# Patient Record
Sex: FEMALE | Race: BLACK OR AFRICAN AMERICAN | NOT HISPANIC OR LATINO | Employment: OTHER | URBAN - METROPOLITAN AREA
[De-identification: names, ages, dates, MRNs, and addresses within clinical notes are randomized per-mention and may not be internally consistent; named-entity substitution may affect disease eponyms.]

---

## 2017-01-06 ENCOUNTER — TELEPHONE (OUTPATIENT)
Dept: INTERNAL MEDICINE | Facility: CLINIC | Age: 57
End: 2017-01-06

## 2017-01-06 NOTE — TELEPHONE ENCOUNTER
----- Message from Bhavani Boone sent at 1/6/2017  8:43 AM CST -----  Contact: pt  _  1st Request  _  2nd Request  _  3rd Request        Who: pt    Why: pt is following up on the back brace, knee    What Number to Call Back:[phone]    When to Expect a call back: (Before the end of the day)   -- if call after 3:00 call back will be tomorrow.

## 2017-01-06 NOTE — TELEPHONE ENCOUNTER
----- Message from Bhavani Boone sent at 1/6/2017  8:43 AM CST -----  Contact: pt  _  1st Request  _  2nd Request  _  3rd Request        Who: pt    Why: pt is following up on the back brace, knee    What Number to Call Back:[phone]    When to Expect a call back: (Before the end of the day)   -- if call after 3:00 call back will be tomorrow.

## 2017-01-13 DIAGNOSIS — E11.9 TYPE 2 DIABETES MELLITUS WITHOUT COMPLICATION: ICD-10-CM

## 2017-01-20 ENCOUNTER — CLINICAL SUPPORT (OUTPATIENT)
Dept: REHABILITATION | Facility: HOSPITAL | Age: 57
End: 2017-01-20
Attending: INTERNAL MEDICINE
Payer: MEDICARE

## 2017-01-20 DIAGNOSIS — M79.89 SWELLING OF LIMB: ICD-10-CM

## 2017-01-20 DIAGNOSIS — I89.0 LYMPHEDEMA: Primary | ICD-10-CM

## 2017-01-20 PROCEDURE — 97110 THERAPEUTIC EXERCISES: CPT

## 2017-01-20 PROCEDURE — 97140 MANUAL THERAPY 1/> REGIONS: CPT

## 2017-01-23 NOTE — PROGRESS NOTES
Physician: Dipesh  Diagnosis: L breast CA- B Mastectomy B SHLOMO reconstruction  Encounter Diagnoses   Name Primary?    Lymphedema Yes    Swelling of limb         Visit #: 4    Treatment: decongestive, therex, education, issued supplies    SUBJECTIVE: Pt was delayed due to transportation by bus and police activity on bridge.  Pt lost or misplaced her [facility] information. Pt has been wearing temporary compression stockinettes regularly.  Pt has some neck, arm, back and knee complaints.   Pt will contact [facility] for compression sleeve support again for financial support Pain: 8/10 all areas  OBJECTIVE:   Pt amb to dept with str cane- pt with some wincing and altering of positions for comfort    Wearing temporary compression sleeve L UE-  Issued several new segments of TGgrip F for her L UE- donated by therapist.  Pt was educated in fit, wear schedule, possible ordering of compression arm sleeve for L UE.    Treatment:   MANUAL LYMPHATIC DRAINAGE:  While supine with arm elevated,  Drainage along neck, B subclavian regions,  Across ant chest and top of shoulder,  Axillary region, drainage of entire UE especially upper arm and forearm areas of density, wrist and hand with return of all areas proximally,  In sidelying stimulation of substitution pathways,  drainage post arm, and across back and down along trunk  SEQUENTIAL COMPRESSION PUMP: na  MULTILAYERED BANDAGING: applied new segment of TGgrip F for daytime use of L UE  Dc with any problems  [facility], request L UE compression arm sleeve and gauntlet 20-30mmHg for lymphedema   THERAPEUTIC EXERCISES:  Pt needs some cueing and encouragement for therex, posture, and participation.  Instructed in and performed AAROM L SH, gentle distraction, scapular mobilizations, stretching for R UE  .Pt was instructed in and performed dowel assisted sh flex and abd,  Scapular retractions, pec stretch in corner or doorway, bow and arrow style stretch  of reach and rotate on table top- standing with back to wall for ext,  Scapular retractions   exercises were adapted as needed for sitting postures, lean on wall on sit at table for back, neck and LE support and comfort.  Pt with ROM limited by c/o in neck and back.    Cueing and encouragement as well as breathing cues required.  Pt was able to demonstrate and voice understanding of performance. , Continue HEP of AROM, stretching, and postural correction.   PATIENT/FAMILY Education: wear schedule,  HEP,  Beginning of self massage,  Risk reduction  ASSESSMENT: Pt needs guidance and support for her overall management.  Her arm is soft and mobile with mild limitations in ROM of her neck and sh.  Pt was issued temporary segments of compression if she is not able to afford or coordinate financial assistance to receive a sleeve.   Only mild lymphedema to L UE but needs more mobility which would assist drainage, help postural alignment, and contract/relax of nearby musculature.   Emotional support provided.   Pt has mild lymphedema in her L UE but feels benefit from compression.   Pt has OA of neck and postural dysfunction that has been limiting for some time.  Encouraging AROM, postures, and positions that will assist with mobility, function as well as comfort.   Patient is making     fair                  progress towards established goals.  PLAN: Continue PT  1-2x           weekly for Complete Decongestive Therapy:  Manual lymphatic drainage, Multilayered short stretch bandaging, Pneumatic compression, Therapeutic exercises, Patient education as deemed necessary to achieve stated goals.

## 2017-02-01 ENCOUNTER — CLINICAL SUPPORT (OUTPATIENT)
Dept: REHABILITATION | Facility: HOSPITAL | Age: 57
End: 2017-02-01
Attending: INTERNAL MEDICINE
Payer: MEDICARE

## 2017-02-01 DIAGNOSIS — I89.0 LYMPHEDEMA: ICD-10-CM

## 2017-02-01 DIAGNOSIS — I97.2 POST-MASTECTOMY LYMPHEDEMA SYNDROME: Primary | ICD-10-CM

## 2017-02-01 DIAGNOSIS — M79.89 SWELLING OF LIMB: ICD-10-CM

## 2017-02-01 PROCEDURE — 97140 MANUAL THERAPY 1/> REGIONS: CPT

## 2017-02-01 PROCEDURE — G8985 CARRY GOAL STATUS: HCPCS | Mod: CJ

## 2017-02-01 PROCEDURE — 97110 THERAPEUTIC EXERCISES: CPT

## 2017-02-01 PROCEDURE — G8986 CARRY D/C STATUS: HCPCS | Mod: CK

## 2017-02-01 NOTE — PROGRESS NOTES
Physician: Dipesh  Diagnosis: L breast CA- B Mastectomy B SHLOMO reconstruction  Encounter Diagnoses   Name Primary?    Post-mastectomy lymphedema syndrome Yes    Lymphedema     Swelling of limb         Visit #:5    Pt was evaluated 11/14/16 and followed for 5 sessions of Complete Decongestive Therapy.  Pt was issued temporary stockinette compression Tubigrip F- several segments donated by therapist  Pt has previously been given [facility] as resource to assist with cost of sleeve  Dr. Landon was requested to send orders for 20-30mmHg compression arm sleeve/gauntlet directly to [employer]- pt voiced interest in getting sleeve with them- she was educated that Medicare does not cover cost and [employer] may have different vendor.   Pt rescheduled 2 sessions.    Treatment: decongestive, therex, education, issued supplies    SUBJECTIVE: Pt uses bus transportation and was early today.  Pt relates some of her struggles with her psychiatric issues and does follow with mental health support and with medications.  Pt has requests for a neck, back, and knee braces with her medical doctors.  Pt has general and ongoing complaints of pain in these areas.  Pt has been wearing temporary compression stockinettes regularly.  Pt has some neck, arm, back and knee complaints.   Pt will contact [facility] for compression sleeve support again for financial support   Pt states she may call [facility] for arm sleeve.   Pain: 6- 8/10 all areas  OBJECTIVE:   Pt amb to dept with str cane- pt moving fairly well with transitions on/off plinth.   Pt will move her arms, neck, and upper back to adjust for comfort.     Wearing temporary compression sleeve L UE-  Issued several new segments of TGgrip F for her L UE- donated by therapist.  Pt was educated in fit, wear schedule, possible ordering of compression arm sleeve for L UE.  Vendor info and confirmed she had [employer] [facility] information.    Pt voices understanding management of lymphedema risk - should continue  "compression and exercise.        LANDMARK UE eval RIGHT 11/14/16 11/14/2016   LEFT UE  LEFT UE 2/1/17 DIFFERENCE of L UE   E + 8" 36.5 cm 35.5 cm 36.0 0.5cm   E + 6" 36.0 cm 35.0 cm 34.5 0.5 cm   E + 4" 35.0 cm 33.5 cm 33.0 0.5 cm   E + 2" 32.5 cm 33.0 cm 32.0 1.0 cm   Elbow 29.5 cm 30.5 cm 29.0 1.5 cm   W+ 8" 30.5 cm 31.0 cm 31.0 0 cm   W + 6" 28.5 cm 29.0 cm 29.0 0cm   W + 4" 22.5 cm 24.5 cm 23.5 1.0 cm   Wrist 17.0 cm 17.5 cm 17.0 0.5 cm   DPC 20.5 cm 20.5 cm 19.0 1.5cm   IP Thumb 6.5 cm 6.5 cm 6.5 0 cm          Treatment:   MANUAL LYMPHATIC DRAINAGE:  While supine with arm elevated,  Drainage along neck, B subclavian regions,  Across ant chest and top of shoulder,  Axillary region, drainage of entire UE especially upper arm and forearm areas of density, wrist and hand with return of all areas proximally,  In sidelying stimulation of substitution pathways,  drainage post arm, and across back and down along trunk  SEQUENTIAL COMPRESSION PUMP: na  MULTILAYERED BANDAGING: applied new segment of TGgrip F for daytime use of L UE  Dc with any problems  Reviewed information for [facility]- Pt has contact #, request L UE compression arm sleeve and gauntlet 20-30mmHg for lymphedema   THERAPEUTIC EXERCISES:  Pt needs some cueing and encouragement for therex, posture, and participation.  Instructed in and performed AAROM L SH, gentle distraction, scapular mobilizations, stretching for R UE  .Pt was instructed in and performed dowel assisted sh flex and abd,  Scapular retractions, pec stretch in corner or doorway, bow and arrow style stretch of reach and rotate on table top- standing with back to wall for ext,  Scapular retractions   exercises were adapted as needed for sitting postures, lean on wall on sit at table for back, neck and LE support and comfort.  Pt with ROM limited by c/o in neck and back.    Cueing and encouragement as well as breathing cues required.  Provided additional copies of her HEP and postural correction. "   Pt was able to demonstrate and voice understanding of performance. , Continue HEP of AROM, stretching, and postural correction.   PATIENT/FAMILY Education: wear schedule,  HEP,  Beginning of self massage,  Risk reduction  Compression choices and cost information  Emotional support provided.  ASSESSMENT: Pt needs guidance and support for her overall management.  Her arm is soft and mobile with mild limitations in ROM of her neck and sh.  Her L UE is smaller in some areas and is well maintained.  Pt may benefit from compression sleeve or ongoing use of segments of temporary compression if she is not able to afford or coordinate financial assistance to receive a sleeve.   Only mild lymphedema to L UE but needs more mobility which would assist drainage, help postural alignment, and contract/relax of nearby musculature.   Emotional support provided.   Pt has mild lymphedema in her L UE but she feels benefit from compression and seeks a sleeve.  Pt has OA of neck and postural dysfunction that has been limiting for some time.  Encouraging AROM, postures, and positions that will assist with mobility, function as well as comfort.     FUNCTIONAL LIMITATIONS REPORTS- G codes     Category: Carry, Moving & Handling     Tool:  FOTO      Score: 49/100     Current:       Goal:  CJ 20-40%     Discharge: : CK 40-60%       Goals: ( 4-6 weeks)  Decrease girth in L UE by .5-1cm  met  Patient will demonstrate 100% knowledge of lymphedema precautions and signs of infection.   Patient with use of compression stockinette or compression sleeve.   Patient will perform self lymph drainage techniques.  Patient will show reduction in density to mild or less with improved contour of limb.  Patient to namita/doff compression garment with daily compliance. - sleeve in process  Pt to be I and compliant with HEP.   Pt to show AROM of B UE to at least 110 * of flex and abd- able to perform- did show placement of her arm overhead on  pillow in flex/abd/er  Does have some wincing and postural adjustments.  Improved postural correction and alignment. - partial    PLAN  Pt has completed course of lymphedema management and exercise recommendations.  Pt has options for compression and cost assistance.   was requested to send orders directly to [facility] if pt chooses this vendor for her sleeve. .

## 2017-02-06 ENCOUNTER — TELEPHONE (OUTPATIENT)
Dept: HEMATOLOGY/ONCOLOGY | Facility: CLINIC | Age: 57
End: 2017-02-06

## 2017-02-16 ENCOUNTER — OFFICE VISIT (OUTPATIENT)
Dept: NEUROLOGY | Facility: CLINIC | Age: 57
End: 2017-02-16
Payer: MEDICARE

## 2017-02-16 ENCOUNTER — OFFICE VISIT (OUTPATIENT)
Dept: INTERNAL MEDICINE | Facility: CLINIC | Age: 57
End: 2017-02-16
Attending: INTERNAL MEDICINE
Payer: MEDICARE

## 2017-02-16 VITALS
DIASTOLIC BLOOD PRESSURE: 84 MMHG | WEIGHT: 195.31 LBS | BODY MASS INDEX: 34.61 KG/M2 | SYSTOLIC BLOOD PRESSURE: 132 MMHG | HEIGHT: 63 IN | HEART RATE: 80 BPM

## 2017-02-16 VITALS
SYSTOLIC BLOOD PRESSURE: 138 MMHG | HEART RATE: 82 BPM | DIASTOLIC BLOOD PRESSURE: 92 MMHG | BODY MASS INDEX: 33.98 KG/M2 | OXYGEN SATURATION: 97 % | WEIGHT: 191.81 LBS | HEIGHT: 63 IN

## 2017-02-16 DIAGNOSIS — M47.812 CERVICAL SPONDYLOSIS WITHOUT MYELOPATHY: ICD-10-CM

## 2017-02-16 DIAGNOSIS — G62.9 PERIPHERAL POLYNEUROPATHY: Primary | ICD-10-CM

## 2017-02-16 DIAGNOSIS — N30.01 ACUTE CYSTITIS WITH HEMATURIA: Primary | ICD-10-CM

## 2017-02-16 DIAGNOSIS — M54.16 LUMBAR RADICULITIS: ICD-10-CM

## 2017-02-16 DIAGNOSIS — M54.12 CERVICAL RADICULOPATHY: ICD-10-CM

## 2017-02-16 DIAGNOSIS — T45.1X5A CHEMOTHERAPY-INDUCED NEUROPATHY: ICD-10-CM

## 2017-02-16 DIAGNOSIS — G62.0 CHEMOTHERAPY-INDUCED NEUROPATHY: ICD-10-CM

## 2017-02-16 LAB
BACTERIA #/AREA URNS AUTO: ABNORMAL /HPF
BILIRUB UR QL STRIP: NEGATIVE
CLARITY UR REFRACT.AUTO: CLEAR
COLOR UR AUTO: YELLOW
GLUCOSE UR QL STRIP: NEGATIVE
HGB UR QL STRIP: ABNORMAL
HYALINE CASTS UR QL AUTO: 0 /LPF
KETONES UR QL STRIP: NEGATIVE
LEUKOCYTE ESTERASE UR QL STRIP: ABNORMAL
MICROSCOPIC COMMENT: ABNORMAL
NITRITE UR QL STRIP: NEGATIVE
PH UR STRIP: 6 [PH] (ref 5–8)
PROT UR QL STRIP: ABNORMAL
RBC #/AREA URNS AUTO: 16 /HPF (ref 0–4)
SP GR UR STRIP: 1.01 (ref 1–1.03)
SQUAMOUS #/AREA URNS AUTO: 1 /HPF
URN SPEC COLLECT METH UR: ABNORMAL
UROBILINOGEN UR STRIP-ACNC: NEGATIVE EU/DL
WBC #/AREA URNS AUTO: 50 /HPF (ref 0–5)

## 2017-02-16 PROCEDURE — 81001 URINALYSIS AUTO W/SCOPE: CPT

## 2017-02-16 PROCEDURE — 87086 URINE CULTURE/COLONY COUNT: CPT

## 2017-02-16 PROCEDURE — 99999 PR PBB SHADOW E&M-EST. PATIENT-LVL III: CPT | Mod: PBBFAC,,, | Performed by: INTERNAL MEDICINE

## 2017-02-16 PROCEDURE — 99999 PR PBB SHADOW E&M-EST. PATIENT-LVL III: CPT | Mod: PBBFAC,,, | Performed by: PSYCHIATRY & NEUROLOGY

## 2017-02-16 PROCEDURE — 87077 CULTURE AEROBIC IDENTIFY: CPT

## 2017-02-16 PROCEDURE — 3075F SYST BP GE 130 - 139MM HG: CPT | Mod: S$GLB,,, | Performed by: INTERNAL MEDICINE

## 2017-02-16 PROCEDURE — 3079F DIAST BP 80-89 MM HG: CPT | Mod: S$GLB,,, | Performed by: PSYCHIATRY & NEUROLOGY

## 2017-02-16 PROCEDURE — 99213 OFFICE O/P EST LOW 20 MIN: CPT | Mod: S$GLB,,, | Performed by: INTERNAL MEDICINE

## 2017-02-16 PROCEDURE — 99214 OFFICE O/P EST MOD 30 MIN: CPT | Mod: S$GLB,,, | Performed by: PSYCHIATRY & NEUROLOGY

## 2017-02-16 PROCEDURE — 3078F DIAST BP <80 MM HG: CPT | Mod: S$GLB,,, | Performed by: INTERNAL MEDICINE

## 2017-02-16 PROCEDURE — 3075F SYST BP GE 130 - 139MM HG: CPT | Mod: S$GLB,,, | Performed by: PSYCHIATRY & NEUROLOGY

## 2017-02-16 PROCEDURE — 87186 SC STD MICRODIL/AGAR DIL: CPT

## 2017-02-16 PROCEDURE — 87088 URINE BACTERIA CULTURE: CPT

## 2017-02-16 RX ORDER — NITROFURANTOIN (MACROCRYSTALS) 100 MG/1
100 CAPSULE ORAL 2 TIMES DAILY
Qty: 10 CAPSULE | Refills: 0 | Status: SHIPPED | OUTPATIENT
Start: 2017-02-16 | End: 2017-02-21

## 2017-02-16 RX ORDER — INSULIN PUMP SYRINGE, 3 ML
EACH MISCELLANEOUS
Qty: 1 EACH | Refills: 0 | Status: SHIPPED | OUTPATIENT
Start: 2017-02-16 | End: 2019-12-24

## 2017-02-16 RX ORDER — LANCETS
EACH MISCELLANEOUS
Qty: 100 EACH | Refills: 11 | Status: SHIPPED | OUTPATIENT
Start: 2017-02-16 | End: 2021-11-16 | Stop reason: SDUPTHER

## 2017-02-16 RX ORDER — PREGABALIN 100 MG/1
100 CAPSULE ORAL 2 TIMES DAILY
Qty: 60 CAPSULE | Refills: 6 | Status: SHIPPED | OUTPATIENT
Start: 2017-02-16 | End: 2017-03-24

## 2017-02-16 RX ORDER — PREGABALIN 100 MG/1
100 CAPSULE ORAL 2 TIMES DAILY
COMMUNITY
End: 2017-02-16 | Stop reason: SDUPTHER

## 2017-02-16 NOTE — PROGRESS NOTES
Subjective:       Patient ID: Jonathan Gonzales is a 57 y.o. female.    Reason for Consult: Peripheral Neuropathy; Neck Pain; and Back Pain      Interval History:  Jonathan Gonzales is here for follow up. Their condition is clinically stable from the peripheral neuropathy perspective.  She notes that the Lyrica at 100 mg by mouth twice a day is somewhat sedating however she notes that this is her significant relief of her neuropathy issues.  She does note that her neck and back pain have been flaring up.  She does note that she refused further intervention several months back for her neck and back pain issues but will go back and see [facility] management [facility] and see what her options are at this point in time.  She is going to get her HbA1c checked today.  Previously it was 5.4.     Objective:     Vitals:    02/16/17 1341   BP: 132/84   Pulse: 80     Patient is awake alert oriented to person place and time.  Spurling's is positive on the left.  There is a length dependent stocking glove neuropathy with decrement of sensation to pinprick and fine touch less than 70% of normal sensation worse distally than proximally.  Focused examination was undertaken today. Over 50% of face to face time of 25 minute visit time was in giving guidance, counseling and discussing treatment options.    Assessment:     1. Peripheral polyneuropathy  pregabalin (LYRICA) 100 MG capsule   2. Chemotherapy-induced neuropathy     3. Cervical spondylosis without myelopathy  pregabalin (LYRICA) 100 MG capsule   4. Cervical radiculopathy  pregabalin (LYRICA) 100 MG capsule   5. Lumbar radiculitis  pregabalin (LYRICA) 100 MG capsule       Plan:   57-year-old right-handed female presents for evaluation and follow-up of diabetes and chemotherapy related neuropathy.  At this point in time as this medication is helping her despite some mild sedation I will prescribe her this medication again.  She is deriving benefit  from this in excess of the intolerable side effects that she is experiencing.  We have had a discussion today stating that this is a medication that will relieve her symptoms but will not do anything to help with the underlying neuropathy that she may likely live with numbness and tingling for the rest of her life of which she verbalizes understanding.  We have discussed the risks, benefits and alternatives to the treatment plan as outlined above.  I will follow up with them in 6 month(s).  The patient verbalizes understanding and agreement with the treatment plan. Questions were sought and answered to her stated verbal satisfaction.        Franchesca Osuna MD    This note is dictated on Dragon Natural Speaking word recognition program. There are word recognition mistakes that are occasionally missed on review.

## 2017-02-16 NOTE — PROGRESS NOTES
"Subjective:       Patient ID: Jonathan Gonzales is a 57 y.o. female.    Chief Complaint: Urinary Frequency    HPI Comments: Here for urgent visit    Increased urinary frequency and urgency, lower abdominal discomfort, and hematuria for the past week. She denies F/C, dysuria, or vaginal discharge. She does not check her BG due to not having a meter any longer but her son recommended she increase her insulin so she increased her lantus to 35 units last night and she feels her symptoms have improved some today.        Review of Systems       Objective:      Vitals:    02/16/17 1502   BP: (!) 138/92   Pulse: 82   SpO2: 97%   Weight: 87 kg (191 lb 12.8 oz)   Height: 5' 3" (1.6 m)      Physical Exam   Constitutional: She is oriented to person, place, and time. She appears well-developed and well-nourished. She does not have a sickly appearance. No distress.   HENT:   Head: Normocephalic and atraumatic.   Eyes: Conjunctivae and EOM are normal. Right eye exhibits no discharge. Left eye exhibits no discharge. No scleral icterus.   Pulmonary/Chest: Effort normal. No respiratory distress.   Abdominal: Normal appearance. She exhibits no distension. There is no tenderness. There is no CVA tenderness.   Neurological: She is alert and oriented to person, place, and time.   Skin: Skin is warm and dry. She is not diaphoretic.   Psychiatric: She has a normal mood and affect. Her speech is normal.       Assessment:       1. Acute cystitis with hematuria        Plan:       Jonathan was seen today for urinary frequency.    Diagnoses and all orders for this visit:    Urinary urgency  -     Urinalysis  -     Urine culture  -     nitrofurantoin (MACRODANTIN) 100 MG capsule; Take 1 capsule (100 mg total) by mouth 2 (two) times daily.    Other orders  -     blood-glucose meter (FREESTYLE SYSTEM KIT) kit; Pt to check BG up to QID. Dispense strips compatible with pt brand meter  -     blood sugar diagnostic Strp; Pt to check BG up to QID. " Dispense strips compatible with pt brand meter  -     lancets (ONETOUCH ULTRASOFT LANCETS) Misc; Pt to check BG up to QID. Dispense strips compatible with pt brand meter                 Side effects of medication(s) were discussed in detail and patient voiced understanding.  Patient will call back for any issues or complications.

## 2017-02-17 ENCOUNTER — TELEPHONE (OUTPATIENT)
Dept: INTERNAL MEDICINE | Facility: CLINIC | Age: 57
End: 2017-02-17

## 2017-02-17 NOTE — TELEPHONE ENCOUNTER
----- Message from Ghulam Contreras MD sent at 2/17/2017 12:59 PM CST -----  A1c has risen slightly to 8.3.  Emphasize dietary and medication adherence.  No changes in medications.  Followup as scheduled.

## 2017-02-20 LAB — BACTERIA UR CULT: NORMAL

## 2017-02-24 ENCOUNTER — TELEPHONE (OUTPATIENT)
Dept: INTERNAL MEDICINE | Facility: CLINIC | Age: 57
End: 2017-02-24

## 2017-02-24 NOTE — TELEPHONE ENCOUNTER
----- Message from Anjana Pelletier sent at 2/24/2017  9:46 AM CST -----  Contact: Dr. Lundberg / [employer] / Ph# [phone]  X  1st Request  _  2nd Request  _  3rd Request    Who: Jonathan Gonzales (mrn# [id number])    Why: Please give a call back at your earliest convenience to Dr. Lundberg with [employer] as it pertains to this patient. THANKS!    What Number to Call Back: [phone]    When to Expect a call back: (Before the end of the day)   -- if the call is after 12:00, the call back will be tomorrow.

## 2017-02-24 NOTE — TELEPHONE ENCOUNTER
Dr. Lundberg is requesting a call to discuss disability hearing for the pt. Dr. Lundberg states he wanted to discuss pt LOV and f/u on questions and concerns about pt status. Dr. Lundberg informed of pt PCP unavailable. Dr. Lundberg states he would like to discuss more with the covering physician. Please advise/authorize?

## 2017-02-25 ENCOUNTER — HOSPITAL ENCOUNTER (EMERGENCY)
Facility: HOSPITAL | Age: 57
Discharge: HOME | End: 2017-02-26
Attending: EMERGENCY MEDICINE
Payer: MEDICAID

## 2017-02-25 DIAGNOSIS — R52 PAIN: ICD-10-CM

## 2017-02-25 DIAGNOSIS — N39.0 ACUTE UTI: ICD-10-CM

## 2017-02-25 DIAGNOSIS — E86.0 DEHYDRATION: ICD-10-CM
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
## 2017-02-25 DIAGNOSIS — M25.552 LEFT HIP PAIN: Primary | ICD-10-CM

## 2017-02-25 DIAGNOSIS — I10 ESSENTIAL HYPERTENSION: ICD-10-CM

## 2017-02-25 LAB — POCT GLUCOSE: 90 MG/DL (ref 70–110)

## 2017-02-25 PROCEDURE — 99285 EMERGENCY DEPT VISIT HI MDM: CPT | Mod: 25

## 2017-02-25 PROCEDURE — 82962 GLUCOSE BLOOD TEST: CPT

## 2017-02-25 PROCEDURE — 96361 HYDRATE IV INFUSION ADD-ON: CPT

## 2017-02-25 PROCEDURE — 96360 HYDRATION IV INFUSION INIT: CPT

## 2017-02-25 NOTE — ED AVS SNAPSHOT
[facility]  [address] [place] [address]               Jonathan Gonzales   2017 10:52 PM   ED    Description:  Female : 1960   Department:  [facility]           Your Care was Coordinated By:     Provider Role From To    Leticia Mcclelland MD Attending Provider [id number] --      Reason for Visit     Hip Pain           Diagnoses this Visit        Comments    Left hip pain    -  Primary     Pain         Fall, initial encounter         Acute UTI           ED Disposition     None           To Do List           Follow-up Information     Follow up with Ghulam Contreras MD In 2 days.    Specialty:  Family Medicine    Contact information:    [address]  [address]  [facility] [address]  [phone]          Follow up with Shiraz Hawley MD.    Specialty:  Orthopedic Surgery    Why:  If symptoms worsen, or do not improve    Contact information:    [address]  [address]  [place] [address]  [phone]         These Medications        Disp Refills Start End    meloxicam (MOBIC) 7.5 MG tablet 10 tablet 0 2017     Take 1 tablet (7.5 mg total) by mouth once daily. - Oral    Pharmacy: [id number] - [place], [place] - [address] AT [facility] Ph #: [phone]       cephALEXin (KEFLEX) 500 MG capsule 28 capsule 0 2017 3/5/2017    Take 1 capsule (500 mg total) by mouth 4 (four) times daily. - Oral    Pharmacy: [id number] - [place], [place] - [address] AT [facility] Ph #: [phone]         [facility]     [facility] -  Assistance  Registered nurses in the [facility] provide clinical advisement, health education, appointment booking, and other advisory services.  Call for this free service at [phone].             Medications           Message regarding Medications      Verify the changes and/or additions to your medication regime listed below are the same as discussed with your clinician today.  If any of these changes or additions are incorrect, please notify your healthcare provider.        START taking these NEW medications        Refills    meloxicam (MOBIC) 7.5 MG tablet 0    Sig: Take 1 tablet (7.5 mg total) by mouth once daily.    Class: Print    Route: Oral    cephALEXin (KEFLEX) 500 MG capsule 0    Sig: Take 1 capsule (500 mg total) by mouth 4 (four) times daily.    Class: Print    Route: Oral      These medications were administered today        Dose Freq    sodium chloride 0.9% bolus 1,000 mL 1,000 mL Once    Sig: Inject 1,000 mLs into the vein once.    Class: Normal    Route: Intravenous    lisinopril tablet 10 mg 10 mg ED 1 Time    Sig: Take 2 tablets (10 mg total) by mouth ED 1 Time.    Class: Normal    Route: Oral    hydrochlorothiazide tablet 25 mg 25 mg ED 1 Time    Sig: Take 1 tablet (25 mg total) by mouth ED 1 Time.    Class: Normal    Route: Oral      STOP taking these medications     ibuprofen (ADVIL,MOTRIN) 600 MG tablet            Verify that the below list of medications is an accurate representation of the medications you are currently taking.  If none reported, the list may be blank. If incorrect, please contact your healthcare provider. Carry this list with you in case of emergency.           Current Medications     divalproex (DEPAKOTE) 500 MG Tb24     lisinopril-hydrochlorothiazide (PRINZIDE,ZESTORETIC) 10-12.5 mg per tablet TAKE ONE TABLET BY MOUTH ONCE DAILY FOR BLOOD PRESSURE    metformin (GLUCOPHAGE) 500 MG tablet Take 1 tablet (500 mg total) by mouth 2 (two) times daily with meals.    nitrofurantoin (MACRODANTIN) 100 MG capsule Take 100 mg by mouth 4 (four) times daily.    olanzapine (ZYPREXA) 20 MG tablet     pravastatin (PRAVACHOL) 20 MG tablet Take 1 tablet (20 mg total) by mouth once daily.    ziprasidone (GEODON) 40 MG Cap Take 20 mg by mouth  "2 (two) times daily.    ammonium lactate (LAC-HYDRIN) 12 % lotion     blood sugar diagnostic Strp Pt to check BG up to QID. Dispense strips compatible with pt brand meter    blood-glucose meter (FREESTYLE SYSTEM KIT) kit Pt to check BG up to QID. Dispense strips compatible with pt brand meter    cephALEXin (KEFLEX) 500 MG capsule Take 1 capsule (500 mg total) by mouth 4 (four) times daily.    FLUARIX QUAD 3983-1661, PF, 60 mcg (15 mcg x 4)/0.5 mL Syrg TO BE ADMINISTERED BY PHARMACIST FOR IMMUNIZATION    insulin glargine (LANTUS SOLOSTAR) 100 unit/mL (3 mL) InPn pen Inject 25 Units into the skin every evening.    KLOR-CON M20 20 mEq tablet Take 1 tablet (20 mEq total) by mouth 2 (two) times daily.    lancets (ONETOUCH ULTRASOFT LANCETS) Misc Pt to check BG up to QID. Dispense strips compatible with pt brand meter    letrozole (FEMARA) 2.5 mg Tab Take 1 tablet (2.5 mg total) by mouth once daily.    meloxicam (MOBIC) 7.5 MG tablet Take 1 tablet (7.5 mg total) by mouth once daily.    multivit-iron-FA-calcium &mins (ONE-A-DAY WOMENS FORMULA) 18 mg iron-400 mcg-500 mg Ca Tab Take 1 tablet by mouth Daily.    nicotine (NICODERM CQ) 21 mg/24 hr Place 1 patch onto the skin once daily.    nicotine polacrilex (NICORETTE) 4 MG Gum Take 1 each (4 mg total) by mouth as needed.    nystatin (MYCOSTATIN) cream Apply topically 2 (two) times daily.    pen needle, diabetic (BD ULTRA-FINE [place] PEN NEEDLES) 32 gauge x 5/32" Ndle Pt is injecting once daily    pen needle, diabetic, safety 29 gauge x 3/16" Ndle Use as instructed    pregabalin (LYRICA) 100 MG capsule Take 1 capsule (100 mg total) by mouth 2 (two) times daily.    quetiapine (SEROQUEL) 100 MG Tab Take 100 mg by mouth nightly as needed.            Clinical Reference Information           Your Vitals Were     BP                   155/96           Allergies as of 2/26/2017        Reactions    Banana Nausea And Vomiting      Immunizations Administered on Date of Encounter - " 2/26/2017     None      ED Micro, Lab, POCT     Start Ordered       Status Ordering Provider    02/26/17 0125 02/26/17 0124  Urine culture  STAT      In process     02/25/17 2321 02/25/17 2320  Ethanol  Once      Final result     02/25/17 2321 02/25/17 2320  Drug screen panel, emergency  STAT      Final result     02/25/17 2321 02/25/17 2320  Acetaminophen level  Once      Final result     02/25/17 2321 02/25/17 2320  Salicylate level  Once      Final result     02/25/17 2321 02/25/17 2320  APTT  STAT      Final result     02/25/17 2321 02/25/17 2320  TSH  Once      Final result     02/25/17 2321 02/25/17 2320  Protime-INR  STAT      Final result     02/25/17 2321 02/25/17 2320  Troponin I  STAT      Final result     02/25/17 2321 02/25/17 2320  B-Type natriuretic peptide (BNP)  STAT      Final result     02/25/17 2321 02/25/17 2320  Ammonia  STAT      Final result     02/25/17 2321 02/25/17 2320  Magnesium  Once      Final result     02/25/17 2320 02/25/17 2320  CBC auto differential  STAT      Final result     02/25/17 2320 02/25/17 2320  Comprehensive metabolic panel  STAT      Final result     02/25/17 2310 02/25/17 2310  POCT glucose  Once      Final result     02/25/17 2252 02/25/17 2251  Urinalysis  STAT      Final result     02/25/17 2251 02/25/17 2251  Urinalysis Microscopic  Once      Final result       ED Imaging Orders     Start Ordered       Status Ordering Provider    02/26/17 0350 02/26/17 0349  CT Lower Extremity Without Contrast Left  1 time imaging      In process     02/25/17 2321 02/25/17 2321  CT Head Without Contrast  1 time imaging      Final result     02/25/17 2320 02/25/17 2320  X-Ray Chest AP Portable  1 time imaging      Final result     02/25/17 2252 02/25/17 2251  X-Ray Hips Bilateral 2 View Incl AP Pelvis  1 time imaging      Final result         Discharge Instructions       Continue medicines as previously prescribed.  Be careful which medicines you are taking so that you are not  accidentally taking additional doses of the same medication.  Return to emergency department for worsening severe pain, fever or difficulty walking, bowel or bladder incontinence or retention, or any other concerns.    Discharge References/Attachments     ARTHRALGIA (ENGLISH)    BLADDER INFECTION, FEMALE (ADULT) (ENGLISH)      Your Scheduled Appointments     Mar 03, 2017  1:00 PM CST   Non-Fasting Lab [facility]   [facility] [place]    [address]  [place] [address] [phone]            Mar 03, 2017  1:15 PM CST   Urine with SPECIMEN, [facility] [place] [facility]    [address]  [place] [address] [phone]            Mar 07, 2017  2:00 PM CST   Established Patient Visit with Galina Landon MD   [facility] ([facility])    [address] [place] [address] [phone]            Aug 17, 2017  1:00 PM CDT   Neurology - Established Patient with Cuba Osuna III, MD   [facility]    [address] [place] [address] [place] [address] [phone]              Smoking Cessation     If you would like to quit smoking:   You may be eligible for free services if you are a [place] resident and started smoking cigarettes before September 1, 1988.  Call the Smoking Cessation Trust (SCT) toll free at (320) 742-2013 or (888) 627-7048.   Call 4-800-QUIT-NOW if you do not meet the above criteria.             [facility] complies with applicable Federal civil rights laws and does not discriminate on the basis of race, color, national origin, age, disability, or sex.        Language Assistance Services     ATTENTION: Language assistance services are available, free of charge. Please call [phone].      ATENCIÓN: Si habla lee, tiene a dorman disposición servicios gratuitos de asistencia lingüística. Llame al [phone].     CHÚ Ý: N?u b?n nói Ti?ng Vi?t, có các d?ch v? h? tr?  steff lane? mi?n phí dành cho b?n. G?i s? [phone].

## 2017-02-26 VITALS
HEART RATE: 86 BPM | OXYGEN SATURATION: 97 % | BODY MASS INDEX: 30.82 KG/M2 | RESPIRATION RATE: 18 BRPM | TEMPERATURE: 99 F | DIASTOLIC BLOOD PRESSURE: 72 MMHG | HEIGHT: 65 IN | SYSTOLIC BLOOD PRESSURE: 138 MMHG | WEIGHT: 185 LBS

## 2017-02-26 LAB
ALBUMIN SERPL BCP-MCNC: 3.4 G/DL
ALP SERPL-CCNC: 63 U/L
ALT SERPL W/O P-5'-P-CCNC: 37 U/L
AMMONIA PLAS-SCNC: 26 UMOL/L
AMPHET+METHAMPHET UR QL: NEGATIVE
ANION GAP SERPL CALC-SCNC: 12 MMOL/L
APAP SERPL-MCNC: <3 UG/ML
APTT BLDCRRT: <21 SEC
AST SERPL-CCNC: 109 U/L
BACTERIA #/AREA URNS HPF: ABNORMAL /HPF
BARBITURATES UR QL SCN>200 NG/ML: NEGATIVE
BASOPHILS # BLD AUTO: 0.04 K/UL
BASOPHILS NFR BLD: 0.4 %
BENZODIAZ UR QL SCN>200 NG/ML: NEGATIVE
BILIRUB SERPL-MCNC: 1.1 MG/DL
BILIRUB UR QL STRIP: NEGATIVE
BNP SERPL-MCNC: 19 PG/ML
BUN SERPL-MCNC: 14 MG/DL
BZE UR QL SCN: NEGATIVE
CALCIUM SERPL-MCNC: 9.5 MG/DL
CANNABINOIDS UR QL SCN: NEGATIVE
CHLORIDE SERPL-SCNC: 106 MMOL/L
CLARITY UR: CLEAR
CO2 SERPL-SCNC: 26 MMOL/L
COLOR UR: YELLOW
CREAT SERPL-MCNC: 1 MG/DL
CREAT UR-MCNC: 166.4 MG/DL
DIFFERENTIAL METHOD: ABNORMAL
EOSINOPHIL # BLD AUTO: 0.1 K/UL
EOSINOPHIL NFR BLD: 1.3 %
ERYTHROCYTE [DISTWIDTH] IN BLOOD BY AUTOMATED COUNT: 14.9 %
EST. GFR  (AFRICAN AMERICAN): >60 ML/MIN/1.73 M^2
EST. GFR  (NON AFRICAN AMERICAN): >60 ML/MIN/1.73 M^2
ETHANOL SERPL-MCNC: <10 MG/DL
GLUCOSE SERPL-MCNC: 103 MG/DL
GLUCOSE UR QL STRIP: NEGATIVE
HCT VFR BLD AUTO: 39.5 %
HGB BLD-MCNC: 12.7 G/DL
HGB UR QL STRIP: NEGATIVE
INR PPP: 1
KETONES UR QL STRIP: ABNORMAL
LEUKOCYTE ESTERASE UR QL STRIP: ABNORMAL
LYMPHOCYTES # BLD AUTO: 2.6 K/UL
LYMPHOCYTES NFR BLD: 23.9 %
MAGNESIUM SERPL-MCNC: 1.8 MG/DL
MCH RBC QN AUTO: 29.7 PG
MCHC RBC AUTO-ENTMCNC: 32.2 %
MCV RBC AUTO: 93 FL
METHADONE UR QL SCN>300 NG/ML: NEGATIVE
MICROSCOPIC COMMENT: ABNORMAL
MONOCYTES # BLD AUTO: 1.5 K/UL
MONOCYTES NFR BLD: 13.9 %
NEUTROPHILS # BLD AUTO: 6.5 K/UL
NEUTROPHILS NFR BLD: 59.6 %
NITRITE UR QL STRIP: NEGATIVE
OPIATES UR QL SCN: NEGATIVE
PCP UR QL SCN>25 NG/ML: NEGATIVE
PH UR STRIP: 5 [PH] (ref 5–8)
PLATELET # BLD AUTO: 304 K/UL
PMV BLD AUTO: 10.1 FL
POTASSIUM SERPL-SCNC: 3.8 MMOL/L
PROT SERPL-MCNC: 8.4 G/DL
PROT UR QL STRIP: NEGATIVE
PROTHROMBIN TIME: 10.7 SEC
RBC # BLD AUTO: 4.27 M/UL
SALICYLATES SERPL-MCNC: <5 MG/DL
SODIUM SERPL-SCNC: 144 MMOL/L
SP GR UR STRIP: 1.02 (ref 1–1.03)
TOXICOLOGY INFORMATION: NORMAL
TROPONIN I SERPL DL<=0.01 NG/ML-MCNC: 0.02 NG/ML
TSH SERPL DL<=0.005 MIU/L-ACNC: 3.42 UIU/ML
URN SPEC COLLECT METH UR: ABNORMAL
UROBILINOGEN UR STRIP-ACNC: NEGATIVE EU/DL
WBC # BLD AUTO: 10.97 K/UL
WBC #/AREA URNS HPF: 12 /HPF (ref 0–5)

## 2017-02-26 PROCEDURE — 84484 ASSAY OF TROPONIN QUANT: CPT

## 2017-02-26 PROCEDURE — 85025 COMPLETE CBC W/AUTO DIFF WBC: CPT

## 2017-02-26 PROCEDURE — 83880 ASSAY OF NATRIURETIC PEPTIDE: CPT

## 2017-02-26 PROCEDURE — 82570 ASSAY OF URINE CREATININE: CPT

## 2017-02-26 PROCEDURE — 83735 ASSAY OF MAGNESIUM: CPT

## 2017-02-26 PROCEDURE — 81000 URINALYSIS NONAUTO W/SCOPE: CPT

## 2017-02-26 PROCEDURE — 85610 PROTHROMBIN TIME: CPT

## 2017-02-26 PROCEDURE — 25000003 PHARM REV CODE 250: Performed by: EMERGENCY MEDICINE

## 2017-02-26 PROCEDURE — 80329 ANALGESICS NON-OPIOID 1 OR 2: CPT

## 2017-02-26 PROCEDURE — 93005 ELECTROCARDIOGRAM TRACING: CPT

## 2017-02-26 PROCEDURE — 80307 DRUG TEST PRSMV CHEM ANLYZR: CPT

## 2017-02-26 PROCEDURE — 87086 URINE CULTURE/COLONY COUNT: CPT

## 2017-02-26 PROCEDURE — 85730 THROMBOPLASTIN TIME PARTIAL: CPT

## 2017-02-26 PROCEDURE — 80053 COMPREHEN METABOLIC PANEL: CPT

## 2017-02-26 PROCEDURE — 84443 ASSAY THYROID STIM HORMONE: CPT

## 2017-02-26 PROCEDURE — 80320 DRUG SCREEN QUANTALCOHOLS: CPT

## 2017-02-26 PROCEDURE — 82140 ASSAY OF AMMONIA: CPT

## 2017-02-26 RX ORDER — MELOXICAM 7.5 MG/1
7.5 TABLET ORAL DAILY
Qty: 10 TABLET | Refills: 0 | Status: SHIPPED | OUTPATIENT
Start: 2017-02-26 | End: 2017-06-19

## 2017-02-26 RX ORDER — LISINOPRIL 5 MG/1
10 TABLET ORAL
Status: COMPLETED | OUTPATIENT
Start: 2017-02-26 | End: 2017-02-26

## 2017-02-26 RX ORDER — NITROFURANTOIN (MACROCRYSTALS) 100 MG/1
100 CAPSULE ORAL 4 TIMES DAILY
COMMUNITY
End: 2017-06-19 | Stop reason: ALTCHOICE

## 2017-02-26 RX ORDER — ZIPRASIDONE HYDROCHLORIDE 40 MG/1
20 CAPSULE ORAL 2 TIMES DAILY
COMMUNITY
End: 2017-05-25

## 2017-02-26 RX ORDER — HYDROCHLOROTHIAZIDE 25 MG/1
25 TABLET ORAL
Status: COMPLETED | OUTPATIENT
Start: 2017-02-26 | End: 2017-02-26

## 2017-02-26 RX ORDER — IBUPROFEN 600 MG/1
600 TABLET ORAL
Status: COMPLETED | OUTPATIENT
Start: 2017-02-26 | End: 2017-02-26

## 2017-02-26 RX ORDER — CEPHALEXIN 500 MG/1
500 CAPSULE ORAL 4 TIMES DAILY
Qty: 28 CAPSULE | Refills: 0 | Status: SHIPPED | OUTPATIENT
Start: 2017-02-26 | End: 2017-03-05

## 2017-02-26 RX ADMIN — SODIUM CHLORIDE 1000 ML: 0.9 INJECTION, SOLUTION INTRAVENOUS at 12:02

## 2017-02-26 RX ADMIN — LISINOPRIL 10 MG: 5 TABLET ORAL at 01:02

## 2017-02-26 RX ADMIN — IBUPROFEN 600 MG: 600 TABLET, FILM COATED ORAL at 08:02

## 2017-02-26 RX ADMIN — HYDROCHLOROTHIAZIDE 25 MG: 25 TABLET ORAL at 01:02

## 2017-02-26 NOTE — ED NOTES
Pt reports having a double mastectomy, MD Carrier made aware. MD Carrier ok'd to use either arm to insert IV.

## 2017-02-26 NOTE — ED NOTES
While attempting to do sitting orthostatics, pt was unable to sit up in bed without falling back. Family (daughter and son) at the bedside witnessed.

## 2017-02-26 NOTE — ED PROVIDER NOTES
"Encounter Date: 2/25/2017    SCRIBE #1 NOTE: I, Johny Randy KIRK, am scribing for, and in the presence of,  Leticia Mcclelland MD. I have scribed the following portions of the note - Other sections scribed: HPI and ROS.       History     Chief Complaint   Patient presents with    Hip Pain     bilateral hip pain all day due to a fall at home. She at home alone and fell and when family came home and  found pt on the floor.     Review of patient's allergies indicates:   Allergen Reactions    Banana Nausea And Vomiting     HPI Comments: CC: Hip Pain     HPI: This 57 y.o. female smoker (x5 per day) with depression, bipolar disorder, schizophrenia, HTN, breast cancer, type II IDDM, neuropathy, and post-mastectomy lymphedema syndrome presents to the ED c/o acute onset, mild (2/10) bilateral hip pain that began earlier today post fall. Daughter of pt states pt was home alone and was found covered in feces when she got home at 7 pm. Pt reports trying to use the bathroom when she fell and hit the back of her head on the floor. Pt states she had been lying on the floor all day. Daughter reports pt has been acting "drowsy and not like herself." Daughter reports finding random pills hidden everywhere around the house. No prior medical treatment has been attempted. No alleviating or exacerbating factors. Pt denies headaches, n/v, fever, and LOC.      The history is provided by the patient and a relative. No  was used.     Past Medical History:   Diagnosis Date    Bipolar disorder     Cancer of female breast 10/2011    T2 N1 Mx, Stage IIB left breast cancer    Depression     Diabetes mellitus type II     Hypertension     Neuropathy     Post-mastectomy lymphedema syndrome     Schizophrenia      Past Surgical History:   Procedure Laterality Date    BREAST RECONSTRUCTION  11/23/11    B/L SHLOMO flap reconstruction S/P left MRM, right prophylactic mastectomy    BREAST RECONSTRUCTION Bilateral " 3/13/2015    NIPPLE RECONSTRUCTION    MASTECTOMY      bilateral    TUBAL LIGATION       Family History   Problem Relation Age of Onset    Kidney disease Mother     Diabetic kidney disease Mother     Hypertension Mother     Diabetes Mother     Diabetic kidney disease Sister     Cancer Sister     Diabetic kidney disease Brother     Diabetic kidney disease Son     Breast cancer Neg Hx     Ovarian cancer Neg Hx      Social History   Substance Use Topics    Smoking status: Current Every Day Smoker     Types: Cigarettes    Smokeless tobacco: None      Comment: Smoke 5 cigarettes a day.    Alcohol use No     Review of Systems   Constitutional: Negative for chills, diaphoresis and fever.   HENT: Negative for ear pain and sore throat.    Eyes: Negative for pain.        (-) eye problems   Respiratory: Negative for cough and shortness of breath.    Cardiovascular: Negative for chest pain.   Gastrointestinal: Negative for abdominal pain, diarrhea, nausea and vomiting.   Genitourinary: Negative for dysuria.   Musculoskeletal: Positive for myalgias. Negative for back pain.        (+) left hip   Skin: Negative for rash.   Neurological: Negative for headaches.       Physical Exam   Initial Vitals   BP Pulse Resp Temp SpO2   02/25/17 2249 02/25/17 2249 02/25/17 2249 02/25/17 2249 02/25/17 2249   130/90 98 16 99.2 °F (37.3 °C) 96 %     Physical Exam    Nursing note and vitals reviewed.  Constitutional: She appears well-developed and well-nourished.   HENT:   Head: Normocephalic and atraumatic.   Mouth/Throat: Mucous membranes are dry.   Eyes: Conjunctivae and EOM are normal.   Neck: Neck supple.   Cardiovascular: Regular rhythm and normal heart sounds. Exam reveals no gallop and no friction rub.    No murmur heard.  Pulmonary/Chest: Breath sounds normal. No respiratory distress. She has no wheezes. She has no rhonchi. She has no rales.   Abdominal: Soft. Bowel sounds are normal. She exhibits no distension and no  pulsatile midline mass. There is no tenderness. There is no rebound and no guarding.   Musculoskeletal:   Pain with range of motion of left hip.  No long bone deformity.   Neurological: She is alert and oriented to person, place, and time. No cranial nerve deficit.   Skin: No rash noted.         ED Course   Procedures  Labs Reviewed   URINALYSIS - Abnormal; Notable for the following:        Result Value    Ketones, UA 1+ (*)     Leukocytes, UA 1+ (*)     All other components within normal limits   CBC W/ AUTO DIFFERENTIAL - Abnormal; Notable for the following:     RDW 14.9 (*)     Mono # 1.5 (*)     All other components within normal limits   COMPREHENSIVE METABOLIC PANEL - Abnormal; Notable for the following:     Albumin 3.4 (*)     Total Bilirubin 1.1 (*)      (*)     All other components within normal limits   ACETAMINOPHEN LEVEL - Abnormal; Notable for the following:     Acetaminophen (Tylenol), Serum <3.0 (*)     All other components within normal limits   SALICYLATE LEVEL - Abnormal; Notable for the following:     Salicylate Lvl <5.0 (*)     All other components within normal limits   URINALYSIS MICROSCOPIC - Abnormal; Notable for the following:     WBC, UA 12 (*)     All other components within normal limits   CULTURE, URINE   ALCOHOL,MEDICAL (ETHANOL)   DRUG SCREEN PANEL, URINE EMERGENCY   APTT   TSH   PROTIME-INR   TROPONIN I   B-TYPE NATRIURETIC PEPTIDE   AMMONIA   MAGNESIUM   POCT GLUCOSE     EKG Readings: (Independently Interpreted)   Normal sinus rhythm, rate approximately 95.  No ST elevation or depression.  QTc 402.  No evidence of acute ischemia or malignant arrhythmia.          Medical Decision Making:   History:   Old Medical Records: I decided to obtain old medical records.  57 y.o. female with history of schizophrenia, diabetes, presents to the emergency department after sustaining a mechanical fall at home today.  Daughter states that she came home today to find her on the floor.  Patient  "states she was tried to go to the bathroom, when she fell down.  She does note she hit her head.  Denies loss of consciousness.  She states she was in pain and could not get up.  She denies chest pain or shortness of breath.  Head CT ordered and is negative for acute findings.  Daughter states that over the last several days she believes that she has been a little more "off", and is unsure if she is comfortable taking her medicines.  She states that she noticed a lot of random pills around floor in the home, and thinks she may be overmedicated.  Her lips are dry, IV fluids ordered.  Bilateral hip x-rays read by radiology as negative for acute fracture or dislocation. Chest x-ray independently interpreted by me as negative for acute infiltrates, pneumothorax, effusion, widening mediastinum, or other acute cardiopulmonary process.  Patient continued to c/o pain and had difficulty bearing weight on affected leg. Therefore CT left hip ordered. This is pending. Dr. Dowling to follow up on CT results.  Labs reveal no leukocytosis.  Hemoglobin and hematocrit are normal.  Creatinine is within normal limits.  Glucose 103.  Anion gap 12, acute DKA ruled out.  LFTs benign, total bilirubin mildly elevated.  CPK is normal, acute rhabdomyolysis unlikely.  Troponin and BNP are not elevated.  Ammonia level normal.  Magnesium normal.  Urine drug screen is negative.  Urinalysis is positive for 1+ leukocytes, 1+ ketone, 12 WBCs.  Urine culture ordered.  Plan to treat for UTI with Keflex, Mobic for pain.  Patient's ED workup essentially benign.  She is afebrile.  Vital signs stable.  Patient given her home dose of blood pressure medicine while in emergency department.  Plan to discharge home once CT resulted.             Scribe Attestation:   Scribe #1: I performed the above scribed service and the documentation accurately describes the services I performed. I attest to the accuracy of the note.    Attending Attestation: "           Physician Attestation for Scribe:  Physician Attestation Statement for Scribe #1: I, Leticia Mcclelland MD, reviewed documentation, as scribed by Johny Padilla II in my presence, and it is both accurate and complete.                 ED Course     Clinical Impression:   The primary encounter diagnosis was Left hip pain. Diagnoses of Pain, Fall, initial encounter, Acute UTI, Essential hypertension, and Dehydration were also pertinent to this visit.          Leticia Mcclelland MD  02/26/17 2035

## 2017-02-26 NOTE — ED TRIAGE NOTES
57 year old female arrives to the ED via [facility] due to bilateral hip pain from fall at home earlier today. Pt is unaware of the time she fell. Pt reports she was alone at home and was found by her family on the floor. EMS reports that family reports pt urinated and defecated on herself and family cleaned up in the restroom PTA.

## 2017-02-26 NOTE — ED NOTES
"  When reviewing the pt medication bottles, there were multiple bottles of the same medication in the bag that was brought with her. Pt states "my medication always making me drowsy." Pt is very lethargic but arouseable. Daughter also reported that she found pill all throughout the house on the floor and in the couch seats. MD Mcclelland made aware.   "

## 2017-02-26 NOTE — ED NOTES
"  Rounded on pt, pt continue to appear lethargic. Pt states "Im ready to go home." Asked pt if she is able to walk pt states "yes i can walk, I dont feel week anymore." MD Carrier notified.     "

## 2017-02-26 NOTE — DISCHARGE INSTRUCTIONS
Continue medicines as previously prescribed.  Be careful which medicines you are taking so that you are not accidentally taking additional doses of the same medication.  Return to emergency department for worsening severe pain, fever or difficulty walking, bowel or bladder incontinence or retention, or any other concerns.

## 2017-02-27 PROBLEM — F31.9 BIPOLAR AFFECTIVE DISORDER: Status: ACTIVE | Noted: 2017-02-27

## 2017-02-28 LAB — BACTERIA UR CULT: NORMAL

## 2017-03-01 ENCOUNTER — DOCUMENTATION ONLY (OUTPATIENT)
Dept: HEMATOLOGY/ONCOLOGY | Facility: CLINIC | Age: 57
End: 2017-03-01

## 2017-03-01 ENCOUNTER — TELEPHONE (OUTPATIENT)
Dept: INTERNAL MEDICINE | Facility: CLINIC | Age: 57
End: 2017-03-01

## 2017-03-01 NOTE — TELEPHONE ENCOUNTER
"----- Message from Anjana Pelletier sent at 3/1/2017 10:50 AM CST -----  Contact: Dr. Godfrey / Ph# [phone]  _  1st Request  X  2nd Request  _  3rd Request    Who:Jonathan Gonzales (mrn# [id number])    Why: Dr. Godfrey has called requesting a call back.  Says, "his call pertains to any additional advise  give as it pertains to patient's disability."  Please give a call back at your earliest convenience.  THANKS!    What Number to Call Back:   Dr. Godfrey / ph#  [phone]    When to Expect a call back: (Before the end of the day)   -- if the call is after 12:00, the call back will be tomorrow.                        "

## 2017-03-08 DIAGNOSIS — Z79.899 ENCOUNTER FOR MONITORING ADJUVANT HORMONAL THERAPY: ICD-10-CM

## 2017-03-08 DIAGNOSIS — Z51.81 ENCOUNTER FOR MONITORING ADJUVANT HORMONAL THERAPY: ICD-10-CM

## 2017-03-08 DIAGNOSIS — C50.912 BREAST CANCER, STAGE 2, LEFT: ICD-10-CM

## 2017-03-08 RX ORDER — LETROZOLE 2.5 MG/1
2.5 TABLET, FILM COATED ORAL DAILY
Qty: 90 TABLET | Refills: 1 | Status: SHIPPED | OUTPATIENT
Start: 2017-03-08 | End: 2017-06-26

## 2017-03-16 ENCOUNTER — TELEPHONE (OUTPATIENT)
Dept: INTERNAL MEDICINE | Facility: CLINIC | Age: 57
End: 2017-03-16

## 2017-03-16 NOTE — TELEPHONE ENCOUNTER
I spoke to pt and confirmed that our office received paperwork from [facility]. Pt advised that she had an appointment on 03/17/2017. CF

## 2017-03-16 NOTE — TELEPHONE ENCOUNTER
----- Message from Meri Lozano sent at 3/16/2017 12:53 PM CDT -----  Contact: pt   X_  1st Request  _  2nd Request  _  3rd Request    Who:JOYA MUNOZ [[id number]]    Why: Patient states she would like to verify a letter has been received from [facility]     What Number to Call Back: [phone]    When to Expect a call back: (Before the end of the day)   -- if call after 3:00 call back will be tomorrow.

## 2017-03-17 ENCOUNTER — OFFICE VISIT (OUTPATIENT)
Dept: INTERNAL MEDICINE | Facility: CLINIC | Age: 57
End: 2017-03-17
Attending: FAMILY MEDICINE
Payer: MEDICARE

## 2017-03-17 VITALS
WEIGHT: 178.13 LBS | SYSTOLIC BLOOD PRESSURE: 113 MMHG | DIASTOLIC BLOOD PRESSURE: 61 MMHG | BODY MASS INDEX: 29.68 KG/M2 | HEIGHT: 65 IN | HEART RATE: 67 BPM

## 2017-03-17 DIAGNOSIS — N30.01 ACUTE CYSTITIS WITH HEMATURIA: Primary | ICD-10-CM

## 2017-03-17 DIAGNOSIS — C50.919 MALIGNANT NEOPLASM OF BREAST (FEMALE), UNSPECIFIED SITE: ICD-10-CM

## 2017-03-17 DIAGNOSIS — I97.2 POSTMASTECTOMY LYMPHEDEMA: Chronic | ICD-10-CM

## 2017-03-17 DIAGNOSIS — I89.0 LYMPHEDEMA: ICD-10-CM

## 2017-03-17 DIAGNOSIS — G62.9 PERIPHERAL POLYNEUROPATHY: ICD-10-CM

## 2017-03-17 DIAGNOSIS — E11.40 TYPE 2 DIABETES MELLITUS WITH DIABETIC NEUROPATHY, UNSPECIFIED LONG TERM INSULIN USE STATUS: ICD-10-CM

## 2017-03-17 DIAGNOSIS — N39.41 URGE INCONTINENCE: ICD-10-CM

## 2017-03-17 DIAGNOSIS — M79.89 SWELLING OF LIMB: ICD-10-CM

## 2017-03-17 PROCEDURE — 4010F ACE/ARB THERAPY RXD/TAKEN: CPT | Mod: S$GLB,,, | Performed by: FAMILY MEDICINE

## 2017-03-17 PROCEDURE — 3078F DIAST BP <80 MM HG: CPT | Mod: S$GLB,,, | Performed by: FAMILY MEDICINE

## 2017-03-17 PROCEDURE — 99214 OFFICE O/P EST MOD 30 MIN: CPT | Mod: S$GLB,,, | Performed by: FAMILY MEDICINE

## 2017-03-17 PROCEDURE — 3074F SYST BP LT 130 MM HG: CPT | Mod: S$GLB,,, | Performed by: FAMILY MEDICINE

## 2017-03-17 PROCEDURE — 2022F DILAT RTA XM EVC RTNOPTHY: CPT | Mod: S$GLB,,, | Performed by: FAMILY MEDICINE

## 2017-03-17 PROCEDURE — 1160F RVW MEDS BY RX/DR IN RCRD: CPT | Mod: S$GLB,,, | Performed by: FAMILY MEDICINE

## 2017-03-17 PROCEDURE — 3045F PR MOST RECENT HEMOGLOBIN A1C LEVEL 7.0-9.0%: CPT | Mod: S$GLB,,, | Performed by: FAMILY MEDICINE

## 2017-03-17 PROCEDURE — 99999 PR PBB SHADOW E&M-EST. PATIENT-LVL III: CPT | Mod: PBBFAC,,, | Performed by: FAMILY MEDICINE

## 2017-03-17 RX ORDER — CLONAZEPAM 0.5 MG/1
TABLET ORAL
Refills: 3 | COMMUNITY
Start: 2017-02-21 | End: 2019-09-11

## 2017-03-17 RX ORDER — BLOOD-GLUCOSE METER
EACH MISCELLANEOUS
COMMUNITY
Start: 2017-02-16 | End: 2023-01-19

## 2017-03-17 RX ORDER — OLANZAPINE 10 MG/1
TABLET ORAL
Refills: 3 | COMMUNITY
Start: 2017-02-21 | End: 2019-08-19 | Stop reason: SDUPTHER

## 2017-03-17 RX ORDER — ZIPRASIDONE HYDROCHLORIDE 20 MG/1
CAPSULE ORAL
Refills: 0 | COMMUNITY
Start: 2017-02-21 | End: 2017-03-24

## 2017-03-17 RX ORDER — PEN NEEDLE, DIABETIC 31 G X1/4"
NEEDLE, DISPOSABLE MISCELLANEOUS
Refills: 5 | COMMUNITY
Start: 2017-03-06 | End: 2021-06-28 | Stop reason: SDUPTHER

## 2017-03-17 RX ORDER — LANCETS 33 GAUGE
EACH MISCELLANEOUS
COMMUNITY
Start: 2017-02-16 | End: 2018-11-23 | Stop reason: SDUPTHER

## 2017-03-17 NOTE — MR AVS SNAPSHOT
[facility]  [address]  [facility] [address]  Phone: [phone]  Fax: [phone]                  Jonahtan Gonzales   3/17/2017 8:40 AM   Office Visit    Description:  Female : 1960   Provider:  Ghulam Contreras MD   Department:  [facility]           Reason for Visit     Urinary Frequency           Diagnoses this Visit        Comments    Acute cystitis with hematuria    -  Primary     Swelling of limb         Postmastectomy lymphedema         Peripheral polyneuropathy         Malignant neoplasm of breast (female), unspecified site         Lymphedema         Type 2 diabetes mellitus with diabetic neuropathy, unspecified long term insulin use status         Urge incontinence                To Do List           Future Appointments        Provider Department Dept Phone    3/24/2017 9:00 AM Galina [facility]andon MD [facility] [phone]    3/30/2017 8:00 AM Subhash Cifuentes MD [facility] Urology [phone]    2017 1:00 PM Cuba Osuna III, MD [facility] Neurology [phone]      Goals (5 Years of Data)     None      [facility]     [facility] -  Assistance  Registered nurses in the [facility] provide clinical advisement, health education, appointment booking, and other advisory services.  Call for this free service at [phone].             Medications           Message regarding Medications     Verify the changes and/or additions to your medication regime listed below are the same as discussed with your clinician today.  If any of these changes or additions are incorrect, please notify your healthcare provider.             Verify that the below list of medications is an accurate representation of the medications you are currently taking.  If none reported, the list may be blank. If incorrect, please contact your healthcare provider. Carry this list with you in case of emergency.  "          Current Medications     ammonium lactate (LAC-HYDRIN) 12 % lotion     blood sugar diagnostic Strp Pt to check BG up to QID. Dispense strips compatible with pt brand meter    blood-glucose meter (FREESTYLE SYSTEM KIT) kit Pt to check BG up to QID. Dispense strips compatible with pt brand meter    clonazePAM (KLONOPIN) 0.5 MG tablet TK 1 T PO BID    divalproex (DEPAKOTE) 500 MG Tb24     EASY TOUCH 31 gauge x 1/4" Ndle     FLUARIX QUAD 0906-5153, PF, 60 mcg (15 mcg x 4)/0.5 mL Syrg TO BE ADMINISTERED BY PHARMACIST FOR IMMUNIZATION    insulin glargine (LANTUS SOLOSTAR) 100 unit/mL (3 mL) InPn pen Inject 25 Units into the skin every evening.    KLOR-CON M20 20 mEq tablet Take 1 tablet (20 mEq total) by mouth 2 (two) times daily.    lancets (ONETOUCH ULTRASOFT LANCETS) Misc Pt to check BG up to QID. Dispense strips compatible with pt brand meter    letrozole (FEMARA) 2.5 mg Tab Take 1 tablet (2.5 mg total) by mouth once daily.    lisinopril-hydrochlorothiazide (PRINZIDE,ZESTORETIC) 10-12.5 mg per tablet TAKE ONE TABLET BY MOUTH ONCE DAILY FOR BLOOD PRESSURE    meloxicam (MOBIC) 7.5 MG tablet Take 1 tablet (7.5 mg total) by mouth once daily.    metformin (GLUCOPHAGE) 500 MG tablet Take 1 tablet (500 mg total) by mouth 2 (two) times daily with meals.    multivit-iron-FA-calcium &mins (ONE-A-DAY WOMENS FORMULA) 18 mg iron-400 mcg-500 mg Ca Tab Take 1 tablet by mouth Daily.    nicotine (NICODERM CQ) 21 mg/24 hr Place 1 patch onto the skin once daily.    nicotine polacrilex (NICORETTE) 4 MG Gum Take 1 each (4 mg total) by mouth as needed.    nitrofurantoin (MACRODANTIN) 100 MG capsule Take 100 mg by mouth 4 (four) times daily.    nystatin (MYCOSTATIN) cream Apply topically 2 (two) times daily.    olanzapine (ZYPREXA) 10 MG tablet TK 1 T PO QHS    pen needle, diabetic (BD ULTRA-FINE ITALO PEN NEEDLES) 32 gauge x 5/32" Ndle Pt is injecting once daily    pen needle, diabetic, safety 29 gauge x 3/16" Ndle Use as " "instructed    pravastatin (PRAVACHOL) 20 MG tablet Take 1 tablet (20 mg total) by mouth once daily.    pregabalin (LYRICA) 100 MG capsule Take 1 capsule (100 mg total) by mouth 2 (two) times daily.    quetiapine (SEROQUEL) 100 MG Tab Take 100 mg by mouth nightly as needed.     TRUE METRIX GLUCOSE METER Misc     TRUEPLUS LANCETS 33 gauge Misc     ziprasidone (GEODON) 20 MG Cap     ziprasidone (GEODON) 40 MG Cap Take 20 mg by mouth 2 (two) times daily.           Clinical Reference Information           Your Vitals Were     BP Pulse Height Weight BMI    113/61 67 5' 5" (1.651 m) 80.8 kg (178 lb 2.1 oz) 29.64 kg/m2      Blood Pressure          Most Recent Value    BP  113/61      Allergies as of 3/17/2017     Banana      Immunizations Administered on Date of Encounter - 3/17/2017     None      Orders Placed During Today's Visit      Normal Orders This Visit    Ambulatory Referral to Urology     URINALYSIS     Urine culture       Instructions    Your test results will be communicated to you via: [facility], Telephone or Letter.  If you have not received your test results within one week. Please contact the clinic.           Smoking Cessation     If you would like to quit smoking:   You may be eligible for free services if you are a [place] resident and started smoking cigarettes before September 1, 1988.  Call the Smoking Cessation Trust ([facility]) toll free at (317) 610-8060 or (792) 140-8151.   Call 1-800-QUIT-NOW if you do not meet the above criteria.            Language Assistance Services     ATTENTION: Language assistance services are available, free of charge. Please call [phone].      ATENCIÓN: Si habla español, tiene a dorman disposición servicios gratuitos de asistencia lingüística. Llame al [phone].     CHÚ Ý: N?u b?n nói Ti?ng Vi?t, có các d?ch v? h? tr? ngôn ng? mi?n phí dành cho b?n. G?i s? [phone].         [facility] complies with applicable Federal civil rights laws and " does not discriminate on the basis of race, color, national origin, age, disability, or sex.

## 2017-03-22 ENCOUNTER — TELEPHONE (OUTPATIENT)
Dept: NEUROLOGY | Facility: CLINIC | Age: 57
End: 2017-03-22

## 2017-03-22 ENCOUNTER — TELEPHONE (OUTPATIENT)
Dept: INTERNAL MEDICINE | Facility: CLINIC | Age: 57
End: 2017-03-22
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
## 2017-03-22 NOTE — TELEPHONE ENCOUNTER
----- Message from Bhavani Boone sent at 3/22/2017  9:00 AM CDT -----  Contact: [facility]roy [facility] [phone] ext [phone]  _  1st Request  _  2nd Request  _  3rd Request        Who: troy [facility] [phone] ext [phone]    Why: faxed a letter on 3/9/17. Wants to know if dr beyer agrees with the medical assessment. It was a 9 page fax. Please call troy    What Number to Call Back:[facility] [phone] ext [phone]    When to Expect a call back: (Before the end of the day)   -- if the call is after 12:00, the call back will be tomorrow.

## 2017-03-22 NOTE — TELEPHONE ENCOUNTER
Last visit 2/16/17, next visit 8/17/17        Disp Refills Start End        pregabalin (LYRICA) 100 MG capsule 60 capsule 6 2/16/2017       Sig - Route: Take 1 capsule (100 mg total) by mouth 2 (two) times daily. - Oral      Class: Print      There should be refills available. Called patient to advise and find out if she has spoken with the pharmacy.    Unable to reach or leave message. Voicemail not set up.

## 2017-03-22 NOTE — TELEPHONE ENCOUNTER
----- Message from Lexie Salter sent at 3/22/2017  4:25 PM CDT -----  Contact: Megan from  [facility]  x_  1st Request  _  2nd Request  _  3rd Request      Who:Megan from  [facility]    Why: returning call back     What Number to Call Back: [phone]    When to Expect a call back: (Before the end of the day)   -- if call after 3:00 call back will be tomorrow.

## 2017-03-22 NOTE — TELEPHONE ENCOUNTER
[facility] told Ms. Gonzales they do not have any refills on the Lyrica. Ms. Gonzales states this is where she had the Rx filled in February.     Called [facility] at . No option to speak with anybody, voicemail only. Detailed message left asking for call back to confirm if Ms. Gonzales had Lyrica filled with them and if so when.

## 2017-03-22 NOTE — TELEPHONE ENCOUNTER
Advised Ms. Christian that there are refills left on the original Rx. She states she switched pharmacies from [facility] to [facility].     I advised her to call [facility] pharmacy and ask them to transfer the Rx to [facility]. She states she will do this.

## 2017-03-23 ENCOUNTER — TELEPHONE (OUTPATIENT)
Dept: NEUROLOGY | Facility: CLINIC | Age: 57
End: 2017-03-23

## 2017-03-23 NOTE — TELEPHONE ENCOUNTER
Left vmail asking if Ms. Gonzales filled Lyrica with them and if so when and how many refills are left. Ms. Gonzales would like to have the remainder transferred to      [facility] [address] [place] [address] [address] AT [facility] [phone] (Phone)  [phone] (Fax)

## 2017-03-23 NOTE — TELEPHONE ENCOUNTER
Called to advise that [facility] states they have not filled Lyrica for her.    Unable to reach or leave message as voicemail is not set up.

## 2017-03-23 NOTE — PROGRESS NOTES
CHIEF COMPLAINT:  Frequent urination and DM follow up    HISTORY OF PRESENT ILLNESS: The patient is a 57 year-old black female.  The patient has a long and complicated medical history.      She has been having frequent urination recently.  We will obtain a culture to rule out a UTI.  If symptoms continue we will have her see urology..     The patient has a history of diabetes.  Recent blood sugars have been less than 120.  There have been no episodes of hypoglycemia.    The patient has a history of stable hypertension on current medications.  Patient denies chest pain or shortness of breath today.    The patient has a history of stable hyperlipidemia on current medications.  The patient denies chest pain or shortness of breath today.  The patient denies muscle aches or myalgias suggestive of myositis.    She has a history of breast cancer for which she underwent bilateral mastectomies and chemotherapy.    REVIEW OF SYSTEMS:  GENERAL: No fever, chills or weight loss.  SKIN: No rashes, itching or changes in color or texture of skin.  HEAD: No headaches or recent head trauma.  EYES: Visual acuity fine. No photophobia, ocular pain or diplopia.  EARS: Denies ear pain, discharge or vertigo.  NOSE: No loss of smell, no epistaxis or postnasal drip.  MOUTH & THROAT: No hoarseness or change in voice. No excessive gum bleeding.  NODES: Denies swollen glands.  CHEST: Denies AWAD, cyanosis, wheezing, cough and sputum production.  CARDIOVASCULAR: Denies chest pain, PND, orthopnea or reduced exercise tolerance.  ABDOMEN: Appetite fine. No weight loss. Denies diarrhea, abdominal pain, hematemesis or blood in stool.  URINARY: No flank pain, dysuria or hematuria.  PERIPHERAL VASCULAR: No claudication or cyanosis.  MUSCULOSKELETAL: No joint stiffness or swelling. Except as noted above.  NEUROLOGIC: No history of seizures, paralysis, alteration of gait or coordination.    SOCIAL HISTORY: The patient does not smoke.  The patient consumes  "alcohol socially.      PHYSICAL EXAMINATION:     Blood pressure 113/61, pulse 67, height 5' 5" (1.651 m), weight 80.8 kg (178 lb 2.1 oz).    APPEARANCE: Well nourished, well developed, in no acute distress.  She is quite somnolent.  She falls asleep during the encounter.  She is easily arousable.  She says she didn't sleep well last night.  HEAD: Normocephalic, atraumatic.  EYES: PERRL. EOMI.  Conjunctivae without injection and  anicteric  EARS: TM's intact. Light reflex normal. No retraction or perforation.    NOSE: Mucosa pink. Airway clear.  MOUTH & THROAT: No tonsillar enlargement. No pharyngeal erythema or exudate. No stridor.  NECK: Supple.   NODES: No cervical, axillary or inguinal lymph node enlargement.  CHEST: Lungs clear to auscultation.  No retractions are noted.  No rales or rhonchi are present.  CARDIOVASCULAR: Normal S1, S2. No rubs, murmurs or gallops.  ABDOMEN: Bowel sounds normal. Not distended. Soft. No tenderness or masses.  No ascites is noted.  MUSCULOSKELETAL:  There is no clubbing, cyanosis, or edema of the extremities x4.  There is full range of motion of the lumbar spine.  There is full range of motion of the extremities x4.  There is no deformity noted.    NEUROLOGIC:       Normal speech development.      Hearing normal.      Normal gait.      Motor and sensory exams grossly normal.      DTR's normal.  PSYCHIATRIC: Patient is alert and oriented x3.  Thought processes are all normal.  There is no homicidality.  There is no suicidality.  There is no evidence of psychosis.    LABORATORY/RADIOLOGY:   Chart reviewed . including surgeries and blood work    ASSESSMENT:   Frequent urination, rule out UTI  Breast cancer with resultant lymphedema  Hypertension  Diabetes  Neuropathic pain  Cervical radiculopathy    PLAN:  We will follow-up UCx to r/o UTl.    KCl 40 po qd  Prinzide  Pravachol 20 mg by mouth daily    Return to clinic in 6 months with blood work prior  "

## 2017-03-24 ENCOUNTER — OFFICE VISIT (OUTPATIENT)
Dept: HEMATOLOGY/ONCOLOGY | Facility: CLINIC | Age: 57
End: 2017-03-24
Payer: MEDICARE

## 2017-03-24 VITALS
DIASTOLIC BLOOD PRESSURE: 84 MMHG | SYSTOLIC BLOOD PRESSURE: 130 MMHG | BODY MASS INDEX: 30.34 KG/M2 | HEART RATE: 77 BPM | TEMPERATURE: 99 F | OXYGEN SATURATION: 97 % | WEIGHT: 182.31 LBS

## 2017-03-24 DIAGNOSIS — E11.8 TYPE 2 DIABETES MELLITUS WITH COMPLICATION, WITH LONG-TERM CURRENT USE OF INSULIN: ICD-10-CM

## 2017-03-24 DIAGNOSIS — F31.9 BIPOLAR AFFECTIVE DISORDER, REMISSION STATUS UNSPECIFIED: ICD-10-CM

## 2017-03-24 DIAGNOSIS — Z79.899 ENCOUNTER FOR MONITORING ADJUVANT HORMONAL THERAPY: ICD-10-CM

## 2017-03-24 DIAGNOSIS — Z79.4 TYPE 2 DIABETES MELLITUS WITH COMPLICATION, WITH LONG-TERM CURRENT USE OF INSULIN: ICD-10-CM

## 2017-03-24 DIAGNOSIS — C50.919 BREAST CANCER, STAGE 2, UNSPECIFIED LATERALITY: Primary | Chronic | ICD-10-CM

## 2017-03-24 DIAGNOSIS — I97.2 POSTMASTECTOMY LYMPHEDEMA: Chronic | ICD-10-CM

## 2017-03-24 DIAGNOSIS — Z51.81 ENCOUNTER FOR MONITORING ADJUVANT HORMONAL THERAPY: ICD-10-CM

## 2017-03-24 PROCEDURE — 99999 PR PBB SHADOW E&M-EST. PATIENT-LVL IV: CPT | Mod: PBBFAC,,, | Performed by: INTERNAL MEDICINE

## 2017-03-24 PROCEDURE — 3045F PR MOST RECENT HEMOGLOBIN A1C LEVEL 7.0-9.0%: CPT | Mod: S$GLB,,, | Performed by: INTERNAL MEDICINE

## 2017-03-24 PROCEDURE — 1160F RVW MEDS BY RX/DR IN RCRD: CPT | Mod: S$GLB,,, | Performed by: INTERNAL MEDICINE

## 2017-03-24 PROCEDURE — 3075F SYST BP GE 130 - 139MM HG: CPT | Mod: S$GLB,,, | Performed by: INTERNAL MEDICINE

## 2017-03-24 PROCEDURE — 4010F ACE/ARB THERAPY RXD/TAKEN: CPT | Mod: S$GLB,,, | Performed by: INTERNAL MEDICINE

## 2017-03-24 PROCEDURE — 2022F DILAT RTA XM EVC RTNOPTHY: CPT | Mod: S$GLB,,, | Performed by: INTERNAL MEDICINE

## 2017-03-24 PROCEDURE — 3079F DIAST BP 80-89 MM HG: CPT | Mod: S$GLB,,, | Performed by: INTERNAL MEDICINE

## 2017-03-24 PROCEDURE — 99214 OFFICE O/P EST MOD 30 MIN: CPT | Mod: S$GLB,,, | Performed by: INTERNAL MEDICINE

## 2017-03-24 RX ORDER — PEN NEEDLE, DIABETIC 31 GX5/16"
NEEDLE, DISPOSABLE MISCELLANEOUS
COMMUNITY
Start: 2017-03-22 | End: 2019-10-31 | Stop reason: SDUPTHER

## 2017-03-24 NOTE — MR AVS SNAPSHOT
[facility]  [address] [place] [address]  Phone: [phone]                  Jonathan Gonzales   3/24/2017 9:00 AM   Office Visit    Description:  Female : 1960   Provider:  Galina Landon MD   [facility]           Reason for Visit     Follow-up           Diagnoses this Visit        Comments    Breast cancer, stage 2, unspecified laterality    -  Primary     Encounter for monitoring adjuvant hormonal therapy         Bipolar affective disorder, remission status unspecified         Postmastectomy lymphedema         Type 2 diabetes mellitus with complication, with long-term current use of insulin                To Do List           Future Appointments        Provider Department Dept Phone    3/30/2017 8:00 AM Subhash Cifuentes MD [facility] Urology [phone]    2017 9:00 AM Galina Landon MD [facility] [phone]    2017 1:00 PM Cuba Osuna III, MD [facility] Neurology [phone]      Goals (5 Years of Data)     None      Follow-Up and Disposition     Return in about 3 months (around 2017).      [facility]     [facility] -  Assistance  Registered nurses in the [facility] provide clinical advisement, health education, appointment booking, and other advisory services.  Call for this free service at [phone].             Medications           Message regarding Medications     Verify the changes and/or additions to your medication regime listed below are the same as discussed with your clinician today.  If any of these changes or additions are incorrect, please notify your healthcare provider.        STOP taking these medications     pregabalin (LYRICA) 100 MG capsule Take 1 capsule (100 mg total) by mouth 2 (two) times daily.    multivit-iron-FA-calcium &mins (ONE-A-DAY WOMENS FORMULA) 18 mg iron-400 mcg-500 mg Ca Tab Take 1 tablet by mouth  "Daily.           Verify that the below list of medications is an accurate representation of the medications you are currently taking.  If none reported, the list may be blank. If incorrect, please contact your healthcare provider. Carry this list with you in case of emergency.           Current Medications     ammonium lactate (LAC-HYDRIN) 12 % lotion     BD INSULIN PEN NEEDLE UF MINI 31 gauge x 3/16" Ndle     blood sugar diagnostic Strp Pt to check BG up to QID. Dispense strips compatible with pt brand meter    blood-glucose meter (FREESTYLE SYSTEM KIT) kit Pt to check BG up to QID. Dispense strips compatible with pt brand meter    clonazePAM (KLONOPIN) 0.5 MG tablet TK 1 T PO BID    divalproex (DEPAKOTE) 500 MG Tb24     EASY TOUCH 31 gauge x 1/4" Ndle     FLUARIX QUAD 0344-7148, PF, 60 mcg (15 mcg x 4)/0.5 mL Syrg TO BE ADMINISTERED BY PHARMACIST FOR IMMUNIZATION    insulin glargine (LANTUS SOLOSTAR) 100 unit/mL (3 mL) InPn pen Inject 25 Units into the skin every evening.    KLOR-CON M20 20 mEq tablet Take 1 tablet (20 mEq total) by mouth 2 (two) times daily.    lancets (ONETOUCH ULTRASOFT LANCETS) Misc Pt to check BG up to QID. Dispense strips compatible with pt brand meter    letrozole (FEMARA) 2.5 mg Tab Take 1 tablet (2.5 mg total) by mouth once daily.    lisinopril-hydrochlorothiazide (PRINZIDE,ZESTORETIC) 10-12.5 mg per tablet TAKE ONE TABLET BY MOUTH ONCE DAILY FOR BLOOD PRESSURE    meloxicam (MOBIC) 7.5 MG tablet Take 1 tablet (7.5 mg total) by mouth once daily.    metformin (GLUCOPHAGE) 500 MG tablet Take 1 tablet (500 mg total) by mouth 2 (two) times daily with meals.    nicotine (NICODERM CQ) 21 mg/24 hr Place 1 patch onto the skin once daily.    nicotine polacrilex (NICORETTE) 4 MG Gum Take 1 each (4 mg total) by mouth as needed.    nitrofurantoin (MACRODANTIN) 100 MG capsule Take 100 mg by mouth 4 (four) times daily.    olanzapine (ZYPREXA) 10 MG tablet TK 1 T PO QHS    pen needle, diabetic (BD " "ULTRA-FINE ITALO PEN NEEDLES) 32 gauge x 5/32" Ndle Pt is injecting once daily    pen needle, diabetic, safety 29 gauge x 3/16" Ndle Use as instructed    pravastatin (PRAVACHOL) 20 MG tablet Take 1 tablet (20 mg total) by mouth once daily.    quetiapine (SEROQUEL) 100 MG Tab Take 100 mg by mouth nightly as needed.     TRUE METRIX GLUCOSE METER Misc     TRUEPLUS LANCETS 33 gauge Misc     ziprasidone (GEODON) 40 MG Cap Take 20 mg by mouth 2 (two) times daily.    nystatin (MYCOSTATIN) cream Apply topically 2 (two) times daily.           Clinical Reference Information           Your Vitals Were     BP Pulse Temp Weight SpO2 BMI    130/84 (BP Location: Right arm, Patient Position: Sitting, BP Method: Manual) 77 98.8 °F (37.1 °C) (Oral) 82.7 kg (182 lb 5.1 oz) 97% 30.34 kg/m2      Blood Pressure          Most Recent Value    BP  130/84      Allergies as of 3/24/2017     Banana      Immunizations Administered on Date of Encounter - 3/24/2017     None      Smoking Cessation     If you would like to quit smoking:   You may be eligible for free services if you are a [place] resident and started smoking cigarettes before September 1, 1988.  Call the Smoking Cessation Trust ([facility]) toll free at ([phone] 910-4889 or (498) [phone].   Call 6-800-QUIT-NOW if you do not meet the above criteria.            Language Assistance Services     ATTENTION: Language assistance services are available, free of charge. Please call [phone].      ATENCIÓN: Si habla español, tiene a dorman disposición servicios gratuitos de asistencia lingüística. Llame al [phone].     CHÚ Ý: N?u b?n nói Ti?ng Vi?t, có các d?ch v? h? tr? ngôn ng? mi?n phí dành cho b?n. G?i s? [phone].         [facility] complies with applicable Federal civil rights laws and does not discriminate on the basis of race, color, national origin, age, disability, or sex.        "

## 2017-03-24 NOTE — PROGRESS NOTES
Subjective:       Patient ID: Jonathan Gonzales is a 57 y.o. female.    Chief Complaint: Follow-up    HPI     PATIENT LOST TO FOLLOW-UP ( last f/u 10/2015)   PT HAD 2 NS VISITS PAST 2 WKS       Diagnosis: Stage IIB lt Breast CA T2N1MO ER/IA negative, HER-2/bridget amplified dx'd 11/2011   Therapy:     1. s/p bilateral mastectomy with SHLOMO flap reconstruction 11/23/2011                        2. completed   Phase III 0221 protocol                        3. Letrozole 3/2012- present    HPI 56-year-old female with stage IIB breast cancer, left breast originally diagnosed in November 2011, status post bilateral skin -sparing mastectomy with a right prophylactic and a left radical mastectomy for a T2 N1 MO ,Stage 2b. breast cancer of the left breast on 11/23/2011 with immediate autologous tissue reconstruction with SHLOMO flap on letrozole therapy here for f/u. She underwent second stage reconstructive surgery on 4/27/2015 which was complicated by a hematoma for whichShe was taken back to OR emergently  for aspiration.          Pt previously Lost to  follow-up    Today, she is doing well  She remains on endocrine therapy   She continues to wear compression sleeve LUE   She reports lymphedema stable.  Previously followed by PT  No SOB/CP/N/V.        She reports urinary urgency past few mos  No dysuria/hematuria  Urine cx 2/26/2017- neg   Follow-up with  planned         She is followed by PCP for  history of type 2  DM w/neuropathy , HTN and hyperlipidemia         Bone Density 10/19/2016 nl      Pt previously  followed by Dr. Mcgee for her psych history of bipolar d/o and schizophrenia  She now followed by [facility] after care      PrevHx: Tumor was ER/IA negative, HER-2/bridget amplified with 1 of total of 24 lymph nodes removed which revealed metastatic carcinoma, 0.4 cm in diameter with no extranodal extension.She had abnormal PET imaging 12/2011 which revealed. Findings suggestive of metastatic disease to  supraclavicular, mediastinal and right hilar lymph nodes.She underwent rt supraclavicular lymph node biopsy which was negative for malignancy. PET/CT 10/12 revealed resolution of LAD and no evid of mets.Patient was enrolled in 0221 study. She completed 1 yr of Herceptin therapy3/12/2013. She was prescribed Letrozole therapy 3/2012      Tx; Phase III 0221 protocol- DD AC ( Adriamycin 60mg/m2/Cytoxan 60mg/2) x4 followed by weekly Paclitaxel 175mg/m2/Herceptin and completion of Herceptin therapy (6mg/kg q3wks) 1 yr 3/12/13              Review of Systems   Constitutional: Negative for appetite change, chills and fatigue.   HENT: Negative for congestion, hearing loss and nosebleeds.    Eyes: Negative for visual disturbance.   Respiratory: Negative for apnea, cough, chest tightness, shortness of breath and wheezing.    Cardiovascular: Negative for chest pain, palpitations and leg swelling.   Gastrointestinal: Negative for abdominal distention, abdominal pain, blood in stool, constipation, diarrhea, nausea and vomiting.   Genitourinary: Negative for difficulty urinating, dysuria, flank pain, frequency, hematuria and urgency.   Musculoskeletal: Negative for arthralgias, back pain, gait problem, joint swelling and neck pain.   Skin: Negative for rash.        No petechiae, ecchymoses   Neurological: Negative for dizziness, tremors, seizures, syncope, speech difficulty, light-headedness, numbness and headaches.   Hematological: Negative for adenopathy. Does not bruise/bleed easily.   Psychiatric/Behavioral: Negative for confusion and dysphoric mood. The patient is not nervous/anxious.        Objective:       Vitals:    03/24/17 0845   BP: 130/84   BP Location: Right arm   Patient Position: Sitting   BP Method: Manual   Pulse: 77   Temp: 98.8 °F (37.1 °C)   TempSrc: Oral   SpO2: 97%   Weight: 82.7 kg (182 lb 5.1 oz)       Physical Exam   Constitutional: She is oriented to person, place, and time. She appears well-developed and  well-nourished.   HENT:   Head: Normocephalic.   Mouth/Throat: Oropharynx is clear and moist. No oropharyngeal exudate.   Eyes: Conjunctivae and lids are normal. Pupils are equal, round, and reactive to light. No scleral icterus.   Neck: Normal range of motion. Neck supple. No thyromegaly present.   Cardiovascular: Normal rate, regular rhythm and normal heart sounds.    No murmur heard.  Pulmonary/Chest: Breath sounds normal. She has no wheezes. She has no rales.   S/p Rt reconstructed breast- palpable fibrosis noted  Healed surgical incision site lower quad no erythema,  LAD noted    S/p Lt reconstructed breast- no palpable masses,  Nodules or LAD noted   Abdominal: Soft. Bowel sounds are normal. She exhibits no distension and no mass. There is no hepatosplenomegaly. There is no tenderness. There is no rebound and no guarding.   Musculoskeletal: Normal range of motion. She exhibits edema. She exhibits no tenderness.   Lymphadenopathy:     She has no cervical adenopathy.     She has no axillary adenopathy.        Right: No supraclavicular adenopathy present.        Left: No supraclavicular adenopathy present.   Neurological: She is alert and oriented to person, place, and time. No cranial nerve deficit. Coordination normal.   Skin: Skin is warm and dry. No ecchymosis, no petechiae and no rash noted. No erythema.   Psychiatric: She has a normal mood and affect.           Results for JOYA MUNOZ (MRN [id number]) as of 6/8/2015 13:40   Ref. Range 6/2/2015 07:50   Vit D, 25-Hydroxy Latest Range: 30-96 ng/mL 33     Lab Results   Component Value Date    WBC 10.97 02/26/2017    HGB 12.7 02/26/2017    HCT 39.5 02/26/2017    MCV 93 02/26/2017     02/26/2017       Impression: Normal bone mineral density of the lumbar spine, right   femoral neck, and left femoral neck      10/19/2016 Bone Density-nl    Labs: none   Assessment:       1. Breast cancer, stage 2, unspecified laterality    2. Encounter for monitoring  adjuvant hormonal therapy    3. Bipolar affective disorder, remission status unspecified    4. Postmastectomy lymphedema    5. Type 2 diabetes mellitus with complication, with long-term current use of insulin        Plan:   Jonathan was seen today for follow-up.  Pt clinically stable    Diagnoses and associated orders for this visit:    Breast cancer, Stage IIB lt Breast CA T2N1MO diagnosed  11/2011   - s/p bilateral mastectomy with SHLOMO flap reconstruction 11/23/2011   - s/p chemotherapy per  Phase III 0221 protocol      S/p second stage reconstructive surgery      Cont with AI therapy ( pt has not completed 5 yrs - off 2 mos)       Rt Breast US 10/2015- Area in the right breast is benign-negative.  .ACR BI-RADS Category 2: Benign   - no evidence of disease recurrence clinically   -Breast Cancer Index discussed - consider testing    Encounter for monitoring adjuvant hormonal therapy     Tolerating w/out complications- cont current therapy        Type 2 DM with diabetic neuropathy     Stable     Cont Lyrica      Follow-up with PCP      Postmastectomy lymphedema  Stable  Cont compression sleeves    Bipolar d/o  Follow-up with psych      F/u 3mos with cbc,cmp ( to ensure compliance w/ follow-up visits)     CC: Ghulam Contreras MD

## 2017-03-27 RX ORDER — METFORMIN HYDROCHLORIDE 500 MG/1
500 TABLET ORAL 2 TIMES DAILY WITH MEALS
Qty: 180 TABLET | Refills: 3 | Status: SHIPPED | OUTPATIENT
Start: 2017-03-27 | End: 2018-01-25 | Stop reason: SDUPTHER

## 2017-03-27 NOTE — TELEPHONE ENCOUNTER
----- Message from Isabella Hewitt sent at 3/27/2017  9:55 AM CDT -----  _  1st Request  _  2nd Request  _  3rd Request    Please refill the medication(s) listed below. Please call the patient when the prescription(s) is ready for  at the phone number ([phone]  Medication #1metformin (GLUCOPHAGE) 500 MG tablet          Preferred Pharmacy:[facility] [phone]  PLEASE NOTE NEW PHARM FOR PATIENT

## 2017-03-30 ENCOUNTER — TELEPHONE (OUTPATIENT)
Dept: UROLOGY | Facility: CLINIC | Age: 57
End: 2017-03-30

## 2017-03-30 NOTE — TELEPHONE ENCOUNTER
Attempt to call pt no answer. A voice message was left for pt regarding her appointment that was missed. A call back number was left for pt to reschedule.

## 2017-04-12 ENCOUNTER — OFFICE VISIT (OUTPATIENT)
Dept: UROLOGY | Facility: CLINIC | Age: 57
End: 2017-04-12
Payer: MEDICARE

## 2017-04-12 VITALS
DIASTOLIC BLOOD PRESSURE: 94 MMHG | HEIGHT: 65 IN | WEIGHT: 182.31 LBS | SYSTOLIC BLOOD PRESSURE: 128 MMHG | HEART RATE: 120 BPM | BODY MASS INDEX: 30.37 KG/M2

## 2017-04-12 DIAGNOSIS — N32.81 OAB (OVERACTIVE BLADDER): ICD-10-CM

## 2017-04-12 DIAGNOSIS — N39.41 URGE INCONTINENCE OF URINE: Primary | ICD-10-CM

## 2017-04-12 LAB
BILIRUB SERPL-MCNC: NORMAL MG/DL
BLOOD URINE, POC: NORMAL
COLOR, POC UA: YELLOW
GLUCOSE UR QL STRIP: NORMAL
KETONES UR QL STRIP: NORMAL
LEUKOCYTE ESTERASE URINE, POC: NORMAL
NITRITE, POC UA: NORMAL
PH, POC UA: 5
POC RESIDUAL URINE VOLUME: 18 ML (ref 0–100)
PROTEIN, POC: NORMAL
SPECIFIC GRAVITY, POC UA: 1
UROBILINOGEN, POC UA: NORMAL

## 2017-04-12 PROCEDURE — 3074F SYST BP LT 130 MM HG: CPT | Mod: S$GLB,,, | Performed by: NURSE PRACTITIONER

## 2017-04-12 PROCEDURE — 81002 URINALYSIS NONAUTO W/O SCOPE: CPT | Mod: S$GLB,,, | Performed by: NURSE PRACTITIONER

## 2017-04-12 PROCEDURE — 3080F DIAST BP >= 90 MM HG: CPT | Mod: S$GLB,,, | Performed by: NURSE PRACTITIONER

## 2017-04-12 PROCEDURE — 99213 OFFICE O/P EST LOW 20 MIN: CPT | Mod: 25,S$GLB,, | Performed by: NURSE PRACTITIONER

## 2017-04-12 PROCEDURE — 51798 US URINE CAPACITY MEASURE: CPT | Mod: S$GLB,,, | Performed by: NURSE PRACTITIONER

## 2017-04-12 PROCEDURE — 1160F RVW MEDS BY RX/DR IN RCRD: CPT | Mod: S$GLB,,, | Performed by: NURSE PRACTITIONER

## 2017-04-12 PROCEDURE — 87086 URINE CULTURE/COLONY COUNT: CPT

## 2017-04-12 PROCEDURE — 99999 PR PBB SHADOW E&M-EST. PATIENT-LVL III: CPT | Mod: PBBFAC,,, | Performed by: NURSE PRACTITIONER

## 2017-04-12 RX ORDER — OXYBUTYNIN CHLORIDE 5 MG/1
5 TABLET, EXTENDED RELEASE ORAL DAILY
Qty: 30 TABLET | Refills: 2 | Status: SHIPPED | OUTPATIENT
Start: 2017-04-12 | End: 2017-05-02 | Stop reason: SDUPTHER

## 2017-04-12 NOTE — PROGRESS NOTES
"Subjective:      Jonathan Gonzales is a 57 y.o. female who was referred by Dr. Contreras for evaluation of urinary incontinence.      Urinary Incontinence  Patient complains of urinary incontinence. This has been present for 2 month. She leaks urine with urge. Patient describes the symptoms as urge to urinate with little or no warning. Factors associated with symptoms include none known. Evaluation to date includes UA/CS: negative. Treatment to date includes none.     The patient reports a UTI (culture proven- E. coli) on 2/16/17 that was treated with Macrobid. She reports that after completing the antibiotics, she still experienced some urinary incontinence (urge), suprapubic pressure, and frequency. She denies dysuria, hematuria, flank pain, and fever.     The following portions of the patient's history were reviewed and updated as appropriate: allergies, current medications, past family history, past medical history, past social history, past surgical history and problem list.    Review of Systems  Constitutional: no fever or chills  ENT: no nasal congestion or sore throat  Respiratory: no cough or shortness of breath  Cardiovascular: no chest pain or palpitations  Gastrointestinal: no nausea or vomiting, tolerating diet  Genitourinary: as per HPI  Hematologic/Lymphatic: no easy bruising or lymphadenopathy  Musculoskeletal: no arthralgias or myalgias  Neurological: no seizures or tremors  Behavioral/Psych: no auditory or visual hallucinations     Objective:   Vital Signs:BP (!) 128/94 (BP Location: Right arm, Patient Position: Sitting, BP Method: Automatic)  Pulse (!) 120  Ht 5' 5" (1.651 m)  Wt 82.7 kg (182 lb 5.1 oz)  BMI 30.34 kg/m2    Physical Exam   General: alert and oriented, no acute distress  Head: normocephalic, atraumatic  Neck: supple, no lymphadenopathy, normal ROM, no masses  Respiratory: Symmetric expansion, non-labored breathing  Cardiovascular: regular rate and rhythm, nomal pulses, no " peripheral edema  Abdomen: soft, non tender, non distended, no palpable masses, no hernias, no hepatomegaly or splenomegaly  Pelvic: not examined  Lymphatic: no inguinal nodes  Skin: normal coloration and turgor, no rashes, no suspicious skin lesions noted  Neuro: alert and oriented x3, no gross deficits  Psych: normal judgment and insight, normal mood/affect and non-anxious  No CVA tenderness    Lab Review   Urinalysis demonstrates positive for leukocytes (+), red blood cells (about 5-10 enrique/mL), ketones (+)  PVR; 18 ml    Lab Results   Component Value Date    WBC 10.97 02/26/2017    HGB 12.7 02/26/2017    HCT 39.5 02/26/2017    MCV 93 02/26/2017     02/26/2017     Lab Results   Component Value Date    CREATININE 1.0 02/26/2017    BUN 14 02/26/2017     Imaging   None     Assessment:     1. Urge incontinence of urine    2. OAB (overactive bladder)     Plan:   Jonathan was seen today for urinary frequency.    Diagnoses and all orders for this visit:    Urge incontinence of urine  -     POCT URINE DIPSTICK WITHOUT MICROSCOPE  -     POCT Bladder Scan  -     oxybutynin (DITROPAN-XL) 5 MG TR24; Take 1 tablet (5 mg total) by mouth once daily.  -     Urine culture    OAB (overactive bladder)    Plan:   1. Urine culture today. Will notify the patient with the results.    2. OAB symptoms most likely related to her recent infection. Will trial ditropan to relieve her urinary symptoms.     Follow up in 6 weeks with PVR.

## 2017-04-13 LAB — BACTERIA UR CULT: NORMAL

## 2017-04-18 DIAGNOSIS — E11.9 TYPE 2 DIABETES MELLITUS WITHOUT COMPLICATION: ICD-10-CM

## 2017-05-02 ENCOUNTER — OFFICE VISIT (OUTPATIENT)
Dept: INTERNAL MEDICINE | Facility: CLINIC | Age: 57
End: 2017-05-02
Attending: FAMILY MEDICINE
Payer: MEDICARE

## 2017-05-02 ENCOUNTER — LAB VISIT (OUTPATIENT)
Dept: LAB | Facility: OTHER | Age: 57
End: 2017-05-02
Attending: FAMILY MEDICINE
Payer: MEDICARE

## 2017-05-02 VITALS
WEIGHT: 189.13 LBS | SYSTOLIC BLOOD PRESSURE: 110 MMHG | HEIGHT: 65 IN | HEART RATE: 93 BPM | DIASTOLIC BLOOD PRESSURE: 76 MMHG | BODY MASS INDEX: 31.51 KG/M2

## 2017-05-02 DIAGNOSIS — E11.40 TYPE 2 DIABETES MELLITUS WITH DIABETIC NEUROPATHY, WITH LONG-TERM CURRENT USE OF INSULIN: ICD-10-CM

## 2017-05-02 DIAGNOSIS — M50.30 DDD (DEGENERATIVE DISC DISEASE), CERVICAL: ICD-10-CM

## 2017-05-02 DIAGNOSIS — N39.41 URGE INCONTINENCE OF URINE: ICD-10-CM

## 2017-05-02 DIAGNOSIS — M54.12 CERVICAL RADICULOPATHY: ICD-10-CM

## 2017-05-02 DIAGNOSIS — F31.9 BIPOLAR AFFECTIVE DISORDER, REMISSION STATUS UNSPECIFIED: ICD-10-CM

## 2017-05-02 DIAGNOSIS — Z79.4 TYPE 2 DIABETES MELLITUS WITH DIABETIC NEUROPATHY, WITH LONG-TERM CURRENT USE OF INSULIN: ICD-10-CM

## 2017-05-02 DIAGNOSIS — Z00.00 PREVENTATIVE HEALTH CARE: Primary | ICD-10-CM

## 2017-05-02 DIAGNOSIS — E11.9 TYPE 2 DIABETES MELLITUS WITHOUT COMPLICATION: ICD-10-CM

## 2017-05-02 DIAGNOSIS — I10 HTN (HYPERTENSION), BENIGN: ICD-10-CM

## 2017-05-02 DIAGNOSIS — M47.812 CERVICAL SPONDYLOSIS WITHOUT MYELOPATHY: ICD-10-CM

## 2017-05-02 DIAGNOSIS — C50.419 MALIGNANT NEOPLASM OF UPPER-OUTER QUADRANT OF FEMALE BREAST, UNSPECIFIED LATERALITY: ICD-10-CM

## 2017-05-02 LAB
ALBUMIN SERPL BCP-MCNC: 3.3 G/DL
ALP SERPL-CCNC: 48 U/L
ALT SERPL W/O P-5'-P-CCNC: 19 U/L
ANION GAP SERPL CALC-SCNC: 10 MMOL/L
AST SERPL-CCNC: 23 U/L
BILIRUB SERPL-MCNC: 0.2 MG/DL
BUN SERPL-MCNC: 10 MG/DL
CALCIUM SERPL-MCNC: 9.4 MG/DL
CHLORIDE SERPL-SCNC: 102 MMOL/L
CHOLEST/HDLC SERPL: 5.3 {RATIO}
CO2 SERPL-SCNC: 29 MMOL/L
CREAT SERPL-MCNC: 1.2 MG/DL
EST. GFR  (AFRICAN AMERICAN): 58 ML/MIN/1.73 M^2
EST. GFR  (NON AFRICAN AMERICAN): 50 ML/MIN/1.73 M^2
GLUCOSE SERPL-MCNC: 276 MG/DL
HDL/CHOLESTEROL RATIO: 18.9 %
HDLC SERPL-MCNC: 196 MG/DL
HDLC SERPL-MCNC: 37 MG/DL
LDLC SERPL CALC-MCNC: 106.2 MG/DL
NONHDLC SERPL-MCNC: 159 MG/DL
POTASSIUM SERPL-SCNC: 3.5 MMOL/L
PROT SERPL-MCNC: 7.6 G/DL
SODIUM SERPL-SCNC: 141 MMOL/L
TRIGL SERPL-MCNC: 264 MG/DL

## 2017-05-02 PROCEDURE — 3078F DIAST BP <80 MM HG: CPT | Mod: S$GLB,,, | Performed by: FAMILY MEDICINE

## 2017-05-02 PROCEDURE — 99999 PR PBB SHADOW E&M-EST. PATIENT-LVL III: CPT | Mod: PBBFAC,,, | Performed by: FAMILY MEDICINE

## 2017-05-02 PROCEDURE — 99396 PREV VISIT EST AGE 40-64: CPT | Mod: S$GLB,,, | Performed by: FAMILY MEDICINE

## 2017-05-02 PROCEDURE — 3074F SYST BP LT 130 MM HG: CPT | Mod: S$GLB,,, | Performed by: FAMILY MEDICINE

## 2017-05-02 PROCEDURE — 80061 LIPID PANEL: CPT

## 2017-05-02 PROCEDURE — 83036 HEMOGLOBIN GLYCOSYLATED A1C: CPT

## 2017-05-02 PROCEDURE — 36415 COLL VENOUS BLD VENIPUNCTURE: CPT

## 2017-05-02 PROCEDURE — 80053 COMPREHEN METABOLIC PANEL: CPT

## 2017-05-02 RX ORDER — OXYBUTYNIN CHLORIDE 10 MG/1
10 TABLET, EXTENDED RELEASE ORAL DAILY
Qty: 30 TABLET | Refills: 2 | Status: SHIPPED | OUTPATIENT
Start: 2017-05-02 | End: 2017-05-25

## 2017-05-02 RX ORDER — PREGABALIN 100 MG/1
100 CAPSULE ORAL 2 TIMES DAILY
Qty: 60 CAPSULE | Refills: 6 | Status: SHIPPED | OUTPATIENT
Start: 2017-05-02 | End: 2017-07-26 | Stop reason: SDUPTHER

## 2017-05-02 NOTE — MR AVS SNAPSHOT
[facility]  [address]  [place] [address]  Phone: [phone]  Fax: [phone]                  Jonathan Gonzales   2017 1:40 PM   Office Visit    Description:  Female : 1960   Provider:  Ghulam Contreras MD   Department:  [facility]           Reason for Visit     Extremity Weakness     Fall           Diagnoses this Visit        Comments    Preventative health care    -  Primary     Bipolar affective disorder, remission status unspecified         Malignant neoplasm of upper-outer quadrant of female breast, unspecified laterality         Type 2 diabetes mellitus with diabetic neuropathy, with long-term current use of insulin         HTN (hypertension), benign         Urge incontinence of urine         Cervical radiculopathy         Cervical spondylosis without myelopathy         DDD (degenerative disc disease), cervical                To Do List           Future Appointments        Provider Department Dept Phone    2017 3:00 PM LAB, SAME DAY [facility] [phone]    5/10/2017 11:00 AM Kathy Garvin MD [facility] [phone]    2017 2:30 PM Brianna Coon NP [facility] [phone]    2017 9:00 AM Galina Landon MD [facility] [phone]    2017 1:00 PM Cuba Osuna III, MD [facility] [phone]      Goals (5 Years of Data)     None       These Medications        Disp Refills Start End    pregabalin (LYRICA) 100 MG capsule 60 capsule 6 2017 10/31/2017    Take 1 capsule (100 mg total) by mouth 2 (two) times daily. - Oral    Pharmacy: [facility] [place], [place] - [address] Ph #: [phone]       oxybutynin (DITROPAN-XL) 10 MG 24 hr tablet 30 tablet 2 2017    Take 1 tablet (10 mg total) by mouth once daily. - Oral    Pharmacy: [facility]  "[place] - [address] Ph #: [phone]         [facility]     [facility] Nurse Care Line - 24/7 Assistance  Unless otherwise directed by your provider, please contact [facility], our nurse care line that is available for 24/7 assistance.     Registered nurses in the [facility] provide: appointment scheduling, clinical advisement, health education, and other advisory services.  Call: [phone] (toll free)               Medications           Message regarding Medications     Verify the changes and/or additions to your medication regime listed below are the same as discussed with your clinician today.  If any of these changes or additions are incorrect, please notify your healthcare provider.        START taking these NEW medications        Refills    pregabalin (LYRICA) 100 MG capsule 6    Sig: Take 1 capsule (100 mg total) by mouth 2 (two) times daily.    Class: Normal    Route: Oral      CHANGE how you are taking these medications     Start Taking Instead of    oxybutynin (DITROPAN-XL) 10 MG 24 hr tablet oxybutynin (DITROPAN-XL) 5 MG TR24    Dosage:  Take 1 tablet (10 mg total) by mouth once daily. Dosage:  Take 1 tablet (5 mg total) by mouth once daily.    Reason for Change:  Reorder       STOP taking these medications     FLUARIX QUAD 4430-8106, PF, 60 mcg (15 mcg x 4)/0.5 mL Syrg TO BE ADMINISTERED BY PHARMACIST FOR IMMUNIZATION           Verify that the below list of medications is an accurate representation of the medications you are currently taking.  If none reported, the list may be blank. If incorrect, please contact your healthcare provider. Carry this list with you in case of emergency.           Current Medications     ammonium lactate (LAC-HYDRIN) 12 % lotion     BD INSULIN PEN NEEDLE UF MINI 31 gauge x 3/16" Ndle     blood sugar diagnostic Strp Pt to check BG up to QID. Dispense strips compatible with pt brand meter    blood-glucose meter (FREESTYLE SYSTEM " "KIT) kit Pt to check BG up to QID. Dispense strips compatible with pt brand meter    clonazePAM (KLONOPIN) 0.5 MG tablet TK 1 T PO BID    divalproex (DEPAKOTE) 500 MG Tb24     EASY TOUCH 31 gauge x 1/4" Ndle     insulin glargine (LANTUS SOLOSTAR) 100 unit/mL (3 mL) InPn pen Inject 25 Units into the skin every evening.    KLOR-CON M20 20 mEq tablet Take 1 tablet (20 mEq total) by mouth 2 (two) times daily.    lancets (ONETOUCH ULTRASOFT LANCETS) [facility] Pt to check BG up to QID. Dispense strips compatible with pt brand meter    letrozole (FEMARA) 2.5 mg Tab Take 1 tablet (2.5 mg total) by mouth once daily.    lisinopril-hydrochlorothiazide (PRINZIDE,ZESTORETIC) 10-12.5 mg per tablet TAKE ONE TABLET BY MOUTH ONCE DAILY FOR BLOOD PRESSURE    meloxicam (MOBIC) 7.5 MG tablet Take 1 tablet (7.5 mg total) by mouth once daily.    metformin (GLUCOPHAGE) 500 MG tablet Take 1 tablet (500 mg total) by mouth 2 (two) times daily with meals.    nitrofurantoin (MACRODANTIN) 100 MG capsule Take 100 mg by mouth 4 (four) times daily.    nystatin (MYCOSTATIN) cream Apply topically 2 (two) times daily.    olanzapine (ZYPREXA) 10 MG tablet TK 1 T PO QHS    oxybutynin (DITROPAN-XL) 10 MG 24 hr tablet Take 1 tablet (10 mg total) by mouth once daily.    pen needle, diabetic (BD ULTRA-FINE [place] PEN NEEDLES) 32 gauge x 5/32" Ndle Pt is injecting once daily    pen needle, diabetic, safety 29 gauge x 3/16" Ndle Use as instructed    pravastatin (PRAVACHOL) 20 MG tablet Take 1 tablet (20 mg total) by mouth once daily.    pregabalin (LYRICA) 100 MG capsule Take 1 capsule (100 mg total) by mouth 2 (two) times daily.    quetiapine (SEROQUEL) 100 MG Tab Take 100 mg by mouth nightly as needed.     TRUE METRIX GLUCOSE METER Misc     TRUEPLUS LANCETS 33 gauge Misc     ziprasidone (GEODON) 40 MG Cap Take 20 mg by mouth 2 (two) times daily.           Clinical Reference Information           Your Vitals Were     BP Pulse Height Weight BMI    110/76 93 5' 5" " (1.651 m) 85.8 kg (189 lb 2.5 oz) 31.48 kg/m2      Blood Pressure          Most Recent Value    BP  110/76      Allergies as of 5/2/2017     Banana      Immunizations Administered on Date of Encounter - 5/2/2017     None      Orders Placed During Today's Visit      Normal Orders This Visit    Ambulatory Referral to Pain Clinic     Future Labs/Procedures Expected by Expires    Comprehensive metabolic panel  5/2/2017 7/1/2017    Hemoglobin A1c  5/2/2017 7/31/2017      [facility] Sign-Up     Activating your [facility] account is as easy as 1-2-3!     1) Visit [email or web address], select Sign Up Now, enter this activation code and your date of birth, then select Next.  Activation code not generated  Current Patient Portal Status: Account disabled      2) Create a username and password to use when you visit [facility] in the future and select a security question in case you lose your password and select Next.    3) Enter your e-mail address and click Sign Up!    Additional Information  If you have questions, please e-mail [email or web address] or call [phone] to talk to our [facility] staff. Remember, [facility] is NOT to be used for urgent needs. For medical emergencies, dial 911.         Instructions    Your test results will be communicated to you via: [facility], Telephone or Letter.  If you have not received your test results within one week. Please contact the clinic.           Smoking Cessation     If you would like to quit smoking:   You may be eligible for free services if you are a [place] resident and started smoking cigarettes before September 1, 1988.  Call the Smoking Cessation Trust (SCT) toll free at (055) 087-6814 or (373) 299-9995.   Call 6-800-QUIT-NOW if you do not meet the above criteria.   Contact us via email: [email or web address]   View our website for more information: [email or web address]        Language Assistance Services     ATTENTION: Language assistance services are available,  free of charge. Please call [phone].      ATENCIÓN: Si habla español, tiene a dorman disposición servicios gratuitos de asistencia lingüística. Llame al [phone].     CHÚ Ý: N?u b?n nói Ti?ng Vi?t, có các d?ch v? h? tr? ngôn ng? mi?n phí dành cho b?n. G?i s? [phone].         [facility] complies with applicable Federal civil rights laws and does not discriminate on the basis of race, color, national origin, age, disability, or sex.

## 2017-05-02 NOTE — PROGRESS NOTES
CHIEF COMPLAINT:  Frequent urination and DM follow up    HISTORY OF PRESENT ILLNESS: The patient is a 57 year-old black female.  The patient has a long and complicated medical history.      She continues to have problems with neuropathic pain as well as central pain.  She would like to be seen in the pain clinic this sounds reasonable.  We will restart her Lyrica 100 twice a day in the meantime.    She has been having frequent urination recently.  We will increase her ditropan to 10 mg daily.     The patient has a history of diabetes.  Recent blood sugars have been less than 120.  There have been no episodes of hypoglycemia.    The patient has a history of stable hypertension on current medications.  Patient denies chest pain or shortness of breath today.    The patient has a history of stable hyperlipidemia on current medications.  The patient denies chest pain or shortness of breath today.  The patient denies muscle aches or myalgias suggestive of myositis.    She has a history of breast cancer for which she underwent bilateral mastectomies and chemotherapy.    REVIEW OF SYSTEMS:  GENERAL: No fever, chills or weight loss.  SKIN: No rashes, itching or changes in color or texture of skin.  HEAD: No headaches or recent head trauma.  EYES: Visual acuity fine. No photophobia, ocular pain or diplopia.  EARS: Denies ear pain, discharge or vertigo.  NOSE: No loss of smell, no epistaxis or postnasal drip.  MOUTH & THROAT: No hoarseness or change in voice. No excessive gum bleeding.  NODES: Denies swollen glands.  CHEST: Denies AWAD, cyanosis, wheezing, cough and sputum production.  CARDIOVASCULAR: Denies chest pain, PND, orthopnea or reduced exercise tolerance.  ABDOMEN: Appetite fine. No weight loss. Denies diarrhea, abdominal pain, hematemesis or blood in stool.  URINARY: No flank pain, dysuria or hematuria.  PERIPHERAL VASCULAR: No claudication or cyanosis.  MUSCULOSKELETAL: No joint stiffness or swelling. Except as  "noted above.  NEUROLOGIC: No history of seizures, paralysis, alteration of gait or coordination.    SOCIAL HISTORY: The patient does not smoke.  The patient consumes alcohol socially.      PHYSICAL EXAMINATION:     Blood pressure 110/76, pulse 93, height 5' 5" (1.651 m), weight 85.8 kg (189 lb 2.5 oz).    APPEARANCE: Well nourished, well developed, in no acute distress.  She is quite somnolent.  She falls asleep during the encounter.  She is easily arousable.  She says she didn't sleep well last night.  HEAD: Normocephalic, atraumatic.  EYES: PERRL. EOMI.  Conjunctivae without injection and  anicteric  EARS: TM's intact. Light reflex normal. No retraction or perforation.    NOSE: Mucosa pink. Airway clear.  MOUTH & THROAT: No tonsillar enlargement. No pharyngeal erythema or exudate. No stridor.  NECK: Supple.   NODES: No cervical, axillary or inguinal lymph node enlargement.  CHEST: Lungs clear to auscultation.  No retractions are noted.  No rales or rhonchi are present.  CARDIOVASCULAR: Normal S1, S2. No rubs, murmurs or gallops.  ABDOMEN: Bowel sounds normal. Not distended. Soft. No tenderness or masses.  No ascites is noted.  MUSCULOSKELETAL:  There is no clubbing, cyanosis, or edema of the extremities x4.  There is full range of motion of the lumbar spine.  There is full range of motion of the extremities x4.  There is no deformity noted.    NEUROLOGIC:       Normal speech development.      Hearing normal.      Normal gait.      Motor and sensory exams grossly normal.      DTR's normal.  PSYCHIATRIC: Patient is alert and oriented x3.  Thought processes are all normal.  There is no homicidality.  There is no suicidality.  There is no evidence of psychosis.    LABORATORY/RADIOLOGY:   Chart reviewed . including surgeries and blood work    ASSESSMENT:   Frequent urination and urge incontinence  Breast cancer with resultant lymphedema  Hypertension  Diabetes  Neuropathic pain  Cervical radiculopathy    PLAN:  We will " increase ditropan to 10 mg daily  Restart Lyrica  Pain clinic referral  KCl 40 po qd  Prinzide  Pravachol 20 mg by mouth daily  We will get an A1c today.  Return to clinic in 6 months

## 2017-05-03 ENCOUNTER — TELEPHONE (OUTPATIENT)
Dept: INTERNAL MEDICINE | Facility: CLINIC | Age: 57
End: 2017-05-03

## 2017-05-03 LAB
ESTIMATED AVG GLUCOSE: 166 MG/DL
HBA1C MFR BLD HPLC: 7.4 %

## 2017-05-03 NOTE — TELEPHONE ENCOUNTER
----- Message from Ghulam Contreras MD sent at 5/3/2017  2:12 PM CDT -----  Blood work is very good.  A1c is very good.  No changes in medications.  Followup as scheduled.

## 2017-05-25 ENCOUNTER — OFFICE VISIT (OUTPATIENT)
Dept: UROLOGY | Facility: CLINIC | Age: 57
End: 2017-05-25
Payer: MEDICARE

## 2017-05-25 VITALS
HEART RATE: 73 BPM | WEIGHT: 189 LBS | BODY MASS INDEX: 31.49 KG/M2 | DIASTOLIC BLOOD PRESSURE: 96 MMHG | HEIGHT: 65 IN | SYSTOLIC BLOOD PRESSURE: 130 MMHG

## 2017-05-25 DIAGNOSIS — N39.41 URGE INCONTINENCE: Primary | ICD-10-CM

## 2017-05-25 DIAGNOSIS — N32.81 OAB (OVERACTIVE BLADDER): ICD-10-CM

## 2017-05-25 DIAGNOSIS — N39.41 URGE INCONTINENCE OF URINE: ICD-10-CM

## 2017-05-25 LAB — POC RESIDUAL URINE VOLUME: 0 ML (ref 0–100)

## 2017-05-25 PROCEDURE — 99213 OFFICE O/P EST LOW 20 MIN: CPT | Mod: PBBFAC,25 | Performed by: NURSE PRACTITIONER

## 2017-05-25 PROCEDURE — 99999 PR PBB SHADOW E&M-EST. PATIENT-LVL III: CPT | Mod: PBBFAC,,, | Performed by: NURSE PRACTITIONER

## 2017-05-25 PROCEDURE — 99213 OFFICE O/P EST LOW 20 MIN: CPT | Mod: S$GLB,,, | Performed by: NURSE PRACTITIONER

## 2017-05-25 PROCEDURE — 51798 US URINE CAPACITY MEASURE: CPT | Mod: PBBFAC | Performed by: NURSE PRACTITIONER

## 2017-05-25 RX ORDER — ZIPRASIDONE HYDROCHLORIDE 80 MG/1
80 CAPSULE ORAL NIGHTLY
COMMUNITY
Start: 2017-05-08 | End: 2017-07-26

## 2017-05-25 RX ORDER — OXYBUTYNIN CHLORIDE 10 MG/1
10 TABLET, EXTENDED RELEASE ORAL DAILY
Qty: 30 TABLET | Refills: 11 | Status: SHIPPED | OUTPATIENT
Start: 2017-05-25 | End: 2018-07-24 | Stop reason: SDUPTHER

## 2017-05-25 NOTE — PROGRESS NOTES
Subjective:      Jonathan Gonzales is a 57 y.o. female who presents today for follow up regarding her urinary incontinence.      Urinary Incontinence  Patient complains of urinary incontinence. This has been present for 2 month. She leaks urine with urge. Patient describes the symptoms as urge to urinate with little or no warning. Factors associated with symptoms include none known. Evaluation to date includes UA/CS: negative. Treatment to date includes none. The patient reported a UTI (culture proven- E. coli) on 2/16/17 that was treated with Macrobid. She reported that after completing the antibiotics, she still experienced some urinary incontinence (urge), suprapubic pressure, and frequency. She denied dysuria, hematuria, flank pain, and fever. Six weeks ago we started ditropan 5 mg XL for her urge incontinence.    Today the patient reports much improvement in her urinary symptoms. She states that she is having less frequency and has had an improvement in urgency. She reports less urinary leakage, although she is still using depends. She denies dysuria, hematuria, flank pain and fever today. Denies SE of the medication.     The following portions of the patient's history were reviewed and updated as appropriate: allergies, current medications, past family history, past medical history, past social history, past surgical history and problem list.    Review of Systems  Constitutional: no fever or chills  ENT: no nasal congestion or sore throat  Respiratory: no cough or shortness of breath  Cardiovascular: no chest pain or palpitations  Gastrointestinal: no nausea or vomiting, tolerating diet  Genitourinary: as per HPI  Hematologic/Lymphatic: no easy bruising or lymphadenopathy  Musculoskeletal: no arthralgias or myalgias  Neurological: no seizures or tremors  Behavioral/Psych: no auditory or visual hallucinations     Objective:   Vital Signs:BP (!) 130/96 (BP Location: Right arm, Patient Position: Sitting, BP  "Method: Automatic)   Pulse 73   Ht 5' 5" (1.651 m)   Wt 85.7 kg (189 lb)   BMI 31.45 kg/m²     Physical Exam   General: alert and oriented, no acute distress  Head: normocephalic, atraumatic  Neck: supple, no lymphadenopathy, normal ROM, no masses  Respiratory: Symmetric expansion, non-labored breathing  Cardiovascular: regular rate and rhythm, nomal pulses, no peripheral edema  Abdomen: soft, non tender, non distended, no palpable masses, no hernias, no hepatomegaly or splenomegaly  Pelvic: not examined  Lymphatic: no inguinal nodes  Skin: normal coloration and turgor, no rashes, no suspicious skin lesions noted  Neuro: alert and oriented x3, no gross deficits  Psych: normal judgment and insight, normal mood/affect and non-anxious  No CVA tenderness    Lab Review   Urinalysis demonstrates: no sample today   PVR:  0 mL (voided before appointment)     Lab Results   Component Value Date    WBC 10.97 02/26/2017    HGB 12.7 02/26/2017    HCT 39.5 02/26/2017    MCV 93 02/26/2017     02/26/2017     Lab Results   Component Value Date    CREATININE 1.2 05/02/2017    BUN 10 05/02/2017     Imaging   None     Assessment:     1. Urge incontinence of urine    2. OAB (overactive bladder)     Plan:   Jonathan was seen today for follow-up.    Diagnoses and all orders for this visit:    OAB (overactive bladder)    Urge incontinence of urine  -     POCT Bladder Scan  -     oxybutynin (DITROPAN-XL) 10 MG 24 hr tablet; Take 1 tablet (10 mg total) by mouth once daily.    Plan:    --Increase the dose of ditropan to 10 mg XL  --Follow up in 6 months with PVR     "

## 2017-06-19 ENCOUNTER — OFFICE VISIT (OUTPATIENT)
Dept: INTERNAL MEDICINE | Facility: CLINIC | Age: 57
End: 2017-06-19
Payer: MEDICARE

## 2017-06-19 VITALS
HEART RATE: 96 BPM | DIASTOLIC BLOOD PRESSURE: 70 MMHG | OXYGEN SATURATION: 96 % | SYSTOLIC BLOOD PRESSURE: 104 MMHG | RESPIRATION RATE: 18 BRPM | BODY MASS INDEX: 32.7 KG/M2 | WEIGHT: 191.56 LBS | HEIGHT: 64 IN

## 2017-06-19 DIAGNOSIS — Z00.00 ENCOUNTER FOR PREVENTIVE HEALTH EXAMINATION: Primary | ICD-10-CM

## 2017-06-19 DIAGNOSIS — I10 HTN (HYPERTENSION), BENIGN: ICD-10-CM

## 2017-06-19 DIAGNOSIS — Z72.0 TOBACCO ABUSE: ICD-10-CM

## 2017-06-19 DIAGNOSIS — M54.5 CHRONIC LOW BACK PAIN, UNSPECIFIED BACK PAIN LATERALITY, WITH SCIATICA PRESENCE UNSPECIFIED: ICD-10-CM

## 2017-06-19 DIAGNOSIS — E11.42 DM TYPE 2 WITH DIABETIC PERIPHERAL NEUROPATHY: ICD-10-CM

## 2017-06-19 DIAGNOSIS — C50.919 BREAST CANCER, STAGE 2, UNSPECIFIED LATERALITY: Chronic | ICD-10-CM

## 2017-06-19 DIAGNOSIS — N32.81 OAB (OVERACTIVE BLADDER): ICD-10-CM

## 2017-06-19 DIAGNOSIS — F25.0 SCHIZOAFFECTIVE DISORDER, BIPOLAR TYPE: ICD-10-CM

## 2017-06-19 DIAGNOSIS — M50.30 DDD (DEGENERATIVE DISC DISEASE), CERVICAL: ICD-10-CM

## 2017-06-19 DIAGNOSIS — Z01.00 ROUTINE EYE EXAM: ICD-10-CM

## 2017-06-19 DIAGNOSIS — E11.8 DIABETIC FOOT: ICD-10-CM

## 2017-06-19 DIAGNOSIS — G89.29 CHRONIC LOW BACK PAIN, UNSPECIFIED BACK PAIN LATERALITY, WITH SCIATICA PRESENCE UNSPECIFIED: ICD-10-CM

## 2017-06-19 PROCEDURE — 99499 UNLISTED E&M SERVICE: CPT | Mod: S$GLB,,, | Performed by: NURSE PRACTITIONER

## 2017-06-19 PROCEDURE — 99215 OFFICE O/P EST HI 40 MIN: CPT | Mod: PBBFAC | Performed by: NURSE PRACTITIONER

## 2017-06-19 PROCEDURE — G0439 PPPS, SUBSEQ VISIT: HCPCS | Mod: S$GLB,,, | Performed by: NURSE PRACTITIONER

## 2017-06-19 PROCEDURE — 99999 PR PBB SHADOW E&M-EST. PATIENT-LVL V: CPT | Mod: PBBFAC,,, | Performed by: NURSE PRACTITIONER

## 2017-06-19 NOTE — PROGRESS NOTES
"Jonathan Gonzales presented for a  Medicare AWV and comprehensive Health Risk Assessment today. She states she has been on disability since May 2014 due to breast cancer, schizophrenia, and bipolar disorder.  The following components were reviewed and updated:    · Medical history  · Family History  · Social history  · Allergies and Current Medications  · Health Risk Assessment  · Health Maintenance  · Care Team     ** See Completed Assessments for Annual Wellness Visit within the encounter summary.**       The following assessments were completed:  · Living Situation  · CAGE  · Depression Screening  · Timed Get Up and Go  · Whisper Test  · Cognitive Function Screening  ·   ·   ·   · Nutrition Screening  · ADL Screening  · PAQ Screening    Vitals:    06/19/17 1413   BP: 104/70   Patient Position: Sitting   BP Method: Manual   Pulse: 96   Resp: 18   SpO2: 96%   Weight: 86.9 kg (191 lb 9.3 oz)   Height: 5' 3.75" (1.619 m)     Body mass index is 33.14 kg/m².  Physical Exam   Constitutional: She is oriented to person, place, and time. She appears well-developed. No distress.   HENT:   Head: Normocephalic.   Right Ear: External ear normal.   Left Ear: External ear normal.   Nose: Nose normal.   Eyes: Conjunctivae are normal. No scleral icterus.   Neck: Normal range of motion. Neck supple.   Cardiovascular: Normal rate, regular rhythm, normal heart sounds and intact distal pulses.  Exam reveals no gallop and no friction rub.    No murmur heard.  Pulmonary/Chest: Effort normal. No respiratory distress. She has decreased breath sounds. She has no wheezes. She has no rales. She exhibits no tenderness.   Abdominal: Soft. Bowel sounds are normal.   Musculoskeletal: Normal range of motion. She exhibits edema (1+ nonpitting edema to BLE (ankles and pedal)).   Neurological: She is alert and oriented to person, place, and time.   Skin: Skin is warm and dry. She is not diaphoretic. No erythema.   Psychiatric: She has a normal mood and " affect. Her behavior is normal. Judgment and thought content normal.   Vitals reviewed.        Diagnoses and health risks identified today and associated recommendations/orders:    1. Breast cancer, stage 2, unspecified laterality  Chronic.  Stable.  Per hematology/oncology note, she was diagnosed with Stage IIB breast cancer in 11/2011.  S/p bilateral mastectomy with SHLOMO flap reconstruction 11/2011.  She is on Letrozole currently.  Monitored by Hematology/Oncology.       2. Schizoaffective disorder, bipolar type  Chronic.  Stable.  Treated with divalproex, ziprasidone,quetiapine, olanzapine, and clonazepam.  Encouraged to continue treatment plan.  Monitored by Psychiatry (Dr. Beckford at [facility]).      3. Uncontrolled type 2 diabetes mellitus with diabetic neuropathy, with long-term current use of insulin  Chronic.  Uncontrolled.  Last Hemoglobin A1C = 7.4% from 5/2017. Treated with Lantus and metformin.  Encouraged to increase exercise as tolerated to at least 2 hours and 30 minutes of moderate-intensity aerobic activity per week and  2 days per week of muscle-strengthening activities and to improve diet to diabetic diet. Education provided.  Encouraged to follow up with PCP and Diabetes Self-Management Training for questions or concerns.  Monitored by PCP.      4. DM type 2 with diabetic peripheral neuropathy  Chronic. Stable.  Neuropathy treated with pregabalin.  Monitored by Neurology and Podiatry.      5. Diabetic foot  Chronic.  Stable.  Monitored by PCP and Podiatry.    - Ambulatory consult to Podiatry    6. Encounter for preventive health examination  Annual Health Risk Assessment (HRA) visit today.  Counseling and referral of health maintenance and preventative health measures performed.  Patient given annual wellness paperwork to take home.  Encouraged to return in 1 year for subsequent HRA visit.  - Declined tetanus immunization and foot exam.      7. DDD (degenerative disc disease), cervical  Chronic.  " Stable.  Monitored by Pain Medicine.     -- Ambulatory Referral to Pain Clinic  She would like to see a different Pain Medicine provider.    - Paper prescription from PCP faxed to DME for tripod cane, per patient request.      8. Routine eye exam  - Ambulatory consult to Optometry  - Scheduled    9. Tobacco abuse  Chronic intermittent use for 25 years.  States she is "maybe" ready to quit.  Smokes 0.75 packs per day.  Monitored by PCP.     - Ambulatory referral to Smoking Cessation Program    10. Chronic low back pain, unspecified back pain laterality, with sciatica presence unspecified  Chronic.  Stable.  Monitored by Pain Medicine.      11. HTN (hypertension), benign  Chronic.  Controlled.  Treated with lisinopril-HCTZ.  Encouraged to increase exercise as tolerated to at least 2 hours and 30 minutes of moderate-intensity aerobic activity per week and  2 days per week of muscle-strengthening activities and to improve diet to heart healthy, low sodium diet.  Education provided.  Encouraged to continue treatment plan.  Monitored by PCP.     12. OAB (overactive bladder)  Chronic.  Stable.  Treated with oxybutynin.  Encouraged to continue treatment plan.  Monitored by Urology.      Provided Lyndon with a 5-10 year written screening schedule and personal prevention plan. Recommendations were developed using the USPSTF age appropriate recommendations. Education, counseling, and referrals were provided as needed. After Visit Summary printed and given to patient which includes a list of additional screenings\tests needed.    Return in about 1 year (around 6/19/2018) for your next health risk assessment visit.    Sulema Cordova NP  "

## 2017-06-19 NOTE — PATIENT INSTRUCTIONS
Fall Prevention  Falls often occur due to slipping, tripping or losing your balance. Millions of people fall every year and injure themselves. Here are ways to reduce your risk of falling again.  · Think about your fall, was there anything that caused your fall that can be fixed, removed, or replaced?  · Make your home safe by keeping walkways clear of objects you may trip over.  · Use non-slip pads under rugs. Do not use area rugs or small throw rugs.  · Use non-slip mats in bathtubs and showers.  · Install handrails and lights on staircases.  · Do not walk in poorly lit areas.  · Do not stand on chairs or wobbly ladders.  · Use caution when reaching overhead or looking upward. This position can cause a loss of balance.  · Be sure your shoes fit properly, have non-slip bottoms and are in good condition.   · Wear shoes both inside and out. Avoid going barefoot or wearing slippers.  · Be cautious when going up and down stairs, curbs, and when walking on uneven sidewalks.  · If your balance is poor, consider using a cane or walker.  · If your fall was related to alcohol use, stop or limit alcohol intake.   · If your fall was related to use of sleeping medicines, talk to your doctor about this. You may need to reduce your dosage at bedtime if you awaken during the night to go to the bathroom.    · To reduce the need for nighttime bathroom trips:  ¨ Avoid drinking fluids for several hours before going to bed  ¨ Empty your bladder before going to bed  ¨ Men can keep a urinal at the bedside  · Stay as active as you can. Balance, flexibility, strength, and endurance all come from exercise. They all play a role in preventing falls. Ask your healthcare provider which types of activity are right for you.  · Get your vision checked on a regular basis.  · If you have pets, know where they are before you stand up or walk so you don't trip over them.  · Use night lights.  Date Last Reviewed: 11/5/2015  © 4886-1095 [employer]. [address] [place] [address]. All rights reserved. This information is not intended as a substitute for professional medical care. Always follow your healthcare professional's instructions.        Preventing Falls in the Home  As you get older, falls are more likely for many reasons. One reason is because your reaction time slows. Your muscles and joints may also get stiffer, making them less flexible and therefore more prone to injury. Illness, medicines, and vision changes can also affect your balance. This increases your risk of falling. A fall could leave you unable to live on your own. To make your home safer, follow these tips:   Floors  Make floors safer by doing the following:   · Put nonskid pads under area rugs.  · Remove throw rugs.  · Replace worn floor coverings.  · Tack carpets firmly to each step on carpeted stairs. Put nonskid strips on the edges of uncarpeted stairs.  · Keep floors and stairs free of clutter and cords.  · Arrange furniture so there are clear pathways.  · Clean up any spills right away.  Bathrooms  Make bathrooms safer by doing the following:   ·   Install grab bars in the tub or shower.  · Apply nonskid strips or put a nonskid rubber mat in the tub or shower.  · Sit on a bath chair to bathe.  · Use bathmats with nonskid backing.  Lighting  Tips to light your way:   · Keep a flashlight in each room.  · Put a nightlight along the pathway between the bedroom and the bathroom.  Date Last Reviewed: 8/1/2016  © 1897-4054 [employer]. [address] [place] [address]. All rights reserved. This information is not intended as a substitute for professional medical care. Always follow your healthcare professional's instructions.        Diabetes: Activity Tips    Being more active can help you manage your diabetes. The tips on this sheet can help you get the most from your exercise. They can also help you stay safe.  Benefit from  briskness  Brisk activity gets your heart beating faster. This can help you increase your fitness, lose extra weight, and manage your blood sugar level. Try brisk walking. Or, if you have foot or leg problems, you can try swimming or bike riding. You can break up your exercise into chunks throughout the day. Work up to at least 30 minutes of steady, brisk exercise on most days.  Warm up and cool down  Warming up and cooling down reduce your risk of injury. They also help limit muscle soreness. Do a mild version of your activity for 5 minutes before and after your routine. You can also learn stretches that will help keep your muscles loose. Your healthcare provider may show you good ways to warm up and stretch.  Do the talk-sing test  The talk-sing test is a simple way to tell how hard youre exercising. If you can talk while exercising, youre in a safe range. If youre out of breath, slow down. If you can carry a tune, its time to  the pace. Walk up a hill. Increase the resistance on your stationary bike. Or swim faster.  What about eating?  You may be told to plan your exercise for 1 to 2 hours after a meal. In most cases, you dont need to eat while being active. If you take insulin or medicine that can cause low blood sugar, test your blood sugar before exercising. And carry a fast-acting sugar that will raise your blood sugar level quickly. This includes glucose tablets or hard candy. Use it if you feel low blood sugar symptoms.  Safety tips  These tips can help you stay safe as you become fit:  · Exercise with a friend or carry a cell phone if you have one.  · Carry or wear identification, such as a necklace or bracelet, that says you have diabetes.  · Use the proper footwear and safety equipment for your activity.  · Drink water before, during, and after exercise.  · Dress properly for the weather.  · Dont exercise in very hot or very cold weather.  · Dont exercise if you are sick.  · If you are  instructed to do so, test your blood sugar before and after you exercise. Have a small carbohydrate snack if your blood sugar is low before you start exercising.   When to stop exercising and call your healthcare provider  Stop exercising and call your healthcare provider right away if you notice any of the following:  · Pain, pressure, tightness, or heaviness in the chest  · Pain or heaviness in the neck, shoulders, back, arms, legs, or feet  · Unusual shortness of breath  · Dizziness or lightheadedness  · Unusually rapid or slow pulse  · Increased joint or muscle pain  · Nausea or vomiting  Date Last Reviewed: 5/1/2016 © 2000-2016 [employer]. [address] [place] [address], [place] [address]. All rights reserved. This information is not intended as a substitute for professional medical care. Always follow your healthcare professional's instructions.        Diet: Diabetes  Food is an important tool that you can use to control diabetes and stay healthy. Eating well-balanced meals in the correct amounts will help you control your blood glucose levels and prevent low blood sugar reactions. It will also help you reduce the health risks of diabetes. There is no one specific diet that is right for everyone with diabetes. But there are general guidelines to follow. A registered dietitian (RD) will create a tailored diet approach thats just right for you. He or she will also help you plan healthy meals and snacks. If you have any questions, call your dietitian for advice.    Guidelines for success  Talk with your healthcare provider before starting a diabetes diet or weight loss program. If you haven't talked with a dietitian yet, ask your provider for a referral. The following guidelines can help you succeed:  · Select foods from the 6 food groups below. Your dietitian will help you find food choices within each group. He or she will also show you serving sizes and how many servings you can have at each  meal.  ¨ Grains, beans, and starchy vegetables  ¨ Vegetables  ¨ Fruit  ¨ Milk or yogurt  ¨ Meat, poultry, fish, or tofu  ¨ Healthy fats  · Check your blood sugar levels as directed by your provider. Take any medicine as prescribed by your provider.  · Learn to read food labels and pick the right portion sizes.  · Eat only the amount of food in your meal plan. Eat about the same amount of food at regular times each day. Dont skip meals. Eat meals 4 to 5 hours apart, with snacks in between.  · Limit alcohol. It raises blood sugar levels. Drink water or calorie-free diet drinks that use safe sweeteners.  · Eat less fat to help lower your risk of heart disease. Use nonfat or low-fat dairy products and lean meats. Avoid fried foods. Use cooking oils that are unsaturated, such as olive, canola, or peanut oil.  · Talk with your dietitian about safe sugar substitutes.  · Avoid added salt. It can contribute to high blood pressure, which can cause heart disease. People with diabetes already have a risk of high blood pressure and heart disease.  · Stay at a healthy weight. If you need to lose weight, cut down on your portion sizes. But dont skip meals. Exercise is an important part of any weight management program. Talk with your provider about an exercise program thats right for you.  · For more information about the best diet plan for you, talk with a registered dietitian (RD). To find an RD in your area, contact:  ¨ Academy of Nutrition and Dietetics www.eatright.org  ¨ The American Diabetes Association [phone] www.diabetes.org  Date Last Reviewed: 8/1/2016  © 5834-3047 [employer]. [address] [place] [address]. All rights reserved. This information is not intended as a substitute for professional medical care. Always follow your healthcare professional's instructions.          Counseling and Referral of Other Preventative  (Italic type indicates deductible and co-insurance are  waived)    Patient Name: Jonathan Gonzales  Today's Date: 6/19/2017      SERVICE LIMITATIONS RECOMMENDATION    Vaccines    · Pneumococcal (once after 65)    · Influenza (annually)    · Hepatitis B (if medium/high risk)    · Prevnar 13      Hepatitis B medium/high risk factors:       - End-stage renal disease       - Hemophiliacs who received Factor VII or         IX concentrates       - Clients of institutions for the mentally             retarded       - Persons who live in the same house as          a HepB carrier       - Homosexual men       - Illicit injectable drug abusers     Pneumococcal: N/A     Influenza: N/A     Hepatitis B: N/A     Prevnar 13: N/A    Mammogram (biennial age 50-74)  Annually (age 40 or over)  N/A    Pap (up to age 70 and after 70 if unknown history or abnormal study last 10 years)    Last done 3/2015, recommend to repeat per provider recommended.     The USPSTF recommends screening for cervical cancer in women age 21 to 65 years with cytology (Pap smear) every 3 years.     Colorectal cancer screening (to age 75)    · Fecal occult blood test (annual)  · Flexible sigmoidoscopy (5y)  · Screening colonoscopy (10y)  · Barium enema   Last done 3/2011, recommend to repeat every 5-10  years    Diabetes self-management training (no USPSTF recommendations)  Requires referral by treating physician for patient with diabetes or renal disease. 10 hours of initial DSMT sessions of no less than 30 minutes each in a continuous 12-month period. 2 hours of follow-up DSMT in subsequent years.  Recommended to patient, declined    Bone mass measurements (age 65 & older, biennial)  Requires diagnosis related to osteoporosis or estrogen deficiency. Biennial benefit unless patient has history of long-term glucocorticoid  N/A    Glaucoma screening (no USPSTF recommendation)  Diabetes mellitus, family history   , age 50 or over    American, age 65 or over  Scheduled, see appointments    Medical  nutrition therapy for diabetes or renal disease (no recommended schedule)  Requires referral by treating physician for patient with diabetes or renal disease or kidney transplant within the past 3 years.  Can be provided in same year as diabetes self-management training (DSMT), and CMS recommends medical nutrition therapy take place after DSMT. Up to 3 hours for initial year and 2 hours in subsequent years.  Recommended to patient, declined    Cardiovascular screening blood tests (every 5 years)  · Fasting lipid panel  Order as a panel if possible  Last done 5/2017, recommend to repeat every 1  years    Diabetes screening tests (at least every 3 years, Medicare covers annually or at 6-month intervals for prediabetic patients)  · Fasting blood sugar (FBS) or glucose tolerance test (GTT)  Patient must be diagnosed with one of the following:       - Hypertension       - Dyslipidemia       - Obesity (BMI 30kg/m2)       - Previous elevated impaired FBS or GTT       ... or any two of the following:       - Overweight (BMI 25 but <30)       - Family history of diabetes       - Age 65 or older       - History of gestational diabetes or birth of baby weighing more than 9 pounds  N/A    Abdominal aortic aneurysm screening (once)  · Sonogram   Limited to patients who meet one of the following criteria:       - Men who are 65-75 years old and have smoked more than 100 cigarette in their lifetime       - Anyone with a family history of abdominal aortic aneurysm       - Anyone recommended for screening by the USPSTF  N/A    HIV screening (annually for increased risk patients)  · HIV-1 and HIV-2 by EIA, or CLEMENTE, rapid antibody test or oral mucosa transudate  Patients must be at increased risk for HIV infection per USPSTF guidelines or pregnant. Tests covered annually for patient at increased risk or as requested by the patient. Pregnant patients may receive up to 3 tests during pregnancy.  Risks discussed, screening is not  recommended    Smoking cessation counseling (up to 8 sessions per year)  Patients must be asymptomatic of tobacco-related conditions to receive as a preventative service.  Referred to Tobacco Cessation Program.    Subsequent annual wellness visit  At least 12 months since last AWV  Return in one year     The following information is provided to all patients.  This information is to help you find resources for any of the problems found today that may be affecting your health:                Living healthy guide: www.[facility].[place].[facility]      Understanding Diabetes: www.diabetes.org      Eating healthy: www.cdc.gov/healthyweight      [facility] home safety checklist: www.cdc.gov/steadi/patient.html      Agency on Aging: www.goea.[place].[facility]      Alcoholics anonymous (AA): www.aa.org      Physical Activity: www.michelle.nih.gov/ft3islo      Tobacco use: www.quitwithusla.org

## 2017-06-26 ENCOUNTER — OFFICE VISIT (OUTPATIENT)
Dept: HEMATOLOGY/ONCOLOGY | Facility: CLINIC | Age: 57
End: 2017-06-26
Payer: MEDICARE

## 2017-06-26 VITALS
TEMPERATURE: 98 F | WEIGHT: 190.94 LBS | HEIGHT: 63 IN | HEART RATE: 77 BPM | BODY MASS INDEX: 33.83 KG/M2 | DIASTOLIC BLOOD PRESSURE: 62 MMHG | SYSTOLIC BLOOD PRESSURE: 114 MMHG | OXYGEN SATURATION: 96 %

## 2017-06-26 DIAGNOSIS — I89.0 LYMPHEDEMA: ICD-10-CM

## 2017-06-26 DIAGNOSIS — C50.919 MALIGNANT NEOPLASM OF FEMALE BREAST, UNSPECIFIED LATERALITY, UNSPECIFIED SITE OF BREAST: Primary | ICD-10-CM

## 2017-06-26 DIAGNOSIS — F25.0 SCHIZOAFFECTIVE DISORDER, BIPOLAR TYPE: ICD-10-CM

## 2017-06-26 PROCEDURE — 99214 OFFICE O/P EST MOD 30 MIN: CPT | Mod: PBBFAC | Performed by: INTERNAL MEDICINE

## 2017-06-26 PROCEDURE — 99499 UNLISTED E&M SERVICE: CPT | Mod: S$GLB,,, | Performed by: INTERNAL MEDICINE

## 2017-06-26 PROCEDURE — 99999 PR PBB SHADOW E&M-EST. PATIENT-LVL IV: CPT | Mod: PBBFAC,,, | Performed by: INTERNAL MEDICINE

## 2017-06-26 PROCEDURE — 99214 OFFICE O/P EST MOD 30 MIN: CPT | Mod: S$GLB,,, | Performed by: INTERNAL MEDICINE

## 2017-06-26 NOTE — PROGRESS NOTES
Subjective:       Patient ID: Jonathan Gonzales is a 57 y.o. female.    Chief Complaint: Follow-up    HPI     PATIENT LOST TO FOLLOW-UP ( last f/u 10/2015)          Diagnosis: Stage IIB lt Breast CA T2N1MO ER weak pos/PA negative, HER-2/bridget amplified dx'd 11/2011   Therapy:     1. s/p bilateral mastectomy with SHLOMO flap reconstruction 11/23/2011                        2. completed   Phase III 0221 protocol                   HPI 56-year-old female with stage IIB breast cancer, left breast originally diagnosed in November 2011, status post bilateral skin -sparing mastectomy with a right prophylactic and a left radical mastectomy for a T2 N1 MO ,Stage 2b. breast cancer of the left breast on 11/23/2011 with immediate autologous tissue reconstruction with SHLOMO flap on letrozole therapy here for f/u. She underwent second stage reconstructive surgery on 4/27/2015 which was complicated by a hematoma for whichShe was taken back to OR emergently  for aspiration.          Pt previously Lost to  follow-up    Today, she is doing well  She is followed by psychiatry ( Dr. Lloyd)  for bipolar disorder  She reports her psych meds recently adjusted   She continues to wear compression sleeve LUE   She reports lymphedema stable.  Previously followed by PT  No SOB/CP/N/V.                She is followed by PCP for  history of type 2  DM w/neuropathy , HTN and hyperlipidemia         Bone Density 10/19/2016 nl      Pt previously  followed by Dr. Mcgee for her psych history of bipolar d/o and schizophrenia  She now followed by [facility] after care      PrevHx: Tumor was ER weak pos /PA negative, HER-2/bridget amplified with 1 of total of 24 lymph nodes removed which revealed metastatic carcinoma, 0.4 cm in diameter with no extranodal extension.She had abnormal PET imaging 12/2011 which revealed. Findings suggestive of metastatic disease to supraclavicular, mediastinal and right hilar lymph nodes.She underwent rt supraclavicular lymph  "node biopsy which was negative for malignancy. PET/CT 10/12 revealed resolution of LAD and no evid of mets.Patient was enrolled in 0221 study. She completed 1 yr of Herceptin therapy3/12/2013.     Tx; Phase III 0221 protocol- DD AC ( Adriamycin 60mg/m2/Cytoxan 60mg/2) x4 followed by weekly Paclitaxel 175mg/m2/Herceptin and completion of Herceptin therapy (6mg/kg q3wks) 1 yr 3/12/13              Review of Systems   Constitutional: Negative for appetite change, chills and fatigue.   HENT: Negative for congestion, hearing loss and nosebleeds.    Eyes: Negative for visual disturbance.   Respiratory: Negative for apnea, cough, chest tightness, shortness of breath and wheezing.    Cardiovascular: Negative for chest pain, palpitations and leg swelling.   Gastrointestinal: Negative for abdominal distention, abdominal pain, blood in stool, constipation, diarrhea, nausea and vomiting.   Genitourinary: Negative for difficulty urinating, dysuria, flank pain, frequency, hematuria and urgency.   Musculoskeletal: Negative for arthralgias, back pain, gait problem, joint swelling and neck pain.   Skin: Negative for rash.        No petechiae, ecchymoses   Neurological: Negative for dizziness, tremors, seizures, syncope, speech difficulty, light-headedness, numbness and headaches.   Hematological: Negative for adenopathy. Does not bruise/bleed easily.   Psychiatric/Behavioral: Negative for confusion and dysphoric mood. The patient is not nervous/anxious.        Objective:       Vitals:    06/26/17 0906   BP: 114/62   BP Location: Right arm   Patient Position: Sitting   BP Method: Manual   Pulse: 77   Temp: 98.3 °F (36.8 °C)   TempSrc: Oral   SpO2: 96%   Weight: 86.6 kg (190 lb 14.7 oz)   Height: 5' 3" (1.6 m)       Physical Exam   Constitutional: She is oriented to person, place, and time. She appears well-developed and well-nourished.   HENT:   Head: Normocephalic.   Mouth/Throat: Oropharynx is clear and moist. No oropharyngeal " exudate.   Eyes: Conjunctivae and lids are normal. Pupils are equal, round, and reactive to light. No scleral icterus.   Neck: Normal range of motion. Neck supple. No thyromegaly present.   Cardiovascular: Normal rate, regular rhythm and normal heart sounds.    No murmur heard.  Pulmonary/Chest: Breath sounds normal. She has no wheezes. She has no rales.   S/p Rt reconstructed breast- palpable fibrosis noted  Healed surgical incision site lower quad no erythema,  LAD noted    S/p Lt reconstructed breast- no palpable masses,  Nodules or LAD noted   Abdominal: Soft. Bowel sounds are normal. She exhibits no distension and no mass. There is no hepatosplenomegaly. There is no tenderness. There is no rebound and no guarding.   Musculoskeletal: Normal range of motion. She exhibits edema. She exhibits no tenderness.   Lymphadenopathy:     She has no cervical adenopathy.     She has no axillary adenopathy.        Right: No supraclavicular adenopathy present.        Left: No supraclavicular adenopathy present.   Neurological: She is alert and oriented to person, place, and time. No cranial nerve deficit. Coordination normal.   Skin: Skin is warm and dry. No ecchymosis, no petechiae and no rash noted. No erythema.   Psychiatric: She has a normal mood and affect.           Results for JONATHAN MUNOZ (MRN [id number]) as of 6/8/2015 13:40   Ref. Range 6/2/2015 07:50   Vit D, 25-Hydroxy Latest Range: 30-96 ng/mL 33     Lab Results   Component Value Date    WBC 10.97 02/26/2017    HGB 12.7 02/26/2017    HCT 39.5 02/26/2017    MCV 93 02/26/2017     02/26/2017           10/19/2016 Bone Density-nl    Labs: none   Assessment:       1. Malignant neoplasm of female breast, unspecified laterality, unspecified site of breast    2. Lymphedema    3. Schizoaffective disorder, bipolar type        Plan:   Jonathan was seen today for follow-up.  Pt clinically stable    Diagnoses and associated orders for this visit:    Breast cancer,  Stage IIB lt Breast CA T2N1MO ER weak pos/ [place] neg Her 2 pos diagnosed  11/2011   - s/p bilateral mastectomy with SHLOMO flap reconstruction 11/23/2011   - s/p chemotherapy per  Phase III 0221 protocol      S/p second stage reconstructive surgery      Rt Breast US 10/2015- Area in the right breast is benign-negative.  .ACR BI-RADS Category 2: Benign   - no evidence of disease recurrence clinically     Postmastectomy lymphedema  Stable  Cont compression sleeves    Bipolar d/o  Follow-up with psych      F/u 4 mos with cbc,cmp     CC: Ghulam Contreras MD

## 2017-07-15 DIAGNOSIS — N39.41 URGE INCONTINENCE OF URINE: ICD-10-CM

## 2017-07-26 ENCOUNTER — OFFICE VISIT (OUTPATIENT)
Dept: INTERNAL MEDICINE | Facility: CLINIC | Age: 57
End: 2017-07-26
Attending: FAMILY MEDICINE
Payer: MEDICARE

## 2017-07-26 ENCOUNTER — OFFICE VISIT (OUTPATIENT)
Dept: OPTOMETRY | Facility: CLINIC | Age: 57
End: 2017-07-26
Payer: MEDICARE

## 2017-07-26 VITALS
WEIGHT: 187.81 LBS | BODY MASS INDEX: 33.28 KG/M2 | SYSTOLIC BLOOD PRESSURE: 130 MMHG | HEIGHT: 63 IN | HEART RATE: 82 BPM | DIASTOLIC BLOOD PRESSURE: 70 MMHG | OXYGEN SATURATION: 97 %

## 2017-07-26 DIAGNOSIS — H01.02B SQUAMOUS BLEPHARITIS OF UPPER AND LOWER EYELIDS OF BOTH EYES: ICD-10-CM

## 2017-07-26 DIAGNOSIS — H01.02A SQUAMOUS BLEPHARITIS OF UPPER AND LOWER EYELIDS OF BOTH EYES: ICD-10-CM

## 2017-07-26 DIAGNOSIS — E11.9 TYPE 2 DIABETES MELLITUS WITHOUT OPHTHALMIC MANIFESTATIONS: Primary | ICD-10-CM

## 2017-07-26 DIAGNOSIS — E11.42 DM TYPE 2 WITH DIABETIC PERIPHERAL NEUROPATHY: Primary | ICD-10-CM

## 2017-07-26 DIAGNOSIS — M47.812 CERVICAL SPONDYLOSIS WITHOUT MYELOPATHY: ICD-10-CM

## 2017-07-26 DIAGNOSIS — M54.12 CERVICAL RADICULOPATHY: ICD-10-CM

## 2017-07-26 DIAGNOSIS — H52.4 PRESBYOPIA: ICD-10-CM

## 2017-07-26 DIAGNOSIS — M50.30 DDD (DEGENERATIVE DISC DISEASE), CERVICAL: ICD-10-CM

## 2017-07-26 PROCEDURE — 99499 UNLISTED E&M SERVICE: CPT | Mod: S$GLB,,, | Performed by: OPTOMETRIST

## 2017-07-26 PROCEDURE — 99214 OFFICE O/P EST MOD 30 MIN: CPT | Mod: S$GLB,,, | Performed by: FAMILY MEDICINE

## 2017-07-26 PROCEDURE — 99999 PR PBB SHADOW E&M-EST. PATIENT-LVL I: CPT | Mod: PBBFAC,,, | Performed by: OPTOMETRIST

## 2017-07-26 PROCEDURE — 99499 UNLISTED E&M SERVICE: CPT | Mod: S$GLB,,, | Performed by: FAMILY MEDICINE

## 2017-07-26 PROCEDURE — 99999 PR PBB SHADOW E&M-EST. PATIENT-LVL III: CPT | Mod: PBBFAC,,, | Performed by: FAMILY MEDICINE

## 2017-07-26 PROCEDURE — 4010F ACE/ARB THERAPY RXD/TAKEN: CPT | Mod: S$GLB,,, | Performed by: FAMILY MEDICINE

## 2017-07-26 PROCEDURE — 92014 COMPRE OPH EXAM EST PT 1/>: CPT | Mod: S$GLB,,, | Performed by: OPTOMETRIST

## 2017-07-26 PROCEDURE — 3045F PR MOST RECENT HEMOGLOBIN A1C LEVEL 7.0-9.0%: CPT | Mod: S$GLB,,, | Performed by: FAMILY MEDICINE

## 2017-07-26 RX ORDER — PREGABALIN 100 MG/1
100 CAPSULE ORAL 2 TIMES DAILY
Qty: 60 CAPSULE | Refills: 6 | Status: SHIPPED | OUTPATIENT
Start: 2017-07-26 | End: 2017-08-17 | Stop reason: SDUPTHER

## 2017-07-26 RX ORDER — MELOXICAM 7.5 MG/1
7.5 TABLET ORAL DAILY
COMMUNITY
End: 2017-09-27

## 2017-07-26 RX ORDER — ZIPRASIDONE HYDROCHLORIDE 40 MG/1
40 CAPSULE ORAL NIGHTLY
COMMUNITY
Start: 2017-07-10 | End: 2018-02-16 | Stop reason: SDUPTHER

## 2017-07-26 RX ORDER — QUETIAPINE FUMARATE 100 MG/1
100 TABLET, FILM COATED ORAL NIGHTLY
Qty: 30 TABLET | Refills: 0 | Status: SHIPPED | OUTPATIENT
Start: 2017-07-26 | End: 2017-07-26 | Stop reason: SDUPTHER

## 2017-07-26 NOTE — PROGRESS NOTES
HPI     Eye Problem    Additional comments: itching and excessive tearing ou x 1 year           Comments   Patient's last dilated exam was: 2/5/2015  Pt states: complaining of itchy, watery eyes ou x 1 year, not using any   gtts, irritation causes blurred vision, has to constantly blink her eyes.    Glasses are 1 year old, gets glasses from a dr in [facility]. Patient denies   flashes, floaters, pain and double vision. Blood sugar was 153 this   morning. IDDM since 2015  Hemoglobin A1C       Date                     Value               Ref Range             Status                05/02/2017               7.4 (H)             4.5 - 6.2 %           Final                 04/11/2016               5.4                 4.5 - 6.2 %           Final            ----------         Last edited by Sherine Harvey, PCT on 7/26/2017 11:15 AM.   (History)        ROS     Positive for: Endocrine, Eyes    Negative for: Constitutional, Gastrointestinal, Neurological, Skin,   Genitourinary, Musculoskeletal, HENT, Cardiovascular, Respiratory,   Psychiatric, Allergic/Imm, Heme/Lymph    Last edited by Tiffanie Zhao, OD on 7/26/2017 12:57 PM. (History)        Assessment /Plan     For exam results, see Encounter Report.    Type 2 diabetes mellitus without ophthalmic manifestations    Squamous blepharitis of upper and lower eyelids of both eyes    Presbyopia          1.  No retinopathy--monitor yearly.   Educated pt eye health correlates with blood sugar control.    2.  Recommend lid scrubs 2-3x/week and artificial tears.  3.  Continue w/ current rx          RTC 1 year for dm exam.

## 2017-07-26 NOTE — PROGRESS NOTES
"Subjective:      Patient ID: Jonathan Gonzales is a 57 y.o. female.    Chief Complaint: Weight Loss (consult visit)    Patient with history of depression, bipolar, schizophrenia, Dm, HTN and HLD. Her heaviest weight was 191 pounds, her lightest weight was 171 when she was eating less. She has never taken medication or completed or any weight loss programs. She is about 5/10 on willingness to change. She eats out and buys food from gas station. She does not drink water. In the last few weeks her mood has been good, sleep varies, no SI or HI.     Breakfast   Skip   Soda    Lunch/dinner  Cabbage  spaghetti with meat sauce       Review of Systems   Constitutional: Negative.    Respiratory: Negative.    Cardiovascular: Negative.    Genitourinary: Negative.      I personally reviewed Past Medical History, Past Surgical history,  Past Social History and Family History    Objective:   /70   Pulse 82   Ht 5' 3" (1.6 m)   Wt 85.2 kg (187 lb 13.3 oz)   SpO2 97%   BMI 33.27 kg/m²     Physical Exam   Constitutional: She is oriented to person, place, and time. She appears well-developed and well-nourished. No distress.   HENT:   Head: Normocephalic.   Right Ear: External ear normal.   Left Ear: External ear normal.   Mouth/Throat: Oropharynx is clear and moist.   Eyes: Conjunctivae and EOM are normal. Pupils are equal, round, and reactive to light. Right eye exhibits no discharge. Left eye exhibits no discharge. No scleral icterus.   Neck: Normal range of motion. No tracheal deviation present. No thyromegaly present.   Cardiovascular: Normal rate, regular rhythm, normal heart sounds and intact distal pulses.  Exam reveals no gallop.    No murmur heard.  Pulmonary/Chest: Effort normal and breath sounds normal. No respiratory distress. She has no wheezes. She has no rales. She exhibits no tenderness.   Abdominal: Soft. Bowel sounds are normal. She exhibits no distension and no mass. There is no tenderness. There is no " rebound and no guarding.   Musculoskeletal: Normal range of motion.   Neurological: She is alert and oriented to person, place, and time.   Skin: Skin is warm and dry.   Psychiatric: She has a normal mood and affect. Her behavior is normal. Judgment and thought content normal.   Vitals reviewed.      Jonathan was seen today for weight loss.    Diagnoses and all orders for this visit:    Uncontrolled type 2 diabetes mellitus with diabetic neuropathy, with long-term current use of insulin  -   Will start trulicity  -patient to return in 4 weeks with food journal and weight recheck   -     dulaglutide (TRULICITY) 1.5 mg/0.5 mL PnIj; Inject 1.5 mg into the skin every 7 days.    Bipolar  -stable, on current regimen followed by Dr. Kulkarni at [facility]   -     Cancel: Ambulatory referral to Optometry

## 2017-07-27 RX ORDER — QUETIAPINE FUMARATE 100 MG/1
TABLET, FILM COATED ORAL
Qty: 90 TABLET | Refills: 0 | Status: SHIPPED | OUTPATIENT
Start: 2017-07-27 | End: 2018-02-16 | Stop reason: SDUPTHER

## 2017-08-17 ENCOUNTER — OFFICE VISIT (OUTPATIENT)
Dept: NEUROLOGY | Facility: CLINIC | Age: 57
End: 2017-08-17
Payer: MEDICARE

## 2017-08-17 VITALS
WEIGHT: 193.31 LBS | HEART RATE: 80 BPM | BODY MASS INDEX: 34.25 KG/M2 | HEIGHT: 63 IN | DIASTOLIC BLOOD PRESSURE: 78 MMHG | SYSTOLIC BLOOD PRESSURE: 102 MMHG

## 2017-08-17 DIAGNOSIS — E11.42 DM TYPE 2 WITH DIABETIC PERIPHERAL NEUROPATHY: Primary | ICD-10-CM

## 2017-08-17 DIAGNOSIS — M47.812 CERVICAL SPONDYLOSIS WITHOUT MYELOPATHY: ICD-10-CM

## 2017-08-17 DIAGNOSIS — M54.12 CERVICAL RADICULOPATHY: ICD-10-CM

## 2017-08-17 DIAGNOSIS — M50.30 DDD (DEGENERATIVE DISC DISEASE), CERVICAL: ICD-10-CM

## 2017-08-17 PROCEDURE — 99214 OFFICE O/P EST MOD 30 MIN: CPT | Mod: S$GLB,,, | Performed by: PSYCHIATRY & NEUROLOGY

## 2017-08-17 PROCEDURE — 4010F ACE/ARB THERAPY RXD/TAKEN: CPT | Mod: S$GLB,,, | Performed by: PSYCHIATRY & NEUROLOGY

## 2017-08-17 PROCEDURE — 99499 UNLISTED E&M SERVICE: CPT | Mod: S$PBB,,, | Performed by: PSYCHIATRY & NEUROLOGY

## 2017-08-17 PROCEDURE — 3078F DIAST BP <80 MM HG: CPT | Mod: S$GLB,,, | Performed by: PSYCHIATRY & NEUROLOGY

## 2017-08-17 PROCEDURE — 3045F PR MOST RECENT HEMOGLOBIN A1C LEVEL 7.0-9.0%: CPT | Mod: S$GLB,,, | Performed by: PSYCHIATRY & NEUROLOGY

## 2017-08-17 PROCEDURE — 99999 PR PBB SHADOW E&M-EST. PATIENT-LVL III: CPT | Mod: PBBFAC,,, | Performed by: PSYCHIATRY & NEUROLOGY

## 2017-08-17 PROCEDURE — 3008F BODY MASS INDEX DOCD: CPT | Mod: S$GLB,,, | Performed by: PSYCHIATRY & NEUROLOGY

## 2017-08-17 PROCEDURE — 3074F SYST BP LT 130 MM HG: CPT | Mod: S$GLB,,, | Performed by: PSYCHIATRY & NEUROLOGY

## 2017-08-17 RX ORDER — PREGABALIN 100 MG/1
100 CAPSULE ORAL 2 TIMES DAILY
Qty: 60 CAPSULE | Refills: 6 | Status: SHIPPED | OUTPATIENT
Start: 2017-08-17 | End: 2017-08-17 | Stop reason: SDUPTHER

## 2017-08-17 RX ORDER — PREGABALIN 100 MG/1
100 CAPSULE ORAL 2 TIMES DAILY
Qty: 60 CAPSULE | Refills: 6 | Status: SHIPPED | OUTPATIENT
Start: 2017-08-17 | End: 2018-02-16 | Stop reason: SDUPTHER

## 2017-08-17 NOTE — PROGRESS NOTES
Subjective:       Patient ID: Jonathan Gonzales is a 57 y.o. female.    Reason for Consult: Neuropathy    Interval History:  Jonathan Gonzales is here for follow up. Their condition is clinically stable.  She notices that she is having some more leg swelling in bilateral lower extremities compared to previous.  She has been working on her diabetes management and her HbA1c has come down from 8-7.4.  We have discussed stroke risk factors on today's visit.  She notes that she does have a strong family history of diabetes on both sides of her family and that she is trying to avoid hemodialysis.  She does admit that she has problems with high blood pressure, cholesterol and diabetes and is a half pack a day smoker.  We have discussed the importance of reducing her stroke risk factors moving forward.     Objective:     Vitals:    08/17/17 1301   BP: 102/78   Pulse: 80     Patient is awake alert oriented to person place and time.  Moves all 4 extremities against gravity.  Gait and station are only mildly abnormal with a steppage gait.  Cranial nerves II through XII without focal deficit.  There is a significant decrement of sensation in terms of fine touch and pinprick less than 50% of normal sensation in a stocking and glove distribution.  Focused examination was undertaken today. Over 50% of face to face time of 25 minute visit time was in giving guidance, counseling and discussing treatment options.      Assessment/Plan:     Problem List Items Addressed This Visit        Neuro    Cervical spondylosis without myelopathy    Relevant Medications    pregabalin (LYRICA) 100 MG capsule    DDD (degenerative disc disease), cervical    Relevant Medications    pregabalin (LYRICA) 100 MG capsule    DM type 2 with diabetic peripheral neuropathy - Primary    Overview     Tolerable amount of leg swelling on Lyrica 100mg po BID  We have discussed the improved HbA1c and I have encouraged continued health maintenance  She notes that  this dose seems to help without too many side effects         Current Assessment & Plan     Refill Lyrica 100mg po BID         Relevant Medications    pregabalin (LYRICA) 100 MG capsule       Orthopedic    Cervical radiculopathy    Relevant Medications    pregabalin (LYRICA) 100 MG capsule      Other Visit Diagnoses    None.       57-year-old female presents for evaluation and follow-up of neuropathy.  At this time the Lyrica continues to provide her significant benefit.  She is compliant with the medication and has not had intolerable adverse effects.  We have discussed the swelling of her feet as part of the issue with this medication both discussed that this is a risk that she is willing to tolerate.  The benefits are excellent for her.  I will refill her prescription for 6 months and see her back at that time.  We have additionally spoken about her stroke risk factors and I have asked her to work on reduction of smoking and eventually leading to smoking cessation to reduce her stroke risk.  I will follow up with them in 6 month(s).  The patient verbalizes understanding and agreement with the treatment plan. I have discussed risks, benefits and alternatives to the treatment plan. Questions were sought and answered to her stated verbal satisfaction.        Franchesca Osuna MD    This note is dictated on Dragon Natural Speaking word recognition program. There are word recognition mistakes that are occasionally missed on review.

## 2017-08-23 ENCOUNTER — OFFICE VISIT (OUTPATIENT)
Dept: INTERNAL MEDICINE | Facility: CLINIC | Age: 57
End: 2017-08-23
Attending: FAMILY MEDICINE
Payer: MEDICARE

## 2017-08-23 DIAGNOSIS — E66.01 SEVERE OBESITY (BMI 35.0-39.9): ICD-10-CM

## 2017-08-23 PROCEDURE — 3008F BODY MASS INDEX DOCD: CPT | Mod: S$GLB,,, | Performed by: FAMILY MEDICINE

## 2017-08-23 PROCEDURE — 3045F PR MOST RECENT HEMOGLOBIN A1C LEVEL 7.0-9.0%: CPT | Mod: S$GLB,,, | Performed by: FAMILY MEDICINE

## 2017-08-23 PROCEDURE — 99499 UNLISTED E&M SERVICE: CPT | Mod: S$PBB,,, | Performed by: FAMILY MEDICINE

## 2017-08-23 PROCEDURE — 3074F SYST BP LT 130 MM HG: CPT | Mod: S$GLB,,, | Performed by: FAMILY MEDICINE

## 2017-08-23 PROCEDURE — 4010F ACE/ARB THERAPY RXD/TAKEN: CPT | Mod: S$GLB,,, | Performed by: FAMILY MEDICINE

## 2017-08-23 PROCEDURE — 99999 PR PBB SHADOW E&M-EST. PATIENT-LVL V: CPT | Mod: PBBFAC,,, | Performed by: FAMILY MEDICINE

## 2017-08-23 PROCEDURE — 3079F DIAST BP 80-89 MM HG: CPT | Mod: S$GLB,,, | Performed by: FAMILY MEDICINE

## 2017-08-23 PROCEDURE — 99213 OFFICE O/P EST LOW 20 MIN: CPT | Mod: S$GLB,,, | Performed by: FAMILY MEDICINE

## 2017-08-23 NOTE — PATIENT INSTRUCTIONS
[email or web address]      Liraglutide injection  What is this medicine?  LIRAGLUTIDE (LIR a GLOO tide) is used to improve blood sugar control in adults with type 2 diabetes. This medicine may be used with other oral diabetes medicines.  How should I use this medicine?  This medicine is for injection under the skin of your upper leg, stomach area, or upper arm. You will be taught how to prepare and give this medicine. Use exactly as directed. Take your medicine at regular intervals. Do not take it more often than directed.  It is important that you put your used needles and syringes in a special sharps container. Do not put them in a trash can. If you do not have a sharps container, call your pharmacist or healthcare provider to get one.  A special MedGuide will be given to you by the pharmacist with each prescription and refill. Be sure to read this information carefully each time.  Talk to your pediatrician regarding the use of this medicine in children. Special care may be needed.  What side effects may I notice from receiving this medicine?  Side effects that you should report to your doctor or health care professional as soon as possible:  · allergic reactions like skin rash, itching or hives, swelling of the face, lips, or tongue  · breathing problems  · fever, chills  · loss of appetite  · signs and symptoms of low blood sugar such as feeling anxious, confusion, dizziness, increased hunger, unusually weak or tired, sweating, shakiness, cold, irritable, headache, blurred vision, fast heartbeat, loss of consciousness  · trouble passing urine or change in the amount of urine  · unusual stomach pain or upset  · vomiting  Side effects that usually do not require medical attention (Report these to your doctor or health care professional if they continue or are bothersome.):  · constipation  · diarrhea  · fatigue  · headache  · nausea  What may interact with this  medicine?  · acetaminophen  · atorvastatin  · birth control pills  · digoxin  · griseofulvin  · lisinoprilMany medications may cause changes in blood sugar, these include:  · alcohol containing beverages  · aspirin and aspirin-like drugs  · chloramphenicol  · chromium  · diuretics  · female hormones, such as estrogens or progestins, birth control pills  · heart medicines  · isoniazid  · male hormones or anabolic steroids  · medications for weight loss  · medicines for allergies, asthma, cold, or cough  · medicines for mental problems  · medicines called MAO inhibitors - Nardil, Parnate, Marplan, Eldepryl  · niacin  · NSAIDS, such as ibuprofen  · pentamidine  · phenytoin  · probenecid  · quinolone antibiotics such as ciprofloxacin, levofloxacin, ofloxacin  · some herbal dietary supplements  · steroid medicines such as prednisone or cortisone  · thyroid hormonesSome medications can hide the warning symptoms of low blood sugar (hypoglycemia). You may need to monitor your blood sugar more closely if you are taking one of these medications. These include:  · beta-blockers, often used for high blood pressure or heart problems (examples include atenolol, metoprolol, propranolol)  · clonidine  · guanethidine  · reserpine  What if I miss a dose?  If you miss a dose, take it as soon as you can. If it is almost time for your next dose, take only that dose. Do not take double or extra doses.  Where should I keep my medicine?  Keep out of the reach of children.  Store unopened pen in a refrigerator between 2 and 8 degrees C (36 and 46 degrees F). Do not freeze or use if the medicine has been frozen. Protect from light and excessive heat. After you first use the pen, it can be stored at room temperature between 15 and 30 degrees C (59 and 86 degrees F) or in a refrigerator. Throw away your used pen after 30 days or after the expiration date, whichever comes first.  Do not store your pen with the needle attached. If the needle is  left on, medicine may leak from the pen.  What should I tell my health care provider before I take this medicine?  They need to know if you have any of these conditions:  · endocrine tumors (MEN 2) or if someone in your family had these tumors  · gallstones  · high cholesterol  · history of alcohol abuse problem  · history of pancreatitis  · kidney disease or if you are on dialysis  · liver disease  · previous swelling of the tongue, face, or lips with difficulty breathing, difficulty swallowing, hoarseness, or tightening of the throat  · stomach problems  · suicidal thoughts, plans, or attempt; a previous suicide attempt by you or a family member  · thyroid cancer or if someone in your family had thyroid cancer  · an unusual or allergic reaction to liraglutide, medicines, foods, dyes, or preservatives  · pregnant or trying to get pregnant  · breast-feeding  What should I watch for while using this medicine?  Visit your doctor or health care professional for regular checks on your progress.  A test called the HbA1C (A1C) will be monitored. This is a simple blood test. It measures your blood sugar control over the last 2 to 3 months. You will receive this test every 3 to 6 months.  Learn how to check your blood sugar. Learn the symptoms of low and high blood sugar and how to manage them.  Always carry a quick-source of sugar with you in case you have symptoms of low blood sugar. Examples include hard sugar candy or glucose tablets. Make sure others know that you can choke if you eat or drink when you develop serious symptoms of low blood sugar, such as seizures or unconsciousness. They must get medical help at once.  Tell your doctor or health care professional if you have high blood sugar. You might need to change the dose of your medicine. If you are sick or exercising more than usual, you might need to change the dose of your medicine.  Do not skip meals. Ask your doctor or health care professional if you should  avoid alcohol. Many nonprescription cough and cold products contain sugar or alcohol. These can affect blood sugar.  Liraglutide pens and cartridges should never be shared. Even if the needle is changed, sharing may result in passing of viruses like hepatitis or HIV.  Wear a medical ID bracelet or chain, and carry a card that describes your disease and details of your medicine and dosage times.  Patients and their families should watch out for worsening depression or thoughts of suicide. Also watch out for sudden changes in feelings such as feeling anxious, agitated, panicky, irritable, hostile, aggressive, impulsive, severely restless, overly excited and hyperactive, or not being able to sleep. If this happens, especially at the beginning of treatment or after a change in dose, call your health care professional.  Date Last Reviewed:   NOTE:This sheet is a summary. It may not cover all possible information. If you have questions about this medicine, talk to your doctor, pharmacist, or health care provider. Copyright© 2016 [employer]

## 2017-08-23 NOTE — PROGRESS NOTES
"Subjective:      Patient ID: Jonathan Gonzales is a 57 y.o. female.    Chief Complaint: Weight Loss (f/u)    She is here for diabetes and weight loss follow up. She did start trulicity and it did curb her appetite a little bit. She has not had any low sugars.     Breakfast  Cold cut sandwich- /ham/salami/2 slices white bread   16 oz pink lemonade    Lunch   Cold cut sandwich-mayonaisse/ham/salami/2 slices white bread   Skip  8 oz pink lemonade     Dinner  Pork, beans and rice   8 oz pink lemonade       Review of Systems   Constitutional: Negative.    Respiratory: Negative.    Cardiovascular: Negative.    Gastrointestinal: Negative.    Genitourinary: Negative.      I personally reviewed Past Medical History, Past Surgical history,  Past Social History and Family History    Objective:   /86   Pulse 106   Ht 5' 3" (1.6 m)   Wt 88.7 kg (195 lb 8.8 oz)   BMI 34.64 kg/m²     Physical Exam   Constitutional: She is oriented to person, place, and time. She appears well-developed and well-nourished. No distress.   HENT:   Head: Normocephalic.   Right Ear: External ear normal.   Left Ear: External ear normal.   Mouth/Throat: Oropharynx is clear and moist.   Eyes: Conjunctivae and EOM are normal. Pupils are equal, round, and reactive to light. Right eye exhibits no discharge. Left eye exhibits no discharge. No scleral icterus.   Neck: Normal range of motion. No tracheal deviation present. No thyromegaly present.   Cardiovascular: Normal rate, regular rhythm, normal heart sounds and intact distal pulses.  Exam reveals no gallop.    No murmur heard.  Pulmonary/Chest: Effort normal and breath sounds normal. No respiratory distress. She has no wheezes. She has no rales. She exhibits no tenderness.   Abdominal: Soft. Bowel sounds are normal. She exhibits no distension and no mass. There is no tenderness. There is no rebound and no guarding.   Musculoskeletal: Normal range of motion.   Neurological: She is alert and " oriented to person, place, and time.   Skin: Skin is warm and dry.   Psychiatric: She has a normal mood and affect. Her behavior is normal. Judgment and thought content normal.   Vitals reviewed.      Jonathan was seen today for weight loss.    Diagnoses and all orders for this visit:    Uncontrolled type 2 diabetes mellitus with diabetic neuropathy, with long-term current use of insulin  Severe obesity (BMI 35.0-39.9)  -will stop trulicity, start victoza, patient to call weekly with list of sugars, reviewed diet changes and change to water     -     liraglutide 0.6 mg/0.1 mL, 18 mg/3 mL, subq PNIJ (VICTOZA 2-HERMANN) 0.6 mg/0.1 mL (18 mg/3 mL) PnIj; Inject 0.6 mg into the skin once daily.

## 2017-08-30 RX ORDER — OXYBUTYNIN CHLORIDE 5 MG/1
TABLET, EXTENDED RELEASE ORAL
Qty: 30 TABLET | Refills: 0 | Status: SHIPPED | OUTPATIENT
Start: 2017-08-30 | End: 2017-09-26 | Stop reason: SDUPTHER

## 2017-09-22 ENCOUNTER — LAB VISIT (OUTPATIENT)
Dept: LAB | Facility: OTHER | Age: 57
End: 2017-09-22
Attending: INTERNAL MEDICINE
Payer: MEDICARE

## 2017-09-22 DIAGNOSIS — C50.919 MALIGNANT NEOPLASM OF FEMALE BREAST: ICD-10-CM

## 2017-09-22 LAB
ALBUMIN SERPL BCP-MCNC: 3.1 G/DL
ALP SERPL-CCNC: 69 U/L
ALT SERPL W/O P-5'-P-CCNC: 28 U/L
ANION GAP SERPL CALC-SCNC: 10 MMOL/L
AST SERPL-CCNC: 29 U/L
BASOPHILS # BLD AUTO: 0.01 K/UL
BASOPHILS NFR BLD: 0.2 %
BILIRUB SERPL-MCNC: 0.4 MG/DL
BUN SERPL-MCNC: 11 MG/DL
CALCIUM SERPL-MCNC: 9.4 MG/DL
CHLORIDE SERPL-SCNC: 105 MMOL/L
CO2 SERPL-SCNC: 26 MMOL/L
CREAT SERPL-MCNC: 1 MG/DL
DIFFERENTIAL METHOD: ABNORMAL
EOSINOPHIL # BLD AUTO: 0.1 K/UL
EOSINOPHIL NFR BLD: 1.9 %
ERYTHROCYTE [DISTWIDTH] IN BLOOD BY AUTOMATED COUNT: 13 %
EST. GFR  (AFRICAN AMERICAN): >60 ML/MIN/1.73 M^2
EST. GFR  (NON AFRICAN AMERICAN): >60 ML/MIN/1.73 M^2
GLUCOSE SERPL-MCNC: 176 MG/DL
HCT VFR BLD AUTO: 36.7 %
HGB BLD-MCNC: 12.2 G/DL
LYMPHOCYTES # BLD AUTO: 2.8 K/UL
LYMPHOCYTES NFR BLD: 52.6 %
MCH RBC QN AUTO: 30.5 PG
MCHC RBC AUTO-ENTMCNC: 33.2 G/DL
MCV RBC AUTO: 92 FL
MONOCYTES # BLD AUTO: 0.4 K/UL
MONOCYTES NFR BLD: 7.7 %
NEUTROPHILS # BLD AUTO: 1.9 K/UL
NEUTROPHILS NFR BLD: 35.9 %
PLATELET # BLD AUTO: 279 K/UL
PMV BLD AUTO: 10.8 FL
POTASSIUM SERPL-SCNC: 3.4 MMOL/L
PROT SERPL-MCNC: 7.4 G/DL
RBC # BLD AUTO: 4 M/UL
SODIUM SERPL-SCNC: 141 MMOL/L
WBC # BLD AUTO: 5.3 K/UL

## 2017-09-22 PROCEDURE — 80053 COMPREHEN METABOLIC PANEL: CPT

## 2017-09-22 PROCEDURE — 85025 COMPLETE CBC W/AUTO DIFF WBC: CPT

## 2017-09-22 PROCEDURE — 36415 COLL VENOUS BLD VENIPUNCTURE: CPT

## 2017-09-26 ENCOUNTER — OFFICE VISIT (OUTPATIENT)
Dept: HEMATOLOGY/ONCOLOGY | Facility: CLINIC | Age: 57
End: 2017-09-26
Payer: MEDICARE

## 2017-09-26 VITALS
HEART RATE: 77 BPM | WEIGHT: 191.69 LBS | DIASTOLIC BLOOD PRESSURE: 82 MMHG | BODY MASS INDEX: 33.96 KG/M2 | TEMPERATURE: 98 F | SYSTOLIC BLOOD PRESSURE: 133 MMHG | OXYGEN SATURATION: 99 %

## 2017-09-26 DIAGNOSIS — F31.9 BIPOLAR AFFECTIVE DISORDER, REMISSION STATUS UNSPECIFIED: ICD-10-CM

## 2017-09-26 DIAGNOSIS — Z85.3 HISTORY OF BREAST CANCER: Primary | ICD-10-CM

## 2017-09-26 DIAGNOSIS — I89.0 LYMPHEDEMA: ICD-10-CM

## 2017-09-26 PROCEDURE — 99499 UNLISTED E&M SERVICE: CPT | Mod: S$GLB,,, | Performed by: INTERNAL MEDICINE

## 2017-09-26 PROCEDURE — 3079F DIAST BP 80-89 MM HG: CPT | Mod: S$GLB,,, | Performed by: INTERNAL MEDICINE

## 2017-09-26 PROCEDURE — 99214 OFFICE O/P EST MOD 30 MIN: CPT | Mod: S$GLB,,, | Performed by: INTERNAL MEDICINE

## 2017-09-26 PROCEDURE — 3008F BODY MASS INDEX DOCD: CPT | Mod: S$GLB,,, | Performed by: INTERNAL MEDICINE

## 2017-09-26 PROCEDURE — 3075F SYST BP GE 130 - 139MM HG: CPT | Mod: S$GLB,,, | Performed by: INTERNAL MEDICINE

## 2017-09-26 PROCEDURE — 99999 PR PBB SHADOW E&M-EST. PATIENT-LVL IV: CPT | Mod: PBBFAC,,, | Performed by: INTERNAL MEDICINE

## 2017-09-26 NOTE — PROGRESS NOTES
Subjective:       Patient ID: Jonathan Gonzales is a 57 y.o. female.    Chief Complaint: Follow-up    HPI              Diagnosis: Stage IIB lt Breast CA T2N1MO ER weak pos/AK negative, HER-2/bridget amplified dx'd 11/2011   Therapy:     1. s/p bilateral mastectomy with SHLOMO flap reconstruction 11/23/2011                        2. completed   Phase III 0221 protocol                   HPI 57-year-old female with stage IIB breast cancer, left breast originally diagnosed in November 2011, status post bilateral skin -sparing mastectomy with a right prophylactic and a left radical mastectomy for a T2 N1 MO ,Stage 2b. breast cancer of the left breast on 11/23/2011 with immediate autologous tissue reconstruction with SHLOMO flap. She underwent second stage reconstructive surgery on 4/27/2015 which was complicated by a hematoma for whichShe was taken back to OR emergently  for aspiration.          Pt previously Lost to  follow-up ( >1yr) and has re established care    Today, she is doing well  She was previously  followed by psychiatry ( Dr. Lloyd)  for bipolar disorder  She is now followed by [facility]   She continues to wear compression sleeve LUE   She reports lymphedema stable.  Previously followed by PT  No SOB/CP/N/V.  Appetite and weight stable   No recent infections  No fevers, night sweats      Pt  Followed by PCP for DM Type 2. She recently started victoza          She is followed by PCP for  history of type 2  DM w/neuropathy , HTN and hyperlipidemia         Bone Density 10/19/2016 nl      Pt previously  followed by Dr. Mcgee for her psych history of bipolar d/o and schizophrenia  She now followed by [facility] care      PrevHx: Tumor was ER weak pos /AK negative, HER-2/bridget amplified with 1 of total of 24 lymph nodes removed which revealed metastatic carcinoma, 0.4 cm in diameter with no extranodal extension.She had abnormal PET imaging 12/2011 which revealed. Findings suggestive of  metastatic disease to supraclavicular, mediastinal and right hilar lymph nodes.She underwent rt supraclavicular lymph node biopsy which was negative for malignancy. PET/CT 10/12 revealed resolution of LAD and no evid of mets.Patient was enrolled in 0221 study. She completed 1 yr of Herceptin therapy3/12/2013.     Tx; Phase III 0221 protocol- DD AC ( Adriamycin 60mg/m2/Cytoxan 60mg/2) x4 followed by weekly Paclitaxel 175mg/m2/Herceptin and completion of Herceptin therapy (6mg/kg q3wks) 1 yr 3/12/13              Review of Systems   Constitutional: Negative for appetite change, chills and fatigue.   HENT: Negative for congestion, hearing loss and nosebleeds.    Eyes: Negative for visual disturbance.   Respiratory: Negative for apnea, cough, chest tightness, shortness of breath and wheezing.    Cardiovascular: Negative for chest pain, palpitations and leg swelling.   Gastrointestinal: Negative for abdominal distention, abdominal pain, blood in stool, constipation, diarrhea, nausea and vomiting.   Genitourinary: Negative for difficulty urinating, dysuria, flank pain, frequency, hematuria and urgency.   Musculoskeletal: Negative for arthralgias, back pain, gait problem, joint swelling and neck pain.   Skin: Negative for rash.        No petechiae, ecchymoses   Neurological: Negative for dizziness, tremors, seizures, syncope, speech difficulty, light-headedness, numbness and headaches.   Hematological: Negative for adenopathy. Does not bruise/bleed easily.   Psychiatric/Behavioral: Negative for confusion and dysphoric mood. The patient is not nervous/anxious.        Objective:       Vitals:    09/26/17 1012   BP: 133/82   BP Location: Right arm   Patient Position: Sitting   BP Method: Medium (Automatic)   Pulse: 77   Temp: 98.1 °F (36.7 °C)   TempSrc: Oral   SpO2: 99%   Weight: 87 kg (191 lb 11 oz)       Physical Exam   Constitutional: She is oriented to person, place, and time. She appears well-developed and  well-nourished.   HENT:   Head: Normocephalic.   Mouth/Throat: Oropharynx is clear and moist. No oropharyngeal exudate.   Eyes: Conjunctivae and lids are normal. Pupils are equal, round, and reactive to light. No scleral icterus.   Neck: Normal range of motion. Neck supple. No thyromegaly present.   Cardiovascular: Normal rate, regular rhythm and normal heart sounds.    No murmur heard.  Pulmonary/Chest: Breath sounds normal. She has no wheezes. She has no rales.   S/p Rt reconstructed breast- palpable fibrosis noted  Healed surgical incision site lower quad no erythema,  LAD noted    S/p Lt reconstructed breast- no palpable masses,  Nodules or LAD noted   Abdominal: Soft. Bowel sounds are normal. She exhibits no distension and no mass. There is no hepatosplenomegaly. There is no tenderness. There is no rebound and no guarding.   Musculoskeletal: Normal range of motion. She exhibits edema. She exhibits no tenderness.   Lymphadenopathy:     She has no cervical adenopathy.     She has no axillary adenopathy.        Right: No supraclavicular adenopathy present.        Left: No supraclavicular adenopathy present.   Neurological: She is alert and oriented to person, place, and time. No cranial nerve deficit. Coordination normal.   Skin: Skin is warm and dry. No ecchymosis, no petechiae and no rash noted. No erythema.   Psychiatric: She has a normal mood and affect.           Results for JONATHAN MUNOZ (MRN [id number]) as of 6/8/2015 13:40   Ref. Range 6/2/2015 07:50   Vit D, 25-Hydroxy Latest Range: 30-96 ng/mL 33     Lab Results   Component Value Date    WBC 5.30 09/22/2017    HGB 12.2 09/22/2017    HCT 36.7 (L) 09/22/2017    MCV 92 09/22/2017     09/22/2017           10/19/2016 Bone Density-nl    Labs: none   Assessment:       1. History of breast cancer    2. Lymphedema    3. Bipolar affective disorder, remission status unspecified    4. DM type 2, uncontrolled, with neuropathy        Plan:   Jonathan was seen  today for follow-up.  Pt clinically stable    Diagnoses and associated orders for this visit:    Breast cancer, Stage IIB lt Breast CA T2N1MO ER weak pos/ CT neg Her 2 pos diagnosed  11/2011   - s/p bilateral mastectomy with SHLOMO flap reconstruction 11/23/2011   - s/p chemotherapy per  Phase III 0221 protocol      S/p second stage reconstructive surgery      Rt Breast US 10/2015- Area in the right breast is benign-negative.  .ACR BI-RADS Category 2: Benign   - no evidence of disease recurrence clinically     Postmastectomy lymphedema  Stable  Cont compression sleeves  S/p PT    Bipolar d/o  Follow-up with psych    Type 2 DM  Follow-up with PCP    F/u 4 mos with cbc,cmp     CC: Ghulam Contreras MD

## 2017-09-27 ENCOUNTER — OFFICE VISIT (OUTPATIENT)
Dept: INTERNAL MEDICINE | Facility: CLINIC | Age: 57
End: 2017-09-27
Attending: FAMILY MEDICINE
Payer: MEDICARE

## 2017-09-27 VITALS
HEART RATE: 88 BPM | WEIGHT: 195.56 LBS | DIASTOLIC BLOOD PRESSURE: 80 MMHG | DIASTOLIC BLOOD PRESSURE: 86 MMHG | SYSTOLIC BLOOD PRESSURE: 120 MMHG | BODY MASS INDEX: 34.14 KG/M2 | HEIGHT: 63 IN | WEIGHT: 192.69 LBS | HEIGHT: 63 IN | BODY MASS INDEX: 34.65 KG/M2 | SYSTOLIC BLOOD PRESSURE: 118 MMHG | HEART RATE: 106 BPM | OXYGEN SATURATION: 97 %

## 2017-09-27 DIAGNOSIS — E11.9 TYPE 2 DIABETES MELLITUS WITHOUT COMPLICATION, WITH LONG-TERM CURRENT USE OF INSULIN: ICD-10-CM

## 2017-09-27 DIAGNOSIS — G89.29 CHRONIC NECK PAIN: ICD-10-CM

## 2017-09-27 DIAGNOSIS — M54.5 CHRONIC LOW BACK PAIN, UNSPECIFIED BACK PAIN LATERALITY, WITH SCIATICA PRESENCE UNSPECIFIED: ICD-10-CM

## 2017-09-27 DIAGNOSIS — G89.29 CHRONIC LOW BACK PAIN, UNSPECIFIED BACK PAIN LATERALITY, WITH SCIATICA PRESENCE UNSPECIFIED: ICD-10-CM

## 2017-09-27 DIAGNOSIS — Z79.4 TYPE 2 DIABETES MELLITUS WITHOUT COMPLICATION, WITH LONG-TERM CURRENT USE OF INSULIN: ICD-10-CM

## 2017-09-27 DIAGNOSIS — M54.2 CHRONIC NECK PAIN: ICD-10-CM

## 2017-09-27 DIAGNOSIS — G56.00 CARPAL TUNNEL SYNDROME, UNSPECIFIED LATERALITY: ICD-10-CM

## 2017-09-27 PROCEDURE — 99999 PR PBB SHADOW E&M-EST. PATIENT-LVL III: CPT | Mod: PBBFAC,,, | Performed by: FAMILY MEDICINE

## 2017-09-27 PROCEDURE — 3079F DIAST BP 80-89 MM HG: CPT | Mod: S$GLB,,, | Performed by: FAMILY MEDICINE

## 2017-09-27 PROCEDURE — 99499 UNLISTED E&M SERVICE: CPT | Mod: S$GLB,,, | Performed by: FAMILY MEDICINE

## 2017-09-27 PROCEDURE — 99214 OFFICE O/P EST MOD 30 MIN: CPT | Mod: S$GLB,,, | Performed by: FAMILY MEDICINE

## 2017-09-27 PROCEDURE — 3074F SYST BP LT 130 MM HG: CPT | Mod: S$GLB,,, | Performed by: FAMILY MEDICINE

## 2017-09-27 PROCEDURE — 3008F BODY MASS INDEX DOCD: CPT | Mod: S$GLB,,, | Performed by: FAMILY MEDICINE

## 2017-09-27 PROCEDURE — G0008 ADMIN INFLUENZA VIRUS VAC: HCPCS | Mod: S$GLB,,, | Performed by: FAMILY MEDICINE

## 2017-09-27 PROCEDURE — 90686 IIV4 VACC NO PRSV 0.5 ML IM: CPT | Mod: S$GLB,,, | Performed by: FAMILY MEDICINE

## 2017-09-27 RX ORDER — AMMONIUM LACTATE 12 G/100G
LOTION TOPICAL 2 TIMES DAILY PRN
Qty: 57 G | Refills: 0 | Status: SHIPPED | OUTPATIENT
Start: 2017-09-27 | End: 2018-09-26

## 2017-09-27 RX ORDER — INSULIN GLARGINE 100 [IU]/ML
23 INJECTION, SOLUTION SUBCUTANEOUS NIGHTLY
Qty: 15 ML | Refills: 0
Start: 2017-09-27 | End: 2018-07-23 | Stop reason: SDUPTHER

## 2017-09-27 NOTE — PROGRESS NOTES
"Subjective:      Patient ID: Jonathan Gonzales is a 57 y.o. female.    Chief Complaint: Weight Loss (f/u)    She has been on victoza and denies abdominal pain, nausea or vomiting or diarrhea. She is tolerating medication. It is helping with suppressing her appetite. She drinks about 1 gallon water daily. She has neck pain and back pain. She reports bulging disc in neck. She reports her sugars are in the 100s in the morning.     Breakfast  1 cup Cheerios/whole milk     Lunch   ramen Noodles    Dinner  ramen noodles       Review of Systems   Constitutional: Negative.    HENT: Negative.    Respiratory: Negative.    Cardiovascular: Negative.    Gastrointestinal: Negative.    Genitourinary: Negative.    Neurological: Negative.      I personally reviewed Past Medical History, Past Surgical history,  Past Social History and Family History    Objective:   /80   Pulse 88   Ht 5' 3" (1.6 m)   Wt 87.4 kg (192 lb 10.9 oz)   SpO2 97%   BMI 34.13 kg/m²     Physical Exam   Constitutional: She is oriented to person, place, and time. She appears well-developed and well-nourished. No distress.   HENT:   Head: Normocephalic.   Right Ear: External ear normal.   Left Ear: External ear normal.   Mouth/Throat: Oropharynx is clear and moist.   Eyes: Conjunctivae and EOM are normal. Pupils are equal, round, and reactive to light. Right eye exhibits no discharge. Left eye exhibits no discharge. No scleral icterus.   Neck: Normal range of motion. No tracheal deviation present. No thyromegaly present.   Cardiovascular: Normal rate, regular rhythm, normal heart sounds and intact distal pulses.  Exam reveals no gallop.    No murmur heard.  Pulmonary/Chest: Effort normal and breath sounds normal. No respiratory distress. She has no wheezes. She has no rales. She exhibits no tenderness.   Abdominal: Soft. Bowel sounds are normal. She exhibits no distension and no mass. There is no tenderness. There is no rebound and no guarding. "   Musculoskeletal: Normal range of motion.   Neurological: She is alert and oriented to person, place, and time.   Skin: Skin is warm and dry.   Psychiatric: She has a normal mood and affect. Her behavior is normal. Judgment and thought content normal.   Vitals reviewed.      Jonathan was seen today for weight loss.    Diagnoses and all orders for this visit:    Uncontrolled type 2 diabetes mellitus with diabetic neuropathy, with long-term current use of insulin  -will increase to 1.2 mg, patient to follow up in 4 weeks and patient to bring food journal   -     liraglutide 0.6 mg/0.1 mL, 18 mg/3 mL, subq PNIJ (VICTOZA 2-HERMANN) 0.6 mg/0.1 mL (18 mg/3 mL) PnIj; Inject 1.2 mg into the skin once daily.    Carpal tunnel syndrome, unspecified laterality  -     arm brace Misc; 1 application by Misc.(Non-Drug; Combo Route) route once daily.    Chronic neck pain  Chronic low back pain, unspecified back pain laterality, with sciatica presence unspecified  -will follow up with pain management     Type 2 diabetes mellitus without complication, with long-term current use of insulin  -call weekly with list of sugars, will decrease to 23 units lantus   -     insulin glargine (LANTUS SOLOSTAR) 100 unit/mL (3 mL) InPn pen; Inject 23 Units into the skin every evening.    Other orders  -     Influenza - Quadrivalent (3 years & older) (PF)

## 2017-09-29 DIAGNOSIS — E11.9 DIABETES MELLITUS WITHOUT COMPLICATION: ICD-10-CM

## 2017-10-06 ENCOUNTER — OFFICE VISIT (OUTPATIENT)
Dept: PAIN MEDICINE | Facility: CLINIC | Age: 57
End: 2017-10-06
Attending: ANESTHESIOLOGY
Payer: MEDICARE

## 2017-10-06 VITALS
DIASTOLIC BLOOD PRESSURE: 83 MMHG | HEART RATE: 76 BPM | TEMPERATURE: 98 F | WEIGHT: 189.63 LBS | RESPIRATION RATE: 16 BRPM | BODY MASS INDEX: 33.6 KG/M2 | HEIGHT: 63 IN | SYSTOLIC BLOOD PRESSURE: 122 MMHG

## 2017-10-06 DIAGNOSIS — M54.12 CERVICAL RADICULOPATHY: ICD-10-CM

## 2017-10-06 DIAGNOSIS — M54.17 LUMBOSACRAL RADICULOPATHY: Primary | ICD-10-CM

## 2017-10-06 DIAGNOSIS — M47.816 LUMBAR SPONDYLOSIS: ICD-10-CM

## 2017-10-06 DIAGNOSIS — G89.4 CHRONIC PAIN DISORDER: ICD-10-CM

## 2017-10-06 DIAGNOSIS — M51.36 DDD (DEGENERATIVE DISC DISEASE), LUMBAR: ICD-10-CM

## 2017-10-06 PROCEDURE — 99214 OFFICE O/P EST MOD 30 MIN: CPT | Mod: S$GLB,,, | Performed by: NURSE PRACTITIONER

## 2017-10-06 PROCEDURE — 99999 PR PBB SHADOW E&M-EST. PATIENT-LVL III: CPT | Mod: PBBFAC,,, | Performed by: NURSE PRACTITIONER

## 2017-10-06 PROCEDURE — 99499 UNLISTED E&M SERVICE: CPT | Mod: S$GLB,,, | Performed by: NURSE PRACTITIONER

## 2017-10-06 NOTE — LETTER
October 6, 2017      Roula Al MD  [address]  [place] [address]           [facility]  [address]  [place] [address]  Phone: [phone]  Fax: [phone]          Patient: Jonathan Gonzales   MR Number: [id number]   YOB: 1960   Date of Visit: 10/6/2017       Dear Dr. Roula Al:    Thank you for referring Jonathan Gonzales to me for evaluation. Attached you will find relevant portions of my assessment and plan of care.    If you have questions, please do not hesitate to call me. I look forward to following Jonathan Gonzales along with you.    Sincerely,    Navya Nair, [facility]    Enclosure  CC:  No Recipients    If you would like to receive this communication electronically, please contact [email or web address] or [phone] to request more information on [facility] [employer] access.    For providers and/or their staff who would like to refer a patient to [facility], please contact us through our one-stop-shop provider referral line, [facility], at [phone].    If you feel you have received this communication in error or would no longer like to receive these types of communications, please e-mail [email or web address]

## 2017-10-06 NOTE — PROGRESS NOTES
Chronic patient Established Note (Follow up visit)      SUBJECTIVE:    Jonathan Gonzales presents to the clinic for a follow-up appointment for neck pain.  She was last seen in clinic by Dr. Jerry on 9/3/15.  Since this time, was evaluated by [facility] and scheduled for Right C5-6 TF CARLEE which was performed on 9/24/15 by IR.  She had limited benefit from the procedure.  She claims that the procedure worsened her pain.  Prior to this, she had procedures by Dr. Jerry which also provided limited benefit.  She underwent an EMG and was evaluated by Dr. Edawrds.  C6/7 ACDF was recommended but the patient declined.  She has recently been seen by PCP and reported neck and back pain.  For this reason, she was referred back to our office.  She is reporting neck pain with radiation into both arms.  She is reporting back pain with radiation down the front and back of both legs.  Her most recent lumbar MRI was in 2012.  She has not had procedures for her back pain.  She is here today requesting oral pain medications.  She is taking Lyrica which does help.  Her pain today is 8/10.      Of note, the patient is a poor historian and has a complex medical history.  She has a history of breast cancer with bilateral mastectomy and chemotherapy.  She is followed by Dr. Osuna for diabetic and chemo induced neuropathy.  She has a history of LUE lymphedema.  She also has a history of schizophrenia, bipolar and depression which she reports is managed by outside psychiatry.    Pain Medications:  Lyrica 100 mg BID     - Opioids: None  - Adjuvant Medications: Lyrica 100 mg BID  - Anti-Coagulants: None  - Others: See Medication Sheet    Opioid Contract: no     report:  Reviewed and consistent with medication use as prescribed.    Pain Procedures:   5/9/14 C7- T1 IL CARLEE  6/20/14 C7-T1 IL CARLEE  5/22/15 Left C3,4,5,6 MBB  6/26/15 Left C3,4,5,6 MBB  7/31/15 Left C3,4,5,6 RFA- no relief  9/24/15 Right C5-6 TF CARLEE (IR)-  limited benefit    Physical Therapy/Home Exercise: no    Imaging:     MRI Cervical Spine Without Contrast 02/15/13    Result Narrative     MRI of cervical spine    Technique: MRI of cervical spine performed without contrast. Following sequences were obtained: Localizer; sagittal T1, T2, and STIR; axial gradient and T2.    Comparison: Not available.    Findings:    There is heterogeneity of bone marrow signal, with region of T1 hypointensity within the occipital bone. Vertebral body height and alignment are maintained. There is straightening of cervical lordosis. Craniocervical junction is unremarkable. Spinal   cord demonstrates normal signal. Vertebral artery flow voids are maintained.    C2-C3: No spinal canal stenosis or neuroforaminal narrowing.    C3-C4: Small central disk osteophyte complex noted. No spinal canal stenosis or neuroforaminal narrowing.    C4-C5: Small central disk osteophyte complex and left uncovertebral arthrosis noted. No spinal canal stenosis or neuroforaminal narrowing.    C5-C6: Broad-based disk osteophyte complex with bilateral uncovertebral arthrosis resulting in mild central canal stenosis and mild right neuroforaminal narrowing.    C6-C7: Broad-based left paracentral disk osteophyte complex results in moderate spinal canal stenosis, predominately in the left lateral recess and deforms the spinal cord. There is no neuroforaminal narrowing.    C7-T1: No spinal canal stenosis or neuroforaminal narrowing.       Result Impression       1. Degenerative changes of the lower cervical spine as detailed above.  2. Heterogeneity of bone marrow signal, particularly the posterior calvarium. Although red marrow hyperplasia is more likely, recommend whole-body bone scan to exclude osseous metastases in this patient with history of breast cancer.      Electronically signed by: YAMILEX DAHL MD  Date: 02/15/13  Time: 10:25       MRI Cervical Spine Without Contrast          Labs:    [facility]  Sodium   Date  Value Ref Range Status   09/22/2017 141 136 - 145 mmol/L Final     Potassium   Date Value Ref Range Status   09/22/2017 3.4 (L) 3.5 - 5.1 mmol/L Final     Chloride   Date Value Ref Range Status   09/22/2017 105 95 - 110 mmol/L Final     CO2   Date Value Ref Range Status   09/22/2017 26 23 - 29 mmol/L Final     Glucose   Date Value Ref Range Status   09/22/2017 176 (H) 70 - 110 mg/dL Final     BUN, Bld   Date Value Ref Range Status   09/22/2017 11 6 - 20 mg/dL Final     Creatinine   Date Value Ref Range Status   09/22/2017 1.0 0.5 - 1.4 mg/dL Final     Calcium   Date Value Ref Range Status   09/22/2017 9.4 8.7 - 10.5 mg/dL Final     Total Protein   Date Value Ref Range Status   09/22/2017 7.4 6.0 - 8.4 g/dL Final     Albumin   Date Value Ref Range Status   09/22/2017 3.1 (L) 3.5 - 5.2 g/dL Final     Total Bilirubin   Date Value Ref Range Status   09/22/2017 0.4 0.1 - 1.0 mg/dL Final     Comment:     For infants and newborns, interpretation of results should be based  on gestational age, weight and in agreement with clinical  observations.  Premature Infant recommended reference ranges:  Up to 24 hours.............<8.0 mg/dL  Up to 48 hours............<12.0 mg/dL  3-5 days..................<15.0 mg/dL  6-29 days.................<15.0 mg/dL       Alkaline Phosphatase   Date Value Ref Range Status   09/22/2017 69 55 - 135 U/L Final     AST   Date Value Ref Range Status   09/22/2017 29 10 - 40 U/L Final     ALT   Date Value Ref Range Status   09/22/2017 28 10 - 44 U/L Final     Anion Gap   Date Value Ref Range Status   09/22/2017 10 8 - 16 mmol/L Final     eGFR if    Date Value Ref Range Status   09/22/2017 >60 >60 mL/min/1.73 m^2 Final     eGFR if non    Date Value Ref Range Status   09/22/2017 >60 >60 mL/min/1.73 m^2 Final     Comment:     Calculation used to obtain the estimated glomerular filtration  rate (eGFR) is the CKD-EPI equation. Since race is unknown   in our information  system, the eGFR values for   -American and Non--American patients are given   for each creatinine result.         Lab Results   Component Value Date    WBC 5.30 09/22/2017    HGB 12.2 09/22/2017    HCT 36.7 (L) 09/22/2017    MCV 92 09/22/2017     09/22/2017         REVIEW OF SYSTEMS:    GENERAL:  No weight loss, malaise or fevers.  HEENT:  Negative for frequent or significant headaches.  NECK:  Negative for lumps, goiter, pain and significant neck swelling.  RESPIRATORY:  Negative for cough, wheezing or shortness of breath.  CARDIOVASCULAR:  Negative for chest pain, leg swelling or palpitations. Hypertension.  GI:  Negative for abdominal discomfort, blood in stools or black stools or change in bowel habits.  MUSCULOSKELETAL:  See HPI.  SKIN:  Negative for lesions, rash, and itching.  PSYCH:  Positive for sleep disturbance.  H/O depression, bipolar disorder and schizophrenia managed by outside psych.  HEMATOLOGY/LYMPHOLOGY:  Negative for prolonged bleeding, bruising easily or swollen nodes. H/O breast cancer.  NEURO:   No history of headaches, syncope, paralysis, seizures or tremors.  ENDO: Diabetes.  All other reviewed and negative other than HPI.    Past Medical History:  Past Medical History:   Diagnosis Date    Bipolar disorder     Cancer of female breast 10/2010    T2 N1 Mx, Stage IIB left breast cancer    Cancer of upper-outer quadrant of female breast 7/9/2012    Candidiasis of vulva and vagina 6/29/2012    Depression     Diabetes mellitus type II     Disturbances of sensation of smell and taste 6/29/2012    Hypertension     Malignant neoplasm of breast (female), unspecified site 4/27/2015    Neuropathy     Nonspecific abnormal unspecified cardiovascular function study 4/12/2013    ECG READ AS SHOWING A T-WAVE ABNORMALITY     Post-mastectomy lymphedema syndrome     Postmastectomy lymphedema 6/29/2012    Schizophrenia     Thrush 6/29/2012       Past Surgical History:  Past  "Surgical History:   Procedure Laterality Date    BREAST RECONSTRUCTION  11/23/11    B/L SHLOMO flap reconstruction S/P left MRM, right prophylactic mastectomy    BREAST RECONSTRUCTION Bilateral 3/13/2015    NIPPLE RECONSTRUCTION    MASTECTOMY Bilateral 01/2011    bilateral    TUBAL LIGATION         Family History:  Family History   Problem Relation Age of Onset    Kidney disease Mother      Dialysis    Hypertension Mother     Diabetes Mother     Deep vein thrombosis Mother     Diabetic kidney disease Sister     Lung cancer Sister     Diabetes Sister     Diabetes Brother     Diabetes Son     No Known Problems Father     No Known Problems Maternal Grandmother     No Known Problems Maternal Grandfather     No Known Problems Paternal Grandmother     No Known Problems Paternal Grandfather     No Known Problems Son     Cervical cancer Daughter     No Known Problems Daughter     No Known Problems Daughter     No Known Problems Daughter     Breast cancer Neg Hx     Ovarian cancer Neg Hx        Social History:  Social History     Social History    Marital status:      Spouse name: N/A    Number of children: 6    Years of education: N/A     Occupational History    Disability      Social History Main Topics    Smoking status: Current Every Day Smoker     Packs/day: 0.75     Years: 25.00     Types: Cigarettes    Smokeless tobacco: Never Used    Alcohol use No    Drug use: No      Comment: 1991 - stopped using crack cocaine    Sexual activity: No     Other Topics Concern    Not on file     Social History Narrative    No narrative on file       OBJECTIVE:    /83   Pulse 76   Temp 97.7 °F (36.5 °C) (Oral)   Resp 16   Ht 5' 3" (1.6 m)   Wt 86 kg (189 lb 9.5 oz)   BMI 33.59 kg/m²     PHYSICAL EXAMINATION:    General appearance: Well appearing, in no acute distress. The patient is frequently drifting off during exam.    Psych:  Mood and affect appropriate.  Skin: Skin color, " texture, turgor normal, no rashes or lesions, in both upper and lower body.  Head/face:  Atraumatic, normocephalic. No palpable lymph nodes  Neck: There is pain to palpation over the cervical paraspinal and trapezius muscles. Spurling positive to right side.  There is pain with neck flexion, extension, and lateral flexion.  Positive facet loading bilaterally, L>R.  Cor: RRR  Pulm: CTA  GI: Abdomen soft and non-tender.  Back: There is pain with palpation to lumbar paraspinals and facet joints.  Limited flexion and extension.  Bilateral facet loading.  SLR is negative.  Extremities: Peripheral joint ROM is full and pain free without obvious instability or laxity in all four extremities. Lymphedema to LUE.  Musculoskeletal: 4/5 strength in right ankle with plantar and dorsiflexion. 4/5 strength in left ankle with plantar and dorsiflexion. 5/5 strength with right knee flexion and extension. 5/5 strength with left knee flexion and extension. 5/5 strength in right EHL, 5/5 strength in left EHL. No atrophy or tone abnormalities are noted.  Bilateral hand  strength 4/5.    Neuro: Bilateral upper and lower extremity coordination and muscle stretch reflexes are physiologic and symmetric.  Plantar response are downgoing. Decreased sensation to BUE and BLE.  Gait: Antalgic.    ASSESSMENT: 57 y.o. year old female with neck and back pain, consistent with      1. Lumbosacral radiculopathy  MRI Lumbar Spine W WO Contrast    X-Ray Lumbar Complete With Flex And Ext   2. DDD (degenerative disc disease), lumbar  MRI Lumbar Spine W WO Contrast    X-Ray Lumbar Complete With Flex And Ext   3. Lumbar spondylosis     4. Chronic pain disorder     5. Cervical radiculopathy           PLAN:     - I have stressed the importance of physical activity and a home exercise plan to help with pain and improve health.    - Can consider restarting PT.    - Regarding neck pain, she has had limited benefit from CARLEE and RFA by Dr. Jerry.  She has  limited benefit from TF CARLEE by IR.  At her last appointment with Dr. Edwards, C6-7 ACDF was recommended which she declined.  I do not recommend further interventions for her neck.    - Regarding back pain, she is having radicular symptoms.  Her last MRI was in 2012 so I will order updated imaging.  She is open to procedures for her back.    - She requested opioid pain medication which I told her we would not start writing for her.  She does endorse benefit with Lyrica.      - RTC after MRI.    - Counseled patient regarding the importance of activity modification and physical therapy.    - Dr. Jerry was consulted on the patient and agrees with this plan.      The above plan and management options were discussed at length with patient. Patient is in agreement with the above and verbalized understanding.      ANUJ Irene  10/06/2017

## 2017-10-17 ENCOUNTER — HOSPITAL ENCOUNTER (OUTPATIENT)
Dept: RADIOLOGY | Facility: OTHER | Age: 57
Discharge: HOME | End: 2017-10-17
Attending: NURSE PRACTITIONER
Payer: MEDICARE

## 2017-10-17 DIAGNOSIS — M54.17 LUMBOSACRAL RADICULOPATHY: ICD-10-CM

## 2017-10-17 DIAGNOSIS — M51.36 DDD (DEGENERATIVE DISC DISEASE), LUMBAR: ICD-10-CM

## 2017-10-17 PROCEDURE — 72158 MRI LUMBAR SPINE W/O & W/DYE: CPT | Mod: TC

## 2017-10-17 PROCEDURE — 72114 X-RAY EXAM L-S SPINE BENDING: CPT | Mod: 26,,, | Performed by: RADIOLOGY

## 2017-10-17 PROCEDURE — 25500020 PHARM REV CODE 255: Performed by: NURSE PRACTITIONER

## 2017-10-17 PROCEDURE — 72158 MRI LUMBAR SPINE W/O & W/DYE: CPT | Mod: 26,,, | Performed by: RADIOLOGY

## 2017-10-17 PROCEDURE — A9585 GADOBUTROL INJECTION: HCPCS | Performed by: NURSE PRACTITIONER

## 2017-10-17 PROCEDURE — 72114 X-RAY EXAM L-S SPINE BENDING: CPT | Mod: TC

## 2017-10-17 RX ORDER — GADOBUTROL 604.72 MG/ML
8.5 INJECTION INTRAVENOUS
Status: COMPLETED | OUTPATIENT
Start: 2017-10-17 | End: 2017-10-17

## 2017-10-17 RX ADMIN — GADOBUTROL 8.5 ML: 604.72 INJECTION INTRAVENOUS at 10:10

## 2017-10-19 ENCOUNTER — OFFICE VISIT (OUTPATIENT)
Dept: PAIN MEDICINE | Facility: CLINIC | Age: 57
End: 2017-10-19
Attending: ANESTHESIOLOGY
Payer: MEDICARE

## 2017-10-19 VITALS
HEIGHT: 63 IN | TEMPERATURE: 99 F | HEART RATE: 81 BPM | BODY MASS INDEX: 32.69 KG/M2 | WEIGHT: 184.5 LBS | SYSTOLIC BLOOD PRESSURE: 111 MMHG | DIASTOLIC BLOOD PRESSURE: 82 MMHG

## 2017-10-19 DIAGNOSIS — M54.12 CERVICAL RADICULOPATHY: ICD-10-CM

## 2017-10-19 DIAGNOSIS — M47.819 SPONDYLOSIS WITHOUT MYELOPATHY: ICD-10-CM

## 2017-10-19 DIAGNOSIS — M47.9 OSTEOARTHRITIS OF SPINE, UNSPECIFIED SPINAL OSTEOARTHRITIS COMPLICATION STATUS, UNSPECIFIED SPINAL REGION: ICD-10-CM

## 2017-10-19 DIAGNOSIS — M50.30 DDD (DEGENERATIVE DISC DISEASE), CERVICAL: Primary | ICD-10-CM

## 2017-10-19 PROCEDURE — 99499 UNLISTED E&M SERVICE: CPT | Mod: S$GLB,,, | Performed by: ANESTHESIOLOGY

## 2017-10-19 PROCEDURE — 99999 PR PBB SHADOW E&M-EST. PATIENT-LVL II: CPT | Mod: PBBFAC,,, | Performed by: ANESTHESIOLOGY

## 2017-10-19 PROCEDURE — 99213 OFFICE O/P EST LOW 20 MIN: CPT | Mod: GC,S$GLB,, | Performed by: ANESTHESIOLOGY

## 2017-10-19 NOTE — PROGRESS NOTES
Chronic patient Established Note (Follow up visit)      SUBJECTIVE:    Jonathan Gonzales 57 y.o. female with a pmhx of breast cancer(s/p mastectomy bilaterally), DM2, HTN, bipolar disorder and schizophrenia who presents to the clinic for a follow-up appointment for neck pain and low back pain. She states that her worst pain is neck pain. She last received cervical CARLEE in 2015 with no pain relief. She was evaluated by neurosurgery, C6/7 ACDF was recommended but the patient declined.Since the last visit, Jonathan Gonzales states the pain has been stable. Current pain intensity is 8/10.    Past Medical History:   Diagnosis Date    Bipolar disorder     Cancer of female breast 10/2010    T2 N1 Mx, Stage IIB left breast cancer    Cancer of upper-outer quadrant of female breast 7/9/2012    Candidiasis of vulva and vagina 6/29/2012    Depression     Diabetes mellitus type II     Disturbances of sensation of smell and taste 6/29/2012    Hypertension     Malignant neoplasm of breast (female), unspecified site 4/27/2015    Neuropathy     Nonspecific abnormal unspecified cardiovascular function study 4/12/2013    ECG READ AS SHOWING A T-WAVE ABNORMALITY     Post-mastectomy lymphedema syndrome     Postmastectomy lymphedema 6/29/2012    Schizophrenia     Thrush 6/29/2012       Pain Disability Index Review:  Last 3 PDI Scores 10/6/2017 9/3/2015 7/10/2015   Pain Disability Index (PDI) 0 65 50     Pain Medications:    - Opioids: None  - Adjuvant Medications: Lyrica ( Pregabalin)  - Anti-Coagulants: None  - Others: See med list    Opioid Contract: no     report:  Reviewed and consistent with medication use as prescribed.    Pain Procedures:  5/9/14 C7- T1 IL CARLEE  6/20/14 C7-T1 IL CARLEE  5/22/15 Left C3,4,5,6 MBB  6/26/15 Left C3,4,5,6 MBB  7/31/15 Left C3,4,5,6 RFA- no relief  9/24/15 Right C5-6 TF CARLEE (IR)- limited benefit     Physical Therapy/Home Exercise: walking daily    Imaging:MRI Lumbar Spine W WO  Contrast   Narrative     HISTORY: Lumbosacral radiculopathy. History of breast cancer    TECHNIQUE: Multiplanar, multisequence MR images were acquired from the thoracolumbar junction to the sacrum before and after intravenous administration of 8.5 mL Gadavist contrast.      COMPARISON: MRI lumbar spine 8/22/12    FINDINGS:     Alignment: Minimal grade 1 anterolisthesis of 2-3 mm L4-L5.    Vertebrae: Normal marrow signal. No fracture.    Discs:  Disc desiccation at L4-L5.    Cord: Normal. Conus terminates at L1-L2.    Degenerative findings:        T10-L1: Incompletely imaged. No obvious disc bulge, spinal canal stenosis, or foraminal stenosis.       L1-L4: No significant disc disease.  No spinal canal stenosis.  Mild facet arthropathy at L3-L4. No neural foraminal stenosis.       L4-L5: Aforementioned anterolisthesis with circumferential disc bulge and uncovering of the disc in conjunction with bilateral facet arthropathy results in severe spinal canal stenosis (more pronounced than 8/22/12) with mild foraminal stenoses.       L5-S1: No significant disc disease.  No central canal stenosis.  No neural foraminal stenosis.    Paraspinal muscles & soft tissues: Normal.   Impression       Anterolisthesis with disc bulge and facet arthropathy at L4-L5 resulting in severe spinal canal stenosis and mild foraminal stenoses, overall worsened from 8/22/12.      Electronically signed by: OSCAR NASCIMENTO  Date: 10/17/17  Time: 10:56        X-Ray Lumbar Complete With Flex And Ext   Narrative     Technique: AP, oblique, and lateral views including flexion/extension views of the lumbar spine.    Comparison: Same-day MRI    Findings:   Degenerative changes throughout the lumbar spine better evaluated on same day MRI. There is left greater than right facet arthropathy throughout the lumbar spine, worse inferiorly. Degenerative disc disease at L4-5 and L5-S1. Grade one anterolisthesis of L4 on L5 (approximately 0.8 cm) without significant  translation of listhesis on flexion/extension views. Vertebral body heights are maintained. Paravertebral soft tissues are unremarkable.   Impression        Degenerative changes as above with grade 1 anterolisthesis L4 on L5 without instability.      Electronically signed by: ZHENG DIAZ MD  Date: 10/17/17  Time: 11:52          MRI Cervical Spine Without Contrast 02/15/13     Result Narrative      MRI of cervical spine    Technique: MRI of cervical spine performed without contrast. Following sequences were obtained: Localizer; sagittal T1, T2, and STIR; axial gradient and T2.    Comparison: Not available.    Findings:    There is heterogeneity of bone marrow signal, with region of T1 hypointensity within the occipital bone. Vertebral body height and alignment are maintained. There is straightening of cervical lordosis. Craniocervical junction is unremarkable. Spinal   cord demonstrates normal signal. Vertebral artery flow voids are maintained.    C2-C3: No spinal canal stenosis or neuroforaminal narrowing.    C3-C4: Small central disk osteophyte complex noted. No spinal canal stenosis or neuroforaminal narrowing.    C4-C5: Small central disk osteophyte complex and left uncovertebral arthrosis noted. No spinal canal stenosis or neuroforaminal narrowing.    C5-C6: Broad-based disk osteophyte complex with bilateral uncovertebral arthrosis resulting in mild central canal stenosis and mild right neuroforaminal narrowing.    C6-C7: Broad-based left paracentral disk osteophyte complex results in moderate spinal canal stenosis, predominately in the left lateral recess and deforms the spinal cord. There is no neuroforaminal narrowing.    C7-T1: No spinal canal stenosis or neuroforaminal narrowing.        Result Impression        1. Degenerative changes of the lower cervical spine as detailed above.  2. Heterogeneity of bone marrow signal, particularly the posterior calvarium. Although red marrow hyperplasia is more  "likely, recommend whole-body bone scan to exclude osseous metastases in this patient with history of breast cancer.      Electronically signed by: YAMILEX DAHL MD  Date: 02/15/13  Time: 10:25          Allergies:   Review of patient's allergies indicates:   Allergen Reactions    Banana Hives and Nausea And Vomiting       Current Medications:   Current Outpatient Prescriptions   Medication Sig Dispense Refill    ammonium lactate (LAC-HYDRIN) 12 % lotion Apply topically 2 (two) times daily as needed for Dry Skin. 57 g 0    arm brace Misc 1 application by Misc.(Non-Drug; Combo Route) route once daily. 1 each 0    BD INSULIN PEN NEEDLE UF MINI 31 gauge x 3/16" Ndle       blood sugar diagnostic Strp Pt to check BG up to QID. Dispense strips compatible with pt brand meter 100 each 11    blood-glucose meter (FREESTYLE SYSTEM KIT) kit Pt to check BG up to QID. Dispense strips compatible with pt brand meter 1 each 0    clonazePAM (KLONOPIN) 0.5 MG tablet TK 1 T PO BID  3    divalproex (DEPAKOTE) 500 MG Tb24 Take 500 mg by mouth 2 (two) times daily. 500 mg in the morning, 1000 mg qhs      EASY TOUCH 31 gauge x 1/4" Ndle   5    insulin glargine (LANTUS SOLOSTAR) 100 unit/mL (3 mL) InPn pen Inject 23 Units into the skin every evening. 15 mL 0    KLOR-CON M20 20 mEq tablet Take 1 tablet (20 mEq total) by mouth 2 (two) times daily. 60 tablet 5    lancets (ONETOUCH ULTRASOFT LANCETS) Mis Pt to check BG up to QID. Dispense strips compatible with pt brand meter 100 each 11    liraglutide 0.6 mg/0.1 mL, 18 mg/3 mL, subq PNIJ (VICTOZA 2-HERMANN) 0.6 mg/0.1 mL (18 mg/3 mL) PnIj Inject 1.2 mg into the skin once daily. 3 mL 1    lisinopril-hydrochlorothiazide (PRINZIDE,ZESTORETIC) 10-12.5 mg per tablet TAKE ONE TABLET BY MOUTH ONCE DAILY FOR BLOOD PRESSURE 90 tablet 2    metformin (GLUCOPHAGE) 500 MG tablet Take 1 tablet (500 mg total) by mouth 2 (two) times daily with meals. 180 tablet 3    olanzapine (ZYPREXA) 10 MG tablet " "TK 1 T PO QHS  3    oxybutynin (DITROPAN-XL) 10 MG 24 hr tablet Take 1 tablet (10 mg total) by mouth once daily. 30 tablet 11    pen needle, diabetic (BD ULTRA-FINE [place] PEN NEEDLES) 32 gauge x 5/32" Ndle Pt is injecting once daily 30 each 11    pen needle, diabetic, safety 29 gauge x 3/16" Ndle Use as instructed 200 each 11    pravastatin (PRAVACHOL) 20 MG tablet Take 1 tablet (20 mg total) by mouth once daily. 90 tablet 3    pregabalin (LYRICA) 100 MG capsule Take 1 capsule (100 mg total) by mouth 2 (two) times daily. 60 capsule 6    quetiapine (SEROQUEL) 100 MG Tab TAKE 1 TABLET(100 MG) BY MOUTH EVERY EVENING 90 tablet 0    TRUE METRIX GLUCOSE METER Misc       TRUEPLUS LANCETS 33 gauge Misc       ziprasidone (GEODON) 40 MG Cap 40 mg nightly.        No current facility-administered medications for this visit.        REVIEW OF SYSTEMS:    GENERAL:  No weight loss, malaise or fevers.  HEENT:  Negative for frequent or significant headaches.  NECK:  Negative for lumps, goiter, pain and significant neck swelling.  RESPIRATORY:  Negative for cough, wheezing or shortness of breath.  CARDIOVASCULAR:  Negative for chest pain, leg swelling or palpitations. Hypertension.  GI:  Negative for abdominal discomfort, blood in stools or black stools or change in bowel habits.  MUSCULOSKELETAL:  See HPI.  SKIN:  Negative for lesions, rash, and itching.  PSYCH:  Positive for sleep disturbance.  H/O depression, bipolar disorder and schizophrenia managed by outside psych.  HEMATOLOGY/LYMPHOLOGY:  Negative for prolonged bleeding, bruising easily or swollen nodes. H/O breast cancer.  NEURO:   No history of headaches, syncope, paralysis, seizures or tremors.  ENDO: Diabetes.  All other reviewed and negative other than HPI.    Past Medical History:  Past Medical History:   Diagnosis Date    Bipolar disorder     Cancer of female breast 10/2010    T2 N1 Mx, Stage IIB left breast cancer    Cancer of upper-outer quadrant of female " breast 7/9/2012    Candidiasis of vulva and vagina 6/29/2012    Depression     Diabetes mellitus type II     Disturbances of sensation of smell and taste 6/29/2012    Hypertension     Malignant neoplasm of breast (female), unspecified site 4/27/2015    Neuropathy     Nonspecific abnormal unspecified cardiovascular function study 4/12/2013    ECG READ AS SHOWING A T-WAVE ABNORMALITY     Post-mastectomy lymphedema syndrome     Postmastectomy lymphedema 6/29/2012    Schizophrenia     Thrush 6/29/2012       Past Surgical History:  Past Surgical History:   Procedure Laterality Date    BREAST RECONSTRUCTION  11/23/11    B/L SHLOMO flap reconstruction S/P left MRM, right prophylactic mastectomy    BREAST RECONSTRUCTION Bilateral 3/13/2015    NIPPLE RECONSTRUCTION    MASTECTOMY Bilateral 01/2011    bilateral    TUBAL LIGATION         Family History:  Family History   Problem Relation Age of Onset    Kidney disease Mother      Dialysis    Hypertension Mother     Diabetes Mother     Deep vein thrombosis Mother     Diabetic kidney disease Sister     Lung cancer Sister     Diabetes Sister     Diabetes Brother     Diabetes Son     No Known Problems Father     No Known Problems Maternal Grandmother     No Known Problems Maternal Grandfather     No Known Problems Paternal Grandmother     No Known Problems Paternal Grandfather     No Known Problems Son     Cervical cancer Daughter     No Known Problems Daughter     No Known Problems Daughter     No Known Problems Daughter     Breast cancer Neg Hx     Ovarian cancer Neg Hx        Social History:  Social History     Social History    Marital status:      Spouse name: N/A    Number of children: 6    Years of education: N/A     Occupational History    Disability      Social History Main Topics    Smoking status: Current Every Day Smoker     Packs/day: 0.75     Years: 25.00     Types: Cigarettes    Smokeless tobacco: Never Used    Alcohol  "use No    Drug use: No      Comment: 1991 - stopped using crack cocaine    Sexual activity: No     Other Topics Concern    Not on file     Social History Narrative    No narrative on file       OBJECTIVE:    /82   Pulse 81   Temp 98.6 °F (37 °C)   Ht 5' 3" (1.6 m)   Wt 83.7 kg (184 lb 8.4 oz)   BMI 32.69 kg/m²     PHYSICAL EXAMINATION:    General appearance: Well appearing, in no acute distress. The patient is frequently drifting off during exam.    Psych:  Mood and affect appropriate.  Skin: Skin color, texture, turgor normal, no rashes or lesions, in both upper and lower body.  Head/face:  Atraumatic, normocephalic. No palpable lymph nodes  Neck: There is pain to palpation over the cervical paraspinal and trapezius muscles. Spurling positive to right side.  There is pain with neck flexion, extension, and lateral flexion.  Positive facet loading bilaterally, L>R.  Cor: RRR  Pulm: CTA  GI: Abdomen soft and non-tender.  Back: There is pain with palpation to lumbar paraspinals and facet joints.  Limited flexion and extension.  Bilateral facet loading.  SLR is negative.  Extremities: Peripheral joint ROM is full and pain free without obvious instability or laxity in all four extremities. Lymphedema to LUE.  Musculoskeletal: 4/5 strength in right ankle with plantar and dorsiflexion. 4/5 strength in left ankle with plantar and dorsiflexion. 5/5 strength with right knee flexion and extension. 5/5 strength with left knee flexion and extension. 5/5 strength in right EHL, 5/5 strength in left EHL. No atrophy or tone abnormalities are noted.  Bilateral hand  strength 4/5.    Neuro: Bilateral upper and lower extremity coordination and muscle stretch reflexes are physiologic and symmetric.  Plantar response are downgoing. Decreased sensation to BUE and BLE.  Gait: Antalgic.  ASSESSMENT: 57 y.o. year old female with neck pain, consistent with     1. DDD (degenerative disc disease), cervical     2. Cervical " radiculopathy     3. Osteoarthritis of spine, unspecified spinal osteoarthritis complication status, unspecified spinal region     4. Spondylosis without myelopathy            PLAN:     - I have counseled the patient on importance of smoking cessation and specifically poor outcomes related to spine and spine surgery.  - I have stressed the importance of physical activity and a home exercise plan to help with pain and improve health.  - Patient can continue with medications for now per her providers since they are providing benefits, using them appropriately, and without side effects. I explained to the patient that I do not recommend long term opioid therapy. Pt counseled about the side effects of long term use of opioids including dependence, tolerance, addiction, respiratory depression, somnelence , immune and endocrine dysfunction.  - Patient plans to follow up with neurosurgery for further surgical planning.   - Counseled patient regarding the importance of activity modification, smoking cessation, constant sleeping habits and physical therapy.    The above plan and management options were discussed at length with patient. Patient is in agreement with the above and verbalized understanding.    Jael Esparza, PGY3   I have personally reviewed the history and exam of this patient and agree with the resident/fellow/NPs note as stated above.    Darren Jerry MD    10/19/2017

## 2017-11-01 ENCOUNTER — OFFICE VISIT (OUTPATIENT)
Dept: INTERNAL MEDICINE | Facility: CLINIC | Age: 57
End: 2017-11-01
Attending: FAMILY MEDICINE
Payer: MEDICARE

## 2017-11-01 VITALS
DIASTOLIC BLOOD PRESSURE: 80 MMHG | HEIGHT: 63 IN | HEART RATE: 60 BPM | WEIGHT: 183.88 LBS | SYSTOLIC BLOOD PRESSURE: 102 MMHG | BODY MASS INDEX: 32.58 KG/M2 | OXYGEN SATURATION: 97 %

## 2017-11-01 DIAGNOSIS — E66.09 CLASS 1 OBESITY DUE TO EXCESS CALORIES WITH SERIOUS COMORBIDITY AND BODY MASS INDEX (BMI) OF 32.0 TO 32.9 IN ADULT: Primary | ICD-10-CM

## 2017-11-01 PROCEDURE — 99499 UNLISTED E&M SERVICE: CPT | Mod: S$GLB,,, | Performed by: FAMILY MEDICINE

## 2017-11-01 PROCEDURE — 99999 PR PBB SHADOW E&M-EST. PATIENT-LVL III: CPT | Mod: PBBFAC,,, | Performed by: FAMILY MEDICINE

## 2017-11-01 PROCEDURE — 99213 OFFICE O/P EST LOW 20 MIN: CPT | Mod: S$GLB,,, | Performed by: FAMILY MEDICINE

## 2017-11-01 RX ORDER — PEN NEEDLE, DIABETIC 30 GX3/16"
NEEDLE, DISPOSABLE MISCELLANEOUS
Qty: 30 EACH | Refills: 11 | Status: SHIPPED | OUTPATIENT
Start: 2017-11-01 | End: 2020-08-19 | Stop reason: SDUPTHER

## 2017-11-01 NOTE — PROGRESS NOTES
"Subjective:      Patient ID: Jonathan Gonzales is a 57 y.o. female.    Chief Complaint: Weight Loss (f/u)    She reports she is eating smaller portions. She does continue to eat fried chicken, noodles, fried rice, chicken wings. She is taking .6mg of the victoza. She is drinking more water. She has stopped drinking sodas.       Review of Systems   Constitutional: Negative.    Respiratory: Negative.    Cardiovascular: Negative.    Gastrointestinal: Negative.    Genitourinary: Negative.      I personally reviewed Past Medical History, Past Surgical history,  Past Social History and Family History    Objective:   /80   Pulse 60   Ht 5' 3" (1.6 m)   Wt 83.4 kg (183 lb 13.8 oz)   SpO2 97%   BMI 32.57 kg/m²     Physical Exam   Constitutional: She is oriented to person, place, and time. She appears well-developed and well-nourished. No distress.   HENT:   Head: Normocephalic and atraumatic.   Right Ear: External ear normal.   Left Ear: External ear normal.   Mouth/Throat: Oropharynx is clear and moist.   Eyes: Conjunctivae and EOM are normal. Pupils are equal, round, and reactive to light. Right eye exhibits no discharge. Left eye exhibits no discharge. No scleral icterus.   Neck: Normal range of motion. No tracheal deviation present. No thyromegaly present.   Cardiovascular: Normal rate, regular rhythm, normal heart sounds and intact distal pulses.  Exam reveals no gallop.    No murmur heard.  Pulmonary/Chest: Effort normal and breath sounds normal. No respiratory distress. She has no wheezes. She has no rales. She exhibits no tenderness.   Abdominal: Soft. Bowel sounds are normal. She exhibits no distension and no mass. There is no tenderness. There is no rebound and no guarding.   Musculoskeletal: Normal range of motion.   Neurological: She is alert and oriented to person, place, and time.   Skin: Skin is warm and dry.   Psychiatric: She has a normal mood and affect. Her behavior is normal. Judgment and " thought content normal.   Vitals reviewed.      Jonathan was seen today for weight loss.    Diagnoses and all orders for this visit:    Class 1 obesity due to excess calories with serious comorbidity and body mass index (BMI) of 32.0 to 32.9 in adult  Uncontrolled type 2 diabetes mellitus with diabetic neuropathy, with long-term current use of insulin  -patient with 9 pound weight loss in the last month, will increase victoza to 1.8mg and patient to return in 4 weeks with list of sugars  -     liraglutide 0.6 mg/0.1 mL, 18 mg/3 mL, subq PNIJ (VICTOZA 2-HERMANN) 0.6 mg/0.1 mL (18 mg/3 mL) PnIj; Inject 1.8 mg into the skin once daily.

## 2017-11-15 ENCOUNTER — TELEPHONE (OUTPATIENT)
Dept: INTERNAL MEDICINE | Facility: CLINIC | Age: 57
End: 2017-11-15

## 2017-11-15 NOTE — TELEPHONE ENCOUNTER
----- Message from Bhavani Boone sent at 11/15/2017  2:21 PM CST -----  Contact: [facility] [employer] [phone] ext [phone]  _  1st Request  _  2nd Request  _  3rd Request        Who: [employer] [employer] [phone] ext [phone]    Why: Requesting a call back in regards to returned the nurse's phone call    What Number to Call Back:[facility] [employer] [phone] ext [phone]    When to Expect a call back: (Within 24 hours)    Please return the call at earliest convenience. Thanks!

## 2017-11-15 NOTE — TELEPHONE ENCOUNTER
Left message for Alberto stating contact with patient. Pt declined advice or appt at this time. Left contact information if Alberto has questions or concerns.

## 2017-11-15 NOTE — TELEPHONE ENCOUNTER
----- Message from Bhavani Boone sent at 11/15/2017 10:52 AM CST -----  Contact: [facility] [employer] [phone] ext [phone]  _  1st Request  _  2nd Request  _  3rd Request        Who: [facility] [employer] [phone] ext [phone]    Why: Requesting a call back in regards to blood sugar is consistency high. 405 this morning. Please call pt     What Number to Call Back:[facility] [employer] [phone] ext [phone]    When to Expect a call back: (Within 24 hours)    Please return the call at earliest convenience. Thanks!

## 2017-11-15 NOTE — TELEPHONE ENCOUNTER
Left message for Alberto to call the office in regards to discussing concerns.      Pt states she is taking insulin as prescribed, pt contributes elevated BS to drinking cold drinks. Pt denies any other concerning s/s. Pt reports BS as 410, but took meds once hanging up with Alberto. Pt states BS has been high: again due to soda intake. Pt reports current BS as:386 taken at 12:10pm. Pt states she still has metformin and lantus to take today. Pt states she will not consume any cold drinks today, verifies she will consume water only today.Pt declines needing to be seen today. Pt informed to seek tx in ER if BS elevates and she has s/s:n/v/cp/sob/dizziness. Please advise if further intervention is needed.

## 2017-11-17 DIAGNOSIS — E11.9 TYPE 2 DIABETES MELLITUS WITHOUT COMPLICATION: ICD-10-CM

## 2017-11-29 ENCOUNTER — HOSPITAL ENCOUNTER (OUTPATIENT)
Dept: DIABETES | Facility: OTHER | Age: 57
Discharge: HOME | End: 2017-11-29
Attending: FAMILY MEDICINE
Payer: MEDICARE

## 2017-11-29 ENCOUNTER — OFFICE VISIT (OUTPATIENT)
Dept: INTERNAL MEDICINE | Facility: CLINIC | Age: 57
End: 2017-11-29
Attending: FAMILY MEDICINE
Payer: MEDICARE

## 2017-11-29 VITALS
DIASTOLIC BLOOD PRESSURE: 88 MMHG | HEIGHT: 63 IN | WEIGHT: 181.88 LBS | SYSTOLIC BLOOD PRESSURE: 128 MMHG | BODY MASS INDEX: 32.23 KG/M2 | HEART RATE: 78 BPM

## 2017-11-29 DIAGNOSIS — E11.42 DM TYPE 2 WITH DIABETIC PERIPHERAL NEUROPATHY: Primary | ICD-10-CM

## 2017-11-29 DIAGNOSIS — M25.561 CHRONIC PAIN OF BOTH KNEES: ICD-10-CM

## 2017-11-29 DIAGNOSIS — M48.00 SPINAL STENOSIS, UNSPECIFIED SPINAL REGION: ICD-10-CM

## 2017-11-29 DIAGNOSIS — E66.9 OBESITY, CLASS I, BMI 30-34.9: ICD-10-CM

## 2017-11-29 DIAGNOSIS — M47.817 LUMBOSACRAL SPONDYLOSIS WITHOUT MYELOPATHY: ICD-10-CM

## 2017-11-29 DIAGNOSIS — M25.562 CHRONIC PAIN OF BOTH KNEES: ICD-10-CM

## 2017-11-29 DIAGNOSIS — G89.29 CHRONIC PAIN OF BOTH KNEES: ICD-10-CM

## 2017-11-29 DIAGNOSIS — M50.30 DDD (DEGENERATIVE DISC DISEASE), CERVICAL: ICD-10-CM

## 2017-11-29 PROCEDURE — 99499 UNLISTED E&M SERVICE: CPT | Mod: S$GLB,,, | Performed by: FAMILY MEDICINE

## 2017-11-29 PROCEDURE — 99214 OFFICE O/P EST MOD 30 MIN: CPT | Mod: S$GLB,,, | Performed by: FAMILY MEDICINE

## 2017-11-29 PROCEDURE — 99999 PR PBB SHADOW E&M-EST. PATIENT-LVL III: CPT | Mod: PBBFAC,,, | Performed by: FAMILY MEDICINE

## 2017-11-29 PROCEDURE — G0108 DIAB MANAGE TRN  PER INDIV: HCPCS

## 2017-11-29 NOTE — PROGRESS NOTES
"Subjective:      Patient ID: Jonathan Gonzales is a 57 y.o. female.    Chief Complaint: Annual Exam (F/U); Medication Refill (Rx Refill); Neck Pain (Tender); Back Pain (Lower Back); and Leg Pain (Both Legs)    She is here for weight loss follow up and has lost 2 pounds. She is using the victoza 1.8mg daily. She is drinking 2 gallons water daily. Cold drinks once a week. She is currently seeing pain management and for her pain. She reports PT made all of her pain in neck, back and knees worse. She is smoking 3-4 cigs a day and is reducing the number on her own.     Breakfast   Bryant-eggs     Lunch   Skip     Dinner  Chicken/noodles/macaroni      Review of Systems   HENT: Negative.    Eyes: Negative.    Respiratory: Negative.    Cardiovascular: Negative.    Gastrointestinal: Negative.    Genitourinary: Negative.      I personally reviewed Past Medical History, Past Surgical history,  Past Social History and Family History    Objective:   /88   Pulse 78   Ht 5' 3" (1.6 m)   Wt 82.5 kg (181 lb 14.1 oz)   BMI 32.22 kg/m²     Physical Exam   Constitutional: She is oriented to person, place, and time. She appears well-developed and well-nourished. No distress.   HENT:   Head: Normocephalic and atraumatic.   Right Ear: External ear normal.   Left Ear: External ear normal.   Mouth/Throat: Oropharynx is clear and moist.   Eyes: Conjunctivae and EOM are normal. Pupils are equal, round, and reactive to light. Right eye exhibits no discharge. Left eye exhibits no discharge. No scleral icterus.   Neck: Normal range of motion. No tracheal deviation present. No thyromegaly present.   Cardiovascular: Normal rate, regular rhythm, normal heart sounds and intact distal pulses.  Exam reveals no gallop.    No murmur heard.  Pulmonary/Chest: Effort normal and breath sounds normal. No respiratory distress. She has no wheezes. She has no rales. She exhibits no tenderness.   Abdominal: Soft. Bowel sounds are normal. She " exhibits no distension and no mass. There is no tenderness. There is no rebound and no guarding.   Musculoskeletal: Normal range of motion.   Neurological: She is alert and oriented to person, place, and time.   Skin: Skin is warm and dry.   Psychiatric: She has a normal mood and affect. Her behavior is normal. Judgment and thought content normal.   Vitals reviewed.      Jonathan was seen today for annual exam, medication refill, neck pain, back pain and leg pain.    Diagnoses and all orders for this visit:    Uncontrolled type 2 diabetes mellitus with insulin therapy  -will cont victoza, lantus, metformin, patient did not bring her list of sugars with her, she denies any lows sugars in the 60-80 range   -     Basic metabolic panel; Future    Spinal stenosis, unspecified spinal region  DDD (degenerative disc disease), cervical  Lumbosacral spondylosis without myelopathy  -declined medication adjustment, cont lyrica   -     Ambulatory Referral to Neurosurgery    Chronic pain of both knees  -declined orthopedic follow up or PT, she will call if she wants to pursue further work up     Other orders  -     Cancel: Hemoglobin A1c; Future

## 2017-11-30 DIAGNOSIS — E87.6 HYPOKALEMIA: Primary | ICD-10-CM

## 2017-11-30 DIAGNOSIS — I10 HTN (HYPERTENSION), BENIGN: ICD-10-CM

## 2017-11-30 DIAGNOSIS — E87.6 HYPOPOTASSEMIA: ICD-10-CM

## 2017-11-30 RX ORDER — LISINOPRIL AND HYDROCHLOROTHIAZIDE 10; 12.5 MG/1; MG/1
TABLET ORAL
Qty: 28 TABLET | Refills: 0 | Status: SHIPPED | OUTPATIENT
Start: 2017-11-30 | End: 2017-12-27 | Stop reason: SDUPTHER

## 2017-11-30 NOTE — TELEPHONE ENCOUNTER
----- Message from Ghulam Contreras MD sent at 11/30/2017  3:17 PM CST -----  A1c is greater than 14.  We need to determine if she is currently taking her medication.  She might benefit from diabetic education as well.

## 2017-11-30 NOTE — PROGRESS NOTES
Diabetes Education  Author: Radha Whitlock RN  Date: 11/30/2017    Diabetes Education Visit  Diabetes Education Record Assessment/Progress: Initial    Diabetes Type  Diabetes Type : Type II    Diabetes History  Diabetes Diagnosis: 1-3 years    Nutrition  Meal Planning:  (appetite has been reduced)  Meal Plan 24 Hour Recall - Breakfast: skipped today (usually sleeps till 12pm)  Meal Plan 24 Hour Recall - Lunch: spaghetti, shrimp, cheese and baked chicken - water   Meal Plan 24 Hour Recall - Dinner: piece of pizza - pepperoni/sausage/cheese - snuck a cold drink   Meal Plan 24 Hour Recall - Snack: had 2 now-and-later candies     Monitoring   Self Monitoring : 186 this morning - SMBG, AM Fasting - 390, 339, 125, 263, 275, 187, 222, 386, 405, 463, 520  Blood Glucose Logs: No (brought meter )    Exercise   Exercise Type: walking  Intensity: Low  Frequency: Daily  Duration: 15 min (walks to gas station 2 times a day 15min/trip)    Current Diabetes Treatment   Current Treatment: Oral Medication, Insulin, Injectable    Social History  Preferred Learning Method: Face to Face  Primary Support: Self, Daughter  Educational Level: Some College  Occupation: not working   Smoking Status: Current Smoker (4 cigarettes/ day)  Alcohol Use: Never                                Barriers to Change  Barriers to Change: Cognitive  Learning Challenges : Literacy    Readiness to Learn   Readiness to Learn : Acceptance    Cultural Influences  Cultural Influences: No    Diabetes Education Assessment/Progress  Diabetes Disease Process (diabetes disease process and treatment options): Discussion, Instructed, Individual Session, Demonstration, Written Materials Provided, No Knowledge (ed on what DM is, insulin resistance, beta cell burnout, importance of taking medications and using diet and exercise to get BG under control)  Nutrition (Incorporating nutritional management into one's lifestyle): Discussion, Instructed, Individual Session,  Demonstration, Needs Instruction, No Knowledge, Written Materials Provided (label reading exercise, stressed importance of no sweet drinks; began ed on CHO portioning; per pt since beginning victoza appetite has decreased significantly)  Physical Activity (incorporating physical activity into one's lifestyle): Discussion, Instructed, Individual Session, Needs Instruction, No Knowledge (ed on ADA recs for physical activity and safety considerations)  Medications (states correct name, dose, onset, peak, duration, side effects & timing of meds): Discussion, Instructed, Individual Session, Requires Assistance Family/SO, Needs Review, Written Materials Provided (pt just recently began taking insulin again (as evidenced by hx on BG meter) - stressed importance of taking medicine consistently )  Monitoring (monitoring blood glucose/other parameters & using results): Discussion, Instructed, Individual Session, Requires Assistance Family/SO, Needs Review, Written Materials Provided (new BG logs given, instructed to check BID - AM fasting and PP - reviewed BG goals and began ed on critical thinking about results)  Acute Complications (preventing, detecting, and treating acute complications): Discussion, Instructed, Individual Session, Requires Assistance Family/SO, Needs Instruction, No Knowledge, Written Materials Provided (reviewed s/s of hypoglycemia and how to prevent/treat - discussed s/s of DKA and risk when BG are very high)  Chronic Complications (preventing, detecting, and treating chronic complications): Discussion, Instructed, Individual Session, Requires Assistance Family/SO, Needs Review, Written Materials Provided (reviewed potential A1C based on recent BG; ed on importance of controlling BG to avoid complications r/t DM )  Clinical (diabetes and other pertinent medical history): Discussion, Instructed, Individual Session, No Knowledge (discussed effects DM and depression have on each other and importance of  getting BG under control to help w/ mental health)  Cognitive (knowledge of self-management skills, functional health literacy): Discussion, Instructed, Individual Session, No Knowledge (low healthy literacy)  Psychosocial (emotional response to diabetes): Discussion, Instructed, Individual Session, No Knowledge  Diabetes Distress and Support Systems: Discussion, Instructed, Individual Session, No Knowledge  Behavioral (readiness for change, lifestyle practices, self-care behaviors): Discussion, Instructed, Individual Session    Goals  Patient has selected/evaluated goals during today's session: Yes, selected  Healthy Eating: Set (pt will eliminate sweet drinks from diet)  Start Date: 11/29/17  Monitoring: Set (pt will SMBG BID and bring logs to all future appointments)  Start Date: 11/29/17  Medications:  (pt will take all medications as prescribed )  Start Date: 11/29/17         Diabetes Care Plan/Intervention  Education Plan/Intervention: Individual Follow-Up DSMT    Diabetes Meal Plan  Restrictions: Restricted Carbohydrate    Education Units of Time   Time Spent: 90 min      Health Maintenance Topics with due status: Not Due       Topic Last Completion Date    Colonoscopy 03/15/2011    Pneumococcal PPSV23 (High Risk) 01/28/2015    Pap Smear with HPV Cotest 03/31/2015    Lipid Panel 05/02/2017    Eye Exam 07/26/2017    Hemoglobin A1c 11/29/2017     Health Maintenance Due   Topic Date Due    Foot Exam  02/02/1969    TETANUS VACCINE  02/02/1977

## 2017-11-30 NOTE — TELEPHONE ENCOUNTER
Pt phone number on file rings and then goes busy. No voicemail available. Unable to communicate results and advice.

## 2017-11-30 NOTE — TELEPHONE ENCOUNTER
She is seeing Dr. WASSERMAN on a regular basis for weight loss.  Perhaps we could mail her a letter with the results and a diabetic education recommendation.

## 2017-12-01 ENCOUNTER — TELEPHONE (OUTPATIENT)
Dept: INTERNAL MEDICINE | Facility: CLINIC | Age: 57
End: 2017-12-01

## 2017-12-01 RX ORDER — POTASSIUM CHLORIDE 1500 MG/1
20 TABLET, EXTENDED RELEASE ORAL 2 TIMES DAILY
Qty: 60 TABLET | Refills: 5 | Status: SHIPPED | OUTPATIENT
Start: 2017-12-01 | End: 2019-09-11

## 2017-12-01 NOTE — TELEPHONE ENCOUNTER
Pt expressed verbal understanding concerning lab results and advised that she taking insulin(Lantus) and keeping a blood sugar log. CF

## 2017-12-01 NOTE — TELEPHONE ENCOUNTER
Pt states that she has not been taking the K+ pills and that she needs a refill. schedule pt for lab redraw on 12/11/17 to check her K+ level.

## 2017-12-04 DIAGNOSIS — E87.6 HYPOPOTASSEMIA: ICD-10-CM

## 2017-12-04 DIAGNOSIS — I10 HTN (HYPERTENSION), BENIGN: ICD-10-CM

## 2017-12-04 RX ORDER — POTASSIUM CHLORIDE 20 MEQ/1
20 TABLET, EXTENDED RELEASE ORAL DAILY
Qty: 90 TABLET | Refills: 11 | Status: SHIPPED | OUTPATIENT
Start: 2017-12-04 | End: 2018-01-03

## 2017-12-04 NOTE — TELEPHONE ENCOUNTER
----- Message from Bhavani Boone sent at 12/4/2017 11:57 AM CST -----  Contact: pt      _  1st Request  _  2nd Request  _  3rd Request    Please refill the medication(s) listed below. Please call the patient when the prescription(s) is ready for  at this phone number      [phone]      Medication #1potassium - [facility] is out of this medication     Medication #2      Preferred Pharmacy:[facility] on [address]

## 2017-12-14 ENCOUNTER — HOSPITAL ENCOUNTER (OUTPATIENT)
Dept: DIABETES | Facility: OTHER | Age: 57
Discharge: HOME | End: 2017-12-14
Attending: FAMILY MEDICINE
Payer: MEDICARE

## 2017-12-14 ENCOUNTER — TELEPHONE (OUTPATIENT)
Dept: INTERNAL MEDICINE | Facility: CLINIC | Age: 57
End: 2017-12-14

## 2017-12-14 DIAGNOSIS — E11.42 DM TYPE 2 WITH DIABETIC PERIPHERAL NEUROPATHY: Primary | ICD-10-CM

## 2017-12-14 DIAGNOSIS — E11.42 DM TYPE 2 WITH DIABETIC PERIPHERAL NEUROPATHY: ICD-10-CM

## 2017-12-14 PROCEDURE — G0108 DIAB MANAGE TRN  PER INDIV: HCPCS

## 2017-12-14 RX ORDER — LANCETS
1 EACH MISCELLANEOUS 4 TIMES DAILY
Qty: 200 EACH | Refills: 11 | Status: SHIPPED | OUTPATIENT
Start: 2017-12-14 | End: 2018-11-23 | Stop reason: SDUPTHER

## 2017-12-14 NOTE — TELEPHONE ENCOUNTER
----- Message from Roula Al MD sent at 12/14/2017  3:57 PM CST -----  Please inform potassium is now normal

## 2017-12-14 NOTE — PROGRESS NOTES
Diabetes Education  Author: Radha Whitlock RN  Date: 2017    Diabetes Education Visit  Diabetes Education Record Assessment/Progress: Comprehensive/Group    Diabetes Type  Diabetes Type : Type II         Nutrition  Meal Plan 24 Hour Recall - Breakfast: skipped/blood work today - yesterday had cereal sugar pops w/ milk - water   Meal Plan 24 Hour Recall - Lunch: skipped  Meal Plan 24 Hour Recall - Dinner: macaroni and ground meat - drank a coke zero   Meal Plan 24 Hour Recall - Snack: no snacks     Monitoring   Self Monitoring : AM Fastin, 119, 100, 81, 91, 76, 79, 82, 99, 113, 87, 87, 102, 115 2 Hours After Dinner: 108, 112, 78, 88, 127, 127, 92, 95, 97, 89, 92, 87, 95  Blood Glucose Logs: Yes    Exercise   Exercise Type: walking  Intensity: Low  Frequency: Daily  Duration: 15 min    Current Diabetes Treatment   Current Treatment: Injectable, Insulin                                                    Diabetes Education Assessment/Progress  Nutrition (Incorporating nutritional management into one's lifestyle): Discussion, Requires Assistance Family/SO, Individual Session, Written Materials Provided (began ed on plate method, encouraged to increase veggies and portion CHO to 1 small scoop, has given up sweet drinks - coke zero now)  Medications (states correct name, dose, onset, peak, duration, side effects & timing of meds): Discussion, Comprehends Key Points, Individual Session (no issues with insulin or victoza )  Monitoring (monitoring blood glucose/other parameters & using results): Discussion, Demonstrates Understanding/Competency (verbalizes/demonstrates), Individual Session (good logs filled out, additional log sheets given - pt able to state what BG goals are)  Acute Complications (preventing, detecting, and treating acute complications): Discussion, Comprehends Key Points, Individual Session (pt able to state how to treat a low BG)  Chronic Complications (preventing, detecting, and treating chronic  complications): Discussion, Requires Assistance Family/SO, Comprehends Key Points, Individual Session (reviewed A1C from november, and improvement w/ BG control, podiatry exam set up, has seen an eye doctor in the past year)  Clinical (diabetes and other pertinent medical history): Not Covered/Deferred  Cognitive (knowledge of self-management skills, functional health literacy): Not Covered/Deferred  Psychosocial (emotional response to diabetes): Not Covered/Deferred  Diabetes Distress and Support Systems: Not Covered/Deferred  Behavioral (readiness for change, lifestyle practices, self-care behaviors): Not Covered/Deferred    Goals  Patient has selected/evaluated goals during today's session: Yes, evaluated  Healthy Eating: % Met  Met Percentage : 100%  Monitoring: % Met  Met Percentage : 100%  Medications: % Met  Met Percentage : 100%         Diabetes Care Plan/Intervention  Education Plan/Intervention: Individual Follow-Up DSMT, Foot Exam    Diabetes Meal Plan  Restrictions: Restricted Carbohydrate    Education Units of Time   Time Spent: 30 min      Health Maintenance Topics with due status: Not Due       Topic Last Completion Date    Colonoscopy 03/15/2011    Pneumococcal PPSV23 (High Risk) 01/28/2015    Pap Smear with HPV Cotest 03/31/2015    Lipid Panel 05/02/2017    Eye Exam 07/26/2017    Hemoglobin A1c 11/29/2017     Health Maintenance Due   Topic Date Due    Foot Exam  02/02/1969    TETANUS VACCINE  02/02/1977

## 2017-12-15 ENCOUNTER — TELEPHONE (OUTPATIENT)
Dept: INTERNAL MEDICINE | Facility: CLINIC | Age: 57
End: 2017-12-15

## 2017-12-20 ENCOUNTER — OFFICE VISIT (OUTPATIENT)
Dept: PODIATRY | Facility: CLINIC | Age: 57
End: 2017-12-20
Payer: MEDICARE

## 2017-12-20 ENCOUNTER — TELEPHONE (OUTPATIENT)
Dept: SPINE | Facility: CLINIC | Age: 57
End: 2017-12-20

## 2017-12-20 VITALS — BODY MASS INDEX: 32.23 KG/M2 | WEIGHT: 181.88 LBS | HEIGHT: 63 IN

## 2017-12-20 DIAGNOSIS — E11.42 DIABETIC PERIPHERAL NEUROPATHY ASSOCIATED WITH TYPE 2 DIABETES MELLITUS: Primary | ICD-10-CM

## 2017-12-20 DIAGNOSIS — S93.511A SPRAIN OF INTERPHALANGEAL JOINT OF RIGHT GREAT TOE, INITIAL ENCOUNTER: ICD-10-CM

## 2017-12-20 DIAGNOSIS — B35.1 ONYCHOMYCOSIS DUE TO DERMATOPHYTE: ICD-10-CM

## 2017-12-20 DIAGNOSIS — M20.42 HAMMER TOES OF BOTH FEET: ICD-10-CM

## 2017-12-20 DIAGNOSIS — L57.0 KERATOSIS: ICD-10-CM

## 2017-12-20 DIAGNOSIS — M20.41 HAMMER TOES OF BOTH FEET: ICD-10-CM

## 2017-12-20 DIAGNOSIS — M54.2 NECK PAIN: Primary | ICD-10-CM

## 2017-12-20 PROCEDURE — 11056 PARNG/CUTG B9 HYPRKR LES 2-4: CPT | Mod: Q9,S$GLB,,

## 2017-12-20 PROCEDURE — 99499 UNLISTED E&M SERVICE: CPT | Mod: S$GLB,,,

## 2017-12-20 PROCEDURE — 11721 DEBRIDE NAIL 6 OR MORE: CPT | Mod: 59,Q9,S$GLB,

## 2017-12-20 PROCEDURE — 99214 OFFICE O/P EST MOD 30 MIN: CPT | Mod: 25,S$GLB,,

## 2017-12-20 RX ORDER — LIRAGLUTIDE 6 MG/ML
INJECTION SUBCUTANEOUS
Qty: 6 ML | Refills: 0 | OUTPATIENT
Start: 2017-12-20

## 2017-12-20 NOTE — PROGRESS NOTES
Subjective:      Patient ID: Jonathan Gonzales is a 57 y.o. female.    Chief Complaint: Foot Pain (Rt foot , pt states she hit her foot and her great toe is swollen , went to [facility] and was told nothing was broken) and Diabetes Mellitus (HgbA1c: !>14.0  11/29/17 PCP: Ben (Servando) 11/1/17)    Jonathan is a 57 y.o. female who presents to the clinic for evaluation and treatment of high risk feet. Jonathan has a past medical history of Bipolar disorder; Cancer of female breast (10/2010); Cancer of upper-outer quadrant of female breast (7/9/2012); Candidiasis of vulva and vagina (6/29/2012); Depression; Diabetes mellitus type II; Disturbances of sensation of smell and taste (6/29/2012); Hypertension; Malignant neoplasm of breast (female), unspecified site (4/27/2015); Neuropathy; Nonspecific abnormal unspecified cardiovascular function study (4/12/2013); Post-mastectomy lymphedema syndrome; Postmastectomy lymphedema (6/29/2012); Schizophrenia; and Thrush (6/29/2012). The patient's chief complaint is right great toe pain and swelling, hit her foot and went to [facility], told nothing was broken.   This patient has documented high risk feet requiring routine maintenance secondary to peripheral neuropathy.    PCP: Ghulam Contreras MD        Current shoe gear:  Affected Foot: Tennis shoes     Unaffected Foot: Tennis shoes    Hemoglobin A1C   Date Value Ref Range Status   11/29/2017 >14.0 (H) 4.0 - 5.6 % Final     Comment:     According to ADA guidelines, hemoglobin A1c <7.0% represents  optimal control in non-pregnant diabetic patients. Different  metrics may apply to specific patient populations.   Standards of Medical Care in Diabetes-2016.  For the purpose of screening for the presence of diabetes:  <5.7%     Consistent with the absence of diabetes  5.7-6.4%  Consistent with increasing risk for diabetes   (prediabetes)  >or=6.5%  Consistent with diabetes  Currently, no consensus exists for use of hemoglobin  A1c  for diagnosis of diabetes for children.  This Hemoglobin A1c assay has significant interference with fetal   hemoglobin   (HbF). The results are invalid for patients with abnormal amounts of   HbF,   including those with known Hereditary Persistence   of Fetal Hemoglobin. Heterozygous hemoglobin variants (HbAS, HbAC,   HbAD, HbAE, HbA2) do not significantly interfere with this assay;   however, presence of multiple variants in a sample may impact the %   interference.     05/02/2017 7.4 (H) 4.5 - 6.2 % Final     Comment:     According to ADA guidelines, hemoglobin A1C <7.0% represents  optimal control in non-pregnant diabetic patients.  Different  metrics may apply to specific populations.   Standards of Medical Care in Diabetes - 2016.  For the purpose of screening for the presence of diabetes:  <5.7%     Consistent with the absence of diabetes  5.7-6.4%  Consistent with increasing risk for diabetes   (prediabetes)  >or=6.5%  Consistent with diabetes  Currently no consensus exists for use of hemoglobin A1C  for diagnosis of diabetes for children.     02/16/2017 8.3 (H) 4.5 - 6.2 % Final     Comment:     According to ADA guidelines, hemoglobin A1C <7.0% represents  optimal control in non-pregnant diabetic patients.  Different  metrics may apply to specific populations.   Standards of Medical Care in Diabetes - 2016.  For the purpose of screening for the presence of diabetes:  <5.7%     Consistent with the absence of diabetes  5.7-6.4%  Consistent with increasing risk for diabetes   (prediabetes)  >or=6.5%  Consistent with diabetes  Currently no consensus exists for use of hemoglobin A1C  for diagnosis of diabetes for children.         Past Medical History:   Diagnosis Date    Bipolar disorder     Cancer of female breast 10/2010    T2 N1 Mx, Stage IIB left breast cancer    Cancer of upper-outer quadrant of female breast 7/9/2012    Candidiasis of vulva and vagina 6/29/2012    Depression     Diabetes  mellitus type II     Disturbances of sensation of smell and taste 6/29/2012    Hypertension     Malignant neoplasm of breast (female), unspecified site 4/27/2015    Neuropathy     Nonspecific abnormal unspecified cardiovascular function study 4/12/2013    ECG READ AS SHOWING A T-WAVE ABNORMALITY     Post-mastectomy lymphedema syndrome     Postmastectomy lymphedema 6/29/2012    Schizophrenia     Thrush 6/29/2012       Past Surgical History:   Procedure Laterality Date    BREAST RECONSTRUCTION  11/23/11    B/L SHLOMO flap reconstruction S/P left MRM, right prophylactic mastectomy    BREAST RECONSTRUCTION Bilateral 3/13/2015    NIPPLE RECONSTRUCTION    MASTECTOMY Bilateral 01/2011    bilateral    TUBAL LIGATION         Family History   Problem Relation Age of Onset    Kidney disease Mother      Dialysis    Hypertension Mother     Diabetes Mother     Deep vein thrombosis Mother     Diabetic kidney disease Sister     Lung cancer Sister     Diabetes Sister     Diabetes Brother     Diabetes Son     No Known Problems Father     No Known Problems Maternal Grandmother     No Known Problems Maternal Grandfather     No Known Problems Paternal Grandmother     No Known Problems Paternal Grandfather     No Known Problems Son     Cervical cancer Daughter     No Known Problems Daughter     No Known Problems Daughter     No Known Problems Daughter     Breast cancer Neg Hx     Ovarian cancer Neg Hx        Social History     Social History    Marital status:      Spouse name: N/A    Number of children: 6    Years of education: N/A     Occupational History    Disability      Social History Main Topics    Smoking status: Current Every Day Smoker     Packs/day: 0.75     Years: 25.00     Types: Cigarettes    Smokeless tobacco: Never Used    Alcohol use No    Drug use: No      Comment: 1991 - stopped using crack cocaine    Sexual activity: No     Other Topics Concern    None     Social  "History Narrative    None       Current Outpatient Prescriptions   Medication Sig Dispense Refill    ammonium lactate (LAC-HYDRIN) 12 % lotion Apply topically 2 (two) times daily as needed for Dry Skin. 57 g 0    arm brace Misc 1 application by Misc.(Non-Drug; Combo Route) route once daily. 1 each 0    BD INSULIN PEN NEEDLE UF MINI 31 gauge x 3/16" Ndle       blood sugar diagnostic Strp Pt to check BG up to QID. Dispense strips compatible with pt brand meter 100 each 11    blood-glucose meter (FREESTYLE SYSTEM KIT) kit Pt to check BG up to QID. Dispense strips compatible with pt brand meter 1 each 0    clonazePAM (KLONOPIN) 0.5 MG tablet TK 1 T PO BID  3    divalproex (DEPAKOTE) 500 MG Tb24 Take 500 mg by mouth 2 (two) times daily. 500 mg in the morning, 1000 mg qhs      EASY TOUCH 31 gauge x 1/4" Ndle   5    insulin glargine (LANTUS SOLOSTAR) 100 unit/mL (3 mL) InPn pen Inject 23 Units into the skin every evening. 15 mL 0    KLOR-CON M20 20 mEq tablet Take 1 tablet (20 mEq total) by mouth 2 (two) times daily. 60 tablet 5    lancets (ONETOUCH ULTRASOFT LANCETS) [facility] [place] to [place] BG up to QID. Dispense strips compatible with pt brand meter 100 each 11    lancets Misc 1 Device by Misc.(Non-Drug; Combo Route) route 4 (four) times daily. Please provide with appropriate lancets for device covered by insurance. 200 each 11    liraglutide 0.6 mg/0.1 mL, 18 mg/3 mL, subq PNIJ (VICTOZA 2-HERMANN) 0.6 mg/0.1 mL (18 mg/3 mL) PnIj Inject 1.8 mg into the skin once daily. 3 mL 1    lisinopril-hydrochlorothiazide (PRINZIDE,ZESTORETIC) 10-12.5 mg per tablet TAKE ONE TABLET BY MOUTH EVERY DAY 28 tablet 0    metformin (GLUCOPHAGE) 500 MG tablet Take 1 tablet (500 mg total) by mouth 2 (two) times daily with meals. 180 tablet 3    olanzapine (ZYPREXA) 10 MG tablet TK 1 T PO QHS  3    oxybutynin (DITROPAN-XL) 10 MG 24 hr tablet Take 1 tablet (10 mg total) by mouth once daily. 30 tablet 11    pen needle, diabetic (BD " "ULTRA-FINE [place] PEN NEEDLES) 32 gauge x 5/32" Ndle Pt is injecting once daily 30 each 11    pen needle, diabetic, safety 29 gauge x 3/16" Ndle Use as instructed 200 each 11    potassium chloride SA (K-DUR,KLOR-CON) 20 MEQ tablet Take 1 tablet (20 mEq total) by mouth once daily. 90 tablet 11    pravastatin (PRAVACHOL) 20 MG tablet Take 1 tablet (20 mg total) by mouth once daily. 90 tablet 3    pregabalin (LYRICA) 100 MG capsule Take 1 capsule (100 mg total) by mouth 2 (two) times daily. 60 capsule 6    quetiapine (SEROQUEL) 100 MG Tab TAKE 1 TABLET(100 MG) BY MOUTH EVERY EVENING 90 tablet 0    TRUE METRIX GLUCOSE METER Misc       TRUEPLUS LANCETS 33 gauge Misc       ziprasidone (GEODON) 40 MG Cap 40 mg nightly.        No current facility-administered medications for this visit.        Review of patient's allergies indicates:   Allergen Reactions    Banana Hives and Nausea And Vomiting         ROS  ROS:  Constitution: Negative for chills, fever, weakness and malaise/fatigue.   HEENT: Negative for headaches.   Cardiovascular: Negative for chest pain and claudication.   Respiratory: Negative for cough and shortness of breath.   Musculoskeletal: Positive for foot pain.  Negative for muscle cramps and muscle weakness.   Gastrointestinal: Negative for nausea and vomiting.   Neurological:(+) for numbness, tingling and paresthesias.   Dermatological:  (--) for skin rash, (+) for fungal nails, (--) for wound.          Objective:      Physical Exam  Constitutional:  Patient is oriented to person, place, and time. Vital signs are normal.  Appears well-developed and well-nourished.     Vascular:  Dorsalis pedis pulses are 2/4 on the right side, and 2/4 on the left side.   Posterior tibial pulses are 2/4 on the right side, and 2/4 on the left side.   - digital hair growth, capillary fill time to all toes <3 seconds, toes are warm touch, trace pedal swelling    Skin/Dermatological:  Skin is warm and intact.  No cyanosis " or clubbing.  No rashes noted.  No open wounds.  Right 1, 2, 3, 4, 5 and left 1, 2, 3, 4, 5  toenails yellow discolored, thickened 2-4 mm to base with subungual debris.  (+) keratosis right sub 5 MTH, bilateral heels    Musculoskeletal:      Hammertoes 2-5 observed.  Pedal rom within normal limits.  (--) ankle joint DF restricted with both knee flexed and extened.  Right 1st MPJ swolen and tenderness with rom, no gross deformities or weakness    Neurological:  (+) deficits to sharp/dull, light touch or vibratory sensation bilateral feet, ten points tested.   Muscle strength to tibialis anterior, extensor hallucis longus, extensor digitorum longus, peroneal muscles, flexor hallucis/digotorum longus, posterior tibial and gastrosoleal complex is 5/5, normal tone without assymmetry   Patellar reflexes are 2+ on the right side and 2+ on the left side.  Achilles reflexes are 2+ on the right side and 2+ on the left side.          Assessment:       Encounter Diagnoses   Name Primary?    Diabetic peripheral neuropathy associated with type 2 diabetes mellitus Yes    Hammer toes of both feet     Keratosis     Onychomycosis due to dermatophyte     Sprain of interphalangeal joint of right great toe, initial encounter          Plan:       Jonathan was seen today for foot pain and diabetes mellitus.    Diagnoses and all orders for this visit:    Diabetic peripheral neuropathy associated with type 2 diabetes mellitus  -     DIABETIC SHOES FOR HOME USE    Hammer toes of both feet  -     DIABETIC SHOES FOR HOME USE    Keratosis  -     DIABETIC SHOES FOR HOME USE    Onychomycosis due to dermatophyte  -     DIABETIC SHOES FOR HOME USE    Sprain of interphalangeal joint of right great toe, initial encounter  -     DIABETIC SHOES FOR HOME USE      I counseled the patient on her conditions, their implications and medical management.    RICE right foot, darco shoe to limit rom and offload.    Shoe inspection. Diabetic Foot Education.  Patient reminded of the importance of good nutrition and blood sugar control to help prevent podiatric complications of diabetes. Patient instructed on proper foot hygeine. We discussed wearing proper shoe gear, daily foot inspections, never walking without protective shoe gear, never putting sharp instruments to feet.  We also discussed padding and shoes with high toe boxes for foot deformities.    - With patient's permission, right 1, 2, 3, 4, 5 and left 1, 2, 3, 4, 5 toenails were aggressively reduced and debrided  to their soft tissue attachment mechanically with nail nipper, removing all offending nail and debris. The porokeratotic tissue was pared x 3 with a 15 blade.  Patient relates relief following the procedure. Patient will continue to monitor the areas daily, inspect feet, wear protective shoe gear when ambulatory, moisturizer to maintain skin integrity and follow in this office in approximately 2 weeks sooner p.r.austen.        Georges De Guzman DPM

## 2017-12-22 ENCOUNTER — OFFICE VISIT (OUTPATIENT)
Dept: SPINE | Facility: CLINIC | Age: 57
End: 2017-12-22
Attending: FAMILY MEDICINE
Payer: MEDICARE

## 2017-12-22 ENCOUNTER — HOSPITAL ENCOUNTER (OUTPATIENT)
Dept: RADIOLOGY | Facility: OTHER | Age: 57
Discharge: HOME | End: 2017-12-22
Attending: PHYSICIAN ASSISTANT
Payer: MEDICARE

## 2017-12-22 VITALS
WEIGHT: 181 LBS | SYSTOLIC BLOOD PRESSURE: 120 MMHG | DIASTOLIC BLOOD PRESSURE: 67 MMHG | HEART RATE: 61 BPM | BODY MASS INDEX: 32.07 KG/M2 | HEIGHT: 63 IN

## 2017-12-22 DIAGNOSIS — M48.02 CERVICAL STENOSIS OF SPINAL CANAL: ICD-10-CM

## 2017-12-22 DIAGNOSIS — M54.2 NECK PAIN: ICD-10-CM

## 2017-12-22 DIAGNOSIS — M54.12 BRACHIAL NEURITIS: ICD-10-CM

## 2017-12-22 DIAGNOSIS — M47.812 CERVICAL SPONDYLOSIS WITHOUT MYELOPATHY: ICD-10-CM

## 2017-12-22 DIAGNOSIS — M50.30 DEGENERATION OF CERVICAL INTERVERTEBRAL DISC: ICD-10-CM

## 2017-12-22 DIAGNOSIS — M47.12 CERVICAL SPONDYLOSIS WITH MYELOPATHY: ICD-10-CM

## 2017-12-22 PROCEDURE — 72050 X-RAY EXAM NECK SPINE 4/5VWS: CPT | Mod: 26,,, | Performed by: RADIOLOGY

## 2017-12-22 PROCEDURE — 72050 X-RAY EXAM NECK SPINE 4/5VWS: CPT | Mod: TC

## 2017-12-22 PROCEDURE — 99999 PR PBB SHADOW E&M-EST. PATIENT-LVL V: CPT | Mod: PBBFAC,,, | Performed by: PHYSICIAN ASSISTANT

## 2017-12-22 PROCEDURE — 99214 OFFICE O/P EST MOD 30 MIN: CPT | Mod: S$GLB,,, | Performed by: PHYSICIAN ASSISTANT

## 2017-12-22 NOTE — PROGRESS NOTES
Subjective:      Patient ID: Jonathan Gonzales is a 57 y.o. female.    Chief Complaint: Neck Pain and Low-back Pain      HPI  (Jerry)    History of breast CA, DM, HTN, obesity, peripheral neuropathy, post mastectomy lymphedema, schizoaffective/bipolar disorder.     Saw Dr. Edwards on 12/8/15 with primarily neck pain and some symptoms of myelopathy. Known DDD C3/4-C6/7, worst at C4/5 and C6/7 causing moderate/severe central stenosis at C4/5, C5/6, and C6/7. EMG showed C6/7 radiculopathy, which corresponds to the level of her worst disc bulge, C6-7. He discussed surgical options including C6/7 ACDFP and nonsurgical management. She has been following with pain management since that time.     She complains of constant posterior neck pain with bilateral arm pain into her hands. Neck pain > arm pain. Right arm pain > left arm pain. She has numbness, tingling, and weakness in her hands. Pain is better with laying down. Pain is worse with standing or walking. Pain is stabbing and aching in nature. She rates her pian as an 8 on a scale of 1-10.     Only short term relief with cervical ESIs, MBB, and RFA with Dr. Jerry. She has been to PT for her neck and it makes her worse. No surgery on her neck. She is on lyrica.     Patient denies fevers, chills, night sweats, nausea, vomiting, and weight loss. She admits to balance issues, changes in handwriting, problems with hand dexterity, and frequent dropping of items.      Review of Systems   Constitution: Negative for chills, fever, night sweats and weight gain.   Gastrointestinal: Negative for bowel incontinence, nausea and vomiting.   Genitourinary: Negative for bladder incontinence.   Neurological: Negative for disturbances in coordination and loss of balance.           Objective:        General: Jonathan is well-developed, well-nourished, appears stated age, in no acute distress, alert and oriented to time, place and person.     Ortho/SPM Exam    Gait: she ambulates with  a cane. Slight swaying with romberg.     On exam of the cervical spine, pt has posterior cervical tenderness.     Patient has limited ROM of cervical spine in all planes with pain.     Skin in the cervical region is warm to the touch without visible rashes.     Strength Testing of Bilateral UEs shows  Right :  +5/5   Left :  +5/5  Right deltoid:  +5/5   Left deltoid:  +5/5  Right biceps:  +5/5   Left biceps:  +5/5  Right triceps:  +5/5   Left triceps:  +5/5  Right wrist extension:  +5/5  Left wrist extension:  +5/5  Right wrist flexion:  +5/5  Left wrist flexion:  +5/5  Right interosseus:  +5/5  Left interosseus:  +5/5    She has a velcro wrist splint on right UE. She has a postop shoe on right LE.     Sensation is grossly intact to light touch in C5, C6, C7, C8, T1 distribution.     negative hoffmans bilateral UEs.    DTRs:  Right biceps:  +2     Left biceps:  +2   Right brachioradialis:  +2  Left brachioradialis:  +2      XRAY INTERPRETATION:  X-rays of cervical spine (AP/lat/flex/ext) dated 12/22/17 are personally reviewed and show mild DDD/spondylosis C4-C7.         Assessment:       1. Cervical spondylosis with myelopathy    2. Cervical spondylosis without myelopathy    3. Degeneration of cervical intervertebral disc    4. Cervical stenosis of spinal canal    5. Brachial neuritis    6. Neck pain           Plan:       Orders Placed This Encounter    MRI Cervical Spine Without Contrast       Constant posterior neck pain with bilateral arm pain into her hands. Neck pain > arm pain. Right arm pain > left arm pain. Known DDD C3/4-C6/7, worst at C4/5 and C6/7 causing moderate/severe central stenosis at C4/5, C5/6, and C6/7 per MRI 9/16/15. EMG from 11/16/15 showed C6/7 radiculopathy, which corresponds to the level of her worst disc bulge, C6-7. She is interested in surgical options. Treatment options reviewed with patient along with above cervical XRs. Following plan made:     - MRI of cervical spine to  further evaluate bilateral UE radiculitis.   - Follow up with Dr. Edwards to discuss possible surgery options. He recommended an ACDF C6-C7 previously.   - She will try to bring a family member to her appointment.   - Continue lyrica in the interim.     Follow-up: Return in 2 weeks (on 1/5/2018). If there are any questions prior to this, the patient was instructed to contact the office.

## 2017-12-27 DIAGNOSIS — E87.6 HYPOPOTASSEMIA: ICD-10-CM

## 2017-12-27 DIAGNOSIS — I10 HTN (HYPERTENSION), BENIGN: ICD-10-CM

## 2017-12-27 RX ORDER — LISINOPRIL AND HYDROCHLOROTHIAZIDE 10; 12.5 MG/1; MG/1
TABLET ORAL
Qty: 30 TABLET | Refills: 0 | Status: SHIPPED | OUTPATIENT
Start: 2017-12-27 | End: 2018-01-25 | Stop reason: SDUPTHER

## 2017-12-29 ENCOUNTER — HOSPITAL ENCOUNTER (OUTPATIENT)
Dept: RADIOLOGY | Facility: OTHER | Age: 57
Discharge: HOME | End: 2017-12-29
Attending: PHYSICIAN ASSISTANT
Payer: MEDICARE

## 2017-12-29 DIAGNOSIS — M54.12 BRACHIAL NEURITIS: ICD-10-CM

## 2017-12-29 DIAGNOSIS — M47.12 CERVICAL SPONDYLOSIS WITH MYELOPATHY: ICD-10-CM

## 2017-12-29 DIAGNOSIS — M47.812 CERVICAL SPONDYLOSIS WITHOUT MYELOPATHY: ICD-10-CM

## 2017-12-29 DIAGNOSIS — M50.30 DEGENERATION OF CERVICAL INTERVERTEBRAL DISC: ICD-10-CM

## 2017-12-29 DIAGNOSIS — M48.02 CERVICAL STENOSIS OF SPINAL CANAL: ICD-10-CM

## 2017-12-29 DIAGNOSIS — M54.2 NECK PAIN: ICD-10-CM

## 2017-12-29 PROCEDURE — 72141 MRI NECK SPINE W/O DYE: CPT | Mod: 26,,, | Performed by: RADIOLOGY

## 2017-12-29 PROCEDURE — 72141 MRI NECK SPINE W/O DYE: CPT | Mod: TC

## 2018-01-03 ENCOUNTER — OFFICE VISIT (OUTPATIENT)
Dept: PODIATRY | Facility: CLINIC | Age: 58
End: 2018-01-03
Payer: MEDICARE

## 2018-01-03 ENCOUNTER — OFFICE VISIT (OUTPATIENT)
Dept: UROLOGY | Facility: CLINIC | Age: 58
End: 2018-01-03
Payer: MEDICARE

## 2018-01-03 ENCOUNTER — TELEPHONE (OUTPATIENT)
Dept: INTERNAL MEDICINE | Facility: CLINIC | Age: 58
End: 2018-01-03

## 2018-01-03 VITALS
BODY MASS INDEX: 32.07 KG/M2 | SYSTOLIC BLOOD PRESSURE: 118 MMHG | HEIGHT: 63 IN | HEART RATE: 101 BPM | WEIGHT: 181 LBS | DIASTOLIC BLOOD PRESSURE: 88 MMHG

## 2018-01-03 VITALS — WEIGHT: 181.19 LBS | HEIGHT: 63 IN | BODY MASS INDEX: 32.11 KG/M2

## 2018-01-03 DIAGNOSIS — N39.41 URGE INCONTINENCE OF URINE: ICD-10-CM

## 2018-01-03 DIAGNOSIS — N32.81 OAB (OVERACTIVE BLADDER): ICD-10-CM

## 2018-01-03 DIAGNOSIS — S93.511A SPRAIN OF INTERPHALANGEAL JOINT OF RIGHT GREAT TOE, INITIAL ENCOUNTER: Primary | ICD-10-CM

## 2018-01-03 DIAGNOSIS — R82.90 ABNORMAL URINALYSIS: Primary | ICD-10-CM

## 2018-01-03 LAB
BILIRUB SERPL-MCNC: ABNORMAL MG/DL
BLOOD URINE, POC: ABNORMAL
COLOR, POC UA: YELLOW
GLUCOSE UR QL STRIP: ABNORMAL
KETONES UR QL STRIP: ABNORMAL
LEUKOCYTE ESTERASE URINE, POC: ABNORMAL
NITRITE, POC UA: ABNORMAL
PH, POC UA: 5
POC RESIDUAL URINE VOLUME: 0 ML (ref 0–100)
PROTEIN, POC: ABNORMAL
SPECIFIC GRAVITY, POC UA: 1.02
UROBILINOGEN, POC UA: ABNORMAL

## 2018-01-03 PROCEDURE — 87086 URINE CULTURE/COLONY COUNT: CPT

## 2018-01-03 PROCEDURE — 99213 OFFICE O/P EST LOW 20 MIN: CPT | Mod: 25,S$GLB,, | Performed by: NURSE PRACTITIONER

## 2018-01-03 PROCEDURE — 99213 OFFICE O/P EST LOW 20 MIN: CPT | Mod: S$GLB,,,

## 2018-01-03 PROCEDURE — 99499 UNLISTED E&M SERVICE: CPT | Mod: S$GLB,,,

## 2018-01-03 PROCEDURE — 81002 URINALYSIS NONAUTO W/O SCOPE: CPT | Mod: S$GLB,,, | Performed by: NURSE PRACTITIONER

## 2018-01-03 PROCEDURE — 51798 US URINE CAPACITY MEASURE: CPT | Mod: S$GLB,,, | Performed by: NURSE PRACTITIONER

## 2018-01-03 RX ORDER — TRAMADOL HYDROCHLORIDE 50 MG/1
50 TABLET ORAL EVERY 6 HOURS PRN
Qty: 30 TABLET | Refills: 0 | Status: SHIPPED | OUTPATIENT
Start: 2018-01-03 | End: 2018-01-13

## 2018-01-03 NOTE — PROGRESS NOTES
Subjective:      Jonathan Gonzales is a 57 y.o. female who presents today for follow up regarding her urinary incontinence.      Urinary Incontinence  Patient complains of urinary incontinence. This has been present for 2 month. She leaks urine with urge. Patient describes the symptoms as urge to urinate with little or no warning. Factors associated with symptoms include none known. Evaluation to date includes UA/CS: negative. Treatment to date includes none. The patient reported a UTI (culture proven- E. coli) on 2/16/17 that was treated with Macrobid. She reported that after completing the antibiotics, she still experienced some urinary incontinence (urge), suprapubic pressure, and frequency. She denied dysuria, hematuria, flank pain, and fever. Six weeks ago we started ditropan 5 mg XL for her urge incontinence.    Today the patient reports much improvement in her urinary symptoms with the ditropan 10 mg XL. She states that she is having less frequency/urgency and now only rare urge incontinence. The patient states that she occasionally has incontinence mostly when she is in public and is unfamiliar with the restroom locations. She denies dysuria, hematuria, flank pain and fever today. Denies SE of the medication.     The following portions of the patient's history were reviewed and updated as appropriate: allergies, current medications, past family history, past medical history, past social history, past surgical history and problem list.    Review of Systems  Constitutional: no fever or chills  ENT: no nasal congestion or sore throat  Respiratory: no cough or shortness of breath  Cardiovascular: no chest pain or palpitations  Gastrointestinal: no nausea or vomiting, tolerating diet  Genitourinary: as per HPI  Hematologic/Lymphatic: no easy bruising or lymphadenopathy  Musculoskeletal: no arthralgias or myalgias  Neurological: no seizures or tremors  Behavioral/Psych: no auditory or visual hallucinations    "  Objective:   Vital Signs:/88 (BP Location: Right arm, Patient Position: Sitting, BP Method: Large (Automatic))   Pulse 101   Ht 5' 3" (1.6 m)   Wt 82.1 kg (181 lb)   BMI 32.06 kg/m²     Physical Exam   General: alert and oriented, no acute distress  Head: normocephalic, atraumatic  Neck: supple, no lymphadenopathy, normal ROM, no masses  Respiratory: Symmetric expansion, non-labored breathing  Cardiovascular: regular rate and rhythm, nomal pulses, no peripheral edema  Abdomen: soft, non tender, non distended, no palpable masses, no hernias, no hepatomegaly or splenomegaly  Pelvic: not examined  Lymphatic: no inguinal nodes  Skin: normal coloration and turgor, no rashes, no suspicious skin lesions noted  Neuro: alert and oriented x3, no gross deficits  Psych: normal judgment and insight, normal mood/affect and non-anxious  No CVA tenderness    Lab Review   Urinalysis demonstrates: leukocytes (+), protein (trace), and RBCs (5-10 enrique/mL)  PVR:  0 mL    Lab Results   Component Value Date    WBC 5.30 09/22/2017    HGB 12.2 09/22/2017    HCT 36.7 (L) 09/22/2017    MCV 92 09/22/2017     09/22/2017     Lab Results   Component Value Date    CREATININE 1.0 11/29/2017    BUN 5 (L) 11/29/2017     Imaging   None     Assessment:     1. Urge incontinence of urine    2. OAB (overactive bladder)     Plan:   Jonathan was seen today for establish care.    Diagnoses and all orders for this visit:    Abnormal urinalysis  -     POCT URINE DIPSTICK WITHOUT MICROSCOPE  -     POCT Bladder Scan  -     Urine culture    OAB (overactive bladder)    Urge incontinence of urine    Plan:  --Urine culture today, will notify if antibiotics are needed  --Continue ditropan 10 mg XL   --Follow up in 1 year or sooner for any new or concerning symptoms   "

## 2018-01-03 NOTE — TELEPHONE ENCOUNTER
Spoke with Shanell from [facility] and inform her that we did not receive fax from there office and she states she will re fax the form. S=Fax number confirm with Shanell. Pt also inform that I call [facility] and we have not receive fax and there office will refax. Pt inform that we otoniel contact her when everything is finish. Pt agrees and verbalize

## 2018-01-03 NOTE — PROGRESS NOTES
Subjective:      Patient ID: Jonathan Gonzales is a 57 y.o. female.    Chief Complaint: Diabetes Mellitus (HgbA1c: !>14.0 11/29/17 PCP: pepito Meza) 11/1/17)    Jonathan is a 57 y.o. female who returns for f/u to a right great toe pain and swelling, hit her foot and went to [facility], told nothing was broken, they did perform an x-ray.  She was placed in a surgical shoe and ace wrap for the last 2 weeks, reports still no improvement in the pain.   This patient has documented high risk feet requiring routine maintenance secondary to peripheral neuropathy.    PCP: Ghulam Contreras MD        Current shoe gear:  Affected Foot: Tennis shoes     Unaffected Foot: Tennis shoes    Hemoglobin A1C   Date Value Ref Range Status   11/29/2017 >14.0 (H) 4.0 - 5.6 % Final     Comment:     According to ADA guidelines, hemoglobin A1c <7.0% represents  optimal control in non-pregnant diabetic patients. Different  metrics may apply to specific patient populations.   Standards of Medical Care in Diabetes-2016.  For the purpose of screening for the presence of diabetes:  <5.7%     Consistent with the absence of diabetes  5.7-6.4%  Consistent with increasing risk for diabetes   (prediabetes)  >or=6.5%  Consistent with diabetes  Currently, no consensus exists for use of hemoglobin A1c  for diagnosis of diabetes for children.  This Hemoglobin A1c assay has significant interference with fetal   hemoglobin   (HbF). The results are invalid for patients with abnormal amounts of   HbF,   including those with known Hereditary Persistence   of Fetal Hemoglobin. Heterozygous hemoglobin variants (HbAS, HbAC,   HbAD, HbAE, HbA2) do not significantly interfere with this assay;   however, presence of multiple variants in a sample may impact the %   interference.     05/02/2017 7.4 (H) 4.5 - 6.2 % Final     Comment:     According to ADA guidelines, hemoglobin A1C <7.0% represents  optimal control in non-pregnant diabetic patients.   Different  metrics may apply to specific populations.   Standards of Medical Care in Diabetes - 2016.  For the purpose of screening for the presence of diabetes:  <5.7%     Consistent with the absence of diabetes  5.7-6.4%  Consistent with increasing risk for diabetes   (prediabetes)  >or=6.5%  Consistent with diabetes  Currently no consensus exists for use of hemoglobin A1C  for diagnosis of diabetes for children.     02/16/2017 8.3 (H) 4.5 - 6.2 % Final     Comment:     According to ADA guidelines, hemoglobin A1C <7.0% represents  optimal control in non-pregnant diabetic patients.  Different  metrics may apply to specific populations.   Standards of Medical Care in Diabetes - 2016.  For the purpose of screening for the presence of diabetes:  <5.7%     Consistent with the absence of diabetes  5.7-6.4%  Consistent with increasing risk for diabetes   (prediabetes)  >or=6.5%  Consistent with diabetes  Currently no consensus exists for use of hemoglobin A1C  for diagnosis of diabetes for children.         Past Medical History:   Diagnosis Date    Bipolar disorder     Cancer of female breast 10/2010    T2 N1 Mx, Stage IIB left breast cancer    Cancer of upper-outer quadrant of female breast 7/9/2012    Candidiasis of vulva and vagina 6/29/2012    Depression     Diabetes mellitus type II     Disturbances of sensation of smell and taste 6/29/2012    Hypertension     Malignant neoplasm of breast (female), unspecified site 4/27/2015    Neuropathy     Nonspecific abnormal unspecified cardiovascular function study 4/12/2013    ECG READ AS SHOWING A T-WAVE ABNORMALITY     Post-mastectomy lymphedema syndrome     Postmastectomy lymphedema 6/29/2012    Schizophrenia     Thrush 6/29/2012       Past Surgical History:   Procedure Laterality Date    BREAST RECONSTRUCTION  11/23/11    B/L SHLOMO flap reconstruction S/P left MRM, right prophylactic mastectomy    BREAST RECONSTRUCTION Bilateral 3/13/2015    NIPPLE  "RECONSTRUCTION    MASTECTOMY Bilateral 01/2011    bilateral    TUBAL LIGATION         Family History   Problem Relation Age of Onset    Kidney disease Mother      Dialysis    Hypertension Mother     Diabetes Mother     Deep vein thrombosis Mother     Diabetic kidney disease Sister     Lung cancer Sister     Diabetes Sister     Diabetes Brother     Diabetes Son     No Known Problems Father     No Known Problems Maternal Grandmother     No Known Problems Maternal Grandfather     No Known Problems Paternal Grandmother     No Known Problems Paternal Grandfather     No Known Problems Son     Cervical cancer Daughter     No Known Problems Daughter     No Known Problems Daughter     No Known Problems Daughter     Breast cancer Neg Hx     Ovarian cancer Neg Hx        Social History     Social History    Marital status:      Spouse name: N/A    Number of children: 6    Years of education: N/A     Occupational History    Disability      Social History Main Topics    Smoking status: Current Every Day Smoker     Packs/day: 0.75     Years: 25.00     Types: Cigarettes    Smokeless tobacco: Never Used    Alcohol use No    Drug use: No      Comment: 1991 - stopped using crack cocaine    Sexual activity: No     Other Topics Concern    None     Social History Narrative    None       Current Outpatient Prescriptions   Medication Sig Dispense Refill    ammonium lactate (LAC-HYDRIN) 12 % lotion Apply topically 2 (two) times daily as needed for Dry Skin. 57 g 0    arm brace Misc 1 application by Misc.(Non-Drug; Combo Route) route once daily. 1 each 0    BD INSULIN PEN NEEDLE UF MINI 31 gauge x 3/16" Ndle       blood sugar diagnostic Strp Pt to check BG up to QID. Dispense strips compatible with pt brand meter 100 each 11    blood-glucose meter (FREESTYLE SYSTEM KIT) kit Pt to check BG up to QID. Dispense strips compatible with pt brand meter 1 each 0    clonazePAM (KLONOPIN) 0.5 MG tablet TK " "1 T PO BID  3    divalproex (DEPAKOTE) 500 MG Tb24 Take 500 mg by mouth 2 (two) times daily. 500 mg in the morning, 1000 mg qhs      EASY TOUCH 31 gauge x 1/4" Ndle   5    insulin glargine (LANTUS SOLOSTAR) 100 unit/mL (3 mL) InPn pen Inject 23 Units into the skin every evening. 15 mL 0    KLOR-CON M20 20 mEq tablet Take 1 tablet (20 mEq total) by mouth 2 (two) times daily. 60 tablet 5    lancets (ONETOUCH ULTRASOFT LANCETS) [facility] Pt to check BG up to QID. Dispense strips compatible with pt brand meter 100 each 11    lancets Misc 1 Device by Misc.(Non-Drug; Combo Route) route 4 (four) times daily. Please provide with appropriate lancets for device covered by insurance. 200 each 11    liraglutide 0.6 mg/0.1 mL, 18 mg/3 mL, subq PNIJ (VICTOZA 3-HERMANN) 0.6 mg/0.1 mL (18 mg/3 mL) PnIj Inject 1.8 mg into the skin once daily. 9 mL 2    lisinopril-hydrochlorothiazide (PRINZIDE,ZESTORETIC) 10-12.5 mg per tablet TAKE ONE TABLET BY MOUTH EVERY DAY 30 tablet 0    metformin (GLUCOPHAGE) 500 MG tablet Take 1 tablet (500 mg total) by mouth 2 (two) times daily with meals. 180 tablet 3    olanzapine (ZYPREXA) 10 MG tablet TK 1 T PO QHS  3    oxybutynin (DITROPAN-XL) 10 MG 24 hr tablet Take 1 tablet (10 mg total) by mouth once daily. 30 tablet 11    pen needle, diabetic (BD ULTRA-FINE [place] PEN NEEDLES) 32 gauge x 5/32" Ndle Pt is injecting once daily 30 each 11    pen needle, diabetic, safety 29 gauge x 3/16" Ndle Use as instructed 200 each 11    potassium chloride SA (K-DUR,KLOR-CON) 20 MEQ tablet Take 1 tablet (20 mEq total) by mouth once daily. 90 tablet 11    pravastatin (PRAVACHOL) 20 MG tablet Take 1 tablet (20 mg total) by mouth once daily. 90 tablet 3    pregabalin (LYRICA) 100 MG capsule Take 1 capsule (100 mg total) by mouth 2 (two) times daily. 60 capsule 6    quetiapine (SEROQUEL) 100 MG Tab TAKE 1 TABLET(100 MG) BY MOUTH EVERY EVENING 90 tablet 0    TRUE METRIX GLUCOSE METER Misc       TRUEPLUS LANCETS " 33 gauge Misc       ziprasidone (GEODON) 40 MG Cap 40 mg nightly.       traMADol (ULTRAM) 50 mg tablet Take 1 tablet (50 mg total) by mouth every 6 (six) hours as needed for Pain. 30 tablet 0     No current facility-administered medications for this visit.        Review of patient's allergies indicates:   Allergen Reactions    Banana Hives and Nausea And Vomiting         ROS  ROS:  Constitution: Negative for chills, fever, weakness and malaise/fatigue.   HEENT: Negative for headaches.   Cardiovascular: Negative for chest pain and claudication.   Respiratory: Negative for cough and shortness of breath.   Musculoskeletal: Positive for right foot pain.  Negative for muscle cramps and muscle weakness.   Gastrointestinal: Negative for nausea and vomiting.   Neurological:(+) for numbness, tingling and paresthesias.   Dermatological:  (--) for skin rash, (+) for fungal nails, (--) for wound.          Objective:      Physical Exam  Constitutional:  Patient is oriented to person, place, and time. Vital signs are normal.  Appears well-developed and well-nourished.     Vascular:  Dorsalis pedis pulses are 2/4 on the right side, and 2/4 on the left side.   Posterior tibial pulses are 2/4 on the right side, and 2/4 on the left side.   - digital hair growth, capillary fill time to all toes <3 seconds, toes are warm touch, trace pedal swelling    Skin/Dermatological:  Skin is warm and intact.  No cyanosis or clubbing.  No rashes noted.  No open wounds.      Musculoskeletal:      Hammertoes 2-5 observed.  Pedal rom within normal limits.  (--) ankle joint DF restricted with both knee flexed and extened.  Right 1st MPJ swolen and tenderness with rom, no gross deformities or weakness    Neurological:  (+) deficits to sharp/dull, light touch or vibratory sensation bilateral feet, ten points tested.   Muscle strength to tibialis anterior, extensor hallucis longus, extensor digitorum longus, peroneal muscles, flexor hallucis/digotorum  longus, posterior tibial and gastrosoleal complex is 5/5, normal tone without assymmetry   Patellar reflexes are 2+ on the right side and 2+ on the left side.  Achilles reflexes are 2+ on the right side and 2+ on the left side.          Assessment:       Encounter Diagnosis   Name Primary?    Sprain of interphalangeal joint of right great toe, initial encounter Yes         Plan:       Jonathan was seen today for diabetes mellitus.    Diagnoses and all orders for this visit:    Sprain of interphalangeal joint of right great toe, initial encounter  -     traMADol (ULTRAM) 50 mg tablet; Take 1 tablet (50 mg total) by mouth every 6 (six) hours as needed for Pain.      I counseled the patient on her conditions, their implications and medical management.    RICE right foot, CAM walker to limit rom and offload.  RTC 2 weeks, consider re-x-ray or mri if no improvement.          Georges De Guzman DPM

## 2018-01-03 NOTE — TELEPHONE ENCOUNTER
----- Message from Bhavani Boone sent at 1/2/2018  4:43 PM CST -----  Contact: evelin with [facility] [phone] ext [phone]  _  1st Request  _  2nd Request  _  3rd Request        Who: evelin with [facility] [phone] ext [phone]    Why: Requesting a call back in regards to faxed over most recent chart notes. Calling for status. Call evelin    What Number to Call Back:evelin with [facility] [phone] ext [phone]    When to Expect a call back: (Within 24 hours)    Please return the call at earliest convenience. Thanks!

## 2018-01-04 LAB
BACTERIA UR CULT: NORMAL
BACTERIA UR CULT: NORMAL

## 2018-01-05 ENCOUNTER — TELEPHONE (OUTPATIENT)
Dept: INTERNAL MEDICINE | Facility: CLINIC | Age: 58
End: 2018-01-05

## 2018-01-05 NOTE — TELEPHONE ENCOUNTER
----- Message from Anjana Pelletier sent at 1/5/2018  2:59 PM CST -----  Contact: Patient herself  X 1st Request  _  2nd Request  _  3rd Request    Who: Jonathan Gonzales (mrn# [id number])    Why:  Patient called requesting confirmation that a fax was received from [facility]. Please give a call back at your earliest convenience.        THANKS!    What Number to Call Back:  [phone]    When to Expect a call back: (Before the end of the day)   -- if the call is after 12:00, the call back will be tomorrow.

## 2018-01-15 ENCOUNTER — TELEPHONE (OUTPATIENT)
Dept: INTERNAL MEDICINE | Facility: CLINIC | Age: 58
End: 2018-01-15

## 2018-01-15 NOTE — TELEPHONE ENCOUNTER
----- Message from Anjana Pelletier sent at 1/15/2018  1:40 PM CST -----  Contact: Shanell / [facility] / office# [phone] ext# [phone] / fax# [phone]  _  1st Request  _  2nd Request  _  3rd Request    Who: Jonathan Taylor Christian (mrn# [id number])    Why: Shanell with [facility] called requesting physician's order for diabetic shoes and chart notes. Please give a call back at your earliest convenience.    THANKS!    What Number to Call Back: Office# [phone]      When to Expect a call back: (Before the end of the day)   -- if the call is after 12:00, the call back will be tomorrow.

## 2018-01-22 ENCOUNTER — LAB VISIT (OUTPATIENT)
Dept: LAB | Facility: OTHER | Age: 58
End: 2018-01-22
Attending: INTERNAL MEDICINE
Payer: MEDICARE

## 2018-01-22 DIAGNOSIS — C50.919 BREAST CANCER, STAGE 2, UNSPECIFIED LATERALITY: Chronic | ICD-10-CM

## 2018-01-22 LAB
ALBUMIN SERPL BCP-MCNC: 3.6 G/DL
ALP SERPL-CCNC: 56 U/L
ALT SERPL W/O P-5'-P-CCNC: 17 U/L
ANION GAP SERPL CALC-SCNC: 14 MMOL/L
AST SERPL-CCNC: 30 U/L
BASOPHILS # BLD AUTO: 0.03 K/UL
BASOPHILS NFR BLD: 0.6 %
BILIRUB SERPL-MCNC: 0.4 MG/DL
BUN SERPL-MCNC: 15 MG/DL
CALCIUM SERPL-MCNC: 9.7 MG/DL
CHLORIDE SERPL-SCNC: 102 MMOL/L
CO2 SERPL-SCNC: 27 MMOL/L
CREAT SERPL-MCNC: 1.6 MG/DL
DIFFERENTIAL METHOD: ABNORMAL
EOSINOPHIL # BLD AUTO: 0.2 K/UL
EOSINOPHIL NFR BLD: 3.1 %
ERYTHROCYTE [DISTWIDTH] IN BLOOD BY AUTOMATED COUNT: 13.3 %
EST. GFR  (AFRICAN AMERICAN): 41 ML/MIN/1.73 M^2
EST. GFR  (NON AFRICAN AMERICAN): 35 ML/MIN/1.73 M^2
GLUCOSE SERPL-MCNC: 118 MG/DL
HCT VFR BLD AUTO: 39.2 %
HGB BLD-MCNC: 12.5 G/DL
LYMPHOCYTES # BLD AUTO: 1.1 K/UL
LYMPHOCYTES NFR BLD: 20.2 %
MCH RBC QN AUTO: 29.8 PG
MCHC RBC AUTO-ENTMCNC: 31.9 G/DL
MCV RBC AUTO: 94 FL
MONOCYTES # BLD AUTO: 0.8 K/UL
MONOCYTES NFR BLD: 14.7 %
NEUTROPHILS # BLD AUTO: 3.3 K/UL
NEUTROPHILS NFR BLD: 61 %
PLATELET # BLD AUTO: 226 K/UL
PMV BLD AUTO: 11.5 FL
POTASSIUM SERPL-SCNC: 3.3 MMOL/L
PROT SERPL-MCNC: 7.9 G/DL
RBC # BLD AUTO: 4.19 M/UL
SODIUM SERPL-SCNC: 143 MMOL/L
WBC # BLD AUTO: 5.45 K/UL

## 2018-01-22 PROCEDURE — 36415 COLL VENOUS BLD VENIPUNCTURE: CPT

## 2018-01-22 PROCEDURE — 85025 COMPLETE CBC W/AUTO DIFF WBC: CPT

## 2018-01-22 PROCEDURE — 80053 COMPREHEN METABOLIC PANEL: CPT

## 2018-01-25 ENCOUNTER — OFFICE VISIT (OUTPATIENT)
Dept: HEMATOLOGY/ONCOLOGY | Facility: CLINIC | Age: 58
End: 2018-01-25
Payer: MEDICARE

## 2018-01-25 VITALS
DIASTOLIC BLOOD PRESSURE: 66 MMHG | HEIGHT: 63 IN | HEART RATE: 82 BPM | SYSTOLIC BLOOD PRESSURE: 103 MMHG | TEMPERATURE: 98 F | OXYGEN SATURATION: 96 %

## 2018-01-25 DIAGNOSIS — N17.9 AKI (ACUTE KIDNEY INJURY): ICD-10-CM

## 2018-01-25 DIAGNOSIS — C50.919 MALIGNANT NEOPLASM OF BREAST, STAGE 2, UNSPECIFIED LATERALITY: Primary | Chronic | ICD-10-CM

## 2018-01-25 DIAGNOSIS — Z92.21 HISTORY OF CHEMOTHERAPY: ICD-10-CM

## 2018-01-25 DIAGNOSIS — F25.0 SCHIZOAFFECTIVE DISORDER, BIPOLAR TYPE: ICD-10-CM

## 2018-01-25 DIAGNOSIS — I89.0 LYMPHEDEMA: ICD-10-CM

## 2018-01-25 DIAGNOSIS — I10 HTN (HYPERTENSION), BENIGN: ICD-10-CM

## 2018-01-25 DIAGNOSIS — E87.6 HYPOPOTASSEMIA: ICD-10-CM

## 2018-01-25 PROCEDURE — 99499 UNLISTED E&M SERVICE: CPT | Mod: S$GLB,,, | Performed by: INTERNAL MEDICINE

## 2018-01-25 PROCEDURE — 99999 PR PBB SHADOW E&M-EST. PATIENT-LVL IV: CPT | Mod: PBBFAC,,, | Performed by: INTERNAL MEDICINE

## 2018-01-25 PROCEDURE — 99213 OFFICE O/P EST LOW 20 MIN: CPT | Mod: S$GLB,,, | Performed by: INTERNAL MEDICINE

## 2018-01-25 RX ORDER — LISINOPRIL AND HYDROCHLOROTHIAZIDE 10; 12.5 MG/1; MG/1
TABLET ORAL
Qty: 30 TABLET | Refills: 11 | Status: SHIPPED | OUTPATIENT
Start: 2018-01-25 | End: 2018-09-05 | Stop reason: SDUPTHER

## 2018-01-25 RX ORDER — TRAMADOL HYDROCHLORIDE 50 MG/1
TABLET ORAL
COMMUNITY
Start: 2018-01-03 | End: 2019-09-11

## 2018-01-25 RX ORDER — METFORMIN HYDROCHLORIDE 500 MG/1
TABLET ORAL
Qty: 60 TABLET | Refills: 11 | Status: SHIPPED | OUTPATIENT
Start: 2018-01-25 | End: 2018-09-05

## 2018-01-25 NOTE — PROGRESS NOTES
Subjective:       Patient ID: Jonathan Gonzales is a 57 y.o. female.    Chief Complaint: Follow-up    HPI   Diagnosis: Stage IIB lt Breast CA T2N1MO ER weak pos/NJ negative, HER-2/bridget amplified dx'd 11/2011  Therapy:       1. s/p bilateral mastectomy with SHLOMO flap reconstruction 11/23/2011                        2. completed   Phase III 0221 protocol                   HPI 57-year-old female with stage IIB breast cancer, left breast originally diagnosed in November 2011, status post bilateral skin -sparing mastectomy with a right prophylactic and a left radical mastectomy for a T2 N1 MO ,Stage 2b. breast cancer of the left breast on 11/23/2011 with immediate autologous tissue reconstruction with SHLOMO flap. She underwent second stage reconstructive surgery on 4/27/2015 which was complicated by a hematoma for which She was taken back to OR emergently  for aspiration.          Pt previously Lost to  follow-up ( >1yr) and has re established care 2017     She was recently diagnosed with sprain of rt foot  [place] [facility] [place] [facility]   She is in surgical shoe     She continues to wear compression sleeve LUE   She reports lymphedema stable.  Previously followed by PT  No SOB/CP  Appetite and weight stable   No recent infections      She was previously  followed by psychiatry ( Dr. Lloyd)  for bipolar disorder and schizophrenia  She is now followed by [facility] [place] [facility]             She is followed by PCP for  history of type 2  DM w/neuropathy , HTN and hyperlipidemia         Bone Density 10/19/2016 nl      Pt previously  followed by Dr. Mcgee for her psych history of bipolar d/o and schizophrenia  She now followed by [facility] care      PrevHx: Tumor was ER weak pos /NJ negative, HER-2/bridget amplified with 1 of total of 24 lymph nodes removed which revealed metastatic carcinoma, 0.4 cm in diameter with no extranodal extension.She had abnormal PET imaging 12/2011 which revealed. Findings suggestive of  "metastatic disease to supraclavicular, mediastinal and right hilar lymph nodes.She underwent rt supraclavicular lymph node biopsy which was negative for malignancy. PET/CT 10/12 revealed resolution of LAD and no evid of mets.Patient was enrolled in 0221 study. She completed 1 yr of Herceptin therapy3/12/2013.     Tx; Phase III 0221 protocol- DD AC ( Adriamycin 60mg/m2/Cytoxan 60mg/2) x4 followed by weekly Paclitaxel 175mg/m2/Herceptin and completion of Herceptin therapy (6mg/kg q3wks) 1 yr 3/12/13              Review of Systems   Constitutional: Negative for appetite change, chills and fatigue.   HENT: Negative for congestion, hearing loss and nosebleeds.    Eyes: Negative for visual disturbance.   Respiratory: Negative for apnea, cough, chest tightness, shortness of breath and wheezing.    Cardiovascular: Negative for chest pain, palpitations and leg swelling.   Gastrointestinal: Negative for abdominal distention, abdominal pain, blood in stool, constipation, diarrhea, nausea and vomiting.   Genitourinary: Negative for difficulty urinating, dysuria, flank pain, frequency, hematuria and urgency.   Musculoskeletal: Negative for arthralgias, back pain, gait problem, joint swelling and neck pain.   Skin: Negative for rash.        No petechiae, ecchymoses   Neurological: Negative for dizziness, tremors, seizures, syncope, speech difficulty, light-headedness, numbness and headaches.   Hematological: Negative for adenopathy. Does not bruise/bleed easily.   Psychiatric/Behavioral: Negative for confusion and dysphoric mood. The patient is not nervous/anxious.        Objective:       Vitals:    01/25/18 1030   BP: 103/66   BP Location: Right arm   Patient Position: Sitting   BP Method: Medium (Automatic)   Pulse: 82   Temp: 98.3 °F (36.8 °C)   TempSrc: Oral   SpO2: 96%   Height: 5' 3" (1.6 m)       Physical Exam   Constitutional: She is oriented to person, place, and time. She appears well-developed and well-nourished. "   HENT:   Head: Normocephalic.   Mouth/Throat: Oropharynx is clear and moist. No oropharyngeal exudate.   Eyes: Conjunctivae and lids are normal. Pupils are equal, round, and reactive to light. No scleral icterus.   Neck: Normal range of motion. Neck supple. No thyromegaly present.   Cardiovascular: Normal rate, regular rhythm and normal heart sounds.    No murmur heard.  Pulmonary/Chest: Breath sounds normal. She has no wheezes. She has no rales.   S/p Rt reconstructed breast- palpable fibrosis noted  Healed surgical incision site, no axillary LAD    S/p Lt reconstructed breast- no palpable masses,  Nodules or axillary LAD noted   Abdominal: Soft. Bowel sounds are normal. She exhibits no distension and no mass. There is no hepatosplenomegaly. There is no tenderness. There is no rebound and no guarding.   Musculoskeletal: Normal range of motion. She exhibits edema. She exhibits no tenderness.   Lymphadenopathy:     She has no cervical adenopathy.     She has no axillary adenopathy.        Right: No supraclavicular adenopathy present.        Left: No supraclavicular adenopathy present.   Neurological: She is alert and oriented to person, place, and time. No cranial nerve deficit. Coordination normal.   Skin: Skin is warm and dry. No ecchymosis, no petechiae and no rash noted. No erythema.   Psychiatric: She has a normal mood and affect.           Results for JOYA MUNOZ (MRN [id number]) as of 6/8/2015 13:40   Ref. Range 6/2/2015 07:50   Vit D, 25-Hydroxy Latest Range: 30-96 ng/mL 33     Lab Results   Component Value Date    WBC 5.45 01/22/2018    HGB 12.5 01/22/2018    HCT 39.2 01/22/2018    MCV 94 01/22/2018     01/22/2018           10/19/2016 Bone Density-nl    Labs: none   Assessment:       1. Malignant neoplasm of breast, stage 2, unspecified laterality    2. History of chemotherapy    3. Uncontrolled type 2 diabetes mellitus with diabetic neuropathy, with long-term current use of insulin    4. BUFFY  (acute kidney injury)    5. Schizoaffective disorder, bipolar type    6. Lymphedema        Plan:   Jonathan was seen today for follow-up.  Pt clinically stable    Diagnoses and associated orders for this visit:    1-2 Breast cancer, Stage IIB lt Breast CA T2N1MO ER weak pos/ NM neg Her 2 pos diagnosed  11/2011   - s/p bilateral mastectomy with SHLOMO flap reconstruction 11/23/2011   - s/p chemotherapy per  Phase III 0221 protocol      S/p second stage reconstructive surgery      Rt Breast US 10/2015- Area in the right breast is benign-negative.  .ACR BI-RADS Category 2: Benign   - no evidence of disease recurrence clinically     Postmastectomy lymphedema  Stable  Cont compression sleeves  S/p PT    Bipolar d/o  Follow-up with psych    BUFFY  Urine studies    Type 2 DM  Follow-up with PCP    F/u 4 mos with cbc,cmp     CC: Ghulam Contreras MD

## 2018-01-30 ENCOUNTER — OFFICE VISIT (OUTPATIENT)
Dept: PODIATRY | Facility: CLINIC | Age: 58
End: 2018-01-30
Payer: MEDICARE

## 2018-01-30 VITALS — BODY MASS INDEX: 32.11 KG/M2 | WEIGHT: 181.19 LBS | HEIGHT: 63 IN

## 2018-01-30 DIAGNOSIS — S93.511A SPRAIN OF INTERPHALANGEAL JOINT OF RIGHT GREAT TOE, INITIAL ENCOUNTER: Primary | ICD-10-CM

## 2018-01-30 PROCEDURE — 3008F BODY MASS INDEX DOCD: CPT | Mod: S$GLB,,,

## 2018-01-30 PROCEDURE — 99213 OFFICE O/P EST LOW 20 MIN: CPT | Mod: S$GLB,,,

## 2018-01-30 PROCEDURE — 99499 UNLISTED E&M SERVICE: CPT | Mod: S$GLB,,,

## 2018-01-30 NOTE — PROGRESS NOTES
Subjective:      Patient ID: Jonathan Gonzales is a 57 y.o. female.    Chief Complaint: Follow-up (2 week f/u )    Jonathan is a 57 y.o. female who returns for f/u to a right great toe pain and swelling, hit her foot and went to [facility], told nothing was broken, they did perform an x-ray.  She was placed in a surgical shoe and ace wrap for the last 4 weeks, reports no pain.  This patient has documented high risk feet requiring routine maintenance secondary to peripheral neuropathy.    PCP: Ghulam Contreras MD        Current shoe gear:  Affected Foot: Tennis shoes     Unaffected Foot: Tennis shoes    Hemoglobin A1C   Date Value Ref Range Status   11/29/2017 >14.0 (H) 4.0 - 5.6 % Final     Comment:     According to ADA guidelines, hemoglobin A1c <7.0% represents  optimal control in non-pregnant diabetic patients. Different  metrics may apply to specific patient populations.   Standards of Medical Care in Diabetes-2016.  For the purpose of screening for the presence of diabetes:  <5.7%     Consistent with the absence of diabetes  5.7-6.4%  Consistent with increasing risk for diabetes   (prediabetes)  >or=6.5%  Consistent with diabetes  Currently, no consensus exists for use of hemoglobin A1c  for diagnosis of diabetes for children.  This Hemoglobin A1c assay has significant interference with fetal   hemoglobin   (HbF). The results are invalid for patients with abnormal amounts of   HbF,   including those with known Hereditary Persistence   of Fetal Hemoglobin. Heterozygous hemoglobin variants (HbAS, HbAC,   HbAD, HbAE, HbA2) do not significantly interfere with this assay;   however, presence of multiple variants in a sample may impact the %   interference.     05/02/2017 7.4 (H) 4.5 - 6.2 % Final     Comment:     According to ADA guidelines, hemoglobin A1C <7.0% represents  optimal control in non-pregnant diabetic patients.  Different  metrics may apply to specific populations.   Standards of Medical Care in  Diabetes - 2016.  For the purpose of screening for the presence of diabetes:  <5.7%     Consistent with the absence of diabetes  5.7-6.4%  Consistent with increasing risk for diabetes   (prediabetes)  >or=6.5%  Consistent with diabetes  Currently no consensus exists for use of hemoglobin A1C  for diagnosis of diabetes for children.     02/16/2017 8.3 (H) 4.5 - 6.2 % Final     Comment:     According to ADA guidelines, hemoglobin A1C <7.0% represents  optimal control in non-pregnant diabetic patients.  Different  metrics may apply to specific populations.   Standards of Medical Care in Diabetes - 2016.  For the purpose of screening for the presence of diabetes:  <5.7%     Consistent with the absence of diabetes  5.7-6.4%  Consistent with increasing risk for diabetes   (prediabetes)  >or=6.5%  Consistent with diabetes  Currently no consensus exists for use of hemoglobin A1C  for diagnosis of diabetes for children.         Past Medical History:   Diagnosis Date    Bipolar disorder     Cancer of female breast 10/2010    T2 N1 Mx, Stage IIB left breast cancer    Cancer of upper-outer quadrant of female breast 7/9/2012    Candidiasis of vulva and vagina 6/29/2012    Depression     Diabetes mellitus type II     Disturbances of sensation of smell and taste 6/29/2012    Hypertension     Malignant neoplasm of breast (female), unspecified site 4/27/2015    Neuropathy     Nonspecific abnormal unspecified cardiovascular function study 4/12/2013    ECG READ AS SHOWING A T-WAVE ABNORMALITY     Post-mastectomy lymphedema syndrome     Postmastectomy lymphedema 6/29/2012    Schizophrenia     Thrush 6/29/2012       Past Surgical History:   Procedure Laterality Date    BREAST RECONSTRUCTION  11/23/11    B/L SHLOMO flap reconstruction S/P left MRM, right prophylactic mastectomy    BREAST RECONSTRUCTION Bilateral 3/13/2015    NIPPLE RECONSTRUCTION    MASTECTOMY Bilateral 01/2011    bilateral    TUBAL LIGATION    "      Family History   Problem Relation Age of Onset    Kidney disease Mother      Dialysis    Hypertension Mother     Diabetes Mother     Deep vein thrombosis Mother     Diabetic kidney disease Sister     Lung cancer Sister     Diabetes Sister     Diabetes Brother     Diabetes Son     No Known Problems Father     No Known Problems Maternal Grandmother     No Known Problems Maternal Grandfather     No Known Problems Paternal Grandmother     No Known Problems Paternal Grandfather     No Known Problems Son     Cervical cancer Daughter     No Known Problems Daughter     No Known Problems Daughter     No Known Problems Daughter     Breast cancer Neg Hx     Ovarian cancer Neg Hx        Social History     Social History    Marital status:      Spouse name: N/A    Number of children: 6    Years of education: N/A     Occupational History    Disability      Social History Main Topics    Smoking status: Current Every Day Smoker     Packs/day: 0.75     Years: 25.00     Types: Cigarettes    Smokeless tobacco: Never Used    Alcohol use No    Drug use: No      Comment: 1991 - stopped using crack cocaine    Sexual activity: No     Other Topics Concern    None     Social History Narrative    None       Current Outpatient Prescriptions   Medication Sig Dispense Refill    ammonium lactate (LAC-HYDRIN) 12 % lotion Apply topically 2 (two) times daily as needed for Dry Skin. 57 g 0    arm brace Misc 1 application by Misc.(Non-Drug; Combo Route) route once daily. 1 each 0    BD INSULIN PEN NEEDLE UF MINI 31 gauge x 3/16" Ndle       blood sugar diagnostic Strp Pt to check BG up to QID. Dispense strips compatible with pt brand meter 100 each 11    blood-glucose meter (FREESTYLE SYSTEM KIT) kit Pt to check BG up to QID. Dispense strips compatible with pt brand meter 1 each 0    clonazePAM (KLONOPIN) 0.5 MG tablet TK 1 T PO BID  3    divalproex (DEPAKOTE) 500 MG Tb24 Take 500 mg by mouth 2 (two) " "times daily. 500 mg in the morning, 1000 mg qhs      EASY TOUCH 31 gauge x 1/4" Ndle   5    GLUCOPHAGE 500 mg tablet       insulin glargine (LANTUS SOLOSTAR) 100 unit/mL (3 mL) InPn pen Inject 23 Units into the skin every evening. 15 mL 0    KLOR-CON M20 20 mEq tablet Take 1 tablet (20 mEq total) by mouth 2 (two) times daily. 60 tablet 5    lancets (ONETOUCH ULTRASOFT LANCETS) [facility] Pt to check BG up to QID. Dispense strips compatible with pt brand meter 100 each 11    lancets Misc 1 Device by Misc.(Non-Drug; Combo Route) route 4 (four) times daily. Please provide with appropriate lancets for device covered by insurance. 200 each 11    liraglutide 0.6 mg/0.1 mL, 18 mg/3 mL, subq PNIJ (VICTOZA 3-HERMANN) 0.6 mg/0.1 mL (18 mg/3 mL) PnIj Inject 1.8 mg into the skin once daily. 9 mL 2    lisinopril-hydrochlorothiazide (PRINZIDE,ZESTORETIC) 10-12.5 mg per tablet TAKE ONE TABLET BY MOUTH EVERY DAY 30 tablet 11    metFORMIN (GLUCOPHAGE) 500 MG tablet TAKE ONE TABLET BY MOUTH TWICE DAILY 60 tablet 11    olanzapine (ZYPREXA) 10 MG tablet TK 1 T PO QHS  3    oxybutynin (DITROPAN-XL) 10 MG 24 hr tablet Take 1 tablet (10 mg total) by mouth once daily. 30 tablet 11    pen needle, diabetic (BD ULTRA-FINE [place] PEN NEEDLES) 32 gauge x 5/32" Ndle Pt is injecting once daily 30 each 11    pen needle, diabetic, safety 29 gauge x 3/16" Ndle Use as instructed 200 each 11    pravastatin (PRAVACHOL) 20 MG tablet Take 1 tablet (20 mg total) by mouth once daily. 90 tablet 3    pregabalin (LYRICA) 100 MG capsule Take 1 capsule (100 mg total) by mouth 2 (two) times daily. 60 capsule 6    quetiapine (SEROQUEL) 100 MG Tab TAKE 1 TABLET(100 MG) BY MOUTH EVERY EVENING 90 tablet 0    traMADol (ULTRAM) 50 mg tablet       TRUE METRIX GLUCOSE METER Misc       TRUEPLUS LANCETS 33 gauge [facility]       ziprasidone (GEODON) 40 MG Cap 40 mg nightly.        No current facility-administered medications for this visit.        Review of patient's " allergies indicates:   Allergen Reactions    Banana Hives and Nausea And Vomiting         ROS  ROS:  Constitution: Negative for chills, fever, weakness and malaise/fatigue.   HEENT: Negative for headaches.   Cardiovascular: Negative for chest pain and claudication.   Respiratory: Negative for cough and shortness of breath.   Musculoskeletal: Positive for right foot pain.  Negative for muscle cramps and muscle weakness.   Gastrointestinal: Negative for nausea and vomiting.   Neurological:(+) for numbness, tingling and paresthesias.   Dermatological:  (--) for skin rash, (+) for fungal nails, (--) for wound.          Objective:      Physical Exam  Constitutional:  Patient is oriented to person, place, and time. Vital signs are normal.  Appears well-developed and well-nourished.     Vascular:  Dorsalis pedis pulses are 2/4 on the right side, and 2/4 on the left side.   Posterior tibial pulses are 2/4 on the right side, and 2/4 on the left side.   - digital hair growth, capillary fill time to all toes <3 seconds, toes are warm touch, no pedal swelling    Skin/Dermatological:  Skin is warm and intact.  No cyanosis or clubbing.  No rashes noted.  No open wounds.      Musculoskeletal:      Hammertoes 2-5 observed.  Pedal rom within normal limits.  (--) ankle joint DF restricted with both knee flexed and extened.  Right 1st MPJ/IPJ no swelling or tenderness.    Neurological:  (+) deficits to sharp/dull, light touch or vibratory sensation bilateral feet, ten points tested.   Muscle strength to tibialis anterior, extensor hallucis longus, extensor digitorum longus, peroneal muscles, flexor hallucis/digotorum longus, posterior tibial and gastrosoleal complex is 5/5, normal tone without assymmetry   Patellar reflexes are 2+ on the right side and 2+ on the left side.  Achilles reflexes are 2+ on the right side and 2+ on the left side.          Assessment:       Encounter Diagnosis   Name Primary?    Sprain of interphalangeal  joint of right great toe, initial encounter Yes         Plan:       Jonathan was seen today for follow-up.    Diagnoses and all orders for this visit:    Sprain of interphalangeal joint of right great toe, initial encounter      I counseled the patient on her conditions, their implications and medical management.    Out of boot over the next week, if pain returns back in boot.  Obtain Spenco orthotics. Quality athletic shoes.        Georges De Guzman DPM

## 2018-01-30 NOTE — PATIENT INSTRUCTIONS
[employer] Orthotic Arch Supports                               SPENCO Orthotic Arch Supports (AKA Spenco Orthotic Insoles) are ¾ length nylon (plastic arch supports that are covered with [employer]ear[employer] classic green neoprene cushion material. SPENCO Orthotic Arch Supports are designed to support the medial and lateral arch. A SPENCO Orthotic Arch Support is ideal for people that need corrective support, but have tried other higher or harder orthotics and found them to be uncomfortable to wear. The nylon support of the SPENCO Orthotic Arch support is softer and more flexible than plastics used in other, harder orthotics and arch supports. This means that people with naturally lower arches or people with extremely flat feet with find that these arch supports are more comfortable to get used to than other higher arch supports.  May be obtained at:     [employer].com   [employer]

## 2018-02-05 ENCOUNTER — OFFICE VISIT (OUTPATIENT)
Dept: SPINE | Facility: CLINIC | Age: 58
End: 2018-02-05
Payer: MEDICARE

## 2018-02-05 VITALS — HEIGHT: 63 IN | WEIGHT: 169 LBS | BODY MASS INDEX: 29.95 KG/M2

## 2018-02-05 DIAGNOSIS — M50.30 DDD (DEGENERATIVE DISC DISEASE), CERVICAL: Primary | ICD-10-CM

## 2018-02-05 PROCEDURE — 99999 PR PBB SHADOW E&M-EST. PATIENT-LVL III: CPT | Mod: PBBFAC,,, | Performed by: NEUROLOGICAL SURGERY

## 2018-02-05 PROCEDURE — 99214 OFFICE O/P EST MOD 30 MIN: CPT | Mod: S$GLB,,, | Performed by: NEUROLOGICAL SURGERY

## 2018-02-05 PROCEDURE — 3008F BODY MASS INDEX DOCD: CPT | Mod: S$GLB,,, | Performed by: NEUROLOGICAL SURGERY

## 2018-02-05 NOTE — PROGRESS NOTES
CHIEF COMPLAINT:  Follow up to review new imaging    I, Ramon Cooper, attest that this documentation has been prepared under the direction and in the presence of Tra Edwards MD.    HPI:  Jonathan Gonzales is a 58 y.o.  female with schizophrenia, depression, bipolar disorder, hypertension, diabetes mellitus type II, breast cancer stage II status post mastectomy, and schizophrenia, who presents today for a follow up after an MRI. Pt reports that she did not receive any further injections after her last visit.  She notes continued neck pain and BUE pain, L>R. Pt's neck pain is worse than her BUE pain.  Pt has low back pain and RLE pain as well. She says she fell out of the bed recently injuring her right big toe and discontinued wearing a boot in the last week. Pt denies imbalance. She is taking medication for bladder control. Pt presents today with a cane. Pt has participated in physical therapy for the neck, but feels that it made her worse.     Review of patient's allergies indicates:   Allergen Reactions    Banana Hives and Nausea And Vomiting       Past Medical History:   Diagnosis Date    Bipolar disorder     Cancer of female breast 10/2010    T2 N1 Mx, Stage IIB left breast cancer    Cancer of upper-outer quadrant of female breast 7/9/2012    Candidiasis of vulva and vagina 6/29/2012    Depression     Diabetes mellitus type II     Disturbances of sensation of smell and taste 6/29/2012    Hypertension     Malignant neoplasm of breast (female), unspecified site 4/27/2015    Neuropathy     Nonspecific abnormal unspecified cardiovascular function study 4/12/2013    ECG READ AS SHOWING A T-WAVE ABNORMALITY     Post-mastectomy lymphedema syndrome     Postmastectomy lymphedema 6/29/2012    Schizophrenia     Thrush 6/29/2012     Past Surgical History:   Procedure Laterality Date    BREAST RECONSTRUCTION  11/23/11    B/L SHLOMO flap reconstruction S/P left MRM, right prophylactic mastectomy     BREAST RECONSTRUCTION Bilateral 3/13/2015    NIPPLE RECONSTRUCTION    MASTECTOMY Bilateral 01/2011    bilateral    TUBAL LIGATION       Family History   Problem Relation Age of Onset    Kidney disease Mother      Dialysis    Hypertension Mother     Diabetes Mother     Deep vein thrombosis Mother     Diabetic kidney disease Sister     Lung cancer Sister     Diabetes Sister     Diabetes Brother     Diabetes Son     No Known Problems Father     No Known Problems Maternal Grandmother     No Known Problems Maternal Grandfather     No Known Problems Paternal Grandmother     No Known Problems Paternal Grandfather     No Known Problems Son     Cervical cancer Daughter     No Known Problems Daughter     No Known Problems Daughter     No Known Problems Daughter     Breast cancer Neg Hx     Ovarian cancer Neg Hx      Social History   Substance Use Topics    Smoking status: Current Every Day Smoker     Packs/day: 0.75     Years: 25.00     Types: Cigarettes    Smokeless tobacco: Never Used    Alcohol use No        Review of Systems   Constitutional: Negative.    HENT: Negative.    Eyes: Negative.    Respiratory: Negative.    Cardiovascular: Negative.    Gastrointestinal: Negative.    Endocrine: Negative.    Genitourinary: Negative.    Musculoskeletal: Negative for back pain, gait problem and neck pain.   Skin: Negative.    Allergic/Immunologic: Negative.    Neurological: Negative for weakness, light-headedness, numbness and headaches.   Hematological: Negative.    Psychiatric/Behavioral: Negative.        OBJECTIVE:   Vital Signs:       Physical Exam:    Vital signs: All nursing notes and vital signs reviewed -- afebrile, vital signs stable.  Constitutional: Patient sitting comfortably in chair. Appears well developed and well nourished.  Skin: Exposed areas are intact without abnormal markings, rashes or other lesions.  HEENT: Normocephalic. Normal conjunctivae.  Cardiovascular: Normal rate and regular  rhythm.  Respiratory: Chest wall rises and falls symmetrically, without signs of respiratory distress.  Abdomen: Soft and non-tender.  Extremities: Warm and without edema. Calves supple, non-tender.  Psych/Behavior: Normal affect.    Neurological:    Mental status: Alert and oriented. Conversational and appropriate.       Cranial Nerves: VFF to confrontation. PERRL. EOMI without nystagmus. Facial STLT normal and symmetric. Strong, symmetric muscles of mastication. Facial strength full and symmetric. Hearing equal bilaterally to finger rub. Palate and uvula rise and fall normally in midline. Shoulder shrug 5/5 strength. Tongue midline. Grossly intact.     Motor:    Upper:  Deltoids Triceps Biceps WE WF     R 5/5 5/5 5/5 5/5 5/5 5/5    L 5/5 5/5 5/5 5/5 5/5 5/5      Lower:  HF KE KF DF PF EHL    R 5/5 5/5 5/5 5/5 5/5 5/5    L 5/5 5/5 5/5 5/5 5/5 5/5     Sensory: Intact sensation to light touch in all extremities. Romberg positive.    Reflexes:          DTR: 1+ symmetrically throughout.     Dye's: Negative.     Babinski's: Negative.     Clonus: Negative.    Cerebellar: Finger-to-nose and rapid alternating movements normal. Gait stable, fluid.    Spine:    Posture: Head well aligned over pelvis in front and side views.  No focal or global spinal deformity visible on inspection. Shoulders and hips even. No obvious leg length discrepancy. No scapula winging.    Bending: Full ROM with forward, back and lateral bending. No rib prominence with forward bend. Restricted range of motion with left lateral rotation and extension.    Cervical:      ROM: Full with flexion, extension, lateral rotation and ear-to-shoulder bend.      Midline TTP: Negative.     Spurling's test: Negative.     Lhermitte's: Negative.    Thoracic:     Midline TTP: Negative    Lumbar:     Midline TTP: Negative     Straight Leg Test: Negative     Crossed Straight Leg Test: Negative     Sciatic notch tenderness: Negative.    Other:     SI joint TTP:  Negative.     Greater trochanter TTP: Negative.     Tenderness with external/internal hip rotation: Negative.     Malcolm's sign: Positive     Diagnostic Results:  All imaging was independently reviewed by me.    MRI C-spine, dated 9/16/2015:  1. DDD C3/4-C6/7, worst at C4/5 and C6/7 causing moderate/severe central stenosis at C4/5, C5/6, and C6/7    Flex/Ex X-ray C-spine, dated 12/22/2017:  1. No gross instability    ASSESSMENT/PLAN:     Jonathan Gonzales has cervical spondylosis and cervicalgia. I had recommended a C6-7 ACD in the past, where she has a large disc bulge and central stenosis; however, she is neurologically stable and radicular arm pain is not her primary concern. I recommend facet injections from C4-5 through C6-7 for neck pain, as well as PT. She will follow up in 3 months for re-evaluation.    The patient understands and agrees with the plan of care. All questions were answered.     1. Referral to Pain Management  2. Referral to Physical Therapy  3. RTC 3 months       I, Dr. Tra Edwards personally performed the services described in this documentation. All medical record entries made by the scribe, Ramon Cooper, were at my direction and in my presence.  I have reviewed the chart and agree that the record reflects my personal performance and is accurate and complete.      Tra Edwards M.D.  Department of Neurosurgery  [facility]      .

## 2018-02-08 ENCOUNTER — TELEPHONE (OUTPATIENT)
Dept: PAIN MEDICINE | Facility: CLINIC | Age: 58
End: 2018-02-08

## 2018-02-08 DIAGNOSIS — M50.30 DEGENERATION OF CERVICAL INTERVERTEBRAL DISC: Primary | ICD-10-CM

## 2018-02-08 NOTE — TELEPHONE ENCOUNTER
Called pt regarding a referral from [facility].  She needs to be set up for a facet joing injection, bilateral, C4-5 through C6-7, with dx: M50.30.  Patient stated she needs to call her transportation service first to determine if they can travel to this location.  I gave pt all of the information and offered her the dos: wed, 2/21/18.  Pt will call back to confirm date as she as she gets approval from transportation.

## 2018-02-08 NOTE — TELEPHONE ENCOUNTER
Per patient, she is able to use her transportation service to get to the [place] location.  She has been scheduled for bilateral facet joint inj C4-5 thru C6-7.  Pre procedural instructions will be mailed to pts home.  Pt will FU with the [facility].  Pt verbalized understanding of this information.    ----- Message from Emelia Malhotra sent at 2/8/2018  1:58 PM CST -----  Contact: Self [phone]  Patient Returning Your Phone Call

## 2018-02-15 ENCOUNTER — TELEPHONE (OUTPATIENT)
Dept: INTERNAL MEDICINE | Facility: CLINIC | Age: 58
End: 2018-02-15

## 2018-02-15 NOTE — TELEPHONE ENCOUNTER
----- Message from Mirna Morales sent at 2/15/2018 10:43 AM CST -----  Contact: Shanell from [employer]   _x  1st Request  _  2nd Request  _  3rd Request        Who:Shanell from [employer]     Why: Requesting a call back in regards to checking status on fax that was sent regarding most recent chart notes for patient to get diabetic shoes. Please call     What Number to Call Back: [phone] ext [phone]    When to Expect a call back: (Within 24 hours)    Please return the call at earliest convenience. Thanks!

## 2018-02-16 ENCOUNTER — LAB VISIT (OUTPATIENT)
Dept: LAB | Facility: OTHER | Age: 58
End: 2018-02-16
Attending: FAMILY MEDICINE
Payer: MEDICARE

## 2018-02-16 ENCOUNTER — OFFICE VISIT (OUTPATIENT)
Dept: INTERNAL MEDICINE | Facility: CLINIC | Age: 58
End: 2018-02-16
Attending: FAMILY MEDICINE
Payer: MEDICARE

## 2018-02-16 VITALS
WEIGHT: 166.25 LBS | DIASTOLIC BLOOD PRESSURE: 76 MMHG | HEART RATE: 93 BPM | OXYGEN SATURATION: 97 % | HEIGHT: 63 IN | SYSTOLIC BLOOD PRESSURE: 116 MMHG | BODY MASS INDEX: 29.46 KG/M2

## 2018-02-16 DIAGNOSIS — M47.812 CERVICAL SPONDYLOSIS WITHOUT MYELOPATHY: ICD-10-CM

## 2018-02-16 DIAGNOSIS — E11.42 DM TYPE 2 WITH DIABETIC PERIPHERAL NEUROPATHY: ICD-10-CM

## 2018-02-16 DIAGNOSIS — M50.30 DDD (DEGENERATIVE DISC DISEASE), CERVICAL: ICD-10-CM

## 2018-02-16 DIAGNOSIS — I10 HTN (HYPERTENSION), BENIGN: ICD-10-CM

## 2018-02-16 DIAGNOSIS — Z00.00 PREVENTATIVE HEALTH CARE: Primary | ICD-10-CM

## 2018-02-16 DIAGNOSIS — M54.12 CERVICAL RADICULOPATHY: ICD-10-CM

## 2018-02-16 LAB
ALBUMIN SERPL BCP-MCNC: 3.7 G/DL
ALP SERPL-CCNC: 49 U/L
ALT SERPL W/O P-5'-P-CCNC: 16 U/L
ANION GAP SERPL CALC-SCNC: 13 MMOL/L
AST SERPL-CCNC: 27 U/L
BILIRUB SERPL-MCNC: 0.4 MG/DL
BUN SERPL-MCNC: 14 MG/DL
CALCIUM SERPL-MCNC: 9.7 MG/DL
CHLORIDE SERPL-SCNC: 99 MMOL/L
CHOLEST SERPL-MCNC: 189 MG/DL
CHOLEST/HDLC SERPL: 5 {RATIO}
CO2 SERPL-SCNC: 28 MMOL/L
CREAT SERPL-MCNC: 1.2 MG/DL
EST. GFR  (AFRICAN AMERICAN): 57 ML/MIN/1.73 M^2
EST. GFR  (NON AFRICAN AMERICAN): 50 ML/MIN/1.73 M^2
ESTIMATED AVG GLUCOSE: 134 MG/DL
GLUCOSE SERPL-MCNC: 76 MG/DL
HBA1C MFR BLD HPLC: 6.3 %
HDLC SERPL-MCNC: 38 MG/DL
HDLC SERPL: 20.1 %
LDLC SERPL CALC-MCNC: 108 MG/DL
NONHDLC SERPL-MCNC: 151 MG/DL
POTASSIUM SERPL-SCNC: 3.6 MMOL/L
PROT SERPL-MCNC: 8.1 G/DL
SODIUM SERPL-SCNC: 140 MMOL/L
TRIGL SERPL-MCNC: 215 MG/DL
TSH SERPL DL<=0.005 MIU/L-ACNC: 1.29 UIU/ML

## 2018-02-16 PROCEDURE — 80061 LIPID PANEL: CPT

## 2018-02-16 PROCEDURE — 83036 HEMOGLOBIN GLYCOSYLATED A1C: CPT

## 2018-02-16 PROCEDURE — 80053 COMPREHEN METABOLIC PANEL: CPT

## 2018-02-16 PROCEDURE — 99499 UNLISTED E&M SERVICE: CPT | Mod: S$GLB,,, | Performed by: FAMILY MEDICINE

## 2018-02-16 PROCEDURE — 84443 ASSAY THYROID STIM HORMONE: CPT

## 2018-02-16 PROCEDURE — 99396 PREV VISIT EST AGE 40-64: CPT | Mod: S$GLB,,, | Performed by: FAMILY MEDICINE

## 2018-02-16 PROCEDURE — 36415 COLL VENOUS BLD VENIPUNCTURE: CPT

## 2018-02-16 PROCEDURE — 99999 PR PBB SHADOW E&M-EST. PATIENT-LVL IV: CPT | Mod: PBBFAC,,, | Performed by: FAMILY MEDICINE

## 2018-02-16 RX ORDER — ZIPRASIDONE HYDROCHLORIDE 40 MG/1
40 CAPSULE ORAL NIGHTLY
Qty: 90 CAPSULE | Refills: 1 | Status: SHIPPED | OUTPATIENT
Start: 2018-02-16 | End: 2018-08-23 | Stop reason: SDUPTHER

## 2018-02-16 RX ORDER — PREGABALIN 100 MG/1
100 CAPSULE ORAL 2 TIMES DAILY
Qty: 60 CAPSULE | Refills: 6 | Status: SHIPPED | OUTPATIENT
Start: 2018-02-16 | End: 2018-02-19 | Stop reason: SDUPTHER

## 2018-02-16 RX ORDER — QUETIAPINE FUMARATE 100 MG/1
TABLET, FILM COATED ORAL
Qty: 90 TABLET | Refills: 0 | Status: SHIPPED | OUTPATIENT
Start: 2018-02-16 | End: 2018-05-20 | Stop reason: SDUPTHER

## 2018-02-19 ENCOUNTER — OFFICE VISIT (OUTPATIENT)
Dept: NEUROLOGY | Facility: CLINIC | Age: 58
End: 2018-02-19
Payer: MEDICARE

## 2018-02-19 ENCOUNTER — TELEPHONE (OUTPATIENT)
Dept: PAIN MEDICINE | Facility: HOSPITAL | Age: 58
End: 2018-02-19

## 2018-02-19 VITALS
SYSTOLIC BLOOD PRESSURE: 106 MMHG | WEIGHT: 165.38 LBS | DIASTOLIC BLOOD PRESSURE: 72 MMHG | HEART RATE: 76 BPM | HEIGHT: 63 IN | BODY MASS INDEX: 29.3 KG/M2

## 2018-02-19 DIAGNOSIS — M50.30 DDD (DEGENERATIVE DISC DISEASE), CERVICAL: ICD-10-CM

## 2018-02-19 DIAGNOSIS — M54.12 CERVICAL RADICULOPATHY: ICD-10-CM

## 2018-02-19 DIAGNOSIS — M47.812 CERVICAL SPONDYLOSIS WITHOUT MYELOPATHY: ICD-10-CM

## 2018-02-19 DIAGNOSIS — E11.42 DM TYPE 2 WITH DIABETIC PERIPHERAL NEUROPATHY: Primary | ICD-10-CM

## 2018-02-19 PROCEDURE — 99499 UNLISTED E&M SERVICE: CPT | Mod: S$GLB,,, | Performed by: PSYCHIATRY & NEUROLOGY

## 2018-02-19 PROCEDURE — 99999 PR PBB SHADOW E&M-EST. PATIENT-LVL III: CPT | Mod: PBBFAC,,, | Performed by: PSYCHIATRY & NEUROLOGY

## 2018-02-19 PROCEDURE — 99214 OFFICE O/P EST MOD 30 MIN: CPT | Mod: S$GLB,,, | Performed by: PSYCHIATRY & NEUROLOGY

## 2018-02-19 PROCEDURE — 3008F BODY MASS INDEX DOCD: CPT | Mod: S$GLB,,, | Performed by: PSYCHIATRY & NEUROLOGY

## 2018-02-19 RX ORDER — GLUCOSAM/CHON-MSM1/C/MANG/BOSW 500-416.6
TABLET ORAL
Refills: 11 | COMMUNITY
Start: 2017-12-20 | End: 2022-09-20

## 2018-02-19 RX ORDER — PREGABALIN 100 MG/1
100 CAPSULE ORAL 2 TIMES DAILY
Qty: 60 CAPSULE | Refills: 6 | Status: SHIPPED | OUTPATIENT
Start: 2018-02-19 | End: 2018-08-23 | Stop reason: SDUPTHER

## 2018-02-19 NOTE — DISCHARGE INSTRUCTIONS
Procedural Sedation  Procedural sedation is medicine to ease discomfort, pain, and anxiety during a procedure. The medicine is often given through an IV (intravenous) line in your arm or hand. In some cases, the medicine may be taken by mouth or inhaled. While you are under sedation, you will likely be awake. But you may not remember anything afterward.  Why procedural sedation is used  Sedation is used for many types of procedures. The goal is to reduce pain, anxiety, and stressful memories of a procedure. It can also help your healthcare provider treat you. For example, having a broken bone fixed may be easier if you feel relaxed.  Procedural sedation is used only for short, basic procedures. It is not used for complex surgeries. Some procedures that use this type of sedation include:  · Dental surgery  · Breast biopsy, to take a sample of breast tissue  · Endoscopy, to look at gastrointestinal problems  · Bronchoscopy, to check for lung problems  · Bone or joint realignment, to fix a broken bone or dislocated joint  · Minor foot or skin surgery  · Electrical cardioversion, to restore a normal heart rhythm  · Lumbar puncture, to assess neurological disease  Risks of procedural sedation  Procedural sedation has some risks and possible side effects, such as:  · Headache  · Nausea and vomiting  · Unpleasant memory of the procedure  · Lowered rate of breathing  · Changes in heart rate and blood pressure (rare)  · Inhalation of stomach contents into your lungs (rare)  Side effects will likely go away shortly after the procedure. Your healthcare team will watch your heart rate and breathing during your sedation. This is to help prevent problems.  Your own risks may vary based on your age and your overall health. They also depend on the type of sedation you are given. Talk with your healthcare provider about the risks that apply most to you.  Getting ready for procedural sedation  Talk with your healthcare provider  about how to get ready for your procedure. Tell him or her about all the medicines you take. This includes over-the-counter medicines such as ibuprofen. It also includes vitamins, herbs, and other supplements. You may need to stop taking some medicines before the procedure, such as blood thinners and aspirin. If you smoke, you should stop to lessen the chance of a lung issue. Talk with your healthcare provider if you need help to stop smoking.  Tell your healthcare provider if you:  · Have had any problems in the past with sedation or anesthesia  · Have had any recent changes in your health, such as an infection or fever  · Are pregnant or think you may be pregnant  Also be sure to:  · Ask a family member or friend to take you home after the procedure. You cant drive on the day you receive sedation.  · Not eat or drink after midnight the night before your procedure, if advised.  · Not plan on making any important decisions, such as financial or legal, for the day after you receive the sedation.  · Follow all other instructions from your healthcare provider.  During your procedural sedation  You may have your procedure in a hospital or a medical clinic. Sedation is done by a trained healthcare provider. In general, you can expect the following:  · You will be given medicine through an IV line in your arm or hand. Or you may receive a shot. The medicine may also be given by mouth. Or you may inhale it through a mask.  · If you receive medicine through an IV, you may feel the effects very quickly. You will start to feel relaxed and drowsy.  · During the procedure, your heart rate, breathing, and blood pressure will be closely watched. Your breathing and blood pressure may decrease a little. But you will likely not need help with your breathing. You may receive a little extra oxygen through a mask or through some soft plastic prongs underneath your nose.  · You will probably be awake the entire time. If you do fall  asleep, you should be easy to wake up, if needed. You should feel little or no pain.  · When your procedure is over, the sedative medicine will be stopped.  After your procedural sedation  You will begin to feel more awake and aware. But you will likely be drowsy for a while afterward. You will be closely watched as you become more alert. You may have a faint memory of the procedure. Or you may not remember it at all.  You should be able to return home within an hour or two after your procedure. Plan to have someone stay with you for a few hours. Side effects such as headache and nausea may go away quickly. Tell your healthcare provider if they continue.  Dont drive or make any important decisions for at least 24 hours. Be sure to follow all after-care instructions.     When to call your healthcare provider  Have someone call your healthcare provider right away if you have any of these:  · Drowsiness that gets worse  · Weakness or dizziness that gets worse  · Repeated vomiting  · You cant be awakened  · Severe or ongoing pain from the procedure, not relieved by the pain medicine   Date Last Reviewed: 2/1/2017 © 2000-2017 [employer]. [address] [place] [address]. All rights reserved. This information is not intended as a substitute for professional medical care. Always follow your healthcare professional's instructions.      Home Care Instructions Pain Management:    1.  DIET:    You may resume your normal diet today.    2.  BATHING:    You may shower with luke warm water.    3.  DRESSING:    You may remove your bandage today.    4.  ACTIVITY LEVEL:      You may resume your normal activities 24 hours after your procedure.    5.  MEDICATIONS:    You may resume your normal medications today.    6.  SPECIAL INSTRUCTIONS:    No heat to the injection site for 24 hours including bath or shower, heating pad, moist heat or hot tubs.    Use an ice pack to the injection site for any pain or  discomfort.  Apply ice packs for 20 minute intervals as needed.    If you have received any sedatives by mouth today, you can not drive for 12 hours.    If you have received sedation through an IV, you can not drive for 24 hours.    PLEASE CALL YOUR DOCTOR FOR THE FOLLOWIN.  Redness or swelling around the injection site.  2.  Fever of 101 degrees.  3.  Drainage (pus) from the injection site.  4.  For any continuous bleeding (some dried blood over the incision is normal.)    FOR EMERGENCIES:    If any unusual problems or difficulties occur during clinic hours, call [phone] or dial [phone].    Follow up with with your physician in 2-3 weeks.

## 2018-02-19 NOTE — PROGRESS NOTES
Subjective:       Patient ID: Jonathan Gonzales is a 58 y.o. female.    Reason for Consult: Peripheral Neuropathy      Interval History:  Jonathan Gonzales is here for follow up. Their condition is clinically stable.  She and I had a long talk about her stroke risk factors at last visit.  Her blood pressure is better controlled, her HbA1c is down to 6.3 from greater than 14 areas she is still working on reducing her cigarette smoking.  We have checked her LDL and it is 108 which is acceptable.  She notes that her neuropathy is about the same as it was before.  She has lost weight, 30 pounds intentionally.    Objective:     Vitals:    02/19/18 1038   BP: 106/72   Pulse: 76     There is still length dependent stocking glove neuropathy worsen bilateral lower extremities were pinprick and fine touch are less than 50% of normal sensation.  Focused examination was undertaken today. Over 50% of face to face time of 25 minute visit time was in giving guidance, counseling and discussing treatment options.    Assessment/Plan:     Problem List Items Addressed This Visit        Neuro    Cervical radiculopathy    Relevant Medications    pregabalin (LYRICA) 100 MG capsule    Cervical spondylosis without myelopathy    Relevant Medications    pregabalin (LYRICA) 100 MG capsule    DDD (degenerative disc disease), cervical    Relevant Medications    pregabalin (LYRICA) 100 MG capsule    DM type 2 with diabetic peripheral neuropathy - Primary    Overview     Tolerable amount of leg swelling on Lyrica 100mg po BID  We have discussed the improved HbA1c and I have encouraged continued health maintenance  She notes that this dose seems to help without too many side effects         Relevant Medications    pregabalin (LYRICA) 100 MG capsule        58-year-old right-handed female presents for evaluation of peripheral neuropathy related to diabetes.  At this point in time her diabetes is better controlled.  I will refill her Lyrica as she  is having significant medical benefit from this without side effects.  Unexplained her that it may take time of the diabetes control before her neuropathy starts to subside.  We have discussed stroke risk factors at length today she is doing quite well and I have encouraged her to continue.  I will follow up with them in 6 month(s).  The patient verbalizes understanding and agreement with the treatment plan. I have discussed risks, benefits and alternatives to the treatment plan. Questions were sought and answered to her stated verbal satisfaction.        Franchesca Osuna MD    This note is dictated on Dragon Natural Speaking word recognition program. There are word recognition mistakes that are occasionally missed on review.

## 2018-02-20 ENCOUNTER — TELEPHONE (OUTPATIENT)
Dept: INTERNAL MEDICINE | Facility: CLINIC | Age: 58
End: 2018-02-20

## 2018-02-20 DIAGNOSIS — E11.8 UNCONTROLLED TYPE 2 DIABETES MELLITUS WITH COMPLICATION, UNSPECIFIED LONG TERM INSULIN USE STATUS: Primary | ICD-10-CM

## 2018-02-20 DIAGNOSIS — E11.65 UNCONTROLLED TYPE 2 DIABETES MELLITUS WITH COMPLICATION, UNSPECIFIED LONG TERM INSULIN USE STATUS: Primary | ICD-10-CM

## 2018-02-20 RX ORDER — SODIUM CHLORIDE, SODIUM LACTATE, POTASSIUM CHLORIDE, CALCIUM CHLORIDE 600; 310; 30; 20 MG/100ML; MG/100ML; MG/100ML; MG/100ML
INJECTION, SOLUTION INTRAVENOUS CONTINUOUS
Status: CANCELLED | OUTPATIENT
Start: 2018-02-20

## 2018-02-20 NOTE — TELEPHONE ENCOUNTER
----- Message from Ghulam Contreras MD sent at 2/19/2018  3:13 PM CST -----  Blood work looks very good.  A1c has gone all the way down to 6.3  No changes in medications.  Followup as scheduled.

## 2018-02-21 ENCOUNTER — CLINICAL SUPPORT (OUTPATIENT)
Dept: REHABILITATION | Facility: HOSPITAL | Age: 58
End: 2018-02-21
Attending: NEUROLOGICAL SURGERY
Payer: MEDICARE

## 2018-02-21 ENCOUNTER — SURGERY (OUTPATIENT)
Age: 58
End: 2018-02-21

## 2018-02-21 ENCOUNTER — HOSPITAL ENCOUNTER (OUTPATIENT)
Facility: HOSPITAL | Age: 58
Discharge: HOME | End: 2018-02-21
Attending: ANESTHESIOLOGY | Admitting: ANESTHESIOLOGY
Payer: MEDICARE

## 2018-02-21 VITALS
RESPIRATION RATE: 18 BRPM | BODY MASS INDEX: 29.41 KG/M2 | SYSTOLIC BLOOD PRESSURE: 110 MMHG | HEIGHT: 63 IN | WEIGHT: 166 LBS | DIASTOLIC BLOOD PRESSURE: 73 MMHG | OXYGEN SATURATION: 100 % | HEART RATE: 72 BPM | TEMPERATURE: 99 F

## 2018-02-21 DIAGNOSIS — M54.2 NECK PAIN: ICD-10-CM

## 2018-02-21 DIAGNOSIS — M54.41 CHRONIC BILATERAL LOW BACK PAIN WITH BILATERAL SCIATICA: ICD-10-CM

## 2018-02-21 DIAGNOSIS — G89.29 CHRONIC BILATERAL LOW BACK PAIN WITH BILATERAL SCIATICA: ICD-10-CM

## 2018-02-21 DIAGNOSIS — M54.42 CHRONIC BILATERAL LOW BACK PAIN WITH BILATERAL SCIATICA: ICD-10-CM

## 2018-02-21 LAB — POCT GLUCOSE: 78 MG/DL (ref 70–110)

## 2018-02-21 PROCEDURE — 25000003 PHARM REV CODE 250: Performed by: ANESTHESIOLOGY

## 2018-02-21 PROCEDURE — 64494 INJ PARAVERT F JNT L/S 2 LEV: CPT | Mod: 50,,, | Performed by: ANESTHESIOLOGY

## 2018-02-21 PROCEDURE — 97162 PT EVAL MOD COMPLEX 30 MIN: CPT

## 2018-02-21 PROCEDURE — G8982 BODY POS GOAL STATUS: HCPCS | Mod: CK

## 2018-02-21 PROCEDURE — G8981 BODY POS CURRENT STATUS: HCPCS | Mod: CL

## 2018-02-21 PROCEDURE — 64490 INJ PARAVERT F JNT C/T 1 LEV: CPT | Mod: 50 | Performed by: ANESTHESIOLOGY

## 2018-02-21 PROCEDURE — 63600175 PHARM REV CODE 636 W HCPCS: Performed by: ANESTHESIOLOGY

## 2018-02-21 PROCEDURE — 64491 INJ PARAVERT F JNT C/T 2 LEV: CPT | Mod: 50 | Performed by: ANESTHESIOLOGY

## 2018-02-21 PROCEDURE — 64492 INJ PARAVERT F JNT C/T 3 LEV: CPT | Mod: 50 | Performed by: ANESTHESIOLOGY

## 2018-02-21 PROCEDURE — 64493 INJ PARAVERT F JNT L/S 1 LEV: CPT | Mod: 50,,, | Performed by: ANESTHESIOLOGY

## 2018-02-21 RX ORDER — LIDOCAINE HYDROCHLORIDE 10 MG/ML
INJECTION INFILTRATION; PERINEURAL
Status: DISCONTINUED | OUTPATIENT
Start: 2018-02-21 | End: 2018-02-21 | Stop reason: HOSPADM

## 2018-02-21 RX ORDER — ALPRAZOLAM 1 MG/1
1 TABLET ORAL ONCE
Status: DISCONTINUED | OUTPATIENT
Start: 2018-02-21 | End: 2018-02-21 | Stop reason: SDUPTHER

## 2018-02-21 RX ORDER — ALPRAZOLAM 0.5 MG/1
1 TABLET, ORALLY DISINTEGRATING ORAL ONCE
Status: COMPLETED | OUTPATIENT
Start: 2018-02-21 | End: 2018-02-21

## 2018-02-21 RX ORDER — BUPIVACAINE HYDROCHLORIDE 2.5 MG/ML
INJECTION, SOLUTION EPIDURAL; INFILTRATION; INTRACAUDAL
Status: DISCONTINUED | OUTPATIENT
Start: 2018-02-21 | End: 2018-02-21 | Stop reason: HOSPADM

## 2018-02-21 RX ORDER — TRIAMCINOLONE ACETONIDE 40 MG/ML
INJECTION, SUSPENSION INTRA-ARTICULAR; INTRAMUSCULAR
Status: DISCONTINUED | OUTPATIENT
Start: 2018-02-21 | End: 2018-02-21 | Stop reason: HOSPADM

## 2018-02-21 RX ORDER — SODIUM BICARBONATE 1 MEQ/ML
SYRINGE (ML) INTRAVENOUS
Status: DISCONTINUED | OUTPATIENT
Start: 2018-02-21 | End: 2018-02-21 | Stop reason: HOSPADM

## 2018-02-21 RX ADMIN — LIDOCAINE HYDROCHLORIDE 5 ML: 10 INJECTION, SOLUTION INFILTRATION; PERINEURAL at 03:02

## 2018-02-21 RX ADMIN — TRIAMCINOLONE ACETONIDE 40 MG: 40 INJECTION, SUSPENSION INTRA-ARTICULAR; INTRAMUSCULAR at 03:02

## 2018-02-21 RX ADMIN — BUPIVACAINE HYDROCHLORIDE 5 ML: 2.5 INJECTION, SOLUTION EPIDURAL; INFILTRATION; INTRACAUDAL; PERINEURAL at 03:02

## 2018-02-21 RX ADMIN — SODIUM BICARBONATE 50 MEQ: 84 INJECTION, SOLUTION INTRAVENOUS at 03:02

## 2018-02-21 RX ADMIN — ALPRAZOLAM 1 MG: 0.5 TABLET, ORALLY DISINTEGRATING ORAL at 02:02

## 2018-02-21 NOTE — BRIEF OP NOTE
Nurses calling me about  patient . She had a minor fall over her buttock while she was leaning on the bedside table  to be discharged. No trauma to the head. Patient fully conscious. I was called and came to bedside  examined the patient . Normal sensory and motor exam. Normal neurological and musculoskeletal .No bruises, no tenderness over any part of the body  No pain to palpation over the buttock, no pain over the C spine and L spine. Fulll ROM of the back and neck.   Patient cleared to be discharged , no further tests required. SOS incident filed by the nursing team

## 2018-02-21 NOTE — OP NOTE
"Patient Name: Jonathan Gonzales  MRN: [id number]    INFORMED CONSENT: The procedure, risks, benefits and options were discussed with patient. There are no contraindications to the procedure. The patient expressed understanding and agreed to proceed. The personnel performing the procedure was discussed. I verify that I personally obtained Jonathan's consent prior to the start of the procedure and the signed consent can be found on the patient's chart.    PROCEDURE: bilateral C4,5,6  CERVICAL FACET MEDIAL BRANCH NERVE BLOCK (Prone)    Procedure Date: 2/21/2018  Anesthesia:  systemic  Pre Procedure diagnosis: cervical spondylosis  Post-Procedure diagnosis: Same    Sedation: ORAL 1 MG NIRAVAM     DESCRIPTION OF PROCEDURE: The patient was brought to the procedure room.  IV access was obtained prior to the procedure.  The patient was positioned prone on the fluoroscopy table.  Continuous hemodynamic monitoring was initiated including blood pressure, EKG, and pulse oximetry.  The skin overlying the cervical spine was prepped with chlorhexidine three times and draped into a sterile field.  Fluoroscopy was used to identify the location of the   C4 to C6 medial branch nerves.  Skin anesthesia was achieved using 5 cc of Lidocaine 1% over the injection sites. A 22 gauge, 3 1/2" spinal needle was slowly inserted at each level using AP, lateral and oblique fluoroscopic imaging. Final needle position was at the midpoint of the waist of the articular pillars. Negative aspiration for blood or CSF was confirmed. A combination of 5ml bupivacaine 0.25%and 40 mg of Kenalog was injected.  The needles were removed and bleeding was nil. A sterile dressing was applied.No specimens collected. The patient was brought to recovery in stable condition. Jonathan was taken back to the recovery room for further observation.     Blood Loss: Nill  Specimen: None    Pre Procedure Pain Level: 8/10  Post-Procedure Pain Level: 4/10        Discharge Diagnosis: " Same as Pre and Post Procedure  Condition on Discharge: Stable.  Diet on Discharge: Same as before.  Activity: as per instruction sheet.  Discharge to: Home with a responsible adult.  Follow up: as per Discharge instructions

## 2018-02-21 NOTE — H&P (VIEW-ONLY)
CHIEF COMPLAINT:  Follow up to review new imaging    I, Ramon Cooper, attest that this documentation has been prepared under the direction and in the presence of Tra Edwards MD.    HPI:  Jonathan Gonzales is a 58 y.o.  female with schizophrenia, depression, bipolar disorder, hypertension, diabetes mellitus type II, breast cancer stage II status post mastectomy, and schizophrenia, who presents today for a follow up after an MRI. Pt reports that she did not receive any further injections after her last visit.  She notes continued neck pain and BUE pain, L>R. Pt's neck pain is worse than her BUE pain.  Pt has low back pain and RLE pain as well. She says she fell out of the bed recently injuring her right big toe and discontinued wearing a boot in the last week. Pt denies imbalance. She is taking medication for bladder control. Pt presents today with a cane. Pt has participated in physical therapy for the neck, but feels that it made her worse.     Review of patient's allergies indicates:   Allergen Reactions    Banana Hives and Nausea And Vomiting       Past Medical History:   Diagnosis Date    Bipolar disorder     Cancer of female breast 10/2010    T2 N1 Mx, Stage IIB left breast cancer    Cancer of upper-outer quadrant of female breast 7/9/2012    Candidiasis of vulva and vagina 6/29/2012    Depression     Diabetes mellitus type II     Disturbances of sensation of smell and taste 6/29/2012    Hypertension     Malignant neoplasm of breast (female), unspecified site 4/27/2015    Neuropathy     Nonspecific abnormal unspecified cardiovascular function study 4/12/2013    ECG READ AS SHOWING A T-WAVE ABNORMALITY     Post-mastectomy lymphedema syndrome     Postmastectomy lymphedema 6/29/2012    Schizophrenia     Thrush 6/29/2012     Past Surgical History:   Procedure Laterality Date    BREAST RECONSTRUCTION  11/23/11    B/L SHLOMO flap reconstruction S/P left MRM, right prophylactic mastectomy     BREAST RECONSTRUCTION Bilateral 3/13/2015    NIPPLE RECONSTRUCTION    MASTECTOMY Bilateral 01/2011    bilateral    TUBAL LIGATION       Family History   Problem Relation Age of Onset    Kidney disease Mother      Dialysis    Hypertension Mother     Diabetes Mother     Deep vein thrombosis Mother     Diabetic kidney disease Sister     Lung cancer Sister     Diabetes Sister     Diabetes Brother     Diabetes Son     No Known Problems Father     No Known Problems Maternal Grandmother     No Known Problems Maternal Grandfather     No Known Problems Paternal Grandmother     No Known Problems Paternal Grandfather     No Known Problems Son     Cervical cancer Daughter     No Known Problems Daughter     No Known Problems Daughter     No Known Problems Daughter     Breast cancer Neg Hx     Ovarian cancer Neg Hx      Social History   Substance Use Topics    Smoking status: Current Every Day Smoker     Packs/day: 0.75     Years: 25.00     Types: Cigarettes    Smokeless tobacco: Never Used    Alcohol use No        Review of Systems   Constitutional: Negative.    HENT: Negative.    Eyes: Negative.    Respiratory: Negative.    Cardiovascular: Negative.    Gastrointestinal: Negative.    Endocrine: Negative.    Genitourinary: Negative.    Musculoskeletal: Negative for back pain, gait problem and neck pain.   Skin: Negative.    Allergic/Immunologic: Negative.    Neurological: Negative for weakness, light-headedness, numbness and headaches.   Hematological: Negative.    Psychiatric/Behavioral: Negative.        OBJECTIVE:   Vital Signs:       Physical Exam:    Vital signs: All nursing notes and vital signs reviewed -- afebrile, vital signs stable.  Constitutional: Patient sitting comfortably in chair. Appears well developed and well nourished.  Skin: Exposed areas are intact without abnormal markings, rashes or other lesions.  HEENT: Normocephalic. Normal conjunctivae.  Cardiovascular: Normal rate and regular  rhythm.  Respiratory: Chest wall rises and falls symmetrically, without signs of respiratory distress.  Abdomen: Soft and non-tender.  Extremities: Warm and without edema. Calves supple, non-tender.  Psych/Behavior: Normal affect.    Neurological:    Mental status: Alert and oriented. Conversational and appropriate.       Cranial Nerves: VFF to confrontation. PERRL. EOMI without nystagmus. Facial STLT normal and symmetric. Strong, symmetric muscles of mastication. Facial strength full and symmetric. Hearing equal bilaterally to finger rub. Palate and uvula rise and fall normally in midline. Shoulder shrug 5/5 strength. Tongue midline. Grossly intact.     Motor:    Upper:  Deltoids Triceps Biceps WE WF     R 5/5 5/5 5/5 5/5 5/5 5/5    L 5/5 5/5 5/5 5/5 5/5 5/5      Lower:  HF KE KF DF PF EHL    R 5/5 5/5 5/5 5/5 5/5 5/5    L 5/5 5/5 5/5 5/5 5/5 5/5     Sensory: Intact sensation to light touch in all extremities. Romberg positive.    Reflexes:          DTR: 1+ symmetrically throughout.     Dye's: Negative.     Babinski's: Negative.     Clonus: Negative.    Cerebellar: Finger-to-nose and rapid alternating movements normal. Gait stable, fluid.    Spine:    Posture: Head well aligned over pelvis in front and side views.  No focal or global spinal deformity visible on inspection. Shoulders and hips even. No obvious leg length discrepancy. No scapula winging.    Bending: Full ROM with forward, back and lateral bending. No rib prominence with forward bend. Restricted range of motion with left lateral rotation and extension.    Cervical:      ROM: Full with flexion, extension, lateral rotation and ear-to-shoulder bend.      Midline TTP: Negative.     Spurling's test: Negative.     Lhermitte's: Negative.    Thoracic:     Midline TTP: Negative    Lumbar:     Midline TTP: Negative     Straight Leg Test: Negative     Crossed Straight Leg Test: Negative     Sciatic notch tenderness: Negative.    Other:     SI joint TTP:  Negative.     Greater trochanter TTP: Negative.     Tenderness with external/internal hip rotation: Negative.     Highmore's sign: Positive     Diagnostic Results:  All imaging was independently reviewed by me.    MRI C-spine, dated 9/16/2015:  1. DDD C3/4-C6/7, worst at C4/5 and C6/7 causing moderate/severe central stenosis at C4/5, C5/6, and C6/7    Flex/Ex X-ray C-spine, dated 12/22/2017:  1. No gross instability    ASSESSMENT/PLAN:     Jonathan Gonzales has cervical spondylosis and cervicalgia. I had recommended a C6-7 ACD in the past, where she has a large disc bulge and central stenosis; however, she is neurologically stable and radicular arm pain is not her primary concern. I recommend facet injections from C4-5 through C6-7 for neck pain, as well as PT. She will follow up in 3 months for re-evaluation.    The patient understands and agrees with the plan of care. All questions were answered.     1. Referral to Pain Management  2. Referral to Physical Therapy  3. RTC 3 months       I, Dr. Tra Edwards personally performed the services described in this documentation. All medical record entries made by the scribe, Ramon Cooper, were at my direction and in my presence.  I have reviewed the chart and agree that the record reflects my personal performance and is accurate and complete.      Tra Edwards M.D.  Department of Neurosurgery  [facility]      .

## 2018-02-22 DIAGNOSIS — G56.00 CARPAL TUNNEL SYNDROME, UNSPECIFIED LATERALITY: ICD-10-CM

## 2018-02-22 NOTE — TELEPHONE ENCOUNTER
----- Message from Sourav Enriquez sent at 2/22/2018  8:29 AM CST -----  Contact: Self @ [phone]  Pt is asking to speak w/ Roula about getting another glove she prescribed for the pt last year. Pls call.

## 2018-02-23 NOTE — PROGRESS NOTES
CHIEF COMPLAINT:  Annual    HISTORY OF PRESENT ILLNESS: The patient is a 58 year-old black female.  The patient has a long and complicated medical history.      She continues to have problems with neuropathic pain as well as central pain.  She would like to be seen in the pain clinic this sounds reasonable.  We will restart her Lyrica 100 twice a day in the meantime.    The patient has a history of diabetes.  Recent blood sugars have been less than 120.  There have been no episodes of hypoglycemia.    The patient has a history of stable hypertension on current medications.  Patient denies chest pain or shortness of breath today.    The patient has a history of stable hyperlipidemia on current medications.  The patient denies chest pain or shortness of breath today.  The patient denies muscle aches or myalgias suggestive of myositis.    She has a history of breast cancer for which she underwent bilateral mastectomies and chemotherapy.    REVIEW OF SYSTEMS:  GENERAL: No fever, chills or weight loss.  SKIN: No rashes, itching or changes in color or texture of skin.  HEAD: No headaches or recent head trauma.  EYES: Visual acuity fine. No photophobia, ocular pain or diplopia.  EARS: Denies ear pain, discharge or vertigo.  NOSE: No loss of smell, no epistaxis or postnasal drip.  MOUTH & THROAT: No hoarseness or change in voice. No excessive gum bleeding.  NODES: Denies swollen glands.  CHEST: Denies AWAD, cyanosis, wheezing, cough and sputum production.  CARDIOVASCULAR: Denies chest pain, PND, orthopnea or reduced exercise tolerance.  ABDOMEN: Appetite fine. No weight loss. Denies diarrhea, abdominal pain, hematemesis or blood in stool.  URINARY: No flank pain, dysuria or hematuria.  PERIPHERAL VASCULAR: No claudication or cyanosis.  MUSCULOSKELETAL: No joint stiffness or swelling. Except as noted above.  NEUROLOGIC: No history of seizures, paralysis, alteration of gait or coordination.    SOCIAL HISTORY: The patient does  "not smoke.  The patient consumes alcohol socially.      PHYSICAL EXAMINATION:     Blood pressure 116/76, pulse 93, height 5' 3" (1.6 m), weight 75.4 kg (166 lb 3.6 oz), SpO2 97 %.    APPEARANCE: Well nourished, well developed, in no acute distress.  She is quite somnolent.  She falls asleep during the encounter.  She is easily arousable.  She says she didn't sleep well last night.  HEAD: Normocephalic, atraumatic.  EYES: PERRL. EOMI.  Conjunctivae without injection and  anicteric  EARS: TM's intact. Light reflex normal. No retraction or perforation.    NOSE: Mucosa pink. Airway clear.  MOUTH & THROAT: No tonsillar enlargement. No pharyngeal erythema or exudate. No stridor.  NECK: Supple.   NODES: No cervical, axillary or inguinal lymph node enlargement.  CHEST: Lungs clear to auscultation.  No retractions are noted.  No rales or rhonchi are present.  CARDIOVASCULAR: Normal S1, S2. No rubs, murmurs or gallops.  ABDOMEN: Bowel sounds normal. Not distended. Soft. No tenderness or masses.  No ascites is noted.  MUSCULOSKELETAL:  There is no clubbing, cyanosis, or edema of the extremities x4.  There is full range of motion of the lumbar spine.  There is full range of motion of the extremities x4.  There is no deformity noted.    NEUROLOGIC:       Normal speech development.      Hearing normal.      Normal gait.      Motor and sensory exams grossly normal.      DTR's normal.  PSYCHIATRIC: Patient is alert and oriented x3.  Thought processes are all normal.  There is no homicidality.  There is no suicidality.  There is no evidence of psychosis.    LABORATORY/RADIOLOGY:   Chart reviewed . including surgeries and blood work    ASSESSMENT:   Frequent urination and urge incontinence  Breast cancer with resultant lymphedema  Hypertension  Diabetes  Neuropathic pain  Cervical radiculopathy    PLAN:  Her diabetes is very well controlled with an A1c at 6.3 today.  Pain clinic   KCl 40 po qd  Prinzide  Pravachol 20 mg by mouth " daily    Return to clinic in 6 month with blood work prior

## 2018-02-26 PROBLEM — M54.41 CHRONIC BILATERAL LOW BACK PAIN WITH BILATERAL SCIATICA: Status: ACTIVE | Noted: 2018-02-26

## 2018-02-26 PROBLEM — M54.2 NECK PAIN: Status: ACTIVE | Noted: 2018-02-26

## 2018-02-26 PROBLEM — M54.42 CHRONIC BILATERAL LOW BACK PAIN WITH BILATERAL SCIATICA: Status: ACTIVE | Noted: 2018-02-26

## 2018-02-26 PROBLEM — G89.29 CHRONIC BILATERAL LOW BACK PAIN WITH BILATERAL SCIATICA: Status: ACTIVE | Noted: 2018-02-26

## 2018-02-27 PROBLEM — M54.2 NECK PAIN: Chronic | Status: ACTIVE | Noted: 2018-02-26

## 2018-02-27 NOTE — PLAN OF CARE
Name:Jonathan Gonzaels  Physician:Tra Edwards MD  Date of eval:2/27/2018  Orders:  Physical Therapy evaluate and treat  Clinical#:[id number]  Diagnosis:  1. Neck pain     2. Chronic bilateral low back pain with bilateral sciatica         Visit 1     SUBJECTIVE: Patient stated she was having cervical injections today and had to leave her evaluation early due to her ride.      Chief complaint: Patient is a 57 yo AAF referred to OP PT secondary neck and low back pain.    Radicular symptoms: tingling and numbness in to B UE and B LE    Bowel and Bladder incontinence: urinary incontinence, bowel constipation  Dizziness: none    Aggravating factors: riding in a car, sit to stand, turning in bed, prolonged standing, increased walking distances, and prolonged sitting.  Easing factors: pain medication, rest    Previous functional status includes: I with amb and ADL  Current functional status : I with amb and ADL      Allergies:    Review of patient's allergies indicates:   Allergen Reactions    Banana Hives and Nausea And Vomiting       Medical history:   Past Medical History:   Diagnosis Date    Bipolar disorder     Cancer of female breast 10/2010    T2 N1 Mx, Stage IIB left breast cancer    Cancer of upper-outer quadrant of female breast 7/9/2012    Candidiasis of vulva and vagina 6/29/2012    Depression     Diabetes mellitus type II     Disturbances of sensation of smell and taste 6/29/2012    Hypertension     Malignant neoplasm of breast (female), unspecified site 4/27/2015    Neuropathy     Nonspecific abnormal unspecified cardiovascular function study 4/12/2013    ECG READ AS SHOWING A T-WAVE ABNORMALITY     Post-mastectomy lymphedema syndrome     Postmastectomy lymphedema 6/29/2012    Schizophrenia     Thrush 6/29/2012       Past Surgical History:   Procedure Laterality Date    BREAST RECONSTRUCTION  11/23/11    B/L SHLOMO flap reconstruction S/P left MRM, right prophylactic mastectomy    BREAST  "RECONSTRUCTION Bilateral 3/13/2015    NIPPLE RECONSTRUCTION    MASTECTOMY Bilateral 01/2011    bilateral    TUBAL LIGATION         Medication:   Current Outpatient Prescriptions on File Prior to Visit   Medication Sig Dispense Refill    ammonium lactate (LAC-HYDRIN) 12 % lotion Apply topically 2 (two) times daily as needed for Dry Skin. 57 g 0    BD INSULIN PEN NEEDLE UF MINI 31 gauge x 3/16" Ndle       blood sugar diagnostic Strp Pt to check BG up to QID. Dispense strips compatible with pt brand meter 100 each 11    blood-glucose meter (FREESTYLE SYSTEM KIT) kit Pt to check BG up to QID. Dispense strips compatible with pt brand meter 1 each 0    clonazePAM (KLONOPIN) 0.5 MG tablet TK 1 T PO BID  3    divalproex (DEPAKOTE) 500 MG Tb24 Take 500 mg by mouth 2 (two) times daily. 500 mg in the morning, 1000 mg qhs      EASY TOUCH 31 gauge x 1/4" Ndle   5    GLUCOPHAGE 500 mg tablet       insulin glargine (LANTUS SOLOSTAR) 100 unit/mL (3 mL) InPn pen Inject 23 Units into the skin every evening. 15 mL 0    KLOR-CON M20 20 mEq tablet Take 1 tablet (20 mEq total) by mouth 2 (two) times daily. 60 tablet 5    lancets (ONETOUCH ULTRASOFT LANCETS) [facility] Pt to check BG up to QID. Dispense strips compatible with pt brand meter 100 each 11    lancets Misc 1 Device by Misc.(Non-Drug; Combo Route) route 4 (four) times daily. Please provide with appropriate lancets for device covered by insurance. 200 each 11    liraglutide 0.6 mg/0.1 mL, 18 mg/3 mL, subq PNIJ (VICTOZA 3-HERMANN) 0.6 mg/0.1 mL (18 mg/3 mL) PnIj Inject 1.8 mg into the skin once daily. 27 mL 3    lisinopril-hydrochlorothiazide (PRINZIDE,ZESTORETIC) 10-12.5 mg per tablet TAKE ONE TABLET BY MOUTH EVERY DAY 30 tablet 11    metFORMIN (GLUCOPHAGE) 500 MG tablet TAKE ONE TABLET BY MOUTH TWICE DAILY 60 tablet 11    olanzapine (ZYPREXA) 10 MG tablet TK 1 T PO QHS  3    oxybutynin (DITROPAN-XL) 10 MG 24 hr tablet Take 1 tablet (10 mg total) by mouth once daily. 30 " "tablet 11    pen needle, diabetic (BD ULTRA-FINE [place] PEN NEEDLES) 32 gauge x 5/32" Ndle Pt is injecting once daily 30 each 11    pen needle, diabetic, safety 29 gauge x 3/16" Ndle Use as instructed 200 each 11    pravastatin (PRAVACHOL) 20 MG tablet Take 1 tablet (20 mg total) by mouth once daily. 90 tablet 3    pregabalin (LYRICA) 100 MG capsule Take 1 capsule (100 mg total) by mouth 2 (two) times daily. 60 capsule 6    QUEtiapine (SEROQUEL) 100 MG Tab TAKE 1 TABLET(100 MG) BY MOUTH EVERY EVENING 90 tablet 0    traMADol (ULTRAM) 50 mg tablet       TRUE METRIX GLUCOSE METER Misc       TRUEPLUS LANCETS 30 gauge Misc USE TO TEST BLOOD SUGART QID  11    TRUEPLUS LANCETS 33 gauge Misc       ziprasidone (GEODON) 40 MG Cap Take 1 capsule (40 mg total) by mouth nightly. 90 capsule 1     No current facility-administered medications on file prior to visit.        Special tests:   MRI:   12/29/17  Findings:    Vertebral body height and alignment are maintained. Facets are aligned.  Bone marrow signal is unremarkable.     Visualized posterior fossa structures and cervical cord are unremarkable.    Limited evaluation of the neck soft tissues is unremarkable.    C2-3: Small central disc osteophyte complex present indenting the thecal sac without significant spinal canal stenosis. Neural foramen are patent.    C3-4: Central disc osteophyte complex present indenting the thecal sac with mild spinal canal stenosis. Neural foramen are patent.    C4-5: Disc height loss and posterior disc osteophyte complex present effacing the thecal sac and indenting the cervical cord without signal abnormality. Mild/moderate spinal canal stenosis present. Mild left neural foraminal narrowing.    C5-6: Posterior disc osteophyte complex present asymmetric to the left effacing the thecal sac and causing mild spinal canal stenosis. No significant left neural foraminal narrowing. There is moderate right neural foraminal narrowing secondary to " uncovertebral hypertrophy.    C6-7: Prominent left paracentral posterior disc osteophyte complex effacing the thecal sac and indenting the cervical cord without cord signal abnormality. This results in moderate left side spinal canal stenosis. Neural foramen are patent.    C7-T1: No spinal canal stenosis or neuroforaminal narrowing.      10/17/17    FINDINGS:     Alignment: Minimal grade 1 anterolisthesis of 2-3 mm L4-L5.    Vertebrae: Normal marrow signal. No fracture.    Discs:  Disc desiccation at L4-L5.    Cord: Normal. Conus terminates at L1-L2.    Degenerative findings:        T10-L1: Incompletely imaged. No obvious disc bulge, spinal canal stenosis, or foraminal stenosis.       L1-L4: No significant disc disease.  No spinal canal stenosis.  Mild facet arthropathy at L3-L4. No neural foraminal stenosis.       L4-L5: Aforementioned anterolisthesis with circumferential disc bulge and uncovering of the disc in conjunction with bilateral facet arthropathy results in severe spinal canal stenosis (more pronounced than 8/22/12) with mild foraminal stenoses.       L5-S1: No significant disc disease.  No central canal stenosis.  No neural foraminal stenosis.    Paraspinal muscles & soft tissues: Normal.             Xray:     12/22/17      Cervical spine with lateral flexion extension compared to September 2015.  There is significant disc space narrowing and osteophyte formation at the C4-5 level.  Mild narrowing at C5-6.  No subluxation on flexion or extension view.  Odontoid is intact.      10/17/17    Findings:   Degenerative changes throughout the lumbar spine better evaluated on same day MRI. There is left greater than right facet arthropathy throughout the lumbar spine, worse inferiorly. Degenerative disc disease at L4-5 and L5-S1. Grade one anterolisthesis of L4 on L5 (approximately 0.8 cm) without significant translation of listhesis on flexion/extension views. Vertebral body heights are maintained.  Paravertebral soft tissues are unremarkable.       Pts goals: to decrease her pain  Pain level with 0 being the lowest and 10 being the highest presently: neck - 8, low back - 7  Pain level with 0 being the lowest and 10 being the highest at worst: neck - 8, low back - 9     OBJECTIVE:   Postural examination in standing:  - increased lumbar lordosis  - forward head  - forward shoulder  - R anterior rotated pelvis, L posterior rotated pelvis    Postural examination in sitting:   - decreased lumbar lordosis  - forward head  - forward shoulders        Cervical AROM    flexion 10 deg   extension 32 deg   R rotation 25 deg   L rotation  25 deg   R side bending 15 deg   L side bending 10 deg     UE MMT R L   C3 Cervical side bend 3-/5 3-/5   C4 Shoulder Shrug 5/5  5/5   Shoulder flexion 3+/5 3+/5   Shoulder abduction 3-/5 2-/5   Shoulder IR 5/5 5/5   Shoulder ER 3+/5 3+/5   C5 Elbow flexion 5/5 5/5   C7 Elbow extension 5/5 5/5   C6 Wrist extension NT 5/5   Wrist flexion NT 5/5   Scapular protraction 4-/5 5/5   Mid Traps 3-/5 3-/5   Lower traps NT NT     Flexibility testing:  - hamstrings:    B: tight, R: -30 deg, L: -35 deg                      - piriformis:       B: tight, decreased 50%              - quadriceps:    B: tight, decreased 50%              - hip adductors: B: tight, decreased 50%  - IT bands:         B: WFL      MMT R L   Hip flexion 5/5 5/5   Hip abduction 3-/5 3-/5   Hip extension 3-/5 3-/5   Hip ER 4/5 5/5   Hip IR 4/5 5/5   Knee extension 5/5 5/5   Knee flexion 5/5 5/5   Ankle dorsiflexion 5/5 5/5   Ankle plantar flexion 5/5 5/5     Lumbar Special Tests    SLR: B hamstring tightness   Cross SLR: negative   RON Positive B   Prone Instability positive     Sensation:  - Light Touch: Diabetic neuropathy  - Saddle: intact    Reflexes:   - Knee Jerk: 1+ B    Other:   Functional Limitations  Reports - G Codes    Category:  Changing and Maintaining Body Position   Tool:  [employer] Measurement System.    Score:  Patient scored out 36 of 100 on the FOTO Outcomes Measurement System.   Current:   CL at least 60% < 80% impaired, limited or restricted   Goal:   CK at least 40% < 60% impaired, limited or restricted     Patient History Examination Clinical Presentation Clinical Decision Making   Comorbidities:  Bipolar   Depression  Schizophrenia  DM  R CTS    Personal Factors:         Activity and Participation Restriction:      Body Systems:  Musculoskeletal: strength    Body Regions: neck, back, LE Evolving clinical presentation with changing clinical characteristics     Mod      FOTO: 60% < 80% impaired, limited or restricted         Endurance is good.    TREATMENT: evaluation, mod complexity    No cultural, environmental, or spiritual barriers identified to treatment or learning.      ASSESSMENT:  PT diagnosis: neck pain, low back pain, poor posture, pelvic malalignment, weak core, weak scapular stabilizers, decreased B LE strength and flexibility.   Patient can benefit from outpatient physical therapy and a home program.  Treatment will be directed at improving the following impairments.  Prognosis is fair. Patient to have cervical injections this afternoon. Will proceed with scapular strengthening and core strengthening to decrease pain and achieve below listed goals.     Medical necessity is demonstrated by the following  IMPAIRMENTS:  - poor posture  - pain  - decreased flexibility  - decreased muscle strength  - impaired function    GOALS: 6-8  weeks. Pt agrees with goals set.  1. Independent with HEP.  2. Report decreased cervical pain < or =  4/10 with adls such as riding in a car, turning in bed, and prolonged sitting.  3. Increased MMT for B UE by 1 muscle grade to promote proper scapular stabilization to decrease cervical pain < or =  4/10 with adls such as riding in a car, turning in bed, and prolonged sitting.  4. Report decreased lumbar pain < or =  4/10 with adls such as riding in a car, sit  to stand, turning in bed, prolonged standing, increased walking distances, and prolonged sitting.   5. Increased MMT for B LE by 1 muscle grade to promote proper pelvic stability to decrease  Increased flexibility in B hamstrings to -20 deg with 90/90 test to promote proper pelvic stability to decrease lumbar pain < or =  4/10 with adls such as riding in a car, sit to stand, turning in bed, prolonged standing, increased walking distances, and prolonged sitting.    6. Increased flexibility in B hip adductors, B piriformis, B hip flexors, and B quads to promote proper pelvic stability to decrease lumbar pain < or =  4/10 with adls such as riding in a car, sit to stand, turning in bed, prolonged standing, increased walking distances, and prolonged sitting.    7. Patient to achieve CK (at least 40% < 60% impaired, limited or restricted) level on the FOTO Outcomes Measurement System.    PLAN:  Outpatient physical therapy 2 times weekly to include: pt ed, hep, therapeutic exercises, neuromuscular re-education/ balance exercises, joint mobilizations, modalities prn. Pt may be seen by PTA to carry out plan of care.      Cont PT for 6-8 weeks.     I certify the need for these services furnished under this plan of treatment and while under my care.    ____________________________________ Physician/Referring Practitioner                                                    Date of Signature

## 2018-03-07 ENCOUNTER — TELEPHONE (OUTPATIENT)
Dept: INTERNAL MEDICINE | Facility: CLINIC | Age: 58
End: 2018-03-07

## 2018-03-07 NOTE — TELEPHONE ENCOUNTER
Pt states that the company fax over form that is needed for her diabetic shoes. Will contact company to see if that they can fax it again. Call [facility] and they will refax.

## 2018-03-07 NOTE — TELEPHONE ENCOUNTER
----- Message from Bhavani Boone sent at 3/7/2018  9:43 AM CST -----  Contact: pt  _  1st Request  _  2nd Request  _  3rd Request        Who: pt    Why: Requesting a call back in regards to needs new diabetic shoes. Please call pt   Please return the call at earliest convenience. Thanks!    What Number to Call Back:[phone]      When to Expect a call back: (Within 24 hours)

## 2018-03-07 NOTE — TELEPHONE ENCOUNTER
----- Message from Anjana Pelletier sent at 3/7/2018  9:46 AM CST -----  Contact: [facility] [employer] / Ph# [phone]  _  1st Request  _  2nd Request  X  3rd Request    Who: Jonathan Gonzales  (mrn# [id number])    Why: Please send over patient chart notes and an order for patient's diabetic shoes. Please do so at your earliest convenience.   THANKS!    What Number to Call Back: [facility] [employer] / Ph# [phone]    When to Expect a call back: (Before the end of the day)   -- if the call is after 12:00, the call back will be tomorrow.

## 2018-03-14 ENCOUNTER — TELEPHONE (OUTPATIENT)
Dept: INTERNAL MEDICINE | Facility: CLINIC | Age: 58
End: 2018-03-14

## 2018-03-14 NOTE — TELEPHONE ENCOUNTER
----- Message from Ronal Bradley sent at 3/14/2018  9:12 AM CDT -----  Contact: Lon from [employer]  _  1st Request  _  2nd Request  X_  3rd Request        Who: Lon from [employer]    Why: Following up paperwork for diabetic shoes       Please return the call at earliest convenience. Thanks!    What Number to Call Back: [phone]      When to Expect a call back: (Within 24 hours)

## 2018-03-14 NOTE — TELEPHONE ENCOUNTER
----- Message from Ronal Bradley sent at 3/14/2018  9:12 AM CDT -----  Contact: Lon from [facility] [employer]  _  1st Request  _  2nd Request  X_  3rd Request        Who: Lon from [employer]    Why: Following up paperwork for diabetic shoes       Please return the call at earliest convenience. Thanks!    What Number to Call Back: [phone]      When to Expect a call back: (Within 24 hours)

## 2018-03-23 ENCOUNTER — TELEPHONE (OUTPATIENT)
Dept: INTERNAL MEDICINE | Facility: CLINIC | Age: 58
End: 2018-03-23

## 2018-03-23 NOTE — TELEPHONE ENCOUNTER
----- Message from Darlene Brito sent at 3/23/2018  9:15 AM CDT -----  Contact: Lars REDDY  1st Request  _ 2nd Request  _ 3rd Request    Who:Lon from [employer]    Why: Following up paperwork for diabetic shoes     What Number to Call Back:[phone]     When to Expect a call back: (Before the end of the day)  -- if call after 3:00 call back will be tomorrow.

## 2018-03-28 ENCOUNTER — TELEPHONE (OUTPATIENT)
Dept: INTERNAL MEDICINE | Facility: CLINIC | Age: 58
End: 2018-03-28

## 2018-03-28 NOTE — TELEPHONE ENCOUNTER
----- Message from Deena Moreno sent at 3/28/2018  3:41 PM CDT -----  Contact: Lon ([facility])  x_  1st Request  _  2nd Request  _  3rd Request    Who: Lon ([facility] [employer])    Why: Lon would like to speak with staff to verify that a fax that was sent over on yesterday.    What Number to Call Back:[phone]    When to Expect a call back: (Within 24 hours)    Please return the call at earliest convenience. Thanks!

## 2018-03-28 NOTE — TELEPHONE ENCOUNTER
----- Message from Shirley Simon sent at 3/28/2018  3:56 PM CDT -----  Contact: Self  X   1st Request  _  2nd Request  _  3rd Request        Who: JOYA MUNOZ [[id number]]    Why: Requesting a call back in regards to a paper for diabetic shoes from [facility] [employer] that needs to be completed. Pt states [facility] would like to be contacted at [phone].    Please return the call at earliest convenience. Thanks!    What Number to Call Back: [phone]      When to Expect a call back: (Within 24 hours)

## 2018-04-03 NOTE — TELEPHONE ENCOUNTER
----- Message from Darlene Lopez sent at 4/3/2018 10:26 AM CDT -----  Contact: pt   x 1st Request  _ 2nd Request  _ 3rd Request    Who: Lon from [facility] [employer]    Why: Requesting a call back in regards to clinic notes and foot exam notes for pt diabetic shoes from [facility] that needs to be completed. Please call and advise.     What Number to Call Back: [phone]     When to Expect a call back: (Before the end of the day)  -- if call after 3:00 call back will be tomorrow.

## 2018-04-11 ENCOUNTER — TELEPHONE (OUTPATIENT)
Dept: INTERNAL MEDICINE | Facility: CLINIC | Age: 58
End: 2018-04-11

## 2018-04-11 NOTE — TELEPHONE ENCOUNTER
----- Message from Anjana Pelletier sent at 4/11/2018 11:59 AM CDT -----  Contact: [facility]  / Office# [phone] / Fax# [phone]      Name of Who is Calling: [facility]       What is the request in detail:  [facility] is calling for copies chart notes and foot exam for this patient faxed over.  Please give a call back at your earliest convenience.     THANKS!      Can the clinic reply by [facility]:   No      What Number [facility] Back [facility]: [phone]  /  Fax# [phone]

## 2018-04-13 ENCOUNTER — TELEPHONE (OUTPATIENT)
Dept: INTERNAL MEDICINE | Facility: CLINIC | Age: 58
End: 2018-04-13

## 2018-04-13 NOTE — TELEPHONE ENCOUNTER
----- Message from Deena Moreno sent at 4/13/2018  2:29 PM CDT -----  Name of Who is Calling: Lon ([facility] [employer])      What is the request in detail: Neither of the chart notes that were sent over are signed by the doctor. Please resend signed chart notes      Can the clinic reply by [facility]: No      What Number to Call Back if not in [facility]: [phone]

## 2018-04-13 NOTE — TELEPHONE ENCOUNTER
----- Message from Deena Moreno sent at 4/13/2018  2:29 PM CDT -----  Name of Who is Calling: Lon ([employer] [facility] [employer])      What is the request in detail: Neither of the chart notes that were sent over are signed by the doctor. Please resend signed chart notes      Can the clinic reply by [facility]: No      What Number to Call Back if not in [facility]: [phone]

## 2018-04-18 ENCOUNTER — TELEPHONE (OUTPATIENT)
Dept: INTERNAL MEDICINE | Facility: CLINIC | Age: 58
End: 2018-04-18

## 2018-04-18 NOTE — TELEPHONE ENCOUNTER
----- Message from Meri Lozano sent at 4/18/2018  8:32 AM CDT -----  Contact: Lon   Name of Who is Calling: Lon with [facility]       What is the request in detail:Lon with [facility] states the chart notes and order for diabetic shoes was not signed she has faxed it back to the office and would like the staff to faxed it back signed to [phone]..... Please contact to further discuss and advise       Can the clinic reply by [facility]: No      What Number to Call Back [facility] not [facility]: [phone]

## 2018-04-19 ENCOUNTER — TELEPHONE (OUTPATIENT)
Dept: PODIATRY | Facility: CLINIC | Age: 58
End: 2018-04-19

## 2018-04-19 NOTE — TELEPHONE ENCOUNTER
Called pt, no answer. Left voice message requesting a call back at [phone].              ----- Message from Ani Hobbs MA sent at 4/18/2018 11:12 AM CDT -----  Contact: Self [phone]  This patient has been seen by him before but I didn't know if she needed an appointment just to get a rx for shoes.    ----- Message -----  From: Mariama Cornejo  Sent: 4/18/2018   8:43 AM  To: [facility] Staff    Patient is requesting a call back from the nurse to schedule an appointment so she can get an rx for her shoes.  I attempted to schedule but was not given any times.    Patient would like to be seen at the [facility] location and may be reached at [phone].    Thank you.  JERI

## 2018-05-07 ENCOUNTER — OFFICE VISIT (OUTPATIENT)
Dept: SPINE | Facility: CLINIC | Age: 58
End: 2018-05-07
Payer: MEDICARE

## 2018-05-07 VITALS
DIASTOLIC BLOOD PRESSURE: 72 MMHG | HEART RATE: 77 BPM | BODY MASS INDEX: 29.95 KG/M2 | WEIGHT: 169 LBS | HEIGHT: 63 IN | SYSTOLIC BLOOD PRESSURE: 99 MMHG

## 2018-05-07 DIAGNOSIS — M54.2 CERVICALGIA: Primary | ICD-10-CM

## 2018-05-07 DIAGNOSIS — M43.16 SPONDYLOLISTHESIS OF LUMBAR REGION: ICD-10-CM

## 2018-05-07 PROCEDURE — 99213 OFFICE O/P EST LOW 20 MIN: CPT | Mod: PBBFAC | Performed by: NEUROLOGICAL SURGERY

## 2018-05-07 PROCEDURE — 99999 PR PBB SHADOW E&M-EST. PATIENT-LVL III: CPT | Mod: PBBFAC,,, | Performed by: NEUROLOGICAL SURGERY

## 2018-05-07 PROCEDURE — 99213 OFFICE O/P EST LOW 20 MIN: CPT | Mod: S$GLB,,, | Performed by: NEUROLOGICAL SURGERY

## 2018-05-07 NOTE — PROGRESS NOTES
CHIEF COMPLAINT:  Follow up after injections and physical therapy    I, Ramon Cooper, attest that this documentation has been prepared under the direction and in the presence of Tra Edwards MD.    HPI:  Jonathan Gonzales is a 58 y.o.  female with depression, bipolar disorder, hypertension, diabetes mellitus type II, breast cancer stage II status post mastectomy, and schizophrenia, who presents today for a follow up evaluation after facet joint injections C4/5-C6/7. Pt has not begun physical therapy. Pt reports that she had received a carpal tunnel glove but it was misplaced when she was having an IV placed. Pt notes falling while she was getting dressed.  She states that her neck pain feels like pins and seems to have improved. Her BUE arm pain is intermittent.   Pt reports low back pain describing it as aching and feeling sore. Pt also endorses BLE pain radiating down the leg.     Pt presents today with a cane      Review of patient's allergies indicates:   Allergen Reactions    Banana Hives and Nausea And Vomiting       Past Medical History:   Diagnosis Date    Bipolar disorder     Cancer of female breast 10/2010    T2 N1 Mx, Stage IIB left breast cancer    Cancer of upper-outer quadrant of female breast 7/9/2012    Candidiasis of vulva and vagina 6/29/2012    Depression     Diabetes mellitus type II     Disturbances of sensation of smell and taste 6/29/2012    Hypertension     Malignant neoplasm of breast (female), unspecified site 4/27/2015    Neuropathy     Nonspecific abnormal unspecified cardiovascular function study 4/12/2013    ECG READ AS SHOWING A T-WAVE ABNORMALITY     Post-mastectomy lymphedema syndrome     Postmastectomy lymphedema 6/29/2012    Schizophrenia     Thrush 6/29/2012     Past Surgical History:   Procedure Laterality Date    BREAST RECONSTRUCTION  11/23/11    B/L SHLOMO flap reconstruction S/P left MRM, right prophylactic mastectomy    BREAST RECONSTRUCTION Bilateral  3/13/2015    NIPPLE RECONSTRUCTION    MASTECTOMY Bilateral 01/2011    bilateral    TUBAL LIGATION       Family History   Problem Relation Age of Onset    Kidney disease Mother         Dialysis    Hypertension Mother     Diabetes Mother     Deep vein thrombosis Mother     Diabetic kidney disease Sister     Lung cancer Sister     Diabetes Sister     Diabetes Brother     Diabetes Son     No Known Problems Father     No Known Problems Maternal Grandmother     No Known Problems Maternal Grandfather     No Known Problems Paternal Grandmother     No Known Problems Paternal Grandfather     No Known Problems Son     Cervical cancer Daughter     No Known Problems Daughter     No Known Problems Daughter     No Known Problems Daughter     Breast cancer Neg Hx     Ovarian cancer Neg Hx      Social History   Substance Use Topics    Smoking status: Current Every Day Smoker     Packs/day: 0.75     Years: 25.00     Types: Cigarettes    Smokeless tobacco: Never Used    Alcohol use No        Review of Systems   Constitutional: Negative.    HENT: Negative.    Eyes: Negative.    Respiratory: Negative.    Cardiovascular: Negative.    Gastrointestinal: Negative.    Endocrine: Negative.    Genitourinary: Negative.    Musculoskeletal: Positive for back pain, myalgias (BUE;BLE) and neck pain. Negative for gait problem.   Skin: Negative.    Allergic/Immunologic: Negative.    Neurological: Negative for weakness, light-headedness, numbness and headaches.   Hematological: Negative.    Psychiatric/Behavioral: Negative.        OBJECTIVE:   Vital Signs:  Pulse: 77 (05/07/18 0905)  BP: 99/72 (05/07/18 0905)    Physical Exam:    Vital signs: All nursing notes and vital signs reviewed -- afebrile, vital signs stable.  Constitutional: Patient sitting comfortably in chair. Appears well developed and well nourished.  Skin: Exposed areas are intact without abnormal markings, rashes or other lesions.  HEENT: Normocephalic. Normal  conjunctivae.  Cardiovascular: Normal rate and regular rhythm.  Respiratory: Chest wall rises and falls symmetrically, without signs of respiratory distress.  Abdomen: Soft and non-tender.  Extremities: Warm and without edema. Calves supple, non-tender.  Psych/Behavior: Normal affect.    Neurological:    Mental status: Alert and oriented. Conversational and appropriate.       Cranial Nerves: Grossly intact.     Motor:    Upper:  Deltoids Triceps Biceps WE WF     R 5/5 5/5 5/5 5/5 5/5 5/5    L 5/5 5/5 5/5 5/5 5/5 5/5      Lower:  HF KE KF DF PF EHL    R 5/5 5/5 5/5 5/5 5/5 5/5    L 5/5 5/5 5/5 5/5 5/5 5/5     Debby's sign: Positive     Sensory: Intact sensation to light touch in all extremities. Romberg positive.    Reflexes:          DTR: 1+ symmetrically throughout.     Dye's: Negative.     Babinski's: Negative.     Clonus: Negative.    Cerebellar: Finger-to-nose and rapid alternating movements normal. Gait stable, fluid.    Spine:    Posture: Head well aligned over pelvis in front and side views.  No focal or global spinal deformity visible on inspection. Shoulders and hips even. No obvious leg length discrepancy. No scapula winging.    Bending: Full ROM with forward, back and lateral bending. No rib prominence with forward bend.    Cervical:      ROM: Full with flexion and ear-to-shoulder bend. Restricted range of motion with left lateral rotation and extension.     Midline TTP: Negative.     Spurling's test: Negative.     Lhermitte's: Negative.    Thoracic:     Midline TTP: Negative    Lumbar:     Midline TTP: Negative     Straight Leg Test: Negative     Crossed Straight Leg Test: Negative     Sciatic notch tenderness: Negative.    Other:     SI joint TTP: Negative.     Greater trochanter TTP: Negative.     Tenderness with external/internal hip rotation: Negative.    Diagnostic Results:  All imaging was independently reviewed by me.    MRI L-spine, 10/17/2017:  1. Grade 1 spondylolisthesis at L4/5   2.  Moderate central stenosis and mild neuroforaminal stenosis     ASSESSMENT/PLAN:     Jonathan Gonzales has degenerative cervical stenosis with cervicalgia and radicular arm pain, both of which have improved after cervical injections. She has not tried PT yet. Her primary complaint now is axial low back pain and bilateral radicular leg pain. Imaging shows a grade 1 degenerative spondylolisthesis. She has not tried PT or injections for this problem. I will order both and she will follow up with me in 3 months.    The patient understands and agrees with the plan of care. All questions were answered.     1. Referral to Pain Management for injections at L4/5   2. Referral to Physical Therapy for neck and low back  3. RTC 3 months       I, Dr. Tra Edwards personally performed the services described in this documentation. All medical record entries made by the scribe, Ramon Cooper, were at my direction and in my presence.  I have reviewed the chart and agree that the record reflects my personal performance and is accurate and complete.      Tra Edwards M.D.  Department of Neurosurgery  [facility]      .

## 2018-05-07 NOTE — PATIENT INSTRUCTIONS
1. Referral to Pain Management for injections at L4/5   2. Referral to Physical Therapy for neck and low back  3. RTC 3 months

## 2018-05-20 DIAGNOSIS — M47.812 CERVICAL SPONDYLOSIS WITHOUT MYELOPATHY: ICD-10-CM

## 2018-05-21 RX ORDER — QUETIAPINE FUMARATE 100 MG/1
TABLET, FILM COATED ORAL
Qty: 90 TABLET | Refills: 0 | Status: SHIPPED | OUTPATIENT
Start: 2018-05-21 | End: 2019-04-09

## 2018-05-22 ENCOUNTER — CLINICAL SUPPORT (OUTPATIENT)
Dept: REHABILITATION | Facility: HOSPITAL | Age: 58
End: 2018-05-22
Attending: NEUROLOGICAL SURGERY
Payer: MEDICARE

## 2018-05-22 DIAGNOSIS — M62.81 MUSCLE WEAKNESS: ICD-10-CM

## 2018-05-22 DIAGNOSIS — M43.16 SPONDYLOLISTHESIS OF LUMBAR REGION: ICD-10-CM

## 2018-05-22 DIAGNOSIS — M25.60 JOINT STIFFNESS: ICD-10-CM

## 2018-05-22 PROCEDURE — 97161 PT EVAL LOW COMPLEX 20 MIN: CPT | Mod: PN

## 2018-05-22 PROCEDURE — 97110 THERAPEUTIC EXERCISES: CPT | Mod: PN

## 2018-05-22 NOTE — PLAN OF CARE
OUTPATIENT PHYSICAL THERAPY   PATIENT EVALUATION  Onset Date: s/p double mastectomy 2011  Primary Diagnosis:   1. Spondylolisthesis of lumbar region      2. Joint stiffness      3. Muscle weakness         Treatment Diagnosis: see above       Past Medical History:   Diagnosis Date    Bipolar disorder      Cancer of female breast 10/2010     T2 N1 Mx, Stage IIB left breast cancer    Cancer of upper-outer quadrant of female breast 7/9/2012    Candidiasis of vulva and vagina 6/29/2012    Depression      Diabetes mellitus type II      Disturbances of sensation of smell and taste 6/29/2012    Hypertension      Malignant neoplasm of breast (female), unspecified site 4/27/2015    Neuropathy      Nonspecific abnormal unspecified cardiovascular function study 4/12/2013     ECG READ AS SHOWING A T-WAVE ABNORMALITY     Post-mastectomy lymphedema syndrome      Postmastectomy lymphedema 6/29/2012    Schizophrenia      Thrush 6/29/2012      Precautions: CA remission, fall risk  Prior Therapy: lymph edema  Medications: Jonathan Gonzales has a current medication list which includes the following prescription(s): ammonium lactate, arm brace, bd ultra-fine mini pen needle, blood sugar diagnostic, blood-glucose meter, clonazepam, divalproex er, easy touch, glucophage, insulin glargine, klor-con m20, lancets, lancets, liraglutide 0.6 mg/0.1 ml (18 mg/3 ml) subq pnij, lisinopril-hydrochlorothiazide, metformin, olanzapine, oxybutynin, pen needle, diabetic, pen needle, diabetic, safety, pravastatin, pregabalin, quetiapine, tramadol, true metrix glucose meter, trueplus lancets, trueplus lancets, and ziprasidone.  Nutrition:  Overweight     Prior Level of Function: Assistive Device  Social History:  Disability (2011), uses transportation service, lives with boyfriend, sedentary  Place of Residence (Steps/Adaptations): [facility]        Subjective      Jonathan Gonzales is a 58 year old female presenting with c/o neck and low back  pain with bilateral upper and lower extremity radiculopathy. She reports an onset in 2011 s/p double mastectomy due to malignant neoplasm. She is currently in remission.  She has been using a cane for a year in her home and in the community. She does use an electric scooter in the grocery store.  She reports a history of falls with the most recent 3 months ago while putting on her clothes. Her goal is to ease the pain. She states she would like to return to dancing and decrease the use of an assistive device.      Pain:  Location:  Left cervical, lumbar     Activities Which Increase Pain: lifting, reaching, walking (1/2 block), sitting worse than standing, household chores  Activities Which Decrease Pain: sitting, lying down  Pain Scale: 5/10 at best 7/10 now  8/10 at worst     Objective      Observation: Pt presents ambulating with a SPC, guarded gait, decreased lumbopelvic mobility, decreased arm swing, decreased step length and velocity. Stands with rounded shoulders, forward head posture.  Palpation: moderate tenderness on palpation of cervical muscular globally, L1-L5 TP's, paraspinals, QL,           Range of Motion/Strength:       AROM: Cervical lumbar   Flexion 20 18   Extension 15 5   Right side bending 12 10   Left side bending 12 10   Right Rotation 15 NP   Left 20 Np   Cervical quadrant: reveals increase in discomfort in all planes.   Lumbar quadrant: reveals increase in discomfort in all planes.       Strength: 4-/5 bilaterally     AROM: Bilateral UE:  Impaired to 90 deg elevation.. MMT 3+/5   BLE, MMT Right LE:  3+ /5 Left LE: 3+/5, AROM, WFL   Abdominal Strength: 3/5     Mobility: L/S:1+    C/S 1+     Flexibility: hip flexor length:poor      Hamstring Length:poor     Bed Mobility:Independent, modified  Transfers: Independent, modified     Special Tests:   -LLD:negative  -Slump: Negative  -SLR: negative  -Hip scour: negative  -Nancy's: Negative  -SIJ gapping and compression: Negative  -Spurlings:  negative  Positive general neural tension     Treatment:   Pt received therapeutic exercises to develop strength, endurance, ROM, flexibility, posture and core stabilization for 15 minutes including:  -LTR x 20  -hip fall out  -cervical AROM x 5  -supine wand flexion x 15  -scapular retraction 2 x 10     Pt received manual therapy to improve mobility for 10 minutes: NP, unable to tolerate     Pt was instructed in and given a home exercise program consisting of the above activities.         Assessment      Pt presents with signs and symptoms consistent with referring diagnosis. Evaluation has determined a decrease in functional status and subjective and objective deficits that can be addressed by physical therapy intervention. Pt demonstrates pain limiting functional activities. Decreased flexibility and strength limiting normal movement patterns. Decreased segmental motion. Decreased postural strength and awareness. Positive special testing. Decreased participation in functional and recreational activities. Subjective and objective measures are addressed by goals in the plan of care.  Patient/family are involved in the development of these goals. Patient/family are educated about current injury and treatment. Pt demonstrates no additional cultural, spiritual or educational need and currently has no barriers to learning.        Pt responded well to treatment today. Pt is a good candidate for skilled physical therapy intervention and has a good prognosis and is motivated to return to functional and  recreational activities.     Rehab Potential: good      History  Co-morbidities and personal factors that may impact the plan of care Examination  Body Structures and Functions, activity limitations and participation restrictions that may impact the plan of care Clinical Presentation    Decision Making/ Complexity Score   Co-morbidities:    Bipolar disorder   Cancer of female breast   T2 N1 Mx, Stage IIB left breast cancer    Cancer of upper-outer quadrant of female breast   Candidiasis of vulva and vagina   Depression   Diabetes mellitus type II       Hypertension   Malignant neoplasm of breast (female), unspecified site   Neuropathy   Nonspecific abnormal unspecified cardiovascular function study       Post-mastectomy lymphedema syndrome       Schizophrenia                     Personal Factors:   transportation Body Regions: cervical, lumbar     Body Systems: musculoskeletal              Activity limitations: lifting, reaching, walking (1/2 block), sitting worse than standing,      Participation Restrictions:  household chores     , dancing       stable    Low            Short Term Goals (4 Weeks):      1.Pt to increase strength by a 1/2 grade of muscles test to allow for improvement in functional activities such as performing chores.  2.Pt to improve range of motion by 25% to allow for improved functional mobility to allow for improvement in IADLs.   3.Pt to report compliance with HEP and demonstrate proper exercise technique to PT to show competence with self management of condition.  4.Decrease pain by 25% during functional activities.     Long Term Goals (12 Weeks):      1. Increase ROM to allow improved joint biomechanics during functional activities.   2.Increase trunk, upper and lower extremity strength to within normal limits during functional activities.   3. Independent with home exercise program.   4. Full return to functional activities with manageable complaints.  5. Patient to demonstrate improved posture and body mechanics.  6. Decrease pain by 75% during functional activities.     CMS Impairment/Limitation/Restriction for FOTO Lumbar Spine Survey  Status Limitation G-Code CMS Severity Modifier  Intake 47% 53% Current Status CK - At least 40 percent but less than 60 percent  Predicted 54% 46% Goal Status+ CK - At least 40 percent but less than 60 percent     Plan      Certification Period: 5/22/18 to  8/22/18     Recommended Treatment Plan: 2-3 times per week for 12 weeks with treatments to consist of:  Neuromuscular and postural re-education,  training, therapeutic exercise, therapeutic activities,balance training, manual therapy, soft tissue mobilization, ROM exercises, Cardiovascular, Postural stabilization, manual traction, spinal mobilization, moist heat, cryotherapy, dry needling, electrical stimulation, kinesiotaping, ultrasound, home exercise education and planning.           Therapist: Jeffry Ashraf, PT

## 2018-05-22 NOTE — PROGRESS NOTES
TIME RECORD    Date: 05/22/2018    Start Time:  1:00  Stop Time:  2:00    Total Timed Minutes:  60 minutes      OUTPATIENT PHYSICAL THERAPY   PATIENT EVALUATION  Onset Date: s/p double mastectomy 2011  Primary Diagnosis:   1. Spondylolisthesis of lumbar region     2. Joint stiffness     3. Muscle weakness       Treatment Diagnosis: see above  Past Medical History:   Diagnosis Date    Bipolar disorder     Cancer of female breast 10/2010    T2 N1 Mx, Stage IIB left breast cancer    Cancer of upper-outer quadrant of female breast 7/9/2012    Candidiasis of vulva and vagina 6/29/2012    Depression     Diabetes mellitus type II     Disturbances of sensation of smell and taste 6/29/2012    Hypertension     Malignant neoplasm of breast (female), unspecified site 4/27/2015    Neuropathy     Nonspecific abnormal unspecified cardiovascular function study 4/12/2013    ECG READ AS SHOWING A T-WAVE ABNORMALITY     Post-mastectomy lymphedema syndrome     Postmastectomy lymphedema 6/29/2012    Schizophrenia     Thrush 6/29/2012     Precautions: CA remission, fall risk  Prior Therapy: lymph edema  Medications: Jonathan Gonzales has a current medication list which includes the following prescription(s): ammonium lactate, arm brace, bd ultra-fine mini pen needle, blood sugar diagnostic, blood-glucose meter, clonazepam, divalproex er, easy touch, glucophage, insulin glargine, klor-con m20, lancets, lancets, liraglutide 0.6 mg/0.1 ml (18 mg/3 ml) subq pnij, lisinopril-hydrochlorothiazide, metformin, olanzapine, oxybutynin, pen needle, diabetic, pen needle, diabetic, safety, pravastatin, pregabalin, quetiapine, tramadol, true metrix glucose meter, trueplus lancets, trueplus lancets, and ziprasidone.  Nutrition:  Overweight    Prior Level of Function: Assistive Device  Social History:  Disability (2011), uses transportation service, lives with boyfriend, sedentary  Place of Residence (Steps/Adaptations):  [facility]      Subjective     [place] Gonzales is a 58 year old female presenting with c/o neck and low back pain with bilateral upper and lower extremity radiculopathy. She reports an onset in 2011 s/p double mastectomy due to malignant neoplasm. She is currently in remission.  She has been using a cane for a year in her home and in the community. She does use an electric scooter in the grocery store.  She reports a history of falls with the most recent 3 months ago while putting on her clothes. Her goal is to ease the pain. She states she would like to return to dancing and decrease the use of an assistive device.     Pain:  Location:  Left cervical, lumbar    Activities Which Increase Pain: lifting, reaching, walking (1/2 block), sitting worse than standing, household chores  Activities Which Decrease Pain: sitting, lying down  Pain Scale: 5/10 at best 7/10 now  8/10 at worst    Objective     Observation: Pt presents ambulating with a SPC, guarded gait, decreased lumbopelvic mobility, decreased arm swing, decreased step length and velocity. Stands with rounded shoulders, forward head posture.  Palpation: moderate tenderness on palpation of cervical muscular globally, L1-L5 TP's, paraspinals, QL,        Range of Motion/Strength:      AROM: Cervical lumbar   Flexion 20 18   Extension 15 5   Right side bending 12 10   Left side bending 12 10   Right Rotation 15 NP   Left 20 Np   Cervical quadrant: reveals increase in discomfort in all planes.   Lumbar quadrant: reveals increase in discomfort in all planes.      Strength: 4-/5 bilaterally    AROM: Bilateral UE:  Impaired to 90 deg elevation.. MMT 3+/5   BLE, MMT Right LE:  3+ /5 Left LE: 3+/5, AROM, WFL   Abdominal Strength: 3/5    Mobility: L/S:1+    C/S 1+    Flexibility: hip flexor length:poor      Hamstring Length:poor    Bed Mobility:Independent, modified  Transfers: Independent, modified    Special Tests:   -LLD:negative  -Slump: Negative  -SLR: negative  -Hip  scour: negative  -Nancy's: Negative  -SIJ gapping and compression: Negative  -Spurlings: negative  Positive general neural tension    Treatment:   Pt received therapeutic exercises to develop strength, endurance, ROM, flexibility, posture and core stabilization for 15 minutes including:  -LTR x 20  -hip fall out  -cervical AROM x 5  -supine wand flexion x 15  -scapular retraction 2 x 10    Pt received manual therapy to improve mobility for 10 minutes: NP, unable to tolerate    Pt was instructed in and given a home exercise program consisting of the above activities.       Assessment     Pt presents with signs and symptoms consistent with referring diagnosis. Evaluation has determined a decrease in functional status and subjective and objective deficits that can be addressed by physical therapy intervention. Pt demonstrates pain limiting functional activities. Decreased flexibility and strength limiting normal movement patterns. Decreased segmental motion. Decreased postural strength and awareness. Positive special testing. Decreased participation in functional and recreational activities. Subjective and objective measures are addressed by goals in the plan of care.  Patient/family are involved in the development of these goals. Patient/family are educated about current injury and treatment. Pt demonstrates no additional cultural, spiritual or educational need and currently has no barriers to learning.      Pt responded well to treatment today. Pt is a good candidate for skilled physical therapy intervention and has a good prognosis and is motivated to return to functional and  recreational activities.    Rehab Potential: good     History  Co-morbidities and personal factors that may impact the plan of care Examination  Body Structures and Functions, activity limitations and participation restrictions that may impact the plan of care Clinical Presentation   Decision Making/ Complexity Score   Co-morbidities:     Bipolar disorder   Cancer of female breast   T2 N1 Mx, Stage IIB left breast cancer   Cancer of upper-outer quadrant of female breast   Candidiasis of vulva and vagina   Depression   Diabetes mellitus type II      Hypertension   Malignant neoplasm of breast (female), unspecified site   Neuropathy   Nonspecific abnormal unspecified cardiovascular function study      Post-mastectomy lymphedema syndrome      Schizophrenia               Personal Factors:   transportation Body Regions: cervical, lumbar    Body Systems: musculoskeletal          Activity limitations: lifting, reaching, walking (1/2 block), sitting worse than standing,     Participation Restrictions:  household chores    , dancing     stable   Low         Short Term Goals (4 Weeks):     1.Pt to increase strength by a 1/2 grade of muscles test to allow for improvement in functional activities such as performing chores.  2.Pt to improve range of motion by 25% to allow for improved functional mobility to allow for improvement in IADLs.   3.Pt to report compliance with HEP and demonstrate proper exercise technique to PT to show competence with self management of condition.  4.Decrease pain by 25% during functional activities.    Long Term Goals (12 Weeks):     1. Increase ROM to allow improved joint biomechanics during functional activities.   2.Increase trunk, upper and lower extremity strength to within normal limits during functional activities.   3. Independent with home exercise program.   4. Full return to functional activities with manageable complaints.  5. Patient to demonstrate improved posture and body mechanics.  6. Decrease pain by 75% during functional activities.    CMS Impairment/Limitation/Restriction for FOTO Lumbar Spine Survey  Status Limitation G-Code CMS Severity Modifier  Intake 47% 53% Current Status CK - At least 40 percent but less than 60 percent  Predicted 54% 46% Goal Status+ CK - At least 40 percent but less than 60  percent    Plan     Certification Period: 5/22/18 to 8/22/18    Recommended Treatment Plan: 2-3 times per week for 12 weeks with treatments to consist of:  Neuromuscular and postural re-education,  training, therapeutic exercise, therapeutic activities,balance training, manual therapy, soft tissue mobilization, ROM exercises, Cardiovascular, Postural stabilization, manual traction, spinal mobilization, moist heat, cryotherapy, dry needling, electrical stimulation, kinesiotaping, ultrasound, home exercise education and planning.        Therapist: Jeffry Ashraf, PT

## 2018-06-06 ENCOUNTER — CLINICAL SUPPORT (OUTPATIENT)
Dept: REHABILITATION | Facility: HOSPITAL | Age: 58
End: 2018-06-06
Attending: NEUROLOGICAL SURGERY
Payer: MEDICARE

## 2018-06-06 DIAGNOSIS — M43.16 SPONDYLOLISTHESIS OF LUMBAR REGION: Primary | ICD-10-CM

## 2018-06-06 DIAGNOSIS — M25.60 JOINT STIFFNESS: ICD-10-CM

## 2018-06-06 DIAGNOSIS — M62.81 MUSCLE WEAKNESS: ICD-10-CM

## 2018-06-06 DIAGNOSIS — M54.2 NECK PAIN: ICD-10-CM

## 2018-06-06 PROCEDURE — 97110 THERAPEUTIC EXERCISES: CPT | Mod: PN

## 2018-06-06 NOTE — PROGRESS NOTES
Name: Jonathan Taylor Gonzales  [facility] Number: [id number]  Date of Treatment: 06/06/2018   Diagnosis:   Encounter Diagnoses   Name Primary?    Spondylolisthesis of lumbar region Yes    Joint stiffness     Muscle weakness     Neck pain        Time in: 9:05  Time Out: 10:50    Total Treatment Time: 45 minutes        Subjective:    Jonathan reports no significant changes since initial visit.  Patient reports their pain to be 5/10 on a 0-10 scale with 0 being no pain and 10 being the worst pain imaginable.    Objective    Treatment:   Pt received therapeutic exercises to develop strength, endurance, ROM, flexibility, posture and core stabilization for 45  minutes including:    -Nu Step x 8 min  -LTR x 20  -hip fall out x 20  -pelvic tilts x 20  -cervical AROM x 5  -supine wand flexion x 15  -supine wand press-up 2 x 10  -scapular retraction 2 x 10  -pulley x 5 min  -ball squeeze 2 x 10    Pt was instructed in and given a home exercise program consisting of the above activities.       Assessment     No c/o increased discomfort with prescribed activities. Good response to initiation of stabilization. Pt demonstrates a good understanding of the education provided and a good return demonstration of activities. Pt  Requires skilled supervision to complete and progress home program.    Medical necessity is demonstrated by the following IMPAIRMENTS:  -pain   -decreased range of motion/flexibility   -decreased muscle strength   -impaired function    -decreased ADL ability  -decreased recreational ability     Patient is making good progress towards established goals.      Short Term Goals (4 Weeks):     1.Pt to increase strength by a 1/2 grade of muscles test to allow for improvement in functional activities such as performing chores.  2.Pt to improve range of motion by 25% to allow for improved functional mobility to allow for improvement in IADLs.   3.Pt to report compliance with HEP and demonstrate proper exercise technique to PT  to show competence with self management of condition.  4.Decrease pain by 25% during functional activities.    Long Term Goals (12 Weeks):     1. Increase ROM to allow improved joint biomechanics during functional activities.   2.Increase trunk, upper and lower extremity strength to within normal limits during functional activities.   3. Independent with home exercise program.   4. Full return to functional activities with manageable complaints.  5. Patient to demonstrate improved posture and body mechanics.  6. Decrease pain by 75% during functional activities.    CMS Impairment/Limitation/Restriction for FOTO Lumbar Spine Survey  Status Limitation G-Code CMS Severity Modifier  Intake 47% 53% Current Status CK - At least 40 percent but less than 60 percent  Predicted 54% 46% Goal Status+ CK - At least 40 percent but less than 60 percent    Plan     Continue with established Plan of Care towards PT goals.     Certification Period: 5/22/18 to 8/22/18    Recommended Treatment Plan: 2-3 times per week for 12 weeks with treatments to consist of:  Neuromuscular and postural re-education,  training, therapeutic exercise, therapeutic activities,balance training, manual therapy, soft tissue mobilization, ROM exercises, Cardiovascular, Postural stabilization, manual traction, spinal mobilization, moist heat, cryotherapy, dry needling, electrical stimulation, kinesiotaping, ultrasound, home exercise education and planning.        Therapist: Jeffry Ashraf, PT

## 2018-06-21 ENCOUNTER — OFFICE VISIT (OUTPATIENT)
Dept: OPTOMETRY | Facility: CLINIC | Age: 58
End: 2018-06-21
Payer: MEDICARE

## 2018-06-21 DIAGNOSIS — H01.02A SQUAMOUS BLEPHARITIS OF UPPER AND LOWER EYELIDS OF BOTH EYES: ICD-10-CM

## 2018-06-21 DIAGNOSIS — E11.9 TYPE 2 DIABETES MELLITUS WITHOUT OPHTHALMIC MANIFESTATIONS: Primary | ICD-10-CM

## 2018-06-21 DIAGNOSIS — H01.02B SQUAMOUS BLEPHARITIS OF UPPER AND LOWER EYELIDS OF BOTH EYES: ICD-10-CM

## 2018-06-21 DIAGNOSIS — H52.4 PRESBYOPIA: ICD-10-CM

## 2018-06-21 PROCEDURE — 99212 OFFICE O/P EST SF 10 MIN: CPT | Mod: PBBFAC | Performed by: OPTOMETRIST

## 2018-06-21 PROCEDURE — 99499 UNLISTED E&M SERVICE: CPT | Mod: S$PBB,,, | Performed by: OPTOMETRIST

## 2018-06-21 PROCEDURE — 92014 COMPRE OPH EXAM EST PT 1/>: CPT | Mod: S$GLB,,, | Performed by: OPTOMETRIST

## 2018-06-21 PROCEDURE — 92015 DETERMINE REFRACTIVE STATE: CPT | Mod: S$GLB,,, | Performed by: OPTOMETRIST

## 2018-06-21 PROCEDURE — 99999 PR PBB SHADOW E&M-EST. PATIENT-LVL II: CPT | Mod: PBBFAC,,, | Performed by: OPTOMETRIST

## 2018-06-21 NOTE — PROGRESS NOTES
HPI     Patient's last dilated exam was: 7/26/2017 W/ Dr. Zhao    Pt here for diabetic eye exam. C/o trouble seeing small bible print, would   like her glasses rx updated. Patient denies flashes, floaters, pain and   double vision. C/O itching around her eyelashes assc with some flaking and   crusting. Not using any gtts.  No previous eye trauma or sx. Fm hx of   glaucoma: mother      Hemoglobin A1C       Date                     Value               Ref Range             Status                02/16/2018               6.3 (H)             4.0 - 5.6 %           Final                 11/29/2017               >14.0 (H)           4.0 - 5.6 %           Final                  05/02/2017               7.4 (H)             4.5 - 6.2 %           Final                  Last edited by Sherine Harvey, PCT on 6/21/2018  8:51 AM.   (History)            Assessment /Plan     For exam results, see Encounter Report.    Type 2 diabetes mellitus without ophthalmic manifestations    Squamous blepharitis of upper and lower eyelids of both eyes    Presbyopia          1.  No retinopathy--monitor yearly.  BS control.  Eye health normal OU.  2.  Continue with lid scrubs QD OU.  3.  Bifocal rx given

## 2018-06-22 ENCOUNTER — CLINICAL SUPPORT (OUTPATIENT)
Dept: REHABILITATION | Facility: HOSPITAL | Age: 58
End: 2018-06-22
Attending: NEUROLOGICAL SURGERY
Payer: MEDICARE

## 2018-06-22 DIAGNOSIS — M25.60 JOINT STIFFNESS: ICD-10-CM

## 2018-06-22 DIAGNOSIS — M54.2 NECK PAIN: ICD-10-CM

## 2018-06-22 DIAGNOSIS — G89.29 CHRONIC BILATERAL LOW BACK PAIN WITH BILATERAL SCIATICA: ICD-10-CM

## 2018-06-22 DIAGNOSIS — M54.41 CHRONIC BILATERAL LOW BACK PAIN WITH BILATERAL SCIATICA: ICD-10-CM

## 2018-06-22 DIAGNOSIS — M62.81 MUSCLE WEAKNESS: ICD-10-CM

## 2018-06-22 DIAGNOSIS — M54.42 CHRONIC BILATERAL LOW BACK PAIN WITH BILATERAL SCIATICA: ICD-10-CM

## 2018-06-22 DIAGNOSIS — M43.16 SPONDYLOLISTHESIS OF LUMBAR REGION: Primary | ICD-10-CM

## 2018-06-22 PROCEDURE — 97110 THERAPEUTIC EXERCISES: CPT | Mod: PN

## 2018-06-22 NOTE — PROGRESS NOTES
Name: Jonathan Taylor Gonzales  [facility] Number: [id number]  Date of Treatment: 06/22/2018   Diagnosis:   Encounter Diagnoses   Name Primary?    Spondylolisthesis of lumbar region Yes    Joint stiffness     Muscle weakness     Neck pain     Chronic bilateral low back pain with bilateral sciatica        Time in: 12:30  Time Out: 1:25    Total Treatment Time: 55 minutes         Subjective:    Jonathan reports continued discomfort. Reports improved compliance with home program.   Patient reports their pain to be 5/10 on a 0-10 scale with 0 being no pain and 10 being the worst pain imaginable.    Objective    Treatment:   Pt received therapeutic exercises to develop strength, endurance, ROM, flexibility, posture and core stabilization for 55  minutes including:    -Nu Step x 8 min  -LTR x 20  -hip fall out x 20  -pelvic tilts x 20  -cervical AROM x 5  -supine wand flexion x 15  -supine wand press-up 2 x 10  -scapular retraction 2 x 10  -pulley x 5 min  -ball squeeze 2 x 10  -seated rows 3 x 10 RTB  -seated extensions  3 x 10 YTB  -thoracic extension vs towel roll seated x 20    Pt was instructed in and given a home exercise program consisting of the above activities.       Assessment     No c/o increased discomfort with prescribed activities. Fair response to progression of stabilization. Pt demonstrates a good understanding of the education provided and a good return demonstration of activities. Pt  Requires skilled supervision to complete and progress home program.    Medical necessity is demonstrated by the following IMPAIRMENTS:  -pain   -decreased range of motion/flexibility   -decreased muscle strength   -impaired function    -decreased ADL ability  -decreased recreational ability     Patient is making good progress towards established goals.    Short Term Goals (4 Weeks):     1.Pt to increase strength by a 1/2 grade of muscles test to allow for improvement in functional activities such as performing chores.  2.Pt to  improve range of motion by 25% to allow for improved functional mobility to allow for improvement in IADLs.   3.Pt to report compliance with HEP and demonstrate proper exercise technique to PT to show competence with self management of condition.  4.Decrease pain by 25% during functional activities.    Long Term Goals (12 Weeks):     1. Increase ROM to allow improved joint biomechanics during functional activities.   2.Increase trunk, upper and lower extremity strength to within normal limits during functional activities.   3. Independent with home exercise program.   4. Full return to functional activities with manageable complaints.  5. Patient to demonstrate improved posture and body mechanics.  6. Decrease pain by 75% during functional activities.    CMS Impairment/Limitation/Restriction for FOTO Lumbar Spine Survey  Status Limitation G-Code CMS Severity Modifier  Intake 47% 53% Current Status CK - At least 40 percent but less than 60 percent  Predicted 54% 46% Goal Status+ CK - At least 40 percent but less than 60 percent    Plan     Continue with established Plan of Care towards PT goals.     Certification Period: 5/22/18 to 8/22/18    Recommended Treatment Plan: 2-3 times per week for 12 weeks with treatments to consist of:  Neuromuscular and postural re-education,  training, therapeutic exercise, therapeutic activities,balance training, manual therapy, soft tissue mobilization, ROM exercises, Cardiovascular, Postural stabilization, manual traction, spinal mobilization, moist heat, cryotherapy, dry needling, electrical stimulation, kinesiotaping, ultrasound, home exercise education and planning.        Therapist: Jeffry Ashraf, PT

## 2018-06-26 ENCOUNTER — CLINICAL SUPPORT (OUTPATIENT)
Dept: REHABILITATION | Facility: HOSPITAL | Age: 58
End: 2018-06-26
Attending: NEUROLOGICAL SURGERY
Payer: MEDICARE

## 2018-06-26 DIAGNOSIS — M62.81 MUSCLE WEAKNESS: ICD-10-CM

## 2018-06-26 DIAGNOSIS — M54.2 NECK PAIN: ICD-10-CM

## 2018-06-26 DIAGNOSIS — M25.60 JOINT STIFFNESS: ICD-10-CM

## 2018-06-26 DIAGNOSIS — M43.16 SPONDYLOLISTHESIS OF LUMBAR REGION: Primary | ICD-10-CM

## 2018-06-26 PROCEDURE — 97110 THERAPEUTIC EXERCISES: CPT | Mod: PN

## 2018-06-26 NOTE — PROGRESS NOTES
Name: Jonathan Taylor Gonzales  [facility] Number: [id number]  Date of Treatment: 06/26/2018   Diagnosis:   Encounter Diagnoses   Name Primary?    Spondylolisthesis of lumbar region Yes    Joint stiffness     Muscle weakness     Neck pain        Time in: 12:30  Time Out: 1:25    Total Treatment Time: 55 minutes     Visit 4 of 20    Subjective:    Jonathan reports continued discomfort. Reports improved compliance with home program.   Patient reports their pain to be 5/10 on a 0-10 scale with 0 being no pain and 10 being the worst pain imaginable.    Pain:  Location:  Left cervical, lumbar     Activities Which Increase Pain: lifting, reaching, walking (3/4 block), sitting worse than standing, household chores  Activities Which Decrease Pain: sitting, lying down  Pain Scale: 5/10 at best 5/10 now  7/10 at worst     Objective      Observation: Pt presents ambulating with a SPC, guarded gait, decreased lumbopelvic mobility, decreased arm swing, decreased step length and velocity. Stands with rounded shoulders, forward head posture.  Palpation: moderate tenderness on palpation of cervical muscular globally, L1-L5 TP's, paraspinals, QL,           Range of Motion/Strength:       AROM: Cervical lumbar   Flexion 25 22   Extension 18 5   Right side bending 12 12   Left side bending 12 12   Right Rotation 18 NP   Left 20 Np       Strength: 4-/5 bilaterally     AROM: Bilateral UE:  Impaired to 110 deg elevation.. MMT 3+/5   BLE, MMT Right LE:  3+ /5 Left LE: 3+/5, AROM, WFL   Abdominal Strength: 3+/5       Objective    Treatment:   Pt received therapeutic exercises to develop strength, endurance, ROM, flexibility, posture and core stabilization for 55  minutes including:    -Nu Step x 8 min  -pulley x 4 min  -LTR x 20  -hip fall out x 20  -pelvic tilts x 20  -cervical AROM x 5  -supine wand flexion x 15  -supine wand press-up 2 x 10  -scapular retraction 2 x 10  -pulley x 5 min  -ball squeeze 2 x 10  -seated rows 3 x 10 RTB  -seated  bilateral shoulderextensions  3 x 10 YTB  -thoracic extension vs towel roll seated x 20    Pt was instructed in and given a home exercise program consisting of the above activities.       Assessment     No c/o increased discomfort with prescribed activities. Improved bilateral shoulder flexion to 110 deg actively.  Improved response to progression of stabilization therex. Pt demonstrates a good understanding of the education provided and a good return demonstration of activities. Pt  Requires skilled supervision to complete and progress home program.    Medical necessity is demonstrated by the following IMPAIRMENTS:  -pain   -decreased range of motion/flexibility   -decreased muscle strength   -impaired function    -decreased ADL ability  -decreased recreational ability     Patient is making good progress towards established goals.    Short Term Goals (4 Weeks):   Updated 6/26/18  MET      1.Pt to increase strength by a 1/2 grade of muscles test to allow for improvement in functional activities such as performing chores.  2.Pt to improve range of motion by 25% to allow for improved functional mobility to allow for improvement in IADLs.   3.Pt to report compliance with HEP and demonstrate proper exercise technique to PT to show competence with self management of condition.  4.Decrease pain by 25% during functional activities.    Long Term Goals (12 Weeks):     1. Increase ROM to allow improved joint biomechanics during functional activities.   2.Increase trunk, upper and lower extremity strength to within normal limits during functional activities.   3. Independent with home exercise program.   4. Full return to functional activities with manageable complaints.  5. Patient to demonstrate improved posture and body mechanics.  6. Decrease pain by 75% during functional activities.    CMS Impairment/Limitation/Restriction for FOTO Lumbar Spine Survey  Status Limitation G-Code CMS Severity Modifier  Intake 47% 53%  Current Status CK - At least 40 percent but less than 60 percent  Predicted 54% 46% Goal Status+ CK - At least 40 percent but less than 60 percent    Plan     Continue with established Plan of Care towards PT goals.     Certification Period: 5/22/18 to 8/22/18    Recommended Treatment Plan: 2-3 times per week for 12 weeks with treatments to consist of:  Neuromuscular and postural re-education,  training, therapeutic exercise, therapeutic activities,balance training, manual therapy, soft tissue mobilization, ROM exercises, Cardiovascular, Postural stabilization, manual traction, spinal mobilization, moist heat, cryotherapy, dry needling, electrical stimulation, kinesiotaping, ultrasound, home exercise education and planning.        Therapist: Jeffry Ashraf, PT

## 2018-06-29 ENCOUNTER — CLINICAL SUPPORT (OUTPATIENT)
Dept: REHABILITATION | Facility: HOSPITAL | Age: 58
End: 2018-06-29
Attending: NEUROLOGICAL SURGERY
Payer: MEDICARE

## 2018-06-29 DIAGNOSIS — M25.60 JOINT STIFFNESS: ICD-10-CM

## 2018-06-29 DIAGNOSIS — M62.81 MUSCLE WEAKNESS: ICD-10-CM

## 2018-06-29 DIAGNOSIS — M54.2 NECK PAIN: ICD-10-CM

## 2018-06-29 DIAGNOSIS — M43.16 SPONDYLOLISTHESIS OF LUMBAR REGION: Primary | ICD-10-CM

## 2018-06-29 PROCEDURE — 97110 THERAPEUTIC EXERCISES: CPT | Mod: PN

## 2018-06-29 NOTE — PROGRESS NOTES
Name: Jonathan Taylor Gonzales  [facility] Number: [id number]  Date of Treatment: 06/29/2018   Diagnosis:   No diagnosis found.    Time in: 12:40  Time Out: 1:35    Total Treatment Time: 55 minutes  (1 on 1 with PT for 45 minutes)    Visit 5 of 20    Subjective:    Jonathan reports continued discomfort. Reports improved compliance with home program.   Patient reports their pain to be 7/10 on a 0-10 scale with 0 being no pain and 10 being the worst pain imaginable.         Objective        Treatment:   Pt received therapeutic exercises to develop strength, endurance, ROM, flexibility, posture and core stabilization for 55  minutes including:    -Nu Step x 8 min  -pulley x 4 min  -LTR x 20  -hip fall out x 20  -pelvic tilts x 20  -cervical AROM x 5  -supine wand flexion x 15  -supine wand press-up 2 x 10  -scapular retraction 2 x 10  -pulley x 5 min  -ball squeeze 2 x 10  -seated rows 3 x 10 RTB  -seated bilateral shoulderextensions  3 x 10 YTB  -thoracic extension vs towel roll seated x 20    Pt was instructed in and given a home exercise program consisting of the above activities.       Assessment     No c/o increased discomfort with prescribed activities.   Improved pelvic mobility and abdominal response with stabilization therex. Pt demonstrates a good understanding of the education provided and a good return demonstration of activities. Pt  Requires skilled supervision to complete and progress home program.    Medical necessity is demonstrated by the following IMPAIRMENTS:  -pain   -decreased range of motion/flexibility   -decreased muscle strength   -impaired function    -decreased ADL ability  -decreased recreational ability     Patient is making good progress towards established goals.    Short Term Goals (4 Weeks):   Updated 6/26/18  MET      1.Pt to increase strength by a 1/2 grade of muscles test to allow for improvement in functional activities such as performing chores.  2.Pt to improve range of motion by 25% to allow  for improved functional mobility to allow for improvement in IADLs.   3.Pt to report compliance with HEP and demonstrate proper exercise technique to PT to show competence with self management of condition.  4.Decrease pain by 25% during functional activities.    Long Term Goals (12 Weeks):     1. Increase ROM to allow improved joint biomechanics during functional activities.   2.Increase trunk, upper and lower extremity strength to within normal limits during functional activities.   3. Independent with home exercise program.   4. Full return to functional activities with manageable complaints.  5. Patient to demonstrate improved posture and body mechanics.  6. Decrease pain by 75% during functional activities.    CMS Impairment/Limitation/Restriction for FOTO Lumbar Spine Survey  Status Limitation G-Code CMS Severity Modifier  Intake 47% 53% Current Status CK - At least 40 percent but less than 60 percent  Predicted 54% 46% Goal Status+ CK - At least 40 percent but less than 60 percent    Plan     Continue with established Plan of Care towards PT goals.     Certification Period: 5/22/18 to 8/22/18    Recommended Treatment Plan: 2-3 times per week for 12 weeks with treatments to consist of:  Neuromuscular and postural re-education,  training, therapeutic exercise, therapeutic activities,balance training, manual therapy, soft tissue mobilization, ROM exercises, Cardiovascular, Postural stabilization, manual traction, spinal mobilization, moist heat, cryotherapy, dry needling, electrical stimulation, kinesiotaping, ultrasound, home exercise education and planning.        Therapist: Jeffry Ashraf, PT

## 2018-07-09 ENCOUNTER — DOCUMENTATION ONLY (OUTPATIENT)
Dept: REHABILITATION | Facility: HOSPITAL | Age: 58
End: 2018-07-09

## 2018-07-09 NOTE — PROGRESS NOTES
Patient was a no show to today's PT appointment. Patient's next scheduled appointment is 7/13/2018.    No show: 2  Cancel: 1    Therapist: Meri Vogel, SAM  07/09/2018

## 2018-07-23 DIAGNOSIS — E11.9 TYPE 2 DIABETES MELLITUS WITHOUT COMPLICATION, WITH LONG-TERM CURRENT USE OF INSULIN: ICD-10-CM

## 2018-07-23 DIAGNOSIS — Z79.4 TYPE 2 DIABETES MELLITUS WITHOUT COMPLICATION, WITH LONG-TERM CURRENT USE OF INSULIN: ICD-10-CM

## 2018-07-23 NOTE — TELEPHONE ENCOUNTER
----- Message from Rosanne Smith sent at 7/23/2018  3:54 PM CDT -----  Contact: pt   Can the clinic reply in [facility]:No       Please refill the medication(s) listed below. Please call the patient when the prescription(s) is ready for  at the phone number [phone]    Medication #1insulin glargine (LANTUS SOLOSTAR) 100 unit/mL (3 mL) InPn pen    Medication #2:      Preferred Pharmacy: [id number] - [address] AT [facility] [address]

## 2018-07-24 DIAGNOSIS — N39.41 URGE INCONTINENCE: ICD-10-CM

## 2018-07-24 RX ORDER — OXYBUTYNIN CHLORIDE 10 MG/1
10 TABLET, EXTENDED RELEASE ORAL DAILY
Qty: 30 TABLET | Refills: 5 | Status: SHIPPED | OUTPATIENT
Start: 2018-07-24 | End: 2018-11-23 | Stop reason: SDUPTHER

## 2018-07-24 RX ORDER — INSULIN GLARGINE 100 [IU]/ML
23 INJECTION, SOLUTION SUBCUTANEOUS NIGHTLY
Qty: 15 ML | Refills: 0 | Status: SHIPPED | OUTPATIENT
Start: 2018-07-24 | End: 2018-11-23 | Stop reason: SDUPTHER

## 2018-08-13 ENCOUNTER — OFFICE VISIT (OUTPATIENT)
Dept: SPINE | Facility: CLINIC | Age: 58
End: 2018-08-13
Payer: MEDICAID

## 2018-08-13 VITALS
HEIGHT: 63 IN | SYSTOLIC BLOOD PRESSURE: 100 MMHG | HEART RATE: 76 BPM | WEIGHT: 169 LBS | BODY MASS INDEX: 29.95 KG/M2 | DIASTOLIC BLOOD PRESSURE: 72 MMHG

## 2018-08-13 DIAGNOSIS — M43.16 SPONDYLOLISTHESIS, LUMBAR REGION: Primary | ICD-10-CM

## 2018-08-13 PROCEDURE — 99213 OFFICE O/P EST LOW 20 MIN: CPT | Mod: PBBFAC | Performed by: NEUROLOGICAL SURGERY

## 2018-08-13 PROCEDURE — 99214 OFFICE O/P EST MOD 30 MIN: CPT | Mod: S$GLB,,, | Performed by: NEUROLOGICAL SURGERY

## 2018-08-13 PROCEDURE — 99999 PR PBB SHADOW E&M-EST. PATIENT-LVL III: CPT | Mod: PBBFAC,,, | Performed by: NEUROLOGICAL SURGERY

## 2018-08-13 NOTE — PROGRESS NOTES
CHIEF COMPLAINT:  Follow up after injections and physical therapy    I, Ramon Cooper, attest that this documentation has been prepared under the direction and in the presence of Tra Edwards MD.    HPI:  Jonathan Gonzales is a 58 y.o.  female with depression, bipolar disorder, hypertension, diabetes mellitus type II, breast cancer stage II status post mastectomy, and schizophrenia, who presents today for a follow up evaluation after injections and physical therapy. Pt states that she did not received any facet injections for the low back but has participated in physical therapy with some improvement. She previously received neck injections here at [facility]. Pt notes back pain which is worse than her leg pain. Her BLE pain radiates down the anterior thighs to the lateral lower legs. She endures paraesthesias in all toes of her bilateral feet and says that she has diabetes. Pt's walking is improving. Physical therapy for her neck has improved her neck and BUE pain. Pt's BUE pain radiates to all fingers, right worse than left. She states that she also has carpal tunnel syndrome which has not been treated surgically. She continues to drop objects with her right hand. Pt presents today using a cane.        Review of patient's allergies indicates:   Allergen Reactions    Banana Hives and Nausea And Vomiting       Past Medical History:   Diagnosis Date    Bipolar disorder     Cancer of female breast 10/2010    T2 N1 Mx, Stage IIB left breast cancer    Cancer of upper-outer quadrant of female breast 7/9/2012    Candidiasis of vulva and vagina 6/29/2012    Depression     Diabetes mellitus type II     Disturbances of sensation of smell and taste 6/29/2012    Hypertension     Malignant neoplasm of breast (female), unspecified site 4/27/2015    Neuropathy     Nonspecific abnormal unspecified cardiovascular function study 4/12/2013    ECG READ AS SHOWING A T-WAVE ABNORMALITY     Post-mastectomy lymphedema  syndrome     Postmastectomy lymphedema 6/29/2012    Schizophrenia     Thrush 6/29/2012     Past Surgical History:   Procedure Laterality Date    BREAST RECONSTRUCTION  11/23/11    B/L SHLOMO flap reconstruction S/P left MRM, right prophylactic mastectomy    BREAST RECONSTRUCTION Bilateral 3/13/2015    NIPPLE RECONSTRUCTION    MASTECTOMY Bilateral 01/2011    bilateral    TUBAL LIGATION       Family History   Problem Relation Age of Onset    Kidney disease Mother         Dialysis    Hypertension Mother     Diabetes Mother     Deep vein thrombosis Mother     Glaucoma Mother     Diabetic kidney disease Sister     Lung cancer Sister     Diabetes Sister     Diabetes Brother     Diabetes Son     No Known Problems Father     No Known Problems Maternal Grandmother     No Known Problems Maternal Grandfather     No Known Problems Paternal Grandmother     No Known Problems Paternal Grandfather     No Known Problems Son     Cervical cancer Daughter     No Known Problems Daughter     No Known Problems Daughter     No Known Problems Daughter     Breast cancer Neg Hx     Ovarian cancer Neg Hx      Social History     Tobacco Use    Smoking status: Current Every Day Smoker     Packs/day: 0.25     Years: 25.00     Pack years: 6.25     Types: Cigarettes    Smokeless tobacco: Never Used    Tobacco comment: currently at less than 1/2 pack pd   Substance Use Topics    Alcohol use: No     Alcohol/week: 0.0 oz    Drug use: No     Comment: 1991 - stopped using crack cocaine        Review of Systems   Constitutional: Negative.    HENT: Negative.    Eyes: Negative.    Respiratory: Negative.    Cardiovascular: Negative.    Gastrointestinal: Negative.    Endocrine: Negative.    Genitourinary: Negative.    Musculoskeletal: Positive for back pain, gait problem (cane) and myalgias (BLE; BUE). Negative for neck pain.   Skin: Negative.    Allergic/Immunologic: Negative.    Neurological: Negative for weakness,  light-headedness, numbness and headaches.        Hand clumsiness       Bilateral feet paraesthesias   Hematological: Negative.    Psychiatric/Behavioral: Negative.        OBJECTIVE:   Vital Signs:  Pulse: 76 (08/13/18 0920)  BP: 100/72 (08/13/18 0920)    Physical Exam:    Vital signs: All nursing notes and vital signs reviewed -- afebrile, vital signs stable.  Constitutional: Patient sitting comfortably in chair. Appears well developed and well nourished.  Skin: Exposed areas are intact without abnormal markings, rashes or other lesions.  HEENT: Normocephalic. Normal conjunctivae.  Cardiovascular: Normal rate and regular rhythm.  Respiratory: Chest wall rises and falls symmetrically, without signs of respiratory distress.  Abdomen: Soft and non-tender.  Extremities: Warm and without edema. Calves supple, non-tender.  Psych/Behavior: Normal affect.    Neurological:    Mental status: Alert and oriented. Conversational and appropriate.       Cranial Nerves: Grossly intact.     Motor:    Upper:  Deltoids Triceps Biceps WE WF     R 5/5 5/5 5/5 5/5 5/5 5/5    L 5/5 5/5 5/5 5/5 5/5 5/5      Lower:  HF KE KF DF PF EHL    R 5/5 5/5 4/5 5/5 5/5 5/5    L 5/5 5/5 4/5 5/5 5/5 5/5     R/L Hand Intrinsics: 4+/5    Debby's sign: Positive      Sensory: Intact sensation to light touch in all extremities. Romberg positive.    Reflexes:          DTR: 1+ symmetrically throughout.     Dye's: Negative.     Babinski's: Negative.     Clonus: Negative.    Cerebellar: Finger-to-nose and rapid alternating movements normal. Gait stable, fluid.    Spine:    Posture: Head well aligned over pelvis in front and side views.  No focal or global spinal deformity visible on inspection. Shoulders and hips even. No obvious leg length discrepancy. No scapula winging.    Bending: Full ROM with forward, back and lateral bending. No rib prominence with forward bend.    Cervical:      ROM: Full with flexion and ear-to-shoulder bend. Restricted range  of motion with left lateral rotation and extension.     Midline TTP: Negative.     Spurling's test: Negative.     Lhermitte's: Negative.    Thoracic:     Midline TTP: Negative    Lumbar:     Midline TTP: Negative     Straight Leg Test: Negative     Crossed Straight Leg Test: Negative     Sciatic notch tenderness: Negative.    Other:     SI joint TTP: Negative.     Greater trochanter TTP: Negative.     Tenderness with external/internal hip rotation: Negative.    Diagnostic Results:  All imaging was independently reviewed by me.    No new imaging for my review.      ASSESSMENT/PLAN:     oJnathan Gonzales is a 59 y/o female with grade 1 L4/5 spondylolisthesis and 1mm of dynamic instability at that level. Her neck pain has improved, however her back pain has persisted. Pt did not receive facet injections previously recommended. Pt is still interested in conservative measures as opposed to moving forward with surgery. We will re-order facet injections at L4/5 at [facility] for her. Pt can follow up in 3 months or sooner if she notices any new or worsening symptoms.      The patient understands and agrees with the plan of care. All questions were answered.     1. Referral to Physical Therapy for facet injections   2. RTC 3 months       I, Dr. Tra Edwards personally performed the services described in this documentation. All medical record entries made by the scribe, Ramon Cooper, were at my direction and in my presence.  I have reviewed the chart and agree that the record reflects my personal performance and is accurate and complete.      Tra Edwards M.D.  Department of Neurosurgery  [facility]      .

## 2018-08-23 DIAGNOSIS — M50.30 DDD (DEGENERATIVE DISC DISEASE), CERVICAL: ICD-10-CM

## 2018-08-23 DIAGNOSIS — E11.42 DM TYPE 2 WITH DIABETIC PERIPHERAL NEUROPATHY: ICD-10-CM

## 2018-08-23 DIAGNOSIS — M47.812 CERVICAL SPONDYLOSIS WITHOUT MYELOPATHY: ICD-10-CM

## 2018-08-23 DIAGNOSIS — M54.12 CERVICAL RADICULOPATHY: ICD-10-CM

## 2018-08-23 RX ORDER — QUETIAPINE FUMARATE 100 MG/1
TABLET, FILM COATED ORAL
Qty: 90 TABLET | Refills: 0 | Status: SHIPPED | OUTPATIENT
Start: 2018-08-23 | End: 2018-09-13 | Stop reason: SDUPTHER

## 2018-08-24 DIAGNOSIS — E11.9 TYPE 2 DIABETES MELLITUS WITHOUT COMPLICATION: ICD-10-CM

## 2018-08-24 RX ORDER — PREGABALIN 100 MG/1
CAPSULE ORAL
Qty: 60 CAPSULE | Refills: 0 | Status: SHIPPED | OUTPATIENT
Start: 2018-08-24 | End: 2018-09-10 | Stop reason: SDUPTHER

## 2018-08-27 RX ORDER — ZIPRASIDONE HYDROCHLORIDE 40 MG/1
CAPSULE ORAL
Qty: 90 CAPSULE | Refills: 0 | Status: SHIPPED | OUTPATIENT
Start: 2018-08-27 | End: 2019-03-04 | Stop reason: SDUPTHER

## 2018-09-05 ENCOUNTER — TELEPHONE (OUTPATIENT)
Dept: INTERNAL MEDICINE | Facility: CLINIC | Age: 58
End: 2018-09-05

## 2018-09-05 ENCOUNTER — OFFICE VISIT (OUTPATIENT)
Dept: INTERNAL MEDICINE | Facility: CLINIC | Age: 58
End: 2018-09-05
Attending: FAMILY MEDICINE
Payer: MEDICARE

## 2018-09-05 ENCOUNTER — TELEPHONE (OUTPATIENT)
Dept: PODIATRY | Facility: CLINIC | Age: 58
End: 2018-09-05

## 2018-09-05 VITALS
HEIGHT: 63 IN | OXYGEN SATURATION: 98 % | BODY MASS INDEX: 31.56 KG/M2 | WEIGHT: 178.13 LBS | HEART RATE: 71 BPM | SYSTOLIC BLOOD PRESSURE: 98 MMHG | DIASTOLIC BLOOD PRESSURE: 74 MMHG

## 2018-09-05 DIAGNOSIS — E87.6 HYPOPOTASSEMIA: ICD-10-CM

## 2018-09-05 DIAGNOSIS — I10 HTN (HYPERTENSION), BENIGN: ICD-10-CM

## 2018-09-05 DIAGNOSIS — Z12.4 PAP SMEAR FOR CERVICAL CANCER SCREENING: Primary | ICD-10-CM

## 2018-09-05 DIAGNOSIS — E11.9 DIABETES MELLITUS WITHOUT COMPLICATION: Primary | ICD-10-CM

## 2018-09-05 PROCEDURE — 99214 OFFICE O/P EST MOD 30 MIN: CPT | Mod: S$PBB,,, | Performed by: FAMILY MEDICINE

## 2018-09-05 PROCEDURE — 99999 PR PBB SHADOW E&M-EST. PATIENT-LVL IV: CPT | Mod: PBBFAC,,, | Performed by: FAMILY MEDICINE

## 2018-09-05 PROCEDURE — 99499 UNLISTED E&M SERVICE: CPT | Mod: S$PBB,,, | Performed by: FAMILY MEDICINE

## 2018-09-05 PROCEDURE — 99214 OFFICE O/P EST MOD 30 MIN: CPT | Mod: PBBFAC | Performed by: FAMILY MEDICINE

## 2018-09-05 RX ORDER — LISINOPRIL AND HYDROCHLOROTHIAZIDE 10; 12.5 MG/1; MG/1
1 TABLET ORAL DAILY
Qty: 30 TABLET | Refills: 11 | Status: SHIPPED | OUTPATIENT
Start: 2018-09-05 | End: 2019-03-04 | Stop reason: SDUPTHER

## 2018-09-05 NOTE — TELEPHONE ENCOUNTER
Called patient to let her know an appointment schedule for her Friday 07/18 at 1:00 pm with Dr. Lazo. Patient states she doesn't want it and that she will like one at [place] unable to book for [place]

## 2018-09-05 NOTE — PROGRESS NOTES
CHIEF COMPLAINT:   Follow-up diabetes and hypertension    HISTORY OF PRESENT ILLNESS: The patient is a 58 year-old black female.  The patient has a long and complicated medical history.      She continues to have problems with neuropathic pain as well as central pain.  She does well w/ her Lyrica 100 twice a day.    The patient has a history of diabetes.  Recent blood sugars have been less than 120.  There have been no episodes of hypoglycemia.    The patient has a history of stable hypertension on current medications.  Patient denies chest pain or shortness of breath today.    The patient has a history of stable hyperlipidemia on current medications.  The patient denies chest pain or shortness of breath today.  The patient denies muscle aches or myalgias suggestive of myositis.    She has a history of breast cancer for which she underwent bilateral mastectomies and chemotherapy.    REVIEW OF SYSTEMS:  GENERAL: No fever, chills or weight loss.  SKIN: No rashes, itching or changes in color or texture of skin.  HEAD: No headaches or recent head trauma.  EYES: Visual acuity fine. No photophobia, ocular pain or diplopia.  EARS: Denies ear pain, discharge or vertigo.  NOSE: No loss of smell, no epistaxis or postnasal drip.  MOUTH & THROAT: No hoarseness or change in voice. No excessive gum bleeding.  NODES: Denies swollen glands.  CHEST: Denies AWAD, cyanosis, wheezing, cough and sputum production.  CARDIOVASCULAR: Denies chest pain, PND, orthopnea or reduced exercise tolerance.  ABDOMEN: Appetite fine. No weight loss. Denies diarrhea, abdominal pain, hematemesis or blood in stool.  URINARY: No flank pain, dysuria or hematuria.  PERIPHERAL VASCULAR: No claudication or cyanosis.  MUSCULOSKELETAL: No joint stiffness or swelling. Except as noted above.  NEUROLOGIC: No history of seizures, paralysis, alteration of gait or coordination.    SOCIAL HISTORY: The patient does smoke.  The patient consumes alcohol socially.   "    PHYSICAL EXAMINATION:     Blood pressure 98/74, pulse 71, height 5' 3" (1.6 m), weight 80.8 kg (178 lb 2.1 oz), SpO2 98 %.    APPEARANCE: Well nourished, well developed, in no acute distress.    HEAD: Normocephalic, atraumatic.  EYES: PERRL. EOMI.  Conjunctivae without injection and  anicteric  EARS: TM's intact. Light reflex normal. No retraction or perforation.    NOSE: Mucosa pink. Airway clear.  MOUTH & THROAT: No tonsillar enlargement. No pharyngeal erythema or exudate. No stridor.  NECK: Supple.   NODES: No cervical, axillary or inguinal lymph node enlargement.  CHEST: Lungs clear to auscultation.  No retractions are noted.  No rales or rhonchi are present.  CARDIOVASCULAR: Normal S1, S2. No rubs, murmurs or gallops.  ABDOMEN: Bowel sounds normal. Not distended. Soft. No tenderness or masses.  No ascites is noted.  MUSCULOSKELETAL:  There is no clubbing, cyanosis, or edema of the extremities x4.  There is full range of motion of the lumbar spine.  There is full range of motion of the extremities x4.  There is no deformity noted.    NEUROLOGIC:       Normal speech development.      Hearing normal.      Normal gait.      Motor and sensory exams grossly normal.      DTR's normal.  PSYCHIATRIC: Patient is alert and oriented x3.  Thought processes are all normal.  There is no homicidality.  There is no suicidality.  There is no evidence of psychosis.    LABORATORY/RADIOLOGY:   Chart reviewed . including surgeries and blood work    ASSESSMENT:   Frequent urination and urge incontinence  Breast cancer with resultant lymphedema  Hypertension, condition is well controlled on current medication regimen  Diabetes, condition is well controlled on current medication regimen  Neuropathic pain  Cervical radiculopathy    PLAN:  Her diabetes is very well controlled with a recent A1c of 6.3   Pain clinic   KCl 40 po qd  Prinzide  Pravachol 20 mg by mouth daily    Return to clinic in 6 month with blood work prior  "

## 2018-09-05 NOTE — TELEPHONE ENCOUNTER
----- Message from Elizabeth Salazar sent at 9/5/2018  2:18 PM CDT -----  Contact: Self  Patient is a prev patient of Dr. Hernandez and has not been seen since 2015. Requesting an appt for bilat diabetic foot care.  showing no availability due to insurance.    Patient can be reached at [phone]

## 2018-09-05 NOTE — TELEPHONE ENCOUNTER
Pt inform  That no shcedule was picking up for Pod and number was given for pt to contact dept to schedule.Pt agrees and verbalize.

## 2018-09-06 ENCOUNTER — LAB VISIT (OUTPATIENT)
Dept: LAB | Facility: OTHER | Age: 58
End: 2018-09-06
Attending: INTERNAL MEDICINE
Payer: MEDICARE

## 2018-09-06 DIAGNOSIS — C50.919 MALIGNANT NEOPLASM OF BREAST, STAGE 2, UNSPECIFIED LATERALITY: Chronic | ICD-10-CM

## 2018-09-06 LAB
ALBUMIN SERPL BCP-MCNC: 3.5 G/DL
ALP SERPL-CCNC: 50 U/L
ALT SERPL W/O P-5'-P-CCNC: 21 U/L
ANION GAP SERPL CALC-SCNC: 13 MMOL/L
AST SERPL-CCNC: 26 U/L
BASOPHILS # BLD AUTO: 0.01 K/UL
BASOPHILS NFR BLD: 0.2 %
BILIRUB SERPL-MCNC: 0.4 MG/DL
BUN SERPL-MCNC: 15 MG/DL
CALCIUM SERPL-MCNC: 9.8 MG/DL
CHLORIDE SERPL-SCNC: 99 MMOL/L
CO2 SERPL-SCNC: 30 MMOL/L
CREAT SERPL-MCNC: 1.1 MG/DL
DIFFERENTIAL METHOD: ABNORMAL
EOSINOPHIL # BLD AUTO: 0.1 K/UL
EOSINOPHIL NFR BLD: 1.9 %
ERYTHROCYTE [DISTWIDTH] IN BLOOD BY AUTOMATED COUNT: 13.2 %
EST. GFR  (AFRICAN AMERICAN): >60 ML/MIN/1.73 M^2
EST. GFR  (NON AFRICAN AMERICAN): 55 ML/MIN/1.73 M^2
GLUCOSE SERPL-MCNC: 87 MG/DL
HCT VFR BLD AUTO: 37.4 %
HGB BLD-MCNC: 12.1 G/DL
LYMPHOCYTES # BLD AUTO: 2 K/UL
LYMPHOCYTES NFR BLD: 37.9 %
MCH RBC QN AUTO: 30.3 PG
MCHC RBC AUTO-ENTMCNC: 32.4 G/DL
MCV RBC AUTO: 94 FL
MONOCYTES # BLD AUTO: 0.4 K/UL
MONOCYTES NFR BLD: 7.9 %
NEUTROPHILS # BLD AUTO: 2.8 K/UL
NEUTROPHILS NFR BLD: 51.9 %
PLATELET # BLD AUTO: 208 K/UL
PMV BLD AUTO: 10.9 FL
POTASSIUM SERPL-SCNC: 3.3 MMOL/L
PROT SERPL-MCNC: 7.6 G/DL
RBC # BLD AUTO: 3.99 M/UL
SODIUM SERPL-SCNC: 142 MMOL/L
WBC # BLD AUTO: 5.31 K/UL

## 2018-09-06 PROCEDURE — 80053 COMPREHEN METABOLIC PANEL: CPT

## 2018-09-06 PROCEDURE — 36415 COLL VENOUS BLD VENIPUNCTURE: CPT

## 2018-09-06 PROCEDURE — 85025 COMPLETE CBC W/AUTO DIFF WBC: CPT

## 2018-09-10 ENCOUNTER — OFFICE VISIT (OUTPATIENT)
Dept: NEUROLOGY | Facility: CLINIC | Age: 58
End: 2018-09-10
Payer: MEDICARE

## 2018-09-10 VITALS
HEIGHT: 63 IN | SYSTOLIC BLOOD PRESSURE: 103 MMHG | WEIGHT: 178.56 LBS | HEART RATE: 74 BPM | DIASTOLIC BLOOD PRESSURE: 73 MMHG | BODY MASS INDEX: 31.64 KG/M2

## 2018-09-10 DIAGNOSIS — M47.812 CERVICAL SPONDYLOSIS WITHOUT MYELOPATHY: ICD-10-CM

## 2018-09-10 DIAGNOSIS — G62.0 CHEMOTHERAPY-INDUCED NEUROPATHY: ICD-10-CM

## 2018-09-10 DIAGNOSIS — T45.1X5A CHEMOTHERAPY-INDUCED NEUROPATHY: ICD-10-CM

## 2018-09-10 DIAGNOSIS — E11.42 DM TYPE 2 WITH DIABETIC PERIPHERAL NEUROPATHY: Primary | ICD-10-CM

## 2018-09-10 DIAGNOSIS — M50.30 DDD (DEGENERATIVE DISC DISEASE), CERVICAL: ICD-10-CM

## 2018-09-10 DIAGNOSIS — M54.16 LUMBAR RADICULITIS: ICD-10-CM

## 2018-09-10 DIAGNOSIS — G62.9 PERIPHERAL POLYNEUROPATHY: ICD-10-CM

## 2018-09-10 DIAGNOSIS — M54.12 CERVICAL RADICULOPATHY: ICD-10-CM

## 2018-09-10 PROCEDURE — 99999 PR PBB SHADOW E&M-EST. PATIENT-LVL III: CPT | Mod: PBBFAC,,, | Performed by: PSYCHIATRY & NEUROLOGY

## 2018-09-10 PROCEDURE — 99214 OFFICE O/P EST MOD 30 MIN: CPT | Mod: S$PBB,,, | Performed by: PSYCHIATRY & NEUROLOGY

## 2018-09-10 PROCEDURE — 99213 OFFICE O/P EST LOW 20 MIN: CPT | Mod: PBBFAC | Performed by: PSYCHIATRY & NEUROLOGY

## 2018-09-10 PROCEDURE — 99499 UNLISTED E&M SERVICE: CPT | Mod: S$PBB,,, | Performed by: PSYCHIATRY & NEUROLOGY

## 2018-09-10 RX ORDER — PREGABALIN 100 MG/1
100 CAPSULE ORAL 2 TIMES DAILY
Qty: 60 CAPSULE | Refills: 6 | Status: SHIPPED | OUTPATIENT
Start: 2018-09-10 | End: 2019-03-04 | Stop reason: SDUPTHER

## 2018-09-10 NOTE — PROGRESS NOTES
Subjective:       Patient ID: Jonathan Gonzales is a 58 y.o. female.    Reason for Consult: Peripheral Neuropathy      Interval History:  Jonathan Gonzales is here for follow up. Their condition is neurologically stable.  She notes that her neuropathy seems to be no worse.  She notes that she deals with more tingling than pain although occasionally she will get right forearm burning.  We have discussed her neck pain in her back pain and her treatment plan for both.  She notes that she is still working on her diabetes control.  We have discussed the likelihood that her neuropathy was initially and sided by her chemotherapy but has now been maintained and has fluctuated with her course of diabetes.  She still notes some swelling in her lower extremities but she notes that with exercise walking and movement of her feet as well as wrapping of her limbs she is able to get by.  She does have some lymphedema left worse than right due to mastectomy and lymph node dissection for breast cancer.    Objective:     Vitals:    09/10/18 0953   BP: 103/73   Pulse: 74     Length dependent stocking-glove neuropathy with decrement to fine touch, pinprick and proprioception less than 30% of normal distally worse than proximally up to wrists and forearms and hands and ankles and knees and legs.  Mild steppage gait noted.  Focused examination was undertaken today. Over 50% of face to face time of 25 minute visit time was in giving guidance, counseling and discussing treatment options.    Assessment/Plan:     Problem List Items Addressed This Visit        Neuro    Cervical radiculopathy (Chronic)    Relevant Medications    pregabalin (LYRICA) 100 MG capsule    Cervical spondylosis without myelopathy (Chronic)    Relevant Medications    pregabalin (LYRICA) 100 MG capsule    Peripheral neuropathy    Lumbar radiculitis    DDD (degenerative disc disease), cervical    Relevant Medications    pregabalin (LYRICA) 100 MG capsule    DM type 2  with diabetic peripheral neuropathy - Primary    Overview     Tolerable amount of leg swelling on Lyrica 100mg po BID, will continue  We have discussed the improved HbA1c and I have encouraged continued health maintenance  We have discussed her cervical and lumbar DDD as well  She notes that this dose seems to help without too many side effects         Relevant Medications    pregabalin (LYRICA) 100 MG capsule      Other Visit Diagnoses     Chemotherapy-induced neuropathy            58-year-old right-handed female presents for evaluation follow-up of neuropathy.  At this point in time we have discussed that she is still receiving benefit from the Lyrica 100 mg p.o. b.i.d..  I will send in a refill for 6 months of his medication and see her back in approximately 6 months.  I will follow her HbA1c which should be drawn by her follow-up visit with me.  She may see me sooner if she has any worsening or clinical change in her neuropathy.  I will follow up with them in 6 month(s).  The patient verbalizes understanding and agreement with the treatment plan. I have discussed risks, benefits and alternatives to the treatment plan. Questions were sought and answered to her stated verbal satisfaction.        Franchesca Osuna MD    This note is dictated on M*Modal Fluency Direct word recognition program. There are word recognition mistakes that are occasionally missed on review.

## 2018-09-13 ENCOUNTER — OFFICE VISIT (OUTPATIENT)
Dept: HEMATOLOGY/ONCOLOGY | Facility: CLINIC | Age: 58
End: 2018-09-13
Payer: MEDICARE

## 2018-09-13 VITALS
DIASTOLIC BLOOD PRESSURE: 78 MMHG | OXYGEN SATURATION: 97 % | HEART RATE: 92 BPM | WEIGHT: 177.13 LBS | BODY MASS INDEX: 30.24 KG/M2 | SYSTOLIC BLOOD PRESSURE: 116 MMHG | TEMPERATURE: 99 F | HEIGHT: 64 IN

## 2018-09-13 DIAGNOSIS — C50.912 MALIGNANT NEOPLASM OF LEFT FEMALE BREAST, UNSPECIFIED ESTROGEN RECEPTOR STATUS, UNSPECIFIED SITE OF BREAST: Primary | ICD-10-CM

## 2018-09-13 DIAGNOSIS — I89.0 LYMPHEDEMA: ICD-10-CM

## 2018-09-13 DIAGNOSIS — F25.0 SCHIZOAFFECTIVE DISORDER, BIPOLAR TYPE: ICD-10-CM

## 2018-09-13 PROCEDURE — 3008F BODY MASS INDEX DOCD: CPT | Mod: CPTII,,, | Performed by: INTERNAL MEDICINE

## 2018-09-13 PROCEDURE — 99499 UNLISTED E&M SERVICE: CPT | Mod: HCNC,S$GLB,, | Performed by: INTERNAL MEDICINE

## 2018-09-13 PROCEDURE — 3078F DIAST BP <80 MM HG: CPT | Mod: CPTII,,, | Performed by: INTERNAL MEDICINE

## 2018-09-13 PROCEDURE — 99999 PR PBB SHADOW E&M-EST. PATIENT-LVL III: CPT | Mod: PBBFAC,,, | Performed by: INTERNAL MEDICINE

## 2018-09-13 PROCEDURE — 3074F SYST BP LT 130 MM HG: CPT | Mod: CPTII,,, | Performed by: INTERNAL MEDICINE

## 2018-09-13 PROCEDURE — 99213 OFFICE O/P EST LOW 20 MIN: CPT | Mod: S$PBB,,, | Performed by: INTERNAL MEDICINE

## 2018-09-13 PROCEDURE — 99213 OFFICE O/P EST LOW 20 MIN: CPT | Mod: PBBFAC | Performed by: INTERNAL MEDICINE

## 2018-09-13 NOTE — Clinical Note
Cbc,cmp prior to f/u Refer to lymphedema clnic on [facility] ( [place] location?)

## 2018-09-13 NOTE — PROGRESS NOTES
Subjective:       Patient ID: Jonathan Gonzales is a 58 y.o. female.    Chief Complaint: Follow-up    HPI   Diagnosis: Stage IIB lt Breast CA T2N1MO ER weak pos/MI negative, HER-2/bridget amplified dx'd 11/2011  Therapy:       1.  s/p bilateral mastectomy with SHLOMO flap reconstruction 11/23/2011                         2.  completed   Phase III 0221 protocol                   HPI 58-year-old female with stage IIB breast cancer, left breast originally diagnosed in November 2011, status post bilateral skin -sparing mastectomy with a right prophylactic and a left radical mastectomy for a T2 N1 MO ,Stage 2b. breast cancer of the left breast on 11/23/2011 with immediate autologous tissue reconstruction with SHLOMO flap. She underwent second stage reconstructive surgery on 4/27/2015 which was complicated by a hematoma for which She was taken back to OR emergently  for aspiration.          Pt previously Lost to  follow-up ( >1yr) and has re established care 2017          She has lost  compression sleeve LUE   She reports lymphedema stable.  Previously followed by PT      Doing well  No fatigue  Appetite and weight stable   No SOB/CP   No recent infections      She is followed by psychiatry  for bipolar disorder and schizophrenia      She is followed by PCP for  history of type 2  DM w/neuropathy , HTN and hyperlipidemia         Bone Density 10/19/2016 nl        PrevHx: Tumor was ER weak pos /MI negative, HER-2/bridget amplified with 1 of total of 24 lymph nodes removed which revealed metastatic carcinoma, 0.4 cm in diameter with no extranodal extension.She had abnormal PET imaging 12/2011 which revealed. Findings suggestive of metastatic disease to supraclavicular, mediastinal and right hilar lymph nodes.She underwent rt supraclavicular lymph node biopsy which was negative for malignancy. PET/CT 10/12 revealed resolution of LAD and no evid of mets.Patient was enrolled in 0221 study. She completed 1 yr of Herceptin  "therapy3/12/2013.     Tx; Phase III 0221 protocol- DD AC ( Adriamycin 60mg/m2/Cytoxan 60mg/2) x4 followed by weekly Paclitaxel 175mg/m2/Herceptin and completion of Herceptin therapy (6mg/kg q3wks) 1 yr 3/12/13              Review of Systems   Constitutional: Negative for appetite change, chills and fatigue.   HENT: Negative for congestion, hearing loss and nosebleeds.    Eyes: Negative for visual disturbance.   Respiratory: Negative for cough, chest tightness, shortness of breath and wheezing.    Cardiovascular: Negative for chest pain, palpitations and leg swelling.   Gastrointestinal: Negative for abdominal distention, abdominal pain, blood in stool, constipation, diarrhea, nausea and vomiting.   Genitourinary: Negative for dysuria, flank pain, frequency and hematuria.   Musculoskeletal: Negative for arthralgias, back pain, gait problem, joint swelling and neck pain.   Skin: Negative for rash.        No petechiae, ecchymoses   Neurological: Negative for dizziness, light-headedness, numbness and headaches.   Hematological: Negative for adenopathy. Does not bruise/bleed easily.   Psychiatric/Behavioral: Negative for dysphoric mood. The patient is not nervous/anxious.        Objective:       Vitals:    09/13/18 1009   BP: 116/78   BP Location: Right arm   Patient Position: Sitting   BP Method: Large (Manual)   Pulse: 92   Temp: 98.5 °F (36.9 °C)   TempSrc: Oral   SpO2: 97%   Weight: 80.3 kg (177 lb 2.2 oz)   Height: 5' 4" (1.626 m)       Physical Exam   Constitutional: She is oriented to person, place, and time. She appears well-developed and well-nourished.   HENT:   Head: Normocephalic.   Mouth/Throat: Oropharynx is clear and moist. No oropharyngeal exudate.   Eyes: Conjunctivae and lids are normal. Pupils are equal, round, and reactive to light. No scleral icterus.   Neck: Normal range of motion. Neck supple. No thyromegaly present.   Cardiovascular: Normal rate, regular rhythm and normal heart sounds.   No murmur " heard.  Pulmonary/Chest: Breath sounds normal. She has no wheezes. She has no rales.   S/p Rt reconstructed breast- palpable fibrosis noted  Weill-healed surg incision site, no axillary LAD    S/p Lt reconstructed breast- well-healed surg incision site, no palpable masses,  Nodules or axillary LAD noted   Abdominal: Soft. Bowel sounds are normal. She exhibits no distension and no mass. There is no hepatosplenomegaly. There is no tenderness. There is no rebound and no guarding.   Musculoskeletal: Normal range of motion. She exhibits edema. She exhibits no tenderness.   Lymphadenopathy:     She has no cervical adenopathy.     She has no axillary adenopathy.        Right: No supraclavicular adenopathy present.        Left: No supraclavicular adenopathy present.   Neurological: She is alert and oriented to person, place, and time. No cranial nerve deficit. Coordination normal.   Skin: Skin is warm and dry. No ecchymosis, no petechiae and no rash noted. No erythema.   Psychiatric: She has a normal mood and affect.           Results for JONATHAN MUNOZ (MRN [id number]) as of 6/8/2015 13:40   Ref. Range 6/2/2015 07:50   Vit D, 25-Hydroxy Latest Range: 30-96 ng/mL 33     Lab Results   Component Value Date    WBC 5.31 09/06/2018    HGB 12.1 09/06/2018    HCT 37.4 09/06/2018    MCV 94 09/06/2018     09/06/2018           10/19/2016 Bone Density-nl    Labs: none   Assessment:       1. Malignant neoplasm of left female breast, unspecified estrogen receptor status, unspecified site of breast    2. Schizoaffective disorder, bipolar type    3. Lymphedema        Plan:   Jonathan was seen today for follow-up.  Pt clinically stable    Diagnoses and associated orders for this visit:    1-2 Breast cancer, Stage IIB lt Breast CA T2N1MO ER weak pos/ MO neg Her 2 pos diagnosed  11/2011   - s/p bilateral mastectomy with SHLOMO flap reconstruction 11/23/2011   - s/p chemotherapy per  Phase III 0221 protocol      S/p second stage  reconstructive surgery      Rt Breast US 10/2015- Area in the right breast is benign-negative.  .   ACR BI-RADS Category 2: Benign   - CUAUHTEMOC  recurrence clinically     Postmastectomy lymphedema  Stable  AMBULATORY REFERRAL TO LYMPHEDEMA CLINIC    Bipolar d/o  Follow-up with psych      F/u 6  mos with cbc,cmp     CC: Ghulam Contreras MD

## 2018-09-26 ENCOUNTER — OFFICE VISIT (OUTPATIENT)
Dept: PODIATRY | Facility: CLINIC | Age: 58
End: 2018-09-26
Payer: MEDICARE

## 2018-09-26 VITALS
HEIGHT: 64 IN | HEART RATE: 63 BPM | DIASTOLIC BLOOD PRESSURE: 76 MMHG | SYSTOLIC BLOOD PRESSURE: 107 MMHG | BODY MASS INDEX: 30.75 KG/M2 | WEIGHT: 180.13 LBS

## 2018-09-26 DIAGNOSIS — M20.41 HAMMER TOES OF BOTH FEET: ICD-10-CM

## 2018-09-26 DIAGNOSIS — M20.42 HAMMER TOES OF BOTH FEET: ICD-10-CM

## 2018-09-26 DIAGNOSIS — L84 CORN OR CALLUS: ICD-10-CM

## 2018-09-26 DIAGNOSIS — B35.1 ONYCHOMYCOSIS DUE TO DERMATOPHYTE: ICD-10-CM

## 2018-09-26 DIAGNOSIS — E11.42 DIABETIC PERIPHERAL NEUROPATHY ASSOCIATED WITH TYPE 2 DIABETES MELLITUS: Primary | ICD-10-CM

## 2018-09-26 PROCEDURE — 11055 PARING/CUTG B9 HYPRKER LES 1: CPT | Mod: Q9,59,PBBFAC | Performed by: PODIATRIST

## 2018-09-26 PROCEDURE — 11055 PARING/CUTG B9 HYPRKER LES 1: CPT | Mod: Q9,S$PBB,, | Performed by: PODIATRIST

## 2018-09-26 PROCEDURE — 3074F SYST BP LT 130 MM HG: CPT | Mod: CPTII,,, | Performed by: PODIATRIST

## 2018-09-26 PROCEDURE — 3008F BODY MASS INDEX DOCD: CPT | Mod: CPTII,,, | Performed by: PODIATRIST

## 2018-09-26 PROCEDURE — 3044F HG A1C LEVEL LT 7.0%: CPT | Mod: CPTII,,, | Performed by: PODIATRIST

## 2018-09-26 PROCEDURE — 99999 PR PBB SHADOW E&M-EST. PATIENT-LVL III: CPT | Mod: PBBFAC,,, | Performed by: PODIATRIST

## 2018-09-26 PROCEDURE — 3078F DIAST BP <80 MM HG: CPT | Mod: CPTII,,, | Performed by: PODIATRIST

## 2018-09-26 PROCEDURE — 99213 OFFICE O/P EST LOW 20 MIN: CPT | Mod: 25,S$PBB,, | Performed by: PODIATRIST

## 2018-09-26 PROCEDURE — 99213 OFFICE O/P EST LOW 20 MIN: CPT | Mod: PBBFAC,25 | Performed by: PODIATRIST

## 2018-09-26 PROCEDURE — 11721 DEBRIDE NAIL 6 OR MORE: CPT | Mod: 59,Q9,S$PBB, | Performed by: PODIATRIST

## 2018-09-26 PROCEDURE — 11721 DEBRIDE NAIL 6 OR MORE: CPT | Mod: Q9,PBBFAC | Performed by: PODIATRIST

## 2018-09-26 RX ORDER — AMMONIUM LACTATE 12 G/100G
CREAM TOPICAL
Qty: 140 G | Refills: 11 | Status: SHIPPED | OUTPATIENT
Start: 2018-09-26 | End: 2019-09-11

## 2018-09-26 RX ORDER — CICLOPIROX 80 MG/ML
SOLUTION TOPICAL NIGHTLY
Qty: 6.6 ML | Refills: 11 | Status: SHIPPED | OUTPATIENT
Start: 2018-09-26 | End: 2019-09-11

## 2018-09-26 NOTE — PROGRESS NOTES
Subjective:      Patient ID: Jonathan Gonzales is a 58 y.o. female.    Chief Complaint: Diabetes Mellitus (PCP Dr. Contreras 9/5/18); Diabetic Foot Exam; Routine Foot Care; and Peripheral Neuropathy    Jonathan is a 58 y.o. female who presents to the clinic for evaluation and treatment of high risk feet. Jonathan has a past medical history of Bipolar disorder, Cancer of female breast (10/2010), Cancer of upper-outer quadrant of female breast (7/9/2012), Candidiasis of vulva and vagina (6/29/2012), Depression, Diabetes mellitus type II, Disturbances of sensation of smell and taste (6/29/2012), Hypertension, Malignant neoplasm of breast (female), unspecified site (4/27/2015), Neuropathy, Nonspecific abnormal unspecified cardiovascular function study (4/12/2013), Post-mastectomy lymphedema syndrome, Postmastectomy lymphedema (6/29/2012), Schizophrenia, and Thrush (6/29/2012). The patient's chief complaint is long, thick toenails both feet and  Callus right forefoot.  Both problems Gradual onset, worsening over past several weeks, aggravated by increased weight bearing, shoe gear, pressure. periodic debridement helps symptoms. .    PCP: Ghulam Contreras MD    Date Last Seen by PCP:   Chief Complaint   Patient presents with    Diabetes Mellitus     PCP Dr. Contreras 9/5/18    Diabetic Foot Exam    Routine Foot Care    Peripheral Neuropathy         Current shoe gear:  Affected Foot: Rx diabetic extra depth shoes and custom accommodative insoles     Unaffected Foot: Rx diabetic extra depth shoes and custom accommodative insoles    Hemoglobin A1C   Date Value Ref Range Status   02/16/2018 6.3 (H) 4.0 - 5.6 % Final     Comment:     According to ADA guidelines, hemoglobin A1c <7.0% represents  optimal control in non-pregnant diabetic patients. Different  metrics may apply to specific patient populations.   Standards of Medical Care in Diabetes-2016.  For the purpose of screening for the presence of diabetes:  <5.7%      Consistent with the absence of diabetes  5.7-6.4%  Consistent with increasing risk for diabetes   (prediabetes)  >or=6.5%  Consistent with diabetes  Currently, no consensus exists for use of hemoglobin A1c  for diagnosis of diabetes for children.  This Hemoglobin A1c assay has significant interference with fetal   hemoglobin   (HbF). The results are invalid for patients with abnormal amounts of   HbF,   including those with known Hereditary Persistence   of Fetal Hemoglobin. Heterozygous hemoglobin variants (HbAS, HbAC,   HbAD, HbAE, HbA2) do not significantly interfere with this assay;   however, presence of multiple variants in a sample may impact the %   interference.     11/29/2017 >14.0 (H) 4.0 - 5.6 % Final     Comment:     According to ADA guidelines, hemoglobin A1c <7.0% represents  optimal control in non-pregnant diabetic patients. Different  metrics may apply to specific patient populations.   Standards of Medical Care in Diabetes-2016.  For the purpose of screening for the presence of diabetes:  <5.7%     Consistent with the absence of diabetes  5.7-6.4%  Consistent with increasing risk for diabetes   (prediabetes)  >or=6.5%  Consistent with diabetes  Currently, no consensus exists for use of hemoglobin A1c  for diagnosis of diabetes for children.  This Hemoglobin A1c assay has significant interference with fetal   hemoglobin   (HbF). The results are invalid for patients with abnormal amounts of   HbF,   including those with known Hereditary Persistence   of Fetal Hemoglobin. Heterozygous hemoglobin variants (HbAS, HbAC,   HbAD, HbAE, HbA2) do not significantly interfere with this assay;   however, presence of multiple variants in a sample may impact the %   interference.     05/02/2017 7.4 (H) 4.5 - 6.2 % Final     Comment:     According to ADA guidelines, hemoglobin A1C <7.0% represents  optimal control in non-pregnant diabetic patients.  Different  metrics may apply to specific populations.   Standards  of Medical Care in Diabetes - 2016.  For the purpose of screening for the presence of diabetes:  <5.7%     Consistent with the absence of diabetes  5.7-6.4%  Consistent with increasing risk for diabetes   (prediabetes)  >or=6.5%  Consistent with diabetes  Currently no consensus exists for use of hemoglobin A1C  for diagnosis of diabetes for children.         Review of Systems   Constitution: Negative for chills, diaphoresis, fever, malaise/fatigue and night sweats.   Cardiovascular: Negative for claudication, cyanosis, leg swelling and syncope.   Skin: Positive for nail changes and suspicious lesions. Negative for color change, dry skin, rash and unusual hair distribution.   Musculoskeletal: Negative for falls, joint pain, joint swelling, muscle cramps, muscle weakness and stiffness.   Gastrointestinal: Negative for constipation, diarrhea, nausea and vomiting.   Neurological: Positive for paresthesias and sensory change. Negative for brief paralysis, disturbances in coordination, focal weakness, numbness and tremors.           Objective:      Physical Exam   Constitutional: She is oriented to person, place, and time. She appears well-developed and well-nourished. She is cooperative.   Oriented to time, place, and person.   Cardiovascular:   Pulses:       Dorsalis pedis pulses are 1+ on the right side, and 1+ on the left side.        Posterior tibial pulses are 1+ on the right side, and 1+ on the left side.   Capillary fill time 3-5 seconds.  All toes warm to touch.      Negative lower extremity edema bilateral.    Negative elevational pallor and dependent rubor bilateral.     Musculoskeletal:   Normal angle, base, station of gait. Decreased stride length, early heel off, moderately propulsive toe off bilateral.    All ten toes without clubbing, cyanosis, or signs of ischemia.      Patient has hammertoes of digits    2-5 bilateral               partially reducible without symptom today.      No pain to palpation  bilateral lower extremities.      Range of motion, stability, muscle strength, and muscle tone are age and health appropriate normal bilateral feet and legs.       Lymphadenopathy:   Negative lymphadenopathy bilateral popliteal fossa and tarsal tunnel.  Negative lymphangitic streaking bilateral foot/ankle bilateral.     Neurological: She is alert and oriented to person, place, and time. She has normal strength. She is not disoriented. She displays no atrophy and no tremor. A sensory deficit is present. She exhibits normal muscle tone.   Reflex Scores:       Patellar reflexes are 2+ on the right side and 2+ on the left side.       Achilles reflexes are 2+ on the right side and 2+ on the left side.  Decreased/absent vibratory sensation bilateral feet to 128Hz tuning fork.    Paresthesias, and burning bilateral feet with no clearly identified trigger or source.     Skin: Skin is warm, dry and intact. No abrasion, no bruising, no burn, no ecchymosis, no laceration, no lesion, no petechiae and no rash noted. She is not diaphoretic. No cyanosis or erythema. No pallor. Nails show no clubbing.   Focal hyperkeratotic lesion consisting entirely of hyperkeratotic tissue without open skin, drainage, pus, fluctuance, malodor, or signs of infection plantar right 5th mtpj.    Otherwise, Skin thin, atrophic, with decreased density and distribution of pedal hair bilateral, but without hyperpigmentation, zain discoloration,  ulcers, masses, nodules or cords palpated bilateral feet and legs.      Toenails 1st, 2nd, 3rd, 4th, 5th  bilateral are hypertrophic thickened 2-3 mm, dystrophic, discolored tanish brown with tan, gray crumbly subungual debris.  Not tender to distal nail plate pressure, without periungual skin abnormality of each.               Assessment:       Encounter Diagnoses   Name Primary?    Diabetic peripheral neuropathy associated with type 2 diabetes mellitus Yes    Hammer toes of both feet     Corn or callus      Onychomycosis due to dermatophyte          Plan:       Jonathan was seen today for diabetes mellitus, diabetic foot exam, routine foot care and peripheral neuropathy.    Diagnoses and all orders for this visit:    Diabetic peripheral neuropathy associated with type 2 diabetes mellitus  -     DIABETIC SHOES FOR HOME USE    Hammer toes of both feet  -     DIABETIC SHOES FOR HOME USE    Corn or callus  -     DIABETIC SHOES FOR HOME USE    Onychomycosis due to dermatophyte  -     DIABETIC SHOES FOR HOME USE    Other orders  -     ammonium lactate 12 % Crea; Apply twice daily to affected parts both feet as needed.  -     ciclopirox (PENLAC) 8 % Soln; Apply topically nightly.      I counseled the patient on her conditions, their implications and medical management.        - Shoe inspection. Diabetic Foot Education. Patient reminded of the importance of good nutrition and blood sugar control to help prevent podiatric complications of diabetes. Patient instructed on proper foot hygeine. We discussed wearing proper shoe gear, daily foot inspections, never walking without protective shoe gear, never putting sharp instruments to feet, routine podiatric visits at least annually.      - With patient's permission, nails were aggressively reduced and debrided x 10 to their soft tissue attachment mechanically and with electric , removing all offending nail and debris. Patient relates relief following the procedure. She will continue to monitor the areas daily, inspect her feet, wear protective shoe gear when ambulatory, moisturizer to maintain skin integrity and follow in this office in approximately 2-3 months, sooner p.r.n.      With the patient's permission, I debrided hyperkeratotic lesion(s) as above totaling    1            to, not  Including dermis with sterile #15 blade.  Patient tolerated the procedure well and related significant relief.    Discussed conservative treatment with shoes of adequate dimensions, material,  and style to alleviate symptoms and delay or prevent surgical intervention.    Rx DM shoes, custom inserts, penlac, lac hydrin.    Follow-up in about 1 year (around 9/26/2019).

## 2018-09-26 NOTE — LETTER
September 26, 2018      Ghulam Contreras MD  [address]  [address]  [facility] [address]           [facility] Podiat[facility]  [address]  [facility] [address]  Phone: [phone]          Patient: Jonathan Gonzales   MR Number: [id number]   YOB: 1960   Date of Visit: 9/26/2018       Dear Dr. Ghulam Contreras:    Thank you for referring Jonathan Gonzales to me for evaluation. Attached you will find relevant portions of my assessment and plan of care.    If you have questions, please do not hesitate to call me. I look forward to following Jonathan Gonzales along with you.    Sincerely,    Philippe Julien, EDI    Enclosure  CC:  No Recipients    If you would like to receive this communication electronically, please contact [email or web address] or [phone] to request more information on [employer] access.    For providers and/or their staff who would like to refer a patient to [facility], please contact us through our one-stop-shop provider referral line, [facility], at [phone].    If you feel you have received this communication in error or would no longer like to receive these types of communications, please e-mail [email or web address]

## 2018-10-03 ENCOUNTER — TELEPHONE (OUTPATIENT)
Dept: HEMATOLOGY/ONCOLOGY | Facility: CLINIC | Age: 58
End: 2018-10-03

## 2018-10-03 DIAGNOSIS — I89.0 LYMPHEDEMA: ICD-10-CM

## 2018-10-03 DIAGNOSIS — C50.912 MALIGNANT NEOPLASM OF LEFT BREAST: Primary | ICD-10-CM

## 2018-10-29 ENCOUNTER — OFFICE VISIT (OUTPATIENT)
Dept: OBSTETRICS AND GYNECOLOGY | Facility: CLINIC | Age: 58
End: 2018-10-29
Attending: OBSTETRICS & GYNECOLOGY
Payer: MEDICARE

## 2018-10-29 VITALS
WEIGHT: 178.13 LBS | DIASTOLIC BLOOD PRESSURE: 88 MMHG | SYSTOLIC BLOOD PRESSURE: 120 MMHG | BODY MASS INDEX: 31.56 KG/M2 | HEIGHT: 63 IN

## 2018-10-29 DIAGNOSIS — Z01.419 WELL WOMAN EXAM WITH ROUTINE GYNECOLOGICAL EXAM: Primary | ICD-10-CM

## 2018-10-29 DIAGNOSIS — N76.0 ACUTE VAGINITIS: ICD-10-CM

## 2018-10-29 LAB
CANDIDA RRNA VAG QL PROBE: NEGATIVE
G VAGINALIS RRNA GENITAL QL PROBE: NEGATIVE
T VAGINALIS RRNA GENITAL QL PROBE: NEGATIVE

## 2018-10-29 PROCEDURE — 88175 CYTOPATH C/V AUTO FLUID REDO: CPT | Performed by: PATHOLOGY

## 2018-10-29 PROCEDURE — 99214 OFFICE O/P EST MOD 30 MIN: CPT | Mod: PBBFAC,25 | Performed by: OBSTETRICS & GYNECOLOGY

## 2018-10-29 PROCEDURE — G0101 CA SCREEN;PELVIC/BREAST EXAM: HCPCS | Mod: PBBFAC | Performed by: OBSTETRICS & GYNECOLOGY

## 2018-10-29 PROCEDURE — 87624 HPV HI-RISK TYP POOLED RSLT: CPT

## 2018-10-29 PROCEDURE — G0101 CA SCREEN;PELVIC/BREAST EXAM: HCPCS | Mod: S$PBB,ICN,, | Performed by: OBSTETRICS & GYNECOLOGY

## 2018-10-29 PROCEDURE — 88141 CYTOPATH C/V INTERPRET: CPT | Mod: ,,, | Performed by: PATHOLOGY

## 2018-10-29 PROCEDURE — 87660 TRICHOMONAS VAGIN DIR PROBE: CPT

## 2018-10-29 PROCEDURE — 99999 PR PBB SHADOW E&M-EST. PATIENT-LVL IV: CPT | Mod: PBBFAC,,, | Performed by: OBSTETRICS & GYNECOLOGY

## 2018-10-29 RX ORDER — CAPSAICIN 0.75 MG/G
CREAM TOPICAL
COMMUNITY
Start: 2018-10-10 | End: 2019-09-11

## 2018-10-29 NOTE — PROGRESS NOTES
SUBJECTIVE:   58 y.o. female   for annual routine Pap and checkup. No LMP recorded. Patient is postmenopausal..  She complains of vaginal discharge and odor.        Past Medical History:   Diagnosis Date    Bipolar disorder     Cancer of female breast 10/2010    T2 N1 Mx, Stage IIB left breast cancer    Cancer of upper-outer quadrant of female breast 2012    Depression     Diabetes mellitus type II     Disturbances of sensation of smell and taste 2012    Hypertension     Malignant neoplasm of breast (female), unspecified site 2015    Neuropathy     Nonspecific abnormal unspecified cardiovascular function study 2013    ECG READ AS SHOWING A T-WAVE ABNORMALITY     Post-mastectomy lymphedema syndrome     Schizophrenia      Past Surgical History:   Procedure Laterality Date    BLOCK-NERVE-MEDIAL BRANCH-CERVICAL Left 2015    [facility]    BREAST RECONSTRUCTION  11    B/L SHLOMO flap reconstruction S/P left MRM, right prophylactic mastectomy    BREAST RECONSTRUCTION Bilateral 3/13/2015    NIPPLE RECONSTRUCTION    FACET JOINT INJECTION - BILATERAL - C4-5 THROUGH C6-7 Bilateral 2018    Performed by [facility] at [facility]    INJECTION, STEROID, SPINE, CERVICAL, EPIDURAL N/A 2014    [facility] at [facility]    INJECTION-STEROID-EPIDURAL-CERVICAL N/A 2014    [facility] at [facility]    MASTECTOMY Bilateral 2011    bilateral    RADIOFREQUENCY THERMOCOAGULATION (RFTC)-NERVE-MEDIAN BRANCH-CERVICAL Left 2015    [facility]    RE-EXPLORATION N/A 2015    [facility]    RECONSTRUCTION-BREAST-NIPPLE Bilateral 2015    [facility] at [facility]    REMOVAL, CATHETER, CENTRAL VENOUS, TUNNELED, WITH PORT N/A 2013    Performed by [facility] 2ND [facility]    REVISION-SCAR Bilateral 2015    [facility] at [facility]    TUBAL LIGATION       Social History     Socioeconomic History    Marital status:      Spouse name: Not on file    Number of children: 6    Years of education: Not on file    Highest education level: Not on file   Social Needs    Financial resource strain: Not on file    Food insecurity - worry: Not on file    Food insecurity - inability: Not on file    Transportation needs - medical: Not on file    Transportation needs - non-medical: Not on file   Occupational History    Occupation: Disability   Tobacco Use    Smoking status: Current Every Day Smoker     Packs/day: 0.25     Years: 25.00     Pack years: 6.25     Types: Cigarettes    Smokeless tobacco: Never Used    Tobacco comment: currently at less than 1/2 pack pd   Substance and Sexual Activity    Alcohol use: No     Alcohol/week: 0.0 oz    Drug use: No     Comment:  - stopped using crack cocaine    Sexual activity: No     Partners: Male   Other Topics Concern    Not on file   Social History Narrative    Not on file     Family History   Problem Relation Age of Onset    Kidney disease Mother         Dialysis    Hypertension Mother     Diabetes Mother     Deep vein thrombosis Mother     Glaucoma Mother     Diabetic kidney disease Sister     Lung cancer Sister     Diabetes Sister     Diabetes Brother     Diabetes Son     No Known Problems Father     No Known Problems Maternal Grandmother     No Known Problems Maternal Grandfather     No Known Problems Paternal Grandmother     No Known Problems Paternal Grandfather     No Known Problems Son     Cervical cancer Daughter     No Known Problems Daughter     No Known Problems Daughter     No Known Problems Daughter     Breast cancer Neg Hx     Ovarian cancer Neg Hx      OB History    Para Term  AB Living   7 6     1 6   SAB TAB Ectopic Multiple Live Births  "  1       6      # Outcome Date GA Lbr Gonzalo/2nd Weight Sex Delivery Anes PTL Lv   7 SAB            6 Para      Vag-Spont   JONN   5 Para      Vag-Spont   JONN   4 Para      Vag-Spont   JONN   3 Para      Vag-Spont   JONN   2 Para      Vag-Spont   JONN   1 Para      Vag-Spont   JONN            Current Outpatient Medications   Medication Sig Dispense Refill    ammonium lactate 12 % Crea Apply twice daily to affected parts both feet as needed. 140 g 11    arm brace (WRIST SUPPORT LARGE-XLARGE MISC)       arm brace Misc 1 application by Misc.(Non-Drug; Combo Route) route once daily. 1 each 0    BD INSULIN PEN NEEDLE UF MINI 31 gauge x 3/16" Ndle       blood sugar diagnostic Strp Pt to check BG up to QID. Dispense strips compatible with pt brand meter 100 each 11    blood-glucose meter (FREESTYLE SYSTEM KIT) kit Pt to check BG up to QID. Dispense strips compatible with pt brand meter 1 each 0    capsicum 0.075% (ZOSTRIX) 0.075 % topical cream       ciclopirox (PENLAC) 8 % Soln Apply topically nightly. 6.6 mL 11    clonazePAM (KLONOPIN) 0.5 MG tablet TK 1 T PO BID  3    divalproex (DEPAKOTE) 500 MG Tb24 Take 500 mg by mouth 2 (two) times daily. 500 mg in the morning, 1000 mg qhs      EASY TOUCH 31 gauge x 1/4" Ndle   5    flu vac [id number] 36mos up,PF, 60 mcg (15 mcg x 4)/0.5 mL Syrg inject into the muscle 0.5 mL 0    GLUCOPHAGE 500 mg tablet       insulin glargine (LANTUS SOLOSTAR U-100 INSULIN) 100 unit/mL (3 mL) InPn pen Inject 23 Units into the skin every evening. 15 mL 0    KLOR-CON M20 20 mEq tablet Take 1 tablet (20 mEq total) by mouth 2 (two) times daily. 60 tablet 5    lancets (ONETOUCH ULTRASOFT LANCETS) [facility] Pt to check BG up to QID. Dispense strips compatible with pt brand meter 100 each 11    lancets Misc 1 Device by Misc.(Non-Drug; Combo Route) route 4 (four) times daily. Please provide with appropriate lancets for device covered by insurance. 200 each 11    liraglutide 0.6 mg/0.1 mL, 18 mg/3 mL, " "subq PNIJ (VICTOZA 3-HERMANN) 0.6 mg/0.1 mL (18 mg/3 mL) PnIj Inject 1.8 mg into the skin once daily. 27 mL 3    lisinopril-hydrochlorothiazide (PRINZIDE,ZESTORETIC) 10-12.5 mg per tablet Take 1 tablet by mouth once daily. 30 tablet 11    olanzapine (ZYPREXA) 10 MG tablet TK 1 T PO QHS  3    oxybutynin (DITROPAN-XL) 10 MG 24 hr tablet Take 1 tablet (10 mg total) by mouth once daily. 30 tablet 5    pen needle, diabetic (BD ULTRA-FINE [place] PEN NEEDLES) 32 gauge x 5/32" Ndle Pt is injecting once daily 30 each 11    pen needle, diabetic, safety 29 gauge x 3/16" Ndle Use as instructed 200 each 11    pravastatin (PRAVACHOL) 20 MG tablet Take 1 tablet (20 mg total) by mouth once daily. 90 tablet 3    pregabalin (LYRICA) 100 MG capsule Take 1 capsule (100 mg total) by mouth 2 (two) times daily. 60 capsule 6    QUEtiapine (SEROQUEL) 100 MG Tab TAKE 1 TABLET BY MOUTH EVERY EVENING 90 tablet 0    traMADol (ULTRAM) 50 mg tablet       TRUE METRIX GLUCOSE METER Misc       TRUEPLUS LANCETS 30 gauge Misc USE TO TEST BLOOD SUGART QID  11    TRUEPLUS LANCETS 33 gauge Misc       ziprasidone (GEODON) 40 MG Cap TAKE 1 CAPSULE(40 MG) BY MOUTH EVERY NIGHT 90 capsule 0    UNABLE TO FIND        No current facility-administered medications for this visit.      Allergies: Banana     ROS:  Constitutional: no weight loss, weight gain, fever, fatigue  Eyes:  No vision changes, glasses/contacts  ENT/Mouth: No ulcers, sinus problems, ears ringing, headache  Cardiovascular: No inability to lie flat, chest pain, exercise intolerance, swelling, heart palpitations  Respiratory: No wheezing, coughing blood, shortness of breath, or cough  Gastrointestinal: No diarrhea, bloody stool, nausea/vomiting, constipation, gas, hemorrhoids  Genitourinary: No blood in urine, painful urination, urgency of urination, frequency of urination, incomplete emptying, incontinence, abnormal bleeding, painful periods, heavy periods, vaginal discharge, vaginal " "odor, painful intercourse, sexual problems, bleeding after intercourse.  Musculoskeletal: No muscle weakness  Skin/Breast: No painful breasts, nipple discharge, masses, rash, ulcers  Neurological: No passing out, seizures, numbness, headache  Endocrine: No diabetes, hypothyroid, hyperthyroid, hot flashes, hair loss, abnormal hair growth, ance  Psychiatric: No depression, crying  Hematologic: No bruises, bleeding, swollen lymph nodes, anemia.      OBJECTIVE:   The patient appears well, alert, oriented x 3, in no distress.  /88   Ht 5' 3" (1.6 m)   Wt 80.8 kg (178 lb 2.1 oz)   BMI 31.55 kg/m²   NECK: no thyromegaly, trachea midline  SKIN: no acne, striae, hirsutism  BREAST EXAM: post-mastectomy site well healed and free of suspicious changes  ABDOMEN: no hernias, masses, or hepatosplenomegaly  GENITALIA: normal external genitalia, no erythema, no discharge  URETHRA: normal urethra, normal urethral meatus  VAGINA: vaginal discharge yellow  CERVIX: no lesions or cervical motion tenderness  UTERUS: normal size, contour, position, consistency, mobility, non-tender  ADNEXA: no mass, fullness, tenderness    \  ASSESSMENT:   Jerson was seen today for annual exam.    Diagnoses and all orders for this visit:    Well woman exam with routine gynecological exam  -     Liquid-based pap smear, screening  -     HPV High Risk Genotypes, PCR    Acute vaginitis  -     Vaginosis Screen by DNA Probe      "

## 2018-11-01 LAB
HPV HR 12 DNA CVX QL NAA+PROBE: POSITIVE
HPV16 AG SPEC QL: NEGATIVE
HPV18 DNA SPEC QL NAA+PROBE: NEGATIVE

## 2018-11-23 DIAGNOSIS — E11.9 TYPE 2 DIABETES MELLITUS WITHOUT COMPLICATION, WITH LONG-TERM CURRENT USE OF INSULIN: ICD-10-CM

## 2018-11-23 DIAGNOSIS — N39.41 URGE INCONTINENCE: ICD-10-CM

## 2018-11-23 DIAGNOSIS — E11.42 DM TYPE 2 WITH DIABETIC PERIPHERAL NEUROPATHY: ICD-10-CM

## 2018-11-23 DIAGNOSIS — Z79.4 TYPE 2 DIABETES MELLITUS WITHOUT COMPLICATION, WITH LONG-TERM CURRENT USE OF INSULIN: ICD-10-CM

## 2018-11-23 RX ORDER — OXYBUTYNIN CHLORIDE 10 MG/1
TABLET, EXTENDED RELEASE ORAL
Qty: 30 TABLET | Refills: 0 | Status: SHIPPED | OUTPATIENT
Start: 2018-11-23 | End: 2019-05-15 | Stop reason: SDUPTHER

## 2018-11-23 RX ORDER — LANCETS 33 GAUGE
100 EACH MISCELLANEOUS 4 TIMES DAILY
Qty: 100 EACH | Refills: 0 | Status: SHIPPED | OUTPATIENT
Start: 2018-11-23 | End: 2021-02-01 | Stop reason: SDUPTHER

## 2018-11-23 RX ORDER — LANCETS
1 EACH MISCELLANEOUS 4 TIMES DAILY
Qty: 200 EACH | Refills: 0 | Status: SHIPPED | OUTPATIENT
Start: 2018-11-23 | End: 2019-08-19 | Stop reason: SDUPTHER

## 2018-11-23 RX ORDER — INSULIN GLARGINE 100 [IU]/ML
INJECTION, SOLUTION SUBCUTANEOUS
Qty: 15 ML | Refills: 0 | Status: SHIPPED | OUTPATIENT
Start: 2018-11-23 | End: 2019-04-30 | Stop reason: SDUPTHER

## 2018-11-23 NOTE — TELEPHONE ENCOUNTER
"----- Message from Miriam Melendez sent at 11/23/2018  2:17 PM CST -----  Contact: self/ [phone]  RX request - refill or new RX.  Is this a refill or new RX:    RX name and strength: insulin glargine (LANTUS SOLOSTAR U-100 INSULIN) 100 unit/mL (3 mL) InPn pen   Directions:   Is this a 30 day or 90 day RX:    Local pharmacy or mail order pharmacy:    Pharmacy name and phone # (DON'T enter "on file" or "in chart"): [facility] [id number] AT [facility] [address] [phone] (Phone)  [phone] (Fax)      Comments:  Patient says it is in a box of 5.      "

## 2018-11-23 NOTE — TELEPHONE ENCOUNTER
----- Message from Benja Whitmore sent at 11/23/2018  1:30 PM CST -----  Contact: Patient [phone]  RX request - refill or new RX.  Is this a refill or new RX:  Refill  RX name and strength: TRUEPLUS LANCETS  [place]      Pharmacy name and phone # ( [id number] - [place], [place] - [address] [place] [facility] [address] [phone] (Phone)  [phone] (Fax      Comments:  Patient stating is at pharmacy currently and was informed no needles available, requesting refills today asap.    Please call an advise  Thank you

## 2019-02-11 ENCOUNTER — LAB VISIT (OUTPATIENT)
Dept: LAB | Facility: OTHER | Age: 59
End: 2019-02-11
Payer: MEDICARE

## 2019-02-11 DIAGNOSIS — E11.9 TYPE 2 DIABETES MELLITUS WITHOUT COMPLICATION: ICD-10-CM

## 2019-02-11 DIAGNOSIS — C50.912 MALIGNANT NEOPLASM OF LEFT FEMALE BREAST, UNSPECIFIED ESTROGEN RECEPTOR STATUS, UNSPECIFIED SITE OF BREAST: ICD-10-CM

## 2019-02-11 LAB
ALBUMIN SERPL BCP-MCNC: 3.3 G/DL
ALP SERPL-CCNC: 49 U/L
ALT SERPL W/O P-5'-P-CCNC: 25 U/L
ANION GAP SERPL CALC-SCNC: 9 MMOL/L
AST SERPL-CCNC: 30 U/L
BASOPHILS # BLD AUTO: 0.03 K/UL
BASOPHILS NFR BLD: 0.6 %
BILIRUB SERPL-MCNC: 0.3 MG/DL
BUN SERPL-MCNC: 13 MG/DL
CALCIUM SERPL-MCNC: 9.2 MG/DL
CHLORIDE SERPL-SCNC: 100 MMOL/L
CO2 SERPL-SCNC: 30 MMOL/L
CREAT SERPL-MCNC: 1.1 MG/DL
DIFFERENTIAL METHOD: ABNORMAL
EOSINOPHIL # BLD AUTO: 0.1 K/UL
EOSINOPHIL NFR BLD: 2.1 %
ERYTHROCYTE [DISTWIDTH] IN BLOOD BY AUTOMATED COUNT: 12.7 %
EST. GFR  (AFRICAN AMERICAN): >60 ML/MIN/1.73 M^2
EST. GFR  (NON AFRICAN AMERICAN): 55 ML/MIN/1.73 M^2
ESTIMATED AVG GLUCOSE: 134 MG/DL
GLUCOSE SERPL-MCNC: 128 MG/DL
HBA1C MFR BLD HPLC: 6.3 %
HCT VFR BLD AUTO: 36.6 %
HGB BLD-MCNC: 12.1 G/DL
LYMPHOCYTES # BLD AUTO: 3 K/UL
LYMPHOCYTES NFR BLD: 56.4 %
MCH RBC QN AUTO: 30.6 PG
MCHC RBC AUTO-ENTMCNC: 33.1 G/DL
MCV RBC AUTO: 92 FL
MONOCYTES # BLD AUTO: 0.4 K/UL
MONOCYTES NFR BLD: 7.9 %
NEUTROPHILS # BLD AUTO: 1.7 K/UL
NEUTROPHILS NFR BLD: 31.9 %
PLATELET # BLD AUTO: 252 K/UL
PMV BLD AUTO: 10.7 FL
POTASSIUM SERPL-SCNC: 3.3 MMOL/L
PROT SERPL-MCNC: 7.5 G/DL
RBC # BLD AUTO: 3.96 M/UL
SODIUM SERPL-SCNC: 139 MMOL/L
WBC # BLD AUTO: 5.34 K/UL

## 2019-02-11 PROCEDURE — 80053 COMPREHEN METABOLIC PANEL: CPT | Mod: HCNC

## 2019-02-11 PROCEDURE — 83036 HEMOGLOBIN GLYCOSYLATED A1C: CPT | Mod: HCNC

## 2019-02-11 PROCEDURE — 85025 COMPLETE CBC W/AUTO DIFF WBC: CPT | Mod: HCNC

## 2019-02-11 PROCEDURE — 36415 COLL VENOUS BLD VENIPUNCTURE: CPT | Mod: HCNC

## 2019-02-12 ENCOUNTER — OFFICE VISIT (OUTPATIENT)
Dept: INTERNAL MEDICINE | Facility: CLINIC | Age: 59
End: 2019-02-12
Attending: FAMILY MEDICINE
Payer: MEDICARE

## 2019-02-12 VITALS
BODY MASS INDEX: 31.36 KG/M2 | OXYGEN SATURATION: 97 % | HEART RATE: 74 BPM | HEIGHT: 63 IN | DIASTOLIC BLOOD PRESSURE: 72 MMHG | WEIGHT: 177 LBS | SYSTOLIC BLOOD PRESSURE: 110 MMHG

## 2019-02-12 DIAGNOSIS — G89.29 CHRONIC LOW BACK PAIN, UNSPECIFIED BACK PAIN LATERALITY, WITH SCIATICA PRESENCE UNSPECIFIED: ICD-10-CM

## 2019-02-12 DIAGNOSIS — F25.0 SCHIZOAFFECTIVE DISORDER, BIPOLAR TYPE: ICD-10-CM

## 2019-02-12 DIAGNOSIS — E11.42 DM TYPE 2 WITH DIABETIC PERIPHERAL NEUROPATHY: Primary | ICD-10-CM

## 2019-02-12 DIAGNOSIS — M54.5 CHRONIC LOW BACK PAIN, UNSPECIFIED BACK PAIN LATERALITY, WITH SCIATICA PRESENCE UNSPECIFIED: ICD-10-CM

## 2019-02-12 DIAGNOSIS — C50.919 BREAST CANCER, STAGE 2, UNSPECIFIED LATERALITY: ICD-10-CM

## 2019-02-12 DIAGNOSIS — I10 HTN (HYPERTENSION), BENIGN: ICD-10-CM

## 2019-02-12 PROCEDURE — 3078F PR MOST RECENT DIASTOLIC BLOOD PRESSURE < 80 MM HG: ICD-10-PCS | Mod: HCNC,CPTII,S$GLB, | Performed by: FAMILY MEDICINE

## 2019-02-12 PROCEDURE — 3044F PR MOST RECENT HEMOGLOBIN A1C LEVEL <7.0%: ICD-10-PCS | Mod: HCNC,CPTII,S$GLB, | Performed by: FAMILY MEDICINE

## 2019-02-12 PROCEDURE — 3074F SYST BP LT 130 MM HG: CPT | Mod: HCNC,CPTII,S$GLB, | Performed by: FAMILY MEDICINE

## 2019-02-12 PROCEDURE — 99999 PR PBB SHADOW E&M-EST. PATIENT-LVL III: CPT | Mod: PBBFAC,HCNC,, | Performed by: FAMILY MEDICINE

## 2019-02-12 PROCEDURE — 3008F BODY MASS INDEX DOCD: CPT | Mod: HCNC,CPTII,S$GLB, | Performed by: FAMILY MEDICINE

## 2019-02-12 PROCEDURE — 3008F PR BODY MASS INDEX (BMI) DOCUMENTED: ICD-10-PCS | Mod: HCNC,CPTII,S$GLB, | Performed by: FAMILY MEDICINE

## 2019-02-12 PROCEDURE — 99214 PR OFFICE/OUTPT VISIT, EST, LEVL IV, 30-39 MIN: ICD-10-PCS | Mod: HCNC,S$GLB,, | Performed by: FAMILY MEDICINE

## 2019-02-12 PROCEDURE — 3078F DIAST BP <80 MM HG: CPT | Mod: HCNC,CPTII,S$GLB, | Performed by: FAMILY MEDICINE

## 2019-02-12 PROCEDURE — 99499 UNLISTED E&M SERVICE: CPT | Mod: HCNC,S$GLB,, | Performed by: FAMILY MEDICINE

## 2019-02-12 PROCEDURE — 99214 OFFICE O/P EST MOD 30 MIN: CPT | Mod: HCNC,S$GLB,, | Performed by: FAMILY MEDICINE

## 2019-02-12 PROCEDURE — 99499 RISK ADDL DX/OHS AUDIT: ICD-10-PCS | Mod: HCNC,S$GLB,, | Performed by: FAMILY MEDICINE

## 2019-02-12 PROCEDURE — 3044F HG A1C LEVEL LT 7.0%: CPT | Mod: HCNC,CPTII,S$GLB, | Performed by: FAMILY MEDICINE

## 2019-02-12 PROCEDURE — 99999 PR PBB SHADOW E&M-EST. PATIENT-LVL III: ICD-10-PCS | Mod: PBBFAC,HCNC,, | Performed by: FAMILY MEDICINE

## 2019-02-12 PROCEDURE — 3074F PR MOST RECENT SYSTOLIC BLOOD PRESSURE < 130 MM HG: ICD-10-PCS | Mod: HCNC,CPTII,S$GLB, | Performed by: FAMILY MEDICINE

## 2019-02-12 NOTE — PROGRESS NOTES
CHIEF COMPLAINT:   Preop clearance in a patient with diabetes and hypertension    HISTORY OF PRESENT ILLNESS: The patient is a 59 year-old black female.  The patient has a long and complicated medical history.      She is scheduled to have dental work in the next couple of weeks.  It will be done with local anesthesia.  She does not need endocarditis prophylaxis.  She is not on any blood thinners.    She continues to have problems with neuropathic pain as well as central pain.  She does well w/ her Lyrica 100 twice a day.    The patient has a history of diabetes.  Recent blood sugars have been less than 120.  There have been no episodes of hypoglycemia.    The patient has a history of stable hypertension on current medications.  Patient denies chest pain or shortness of breath today.    The patient has a history of stable hyperlipidemia on current medications.  The patient denies chest pain or shortness of breath today.  The patient denies muscle aches or myalgias suggestive of myositis.    She has a history of breast cancer for which she underwent bilateral mastectomies and chemotherapy.    REVIEW OF SYSTEMS:  GENERAL: No fever, chills or weight loss.  SKIN: No rashes, itching or changes in color or texture of skin.  HEAD: No headaches or recent head trauma.  EYES: Visual acuity fine. No photophobia, ocular pain or diplopia.  EARS: Denies ear pain, discharge or vertigo.  NOSE: No loss of smell, no epistaxis or postnasal drip.  MOUTH & THROAT: No hoarseness or change in voice. No excessive gum bleeding.  NODES: Denies swollen glands.  CHEST: Denies AWAD, cyanosis, wheezing, cough and sputum production.  CARDIOVASCULAR: Denies chest pain, PND, orthopnea or reduced exercise tolerance.  ABDOMEN: Appetite fine. No weight loss. Denies diarrhea, abdominal pain, hematemesis or blood in stool.  URINARY: No flank pain, dysuria or hematuria.  PERIPHERAL VASCULAR: No claudication or cyanosis.  MUSCULOSKELETAL: No joint  "stiffness or swelling. Except as noted above.  NEUROLOGIC: No history of seizures, paralysis, alteration of gait or coordination.    SOCIAL HISTORY: The patient does smoke.  The patient consumes alcohol socially.      PHYSICAL EXAMINATION:     Blood pressure 110/72, pulse 74, height 5' 3" (1.6 m), weight 80.3 kg (177 lb 0.5 oz), SpO2 97 %.    APPEARANCE: Well nourished, well developed, in no acute distress.    HEAD: Normocephalic, atraumatic.  EYES: PERRL. EOMI.  Conjunctivae without injection and  anicteric  EARS: TM's intact. Light reflex normal. No retraction or perforation.    NOSE: Mucosa pink. Airway clear.  MOUTH & THROAT: No tonsillar enlargement. No pharyngeal erythema or exudate. No stridor.  NECK: Supple.   NODES: No cervical, axillary or inguinal lymph node enlargement.  CHEST: Lungs clear to auscultation.  No retractions are noted.  No rales or rhonchi are present.  CARDIOVASCULAR: Normal S1, S2. No rubs, murmurs or gallops.  ABDOMEN: Bowel sounds normal. Not distended. Soft. No tenderness or masses.  No ascites is noted.  MUSCULOSKELETAL:  There is no clubbing, cyanosis, or edema of the extremities x4.  There is full range of motion of the lumbar spine.  There is full range of motion of the extremities x4.  There is no deformity noted.    NEUROLOGIC:       Normal speech development.      Hearing normal.      Normal gait.      Motor and sensory exams grossly normal.      DTR's normal.  PSYCHIATRIC: Patient is alert and oriented x3.  Thought processes are all normal.  There is no homicidality.  There is no suicidality.  There is no evidence of psychosis.    LABORATORY/RADIOLOGY:   Chart reviewed . including surgeries and blood work    ASSESSMENT:   Poor dentition  Frequent urination and urge incontinence  Breast cancer with resultant lymphedema  Hypertension, condition is well controlled on current medication regimen  Diabetes, condition is well controlled on current medication regimen  Neuropathic " pain  Cervical radiculopathy    PLAN:  She is cleared for dental work  Her diabetes is very well controlled with a recent A1c of 6.3   Pain clinic   KCl 40 po qd  Prinzide  Pravachol 20 mg by mouth daily    Return to clinic in 6 month with blood work prior

## 2019-02-19 ENCOUNTER — TELEPHONE (OUTPATIENT)
Dept: INTERNAL MEDICINE | Facility: CLINIC | Age: 59
End: 2019-02-19

## 2019-02-19 NOTE — TELEPHONE ENCOUNTER
----- Message from Ministerio Aleman sent at 2/19/2019  2:23 PM CST -----  Contact: Pt   Name of Who is CallingJOYA MUNOZ [[id number]]:       What is the request in detail: Pt is calling in regards to getting clearance for dental appt.Clearance can be  Fax to  [facility]  Please contact to further discuss and advise.       Can the clinic reply by [facility]:       What Number to Call Back if not in [facility]:[phone]

## 2019-02-20 NOTE — TELEPHONE ENCOUNTER
Message left informing pt document has been filled out.        Called [facility], left message to return call with fax number to fax over clearance.

## 2019-02-22 DIAGNOSIS — E11.9 TYPE 2 DIABETES MELLITUS WITHOUT COMPLICATION: ICD-10-CM

## 2019-02-25 ENCOUNTER — TELEPHONE (OUTPATIENT)
Dept: INTERNAL MEDICINE | Facility: CLINIC | Age: 59
End: 2019-02-25

## 2019-02-25 NOTE — TELEPHONE ENCOUNTER
----- Message from Debbie Cancino sent at 2/25/2019  1:49 PM CST -----  Contact: Pt  Name of Who is Calling: JOYA MUNOZ [[id number]]    What is the request in detail: Pt is now at the dentist and do not have the clearance that was faxed last week. Please resend to [phone]. Pt is in clinic now. Please advise.     Can the clinic reply by [facility]: No     What [facility] [phone]

## 2019-02-25 NOTE — TELEPHONE ENCOUNTER
Please call patient need to confirm if she is taking her potassium supplement twice a day, need recheck next week   Her potassium is low and we will need to increase it if she is not taking it regularly    The procedure was performed independently

## 2019-03-04 DIAGNOSIS — E87.6 HYPOPOTASSEMIA: ICD-10-CM

## 2019-03-04 DIAGNOSIS — I10 HTN (HYPERTENSION), BENIGN: ICD-10-CM

## 2019-03-04 DIAGNOSIS — E11.42 DM TYPE 2 WITH DIABETIC PERIPHERAL NEUROPATHY: ICD-10-CM

## 2019-03-04 DIAGNOSIS — M50.30 DDD (DEGENERATIVE DISC DISEASE), CERVICAL: ICD-10-CM

## 2019-03-04 DIAGNOSIS — M54.12 CERVICAL RADICULOPATHY: ICD-10-CM

## 2019-03-04 DIAGNOSIS — M47.812 CERVICAL SPONDYLOSIS WITHOUT MYELOPATHY: ICD-10-CM

## 2019-03-04 RX ORDER — ZIPRASIDONE HYDROCHLORIDE 40 MG/1
40 CAPSULE ORAL NIGHTLY
Qty: 30 CAPSULE | Refills: 0 | Status: SHIPPED | OUTPATIENT
Start: 2019-03-04 | End: 2019-04-15 | Stop reason: SDUPTHER

## 2019-03-04 RX ORDER — PREGABALIN 100 MG/1
100 CAPSULE ORAL 2 TIMES DAILY
Qty: 60 CAPSULE | Refills: 0 | Status: SHIPPED | OUTPATIENT
Start: 2019-03-04 | End: 2019-04-15 | Stop reason: SDUPTHER

## 2019-03-04 RX ORDER — LISINOPRIL AND HYDROCHLOROTHIAZIDE 10; 12.5 MG/1; MG/1
1 TABLET ORAL DAILY
Qty: 30 TABLET | Refills: 0 | Status: SHIPPED | OUTPATIENT
Start: 2019-03-04 | End: 2019-08-19 | Stop reason: SDUPTHER

## 2019-03-04 RX ORDER — ZIPRASIDONE HYDROCHLORIDE 40 MG/1
CAPSULE ORAL
Qty: 90 CAPSULE | Refills: 0 | OUTPATIENT
Start: 2019-03-04

## 2019-03-04 NOTE — TELEPHONE ENCOUNTER
----- Message from Deena Moreno sent at 3/4/2019  1:27 PM CST -----  Can the clinic reply in [facility]: no      Please refill the medication(s) listed below. Please call the patient when the prescription(s) is ready for  at this phone number   [phone]    Medication #1 METFORMIN    Medication #2 LYRICA      Medication #2LISINOPRIL      Medication #2ziprasidone (GEODON) 40 MG Cap       Preferred Pharmacy: [id number] - [place] [address] AT [facility] [address]

## 2019-03-11 ENCOUNTER — TELEPHONE (OUTPATIENT)
Dept: NEUROLOGY | Facility: CLINIC | Age: 59
End: 2019-03-11

## 2019-03-11 NOTE — TELEPHONE ENCOUNTER
----- Message from Gladis Funez sent at 3/11/2019  2:57 PM CDT -----  Contact: pt   Name of Who is Calling: JOYA MUNOZ [[id number]]    What is the request in detail: Patient is needing to reschedule appointment.....Please contact to further discuss and advise      Can the clinic reply by [facility]:     What Number to Call Back if not in [facility]: [phone]

## 2019-03-14 ENCOUNTER — OFFICE VISIT (OUTPATIENT)
Dept: HEMATOLOGY/ONCOLOGY | Facility: CLINIC | Age: 59
End: 2019-03-14
Payer: MEDICARE

## 2019-03-14 VITALS
TEMPERATURE: 99 F | BODY MASS INDEX: 29.59 KG/M2 | HEART RATE: 70 BPM | SYSTOLIC BLOOD PRESSURE: 132 MMHG | HEIGHT: 64 IN | DIASTOLIC BLOOD PRESSURE: 80 MMHG | OXYGEN SATURATION: 97 % | WEIGHT: 173.31 LBS

## 2019-03-14 DIAGNOSIS — F31.9 BIPOLAR AFFECTIVE DISORDER, REMISSION STATUS UNSPECIFIED: ICD-10-CM

## 2019-03-14 DIAGNOSIS — I97.2 POSTMASTECTOMY LYMPHEDEMA: ICD-10-CM

## 2019-03-14 DIAGNOSIS — C50.912 MALIGNANT NEOPLASM OF LEFT FEMALE BREAST, UNSPECIFIED ESTROGEN RECEPTOR STATUS, UNSPECIFIED SITE OF BREAST: Primary | ICD-10-CM

## 2019-03-14 PROCEDURE — 3075F SYST BP GE 130 - 139MM HG: CPT | Mod: HCNC,CPTII,S$GLB, | Performed by: INTERNAL MEDICINE

## 2019-03-14 PROCEDURE — 99999 PR PBB SHADOW E&M-EST. PATIENT-LVL V: ICD-10-PCS | Mod: PBBFAC,HCNC,, | Performed by: INTERNAL MEDICINE

## 2019-03-14 PROCEDURE — 99499 UNLISTED E&M SERVICE: CPT | Mod: HCNC,S$GLB,, | Performed by: INTERNAL MEDICINE

## 2019-03-14 PROCEDURE — 99499 RISK ADDL DX/OHS AUDIT: ICD-10-PCS | Mod: HCNC,S$GLB,, | Performed by: INTERNAL MEDICINE

## 2019-03-14 PROCEDURE — 3079F PR MOST RECENT DIASTOLIC BLOOD PRESSURE 80-89 MM HG: ICD-10-PCS | Mod: HCNC,CPTII,S$GLB, | Performed by: INTERNAL MEDICINE

## 2019-03-14 PROCEDURE — 99999 PR PBB SHADOW E&M-EST. PATIENT-LVL V: CPT | Mod: PBBFAC,HCNC,, | Performed by: INTERNAL MEDICINE

## 2019-03-14 PROCEDURE — 3079F DIAST BP 80-89 MM HG: CPT | Mod: HCNC,CPTII,S$GLB, | Performed by: INTERNAL MEDICINE

## 2019-03-14 PROCEDURE — 99213 PR OFFICE/OUTPT VISIT, EST, LEVL III, 20-29 MIN: ICD-10-PCS | Mod: HCNC,S$GLB,, | Performed by: INTERNAL MEDICINE

## 2019-03-14 PROCEDURE — 3008F PR BODY MASS INDEX (BMI) DOCUMENTED: ICD-10-PCS | Mod: HCNC,CPTII,S$GLB, | Performed by: INTERNAL MEDICINE

## 2019-03-14 PROCEDURE — 99213 OFFICE O/P EST LOW 20 MIN: CPT | Mod: HCNC,S$GLB,, | Performed by: INTERNAL MEDICINE

## 2019-03-14 PROCEDURE — 3075F PR MOST RECENT SYSTOLIC BLOOD PRESS GE 130-139MM HG: ICD-10-PCS | Mod: HCNC,CPTII,S$GLB, | Performed by: INTERNAL MEDICINE

## 2019-03-14 PROCEDURE — 3008F BODY MASS INDEX DOCD: CPT | Mod: HCNC,CPTII,S$GLB, | Performed by: INTERNAL MEDICINE

## 2019-03-14 NOTE — PROGRESS NOTES
Subjective:       Patient ID: Jonathan Gonzales is a 59 y.o. female.    Chief Complaint: No chief complaint on file.    HPI   Diagnosis: Stage IIB lt Breast CA T2N1MO ER weak pos/IL negative, HER-2/bridget amplified dx'd 11/2011  Therapy:       1.  s/p bilateral mastectomy with SHLOMO flap reconstruction 11/23/2011                         2.  completed   Phase III 0221 protocol                   HPI 59 -year-old female with stage IIB breast cancer, left breast originally diagnosed in November 2011, status post bilateral skin -sparing mastectomy with a right prophylactic and a left radical mastectomy for a T2 N1 MO ,Stage 2b. breast cancer of the left breast on 11/23/2011 with immediate autologous tissue reconstruction with SHLOMO flap. She underwent second stage reconstructive surgery on 4/27/2015 which was complicated by a hematoma for which She was taken back to OR emergently  for aspiration.          Pt previously Lost to  follow-up ( >1yr) and has re established care 2017          She wears  compression sleeve LUE   She reports lymphedema stable.  Previously followed by PT      Doing well  No new issues   Appetite and weight stable   No SOB/CP/cough  No fevers, night sweats        She is followed by psychiatry  for bipolar disorder and schizophrenia      She is followed by PCP for  history of type 2  DM w/neuropathy , HTN and hyperlipidemia         Bone Density 10/19/2016 nl        PrevHx: Tumor was ER weak pos /IL negative, HER-2/bridget amplified with 1 of total of 24 lymph nodes removed which revealed metastatic carcinoma, 0.4 cm in diameter with no extranodal extension.She had abnormal PET imaging 12/2011 which revealed. Findings suggestive of metastatic disease to supraclavicular, mediastinal and right hilar lymph nodes.She underwent rt supraclavicular lymph node biopsy which was negative for malignancy. PET/CT 10/12 revealed resolution of LAD and no evid of mets.Patient was enrolled in 0221 study. She completed 1 yr  "of Herceptin therapy3/12/2013.     Tx; Phase III 0221 protocol- DD AC ( Adriamycin 60mg/m2/Cytoxan 60mg/2) x4 followed by weekly Paclitaxel 175mg/m2/Herceptin and completion of Herceptin therapy (6mg/kg q3wks) 1 yr 3/12/13              Review of Systems   Constitutional: Negative for appetite change, chills and fatigue.   HENT: Negative for congestion, hearing loss and nosebleeds.    Eyes: Negative for visual disturbance.   Respiratory: Negative for cough, chest tightness, shortness of breath and wheezing.    Cardiovascular: Negative for chest pain, palpitations and leg swelling.   Gastrointestinal: Negative for abdominal distention, abdominal pain, blood in stool, constipation, diarrhea, nausea and vomiting.   Genitourinary: Negative for dysuria, flank pain, frequency and hematuria.   Musculoskeletal: Negative for arthralgias, back pain, gait problem, joint swelling and neck pain.   Skin: Negative for rash.        No petechiae, ecchymoses   Neurological: Negative for dizziness, light-headedness, numbness and headaches.   Hematological: Negative for adenopathy. Does not bruise/bleed easily.   Psychiatric/Behavioral: Negative for dysphoric mood. The patient is not nervous/anxious.        Objective:       Vitals:    03/14/19 1039   BP: 132/80   BP Location: Right arm   Patient Position: Sitting   BP Method: Large (Automatic)   Pulse: 70   Temp: 98.6 °F (37 °C)   TempSrc: Oral   SpO2: 97%   Weight: 78.6 kg (173 lb 4.5 oz)   Height: 5' 4" (1.626 m)       Physical Exam   Constitutional: She is oriented to person, place, and time. She appears well-developed and well-nourished.   HENT:   Head: Normocephalic.   Mouth/Throat: Oropharynx is clear and moist. No oropharyngeal exudate.   Eyes: Conjunctivae and lids are normal. Pupils are equal, round, and reactive to light. No scleral icterus.   Neck: Normal range of motion. Neck supple. No thyromegaly present.   Cardiovascular: Normal rate, regular rhythm and normal heart " sounds.   No murmur heard.  Pulmonary/Chest: Breath sounds normal. She has no wheezes. She has no rales.   S/p Rt reconstructed breast- palpable fibrosis noted  Weill-healed surg incision site, no axillary LAD    S/p Lt reconstructed breast- well-healed surg incision site, no palpable masses,  Nodules or axillary LAD noted   Abdominal: Soft. Bowel sounds are normal. She exhibits no distension and no mass. There is no hepatosplenomegaly. There is no tenderness. There is no rebound and no guarding.   Musculoskeletal: Normal range of motion. She exhibits edema. She exhibits no tenderness.   Lymphadenopathy:     She has no cervical adenopathy.     She has no axillary adenopathy.        Right: No supraclavicular adenopathy present.        Left: No supraclavicular adenopathy present.   Neurological: She is alert and oriented to person, place, and time. No cranial nerve deficit. Coordination normal.   Skin: Skin is warm and dry. No ecchymosis, no petechiae and no rash noted. No erythema.   Psychiatric: She has a normal mood and affect.           Results for JONATHAN MUNOZ (MRN [id number]) as of 6/8/2015 13:40   Ref. Range 6/2/2015 07:50   Vit D, 25-Hydroxy Latest Range: 30-96 ng/mL 33     Lab Results   Component Value Date    WBC 5.34 02/11/2019    HGB 12.1 02/11/2019    HCT 36.6 (L) 02/11/2019    MCV 92 02/11/2019     02/11/2019           10/19/2016 Bone Density-nl    Labs: none   Assessment:       1. Malignant neoplasm of left female breast, unspecified estrogen receptor status, unspecified site of breast    2. Postmastectomy lymphedema    3. Bipolar affective disorder, remission status unspecified        Plan:   Jonathan was seen today for follow-up.  Pt clinically stable         Breast cancer, Stage IIB lt Breast CA T2N1MO ER weak pos/ MO neg Her 2 pos diagnosed  11/2011   - s/p bilateral mastectomy with SHLOMO flap reconstruction 11/23/2011   - s/p chemotherapy per  Phase III 0221 protocol      S/p second stage  reconstructive surgery      Rt Breast US 10/2015- Area in the right breast is benign-negative.  .   ACR BI-RADS Category 2: Benign   - CUAUHTEMOC  recurrence clinically     Postmastectomy lymphedema  Stable  AMBULATORY REFERRAL TO LYMPHEDEMA CLINIC  ( pt will be rescheduled)     Bipolar d/o  Follow-up with psych      F/u 6  mos with cbc,cmp     CC: Ghulam Contreras MD

## 2019-03-20 DIAGNOSIS — I89.0 LYMPHEDEMA: ICD-10-CM

## 2019-03-20 DIAGNOSIS — C50.919 BREAST CANCER: Primary | ICD-10-CM

## 2019-04-01 DIAGNOSIS — M47.812 CERVICAL SPONDYLOSIS WITHOUT MYELOPATHY: ICD-10-CM

## 2019-04-01 RX ORDER — QUETIAPINE FUMARATE 100 MG/1
TABLET, FILM COATED ORAL
Qty: 90 TABLET | Refills: 0 | Status: SHIPPED | OUTPATIENT
Start: 2019-04-01 | End: 2019-05-15 | Stop reason: SDUPTHER

## 2019-04-02 RX ORDER — METFORMIN HYDROCHLORIDE 500 MG/1
500 TABLET ORAL 2 TIMES DAILY WITH MEALS
Qty: 180 TABLET | Refills: 3 | Status: SHIPPED | OUTPATIENT
Start: 2019-04-02 | End: 2020-01-28

## 2019-04-02 NOTE — TELEPHONE ENCOUNTER
----- Message from [facility] sent at 4/2/2019  3:29 PM CDT -----  Name of Who is Calling:Self        What is the request in detail: Pt is requesting a refill on a medication that I can not find in her chart. Pt stated Dr. Contreras prescribes metformin 500mg. Please contact to further discuss and advise.         Can the clinic reply by [facility]: No      What Number to Call Back [facility] not in [facility] [phone]

## 2019-04-05 ENCOUNTER — NURSE TRIAGE (OUTPATIENT)
Dept: ADMINISTRATIVE | Facility: CLINIC | Age: 59
End: 2019-04-05

## 2019-04-05 NOTE — TELEPHONE ENCOUNTER
Reason for Disposition   Sounds like a life-threatening emergency to the triager    Protocols used: NEUROLOGIC DEFICIT-A-OH  Fell last pm, trembles, dropping things out of hands .sx going on for months. Sx come and go. Pt states that she has numbness and weakness currently of face arm/ leg. Pt states family can take her to ED. Rec EMS. Call back with questions

## 2019-04-09 ENCOUNTER — OFFICE VISIT (OUTPATIENT)
Dept: INTERNAL MEDICINE | Facility: CLINIC | Age: 59
End: 2019-04-09
Attending: FAMILY MEDICINE
Payer: MEDICARE

## 2019-04-09 VITALS
BODY MASS INDEX: 30.46 KG/M2 | HEIGHT: 63 IN | OXYGEN SATURATION: 96 % | WEIGHT: 171.94 LBS | HEART RATE: 82 BPM | SYSTOLIC BLOOD PRESSURE: 110 MMHG | DIASTOLIC BLOOD PRESSURE: 64 MMHG

## 2019-04-09 DIAGNOSIS — E11.42 DM TYPE 2 WITH DIABETIC PERIPHERAL NEUROPATHY: ICD-10-CM

## 2019-04-09 DIAGNOSIS — F25.0 SCHIZOAFFECTIVE DISORDER, BIPOLAR TYPE: ICD-10-CM

## 2019-04-09 DIAGNOSIS — R40.0 SOMNOLENCE: Primary | ICD-10-CM

## 2019-04-09 DIAGNOSIS — I10 HTN (HYPERTENSION), BENIGN: ICD-10-CM

## 2019-04-09 PROCEDURE — 99499 RISK ADDL DX/OHS AUDIT: ICD-10-PCS | Mod: HCNC,S$GLB,, | Performed by: FAMILY MEDICINE

## 2019-04-09 PROCEDURE — 99214 OFFICE O/P EST MOD 30 MIN: CPT | Mod: HCNC,S$GLB,, | Performed by: FAMILY MEDICINE

## 2019-04-09 PROCEDURE — 3008F BODY MASS INDEX DOCD: CPT | Mod: HCNC,CPTII,S$GLB, | Performed by: FAMILY MEDICINE

## 2019-04-09 PROCEDURE — 3008F PR BODY MASS INDEX (BMI) DOCUMENTED: ICD-10-PCS | Mod: HCNC,CPTII,S$GLB, | Performed by: FAMILY MEDICINE

## 2019-04-09 PROCEDURE — 3078F DIAST BP <80 MM HG: CPT | Mod: HCNC,CPTII,S$GLB, | Performed by: FAMILY MEDICINE

## 2019-04-09 PROCEDURE — 99999 PR PBB SHADOW E&M-EST. PATIENT-LVL III: CPT | Mod: PBBFAC,HCNC,, | Performed by: FAMILY MEDICINE

## 2019-04-09 PROCEDURE — 99499 UNLISTED E&M SERVICE: CPT | Mod: HCNC,S$GLB,, | Performed by: FAMILY MEDICINE

## 2019-04-09 PROCEDURE — 3044F PR MOST RECENT HEMOGLOBIN A1C LEVEL <7.0%: ICD-10-PCS | Mod: HCNC,CPTII,S$GLB, | Performed by: FAMILY MEDICINE

## 2019-04-09 PROCEDURE — 3074F SYST BP LT 130 MM HG: CPT | Mod: HCNC,CPTII,S$GLB, | Performed by: FAMILY MEDICINE

## 2019-04-09 PROCEDURE — 3078F PR MOST RECENT DIASTOLIC BLOOD PRESSURE < 80 MM HG: ICD-10-PCS | Mod: HCNC,CPTII,S$GLB, | Performed by: FAMILY MEDICINE

## 2019-04-09 PROCEDURE — 3074F PR MOST RECENT SYSTOLIC BLOOD PRESSURE < 130 MM HG: ICD-10-PCS | Mod: HCNC,CPTII,S$GLB, | Performed by: FAMILY MEDICINE

## 2019-04-09 PROCEDURE — 99999 PR PBB SHADOW E&M-EST. PATIENT-LVL III: ICD-10-PCS | Mod: PBBFAC,HCNC,, | Performed by: FAMILY MEDICINE

## 2019-04-09 PROCEDURE — 99214 PR OFFICE/OUTPT VISIT, EST, LEVL IV, 30-39 MIN: ICD-10-PCS | Mod: HCNC,S$GLB,, | Performed by: FAMILY MEDICINE

## 2019-04-09 PROCEDURE — 3044F HG A1C LEVEL LT 7.0%: CPT | Mod: HCNC,CPTII,S$GLB, | Performed by: FAMILY MEDICINE

## 2019-04-09 RX ORDER — DIVALPROEX SODIUM 250 MG/1
250 TABLET, DELAYED RELEASE ORAL 2 TIMES DAILY
Qty: 180 TABLET | Refills: 3 | Status: SHIPPED | OUTPATIENT
Start: 2019-04-09 | End: 2020-01-28

## 2019-04-09 NOTE — PROGRESS NOTES
CHIEF COMPLAINT:   Altered mental status follow-up in a patient with diabetes and hypertension    HISTORY OF PRESENT ILLNESS: The patient is a 59 year-old black female.  The patient has a long and complicated medical history.  She was recently seen in the emergency room at [facility] for altered mental status.  They felt she was overmedicated.  We will adjust medications as below today.    She continues to have problems with neuropathic pain as well as central pain.  She does well w/ her Lyrica 100 twice a day.    The patient has a history of diabetes.  Recent blood sugars have been less than 120.  There have been no episodes of hypoglycemia.    The patient has a history of stable hypertension on current medications.  Patient denies chest pain or shortness of breath today.    The patient has a history of stable hyperlipidemia on current medications.  The patient denies chest pain or shortness of breath today.  The patient denies muscle aches or myalgias suggestive of myositis.    She has a history of breast cancer for which she underwent bilateral mastectomies and chemotherapy.    REVIEW OF SYSTEMS:  GENERAL: No fever, chills or weight loss.  SKIN: No rashes, itching or changes in color or texture of skin.  HEAD: No headaches or recent head trauma.  EYES: Visual acuity fine. No photophobia, ocular pain or diplopia.  EARS: Denies ear pain, discharge or vertigo.  NOSE: No loss of smell, no epistaxis or postnasal drip.  MOUTH & THROAT: No hoarseness or change in voice. No excessive gum bleeding.  NODES: Denies swollen glands.  CHEST: Denies AWAD, cyanosis, wheezing, cough and sputum production.  CARDIOVASCULAR: Denies chest pain, PND, orthopnea or reduced exercise tolerance.  ABDOMEN: Appetite fine. No weight loss. Denies diarrhea, abdominal pain, hematemesis or blood in stool.  URINARY: No flank pain, dysuria or hematuria.  PERIPHERAL VASCULAR: No claudication or cyanosis.  MUSCULOSKELETAL: No joint  "stiffness or swelling. Except as noted above.  NEUROLOGIC: No history of seizures, paralysis, alteration of gait or coordination.    SOCIAL HISTORY: The patient does smoke.  The patient consumes alcohol socially.      PHYSICAL EXAMINATION:     Blood pressure 110/64, pulse 82, height 5' 3" (1.6 m), weight 78 kg (171 lb 15.3 oz), SpO2 96 %.    APPEARANCE: Well nourished, well developed, in no acute distress.    HEAD: Normocephalic, atraumatic.  EYES: PERRL. EOMI.  Conjunctivae without injection and  anicteric  EARS: TM's intact. Light reflex normal. No retraction or perforation.    NOSE: Mucosa pink. Airway clear.  MOUTH & THROAT: No tonsillar enlargement. No pharyngeal erythema or exudate. No stridor.  NECK: Supple.   NODES: No cervical, axillary or inguinal lymph node enlargement.  CHEST: Lungs clear to auscultation.  No retractions are noted.  No rales or rhonchi are present.  CARDIOVASCULAR: Normal S1, S2. No rubs, murmurs or gallops.  ABDOMEN: Bowel sounds normal. Not distended. Soft. No tenderness or masses.  No ascites is noted.  MUSCULOSKELETAL:  There is no clubbing, cyanosis, or edema of the extremities x4.  There is full range of motion of the lumbar spine.  There is full range of motion of the extremities x4.  There is no deformity noted.    NEUROLOGIC:       Normal speech development.      Hearing normal.      Normal gait.      Motor and sensory exams grossly normal.      DTR's normal.  PSYCHIATRIC: Patient is alert and oriented x3.  Thought processes are all normal.  There is no homicidality.  There is no suicidality.  There is no evidence of psychosis.    LABORATORY/RADIOLOGY:   Chart reviewed . including surgeries and blood work    ASSESSMENT:   Altered mental status   Frequent urination and urge incontinence  Breast cancer with resultant lymphedema  Hypertension, condition is well controlled on current medication regimen  Diabetes, condition is well controlled on current medication " regimen  Neuropathic pain  Cervical radiculopathy    PLAN:  Decrease Depakote to 250 mg p.o. b.i.d..  Can also consider decreasing to 125 mg p.o. b.i.d..  Her diabetes is very well controlled with a recent A1c of 6.3   Pain clinic   KCl 40 po qd  Prinzide  Pravachol 20 mg by mouth daily    Return to clinic in 6 month with blood work prior

## 2019-04-15 DIAGNOSIS — M47.812 CERVICAL SPONDYLOSIS WITHOUT MYELOPATHY: ICD-10-CM

## 2019-04-15 DIAGNOSIS — E11.42 DM TYPE 2 WITH DIABETIC PERIPHERAL NEUROPATHY: ICD-10-CM

## 2019-04-15 DIAGNOSIS — M50.30 DDD (DEGENERATIVE DISC DISEASE), CERVICAL: ICD-10-CM

## 2019-04-15 DIAGNOSIS — M54.12 CERVICAL RADICULOPATHY: ICD-10-CM

## 2019-04-15 RX ORDER — ZIPRASIDONE HYDROCHLORIDE 40 MG/1
CAPSULE ORAL
Qty: 30 CAPSULE | Refills: 0 | Status: SHIPPED | OUTPATIENT
Start: 2019-04-15 | End: 2019-05-15 | Stop reason: SDUPTHER

## 2019-04-15 RX ORDER — PREGABALIN 100 MG/1
CAPSULE ORAL
Qty: 60 CAPSULE | Refills: 0 | Status: SHIPPED | OUTPATIENT
Start: 2019-04-15 | End: 2019-05-16 | Stop reason: SDUPTHER

## 2019-04-23 ENCOUNTER — CLINICAL SUPPORT (OUTPATIENT)
Dept: SMOKING CESSATION | Facility: CLINIC | Age: 59
End: 2019-04-23
Payer: COMMERCIAL

## 2019-04-23 DIAGNOSIS — F17.210 MODERATE CIGARETTE SMOKER (10-19 PER DAY): Primary | ICD-10-CM

## 2019-04-23 PROCEDURE — 99999 PR PBB SHADOW E&M-EST. PATIENT-LVL I: CPT | Mod: PBBFAC,,,

## 2019-04-23 PROCEDURE — 99404 PR PREVENT COUNSEL,INDIV,60 MIN: ICD-10-PCS | Mod: S$GLB,,,

## 2019-04-23 PROCEDURE — 99999 PR PBB SHADOW E&M-EST. PATIENT-LVL I: ICD-10-PCS | Mod: PBBFAC,,,

## 2019-04-23 PROCEDURE — 99404 PREV MED CNSL INDIV APPRX 60: CPT | Mod: S$GLB,,,

## 2019-04-23 RX ORDER — MICONAZOLE NITRATE 2 %
2 CREAM (GRAM) TOPICAL
Qty: 100 EACH | Refills: 0 | Status: SHIPPED | OUTPATIENT
Start: 2019-04-23 | End: 2019-05-22 | Stop reason: SDUPTHER

## 2019-04-23 RX ORDER — IBUPROFEN 200 MG
1 TABLET ORAL DAILY
Qty: 14 PATCH | Refills: 0 | Status: SHIPPED | OUTPATIENT
Start: 2019-04-23 | End: 2019-05-22 | Stop reason: SDUPTHER

## 2019-04-23 NOTE — Clinical Note
Patient was seen for tobacco cessation intake. Patient will begin on 14 mg nicotine patch and 2 mg nicotine lozenge.  Patient has elevated CESD score with hx of Depression and is currently under  treatment with her physician.  Patient has agreed to weekly follow up.

## 2019-04-29 RX ORDER — LIRAGLUTIDE 6 MG/ML
INJECTION SUBCUTANEOUS
Qty: 27 ML | Refills: 0 | Status: SHIPPED | OUTPATIENT
Start: 2019-04-29 | End: 2019-08-10 | Stop reason: SDUPTHER

## 2019-04-30 DIAGNOSIS — E11.9 TYPE 2 DIABETES MELLITUS WITHOUT COMPLICATION, WITH LONG-TERM CURRENT USE OF INSULIN: ICD-10-CM

## 2019-04-30 DIAGNOSIS — Z79.4 TYPE 2 DIABETES MELLITUS WITHOUT COMPLICATION, WITH LONG-TERM CURRENT USE OF INSULIN: ICD-10-CM

## 2019-04-30 RX ORDER — INSULIN GLARGINE 100 [IU]/ML
INJECTION, SOLUTION SUBCUTANEOUS
Qty: 15 ML | Refills: 0 | Status: SHIPPED | OUTPATIENT
Start: 2019-04-30 | End: 2019-07-13 | Stop reason: SDUPTHER

## 2019-04-30 NOTE — TELEPHONE ENCOUNTER
"----- Message from Marianne Bustos sent at 4/30/2019 12:19 PM CDT -----  Contact: pt   Can the clinic reply in [facility]: no+       Please refill the medication(s) listed below. Please call the patient when the prescription(s) is ready for  at the phone number [phone]    Medication #1: pen needle, diabetic (BD ULTRA-FINE [place]ALO PEN NEEDLES) 32 gauge x 5/32" Ndle    Medication #2:       Preferred Pharmacy: [facility] [id number] [place] [address] AT [facility] [address]      "

## 2019-05-08 ENCOUNTER — CLINICAL SUPPORT (OUTPATIENT)
Dept: SMOKING CESSATION | Facility: CLINIC | Age: 59
End: 2019-05-08
Payer: COMMERCIAL

## 2019-05-08 DIAGNOSIS — F17.210 LIGHT CIGARETTE SMOKER (1-9 CIGS/DAY): Primary | ICD-10-CM

## 2019-05-08 PROCEDURE — 99999 PR PBB SHADOW E&M-EST. PATIENT-LVL I: CPT | Mod: PBBFAC,,,

## 2019-05-08 PROCEDURE — 99404 PREV MED CNSL INDIV APPRX 60: CPT | Mod: S$GLB,,,

## 2019-05-08 PROCEDURE — 99999 PR PBB SHADOW E&M-EST. PATIENT-LVL I: ICD-10-PCS | Mod: PBBFAC,,,

## 2019-05-08 PROCEDURE — 99404 PR PREVENT COUNSEL,INDIV,60 MIN: ICD-10-PCS | Mod: S$GLB,,,

## 2019-05-08 NOTE — PROGRESS NOTES
Individual Follow-Up Form    5/8/2019    Quit Date:     Clinical Status of Patient: Outpatient    Length of Service: 60 minutes    Continuing Medication: yes  Patches    Other Medications: Nicotine gum     Target Symptoms: Withdrawal and medication side effects. The following were  rated moderate (3) to severe (4) on TCRS:  · Moderate (3): none  · Severe (4): none    Comments: Patient reports smoking 2 cigarettes per day.  Patient remains on prescribed tobacco cessation medication regimen of 14 mg patch without any negative side effects at this time. She admits to forgetting to use nicotine patch daily.  We discussed possible routines to use to increase nicotine patch compliance.  We discussed cues, triggers, quit date, reasons and timeline to quitting.  We discussed willpower and commitment to quitting.  Patient was encouraged to attempt a quit.  We discussed proper way to attempt successful quit.   to The patient denies any abnormal behavioral or mental changes at this time. The patient will continue with group therapy sessions and medication monitoring by CTTS. Prescribed medication management will be by physician.         Diagnosis: F17.210    Next Visit: 1 week

## 2019-05-15 DIAGNOSIS — M47.812 CERVICAL SPONDYLOSIS WITHOUT MYELOPATHY: ICD-10-CM

## 2019-05-15 DIAGNOSIS — N39.41 URGE INCONTINENCE: ICD-10-CM

## 2019-05-15 RX ORDER — ZIPRASIDONE HYDROCHLORIDE 40 MG/1
CAPSULE ORAL
Qty: 30 CAPSULE | Refills: 0 | Status: SHIPPED | OUTPATIENT
Start: 2019-05-15 | End: 2019-06-17 | Stop reason: SDUPTHER

## 2019-05-15 RX ORDER — OXYBUTYNIN CHLORIDE 10 MG/1
TABLET, EXTENDED RELEASE ORAL
Qty: 30 TABLET | Refills: 0 | Status: SHIPPED | OUTPATIENT
Start: 2019-05-15 | End: 2019-05-15 | Stop reason: SDUPTHER

## 2019-05-15 RX ORDER — ZIPRASIDONE HYDROCHLORIDE 40 MG/1
CAPSULE ORAL
Qty: 30 CAPSULE | Refills: 0 | Status: SHIPPED | OUTPATIENT
Start: 2019-05-15 | End: 2019-07-12 | Stop reason: SDUPTHER

## 2019-05-15 RX ORDER — OXYBUTYNIN CHLORIDE 10 MG/1
TABLET, EXTENDED RELEASE ORAL
Qty: 90 TABLET | Refills: 0 | Status: SHIPPED | OUTPATIENT
Start: 2019-05-15 | End: 2019-08-19 | Stop reason: SDUPTHER

## 2019-05-15 RX ORDER — QUETIAPINE FUMARATE 100 MG/1
TABLET, FILM COATED ORAL
Qty: 90 TABLET | Refills: 0 | Status: SHIPPED | OUTPATIENT
Start: 2019-05-15 | End: 2019-08-19 | Stop reason: SDUPTHER

## 2019-05-16 DIAGNOSIS — E11.42 DM TYPE 2 WITH DIABETIC PERIPHERAL NEUROPATHY: ICD-10-CM

## 2019-05-16 DIAGNOSIS — M50.30 DDD (DEGENERATIVE DISC DISEASE), CERVICAL: ICD-10-CM

## 2019-05-16 DIAGNOSIS — M47.812 CERVICAL SPONDYLOSIS WITHOUT MYELOPATHY: ICD-10-CM

## 2019-05-16 DIAGNOSIS — M54.12 CERVICAL RADICULOPATHY: ICD-10-CM

## 2019-05-16 RX ORDER — PREGABALIN 100 MG/1
CAPSULE ORAL
Qty: 60 CAPSULE | Refills: 0 | Status: SHIPPED | OUTPATIENT
Start: 2019-05-16 | End: 2019-07-01 | Stop reason: SDUPTHER

## 2019-05-22 ENCOUNTER — CLINICAL SUPPORT (OUTPATIENT)
Dept: SMOKING CESSATION | Facility: CLINIC | Age: 59
End: 2019-05-22
Payer: COMMERCIAL

## 2019-05-22 DIAGNOSIS — F17.210 MODERATE CIGARETTE SMOKER (10-19 PER DAY): ICD-10-CM

## 2019-05-22 DIAGNOSIS — F17.210 LIGHT CIGARETTE SMOKER (1-9 CIGS/DAY): Primary | ICD-10-CM

## 2019-05-22 PROCEDURE — 99999 PR PBB SHADOW E&M-EST. PATIENT-LVL I: CPT | Mod: PBBFAC,,,

## 2019-05-22 PROCEDURE — 99404 PREV MED CNSL INDIV APPRX 60: CPT | Mod: S$GLB,,,

## 2019-05-22 PROCEDURE — 99404 PR PREVENT COUNSEL,INDIV,60 MIN: ICD-10-PCS | Mod: S$GLB,,,

## 2019-05-22 PROCEDURE — 99999 PR PBB SHADOW E&M-EST. PATIENT-LVL I: ICD-10-PCS | Mod: PBBFAC,,,

## 2019-05-22 RX ORDER — IBUPROFEN 200 MG
1 TABLET ORAL DAILY
Qty: 28 PATCH | Refills: 0 | Status: SHIPPED | OUTPATIENT
Start: 2019-05-22 | End: 2019-06-06 | Stop reason: SDUPTHER

## 2019-05-22 RX ORDER — MICONAZOLE NITRATE 2 %
2 CREAM (GRAM) TOPICAL
Qty: 100 EACH | Refills: 0 | Status: SHIPPED | OUTPATIENT
Start: 2019-05-22 | End: 2019-09-11

## 2019-05-22 NOTE — Clinical Note
Patient is smoking 4 cpd this week. Patient did not  nicotine patches from pharmacy downstairs and has been without since last visit. Patient set a quit date for end of June.   Patient was encouraged to modified behavior to prepare for quit.  We reviewed willpower, reason and benefits of quitting. We discussed health issues associated with tobacco use.  The patient denies any abnormal behavioral or mental changes at this time. The patient will continue with group therapy sessions and medication monitoring by CTTS. Prescribed medication management will be by physician.

## 2019-05-22 NOTE — PROGRESS NOTES
Individual Follow-Up Form    5/22/2019    Quit Date:     Clinical Status of Patient: Outpatient    Length of Service: 60 minutes    Continuing Medication: yes  Patches    Other Medications: nicotine gum     Target Symptoms: Withdrawal and medication side effects. The following were  rated moderate (3) to severe (4) on TCRS:  · Moderate (3): none  · Severe (4): none    Comments: Patient is smoking 4 cpd this week. Patient did not  nicotine patches from pharmacy downstairs and has been without since last visit. Patient set a quit date for end of June.  Patient remains on prescribed tobacco cessation medication regimen of 14 mg patch and 2 mg nicotine gum without any negative side effects at this time.   Patient was encouraged to modified behavior to prepare for quit.  We reviewed willpower, reason and benefits of quitting. We discussed health issues associated with tobacco use.  The patient denies any abnormal behavioral or mental changes at this time. The patient will continue with individual therapy sessions and medication monitoring by CTTS. Prescribed medication management will be by physician.  Completion of TCRS (Tobacco Cessation Rating Scale) reviewed strategies, cues, and triggers. Introduced the negative impact of tobacco on health, the health advantages of discontinuing the use of tobacco, time line improved health changes after a quit, withdrawal issues to expect from nicotine and habit, and ways to achieve the goal of a quit.      Diagnosis: F17.210    Next Visit: 1 week

## 2019-06-06 ENCOUNTER — CLINICAL SUPPORT (OUTPATIENT)
Dept: SMOKING CESSATION | Facility: CLINIC | Age: 59
End: 2019-06-06
Payer: COMMERCIAL

## 2019-06-06 DIAGNOSIS — F17.210 LIGHT CIGARETTE SMOKER (1-9 CIGS/DAY): Primary | ICD-10-CM

## 2019-06-06 DIAGNOSIS — F17.210 MODERATE CIGARETTE SMOKER (10-19 PER DAY): ICD-10-CM

## 2019-06-06 PROCEDURE — 99999 PR PBB SHADOW E&M-EST. PATIENT-LVL I: ICD-10-PCS | Mod: PBBFAC,,,

## 2019-06-06 PROCEDURE — 99403 PREV MED CNSL INDIV APPRX 45: CPT | Mod: S$GLB,,,

## 2019-06-06 PROCEDURE — 99403 PR PREVENT COUNSEL,INDIV,45 MIN: ICD-10-PCS | Mod: S$GLB,,,

## 2019-06-06 PROCEDURE — 99999 PR PBB SHADOW E&M-EST. PATIENT-LVL I: CPT | Mod: PBBFAC,,,

## 2019-06-06 RX ORDER — IBUPROFEN 200 MG
1 TABLET ORAL DAILY
Qty: 28 PATCH | Refills: 0 | Status: SHIPPED | OUTPATIENT
Start: 2019-06-06 | End: 2019-09-11

## 2019-06-06 NOTE — PROGRESS NOTES
Individual Follow-Up Form    6/6/2019    Quit Date:     Clinical Status of Patient: Outpatient    Length of Service: 45minutes    Continuing Medication: yes  Patches    Other Medications: nicotine gum     Target Symptoms: Withdrawal and medication side effects. The following were  rated moderate (3) to severe (4) on TCRS:  · Moderate (3): none  · Severe (4): none    Comments: Patient is smoking 4 cpd.  Patient continue to smoke in her living room.  Patient now leave tobacco in a  her bedroom.  Patient states she does not plan on buying any more cigarettes once she finish this last pack.  Patient remains on prescribed tobacco cessation medication regimen of 14 mg patch and 2 mg nicotine gum without any negative side effects at this time. completion of TCRS (Tobacco Cessation Rating Scale) reviewed strategies, controlling environment, cues, triggers, new goals set. Introduced high risk situations with preparation interventions, caffeine similarities with withdrawal issues of habit and nicotine, Alcohol, Understanding urges, cravings, stress and relaxation. Open discussion with intervention discussion.        Diagnosis: F17.210    Next Visit: 1 week

## 2019-06-06 NOTE — Clinical Note
Patient is smoking 4 cpd.  Patient continue to smoke in her living room.  Patient now leave tobacco in a  her bedroom.  Patient states she does not plan on buying any more cigarettes once she finish this last pack.  Patient remains on prescribed tobacco cessation medication regimen of 14 mg patch and 2 mg nicotine gum without any negative side effects at this time. completion of TCRS (Tobacco Cessation Rating Scale) reviewed strategies, controlling environment, cues, triggers, new goals set. Introduced high risk situations with preparation interventions, caffeine similarities with withdrawal issues of habit and nicotine, Alcohol, Understanding urges, cravings, stress and relaxation. Open discussion with intervention discussion.

## 2019-06-17 ENCOUNTER — OFFICE VISIT (OUTPATIENT)
Dept: INTERNAL MEDICINE | Facility: CLINIC | Age: 59
End: 2019-06-17
Attending: FAMILY MEDICINE
Payer: MEDICARE

## 2019-06-17 VITALS
OXYGEN SATURATION: 95 % | HEIGHT: 63 IN | BODY MASS INDEX: 32.43 KG/M2 | DIASTOLIC BLOOD PRESSURE: 74 MMHG | SYSTOLIC BLOOD PRESSURE: 110 MMHG | HEART RATE: 94 BPM | WEIGHT: 183 LBS

## 2019-06-17 DIAGNOSIS — C50.919 BREAST CANCER, STAGE 2, UNSPECIFIED LATERALITY: ICD-10-CM

## 2019-06-17 DIAGNOSIS — I10 HTN (HYPERTENSION), BENIGN: ICD-10-CM

## 2019-06-17 DIAGNOSIS — E11.42 DM TYPE 2 WITH DIABETIC PERIPHERAL NEUROPATHY: Primary | ICD-10-CM

## 2019-06-17 PROCEDURE — 3074F PR MOST RECENT SYSTOLIC BLOOD PRESSURE < 130 MM HG: ICD-10-PCS | Mod: HCNC,CPTII,S$GLB, | Performed by: FAMILY MEDICINE

## 2019-06-17 PROCEDURE — 99499 RISK ADDL DX/OHS AUDIT: ICD-10-PCS | Mod: S$GLB,,, | Performed by: FAMILY MEDICINE

## 2019-06-17 PROCEDURE — 99999 PR PBB SHADOW E&M-EST. PATIENT-LVL III: ICD-10-PCS | Mod: PBBFAC,HCNC,, | Performed by: FAMILY MEDICINE

## 2019-06-17 PROCEDURE — 99214 OFFICE O/P EST MOD 30 MIN: CPT | Mod: HCNC,S$GLB,, | Performed by: FAMILY MEDICINE

## 2019-06-17 PROCEDURE — 3044F PR MOST RECENT HEMOGLOBIN A1C LEVEL <7.0%: ICD-10-PCS | Mod: HCNC,CPTII,S$GLB, | Performed by: FAMILY MEDICINE

## 2019-06-17 PROCEDURE — 3078F PR MOST RECENT DIASTOLIC BLOOD PRESSURE < 80 MM HG: ICD-10-PCS | Mod: HCNC,CPTII,S$GLB, | Performed by: FAMILY MEDICINE

## 2019-06-17 PROCEDURE — 3078F DIAST BP <80 MM HG: CPT | Mod: HCNC,CPTII,S$GLB, | Performed by: FAMILY MEDICINE

## 2019-06-17 PROCEDURE — 3008F PR BODY MASS INDEX (BMI) DOCUMENTED: ICD-10-PCS | Mod: HCNC,CPTII,S$GLB, | Performed by: FAMILY MEDICINE

## 2019-06-17 PROCEDURE — 99214 PR OFFICE/OUTPT VISIT, EST, LEVL IV, 30-39 MIN: ICD-10-PCS | Mod: HCNC,S$GLB,, | Performed by: FAMILY MEDICINE

## 2019-06-17 PROCEDURE — 3074F SYST BP LT 130 MM HG: CPT | Mod: HCNC,CPTII,S$GLB, | Performed by: FAMILY MEDICINE

## 2019-06-17 PROCEDURE — 3044F HG A1C LEVEL LT 7.0%: CPT | Mod: HCNC,CPTII,S$GLB, | Performed by: FAMILY MEDICINE

## 2019-06-17 PROCEDURE — 99999 PR PBB SHADOW E&M-EST. PATIENT-LVL III: CPT | Mod: PBBFAC,HCNC,, | Performed by: FAMILY MEDICINE

## 2019-06-17 PROCEDURE — 99499 UNLISTED E&M SERVICE: CPT | Mod: S$GLB,,, | Performed by: FAMILY MEDICINE

## 2019-06-17 PROCEDURE — 3008F BODY MASS INDEX DOCD: CPT | Mod: HCNC,CPTII,S$GLB, | Performed by: FAMILY MEDICINE

## 2019-06-17 NOTE — PATIENT INSTRUCTIONS
[place],     We are always striving for excellence. Should you receive a patient experience survey in the mail, we would appreciate if you would take a few moments to give us your feedback. These surveys let us know our strengths as well as areas of opportunity for improvement to better serve you.    Thank you for your time,  Sherrie Kyle MA

## 2019-06-17 NOTE — LETTER
June 17, 2019    Jonathan Brandon Gonzales  [address] [place] [address]             [facility] [address]  [address] [facility] [address]  Phone: [phone]  Fax: [phone] Dear Ms. Gonzales:    This letter is to confirm that you are medically optimized and cleared for dental surgery.      If you have any questions or concerns, please don't hesitate to call.    Sincerely,        Ghulam Contreras MD

## 2019-06-17 NOTE — PROGRESS NOTES
CHIEF COMPLAINT:   Dental clearance in a patient with diabetes and hypertension    HISTORY OF PRESENT ILLNESS: The patient is a 59 year-old black female.  The patient has a long and complicated medical history.  She was recently seen in the dental clinic.  They need clearance for her to have dental procedures performed.  We can provide this    She continues to have problems with neuropathic pain as well as central pain.  She does well w/ her Lyrica 100 twice a day.    The patient has a history of diabetes.  Recent blood sugars have been less than 120.  There have been no episodes of hypoglycemia.    The patient has a history of stable hypertension on current medications.  Patient denies chest pain or shortness of breath today.    The patient has a history of stable hyperlipidemia on current medications.  The patient denies chest pain or shortness of breath today.  The patient denies muscle aches or myalgias suggestive of myositis.    She has a history of breast cancer for which she underwent bilateral mastectomies and chemotherapy.    REVIEW OF SYSTEMS:  GENERAL: No fever, chills or weight loss.  SKIN: No rashes, itching or changes in color or texture of skin.  HEAD: No headaches or recent head trauma.  EYES: Visual acuity fine. No photophobia, ocular pain or diplopia.  EARS: Denies ear pain, discharge or vertigo.  NOSE: No loss of smell, no epistaxis or postnasal drip.  MOUTH & THROAT: No hoarseness or change in voice. No excessive gum bleeding.  NODES: Denies swollen glands.  CHEST: Denies AWAD, cyanosis, wheezing, cough and sputum production.  CARDIOVASCULAR: Denies chest pain, PND, orthopnea or reduced exercise tolerance.  ABDOMEN: Appetite fine. No weight loss. Denies diarrhea, abdominal pain, hematemesis or blood in stool.  URINARY: No flank pain, dysuria or hematuria.  PERIPHERAL VASCULAR: No claudication or cyanosis.  MUSCULOSKELETAL: No joint stiffness or swelling. Except as noted above.  NEUROLOGIC: No  "history of seizures, paralysis, alteration of gait or coordination.    SOCIAL HISTORY: The patient does smoke.  The patient consumes alcohol socially.      PHYSICAL EXAMINATION:     Blood pressure 110/74, pulse 94, height 5' 3" (1.6 m), weight 83 kg (182 lb 15.7 oz), SpO2 95 %.    APPEARANCE: Well nourished, well developed, in no acute distress.    HEAD: Normocephalic, atraumatic.  EYES: PERRL. EOMI.  Conjunctivae without injection and  anicteric  EARS: TM's intact. Light reflex normal. No retraction or perforation.    NOSE: Mucosa pink. Airway clear.  MOUTH & THROAT: No tonsillar enlargement. No pharyngeal erythema or exudate. No stridor.  NECK: Supple.   NODES: No cervical, axillary or inguinal lymph node enlargement.  CHEST: Lungs clear to auscultation.  No retractions are noted.  No rales or rhonchi are present.  CARDIOVASCULAR: Normal S1, S2. No rubs, murmurs or gallops.  ABDOMEN: Bowel sounds normal. Not distended. Soft. No tenderness or masses.  No ascites is noted.  MUSCULOSKELETAL:  There is no clubbing, cyanosis, or edema of the extremities x4.  There is full range of motion of the lumbar spine.  There is full range of motion of the extremities x4.  There is no deformity noted.    NEUROLOGIC:       Normal speech development.      Hearing normal.      Normal gait.      Motor and sensory exams grossly normal.      DTR's normal.  PSYCHIATRIC: Patient is alert and oriented x3.  Thought processes are all normal.  There is no homicidality.  There is no suicidality.  There is no evidence of psychosis.    LABORATORY/RADIOLOGY:   Chart reviewed . including surgeries and blood work    ASSESSMENT:   Dental caries  Breast cancer with resultant lymphedema  Hypertension, condition is well controlled on current medication regimen  Diabetes, condition is well controlled on current medication regimen  Neuropathic pain  Cervical radiculopathy    PLAN:  Letter provided stating that she is medically optimized and cleared for " dental surgery.  Depakote 250 mg p.o. b.i.d.  Her diabetes is very well controlled with a recent A1c of 6.3   Pain clinic   KCl 40 po qd  Prinzide  Pravachol 20 mg by mouth daily    Return to clinic in 6 month with blood work prior

## 2019-06-24 ENCOUNTER — OFFICE VISIT (OUTPATIENT)
Dept: INTERNAL MEDICINE | Facility: CLINIC | Age: 59
End: 2019-06-24
Attending: FAMILY MEDICINE
Payer: MEDICARE

## 2019-06-24 VITALS
BODY MASS INDEX: 31.92 KG/M2 | SYSTOLIC BLOOD PRESSURE: 114 MMHG | HEIGHT: 63 IN | HEART RATE: 92 BPM | WEIGHT: 180.13 LBS | OXYGEN SATURATION: 98 % | DIASTOLIC BLOOD PRESSURE: 86 MMHG

## 2019-06-24 DIAGNOSIS — E11.42 DM TYPE 2 WITH DIABETIC PERIPHERAL NEUROPATHY: Primary | ICD-10-CM

## 2019-06-24 DIAGNOSIS — M54.5 CHRONIC LOW BACK PAIN, UNSPECIFIED BACK PAIN LATERALITY, WITH SCIATICA PRESENCE UNSPECIFIED: ICD-10-CM

## 2019-06-24 DIAGNOSIS — I10 HTN (HYPERTENSION), BENIGN: ICD-10-CM

## 2019-06-24 DIAGNOSIS — G89.29 CHRONIC LOW BACK PAIN, UNSPECIFIED BACK PAIN LATERALITY, WITH SCIATICA PRESENCE UNSPECIFIED: ICD-10-CM

## 2019-06-24 DIAGNOSIS — M50.30 DDD (DEGENERATIVE DISC DISEASE), CERVICAL: ICD-10-CM

## 2019-06-24 PROCEDURE — 3074F SYST BP LT 130 MM HG: CPT | Mod: HCNC,CPTII,S$GLB, | Performed by: FAMILY MEDICINE

## 2019-06-24 PROCEDURE — 99999 PR PBB SHADOW E&M-EST. PATIENT-LVL III: ICD-10-PCS | Mod: PBBFAC,HCNC,, | Performed by: FAMILY MEDICINE

## 2019-06-24 PROCEDURE — 3008F BODY MASS INDEX DOCD: CPT | Mod: HCNC,CPTII,S$GLB, | Performed by: FAMILY MEDICINE

## 2019-06-24 PROCEDURE — 3079F PR MOST RECENT DIASTOLIC BLOOD PRESSURE 80-89 MM HG: ICD-10-PCS | Mod: HCNC,CPTII,S$GLB, | Performed by: FAMILY MEDICINE

## 2019-06-24 PROCEDURE — 3044F PR MOST RECENT HEMOGLOBIN A1C LEVEL <7.0%: ICD-10-PCS | Mod: HCNC,CPTII,S$GLB, | Performed by: FAMILY MEDICINE

## 2019-06-24 PROCEDURE — 99499 UNLISTED E&M SERVICE: CPT | Mod: HCNC,S$GLB,, | Performed by: FAMILY MEDICINE

## 2019-06-24 PROCEDURE — 3074F PR MOST RECENT SYSTOLIC BLOOD PRESSURE < 130 MM HG: ICD-10-PCS | Mod: HCNC,CPTII,S$GLB, | Performed by: FAMILY MEDICINE

## 2019-06-24 PROCEDURE — 99499 RISK ADDL DX/OHS AUDIT: ICD-10-PCS | Mod: HCNC,S$GLB,, | Performed by: FAMILY MEDICINE

## 2019-06-24 PROCEDURE — 99214 OFFICE O/P EST MOD 30 MIN: CPT | Mod: HCNC,S$GLB,, | Performed by: FAMILY MEDICINE

## 2019-06-24 PROCEDURE — 99214 PR OFFICE/OUTPT VISIT, EST, LEVL IV, 30-39 MIN: ICD-10-PCS | Mod: HCNC,S$GLB,, | Performed by: FAMILY MEDICINE

## 2019-06-24 PROCEDURE — 3044F HG A1C LEVEL LT 7.0%: CPT | Mod: HCNC,CPTII,S$GLB, | Performed by: FAMILY MEDICINE

## 2019-06-24 PROCEDURE — 3079F DIAST BP 80-89 MM HG: CPT | Mod: HCNC,CPTII,S$GLB, | Performed by: FAMILY MEDICINE

## 2019-06-24 PROCEDURE — 3008F PR BODY MASS INDEX (BMI) DOCUMENTED: ICD-10-PCS | Mod: HCNC,CPTII,S$GLB, | Performed by: FAMILY MEDICINE

## 2019-06-24 PROCEDURE — 99999 PR PBB SHADOW E&M-EST. PATIENT-LVL III: CPT | Mod: PBBFAC,HCNC,, | Performed by: FAMILY MEDICINE

## 2019-06-24 NOTE — PROGRESS NOTES
CHIEF COMPLAINT:   Dental clearance in a patient with diabetes and hypertension    HISTORY OF PRESENT ILLNESS: The patient is a 59 year-old black female.  The patient has a long and complicated medical history.  She was recently seen in the dental clinic.  They need clearance for her to have dental procedures performed.  We can provide this    She continues to have problems with neuropathic pain as well as central pain.  She does well w/ her Lyrica 100 twice a day.    The patient has a history of diabetes.  Recent blood sugars have been less than 120.  There have been no episodes of hypoglycemia.    The patient has a history of stable hypertension on current medications.  Patient denies chest pain or shortness of breath today.    The patient has a history of stable hyperlipidemia on current medications.  The patient denies chest pain or shortness of breath today.  The patient denies muscle aches or myalgias suggestive of myositis.    She has a history of breast cancer for which she underwent bilateral mastectomies and chemotherapy.    REVIEW OF SYSTEMS:  GENERAL: No fever, chills or weight loss.  SKIN: No rashes, itching or changes in color or texture of skin.  HEAD: No headaches or recent head trauma.  EYES: Visual acuity fine. No photophobia, ocular pain or diplopia.  EARS: Denies ear pain, discharge or vertigo.  NOSE: No loss of smell, no epistaxis or postnasal drip.  MOUTH & THROAT: No hoarseness or change in voice. No excessive gum bleeding.  NODES: Denies swollen glands.  CHEST: Denies AWAD, cyanosis, wheezing, cough and sputum production.  CARDIOVASCULAR: Denies chest pain, PND, orthopnea or reduced exercise tolerance.  ABDOMEN: Appetite fine. No weight loss. Denies diarrhea, abdominal pain, hematemesis or blood in stool.  URINARY: No flank pain, dysuria or hematuria.  PERIPHERAL VASCULAR: No claudication or cyanosis.  MUSCULOSKELETAL: No joint stiffness or swelling. Except as noted above.  NEUROLOGIC: No  "history of seizures, paralysis, alteration of gait or coordination.    SOCIAL HISTORY: The patient does smoke.  The patient consumes alcohol socially.      PHYSICAL EXAMINATION:     Blood pressure 114/86, pulse 92, height 5' 3" (1.6 m), weight 81.7 kg (180 lb 1.9 oz), SpO2 98 %.    APPEARANCE: Well nourished, well developed, in no acute distress.    HEAD: Normocephalic, atraumatic.  EYES: PERRL. EOMI.  Conjunctivae without injection and  anicteric  EARS: TM's intact. Light reflex normal. No retraction or perforation.    NOSE: Mucosa pink. Airway clear.  MOUTH & THROAT: No tonsillar enlargement. No pharyngeal erythema or exudate. No stridor.  NECK: Supple.   NODES: No cervical, axillary or inguinal lymph node enlargement.  CHEST: Lungs clear to auscultation.  No retractions are noted.  No rales or rhonchi are present.  CARDIOVASCULAR: Normal S1, S2. No rubs, murmurs or gallops.  ABDOMEN: Bowel sounds normal. Not distended. Soft. No tenderness or masses.  No ascites is noted.  MUSCULOSKELETAL:  There is no clubbing, cyanosis, or edema of the extremities x4.  There is full range of motion of the lumbar spine.  There is full range of motion of the extremities x4.  There is no deformity noted.    NEUROLOGIC:       Normal speech development.      Hearing normal.      Normal gait.      Motor and sensory exams grossly normal.  PSYCHIATRIC: Patient is alert and oriented x3.  Thought processes are all normal.  There is no homicidality.  There is no suicidality.  There is no evidence of psychosis.    LABORATORY/RADIOLOGY:   Chart reviewed . including surgeries and blood work    ASSESSMENT:   Dental caries  Breast cancer with resultant lymphedema  Hypertension, condition is well controlled on current medication regimen  Diabetes, condition is well controlled on current medication regimen  Neuropathic pain  Cervical radiculopathy    PLAN:  Paperwork completed stating that she is medically optimized and cleared for dental " surgery.  Depakote 250 mg p.o. b.i.d.  Her diabetes is very well controlled with a recent A1c of 6.3   Pain clinic   KCl 40 po qd  Prinzide  Pravachol 20 mg by mouth daily    Return to clinic in 6 month with blood work prior

## 2019-06-28 DIAGNOSIS — Z00.00 PREVENTATIVE HEALTH CARE: Primary | ICD-10-CM

## 2019-06-28 RX ORDER — PRAVASTATIN SODIUM 20 MG/1
40 TABLET ORAL DAILY
Qty: 90 TABLET | Refills: 3 | Status: SHIPPED | OUTPATIENT
Start: 2019-06-28 | End: 2019-09-11

## 2019-06-28 NOTE — TELEPHONE ENCOUNTER
----- Message from Calderon Buckely sent at 6/28/2019  4:11 PM CDT -----  Contact: Gasper Valadez  Name of Who is Calling:  Gasper        What is the request in detail: rep requesting to speak with staff regarding pt's lisinopril-hydrochlorothiazide (PRINZIDE,ZESTORETIC) 10-12.5 mg per tablet. Please advise-          Can the clinic reply by [facility]: N    What Number to Call Back if not in [facility]: [phone]

## 2019-06-28 NOTE — TELEPHONE ENCOUNTER
Spoke to Gasper the [facility] pharmacist and he is recommending based on ADL guidelines a pravastatin 40 mg daily be given to Pt since she is diabetic..  Gasper said he will send us a fax asking us if we will send him a reply letting him know we filled it  Msg routed to MD

## 2019-07-01 ENCOUNTER — TELEPHONE (OUTPATIENT)
Dept: NEUROLOGY | Facility: CLINIC | Age: 59
End: 2019-07-01

## 2019-07-01 DIAGNOSIS — M54.12 CERVICAL RADICULOPATHY: ICD-10-CM

## 2019-07-01 DIAGNOSIS — M47.812 CERVICAL SPONDYLOSIS WITHOUT MYELOPATHY: ICD-10-CM

## 2019-07-01 DIAGNOSIS — E11.42 DM TYPE 2 WITH DIABETIC PERIPHERAL NEUROPATHY: ICD-10-CM

## 2019-07-01 DIAGNOSIS — M50.30 DDD (DEGENERATIVE DISC DISEASE), CERVICAL: ICD-10-CM

## 2019-07-01 RX ORDER — PREGABALIN 100 MG/1
CAPSULE ORAL
Qty: 60 CAPSULE | Refills: 0 | Status: SHIPPED | OUTPATIENT
Start: 2019-07-01 | End: 2019-08-07 | Stop reason: SDUPTHER

## 2019-07-01 NOTE — TELEPHONE ENCOUNTER
----- Message from Marianne Bustos sent at 7/1/2019 12:21 PM CDT -----  Contact: pt  Name of Who is Calling: JOYA MUNOZ [[id number]]       What is the request in detail: Patient is requesting a call from staff in regards to scheduling an appointment she states she's having tingling in fingers and toes and would like to be seen sooner than available appointments   .....Please contact to further discuss and advise.     Can the clinic reply by [facility]: yes     What [facility]: [phone]

## 2019-07-02 ENCOUNTER — TELEPHONE (OUTPATIENT)
Dept: NEUROLOGY | Facility: CLINIC | Age: 59
End: 2019-07-02

## 2019-07-02 NOTE — TELEPHONE ENCOUNTER
----- Message from Sourav Enriquez sent at 7/2/2019 10:45 AM CDT -----  Patient Returning Call from [facility]    Who Left Message for Patient: Darrell  Communication Preference: [phone]  Additional Information:

## 2019-07-08 ENCOUNTER — OFFICE VISIT (OUTPATIENT)
Dept: OPTOMETRY | Facility: CLINIC | Age: 59
End: 2019-07-08
Payer: MEDICARE

## 2019-07-08 DIAGNOSIS — H52.4 PRESBYOPIA: ICD-10-CM

## 2019-07-08 DIAGNOSIS — E11.9 TYPE 2 DIABETES MELLITUS WITHOUT OPHTHALMIC MANIFESTATIONS: ICD-10-CM

## 2019-07-08 DIAGNOSIS — Z01.00 ROUTINE EYE EXAM: Primary | ICD-10-CM

## 2019-07-08 PROCEDURE — 92014 COMPRE OPH EXAM EST PT 1/>: CPT | Mod: HCNC,S$GLB,, | Performed by: OPTOMETRIST

## 2019-07-08 PROCEDURE — 92015 DETERMINE REFRACTIVE STATE: CPT | Mod: HCNC,S$GLB,, | Performed by: OPTOMETRIST

## 2019-07-08 PROCEDURE — 92015 PR REFRACTION: ICD-10-PCS | Mod: HCNC,S$GLB,, | Performed by: OPTOMETRIST

## 2019-07-08 PROCEDURE — 99999 PR PBB SHADOW E&M-EST. PATIENT-LVL II: ICD-10-PCS | Mod: PBBFAC,HCNC,, | Performed by: OPTOMETRIST

## 2019-07-08 PROCEDURE — 99999 PR PBB SHADOW E&M-EST. PATIENT-LVL II: CPT | Mod: PBBFAC,HCNC,, | Performed by: OPTOMETRIST

## 2019-07-08 PROCEDURE — 92014 PR EYE EXAM, EST PATIENT,COMPREHESV: ICD-10-PCS | Mod: HCNC,S$GLB,, | Performed by: OPTOMETRIST

## 2019-07-08 NOTE — PROGRESS NOTES
HPI     Last eye exam was 6/21//18 with   Patient here for annual eye exam  Problems with near vison  Pt states she has a lot of tearing OU and has been having this issue for a   while.     (-)flashes  (-)floaters  (-)Photophobia  (-) Glare  (-)Headache  (-) OTC Drops  (-) eye drops  (-) Eye Surgeries            Last edited by Tiffanie Zhao, OD on 7/8/2019 10:17 AM. (History)            Assessment /Plan     For exam results, see Encounter Report.    Routine eye exam    Type 2 diabetes mellitus without ophthalmic manifestations    Presbyopia            1.  No retinopathy--monitor yearly.  BS control.  Eye health normal OU.  2.  Bifocal rx given

## 2019-07-13 DIAGNOSIS — E11.9 TYPE 2 DIABETES MELLITUS WITHOUT COMPLICATION, WITH LONG-TERM CURRENT USE OF INSULIN: ICD-10-CM

## 2019-07-13 DIAGNOSIS — Z79.4 TYPE 2 DIABETES MELLITUS WITHOUT COMPLICATION, WITH LONG-TERM CURRENT USE OF INSULIN: ICD-10-CM

## 2019-07-14 RX ORDER — ZIPRASIDONE HYDROCHLORIDE 40 MG/1
CAPSULE ORAL
Qty: 30 CAPSULE | Refills: 0 | Status: SHIPPED | OUTPATIENT
Start: 2019-07-14 | End: 2019-08-07 | Stop reason: SDUPTHER

## 2019-07-14 RX ORDER — INSULIN GLARGINE 100 [IU]/ML
INJECTION, SOLUTION SUBCUTANEOUS
Qty: 15 ML | Refills: 0 | Status: SHIPPED | OUTPATIENT
Start: 2019-07-14 | End: 2019-08-19 | Stop reason: SDUPTHER

## 2019-07-31 ENCOUNTER — TELEPHONE (OUTPATIENT)
Dept: SMOKING CESSATION | Facility: CLINIC | Age: 59
End: 2019-07-31

## 2019-08-07 DIAGNOSIS — M54.12 CERVICAL RADICULOPATHY: ICD-10-CM

## 2019-08-07 DIAGNOSIS — E11.42 DM TYPE 2 WITH DIABETIC PERIPHERAL NEUROPATHY: ICD-10-CM

## 2019-08-07 DIAGNOSIS — M50.30 DDD (DEGENERATIVE DISC DISEASE), CERVICAL: ICD-10-CM

## 2019-08-07 DIAGNOSIS — M47.812 CERVICAL SPONDYLOSIS WITHOUT MYELOPATHY: ICD-10-CM

## 2019-08-08 RX ORDER — PREGABALIN 100 MG/1
CAPSULE ORAL
Qty: 60 CAPSULE | Refills: 0 | Status: SHIPPED | OUTPATIENT
Start: 2019-08-08 | End: 2019-08-19 | Stop reason: SDUPTHER

## 2019-08-08 RX ORDER — ZIPRASIDONE HYDROCHLORIDE 40 MG/1
CAPSULE ORAL
Qty: 30 CAPSULE | Refills: 0 | Status: SHIPPED | OUTPATIENT
Start: 2019-08-08 | End: 2019-08-19 | Stop reason: SDUPTHER

## 2019-08-11 RX ORDER — LIRAGLUTIDE 6 MG/ML
INJECTION SUBCUTANEOUS
Qty: 27 ML | Refills: 0 | Status: SHIPPED | OUTPATIENT
Start: 2019-08-11 | End: 2019-08-19 | Stop reason: SDUPTHER

## 2019-08-13 ENCOUNTER — TELEPHONE (OUTPATIENT)
Dept: NEUROLOGY | Facility: CLINIC | Age: 59
End: 2019-08-13

## 2019-08-13 NOTE — TELEPHONE ENCOUNTER
----- Message from Shana Villafuerte sent at 8/13/2019  2:40 PM CDT -----  Contact: JOYA MUNOZ [[id number]]  Name of Who is Calling: JOYA MUNOZ [[id number]]       What is the request in detail: Patient is requesting a call from staff in regards to scheduling an appointment for a follow up exam.....Please contact to further discuss and advise.     Can the clinic reply by [facility]: No     What [facility]:  [phone]

## 2019-08-15 DIAGNOSIS — N39.41 URGE INCONTINENCE: ICD-10-CM

## 2019-08-19 DIAGNOSIS — E11.42 DM TYPE 2 WITH DIABETIC PERIPHERAL NEUROPATHY: ICD-10-CM

## 2019-08-19 DIAGNOSIS — Z79.4 TYPE 2 DIABETES MELLITUS WITHOUT COMPLICATION, WITH LONG-TERM CURRENT USE OF INSULIN: ICD-10-CM

## 2019-08-19 DIAGNOSIS — F25.0 SCHIZOAFFECTIVE DISORDER, BIPOLAR TYPE: Primary | ICD-10-CM

## 2019-08-19 DIAGNOSIS — E87.6 HYPOPOTASSEMIA: ICD-10-CM

## 2019-08-19 DIAGNOSIS — M54.12 CERVICAL RADICULOPATHY: ICD-10-CM

## 2019-08-19 DIAGNOSIS — E11.9 TYPE 2 DIABETES MELLITUS WITHOUT COMPLICATION, WITH LONG-TERM CURRENT USE OF INSULIN: ICD-10-CM

## 2019-08-19 DIAGNOSIS — I10 HTN (HYPERTENSION), BENIGN: ICD-10-CM

## 2019-08-19 DIAGNOSIS — M47.812 CERVICAL SPONDYLOSIS WITHOUT MYELOPATHY: ICD-10-CM

## 2019-08-19 DIAGNOSIS — N39.41 URGE INCONTINENCE: ICD-10-CM

## 2019-08-19 DIAGNOSIS — M50.30 DDD (DEGENERATIVE DISC DISEASE), CERVICAL: ICD-10-CM

## 2019-08-19 RX ORDER — OLANZAPINE 10 MG/1
TABLET ORAL
Qty: 90 TABLET | Refills: 3 | Status: SHIPPED | OUTPATIENT
Start: 2019-08-19 | End: 2019-12-16 | Stop reason: SDUPTHER

## 2019-08-19 RX ORDER — INSULIN GLARGINE 100 [IU]/ML
INJECTION, SOLUTION SUBCUTANEOUS
Qty: 15 ML | Refills: 0 | Status: SHIPPED | OUTPATIENT
Start: 2019-08-19 | End: 2019-10-15 | Stop reason: SDUPTHER

## 2019-08-19 RX ORDER — PREGABALIN 100 MG/1
CAPSULE ORAL
Qty: 60 CAPSULE | Refills: 0 | Status: SHIPPED | OUTPATIENT
Start: 2019-08-19 | End: 2019-10-10 | Stop reason: SDUPTHER

## 2019-08-19 RX ORDER — QUETIAPINE FUMARATE 100 MG/1
100 TABLET, FILM COATED ORAL NIGHTLY
Qty: 90 TABLET | Refills: 0 | Status: SHIPPED | OUTPATIENT
Start: 2019-08-19 | End: 2020-01-13 | Stop reason: SDUPTHER

## 2019-08-19 RX ORDER — OXYBUTYNIN CHLORIDE 10 MG/1
TABLET, EXTENDED RELEASE ORAL
Qty: 90 TABLET | Refills: 0 | Status: SHIPPED | OUTPATIENT
Start: 2019-08-19 | End: 2019-08-19 | Stop reason: SDUPTHER

## 2019-08-19 RX ORDER — OXYBUTYNIN CHLORIDE 10 MG/1
TABLET, EXTENDED RELEASE ORAL
Qty: 90 TABLET | Refills: 0 | Status: SHIPPED | OUTPATIENT
Start: 2019-08-19 | End: 2019-09-06 | Stop reason: SDUPTHER

## 2019-08-19 RX ORDER — LANCETS
1 EACH MISCELLANEOUS 4 TIMES DAILY
Qty: 200 EACH | Refills: 0 | Status: SHIPPED | OUTPATIENT
Start: 2019-08-19 | End: 2019-11-11 | Stop reason: SDUPTHER

## 2019-08-19 RX ORDER — LISINOPRIL AND HYDROCHLOROTHIAZIDE 10; 12.5 MG/1; MG/1
1 TABLET ORAL DAILY
Qty: 30 TABLET | Refills: 0 | Status: SHIPPED | OUTPATIENT
Start: 2019-08-19 | End: 2019-10-04 | Stop reason: SDUPTHER

## 2019-08-19 RX ORDER — ZIPRASIDONE HYDROCHLORIDE 40 MG/1
CAPSULE ORAL
Qty: 30 CAPSULE | Refills: 0 | Status: SHIPPED | OUTPATIENT
Start: 2019-08-19 | End: 2019-09-11 | Stop reason: SDUPTHER

## 2019-08-19 NOTE — TELEPHONE ENCOUNTER
----- Message from Kierra Tyler sent at 8/19/2019 11:46 AM CDT -----  Contact: ANUJA CHOI   Can the clinic reply in [facility]: no      Please refill the medication(s) listed below. Please call the patient when the prescription(s) is ready for  at this phone number   [phone]      Medication #1 LYRICA 100 mg capsule    Medication #2 QUEtiapine (SEROQUEL) 100 MG Tab    Medication #3 lisinopril-hydrochlorothiazide (PRINZIDE,ZESTORETIC) 10-12.5 mg per tablet    Medication #4 ziprasidone (GEODON) 40 MG Cap    Medication #5 olanzapine (ZYPREXA) 10 MG tablet    Medication # 6 oxybutynin (DITROPAN-XL) 10 MG 24 hr tablet    Medication #7 VICTOZA 3-HERMANN 0.6 mg/0.1 mL (18 mg/3 mL) PnIj    Medication #8 LANTUS SOLOSTAR U-100 INSULIN glargine 100 units/mL (3mL) SubQ pen    Medication #9 Accu check denzel plus meter    Medication #10 Accu check denzel plus test strips     Medication #11 Accu check soft ckixc lancets     Medication #12 Bd single use swab     Medication #13 AcCU check denzel solution     Preferred Pharmacy: [facility] mail order

## 2019-08-23 ENCOUNTER — HOSPITAL ENCOUNTER (EMERGENCY)
Facility: OTHER | Age: 59
Discharge: HOME | End: 2019-08-24
Attending: EMERGENCY MEDICINE
Payer: MEDICARE

## 2019-08-23 VITALS
OXYGEN SATURATION: 94 % | SYSTOLIC BLOOD PRESSURE: 123 MMHG | HEIGHT: 63 IN | HEART RATE: 95 BPM | RESPIRATION RATE: 16 BRPM | DIASTOLIC BLOOD PRESSURE: 84 MMHG | TEMPERATURE: 98 F | WEIGHT: 170 LBS | BODY MASS INDEX: 30.12 KG/M2

## 2019-08-23 DIAGNOSIS — E11.9 TYPE 2 DIABETES MELLITUS WITHOUT COMPLICATION: ICD-10-CM

## 2019-08-23 DIAGNOSIS — S70.01XA CONTUSION OF RIGHT HIP, INITIAL ENCOUNTER: Primary | ICD-10-CM

## 2019-08-23 PROCEDURE — 99283 EMERGENCY DEPT VISIT LOW MDM: CPT | Mod: 25,HCNC

## 2019-08-23 PROCEDURE — 25000003 PHARM REV CODE 250: Mod: HCNC | Performed by: EMERGENCY MEDICINE

## 2019-08-23 RX ORDER — NAPROXEN 500 MG/1
500 TABLET ORAL
Status: COMPLETED | OUTPATIENT
Start: 2019-08-23 | End: 2019-08-23

## 2019-08-23 RX ORDER — NAPROXEN 500 MG/1
500 TABLET ORAL 2 TIMES DAILY WITH MEALS
Qty: 28 TABLET | Refills: 0 | Status: SHIPPED | OUTPATIENT
Start: 2019-08-23 | End: 2019-08-23 | Stop reason: SDUPTHER

## 2019-08-23 RX ORDER — NAPROXEN 500 MG/1
500 TABLET ORAL 2 TIMES DAILY WITH MEALS
Qty: 28 TABLET | Refills: 0 | Status: SHIPPED | OUTPATIENT
Start: 2019-08-23 | End: 2019-11-05 | Stop reason: SDUPTHER

## 2019-08-23 RX ADMIN — NAPROXEN 500 MG: 500 TABLET ORAL at 09:08

## 2019-08-24 NOTE — ED PROVIDER NOTES
Encounter Date: 8/23/2019    SCRIBE #1 NOTE: Glenny TORRES, am scribing for, and in the presence of, Dr. Sandoval.       History     Chief Complaint   Patient presents with    Hip Pain     to rt hip X 3 days, frequent falls     Time seen by provider: 9:32 PM    This is a 59 y.o. female who presents with complaint of sudden right hip pain that began three days ago. Pt states she has had multiple falls with the most recent being approximately 2 days prior to the onset pain.  No interventions at home taken for her hip pain. Pt reports chronic lower back pain and neck pain. She has not had any procedures on her hip. No current x-ray at this time. Pt walks with a cane.    The history is provided by the patient.     Review of patient's allergies indicates:  No Known Allergies  Past Medical History:   Diagnosis Date    Bipolar disorder     Cancer of female breast 10/2010    T2 N1 Mx, Stage IIB left breast cancer    Cancer of upper-outer quadrant of female breast 7/9/2012    Depression     Diabetes mellitus type II     Disturbances of sensation of smell and taste 6/29/2012    Hypertension     Malignant neoplasm of breast (female), unspecified site 4/27/2015    Neuropathy     Nonspecific abnormal unspecified cardiovascular function study 4/12/2013    ECG READ AS SHOWING A T-WAVE ABNORMALITY     Post-mastectomy lymphedema syndrome     Schizophrenia      Past Surgical History:   Procedure Laterality Date    BLOCK-NERVE-MEDIAL BRANCH-CERVICAL Left 6/26/2015    [facility] at [facility] PAIN T    BREAST RECONSTRUCTION  11/23/11    B/L SHLOMO flap reconstruction S/P left MRM, right prophylactic mastectomy    BREAST RECONSTRUCTION Bilateral 3/13/2015    NIPPLE RECONSTRUCTION    FACET JOINT INJECTION - BILATERAL - C4-5 THROUGH C6-7 Bilateral 2/21/2018    [facility] at [facility] PAIN T    INJECTION, STEROID, SPINE, CERVICAL, EPIDURAL N/A 5/9/2014    Performed by Darren CLARK  MD [facility] at [facility] PAIN MGT    INJECTION-STEROID-EPIDURAL-CERVICAL N/A 6/20/2014    Performed by [facility] at [facility] PAIN MGT    MASTECTOMY Bilateral 01/2011    bilateral    RADIOFREQUENCY THERMOCOAGULATION (RFTC)-NERVE-MEDIAN BRANCH-CERVICAL Left 7/31/2015    Performed by [facility] Jerry [facility] at [facility] PAIN MGT    RE-EXPLORATION N/A 4/27/2015    Performed by Aroldo Lake MD at [facility] OR    RECONSTRUCTION-BREAST-NIPPLE Bilateral 4/27/2015    Performed by Aroldo Lake MD at [facility] OR    REMOVAL, CATHETER, CENTRAL VENOUS, TUNNELED, WITH PORT N/A 4/18/2013    Per[facility] by [facility] OR 2ND FLR    REVISION-SCAR Bilateral 4/27/2015    Performed by Aroldo Lake MD at [facility] OR    TUBAL LIGATION       Family History   Problem Relation Age of Onset    Kidney disease Mother         Dialysis    Hypertension Mother     Diabetes Mother     Deep vein thrombosis Mother     Glaucoma Mother     Diabetic kidney disease Sister     Lung cancer Sister     Diabetes Sister     Diabetes Brother     Diabetes Son     No Known Problems Father     No Known Problems Maternal Grandmother     No Known Problems Maternal Grandfather     No Known Problems Paternal Grandmother     No Known Problems Paternal Grandfather     No Known Problems Son     Cervical cancer Daughter     No Known Problems Daughter     No Known Problems Daughter     No Known Problems Daughter     Breast cancer Neg Hx     Ovarian cancer Neg Hx      Social History     Tobacco Use    Smoking status: Current Every Day Smoker     Packs/day: 0.25     Years: 25.00     Pack years: 6.25     Types: Cigarettes    Smokeless tobacco: Never Used    Tobacco comment: currently at less than 1/2 pack pd   Substance Use Topics    Alcohol use: No     Alcohol/week: 0.0 oz    Drug use: No     Comment: 1991 - stopped using crack cocaine     Review of Systems   Constitutional: Negative for chills and fever.   HENT: Negative for  congestion, rhinorrhea and sore throat.    Eyes: Negative for visual disturbance.   Respiratory: Negative for cough and shortness of breath.    Cardiovascular: Negative for chest pain.   Gastrointestinal: Negative for abdominal pain, diarrhea, nausea and vomiting.   Genitourinary: Negative for dysuria.   Musculoskeletal: Positive for arthralgias (right hip), back pain (chronic) and neck pain.   Skin: Negative for rash.   Neurological: Negative for dizziness, weakness and light-headedness.   Psychiatric/Behavioral: Negative for confusion.       Physical Exam     Initial Vitals [08/23/19 2036]   BP Pulse Resp Temp SpO2   123/84 95 18 98.8 °F (37.1 °C) 97 %      MAP       --         Physical Exam    Nursing note and vitals reviewed.  Constitutional: She appears well-developed and well-nourished. She is not diaphoretic. No distress.   HENT:   Head: Normocephalic and atraumatic.   Eyes: Conjunctivae and EOM are normal. Pupils are equal, round, and reactive to light. No scleral icterus.   Neck: Normal range of motion. Neck supple.   Cardiovascular: Normal rate, regular rhythm and normal heart sounds. Exam reveals no gallop and no friction rub.    No murmur heard.  Pulmonary/Chest: Breath sounds normal. No respiratory distress. She has no wheezes. She has no rhonchi. She has no rales.   Abdominal: Soft. Bowel sounds are normal. She exhibits no distension. There is no tenderness. There is no rebound and no guarding.   Musculoskeletal: Normal range of motion. She exhibits no edema.   Right Hip: No leg shortening. External and internal rotation intact. Dorsal flexion full ROM. 2+ pt and dp bilaterally. No obvious leg deformities. TTP on lateral aspect of hip. No area of edema or ecchymosis. Negative straight leg raise.   Neurological: She is alert and oriented to person, place, and time.   Skin: Skin is warm and dry.         ED Course   Procedures  Labs Reviewed - No data to display       Imaging Results          X-Ray Hip 2  View Right (In process)               X-Rays:   Independently Interpreted Readings:   Other Readings:  Right hip: No displacement. No fracture. No dislocation    Medical Decision Making:   Independently Interpreted Test(s):   I have ordered and independently interpreted X-rays - see prior notes.  Clinical Tests:   Radiological Study: Ordered and Reviewed    Additional MDM:   Comments: 59-year-old female presents complaining of right hip pain. On exam her pain was to the lateral aspect of the right hip.  She otherwise has full range of motion of the right lower extremity and is neurovascularly intact.  X-ray showed no evidence of a fracture of the hip.  She received naproxen in the emergency department and subsequently reported feeling much better.  She was able to ambulate and bear weight on this leg.  Patient will be discharged home with a prescription for naproxen instructed to follow up with her primary care doctor within the next 5-7 days for re-evaluation if the pain persists..          Scribe Attestation:   Scribe #1: I performed the above scribed service and the documentation accurately describes the services I performed. I attest to the accuracy of the note.    Attending Attestation:           Physician Attestation for Scribe:  Physician Attestation Statement for Scribe #1: I, Dr. Sandovla, reviewed documentation, as scribed by Glenny Monroe in my presence, and it is both accurate and complete.                    Clinical Impression:   No diagnosis found.                               Portia Sandoval MD  08/23/19 1381

## 2019-08-24 NOTE — ED TRIAGE NOTES
Pt presents to ED with c/o right hip pain, states she has had multiple falls the past month. Pt ambulatory with cane. Pt AAO x4.

## 2019-08-26 ENCOUNTER — TELEPHONE (OUTPATIENT)
Dept: INTERNAL MEDICINE | Facility: CLINIC | Age: 59
End: 2019-08-26

## 2019-08-26 NOTE — TELEPHONE ENCOUNTER
----- Message from Elba Norton sent at 8/26/2019 10:27 AM CDT -----  Contact: Pt  Pt is requesting an appt due to hospital follow up    Pt can be reached at [phone]

## 2019-09-06 DIAGNOSIS — M47.812 CERVICAL SPONDYLOSIS WITHOUT MYELOPATHY: ICD-10-CM

## 2019-09-06 DIAGNOSIS — N39.41 URGE INCONTINENCE: ICD-10-CM

## 2019-09-06 RX ORDER — OXYBUTYNIN CHLORIDE 10 MG/1
TABLET, EXTENDED RELEASE ORAL
Qty: 90 TABLET | Refills: 3 | Status: SHIPPED | OUTPATIENT
Start: 2019-09-06 | End: 2019-12-24

## 2019-09-09 ENCOUNTER — LAB VISIT (OUTPATIENT)
Dept: LAB | Facility: OTHER | Age: 59
End: 2019-09-09
Attending: INTERNAL MEDICINE
Payer: MEDICARE

## 2019-09-09 DIAGNOSIS — C50.912 MALIGNANT NEOPLASM OF LEFT FEMALE BREAST, UNSPECIFIED ESTROGEN RECEPTOR STATUS, UNSPECIFIED SITE OF BREAST: ICD-10-CM

## 2019-09-09 LAB
ALBUMIN SERPL BCP-MCNC: 3.4 G/DL (ref 3.5–5.2)
ALP SERPL-CCNC: 55 U/L (ref 55–135)
ALT SERPL W/O P-5'-P-CCNC: 32 U/L (ref 10–44)
ANION GAP SERPL CALC-SCNC: 13 MMOL/L (ref 8–16)
AST SERPL-CCNC: 28 U/L (ref 10–40)
BASOPHILS # BLD AUTO: ABNORMAL K/UL (ref 0–0.2)
BASOPHILS NFR BLD: 0 % (ref 0–1.9)
BILIRUB SERPL-MCNC: 0.2 MG/DL (ref 0.1–1)
BUN SERPL-MCNC: 8 MG/DL (ref 6–20)
CALCIUM SERPL-MCNC: 9.8 MG/DL (ref 8.7–10.5)
CHLORIDE SERPL-SCNC: 105 MMOL/L (ref 95–110)
CO2 SERPL-SCNC: 23 MMOL/L (ref 23–29)
CREAT SERPL-MCNC: 1.1 MG/DL (ref 0.5–1.4)
DIFFERENTIAL METHOD: ABNORMAL
EOSINOPHIL # BLD AUTO: ABNORMAL K/UL (ref 0–0.5)
EOSINOPHIL NFR BLD: 3 % (ref 0–8)
ERYTHROCYTE [DISTWIDTH] IN BLOOD BY AUTOMATED COUNT: 13.9 % (ref 11.5–14.5)
EST. GFR  (AFRICAN AMERICAN): >60 ML/MIN/1.73 M^2
EST. GFR  (NON AFRICAN AMERICAN): 55 ML/MIN/1.73 M^2
GLUCOSE SERPL-MCNC: 272 MG/DL (ref 70–110)
HCT VFR BLD AUTO: 36.8 % (ref 37–48.5)
HGB BLD-MCNC: 11.8 G/DL (ref 12–16)
IMM GRANULOCYTES # BLD AUTO: ABNORMAL K/UL (ref 0–0.04)
IMM GRANULOCYTES NFR BLD AUTO: ABNORMAL % (ref 0–0.5)
LYMPHOCYTES # BLD AUTO: ABNORMAL K/UL (ref 1–4.8)
LYMPHOCYTES NFR BLD: 64 % (ref 18–48)
MCH RBC QN AUTO: 29.1 PG (ref 27–31)
MCHC RBC AUTO-ENTMCNC: 32.1 G/DL (ref 32–36)
MCV RBC AUTO: 91 FL (ref 82–98)
MONOCYTES # BLD AUTO: ABNORMAL K/UL (ref 0.3–1)
MONOCYTES NFR BLD: 7 % (ref 4–15)
NEUTROPHILS NFR BLD: 26 % (ref 38–73)
NRBC BLD-RTO: 0 /100 WBC
PLATELET # BLD AUTO: 248 K/UL (ref 150–350)
PLATELET BLD QL SMEAR: ABNORMAL
PMV BLD AUTO: 10.4 FL (ref 9.2–12.9)
POTASSIUM SERPL-SCNC: 3.8 MMOL/L (ref 3.5–5.1)
PROT SERPL-MCNC: 7.3 G/DL (ref 6–8.4)
RBC # BLD AUTO: 4.06 M/UL (ref 4–5.4)
SODIUM SERPL-SCNC: 141 MMOL/L (ref 136–145)
WBC # BLD AUTO: 6.33 K/UL (ref 3.9–12.7)

## 2019-09-09 PROCEDURE — 85027 COMPLETE CBC AUTOMATED: CPT | Mod: HCNC

## 2019-09-09 PROCEDURE — 36415 COLL VENOUS BLD VENIPUNCTURE: CPT | Mod: HCNC

## 2019-09-09 PROCEDURE — 85007 BL SMEAR W/DIFF WBC COUNT: CPT | Mod: HCNC

## 2019-09-09 PROCEDURE — 80053 COMPREHEN METABOLIC PANEL: CPT | Mod: HCNC

## 2019-09-09 RX ORDER — ZIPRASIDONE HYDROCHLORIDE 40 MG/1
CAPSULE ORAL
Qty: 30 CAPSULE | Refills: 0 | Status: SHIPPED | OUTPATIENT
Start: 2019-09-09 | End: 2019-10-10 | Stop reason: SDUPTHER

## 2019-09-09 RX ORDER — QUETIAPINE FUMARATE 100 MG/1
TABLET, FILM COATED ORAL
Qty: 90 TABLET | Refills: 0 | Status: SHIPPED | OUTPATIENT
Start: 2019-09-09 | End: 2019-09-11 | Stop reason: SDUPTHER

## 2019-09-11 ENCOUNTER — OFFICE VISIT (OUTPATIENT)
Dept: HEMATOLOGY/ONCOLOGY | Facility: CLINIC | Age: 59
End: 2019-09-11
Payer: MEDICARE

## 2019-09-11 VITALS
WEIGHT: 186.06 LBS | HEIGHT: 64 IN | DIASTOLIC BLOOD PRESSURE: 69 MMHG | TEMPERATURE: 98 F | OXYGEN SATURATION: 96 % | SYSTOLIC BLOOD PRESSURE: 109 MMHG | HEART RATE: 80 BPM | BODY MASS INDEX: 31.77 KG/M2

## 2019-09-11 DIAGNOSIS — F25.0 SCHIZOAFFECTIVE DISORDER, BIPOLAR TYPE: ICD-10-CM

## 2019-09-11 DIAGNOSIS — I89.0 LYMPHEDEMA: ICD-10-CM

## 2019-09-11 DIAGNOSIS — C50.912 MALIGNANT NEOPLASM OF LEFT FEMALE BREAST, UNSPECIFIED ESTROGEN RECEPTOR STATUS, UNSPECIFIED SITE OF BREAST: Primary | ICD-10-CM

## 2019-09-11 PROCEDURE — 3078F DIAST BP <80 MM HG: CPT | Mod: HCNC,CPTII,S$GLB, | Performed by: INTERNAL MEDICINE

## 2019-09-11 PROCEDURE — 3074F SYST BP LT 130 MM HG: CPT | Mod: HCNC,CPTII,S$GLB, | Performed by: INTERNAL MEDICINE

## 2019-09-11 PROCEDURE — 3008F PR BODY MASS INDEX (BMI) DOCUMENTED: ICD-10-PCS | Mod: HCNC,CPTII,S$GLB, | Performed by: INTERNAL MEDICINE

## 2019-09-11 PROCEDURE — 99999 PR PBB SHADOW E&M-EST. PATIENT-LVL V: ICD-10-PCS | Mod: PBBFAC,HCNC,, | Performed by: INTERNAL MEDICINE

## 2019-09-11 PROCEDURE — 99499 RISK ADDL DX/OHS AUDIT: ICD-10-PCS | Mod: HCNC,S$GLB,, | Performed by: INTERNAL MEDICINE

## 2019-09-11 PROCEDURE — 99499 UNLISTED E&M SERVICE: CPT | Mod: HCNC,S$GLB,, | Performed by: INTERNAL MEDICINE

## 2019-09-11 PROCEDURE — 99214 OFFICE O/P EST MOD 30 MIN: CPT | Mod: HCNC,S$GLB,, | Performed by: INTERNAL MEDICINE

## 2019-09-11 PROCEDURE — 3078F PR MOST RECENT DIASTOLIC BLOOD PRESSURE < 80 MM HG: ICD-10-PCS | Mod: HCNC,CPTII,S$GLB, | Performed by: INTERNAL MEDICINE

## 2019-09-11 PROCEDURE — 3044F HG A1C LEVEL LT 7.0%: CPT | Mod: HCNC,CPTII,S$GLB, | Performed by: INTERNAL MEDICINE

## 2019-09-11 PROCEDURE — 99214 PR OFFICE/OUTPT VISIT, EST, LEVL IV, 30-39 MIN: ICD-10-PCS | Mod: HCNC,S$GLB,, | Performed by: INTERNAL MEDICINE

## 2019-09-11 PROCEDURE — 99999 PR PBB SHADOW E&M-EST. PATIENT-LVL V: CPT | Mod: PBBFAC,HCNC,, | Performed by: INTERNAL MEDICINE

## 2019-09-11 PROCEDURE — 3044F PR MOST RECENT HEMOGLOBIN A1C LEVEL <7.0%: ICD-10-PCS | Mod: HCNC,CPTII,S$GLB, | Performed by: INTERNAL MEDICINE

## 2019-09-11 PROCEDURE — 3008F BODY MASS INDEX DOCD: CPT | Mod: HCNC,CPTII,S$GLB, | Performed by: INTERNAL MEDICINE

## 2019-09-11 PROCEDURE — 3074F PR MOST RECENT SYSTOLIC BLOOD PRESSURE < 130 MM HG: ICD-10-PCS | Mod: HCNC,CPTII,S$GLB, | Performed by: INTERNAL MEDICINE

## 2019-09-11 RX ORDER — NAPROXEN 500 MG/1
500 TABLET ORAL 2 TIMES DAILY
COMMUNITY
End: 2019-11-03 | Stop reason: ALTCHOICE

## 2019-09-11 NOTE — PROGRESS NOTES
Subjective:       Patient ID: Jonathan Gonzales is a 59 y.o. female.    Chief Complaint: Follow-up    HPI   Diagnosis: Stage IIB lt Breast CA T2N1MO ER weak pos/NH negative, HER-2/bridget amplified dx'd 11/2011  Therapy:       1.  s/p bilateral mastectomy with SHLOMO flap reconstruction 11/23/2011                         2.  completed   Phase III 0221 protocol                   HPI 59 -year-old female with stage IIB breast cancer, left breast originally diagnosed in November 2011, status post bilateral skin -sparing mastectomy with a right prophylactic and a left radical mastectomy for a T2 N1 MO ,Stage 2b. breast cancer of the left breast on 11/23/2011 with immediate autologous tissue reconstruction with SHLOMO flap. She underwent second stage reconstructive surgery on 4/27/2015 which was complicated by a hematoma for which She was taken back to OR emergently  for aspiration.          Pt previously Lost to  follow-up ( >1yr) and has re established care 2017          She wears  compression sleeve LUE   She reports lymphedema stable.  Previously followed by PT  Pt lost to follow up with Lympedema clinic       She continues to have problems with neuropathic pain as well as central pain.    She continues with  Lyrica 100 twice a day.     She has a history of diabetes.   She reports Last glucose 145       Doing well  She has not followed up with lymphedema clinic  She reports minor swelling RUE  She needs new compression sleeve LUE  Appetite and weight stable   No SOB/CP/cough  No fevers, night sweats          She is followed by psychiatry  for bipolar disorder and schizophrenia      She is followed by PCP for  history of type 2  DM w/neuropathy , HTN and hyperlipidemia         Bone Density 10/19/2016 nl        PrevHx: Tumor was ER weak pos /NH negative, HER-2/bridget amplified with 1 of total of 24 lymph nodes removed which revealed metastatic carcinoma, 0.4 cm in diameter with no extranodal extension.She had abnormal PET  "imaging 12/2011 which revealed. Findings suggestive of metastatic disease to supraclavicular, mediastinal and right hilar lymph nodes.She underwent rt supraclavicular lymph node biopsy which was negative for malignancy. PET/CT 10/12 revealed resolution of LAD and no evid of mets.Patient was enrolled in 0221 study. She completed 1 yr of Herceptin therapy3/12/2013.     Tx; Phase III 0221 protocol- DD AC ( Adriamycin 60mg/m2/Cytoxan 60mg/2) x4 followed by weekly Paclitaxel 175mg/m2/Herceptin and completion of Herceptin therapy (6mg/kg q3wks) 1 yr 3/12/13          Review of Systems   Constitutional: Negative for appetite change, chills and fatigue.   HENT: Negative for congestion, hearing loss and nosebleeds.    Eyes: Negative for visual disturbance.   Respiratory: Negative for cough, chest tightness, shortness of breath and wheezing.    Cardiovascular: Negative for chest pain, palpitations and leg swelling.   Gastrointestinal: Negative for abdominal distention, abdominal pain, blood in stool, constipation, diarrhea, nausea and vomiting.   Genitourinary: Negative for dysuria, flank pain, frequency and hematuria.   Musculoskeletal: Negative for arthralgias, back pain, gait problem, joint swelling and neck pain.   Skin: Negative for rash.        No petechiae, ecchymoses   Neurological: Negative for dizziness, light-headedness, numbness and headaches.   Hematological: Negative for adenopathy. Does not bruise/bleed easily.   Psychiatric/Behavioral: Negative for dysphoric mood. The patient is not nervous/anxious.        Objective:       Vitals:    09/11/19 1038   BP: 109/69   BP Location: Right arm   Patient Position: Sitting   BP Method: Medium (Automatic)   Pulse: 80   Temp: 97.6 °F (36.4 °C)   TempSrc: Oral   SpO2: 96%   Weight: 84.4 kg (186 lb 1.1 oz)   Height: 5' 4" (1.626 m)       Physical Exam   Constitutional: She is oriented to person, place, and time. She appears well-developed and well-nourished.   HENT:   Head: " Normocephalic.   Mouth/Throat: Oropharynx is clear and moist. No oropharyngeal exudate.   Eyes: Pupils are equal, round, and reactive to light. Conjunctivae and lids are normal. No scleral icterus.   Neck: Normal range of motion. Neck supple. No thyromegaly present.   Cardiovascular: Normal rate, regular rhythm and normal heart sounds.   No murmur heard.  Pulmonary/Chest: Breath sounds normal. She has no wheezes. She has no rales.   S/p Rt reconstructed breast- palpable fibrosis noted  Well-healed surg incision site, no axillary LAD    S/p Lt reconstructed breast- well-healed surg incision site, no palpable masses or axillary LAD noted   Abdominal: Soft. Bowel sounds are normal. She exhibits no mass. There is no hepatosplenomegaly. There is no tenderness. There is no rebound and no guarding.   Musculoskeletal: Normal range of motion. She exhibits edema. She exhibits no tenderness.   Lymphadenopathy:     She has no cervical adenopathy.     She has no axillary adenopathy.        Right: No supraclavicular adenopathy present.        Left: No supraclavicular adenopathy present.   Neurological: She is alert and oriented to person, place, and time. No cranial nerve deficit. Coordination normal.   Skin: Skin is warm and dry. No ecchymosis, no petechiae and no rash noted. No erythema.   Psychiatric: She has a normal mood and affect.           Results for JOYA MUNOZ (MRN [id number]) as of 6/8/2015 13:40   Ref. Range 6/2/2015 07:50   Vit D, 25-Hydroxy Latest Range: 30-96 ng/mL 33     Lab Results   Component Value Date    WBC 6.33 09/09/2019    HGB 11.8 (L) 09/09/2019    HCT 36.8 (L) 09/09/2019    MCV 91 09/09/2019     09/09/2019           10/19/2016 Bone Density-nl    Labs: none   Assessment:       1. Malignant neoplasm of left female breast, unspecified estrogen receptor status, unspecified site of breast    2. Lymphedema    3. Schizoaffective disorder, bipolar type    4. Uncontrolled type 2 diabetes mellitus  with diabetic neuropathy, with long-term current use of insulin        Plan:   Jonathan was seen today for follow-up.  Pt clinically stable         Breast cancer, Stage IIB lt Breast CA T2N1MO ER weak pos/ NV neg Her 2 pos diagnosed  11/2011   - s/p bilateral mastectomy with SHLOMO flap reconstruction 11/23/2011   - s/p chemotherapy per  Phase III 0221 protocol      S/p second stage reconstructive surgery      Rt Breast US 10/2015- Area in the right breast is benign-negative.  .   ACR BI-RADS Category 2: Benign   - CUAUHTEMOC  recurrence clinically     Postmastectomy lymphedema  Stable  AMBULATORY REFERRAL TO LYMPHEDEMA CLINIC last visit  ( pt will be rescheduled)     Bipolar d/o  Follow-up with psych    DM type 2  Follow up with PCP    Advance Care Planning     Power of   I initiated the process of advance care planning today and explained the importance of this process to the patient.  I introduced the concept of advance directives to the patient, as well. Then the patient received detailed information about the importance of designating a Health Care Power of  (HCPOA). She was also instructed to communicate with this person about their wishes for future healthcare, should she become sick and lose decision-making capacity. The patient has not previously appointed a HCPOA. After our discussion, the patient has decided to complete a HCPOA and has appointed her daughter and NAME:    Rojelio Daniels    I spent a total time of 16  minutes discussing this issue with the patient.            F/u 6  mos with cbc,cmp     CC: Ghulam Contreras MD

## 2019-09-16 NOTE — PROGRESS NOTES
[facility]  Physical Therapy Initial Evaluation    Name: Jonathan Gonzales  Clinic Number: [id number]    Therapy Diagnosis:   Encounter Diagnoses   Name Primary?    Schizoaffective disorder, bipolar type Yes    Malignant neoplasm involving both nipple and areola of left breast in female, unspecified estrogen receptor status     Malignant neoplasm of left breast, stage 2     Lymphedema      Physician: Galina Landon MD    Physician Orders: PT Eval and Treat   Medical Diagnosis: Left Breast cancer/lymphedema LUE  Evaluation Date: 9/17/2019  Authorization Period Expiration: 12/31/19  Plan of Care Certification Period: 11/15/19  Visit # / Visits authorized: 1/ 20  Insurance: [employer]    Time In: 11:00 AM  Time Out: 12:00 PM  Total Billable Time: 60 minutes    Precautions: cancer      History   History of Present Illness: Jonathan is a 59 y.o. female that presents to  [facility] at the [facility] s/p left breast surgery.   Diagnosis: Left breast Stage IIb T2N1MO, ER weakly (+), ER (-), HER2 amplified-diagnosed 11/2011  Chief complaint: swelling in both upper extremities. Patient she had compression sleeve for LUE and it wore out.   Surgical History:11/23/11 bilateral mastectomies with ALND and SHLOMO flap reconstruction; 1/24 LN (+); 4/27/15 second stage reconstruction  Jonathan Gonzales  has a past surgical history that includes Tubal ligation; Breast reconstruction (11/23/11); Mastectomy (Bilateral, 01/2011); and Breast reconstruction (Bilateral, 3/13/2015).    Chemotherapy: Enrolled in 1221 study and received AC/Paclitaxel and 1 year of herceptin (completed in 3/13)  Radiation: None    Past Medical History:   Diagnosis Date    Bipolar disorder     Cancer of female breast 10/2010    T2 N1 Mx, Stage IIB left breast cancer    Cancer of upper-outer quadrant of female breast 7/9/2012    Depression     Diabetes mellitus type II      Disturbances of sensation of smell and taste 6/29/2012    Hypertension     Malignant neoplasm of breast (female), unspecified site 4/27/2015    Neuropathy     Nonspecific abnormal unspecified cardiovascular function study 4/12/2013    ECG READ AS SHOWING A T-WAVE ABNORMALITY     Post-mastectomy lymphedema syndrome     Schizophrenia           Medications:  Jonathan has a current medication list which includes the following prescription(s): arm brace, arm brace, bd ultra-fine mini pen needle, blood sugar diagnostic, blood-glucose meter, divalproex, easy touch, flu vac [id number] 36mos up(pf), insulin, lancets, lancets, liraglutide 0.6 mg/0.1 ml (18 mg/3 ml) subq pnij, lisinopril-hydrochlorothiazide, metformin, naproxen, olanzapine, oxybutynin, pen needle, diabetic, pen needle, diabetic, safety, pregabalin, quetiapine, true metrix glucose meter, trueplus lancets, trueplus lancets, and ziprasidone.    Allergies:  Review of patient's allergies indicates:  No Known Allergies     Prior Therapy: for LUE lymphedema 2013 & 2015; her low back   Social History:  lives alone  Occupation: Does Not work  Prior Level of Function: Independent with ADL's   Current Level of Function: able to use both UE's with dressing, bathing and grooming. Patient states unable to do housecleaning and cooking due to her low back pain    Other Past Medical History: See Above    Patient's Goals: I want to get new compression sleeves for my lymphedema    Hand dominance: right handed    Subjective   Pt states: not having pain  In her arms. States her pain is in her neck and low back  Pain: 7/10 on VAS currently.   Best: 7/10  Worst: 8/10   Pain location: neck  And low back   Objective   Mental status :oriented    Postural examination/scapula alignment: Rounded shoulder and Head forward    Skin Integrity:   Scar Location: bilateral breasts  Appearance: healed  Signs of infection: No  Drainage:NOne  Color:NA    Edema: Moderate  Location:  "BUE's    Axillary Web Syndrome/Cording:   Location: None  Degree of Cording: NA (mild, moderate etc...)   Number of cords present: NA    Sensation: numbness noted bilateral hands due to chemotherapy        Range of Motion:      Shoulder Range of Motion:   Active /Passive ROM Right Left   Flexion 50/80 70/90*   Abduction 70/90 90/95*   Extension 40 40   IR/90deg 80 80   ER/90deg 40/45 30/35*   * pain with mobility       Strength: manual muscle test grades below   Upper Extremity Strength   (R) UE (L) UE   Shoulder flexion: 4/5 4/5   Shoulder Abduction: 4/5 4/5   Shoulder IR 4/5 4/5   Shoulder ER 4/5 4/5   Elbow flexion: 4/5 4/5   Elbow extension: 4/5 4/5   Wrist flexion: 4/5 4/5   Wrist extension: 4/5 4/5    4/5 4/5       Baseline Measurements of BL UE's for early detection of Lymphedema:     LANDMARK RIGHT UE LEFT UE DIFFERENCE   E + 8" 41 cm 39 cm 2 cm   E + 6" 38 cm 35 cm 3 cm   E + 4" 36 cm 34 cm 2 cm   E + 2" 34 cm 34 cm 0 cm   Elbow 30.5 cm 30 cm 0.5 cm   W+ 8" 31.5 cm 31 cm 0.5 cm   W +  6" 30 cm 30 cm 0 cm   W + 4" 26.5 cm 26 cm 0.5 cm   Wrist 19 cm 19 cm 0 cm   DPC 22 cm 22 cm 0 cm   IP Thumb 7 cm 7 cm 0 cm     Functional Mobility (Bed mobility, transfers)  Independent    ADL's:  [place] to perform grooming, dressing and bathing. Difficulty with housework and cooking due to her neck and low back pain    Gait Assessment:   - AD used: none  - Assistance: independent  - Distance: community distances     Patient Education Provided   - role of therapy in multi - disciplinary team, goals for therapy  - Pt was educated in lymphedema etiology and management plans. --reviewed with patient        Pt has no cultural, educational or language barriers to learning provided.    Treatment and Instruction of Home Exercise Program    Time In:11:40 AM  Time Out: 12:00 PM    [place] received individual therapeutic exercises to improve postural correction and alignment, stretching and soft tissue mobility, and strengthening for " 20 minutes including the following:   Supine Shld Flexion with Wand     1 x 5  Butterflies        1 x 5  Seated shoulder Abd                   1 x 5    Written Home Exercises Provided and Patient Education: Handouts given   See EMR under patient instructions for program given  Pt demonstrated good understanding of the education provided. Patient demo good return demo of skill of exercises.    Functional Limitations Reporting      CMS Impairment/Limitation/Restriction for FOTO Outcome Survey    Therapist reviewed FOTO scores for Jonathan Gonzales on 9/17/2019.   FOTO documents entered into [employer] - see Media section.    Limitations Score: 51%  Category: Carrying    Current : CK = at least 40% but < 60% impaired, limited or restricted  Goal: CJ = at least 20% but < 40% impaired, limited or restricted         Assessment   This is a 59 y.o. female referred to outpatient physical therapy and presents with a medical diagnosis of left breast cancer and was seen today post-operatively to establish PT plan of care for impairments following surgery including: decreased bilateral shoulder A/PROM, and strength, Stage 1 lymphedema bilateral UE's, increased pain bilateral shoulders..     Pt instructed in HEP this session and was able to perform all exercises given independently. Pt instructed to follow up with therapist if any concerns arise with program established. Pt will continue to benefit from skilled physical therapy to address the impairments stated in chart below, provide patient/family education and to maximize pt's level of independence in home and community environment     Anticipated barriers to physical therapy: None     Pt's spiritual, cultural and educational needs considered and pt agreeable to plan of care and goals as stated below:     Medical necessity is demonstrated by the following IMPAIRMENTS/PROMBLEM LIST:    History  Co-morbidities and personal factors that may impact the plan of care Co-morbidities:    history of cancer    Personal Factors:   no deficits     moderate   Examination  Body Structures and Functions, activity limitations and participation restrictions that may impact the plan of care Body Regions:   upper extremities    Body Systems:    ROM  strength  edema    Participation Restrictions:   Difficulty raising BUE's upward    Activity limitations:   Learning and applying knowledge  no deficits    General Tasks and Commands  no deficits    Communication  no deficits    Mobility  lifting and carrying objects    Self care  Cooking and performing housework    Domestic Life  shopping  cooking  doing house work (cleaning house, washing dishes, laundry)    Interactions/Relationships  no deficits    Life Areas  no deficits    Community and Social Life  community life  recreation and leisure         moderate   Clinical Presentation evolving clinical presentation with changing clinical characteristics moderate   Decision Making/ Complexity Score: moderate       Goals: Pt agrees with goals set    Short Term goals: 4 weeks  1. Patient will demonstrate 100% understanding of lymphedema risk reduction practices to include self monitoring for lymphedema. (progressing, not met)  2. Patient will demonstrate independence with Home Exercise program established. (progressing, not met)  3. Pt will increase AROM/PROM in shoulder abduction ROM to 110 degrees bilaterally to improve functional reach, carry, push, pull pain free. (progressing, not met)  4. Pt will increase AROM/PROM in shoulder flexion to 120 degrees bilaterally to improve functional reach, carry, push, pull pain free.(progressing, not met)  5. Strength will be 4+/5 in BUE's in order to perform functional activities. (progressing, not met)  6. Patient will be able to perform Self-MLD techniques to manage her lymphedema (progressing, not met)     Long Term Goals: 8 weeks   1.  Pt will increase AROM/PROM in shoulder flexion to 140 degrees bilaterally to improve  functional reach, carry, push, pull pain free. (progressing, not met)  2. Pt will increase strength to 5/5 in gross UE musculature to improve tolerance to all functional activities pain free. (progressing, not met)  3. Pt will demonstrate full/maximized tissue mobility to increase ROM and promote healthy tissue to be pain free at discharge. (progressing, not met)  4. Pt will report decrease in overall worst pain to 2/10 at discharge. (progressing, not met)  5. Pt will increase AROM/PROM in shoulder abduction ROM to 120 degrees bilaterally to improve functional reach, carry, push, pull pain free. (progressing, not met)  6.  Decrease in girth of BUE of 1  Cm to 2 cm for better contour of upper extremities   (progressing not met)  7. Patient will be able to don/doff compression sleeves and will demonstrate understanding of wearing compression sleeves to control and to manage her lymphedema in BUE's (progressing, not met)      Plan   Outpatient physical therapy 2x week for 8 weeks to include the following:   Manual Therapy, Patient Education, Therapeutic Activites and Therapeutic Exercise.    Plan of care Certification Period: 9/17/2019 to 11/15/19.    Therapist: Cheri Bui, PT  9/17/2019

## 2019-09-17 ENCOUNTER — CLINICAL SUPPORT (OUTPATIENT)
Dept: REHABILITATION | Facility: HOSPITAL | Age: 59
End: 2019-09-17
Payer: MEDICARE

## 2019-09-17 ENCOUNTER — TELEPHONE (OUTPATIENT)
Dept: INTERNAL MEDICINE | Facility: CLINIC | Age: 59
End: 2019-09-17

## 2019-09-17 DIAGNOSIS — F25.0 SCHIZOAFFECTIVE DISORDER, BIPOLAR TYPE: Primary | ICD-10-CM

## 2019-09-17 DIAGNOSIS — I89.0 LYMPHEDEMA: ICD-10-CM

## 2019-09-17 DIAGNOSIS — C50.012 MALIGNANT NEOPLASM INVOLVING BOTH NIPPLE AND AREOLA OF LEFT BREAST IN FEMALE, UNSPECIFIED ESTROGEN RECEPTOR STATUS: ICD-10-CM

## 2019-09-17 DIAGNOSIS — C50.912 MALIGNANT NEOPLASM OF LEFT BREAST, STAGE 2: Chronic | ICD-10-CM

## 2019-09-17 PROCEDURE — G8985 CARRY GOAL STATUS: HCPCS | Mod: CJ,HCNC | Performed by: PHYSICAL MEDICINE & REHABILITATION

## 2019-09-17 PROCEDURE — 97162 PT EVAL MOD COMPLEX 30 MIN: CPT | Mod: HCNC | Performed by: PHYSICAL MEDICINE & REHABILITATION

## 2019-09-17 PROCEDURE — 97110 THERAPEUTIC EXERCISES: CPT | Mod: HCNC | Performed by: PHYSICAL MEDICINE & REHABILITATION

## 2019-09-17 PROCEDURE — G8984 CARRY CURRENT STATUS: HCPCS | Mod: CK,HCNC | Performed by: PHYSICAL MEDICINE & REHABILITATION

## 2019-09-17 NOTE — TELEPHONE ENCOUNTER
----- Message from Rodolfo Wing sent at 9/17/2019  8:10 AM CDT -----  Contact: Oly qureshi   Name of Who is Calling: Oly qureshi     What is the request in detail:Oly qureshi is requesting a call back regards to a prescription request that was faxed over 9/9/19 She was following up to see if the request was received by staff......  Please contact to further discuss and advise      Can the clinic reply by [facility]: No     What [facility]  [phone]

## 2019-09-17 NOTE — PATIENT INSTRUCTIONS
WAND FLEXION - SUPINE  Lying on your back and holding a wand or  cane, slowly raise the wand towards  overhead. Use your unaffected arm to  assist with the movement.  Perform 5 times. Perform 2 times a day.            Butterflies  Place both hands behind your head. Move your elbows up and down.  Perform 5 times 2 times a day.            Lift both arms out to side and return to lap.  Perform 5 times, 2 times a day.

## 2019-09-17 NOTE — PLAN OF CARE
[facility]  Physical Therapy Initial Evaluation    Name: Jonathan Gonzales  Clinic Number: [id number]    Therapy Diagnosis:   Encounter Diagnoses   Name Primary?    Schizoaffective disorder, bipolar type Yes    Malignant neoplasm involving both nipple and areola of left breast in female, unspecified estrogen receptor status     Malignant neoplasm of left breast, stage 2     Lymphedema      Physician: Galina Landon MD    Physician Orders: PT Eval and Treat   Medical Diagnosis: Left Breast cancer/lymphedema LUE  Evaluation Date: 9/17/2019  Authorization Period Expiration: 12/31/19  Plan of Care Certification Period: 11/15/19  Visit # / Visits authorized: 1/ 20  Insurance: [employer] Managed Medicare    Time In: 11:00 AM  Time Out: 12:00 PM  Total Billable Time: 60 minutes    Precautions: cancer      History   History of Present Illness: Jonathan is a 59 y.o. female that presents to  [facility] at the [facility] s/p left breast surgery.   Diagnosis: Left breast Stage IIb T2N1MO, ER weakly (+), ER (-), HER2 amplified-diagnosed 11/2011  Chief complaint: swelling in both upper extremities. Patient she had compression sleeve for LUE and it wore out.   Surgical History:11/23/11 bilateral mastectomies with ALND and SHLOMO flap reconstruction; 1/24 LN (+); 4/27/15 second stage reconstruction  Jonathan Gonzales  has a past surgical history that includes Tubal ligation; Breast reconstruction (11/23/11); Mastectomy (Bilateral, 01/2011); and Breast reconstruction (Bilateral, 3/13/2015).    Chemotherapy: Enrolled in 1221 study and received AC/Paclitaxel and 1 year of herceptin (completed in 3/13)  Radiation: None    Past Medical History:   Diagnosis Date    Bipolar disorder     Cancer of female breast 10/2010    T2 N1 Mx, Stage IIB left breast cancer    Cancer of upper-outer quadrant of female breast 7/9/2012    Depression     Diabetes mellitus type II      Disturbances of sensation of smell and taste 6/29/2012    Hypertension     Malignant neoplasm of breast (female), unspecified site 4/27/2015    Neuropathy     Nonspecific abnormal unspecified cardiovascular function study 4/12/2013    ECG READ AS SHOWING A T-WAVE ABNORMALITY     Post-mastectomy lymphedema syndrome     Schizophrenia           Medications:  Jonathan has a current medication list which includes the following prescription(s): arm brace, arm brace, bd ultra-fine mini pen needle, blood sugar diagnostic, blood-glucose meter, divalproex, easy touch, flu vac [id number] 36mos up(pf), insulin, lancets, lancets, liraglutide 0.6 mg/0.1 ml (18 mg/3 ml) subq pnij, lisinopril-hydrochlorothiazide, metformin, naproxen, olanzapine, oxybutynin, pen needle, diabetic, pen needle, diabetic, safety, pregabalin, quetiapine, true metrix glucose meter, trueplus lancets, trueplus lancets, and ziprasidone.    Allergies:  Review of patient's allergies indicates:  No Known Allergies     Prior Therapy: for LUE lymphedema 2013 & 2015; her low back   Social History:  lives alone  Occupation: Does Not work  Prior Level of Function: Independent with ADL's   Current Level of Function: able to use both UE's with dressing, bathing and grooming. Patient states unable to do housecleaning and cooking due to her low back pain    Other Past Medical History: See Above    Patient's Goals: I want to get new compression sleeves for my lymphedema    Hand dominance: right handed    Subjective   Pt states: not having pain  In her arms. States her pain is in her neck and low back  Pain: 7/10 on VAS currently.   Best: 7/10  Worst: 8/10   Pain location: neck  And low back   Objective   Mental status :oriented    Postural examination/scapula alignment: Rounded shoulder and Head forward    Skin Integrity:   Scar Location: bilateral breasts  Appearance: healed  Signs of infection: No  Drainage:NOne  Color:NA    Edema: Moderate  Location:  "BUE's    Axillary Web Syndrome/Cording:   Location: None  Degree of Cording: NA (mild, moderate etc...)   Number of cords present: NA    Sensation: numbness noted bilateral hands due to chemotherapy        Range of Motion:      Shoulder Range of Motion:   Active /Passive ROM Right Left   Flexion 50/80 70/90*   Abduction 70/90 90/95*   Extension 40 40   IR/90deg 80 80   ER/90deg 40/45 30/35*   * pain with mobility       Strength: manual muscle test grades below   Upper Extremity Strength   (R) UE (L) UE   Shoulder flexion: 4/5 4/5   Shoulder Abduction: 4/5 4/5   Shoulder IR 4/5 4/5   Shoulder ER 4/5 4/5   Elbow flexion: 4/5 4/5   Elbow extension: 4/5 4/5   Wrist flexion: 4/5 4/5   Wrist extension: 4/5 4/5    4/5 4/5       Baseline Measurements of BL UE's for early detection of Lymphedema:     LANDMARK RIGHT UE LEFT UE DIFFERENCE   E + 8" 41 cm 39 cm 2 cm   E + 6" 38 cm 35 cm 3 cm   E + 4" 36 cm 34 cm 2 cm   E + 2" 34 cm 34 cm 0 cm   Elbow 30.5 cm 30 cm 0.5 cm   W+ 8" 31.5 cm 31 cm 0.5 cm   W +  6" 30 cm 30 cm 0 cm   W + 4" 26.5 cm 26 cm 0.5 cm   Wrist 19 cm 19 cm 0 cm   DPC 22 cm 22 cm 0 cm   IP Thumb 7 cm 7 cm 0 cm     Functional Mobility (Bed mobility, transfers)  Independent    ADL's:  [place] to perform grooming, dressing and bathing. Difficulty with housework and cooking due to her neck and low back pain    Gait Assessment:   - AD used: none  - Assistance: independent  - Distance: community distances     Patient Education Provided   - role of therapy in multi - disciplinary team, goals for therapy  - Pt was educated in lymphedema etiology and management plans. --reviewed with patient        Pt has no cultural, educational or language barriers to learning provided.    Treatment and Instruction of Home Exercise Program    Time In:11:40 AM  Time Out: 12:00 PM    [place] received individual therapeutic exercises to improve postural correction and alignment, stretching and soft tissue mobility, and strengthening for " 20 minutes including the following:   Supine Shld Flexion with Wand     1 x 5  Butterflies        1 x 5  Seated shoulder Abd                   1 x 5    Written Home Exercises Provided and Patient Education: Handouts given   See EMR under patient instructions for program given  Pt demonstrated good understanding of the education provided. Patient demo good return demo of skill of exercises.    Functional Limitations Reporting      CMS Impairment/Limitation/Restriction for FOTO Outcome Survey    Therapist reviewed FOTO scores for Jonathan Gonzales on 9/17/2019.   FOTO documents entered into [employer] - see Media section.    Limitations Score: 51%  Category: Carrying    Current : CK = at least 40% but < 60% impaired, limited or restricted  Goal: CJ = at least 20% but < 40% impaired, limited or restricted         Assessment   This is a 59 y.o. female referred to outpatient physical therapy and presents with a medical diagnosis of left breast cancer and was seen today post-operatively to establish PT plan of care for impairments following surgery including: decreased bilateral shoulder A/PROM, and strength, Stage 1 lymphedema bilateral UE's, increased pain bilateral shoulders..     Pt instructed in HEP this session and was able to perform all exercises given independently. Pt instructed to follow up with therapist if any concerns arise with program established. Pt will continue to benefit from skilled physical therapy to address the impairments stated in chart below, provide patient/family education and to maximize pt's level of independence in home and community environment     Anticipated barriers to physical therapy: None     Pt's spiritual, cultural and educational needs considered and pt agreeable to plan of care and goals as stated below:     Medical necessity is demonstrated by the following IMPAIRMENTS/PROMBLEM LIST:    History  Co-morbidities and personal factors that may impact the plan of care Co-morbidities:    history of cancer    Personal Factors:   no deficits     moderate   Examination  Body Structures and Functions, activity limitations and participation restrictions that may impact the plan of care Body Regions:   upper extremities    Body Systems:    ROM  strength  edema    Participation Restrictions:   Difficulty raising BUE's upward    Activity limitations:   Learning and applying knowledge  no deficits    General Tasks and Commands  no deficits    Communication  no deficits    Mobility  lifting and carrying objects    Self care  Cooking and performing housework    Domestic Life  shopping  cooking  doing house work (cleaning house, washing dishes, laundry)    Interactions/Relationships  no deficits    Life Areas  no deficits    Community and Social Life  community life  recreation and leisure         moderate   Clinical Presentation evolving clinical presentation with changing clinical characteristics moderate   Decision Making/ Complexity Score: moderate       Goals: Pt agrees with goals set    Short Term goals: 4 weeks  1. Patient will demonstrate 100% understanding of lymphedema risk reduction practices to include self monitoring for lymphedema. (progressing, not met)  2. Patient will demonstrate independence with Home Exercise program established. (progressing, not met)  3. Pt will increase AROM/PROM in shoulder abduction ROM to 110 degrees bilaterally to improve functional reach, carry, push, pull pain free. (progressing, not met)  4. Pt will increase AROM/PROM in shoulder flexion to 120 degrees bilaterally to improve functional reach, carry, push, pull pain free.(progressing, not met)  5. Strength will be 4+/5 in BUE's in order to perform functional activities. (progressing, not met)  6. Patient will be able to perform Self-MLD techniques to manage her lymphedema (progressing, not met)     Long Term Goals: 8 weeks   1.  Pt will increase AROM/PROM in shoulder flexion to 140 degrees bilaterally to improve  functional reach, carry, push, pull pain free. (progressing, not met)  2. Pt will increase strength to 5/5 in gross UE musculature to improve tolerance to all functional activities pain free. (progressing, not met)  3. Pt will demonstrate full/maximized tissue mobility to increase ROM and promote healthy tissue to be pain free at discharge. (progressing, not met)  4. Pt will report decrease in overall worst pain to 2/10 at discharge. (progressing, not met)  5. Pt will increase AROM/PROM in shoulder abduction ROM to 120 degrees bilaterally to improve functional reach, carry, push, pull pain free. (progressing, not met)  6.  Decrease in girth of BUE of 1  Cm to 2 cm for better contour of upper extremities   (progressing not met)  7. Patient will be able to don/doff compression sleeves and will demonstrate understanding of wearing compression sleeves to control and to manage her lymphedema in BUE's (progressing, not met)      Plan   Outpatient physical therapy 2x week for 8 weeks to include the following:   Manual Therapy, Patient Education, Therapeutic Activites and Therapeutic Exercise.    Plan of care Certification Period: 9/17/2019 to 11/15/19.    Therapist: Cheri Bui, PT  9/17/2019

## 2019-09-23 NOTE — PROGRESS NOTES
Physical Therapy Daily Treatment Note     Name: Jonathan Gonzales  Clinic Number: [id number]  Diagnosis:   Encounter Diagnoses   Name Primary?    Malignant neoplasm of areola of left breast in female, estrogen receptor positive     Schizoaffective disorder, bipolar type     Lymphedema      Physician: Galina Landon MD    Precautions: lymphedema LUE  Visit #: 3 of 20  Time In: 10:50 AM  Time Out: 11:50 AM  Total Treatment Time 1:1: 60 minutes    Evaluation Date: 9/17/19  Visit # authorized: 20  Authorization period: 12/31/19  Plan of care Expiration: 11/15/19  MD referral: Galina Landon MD  Insurance: [employer] Managed Medicare      Subjective     Pt reports: swelling staying about the same. States has been performing HEP and her shoulders feel better.   Pain Scale: Jonathan rates pain on a scale of 8/10 on VAS.   Pain location: low back and neck     Fatigue: moderate  Functional change: moving her shoulders better  Diagnosis: Left breast Stage IIb T2N1MO, ER weakly (+), ER (-), HER2 amplified-diagnosed 11/2011  Chief complaint: swelling in both upper extremities. Patient she had compression sleeve for LUE and it wore out.   Surgical History:11/23/11 bilateral mastectomies with ALND and SHLOMO flap reconstruction; 1/24 LN (+); 4/27/15 second stage reconstruction  Jonathan Gonzales  has a past surgical history that includes Tubal ligation; Breast reconstruction (11/23/11); Mastectomy (Bilateral, 01/2011); and Breast reconstruction (Bilateral, 3/13/2015).     Chemotherapy: Enrolled in 1221 study and received AC/Paclitaxel and 1 year of herceptin (completed in 3/13)  Radiation: None    Objective     Jonathan received individual therapeutic exercises to improve postural correction and alignment, stretching and soft tissue mobility, and strengthening for 15 minutes including the following:   Supine Shld Flexion with Wand     1 x 10  Butterflies                                         1 x 10  Seated shoulder Abd                      1 x 10  Scap Retractions                           1 x 10  Exaggerated Breathing    [place] received the following manual therapy techniques were performed to increased myofascial/soft tissue length, mobility and pliability, increase PROM, AROM and function as well as to decrease pain for 30 minutes  Grade II G-H jt mobs bilateral shoulders  Posterior capsule stretch (B) shoulders  Passive stretch all motions (B) shoulders    Short Neck  Clear Healthy Lymph Nodes:  Right Axilla                                                  Left Groin  Open Anastomoses:  Anterior Axillo-Axillary  (AAA)                                    Axillo-Inguinal     (AI)                                    Posterior Axillo-Axillary  (SHERRON)  Left Upper Arm (Lateral and Medial)  Left Antecubital Fossa  Left Dorsal Forearm  Left Volar Forearm  Left Hand    Rework Left UE  Rework Anastomoses  Rework Healthy lymph Nodes    [place] performed Therapeutic Activity for 15 minutes which included:   Self -MLD: Short Neck  Clear Healthy Lymph Nodes:  Right Axilla                                                  Left Groin  Open Anastomoses:  Anterior Axillo-Axillary  (AAA)                                    Axillo-Inguinal     (AI)  Patient given written diagram to follow.  Patient fitted with Size E tubigrip to BUE    Shoulder Range of Motion:   Active /Passive ROM Right Left   Flexion 50/80 70/90*   Abduction 70/90 90/95*   Extension 40 40   IR/90deg 80 80   ER/90deg 40/45 30/35*   * pain with mobility       Strength: manual muscle test grades below   Upper Extremity Strength    (R) UE (L) UE   Shoulder flexion: 4/5 4/5   Shoulder Abduction: 4/5 4/5   Shoulder IR 4/5 4/5   Shoulder ER 4/5 4/5   Elbow flexion: 4/5 4/5   Elbow extension: 4/5 4/5   Wrist flexion: 4/5 4/5   Wrist extension: 4/5 4/5    4/5 4/5            LANDMARK LUE DIFF RIGHT UE LEFT UE DIFFERENCE   Date:  9/24/19 9/24/19  "9/17/19 9/17/19 9/17/19   E + 8" 39 cm No chg 41 cm 39 cm 2 cm   E + 6" 35 cm No chg 38 cm 35 cm 3 cm   E + 4" 34 cm No chg 36 cm 34 cm 2 cm   E + 2" 33.5 cm 0.5 cm dec 34 cm 34 cm 0 cm   Elbow 29 cm 1 cm dec 30.5 cm 30 cm 0.5 cm   W+ 8" 31 cm No chg 31.5 cm 31 cm 0.5 cm   W +  6" 30 cm No chg 30 cm 30 cm 0 cm   W + 4" 24.5 cm 1.5 cm dec 26.5 cm 26 cm 0.5 cm   Wrist 18.5 cm 0.5 cm dec 19 cm 19 cm 0 cm   DPC 21.5 cm 0.5 cm dec 22 cm 22 cm 0 cm   IP Thumb 7 cm No chg 7 cm 7 cm 0 cm      Functional Limitations Reporting       CMS Impairment/Limitation/Restriction for FOTO Outcome Survey     Therapist reviewed FOTO scores for Jonathan Gonzales on 9/24/2019.   FOTO documents entered into [employer] - see Media section.     Limitations Score: 46%  Category: Carrying     Current : CK = at least 40% but < 60% impaired, limited or restricted  Goal: CJ = at least 20% but < 40% impaired, limited or restricted            Home Exercise Program and Patient Education   Education provided re:  - role of PT in multi - disciplinary team, goals for PT  - progress towards goals  - Self-MLD techniques    See EMR under notes/patient instructions for HEP given/taught this session - all sets and reps included. Pt received printed copy.     Pt was able to demonstrate and report understanding and performance  Pt has no cultural, educational or language barriers to learning provided.    Assessment     Patient is responding well to physical therapy.   Pain after treatment: 8/10 low back and neck    Pt prognosis is Good.Patient received manual therapy as above to decrease bilateral shoulder joint tightness and began MLD techniques to decrease edema of LUE.Slight change in girth LUE.Patient instructed in self-MLD techniques and given written diagram to follow. Patient performed above exercise program and tolerated new exercises added today. Patient fitted with size E tubigrip to BUE's towear to decrease edema. Patient given prescription to obtain " compression sleeves for UE's. Patient instructed to continue with HEP and self-MLD daily.   Pt will continue to benefit from skilled outpatient physical therapy to address the deficits listed in the problem list chart on initial evaluation, provide pt/family education and to maximize pt's level of independence in the home and community environment. Patient met 2 STG this session.    Goals as follows:  Short Term goals: 4 weeks  1. Patient will demonstrate 100% understanding of lymphedema risk reduction practices to include self monitoring for lymphedema. (met, 9/24/19)  2. Patient will demonstrate independence with Home Exercise program established. ( met, 9/24/19)  3. Pt will increase AROM/PROM in shoulder abduction ROM to 110 degrees bilaterally to improve functional reach, carry, push, pull pain free. (progressing, not met)  4. Pt will increase AROM/PROM in shoulder flexion to 120 degrees bilaterally to improve functional reach, carry, push, pull pain free.(progressing, not met)  5. Strength will be 4+/5 in BUE's in order to perform functional activities. (progressing, not met)  6. Patient will be able to perform Self-MLD techniques to manage her lymphedema (progressing, not met)      Long Term Goals: 8 weeks   1.  Pt will increase AROM/PROM in shoulder flexion to 140 degrees bilaterally to improve functional reach, carry, push, pull pain free. (progressing, not met)  2. Pt will increase strength to 5/5 in gross UE musculature to improve tolerance to all functional activities pain free. (progressing, not met)  3. Pt will demonstrate full/maximized tissue mobility to increase ROM and promote healthy tissue to be pain free at discharge. (progressing, not met)  4. Pt will report decrease in overall worst pain to 2/10 at discharge. (progressing, not met)  5. Pt will increase AROM/PROM in shoulder abduction ROM to 120 degrees bilaterally to improve functional reach, carry, push, pull pain free. (progressing, not  met)  6.  Decrease in girth of BUE of 1  Cm to 2 cm for better contour of upper extremities   (progressing not met)  7. Patient will be able to don/doff compression sleeves and will demonstrate understanding of wearing compression sleeves to control and to manage her lymphedema in BUE's (progressing, not met)        Plan     Continue with established POC toward physical therapy goals.    Therapist: Cheri Bui, PT  9/24/2019

## 2019-09-24 ENCOUNTER — CLINICAL SUPPORT (OUTPATIENT)
Dept: REHABILITATION | Facility: HOSPITAL | Age: 59
End: 2019-09-24
Payer: MEDICARE

## 2019-09-24 DIAGNOSIS — I89.0 LYMPHEDEMA: ICD-10-CM

## 2019-09-24 DIAGNOSIS — C50.012 MALIGNANT NEOPLASM OF AREOLA OF LEFT BREAST IN FEMALE, ESTROGEN RECEPTOR POSITIVE: ICD-10-CM

## 2019-09-24 DIAGNOSIS — Z17.0 MALIGNANT NEOPLASM OF AREOLA OF LEFT BREAST IN FEMALE, ESTROGEN RECEPTOR POSITIVE: ICD-10-CM

## 2019-09-24 DIAGNOSIS — F25.0 SCHIZOAFFECTIVE DISORDER, BIPOLAR TYPE: ICD-10-CM

## 2019-09-24 PROCEDURE — 97140 MANUAL THERAPY 1/> REGIONS: CPT | Mod: HCNC | Performed by: PHYSICAL MEDICINE & REHABILITATION

## 2019-09-24 PROCEDURE — 97110 THERAPEUTIC EXERCISES: CPT | Mod: HCNC | Performed by: PHYSICAL MEDICINE & REHABILITATION

## 2019-09-24 PROCEDURE — 97530 THERAPEUTIC ACTIVITIES: CPT | Mod: HCNC,59 | Performed by: PHYSICAL MEDICINE & REHABILITATION

## 2019-09-24 NOTE — PATIENT INSTRUCTIONS
Scapular Retraction (Standing)    With arms at sides, squeeze shoulder blades together. Do not shrug and do not hold your breath. Hold 5 seconds. Repeat 10 times 1 sessions 2 x day.     Exaggerated Breathing and Relaxation      Practice deep breathing frequently in the first few days following surgery even before you begin exercising your arm. Inhale slowly and deeply through the nose and exhale through pursed lips. Concentrate on relaxing as you let the air out of your lungs. Repeat three (3) to four (4) times, remembering to breath in deeply and then relaxing. This exercise helps to ease the sensation of pulling and discomfort that may be experienced while exercising.

## 2019-10-01 ENCOUNTER — CLINICAL SUPPORT (OUTPATIENT)
Dept: REHABILITATION | Facility: HOSPITAL | Age: 59
End: 2019-10-01
Payer: MEDICARE

## 2019-10-01 DIAGNOSIS — I89.0 LYMPHEDEMA: ICD-10-CM

## 2019-10-01 DIAGNOSIS — C50.919 MALIGNANT NEOPLASM OF BREAST, STAGE 2, UNSPECIFIED LATERALITY: ICD-10-CM

## 2019-10-01 DIAGNOSIS — C50.012 MALIGNANT NEOPLASM INVOLVING BOTH NIPPLE AND AREOLA OF LEFT BREAST IN FEMALE, UNSPECIFIED ESTROGEN RECEPTOR STATUS: ICD-10-CM

## 2019-10-01 DIAGNOSIS — F25.0 SCHIZOAFFECTIVE DISORDER, BIPOLAR TYPE: ICD-10-CM

## 2019-10-01 PROCEDURE — 97110 THERAPEUTIC EXERCISES: CPT | Mod: HCNC | Performed by: PHYSICAL MEDICINE & REHABILITATION

## 2019-10-01 PROCEDURE — 97530 THERAPEUTIC ACTIVITIES: CPT | Mod: HCNC | Performed by: PHYSICAL MEDICINE & REHABILITATION

## 2019-10-01 PROCEDURE — 97140 MANUAL THERAPY 1/> REGIONS: CPT | Mod: HCNC | Performed by: PHYSICAL MEDICINE & REHABILITATION

## 2019-10-01 NOTE — PROGRESS NOTES
Physical Therapy Daily Treatment Note     Name: Jonathan Gonzales  Clinic Number: [id number]  Diagnosis:   Encounter Diagnoses   Name Primary?    Malignant neoplasm involving both nipple and areola of left breast in female, unspecified estrogen receptor status     Schizoaffective disorder, bipolar type     Lymphedema     Malignant neoplasm of breast, stage 2, unspecified laterality      Physician: Galina Landon MD    Precautions: lymphedema LUE  Visit #: 4 of 20  Time In: 11:05 AM  Time Out: 12:05 PM  Total Treatment Time 1:1: 60 minutes    Evaluation Date: 9/17/19  Visit # authorized: 20  Authorization period: 12/31/19  Plan of care Expiration: 11/15/19  MD referral: Galina Landon MD  Insurance: [employer]      Subjective     Pt reports: swelling a little less.  has been performing HEP and her shoulders feel better. And  has been performing self -MLD daily.  Pain Scale: Jonathan rates pain on a scale of 8/10 on VAS.   Pain location: low back and neck     Fatigue: moderate  Functional change: moving her shoulders better  Diagnosis: Left breast Stage IIb T2N1MO, ER weakly (+), ER (-), HER2 amplified-diagnosed 11/2011  Chief complaint: swelling in both upper extremities. Patient she had compression sleeve for LUE and it wore out.   Surgical History:11/23/11 bilateral mastectomies with ALND and SHLOMO flap reconstruction; 1/24 LN (+); 4/27/15 second stage reconstruction  Jonathan Gonzales  has a past surgical history that includes Tubal ligation; Breast reconstruction (11/23/11); Mastectomy (Bilateral, 01/2011); and Breast reconstruction (Bilateral, 3/13/2015).     Chemotherapy: Enrolled in 122 study and received AC/Paclitaxel and 1 year of herceptin (completed in 3/13)  Radiation: None    Objective     Jonathan received individual therapeutic exercises to improve postural correction and alignment, stretching and soft tissue mobility,  and strengthening for 15 minutes including the following:   Supine Shld Flexion with Wand     1 x 10  Butterflies                                        1 x 10  Seated shoulder Abd                      1 x 10  Scap Retractions                           1 x 10  Fist Making                                     1 x 10  Exaggerated Breathing    [place] received the following manual therapy techniques were performed to increased myofascial/soft tissue length, mobility and pliability, increase PROM, AROM and function as well as to decrease pain for 30 minutes  Grade II G-H jt mobs bilateral shoulders  Posterior capsule stretch (B) shoulders  Passive stretch all motions (B) shoulders    Short Neck  Clear Healthy Lymph Nodes:  Right Axilla                                                  Left Groin  Open Anastomoses:  Anterior Axillo-Axillary  (AAA)                                    Axillo-Inguinal     (AI)                                    Posterior Axillo-Axillary  (SHERRON)  Left Upper Arm (Lateral and Medial)  Left Antecubital Fossa  Left Dorsal Forearm  Left Volar Forearm  Left Hand    Rework Left UE  Rework Anastomoses  Rework Healthy lymph Nodes    [place] performed Therapeutic Activity for 15 minutes which included:   Self -MLD: Short Neck  Clear Healthy Lymph Nodes:  Right Axilla                                                  Left Groin  Open Anastomoses:  Anterior Axillo-Axillary  (AAA)                                    Axillo-Inguinal     (AI)    Left Upper Arm (Lateral and Medial)  Left Antecubital Fossa  Left Dorsal Forearm  Left Volar Forearm  Left Hand    Patient given written diagram to follow.  Patient fitted with Size E tubigrip to BUE    Shoulder Range of Motion:   Active /Passive ROM Right Left   Flexion 50/80 70/90*   Abduction 70/90 90/95*   Extension 40 40   IR/90deg 80 80   ER/90deg 40/45 30/35*   * pain with mobility       Strength: manual muscle test grades below   Upper Extremity Strength    (R) UE  "(L) UE   Shoulder flexion: 4/5 4/5   Shoulder Abduction: 4/5 4/5   Shoulder IR 4/5 4/5   Shoulder ER 4/5 4/5   Elbow flexion: 4/5 4/5   Elbow extension: 4/5 4/5   Wrist flexion: 4/5 4/5   Wrist extension: 4/5 4/5    4/5 4/5            LANDMARK LUE Diff LUE DIFF RIGHT UE LEFT UE DIFFERENCE   Date:  10/1/19 10/1/19 9/24/19 9/24/19 9/17/19 9/17/19 9/17/19   E + 8" 38 cm 1 cm dec 39 cm No chg 41 cm 39 cm 2 cm   E + 6" 34 cm 1 cm dec 35 cm No chg 38 cm 35 cm 3 cm   E + 4" 32 cm 2 cm dec 34 cm No chg 36 cm 34 cm 2 cm   E + 2" 32 cm 1.5 cm dec 33.5 cm 0.5 cm dec 34 cm 34 cm 0 cm   Elbow 28.5 cm 0.5 cm dec 29 cm 1 cm dec 30.5 cm 30 cm 0.5 cm   W+ 8" 30 cm 1 cm dec 31 cm No chg 31.5 cm 31 cm 0.5 cm   W +  6" 29 cm 1 cm dec 30 cm No chg 30 cm 30 cm 0 cm   W + 4" 24.5 cm No chg 24.5 cm 1.5 cm dec 26.5 cm 26 cm 0.5 cm   Wrist 18 cm 0.5 cm dec 18.5 cm 0.5 cm dec 19 cm 19 cm 0 cm   DPC 20 cm 1.5 cm dec 21.5 cm 0.5 cm dec 22 cm 22 cm 0 cm   IP Thumb 6.5 cm 0.f cm dec 7 cm No chg 7 cm 7 cm 0 cm      Functional Limitations Reporting       CMS Impairment/Limitation/Restriction for FOTO Outcome Survey     Therapist reviewed FOTO scores for Jonathan Gonzales on 10/1/2019.   FOTO documents entered into [employer] - see Media section.     Limitations Score: 39%  Category: Carrying     Current : CJ = at least 40% but <40% impaired, limited or restricted  Goal: CJ = at least 20% but < 40% impaired, limited or restricted            Home Exercise Program and Patient Education   Education provided re:  - role of PT in multi - disciplinary team, goals for PT  - progress towards goals  - Self-MLD techniques    See EMR under notes/patient instructions for HEP given/taught this session - all sets and reps included. Pt received printed copy.     Pt was able to demonstrate and report understanding and performance  Pt has no cultural, educational or language barriers to learning provided.    Assessment     Patient is responding well to physical " therapy.   Pain after treatment: 8/10 low back and neck    Pt prognosis is Good.Patient received manual therapy as above to decrease bilateral shoulder joint tightness and  MLD techniques to decrease edema of LUE.Decrease from in girth of  LUE noted today.Patient instructed in self-MLD techniques and given written diagram to follow. Patient performed above exercise program. Patient continuing to wear size E tubigrip to BUE's to decrease edema. Patient given prescription to obtain compression sleeves for UE's. Patient instructed to continue with HEP and self-MLD daily.   Pt will continue to benefit from skilled outpatient physical therapy to address the deficits listed in the problem list chart on initial evaluation, provide pt/family education and to maximize pt's level of independence in the home and community environment. Patient met 1 STG and 1 LTG  this session.    Goals as follows:  Short Term goals: 4 weeks  1. Patient will demonstrate 100% understanding of lymphedema risk reduction practices to include self monitoring for lymphedema. (met, 9/24/19)  2. Patient will demonstrate independence with Home Exercise program established. ( met, 9/24/19)  3. Pt will increase AROM/PROM in shoulder abduction ROM to 110 degrees bilaterally to improve functional reach, carry, push, pull pain free. (progressing, not met)  4. Pt will increase AROM/PROM in shoulder flexion to 120 degrees bilaterally to improve functional reach, carry, push, pull pain free.(progressing, not met)  5. Strength will be 4+/5 in BUE's in order to perform functional activities. (progressing, not met)  6. Patient will be able to perform Self-MLD techniques to manage her lymphedema (met, 10/1/19)      Long Term Goals: 8 weeks   1.  Pt will increase AROM/PROM in shoulder flexion to 140 degrees bilaterally to improve functional reach, carry, push, pull pain free. (progressing, not met)  2. Pt will increase strength to 5/5 in gross UE musculature to  improve tolerance to all functional activities pain free. (progressing, not met)  3. Pt will demonstrate full/maximized tissue mobility to increase ROM and promote healthy tissue to be pain free at discharge. (progressing, not met)  4. Pt will report decrease in overall worst pain to 2/10 at discharge. (progressing, not met)  5. Pt will increase AROM/PROM in shoulder abduction ROM to 120 degrees bilaterally to improve functional reach, carry, push, pull pain free. (progressing, not met)  6.  Decrease in girth of BUE of 1  cm to 2 cm for better contour of upper extremities   ( met, 10/1/19)  7. Patient will be able to don/doff compression sleeves and will demonstrate understanding of wearing compression sleeves to control and to manage her lymphedema in BUE's (progressing, not met)        Plan     Continue with established POC toward physical therapy goals.    Therapist: Cheri Bui, PT  10/1/2019

## 2019-10-04 DIAGNOSIS — I10 HTN (HYPERTENSION), BENIGN: ICD-10-CM

## 2019-10-04 DIAGNOSIS — E87.6 HYPOPOTASSEMIA: ICD-10-CM

## 2019-10-06 DIAGNOSIS — I10 HTN (HYPERTENSION), BENIGN: ICD-10-CM

## 2019-10-06 DIAGNOSIS — E87.6 HYPOPOTASSEMIA: ICD-10-CM

## 2019-10-07 RX ORDER — LISINOPRIL AND HYDROCHLOROTHIAZIDE 10; 12.5 MG/1; MG/1
TABLET ORAL
Qty: 30 TABLET | Refills: 11 | Status: SHIPPED | OUTPATIENT
Start: 2019-10-07 | End: 2019-11-11 | Stop reason: SDUPTHER

## 2019-10-07 RX ORDER — LISINOPRIL AND HYDROCHLOROTHIAZIDE 10; 12.5 MG/1; MG/1
TABLET ORAL
Qty: 30 TABLET | Refills: 11 | Status: ON HOLD | OUTPATIENT
Start: 2019-10-07 | End: 2019-11-14 | Stop reason: SDUPTHER

## 2019-10-08 ENCOUNTER — CLINICAL SUPPORT (OUTPATIENT)
Dept: REHABILITATION | Facility: HOSPITAL | Age: 59
End: 2019-10-08
Payer: MEDICARE

## 2019-10-08 DIAGNOSIS — C50.012 MALIGNANT NEOPLASM OF NIPPLE OF LEFT BREAST IN FEMALE, ESTROGEN RECEPTOR POSITIVE: ICD-10-CM

## 2019-10-08 DIAGNOSIS — Z17.0 MALIGNANT NEOPLASM OF NIPPLE OF LEFT BREAST IN FEMALE, ESTROGEN RECEPTOR POSITIVE: ICD-10-CM

## 2019-10-08 DIAGNOSIS — F25.0 SCHIZOAFFECTIVE DISORDER, BIPOLAR TYPE: ICD-10-CM

## 2019-10-08 DIAGNOSIS — I89.0 LYMPHEDEMA: ICD-10-CM

## 2019-10-08 DIAGNOSIS — C50.912 MALIGNANT NEOPLASM OF LEFT BREAST, STAGE 2: ICD-10-CM

## 2019-10-08 PROCEDURE — 97110 THERAPEUTIC EXERCISES: CPT | Mod: HCNC | Performed by: PHYSICAL MEDICINE & REHABILITATION

## 2019-10-08 PROCEDURE — 97140 MANUAL THERAPY 1/> REGIONS: CPT | Mod: HCNC | Performed by: PHYSICAL MEDICINE & REHABILITATION

## 2019-10-08 NOTE — PROGRESS NOTES
Physical Therapy Daily Treatment Note     Name: Jonathan Gonzales  Clinic Number: [id number]  Diagnosis:   Encounter Diagnoses   Name Primary?    Malignant neoplasm of nipple of left breast in female, estrogen receptor positive     Schizoaffective disorder, bipolar type     Lymphedema     Malignant neoplasm of left breast, stage 2      Physician: Galina Landon MD    Precautions: lymphedema LUE  Visit #: 4 of 20  Time In: 11:05 AM  Time Out: 11:50 AM  Total Treatment Time 1:1: 50 minutes    Evaluation Date: 9/17/19  Visit # authorized: 20  Authorization period: 12/31/19  Plan of care Expiration: 11/15/19  MD referral: Galina Landon MD  Insurance: [employer]      Subjective     Pt reports:   has been performing HEP and her shoulders feel better. And  has been performing self -MLD daily. Patient states having difficulty purchasing compression sleeve since she was told by [employer] that her insurance does not pay for the compression sleeves.   Pain Scale: Jonathan rates pain on a scale of 8/10 on VAS.   Pain location: low back and neck     Fatigue: moderate  Functional change: moving her shoulders better  Diagnosis: Left breast Stage IIb T2N1MO, ER weakly (+), ER (-), HER2 amplified-diagnosed 11/2011  Chief complaint: swelling in both upper extremities. Patient she had compression sleeve for LUE and it wore out.   Surgical History:11/23/11 bilateral mastectomies with ALND and SHLOMO flap reconstruction; 1/24 LN (+); 4/27/15 second stage reconstruction  Jonathan Gonzales  has a past surgical history that includes Tubal ligation; Breast reconstruction (11/23/11); Mastectomy (Bilateral, 01/2011); and Breast reconstruction (Bilateral, 3/13/2015).     Chemotherapy: Enrolled in 1221 study and received AC/Paclitaxel and 1 year of herceptin (completed in 3/13)  Radiation: None    Objective     Jonathan received individual therapeutic  exercises to improve postural correction and alignment, stretching and soft tissue mobility, and strengthening for 15 minutes including the following:   Supine Shld Flexion with Wand     1 x 10  Butterflies                                        1 x 10  Seated shoulder Abd                      1 x 10  Scap Retractions                           1 x 10  Fist Making                                     1 x 10  Exaggerated Breathing    [place] received the following manual therapy techniques were performed to increased myofascial/soft tissue length, mobility and pliability, increase PROM, AROM and function as well as to decrease pain for 30 minutes  Grade II G-H jt mobs bilateral shoulders  Posterior capsule stretch (B) shoulders  Passive stretch all motions (B) shoulders    Short Neck  Clear Healthy Lymph Nodes:  Right Axilla                                                  Left Groin  Open Anastomoses:  Anterior Axillo-Axillary  (AAA)                                    Axillo-Inguinal     (AI)                                    Posterior Axillo-Axillary  (SHERRON)  Left Upper Arm (Lateral and Medial)  Left Antecubital Fossa  Left Dorsal Forearm  Left Volar Forearm  Left Hand    Rework Left UE  Rework Anastomoses  Rework Healthy lymph Nodes    Patient is independent with Self-MLD    Patient fitted with Size E tubigrip to BUE    Shoulder Range of Motion:   Active /Passive ROM Right Left   Flexion 50/80 70/90*   Abduction 70/90 90/95*   Extension 40 40   IR/90deg 80 80   ER/90deg 40/45 30/35*   * pain with mobility       Strength: manual muscle test grades below   Upper Extremity Strength    (R) UE (L) UE   Shoulder flexion: 4/5 4/5   Shoulder Abduction: 4/5 4/5   Shoulder IR 4/5 4/5   Shoulder ER 4/5 4/5   Elbow flexion: 4/5 4/5   Elbow extension: 4/5 4/5   Wrist flexion: 4/5 4/5   Wrist extension: 4/5 4/5    4/5 4/5            LANDMARK LUE DIFF LUE Diff LUE DIFF RIGHT UE LEFT UE DIFFERENCE   Date:  10/8/19 10/8/19  "10/1/19 10/1/19 9/24/19 9/24/19 9/17/19 9/17/19 9/17/19   E + 8" 37 cm 1 cm dec 38 cm 1 cm dec 39 cm No chg 41 cm 39 cm 2 cm   E + 6" 34 cm No chg 34 cm 1 cm dec 35 cm No chg 38 cm 35 cm 3 cm   E + 4" 31.5 cm 0.5 cm dec 32 cm 2 cm dec 34 cm No chg 36 cm 34 cm 2 cm   E + 2" 32 cm No chg 32 cm 1.5 cm dec 33.5 cm 0.5 cm dec 34 cm 34 cm 0 cm   Elbow 28.5 cm No chg 28.5 cm 0.5 cm dec 29 cm 1 cm dec 30.5 cm 30 cm 0.5 cm   W+ 8" 29.5 cm 0.5 cm dec 30 cm 1 cm dec 31 cm No chg 31.5 cm 31 cm 0.5 cm   W +  6" 29 cm No chg 29 cm 1 cm dec 30 cm No chg 30 cm 30 cm 0 cm   W + 4" 24.5 cm No chg 24.5 cm No chg 24.5 cm 1.5 cm dec 26.5 cm 26 cm 0.5 cm   Wrist 18 cm No chg 18 cm 0.5 cm dec 18.5 cm 0.5 cm dec 19 cm 19 cm 0 cm   DPC 20 cm No chg 20 cm 1.5 cm dec 21.5 cm 0.5 cm dec 22 cm 22 cm 0 cm   IP Thumb 6.5 cm No chg 6.5 cm 0.f cm dec 7 cm No chg 7 cm 7 cm 0 cm      Functional Limitations Reporting       CMS Impairment/Limitation/Restriction for FOTO Outcome Survey     Therapist reviewed FOTO scores for Jonathan Gonzales on 10/1/2019.   FOTO documents entered into [employer] - see Media section.     Limitations Score: 39%  Category: Carrying     Current : CJ = at least 40% but <40% impaired, limited or restricted  Goal: CJ = at least 20% but < 40% impaired, limited or restricted            Home Exercise Program and Patient Education   Education provided re:  - role of PT in multi - disciplinary team, goals for PT  - progress towards goals  - Self-MLD techniques    See EMR under notes/patient instructions for HEP given/taught this session - all sets and reps included. Pt received printed copy.     Pt was able to demonstrate and report understanding and performance  Pt has no cultural, educational or language barriers to learning provided.    Assessment     Patient is responding well to physical therapy.   Pain after treatment: 8/10 low back and neck    Pt prognosis is Good.Patient received manual therapy as above to decrease bilateral " shoulder joint tightness and  MLD techniques to decrease edema of LUE.Giirth of  LUE remaining decreased and appears to be stabilizing. Patient is now independent with self MLD techniques. Patient performed above exercise program. Patient continuing to wear size E tubigrip to BUE's to decrease edema. Patient given information to order tubigrip to use at home and info on the ACS and [employer]NO to see if she can get assistance to purchase her compression sleeve.  Patient instructed to continue with HEP and self-MLD daily.   Pt will continue to benefit from skilled outpatient physical therapy to address the deficits listed in the problem list chart on initial evaluation, provide pt/family education and to maximize pt's level of independence in the home and community environment.     Goals as follows:  Short Term goals: 4 weeks  1. Patient will demonstrate 100% understanding of lymphedema risk reduction practices to include self monitoring for lymphedema. (met, 9/24/19)  2. Patient will demonstrate independence with Home Exercise program established. ( met, 9/24/19)  3. Pt will increase AROM/PROM in shoulder abduction ROM to 110 degrees bilaterally to improve functional reach, carry, push, pull pain free. (progressing, not met)  4. Pt will increase AROM/PROM in shoulder flexion to 120 degrees bilaterally to improve functional reach, carry, push, pull pain free.(progressing, not met)  5. Strength will be 4+/5 in BUE's in order to perform functional activities. (progressing, not met)  6. Patient will be able to perform Self-MLD techniques to manage her lymphedema (met, 10/1/19)      Long Term Goals: 8 weeks   1.  Pt will increase AROM/PROM in shoulder flexion to 140 degrees bilaterally to improve functional reach, carry, push, pull pain free. (progressing, not met)  2. Pt will increase strength to 5/5 in gross UE musculature to improve tolerance to all functional activities pain free. (progressing, not met)  3. Pt will  demonstrate full/maximized tissue mobility to increase ROM and promote healthy tissue to be pain free at discharge. (progressing, not met)  4. Pt will report decrease in overall worst pain to 2/10 at discharge. (progressing, not met)  5. Pt will increase AROM/PROM in shoulder abduction ROM to 120 degrees bilaterally to improve functional reach, carry, push, pull pain free. (progressing, not met)  6.  Decrease in girth of BUE of 1  cm to 2 cm for better contour of upper extremities   ( met, 10/1/19)  7. Patient will be able to don/doff compression sleeves and will demonstrate understanding of wearing compression sleeves to control and to manage her lymphedema in BUE's (progressing, not met)        Plan     Continue with established POC toward physical therapy goals.    Therapist: Cheri Bui, PT  10/8/2019

## 2019-10-10 DIAGNOSIS — E11.42 DM TYPE 2 WITH DIABETIC PERIPHERAL NEUROPATHY: ICD-10-CM

## 2019-10-10 DIAGNOSIS — M54.12 CERVICAL RADICULOPATHY: ICD-10-CM

## 2019-10-10 DIAGNOSIS — M50.30 DDD (DEGENERATIVE DISC DISEASE), CERVICAL: ICD-10-CM

## 2019-10-10 DIAGNOSIS — M47.812 CERVICAL SPONDYLOSIS WITHOUT MYELOPATHY: ICD-10-CM

## 2019-10-10 RX ORDER — PREGABALIN 100 MG/1
CAPSULE ORAL
Qty: 60 CAPSULE | Refills: 0 | Status: SHIPPED | OUTPATIENT
Start: 2019-10-10 | End: 2019-10-18 | Stop reason: SDUPTHER

## 2019-10-10 RX ORDER — ZIPRASIDONE HYDROCHLORIDE 40 MG/1
CAPSULE ORAL
Qty: 30 CAPSULE | Refills: 0 | Status: SHIPPED | OUTPATIENT
Start: 2019-10-10 | End: 2019-12-16 | Stop reason: SDUPTHER

## 2019-10-15 DIAGNOSIS — Z79.4 TYPE 2 DIABETES MELLITUS WITHOUT COMPLICATION, WITH LONG-TERM CURRENT USE OF INSULIN: ICD-10-CM

## 2019-10-15 DIAGNOSIS — E11.9 TYPE 2 DIABETES MELLITUS WITHOUT COMPLICATION, WITH LONG-TERM CURRENT USE OF INSULIN: ICD-10-CM

## 2019-10-15 RX ORDER — INSULIN GLARGINE 100 [IU]/ML
INJECTION, SOLUTION SUBCUTANEOUS
Qty: 15 ML | Refills: 0 | Status: ON HOLD | OUTPATIENT
Start: 2019-10-15 | End: 2019-11-13 | Stop reason: SDUPTHER

## 2019-10-17 ENCOUNTER — CLINICAL SUPPORT (OUTPATIENT)
Dept: REHABILITATION | Facility: HOSPITAL | Age: 59
End: 2019-10-17
Payer: MEDICARE

## 2019-10-17 DIAGNOSIS — F25.0 SCHIZOAFFECTIVE DISORDER, BIPOLAR TYPE: ICD-10-CM

## 2019-10-17 DIAGNOSIS — I89.0 LYMPHEDEMA: ICD-10-CM

## 2019-10-17 DIAGNOSIS — C50.912 MALIGNANT NEOPLASM OF LEFT BREAST, STAGE 2: ICD-10-CM

## 2019-10-17 DIAGNOSIS — C50.012 MALIGNANT NEOPLASM OF NIPPLE OF LEFT BREAST IN FEMALE, ESTROGEN RECEPTOR POSITIVE: ICD-10-CM

## 2019-10-17 DIAGNOSIS — Z17.0 MALIGNANT NEOPLASM OF NIPPLE OF LEFT BREAST IN FEMALE, ESTROGEN RECEPTOR POSITIVE: ICD-10-CM

## 2019-10-17 PROCEDURE — 97140 MANUAL THERAPY 1/> REGIONS: CPT | Mod: HCNC | Performed by: PHYSICAL MEDICINE & REHABILITATION

## 2019-10-17 PROCEDURE — 97110 THERAPEUTIC EXERCISES: CPT | Mod: HCNC | Performed by: PHYSICAL MEDICINE & REHABILITATION

## 2019-10-18 DIAGNOSIS — E11.42 DM TYPE 2 WITH DIABETIC PERIPHERAL NEUROPATHY: ICD-10-CM

## 2019-10-18 DIAGNOSIS — M47.812 CERVICAL SPONDYLOSIS WITHOUT MYELOPATHY: ICD-10-CM

## 2019-10-18 DIAGNOSIS — M54.12 CERVICAL RADICULOPATHY: ICD-10-CM

## 2019-10-18 DIAGNOSIS — M50.30 DDD (DEGENERATIVE DISC DISEASE), CERVICAL: ICD-10-CM

## 2019-10-18 NOTE — TELEPHONE ENCOUNTER
----- Message from Tatiana Dutta sent at 10/18/2019 10:11 AM CDT -----  Contact: pt  Can the clinic reply in [facility]: N       Please refill the medication(s) listed below. Please call the patient when the prescription(s) is ready for  at this phone number  [phone]      Medication #1 pregabalin (LYRICA) 100 MG capsule    Medication #2       Preferred Pharmacy:      [facility] - [place], [place] - [address]  [address] [address]  Phone: [phone] Fax: [phone]

## 2019-10-21 DIAGNOSIS — E11.42 DM TYPE 2 WITH DIABETIC PERIPHERAL NEUROPATHY: Primary | ICD-10-CM

## 2019-10-21 RX ORDER — INSULIN PUMP SYRINGE, 3 ML
EACH MISCELLANEOUS
Qty: 1 EACH | Refills: 0 | Status: SHIPPED | OUTPATIENT
Start: 2019-10-21 | End: 2019-11-11 | Stop reason: SDUPTHER

## 2019-10-21 RX ORDER — PREGABALIN 100 MG/1
CAPSULE ORAL
Qty: 60 CAPSULE | Refills: 0 | Status: SHIPPED | OUTPATIENT
Start: 2019-10-21 | End: 2019-12-16 | Stop reason: SDUPTHER

## 2019-10-21 NOTE — TELEPHONE ENCOUNTER
----- Message from Meri Murillo sent at 10/21/2019 11:48 AM CDT -----  Contact: JOYA MUNOZ   Type: RX Refill Request    Who Called: JOYA MUNOZ     RX Name and Strength: Accu chex meter     Preferred Pharmacy with phone number: [facility] [address]    Best Call Back Number: [phone]    Additional Information: N/A

## 2019-10-31 ENCOUNTER — CLINICAL SUPPORT (OUTPATIENT)
Dept: REHABILITATION | Facility: HOSPITAL | Age: 59
End: 2019-10-31
Payer: MEDICARE

## 2019-10-31 DIAGNOSIS — F25.0 SCHIZOAFFECTIVE DISORDER, BIPOLAR TYPE: ICD-10-CM

## 2019-10-31 DIAGNOSIS — E11.42 DM TYPE 2 WITH DIABETIC PERIPHERAL NEUROPATHY: Primary | ICD-10-CM

## 2019-10-31 DIAGNOSIS — C50.912 MALIGNANT NEOPLASM OF LEFT BREAST, STAGE 2: ICD-10-CM

## 2019-10-31 DIAGNOSIS — I89.0 LYMPHEDEMA: ICD-10-CM

## 2019-10-31 PROCEDURE — 97140 MANUAL THERAPY 1/> REGIONS: CPT | Mod: HCNC | Performed by: PHYSICAL MEDICINE & REHABILITATION

## 2019-10-31 PROCEDURE — G8985 CARRY GOAL STATUS: HCPCS | Mod: CJ,HCNC | Performed by: PHYSICAL MEDICINE & REHABILITATION

## 2019-10-31 PROCEDURE — 97110 THERAPEUTIC EXERCISES: CPT | Mod: HCNC | Performed by: PHYSICAL MEDICINE & REHABILITATION

## 2019-10-31 PROCEDURE — G8986 CARRY D/C STATUS: HCPCS | Mod: CJ,HCNC | Performed by: PHYSICAL MEDICINE & REHABILITATION

## 2019-10-31 RX ORDER — PEN NEEDLE, DIABETIC 31 GX5/16"
1 NEEDLE, DISPOSABLE MISCELLANEOUS DAILY
Qty: 100 EACH | Refills: 2 | Status: SHIPPED | OUTPATIENT
Start: 2019-10-31 | End: 2020-01-29

## 2019-10-31 NOTE — PROGRESS NOTES
Physical Therapy Daily Treatment Note and Discharge Note     Name: Jonathan Gonzales  Clinic Number: [id number]  Diagnosis:   Encounter Diagnoses   Name Primary?    Schizoaffective disorder, bipolar type     Lymphedema     Malignant neoplasm of left breast, stage 2      Physician: Galina Landon MD    Precautions: lymphedema LUE  Visit #: 5 of 20  Time In: 11:05 AM  Time Out: 11:55 AM  Total Treatment Time 1:1: 50 minutes    Evaluation Date: 9/17/19  Visit # authorized: 20  Authorization period: 12/31/19  Plan of care Expiration: 11/15/19  MD referral: Galina Landon MD  Insurance: [employer]      Subjective     Pt reports:   has been performing HEP and her shoulders feel better. And  has been performing self -MLD daily and feels her swelling in LUE has decreased. Patient  will be able to purchase a compression sleeve from [facility] on-line once she saves the money for it. Patient states she feels comfortable with performing self-MLD and feels she is ready for discharge at this time.   Pain Scale: Jonathan rates pain on a scale of 8/10 on VAS.   Pain location: low back and neck     Fatigue: moderate  Functional change: moving her shoulders better  Diagnosis: Left breast Stage IIb T2N1MO, ER weakly (+), ER (-), HER2 amplified-diagnosed 11/2011  Chief complaint: swelling in both upper extremities. Patient she had compression sleeve for LUE and it wore out.   Surgical History:11/23/11 bilateral mastectomies with ALND and SHLOMO flap reconstruction; 1/24 LN (+); 4/27/15 second stage reconstruction  Jonathan Gonzales  has a past surgical history that includes Tubal ligation; Breast reconstruction (11/23/11); Mastectomy (Bilateral, 01/2011); and Breast reconstruction (Bilateral, 3/13/2015).     Chemotherapy: Enrolled in [facility] study and received AC/Paclitaxel and 1 year of herceptin (completed in 3/13)  Radiation: None    Objective     Jonathan received individual  therapeutic exercises to improve postural correction and alignment, stretching and soft tissue mobility, and strengthening for 20 minutes including the following:   Supine Shld Flexion with Wand     1 x 10  Butterflies                                        1 x 10  Seated shoulder Abd                      1 x 10  Scap Retractions                           1 x 10  Fist Making                                     1 x 10  Exaggerated Breathing    [place] received the following manual therapy techniques were performed to increased myofascial/soft tissue length, mobility and pliability, increase PROM, AROM and function as well as to decrease pain for 30 minutes  Grade II G-H jt mobs bilateral shoulders  Posterior capsule stretch (B) shoulders  Passive stretch all motions (B) shoulders    Short Neck  Clear Healthy Lymph Nodes:  Right Axilla                                                  Left Groin  Open Anastomoses:  Anterior Axillo-Axillary  (AAA)                                    Axillo-Inguinal     (AI)                                    Posterior Axillo-Axillary  (SHERRON)  Left Upper Arm (Lateral and Medial)  Left Antecubital Fossa  Left Dorsal Forearm  Left Volar Forearm  Left Hand    Rework Left UE  Rework Anastomoses  Rework Healthy lymph Nodes    Patient is independent with Self-MLD    Patient fitted with Size E tubigrip to BUE    Shoulder Range of Motion: 10/31/19  Active /Passive ROM Right Left   Flexion 145 130   Abduction 130 135   Extension 40 40   IR/90deg 80 80   ER/90deg 70 50   * pain with mobility       Strength: manual muscle test grades below   Upper Extremity Strength    (R) UE (L) UE   Shoulder flexion: 4/5 4/5   Shoulder Abduction: 4/5 4/5   Shoulder IR 4/5 4/5   Shoulder ER 4/5 4/5   Elbow flexion: 4/5 4/5   Elbow extension: 4/5 4/5   Wrist flexion: 4/5 4/5   Wrist extension: 4/5 4/5    4/5 4/5            LANDMARK LUE Diff LUE Diff LUE DIFF LUE Diff LUE DIFF RIGHT UE LEFT UE DIFFERENCE   Date:   "10/31/19 10/31/19 10/17/19 10/17/19 10/8/19 10/8/19 10/1/19 10/1/19 9/24/19 9/24/19 9/17/19 9/17/19 9/17/19   E + 8" 35 cm No chg 35 cm 2 cm dec 37 cm 1 cm dec 38 cm 1 cm dec 39 cm No chg 41 cm 39 cm 2 cm   E + 6" 33 cm No chg 33 cm 1 cm dec 34 cm No chg 34 cm 1 cm dec 35 cm No chg 38 cm 35 cm 3 cm   E + 4" 31.5 cm No chg 31.5 cm No chg 31.5 cm 0.5 cm dec 32 cm 2 cm dec 34 cm No chg 36 cm 34 cm 2 cm   E + 2" 31 cm No chg 31 cm 1 cm dec 32 cm No chg 32 cm 1.5 cm dec 33.5 cm 0.5 cm dec 34 cm 34 cm 0 cm   Elbow 28 cm No chg 28 cm 0.5 cm dec 28.5 cm No chg 28.5 cm 0.5 cm dec 29 cm 1 cm dec 30.5 cm 30 cm 0.5 cm   W+ 8" 29.5 cm 0.5 cm dec 30 cm 0.5 cm inc 29.5 cm 0.5 cm dec 30 cm 1 cm dec 31 cm No chg 31.5 cm 31 cm 0.5 cm   W +  6" 29 cm No chg 29 cm No chg 29 cm No chg 29 cm 1 cm dec 30 cm No chg 30 cm 30 cm 0 cm   W + 4" 24 cm No chg 24 cm 0.5 cm dec 24.5 cm No chg 24.5 cm No chg 24.5 cm 1.5 cm dec 26.5 cm 26 cm 0.5 cm   Wrist 18 cm No chg 18 cm No chg 18 cm No chg 18 cm 0.5 cm dec 18.5 cm 0.5 cm dec 19 cm 19 cm 0 cm   DPC 20 cm No chg 20 cm No chg 20 cm No chg 20 cm 1.5 cm dec 21.5 cm 0.5 cm dec 22 cm 22 cm 0 cm   IP Thumb 6.5 cm No chg 6.5 cm No chg 6.5 cm No chg 6.5 cm 0.f cm dec 7 cm No chg 7 cm 7 cm 0 cm      Functional Limitations Reporting       CMS Impairment/Limitation/Restriction for FOTO Outcome Survey     Therapist reviewed FOTO scores for Jonathan Gonzales on 10/17/2019.   FOTO documents entered into [facility]PO - see Media section.     Limitations Score: 37%  Category: Carrying     Current : CJ = at least 20% but <40% impaired, limited or restricted  Goal: CJ = at least 20% but < 40% impaired, limited or restricted  Discharge: CJ at least 20%, but < 40% impaired, limited or restricted              Home Exercise Program and Patient Education   Education provided re:  - role of PT in multi - disciplinary team, goals for PT  - progress towards goals  - Self-MLD techniques    See EMR under notes/patient " instructions for HEP given/taught this session - all sets and reps included. Pt received printed copy.     Pt was able to demonstrate and report understanding and performance  Pt has no cultural, educational or language barriers to learning provided.    Assessment     Patient has responded well to physical therapy.   Pain after treatment: 8/10 low back and neck    Pt prognosis is Good.Reassessment for discharge:  AROM has improved slightly bilateral shoulders: Left: 5 degrees with flexion; Right: 10 degrees with flexion. AROM is WFL. Strength BUE's in 4/5 range.  Patient received manual therapy as above to decrease bilateral shoulder joint tightness and  MLD techniques to decrease edema of LUE.Giirth of  LUE has remained decreased and has stabilized. Patient is now independent with self MLD techniques. Reviewed above exercise program for patient to continue on HEP and patient is independent with HEP. .  Patient continuing to wear size E tubigrip to BUE's to decrease edema. Patient given information to order tubigrip to use at home and info on the [email or web address] and [email or web address] to see if she can get assistance to purchase her compression sleeve.Patient stated she has also looked into ordering her compression sleeve on [employer]. States has to save up for it due to the cost.   Patient instructed to continue with HEP and self-MLD daily and wearing tubigrip sleeve to LUE until she is able to get her compression sleeve..  Patient met 5/6 STG and met/partially met 4/7 LTG.    Goals as follows:  Short Term goals: 4 weeks  1. Patient will demonstrate 100% understanding of lymphedema risk reduction practices to include self monitoring for lymphedema. (met, 9/24/19)  2. Patient will demonstrate independence with Home Exercise program established. ( met, 9/24/19)  3. Pt will increase AROM/PROM in shoulder abduction ROM to 110 degrees bilaterally to improve functional reach, carry, push, pull pain free. (met 10/17/19)  4. Pt will increase  AROM/PROM in shoulder flexion to 120 degrees bilaterally to improve functional reach, carry, push, pull pain free.(met, 10/17/19)  5. Strength will be 4+/5 in BUE's in order to perform functional activities. (not met)  6. Patient will be able to perform Self-MLD techniques to manage her lymphedema (met, 10/1/19)      Long Term Goals: 8 weeks   1.  Pt will increase AROM/PROM in shoulder flexion to 140 degrees bilaterally to improve functional reach, carry, push, pull pain free. (met on right, not met on left, 10/31/19)  2. Pt will increase strength to 5/5 in gross UE musculature to improve tolerance to all functional activities pain free. (not met)  3. Pt will demonstrate full/maximized tissue mobility to increase ROM and promote healthy tissue to be pain free at discharge. (met, 10/31/19)  4. Pt will report decrease in overall worst pain to 2/10 at discharge. (not met)  5. Pt will increase AROM/PROM in shoulder abduction ROM to 120 degrees bilaterally to improve functional reach, carry, push, pull pain free. (met, 10/31/19)  6.  Decrease in girth of BUE of 1  cm to 2 cm for better contour of upper extremities   ( met, 10/1/19)  7. Patient will be able to don/doff compression sleeves and will demonstrate understanding of wearing compression sleeves to control and to manage her lymphedema in BUE's (not met)        Plan     Patient is discharged from physical therapy.    Therapist: Cheri Bui, PT  10/31/2019

## 2019-10-31 NOTE — TELEPHONE ENCOUNTER
"----- Message from Tatiana Dutta sent at 10/31/2019  8:28 AM CDT -----  Contact: pt   Can the clinic reply in [facility]: n      Please refill the medication(s) listed below. Please call the patient when the prescription(s) is ready for  at this phone number  [phone]      Medication #1 BD INSULIN PEN NEEDLE UF MINI 31 gauge x 3/16" Ndle    Medication #2       Preferred Pharmacy:  [facility] - [facility] [address] [phone] (Phone)  [phone] (Fax)      "

## 2019-11-03 ENCOUNTER — HOSPITAL ENCOUNTER (EMERGENCY)
Facility: OTHER | Age: 59
Discharge: HOME | End: 2019-11-03
Attending: EMERGENCY MEDICINE
Payer: MEDICARE

## 2019-11-03 VITALS
WEIGHT: 175 LBS | TEMPERATURE: 99 F | SYSTOLIC BLOOD PRESSURE: 139 MMHG | BODY MASS INDEX: 31.01 KG/M2 | RESPIRATION RATE: 18 BRPM | OXYGEN SATURATION: 97 % | DIASTOLIC BLOOD PRESSURE: 65 MMHG | HEIGHT: 63 IN | HEART RATE: 95 BPM

## 2019-11-03 DIAGNOSIS — M54.41 ACUTE RIGHT-SIDED BACK PAIN WITH SCIATICA: Primary | ICD-10-CM

## 2019-11-03 PROCEDURE — 25000003 PHARM REV CODE 250: Mod: HCNC | Performed by: PHYSICIAN ASSISTANT

## 2019-11-03 PROCEDURE — 96372 THER/PROPH/DIAG INJ SC/IM: CPT | Mod: HCNC

## 2019-11-03 PROCEDURE — 99284 EMERGENCY DEPT VISIT MOD MDM: CPT | Mod: 25,HCNC

## 2019-11-03 PROCEDURE — 63600175 PHARM REV CODE 636 W HCPCS: Mod: HCNC | Performed by: PHYSICIAN ASSISTANT

## 2019-11-03 RX ORDER — DICLOFENAC SODIUM 10 MG/G
2 GEL TOPICAL 4 TIMES DAILY
Qty: 100 G | Refills: 0 | Status: SHIPPED | OUTPATIENT
Start: 2019-11-03 | End: 2020-03-04

## 2019-11-03 RX ORDER — ACETAMINOPHEN 325 MG/1
650 TABLET ORAL
Status: COMPLETED | OUTPATIENT
Start: 2019-11-03 | End: 2019-11-03

## 2019-11-03 RX ORDER — KETOROLAC TROMETHAMINE 30 MG/ML
15 INJECTION, SOLUTION INTRAMUSCULAR; INTRAVENOUS
Status: COMPLETED | OUTPATIENT
Start: 2019-11-03 | End: 2019-11-03

## 2019-11-03 RX ORDER — LIDOCAINE 50 MG/G
PATCH TOPICAL
Qty: 15 PATCH | Refills: 0 | Status: SHIPPED | OUTPATIENT
Start: 2019-11-03 | End: 2020-03-04

## 2019-11-03 RX ORDER — CYCLOBENZAPRINE HCL 10 MG
10 TABLET ORAL
Status: COMPLETED | OUTPATIENT
Start: 2019-11-03 | End: 2019-11-03

## 2019-11-03 RX ADMIN — CYCLOBENZAPRINE HYDROCHLORIDE 10 MG: 10 TABLET, FILM COATED ORAL at 10:11

## 2019-11-03 RX ADMIN — ACETAMINOPHEN 650 MG: 325 TABLET, FILM COATED ORAL at 10:11

## 2019-11-03 RX ADMIN — KETOROLAC TROMETHAMINE 15 MG: 30 INJECTION, SOLUTION INTRAMUSCULAR; INTRAVENOUS at 10:11

## 2019-11-03 NOTE — ED PROVIDER NOTES
Encounter Date: 11/3/2019       History     Chief Complaint   Patient presents with    Back Pain     back pain that radiates down R leg x 2 days     Patient is a 59-year-old female with type 2 diabetes, obesity, bipolar disorder, history of breast cancer presents to the emergency department for back pain. Patient has history of lumbar back pain with sciatica, has been evaluated by [facility] and received spinal injections.  Patient is reporting right lower back pain that radiates into her right hip and behind her right upper leg for the past 3-4 days.  She denies injury. She has been applying Patrice-Nina and resting with minimal relief.  She denies saddle anesthesia or bowel/bladder incontinence.  She rates her pain a 10/10.    The history is provided by the patient.     Review of patient's allergies indicates:  No Known Allergies  Past Medical History:   Diagnosis Date    Bipolar disorder     Cancer of female breast 10/2010    T2 N1 Mx, Stage IIB left breast cancer    Cancer of upper-outer quadrant of female breast 7/9/2012    Depression     Diabetes mellitus type II     Disturbances of sensation of smell and taste 6/29/2012    Hypertension     Malignant neoplasm of breast (female), unspecified site 4/27/2015    Neuropathy     Nonspecific abnormal unspecified cardiovascular function study 4/12/2013    ECG READ AS SHOWING A T-WAVE ABNORMALITY     Post-mastectomy lymphedema syndrome     Schizophrenia      Past Surgical History:   Procedure Laterality Date    BREAST RECONSTRUCTION  11/23/11    B/L SHLOMO flap reconstruction S/P left MRM, right prophylactic mastectomy    BREAST RECONSTRUCTION Bilateral 3/13/2015    NIPPLE RECONSTRUCTION    MASTECTOMY Bilateral 01/2011    bilateral    TUBAL LIGATION       Family History   Problem Relation Age of Onset    Kidney disease Mother         Dialysis    Hypertension Mother     Diabetes Mother     Deep vein thrombosis Mother     Glaucoma Mother     Diabetic  kidney disease Sister     Lung cancer Sister     Diabetes Sister     Diabetes Brother     Diabetes Son     No Known Problems Father     No Known Problems Maternal Grandmother     No Known Problems Maternal Grandfather     No Known Problems Paternal Grandmother     No Known Problems Paternal Grandfather     No Known Problems Son     Cervical cancer Daughter     No Known Problems Daughter     No Known Problems Daughter     No Known Problems Daughter     Breast cancer Neg Hx     Ovarian cancer Neg Hx      Social History     Tobacco Use    Smoking status: Current Every Day Smoker     Packs/day: 0.25     Years: 25.00     Pack years: 6.25     Types: Cigarettes    Smokeless tobacco: Never Used    Tobacco comment: currently at less than 1/2 pack pd   Substance Use Topics    Alcohol use: No     Alcohol/week: 0.0 standard drinks    Drug use: No     Comment: 1991 - stopped using crack cocaine     Review of Systems   Constitutional: Negative for chills and fever.   Respiratory: Negative for shortness of breath.    Cardiovascular: Negative for chest pain.   Gastrointestinal: Negative for nausea and vomiting.   Genitourinary: Negative for difficulty urinating.   Musculoskeletal: Positive for back pain and myalgias.   Skin: Negative for rash.   Neurological: Negative for weakness and numbness.       Physical Exam     Initial Vitals [11/03/19 0938]   BP Pulse Resp Temp SpO2   139/65 95 18 98.8 °F (37.1 °C) 97 %      MAP       --         Physical Exam    Constitutional: Vital signs are normal. She is Obese . She is cooperative. No distress.   HENT:   Head: Normocephalic and atraumatic.   Eyes: Conjunctivae and EOM are normal.   Neck: Normal range of motion. Neck supple.   Cardiovascular: Normal rate, regular rhythm and intact distal pulses.   Pulmonary/Chest: No respiratory distress.   Musculoskeletal:   No CTL midline tenderness, crepitus, masses or step-offs.  No pain overlying the right hip.  Pain reproduced  to the posterior right thigh with internal and external rotation of the hip.  Negative straight leg raise on the right but pain is reproduced to the posterior upper leg with this.    Neurological: She is alert and oriented to person, place, and time. She has normal strength. GCS eye subscore is 4. GCS verbal subscore is 5. GCS motor subscore is 6.   Normal sensation to light touch to the bilateral lower extremities.   Skin: Skin is warm and dry. No rash noted.         ED Course   Procedures  Labs Reviewed - No data to display       Imaging Results    None          Medical Decision Making:   Initial Assessment:   Urgent evaluation of a 59 y.o. female presenting to the emergency department complaining of lower back pain radiating into right upper leg and right hip. Patient is afebrile, nontoxic appearing and hemodynamically stable.  The patient's back pain is likely flare up of sciatic nerve pain.  There are no signs of saddle anesthesia, incontinence, neurologic deficits, fevers, trauma or midline tenderness on history or physical to suggest cauda equina, infectious process, fracture or subluxation.  I will treat with anti-inflammatories and muscle relaxers for relief.                        Clinical Impression:     1. Acute right-sided back pain with sciatica                               Brandan Mabry PA-C  11/03/19 0504

## 2019-11-03 NOTE — ED NOTES
Pt reporting R buttock pain that radiates to R lateral hip and R posterior thigh x 2 days; denies specific injury or falls. Pt AAOx4 and appropriate at this time. Respirations even and unlabored. No acute distress noted. Pt ambulatory with assistance. No swelling or deformities noted.

## 2019-11-05 DIAGNOSIS — M47.812 CERVICAL SPONDYLOSIS WITHOUT MYELOPATHY: ICD-10-CM

## 2019-11-05 DIAGNOSIS — M54.12 CERVICAL RADICULOPATHY: ICD-10-CM

## 2019-11-05 DIAGNOSIS — M50.30 DDD (DEGENERATIVE DISC DISEASE), CERVICAL: Primary | ICD-10-CM

## 2019-11-06 ENCOUNTER — CLINICAL SUPPORT (OUTPATIENT)
Dept: SMOKING CESSATION | Facility: CLINIC | Age: 59
End: 2019-11-06
Payer: COMMERCIAL

## 2019-11-06 DIAGNOSIS — F17.200 NICOTINE DEPENDENCE: Primary | ICD-10-CM

## 2019-11-06 PROCEDURE — 99407 BEHAV CHNG SMOKING > 10 MIN: CPT | Mod: S$GLB,,, | Performed by: INTERNAL MEDICINE

## 2019-11-06 PROCEDURE — 99407 PR TOBACCO USE CESSATION INTENSIVE >10 MINUTES: ICD-10-PCS | Mod: S$GLB,,, | Performed by: INTERNAL MEDICINE

## 2019-11-06 RX ORDER — NAPROXEN 500 MG/1
500 TABLET ORAL 2 TIMES DAILY WITH MEALS
Qty: 28 TABLET | Refills: 0 | Status: ON HOLD | OUTPATIENT
Start: 2019-11-06 | End: 2019-11-12 | Stop reason: CLARIF

## 2019-11-06 NOTE — TELEPHONE ENCOUNTER
----- Message from Pao Sheth sent at 11/5/2019  3:36 PM CST -----  Contact: Self   Type: RX Refill Request    Who Called:  Self   Have you contacted your pharmacy: Yes  Refill or New Rx: Refill   RX Name and Strength: Naproxen   How is the patient currently taking it? (ex. 1XDay):  Is this a 30 day or 90 day RX: 30    Preferred Pharmacy with phone number:       [facility] - [facility] [address]  [address] [facility] [address] [address]  Phone: [phone] Fax: [phone]      Local or Mail Order: local     Ordering Provider: Ben     Would the patient rather a call back or a response via [facility]?  Call   Best Call Back Number: [phone]    Additional Information:

## 2019-11-06 NOTE — TELEPHONE ENCOUNTER
Notified patient of rx refill. Made appt. with Dr. Rosario for hip pain as Dr. Contreras had no availability in the next two weeks. She reported she was still having issues after hospital visit.

## 2019-11-06 NOTE — PROGRESS NOTES
Spoke with patient today in regard to smoking cessation progress for 3/6 month telephone follow up, she states not tobacco free. Patient has scheduled an appointment to return to the program for quit attempt #2 on 11/19/2019 @ 10:00 am. She states smoking about 1 ppd of cigarettes. Informed patient of benefit period, future follow ups, and contact information if any further help or support is needed. Will resolve episode and complete smart form for Quit attempt #1.

## 2019-11-10 RX ORDER — LIRAGLUTIDE 6 MG/ML
INJECTION SUBCUTANEOUS
Qty: 27 ML | Refills: 0 | Status: SHIPPED | OUTPATIENT
Start: 2019-11-10 | End: 2019-12-24

## 2019-11-11 ENCOUNTER — OFFICE VISIT (OUTPATIENT)
Dept: INTERNAL MEDICINE | Facility: CLINIC | Age: 59
End: 2019-11-11
Payer: MEDICARE

## 2019-11-11 ENCOUNTER — HOSPITAL ENCOUNTER (OUTPATIENT)
Facility: OTHER | Age: 59
Discharge: NURSING, REHAB OR LONG-TERM CARE | End: 2019-11-14
Attending: EMERGENCY MEDICINE | Admitting: HOSPITALIST
Payer: MEDICARE

## 2019-11-11 VITALS
WEIGHT: 188.94 LBS | SYSTOLIC BLOOD PRESSURE: 100 MMHG | BODY MASS INDEX: 33.48 KG/M2 | OXYGEN SATURATION: 96 % | HEART RATE: 94 BPM | HEIGHT: 63 IN | DIASTOLIC BLOOD PRESSURE: 80 MMHG

## 2019-11-11 DIAGNOSIS — E11.9 TYPE 2 DIABETES MELLITUS WITHOUT COMPLICATION, WITH LONG-TERM CURRENT USE OF INSULIN: ICD-10-CM

## 2019-11-11 DIAGNOSIS — R47.81 SLURRED SPEECH: ICD-10-CM

## 2019-11-11 DIAGNOSIS — R47.1 DYSARTHRIA: ICD-10-CM

## 2019-11-11 DIAGNOSIS — R29.90 STROKE-LIKE SYMPTOMS: Primary | ICD-10-CM

## 2019-11-11 DIAGNOSIS — I63.9 STROKE: ICD-10-CM

## 2019-11-11 DIAGNOSIS — I10 HYPERTENSION: ICD-10-CM

## 2019-11-11 DIAGNOSIS — E87.6 HYPOPOTASSEMIA: ICD-10-CM

## 2019-11-11 DIAGNOSIS — I10 HTN (HYPERTENSION), BENIGN: ICD-10-CM

## 2019-11-11 DIAGNOSIS — G89.29 CHRONIC BILATERAL LOW BACK PAIN WITH BILATERAL SCIATICA: ICD-10-CM

## 2019-11-11 DIAGNOSIS — Z72.0 TOBACCO ABUSE: ICD-10-CM

## 2019-11-11 DIAGNOSIS — M25.551 PAIN OF RIGHT HIP JOINT: Primary | ICD-10-CM

## 2019-11-11 DIAGNOSIS — I63.9 CEREBROVASCULAR ACCIDENT (CVA), UNSPECIFIED MECHANISM: ICD-10-CM

## 2019-11-11 DIAGNOSIS — I97.2 POST-MASTECTOMY LYMPHEDEMA SYNDROME: ICD-10-CM

## 2019-11-11 DIAGNOSIS — M54.42 CHRONIC BILATERAL LOW BACK PAIN WITH BILATERAL SCIATICA: ICD-10-CM

## 2019-11-11 DIAGNOSIS — M54.12 CERVICAL RADICULOPATHY: Chronic | ICD-10-CM

## 2019-11-11 DIAGNOSIS — I10 ESSENTIAL HYPERTENSION: ICD-10-CM

## 2019-11-11 DIAGNOSIS — F17.210 MODERATE SMOKER (20 OR LESS PER DAY): ICD-10-CM

## 2019-11-11 DIAGNOSIS — G62.9 PERIPHERAL POLYNEUROPATHY: ICD-10-CM

## 2019-11-11 DIAGNOSIS — N18.30 CKD (CHRONIC KIDNEY DISEASE) STAGE 3, GFR 30-59 ML/MIN: ICD-10-CM

## 2019-11-11 DIAGNOSIS — M54.41 CHRONIC BILATERAL LOW BACK PAIN WITH BILATERAL SCIATICA: ICD-10-CM

## 2019-11-11 DIAGNOSIS — Z79.4 TYPE 2 DIABETES MELLITUS WITHOUT COMPLICATION, WITH LONG-TERM CURRENT USE OF INSULIN: ICD-10-CM

## 2019-11-11 PROBLEM — M25.559 HIP PAIN: Status: ACTIVE | Noted: 2019-11-11

## 2019-11-11 LAB
ALBUMIN SERPL BCP-MCNC: 3.2 G/DL (ref 3.5–5.2)
ALP SERPL-CCNC: 55 U/L (ref 55–135)
ALT SERPL W/O P-5'-P-CCNC: 27 U/L (ref 10–44)
ANION GAP SERPL CALC-SCNC: 10 MMOL/L (ref 8–16)
ANISOCYTOSIS BLD QL SMEAR: SLIGHT
AST SERPL-CCNC: 29 U/L (ref 10–40)
BASOPHILS # BLD AUTO: ABNORMAL K/UL (ref 0–0.2)
BASOPHILS NFR BLD: 0 % (ref 0–1.9)
BILIRUB SERPL-MCNC: 0.2 MG/DL (ref 0.1–1)
BILIRUB UR QL STRIP: NEGATIVE
BUN SERPL-MCNC: 17 MG/DL (ref 6–20)
CALCIUM SERPL-MCNC: 8.9 MG/DL (ref 8.7–10.5)
CHLORIDE SERPL-SCNC: 103 MMOL/L (ref 95–110)
CLARITY UR: ABNORMAL
CO2 SERPL-SCNC: 28 MMOL/L (ref 23–29)
COLOR UR: YELLOW
CREAT SERPL-MCNC: 1.3 MG/DL (ref 0.5–1.4)
DIFFERENTIAL METHOD: ABNORMAL
EOSINOPHIL # BLD AUTO: ABNORMAL K/UL (ref 0–0.5)
EOSINOPHIL NFR BLD: 1 % (ref 0–8)
ERYTHROCYTE [DISTWIDTH] IN BLOOD BY AUTOMATED COUNT: 12.9 % (ref 11.5–14.5)
EST. GFR  (AFRICAN AMERICAN): 52 ML/MIN/1.73 M^2
EST. GFR  (NON AFRICAN AMERICAN): 45 ML/MIN/1.73 M^2
GIANT PLATELETS BLD QL SMEAR: PRESENT
GLUCOSE SERPL-MCNC: 80 MG/DL (ref 70–110)
GLUCOSE UR QL STRIP: NEGATIVE
HCT VFR BLD AUTO: 36.6 % (ref 37–48.5)
HGB BLD-MCNC: 12 G/DL (ref 12–16)
HGB UR QL STRIP: NEGATIVE
IMM GRANULOCYTES # BLD AUTO: ABNORMAL K/UL (ref 0–0.04)
IMM GRANULOCYTES NFR BLD AUTO: ABNORMAL %
KETONES UR QL STRIP: NEGATIVE
LEUKOCYTE ESTERASE UR QL STRIP: NEGATIVE
LYMPHOCYTES # BLD AUTO: ABNORMAL K/UL (ref 1–4.8)
LYMPHOCYTES NFR BLD: 56 % (ref 18–48)
MCH RBC QN AUTO: 29.4 PG (ref 27–31)
MCHC RBC AUTO-ENTMCNC: 32.8 G/DL (ref 32–36)
MCV RBC AUTO: 90 FL (ref 82–98)
MONOCYTES # BLD AUTO: ABNORMAL K/UL (ref 0.3–1)
MONOCYTES NFR BLD: 11 % (ref 4–15)
NEUTROPHILS NFR BLD: 32 % (ref 38–73)
NITRITE UR QL STRIP: NEGATIVE
NRBC BLD-RTO: 0 /100 WBC
PH UR STRIP: 6 [PH] (ref 5–8)
PLATELET # BLD AUTO: 308 K/UL (ref 150–350)
PMV BLD AUTO: 10 FL (ref 9.2–12.9)
POCT GLUCOSE: 75 MG/DL (ref 70–110)
POTASSIUM SERPL-SCNC: 3.9 MMOL/L (ref 3.5–5.1)
PROT SERPL-MCNC: 7.4 G/DL (ref 6–8.4)
PROT UR QL STRIP: NEGATIVE
RBC # BLD AUTO: 4.08 M/UL (ref 4–5.4)
SODIUM SERPL-SCNC: 141 MMOL/L (ref 136–145)
SP GR UR STRIP: 1.01 (ref 1–1.03)
URN SPEC COLLECT METH UR: ABNORMAL
UROBILINOGEN UR STRIP-ACNC: NEGATIVE EU/DL
WBC # BLD AUTO: 5.96 K/UL (ref 3.9–12.7)

## 2019-11-11 PROCEDURE — 3079F DIAST BP 80-89 MM HG: CPT | Mod: HCNC,CPTII,S$GLB, | Performed by: INTERNAL MEDICINE

## 2019-11-11 PROCEDURE — 25500020 PHARM REV CODE 255: Mod: HCNC | Performed by: HOSPITALIST

## 2019-11-11 PROCEDURE — 99406 BEHAV CHNG SMOKING 3-10 MIN: CPT | Mod: HCNC,S$GLB,, | Performed by: INTERNAL MEDICINE

## 2019-11-11 PROCEDURE — G0378 HOSPITAL OBSERVATION PER HR: HCPCS | Mod: HCNC

## 2019-11-11 PROCEDURE — 85027 COMPLETE CBC AUTOMATED: CPT | Mod: HCNC

## 2019-11-11 PROCEDURE — 93010 EKG 12-LEAD: ICD-10-PCS | Mod: HCNC,,, | Performed by: INTERNAL MEDICINE

## 2019-11-11 PROCEDURE — 99215 OFFICE O/P EST HI 40 MIN: CPT | Mod: 25,HCNC,S$GLB, | Performed by: INTERNAL MEDICINE

## 2019-11-11 PROCEDURE — 99215 PR OFFICE/OUTPT VISIT, EST, LEVL V, 40-54 MIN: ICD-10-PCS | Mod: 25,HCNC,S$GLB, | Performed by: INTERNAL MEDICINE

## 2019-11-11 PROCEDURE — 3008F PR BODY MASS INDEX (BMI) DOCUMENTED: ICD-10-PCS | Mod: HCNC,CPTII,S$GLB, | Performed by: INTERNAL MEDICINE

## 2019-11-11 PROCEDURE — 80053 COMPREHEN METABOLIC PANEL: CPT | Mod: HCNC

## 2019-11-11 PROCEDURE — 99499 UNLISTED E&M SERVICE: CPT | Mod: HCNC,S$GLB,, | Performed by: INTERNAL MEDICINE

## 2019-11-11 PROCEDURE — 36415 COLL VENOUS BLD VENIPUNCTURE: CPT | Mod: HCNC

## 2019-11-11 PROCEDURE — 99285 EMERGENCY DEPT VISIT HI MDM: CPT | Mod: 25,HCNC

## 2019-11-11 PROCEDURE — 93005 ELECTROCARDIOGRAM TRACING: CPT | Mod: HCNC

## 2019-11-11 PROCEDURE — 96372 THER/PROPH/DIAG INJ SC/IM: CPT | Mod: 59 | Performed by: EMERGENCY MEDICINE

## 2019-11-11 PROCEDURE — 83036 HEMOGLOBIN GLYCOSYLATED A1C: CPT | Mod: HCNC

## 2019-11-11 PROCEDURE — 85007 BL SMEAR W/DIFF WBC COUNT: CPT | Mod: HCNC

## 2019-11-11 PROCEDURE — 3074F PR MOST RECENT SYSTOLIC BLOOD PRESSURE < 130 MM HG: ICD-10-PCS | Mod: HCNC,CPTII,S$GLB, | Performed by: INTERNAL MEDICINE

## 2019-11-11 PROCEDURE — 63600175 PHARM REV CODE 636 W HCPCS: Mod: HCNC | Performed by: NURSE PRACTITIONER

## 2019-11-11 PROCEDURE — 3079F PR MOST RECENT DIASTOLIC BLOOD PRESSURE 80-89 MM HG: ICD-10-PCS | Mod: HCNC,CPTII,S$GLB, | Performed by: INTERNAL MEDICINE

## 2019-11-11 PROCEDURE — 3074F SYST BP LT 130 MM HG: CPT | Mod: HCNC,CPTII,S$GLB, | Performed by: INTERNAL MEDICINE

## 2019-11-11 PROCEDURE — 99499 RISK ADDL DX/OHS AUDIT: ICD-10-PCS | Mod: HCNC,S$GLB,, | Performed by: INTERNAL MEDICINE

## 2019-11-11 PROCEDURE — 93010 ELECTROCARDIOGRAM REPORT: CPT | Mod: HCNC,,, | Performed by: INTERNAL MEDICINE

## 2019-11-11 PROCEDURE — 99999 PR PBB SHADOW E&M-EST. PATIENT-LVL III: ICD-10-PCS | Mod: PBBFAC,HCNC,, | Performed by: INTERNAL MEDICINE

## 2019-11-11 PROCEDURE — A9585 GADOBUTROL INJECTION: HCPCS | Mod: HCNC | Performed by: HOSPITALIST

## 2019-11-11 PROCEDURE — 81003 URINALYSIS AUTO W/O SCOPE: CPT | Mod: HCNC

## 2019-11-11 PROCEDURE — 25000003 PHARM REV CODE 250: Mod: HCNC | Performed by: NURSE PRACTITIONER

## 2019-11-11 PROCEDURE — 99999 PR PBB SHADOW E&M-EST. PATIENT-LVL III: CPT | Mod: PBBFAC,HCNC,, | Performed by: INTERNAL MEDICINE

## 2019-11-11 PROCEDURE — 99406 PR TOBACCO USE CESSATION INTERMEDIATE 3-10 MINUTES: ICD-10-PCS | Mod: HCNC,S$GLB,, | Performed by: INTERNAL MEDICINE

## 2019-11-11 PROCEDURE — 3008F BODY MASS INDEX DOCD: CPT | Mod: HCNC,CPTII,S$GLB, | Performed by: INTERNAL MEDICINE

## 2019-11-11 RX ORDER — ATORVASTATIN CALCIUM 20 MG/1
40 TABLET, FILM COATED ORAL DAILY
Status: DISCONTINUED | OUTPATIENT
Start: 2019-11-12 | End: 2019-11-12

## 2019-11-11 RX ORDER — DIVALPROEX SODIUM 250 MG/1
250 TABLET, DELAYED RELEASE ORAL 2 TIMES DAILY
Status: DISCONTINUED | OUTPATIENT
Start: 2019-11-11 | End: 2019-11-14 | Stop reason: HOSPADM

## 2019-11-11 RX ORDER — INSULIN ASPART 100 [IU]/ML
1-10 INJECTION, SOLUTION INTRAVENOUS; SUBCUTANEOUS
Status: DISCONTINUED | OUTPATIENT
Start: 2019-11-11 | End: 2019-11-14 | Stop reason: HOSPADM

## 2019-11-11 RX ORDER — QUETIAPINE FUMARATE 100 MG/1
100 TABLET, FILM COATED ORAL NIGHTLY
Status: DISCONTINUED | OUTPATIENT
Start: 2019-11-11 | End: 2019-11-14 | Stop reason: HOSPADM

## 2019-11-11 RX ORDER — SODIUM CHLORIDE 0.9 % (FLUSH) 0.9 %
10 SYRINGE (ML) INJECTION
Status: DISCONTINUED | OUTPATIENT
Start: 2019-11-11 | End: 2019-11-14 | Stop reason: HOSPADM

## 2019-11-11 RX ORDER — CYCLOBENZAPRINE HCL 5 MG
5 TABLET ORAL 3 TIMES DAILY PRN
Qty: 30 TABLET | Refills: 0 | Status: CANCELLED | OUTPATIENT
Start: 2019-11-11 | End: 2019-11-21

## 2019-11-11 RX ORDER — LABETALOL HYDROCHLORIDE 5 MG/ML
10 INJECTION, SOLUTION INTRAVENOUS
Status: DISCONTINUED | OUTPATIENT
Start: 2019-11-11 | End: 2019-11-14 | Stop reason: HOSPADM

## 2019-11-11 RX ORDER — OXYBUTYNIN CHLORIDE 5 MG/1
5 TABLET, EXTENDED RELEASE ORAL DAILY
Status: DISCONTINUED | OUTPATIENT
Start: 2019-11-12 | End: 2019-11-14 | Stop reason: HOSPADM

## 2019-11-11 RX ORDER — ASPIRIN 81 MG/1
81 TABLET ORAL DAILY
Status: DISCONTINUED | OUTPATIENT
Start: 2019-11-12 | End: 2019-11-14 | Stop reason: HOSPADM

## 2019-11-11 RX ORDER — ENOXAPARIN SODIUM 100 MG/ML
40 INJECTION SUBCUTANEOUS EVERY 24 HOURS
Status: DISCONTINUED | OUTPATIENT
Start: 2019-11-11 | End: 2019-11-14 | Stop reason: HOSPADM

## 2019-11-11 RX ORDER — GLUCAGON 1 MG
1 KIT INJECTION
Status: DISCONTINUED | OUTPATIENT
Start: 2019-11-11 | End: 2019-11-14 | Stop reason: HOSPADM

## 2019-11-11 RX ORDER — IBUPROFEN 200 MG
24 TABLET ORAL
Status: DISCONTINUED | OUTPATIENT
Start: 2019-11-11 | End: 2019-11-14 | Stop reason: HOSPADM

## 2019-11-11 RX ORDER — IBUPROFEN 200 MG
16 TABLET ORAL
Status: DISCONTINUED | OUTPATIENT
Start: 2019-11-11 | End: 2019-11-14 | Stop reason: HOSPADM

## 2019-11-11 RX ORDER — METHYLPREDNISOLONE 4 MG/1
TABLET ORAL
Qty: 1 PACKAGE | Refills: 0 | Status: SHIPPED | OUTPATIENT
Start: 2019-11-11 | End: 2019-11-11

## 2019-11-11 RX ORDER — GADOBUTROL 604.72 MG/ML
9 INJECTION INTRAVENOUS
Status: COMPLETED | OUTPATIENT
Start: 2019-11-11 | End: 2019-11-11

## 2019-11-11 RX ORDER — ONDANSETRON 8 MG/1
8 TABLET, ORALLY DISINTEGRATING ORAL EVERY 8 HOURS PRN
Status: DISCONTINUED | OUTPATIENT
Start: 2019-11-11 | End: 2019-11-14 | Stop reason: HOSPADM

## 2019-11-11 RX ADMIN — DIVALPROEX SODIUM 250 MG: 250 TABLET, DELAYED RELEASE ORAL at 09:11

## 2019-11-11 RX ADMIN — PREGABALIN 100 MG: 75 CAPSULE ORAL at 09:11

## 2019-11-11 RX ADMIN — ENOXAPARIN SODIUM 40 MG: 100 INJECTION SUBCUTANEOUS at 09:11

## 2019-11-11 RX ADMIN — GADOBUTROL 9 ML: 604.72 INJECTION INTRAVENOUS at 07:11

## 2019-11-11 RX ADMIN — QUETIAPINE FUMARATE 100 MG: 100 TABLET ORAL at 09:11

## 2019-11-11 NOTE — PATIENT INSTRUCTIONS
Please do not take over-the-counter NSAIDs such as Aleve, ibuprofen, naproxen, Motrin, goodies powder.  Tylenol OK.

## 2019-11-11 NOTE — PLAN OF CARE
Problem: Pain (Stroke, Ischemic/Transient Ischemic Attack)  Goal: Acceptable Pain Control  Outcome: Ongoing, Progressing  Intervention: Monitor and Manage Pain  Flowsheets (Taken 11/11/2019 1731)  Pain Management Interventions: position adjusted; pain management plan reviewed with patient/caregiver; medication offered but refused     Problem: Fall Injury Risk  Goal: Absence of Fall and Fall-Related Injury  Outcome: Ongoing, Progressing  Intervention: Identify and Manage Contributors to Fall Injury Risk  Flowsheets (Taken 11/11/2019 1731)  Self-Care Promotion: safe use of adaptive equipment encouraged; BADL personal objects within reach; BADL personal routines maintained  Medication Review/Management: medications reviewed  Intervention: Promote Injury-Free Environment  Flowsheets (Taken 11/11/2019 1731)  Safety Promotion/Fall Prevention: bed alarm set; toileting scheduled; medications reviewed; lighting adjusted; Fall Risk reviewed with patient/family; side rails raised x 3  Environmental Safety Modification: assistive device/personal items within reach; clutter free environment maintained; lighting adjusted; mobility aid in reach; room organization consistent     Problem: Diabetes Comorbidity  Goal: Blood Glucose Level Within Desired Range  Outcome: Ongoing, Progressing  Intervention: Maintain Glycemic Control  Flowsheets (Taken 11/11/2019 1731)  Glycemic Management: blood glucose monitoring; supplemental insulin given

## 2019-11-11 NOTE — ED PROVIDER NOTES
"Encounter Date: 11/11/2019    SCRIBE #1 NOTE: I, Nichole Liz, am scribing for, and in the presence of, Dr. Colindres.       History     Chief Complaint   Patient presents with    Aphasia     pt was in the internal med clinic for follow up from ED visit for right hip pain. pt report she noted her speech became thick about 2-3 days ago and pt report increased weakness. upon evaluation at triage both upper extremeties weak, speech thick, finger to nose test equal bilaterally. sensation on the right side of face and shoulder different.       Time seen by provider: 12:30 PM    This is a 59 y.o. female with hx of HTN, DM, breast cancer who presents with complaint of slurred speech that began two to three days ago. Daughter initially reported to pt that her speech was slurred. Pt states "I cannot pronounce the words I'm trying to say." She was following up with her PCP today after being seen here one week ago with hip and back pain, was told she may have had a "mild stroke," and was sent here for further evaluation. She reports episode of chest pain over the weekend and difficulty ambulating secondary to right hip pain, but denies right leg weakness. She denies change in vision, hearing loss, or difficulty understanding speech. She reports use of tobacco (0.5 PPD) and denies use of alcohol or illicit drugs.    The history is provided by the patient and medical records.     Review of patient's allergies indicates:  No Known Allergies  Past Medical History:   Diagnosis Date    Bipolar disorder     Cancer of female breast 10/2010    T2 N1 Mx, Stage IIB left breast cancer    Cancer of upper-outer quadrant of female breast 7/9/2012    Depression     Diabetes mellitus type II     Disturbances of sensation of smell and taste 6/29/2012    Hypertension     Malignant neoplasm of breast (female), unspecified site 4/27/2015    Neuropathy     Nonspecific abnormal unspecified cardiovascular function study 4/12/2013    ECG READ " AS SHOWING A T-WAVE ABNORMALITY     Post-mastectomy lymphedema syndrome     Schizophrenia      Past Surgical History:   Procedure Laterality Date    BREAST RECONSTRUCTION  11/23/11    B/L SHLOMO flap reconstruction S/P left MRM, right prophylactic mastectomy    BREAST RECONSTRUCTION Bilateral 3/13/2015    NIPPLE RECONSTRUCTION    MASTECTOMY Bilateral 01/2011    bilateral    TUBAL LIGATION       Family History   Problem Relation Age of Onset    Kidney disease Mother         Dialysis    Hypertension Mother     Diabetes Mother     Deep vein thrombosis Mother     Glaucoma Mother     Diabetic kidney disease Sister     Lung cancer Sister     Diabetes Sister     Diabetes Brother     Diabetes Son     No Known Problems Father     No Known Problems Maternal Grandmother     No Known Problems Maternal Grandfather     No Known Problems Paternal Grandmother     No Known Problems Paternal Grandfather     No Known Problems Son     Cervical cancer Daughter     No Known Problems Daughter     No Known Problems Daughter     No Known Problems Daughter     Breast cancer Neg Hx     Ovarian cancer Neg Hx      Social History     Tobacco Use    Smoking status: Current Every Day Smoker     Packs/day: 0.25     Years: 25.00     Pack years: 6.25     Types: Cigarettes    Smokeless tobacco: Never Used    Tobacco comment: currently at less than 1/2 pack pd   Substance Use Topics    Alcohol use: No     Alcohol/week: 0.0 standard drinks    Drug use: No     Comment: 1991 - stopped using crack cocaine     Review of Systems   Constitutional: Negative for fever.   HENT: Negative for sore throat.    Respiratory: Negative for shortness of breath.    Cardiovascular: Positive for chest pain.   Gastrointestinal: Negative for nausea.   Genitourinary: Negative for dysuria.   Musculoskeletal: Positive for arthralgias (right hip) and gait problem. Negative for back pain.   Skin: Negative for rash.   Neurological: Positive for speech  difficulty. Negative for weakness and numbness.   Hematological: Does not bruise/bleed easily.       Physical Exam     Initial Vitals [11/11/19 1213]   BP Pulse Resp Temp SpO2   110/67 80 18 98.5 °F (36.9 °C) 96 %      MAP       --         Physical Exam    Nursing note and vitals reviewed.  Constitutional: She appears well-developed and well-nourished. She is not diaphoretic. She is Obese . No distress.   HENT:   Head: Normocephalic and atraumatic.   Mouth/Throat: Mucous membranes are normal.   Eyes: EOM are normal. Pupils are equal, round, and reactive to light.   No pallor or icterus.   Neck: Normal range of motion. Neck supple.   Cardiovascular: Normal rate, regular rhythm and normal heart sounds. Exam reveals no gallop and no friction rub.    No murmur heard.  Pulmonary/Chest: Breath sounds normal. No respiratory distress. She has no wheezes. She has no rhonchi. She has no rales.   Musculoskeletal: She exhibits no tenderness.   Lymphedema stockings bilateral upper extremities secondary to mastectomies. Pain in right hip which limits ROM of leg.   Neurological: She is alert and oriented to person, place, and time. GCS score is 15. GCS eye subscore is 4. GCS verbal subscore is 5. GCS motor subscore is 6.   Mild slurred speech with no expressive aphasia. Slight droop of left mouth, but normal facial sensation. Pain in right hip which limits strength of leg, but otherwise normal strength and sensation of extremities.   Skin: Skin is warm and dry.         ED Course   Procedures  Labs Reviewed   CBC W/ AUTO DIFFERENTIAL - Abnormal; Notable for the following components:       Result Value    Hematocrit 36.6 (*)     Gran% 32.0 (*)     Lymph% 56.0 (*)     All other components within normal limits    Narrative:     Recoll. [id number] by [email or web address] at 11/11/2019 13:44, reason: Specimen   clotted notified Radha Hung RN in ED   COMPREHENSIVE METABOLIC PANEL - Abnormal; Notable for the following components:    Albumin 3.2 (*)      eGFR if  52 (*)     eGFR if non  45 (*)     All other components within normal limits    Narrative:     Recoll. [id number] by [email or web address] at 11/11/2019 13:49, reason: Specimen   hemolyzed called to Elda Ayers RN. in ER. Phlebotomist   requested in ER.     EKG Readings: (Independently Interpreted)   Sinus rhythm at rate of 75. No ischemia or arrhythmia.     ECG Results          EKG 12-lead (Final result)  Result time 11/11/19 14:30:58    Final result by Interface, Lab In [facility] (11/11/19 14:30:58)                 Narrative:    Test Reason : I10,    Vent. Rate : 076 BPM     Atrial Rate : 076 BPM     P-R Int : 144 ms          QRS Dur : 078 ms      QT Int : 392 ms       P-R-T Axes : 090 028 031 degrees     QTc Int : 441 ms    Normal sinus rhythm  Low voltage QRS  Nonspecific T wave abnormality  Abnormal ECG    Confirmed by Eloise Cali MD ([id number]) on 11/11/2019 2:30:46 PM    Referred By: [email or web address]   SELF           Confirmed By:Eloise Cali MD                            Imaging Results          MRA Brain (Final result)  Result time 11/11/19 20:08:19    Final result by Arcelia Hawley MD (11/11/19 20:08:19)                 Impression:      1. No acute intracranial abnormalities identified.  No evidence of acute infarction.  2. MRA brain and neck without significant focal stenosis or occlusion.      Electronically signed by: Arcelia Hawley MD  Date:    11/11/2019  Time:    20:08             Narrative:    EXAMINATION:  MRI BRAIN W WO CONTRAST; MRA NECK WITH CONTRAST; MRA BRAIN WITHOUT CONTRAST    CLINICAL HISTORY:  Stroke;    TECHNIQUE:  Multiplanar multisequence MR imaging of the brain was performed before and after the administration of 10 mL Gadavist  Intravenous contrast.    Obtained as a separate acquisition from MRI Brain, noncontrast 3D time-of-flight intracranial MR angiography was performed for visualization of intracranial vasculature.  MIP 3D reformatted images were  created.    COMPARISON:  None    FINDINGS:  The brain is normal in contour and morphology.  There is mild chronic microvascular ischemic disease.  No diffusion signal abnormality to suggest acute infarction.  Ventricles are normal in size and configuration.  No intracranial hemorrhage or extraaxial fluid collection.  No mass or mass effect.  After administration of gadolinium, no pathologic foci of enhancement.    Visualized paranasal sinuses and mastoid air cells are clear. Orbits appear normal.    The bilateral distal cervical, petrous, cavernous and supraclinoid segments of the ICA's and the visualized bilateral anterior and middle cerebral arteries are patent without evidence for significant focal stenosis or occlusion.  The distal vertebral arteries, basilar artery and posterior cerebral arteries are patent without evidence for significant focal stenosis or occlusion.    The origins of the right brachiocephalic,  left common carotid and left subclavian arteries from the arch are within normal limits.  Vertebral artery origins are within normal limits.  The vertebral arteries are unremarkable throughout their course without evidence for focal stenosis or occlusion.  The bilateral common carotid arteries, carotid bifurcation, and extra cranial portions of the internal carotid arteries are patent without evidence for focal stenosis or occlusion.                               CT Head Without Contrast (Final result)  Result time 11/11/19 13:48:47    Final result by Marc Starkey MD (11/11/19 13:48:47)                 Impression:      No acute large vascular territory infarct or intracranial hemorrhage identified.  Further evaluation/follow-up as warranted      Electronically signed by: Marc Starkey MD  Date:    11/11/2019  Time:    13:48             Narrative:    EXAMINATION:  CT HEAD WITHOUT CONTRAST    CLINICAL HISTORY:  Focal neuro deficit, new, fixed or worsening, >6 hours;    TECHNIQUE:  Low dose axial CT  images obtained throughout the head without intravenous contrast. Sagittal and coronal reconstructions were performed.    COMPARISON:  Head CT 02/25/2017    FINDINGS:  Intracranial compartment:    Brain appears normally formed.  Mild generalized cerebral volume loss.  Ventricles are midline and stable in size configuration without distortion by mass effect or acute hydrocephalus.  No extra-axial blood or fluid collections.  Nonspecific empty sella configuration, unchanged.    Grossly stable distribution of mild periventricular white matter hypoattenuation, likely sequela of chronic small vessel ischemic change.  No definite new focal areas of abnormal parenchymal attenuation.  Skull base atherosclerotic vascular calcifications noted.  No parenchymal mass, hemorrhage, edema or major vascular distribution infarct.    Skull/extracranial contents (limited evaluation): No fracture. Mastoid air cells and paranasal sinuses are essentially clear.                                 Medical Decision Making:   History:   Old Medical Records: I decided to obtain old medical records.  Independently Interpreted Test(s):   I have ordered and independently interpreted EKG Reading(s) - see prior notes  Clinical Tests:   Lab Tests: Ordered and Reviewed  Radiological Study: Ordered and Reviewed  Medical Tests: Ordered and Reviewed  ED Management:  12:35 PM I have counseled the patient on the importance of smoking cessation.  3:35 PM - Discussed case with Dr. Peña, and will admit patient to his service.            Scribe Attestation:   Scribe #1: I performed the above scribed service and the documentation accurately describes the services I performed. I attest to the accuracy of the note.    Attending Attestation:           Physician Attestation for Scribe:  Physician Attestation Statement for Scribe #1: I, Dr. Colindres, reviewed documentation, as scribed by Nichole Liz in my presence, and it is both accurate and complete.                     Patient presents complaining of slurred speech which she noticed 3 days ago.  Saw primary care clinic today referred to the emergency department due to concern that this represents CVA on my exam she has a mild facial droop on the left mouth otherwise no focal deficits she does have slurred speech but no evidence of aphasia CT scan shows chronic microvascular change in similar chronic findings but no evidence of acute CVA.  Laboratory studies unremarkable.  EKG shows sinus rhythm.  No arrhythmia while on the monitor.  Case discussed with hospitalist service to whom she will be admitted due to concern for CVA, further workup such as MRI and carotid imaging.           Clinical Impression:     1. Cerebrovascular accident (CVA), unspecified mechanism    2. Hypertension    3. Slurred speech    4. Stroke                              Jorge Colindres II, MD  11/11/19 2047

## 2019-11-11 NOTE — HPI
Jonathan Gonzales is a 59 y.o. female who presented to the [facility] ED from her Primary Care office for evaluation of family report patient with dysarthria and right lower extremities weakness.  The patient reported that she was noted to have slurred speech noted three days prior to arrival.  The patient reports right sided weakness and right hip pain that she reports is not new.  Per medical record, the patient was evaluated for this right lower extremity weakness that was thought to be associated with pre-existing sciatica.  She was discharged home on topical anti-inflammatory gel and lidocaine patches that she reports have not been effective.  She states she receives the most relief with ibuprofen 800 mg as needed. Initial evaluation with CT head that revealed no evidence of acute infarct or hemorrhage. Her labs and vital signs were essentially normal. NIHSS = 2 for mild dysarthria and lower ataxia.   She will be admitted under Observation status for stroke work up.      She ha medical history of bipolar disorder, breast cancer (2010, 2012) s/p mastectomy with post mastectomy lymphedema left upper extremity, depression, type 2 diabetes mellitus uncontrolled with peripheral neuropathy, and hypertension.  She has history of sciatica and lumbar spine pain as well as left hip pain.

## 2019-11-11 NOTE — CARE UPDATE
Unable to do dc planning assessment and MOON notice at present time. NP in room assessing pt. CM to follow.

## 2019-11-11 NOTE — SUBJECTIVE & OBJECTIVE
"Past Medical History:   Diagnosis Date    Bipolar disorder     Cancer of female breast 10/2010    T2 N1 Mx, Stage IIB left breast cancer    Cancer of upper-outer quadrant of female breast 7/9/2012    Depression     Diabetes mellitus type II     Disturbances of sensation of smell and taste 6/29/2012    Hypertension     Malignant neoplasm of breast (female), unspecified site 4/27/2015    Neuropathy     Nonspecific abnormal unspecified cardiovascular function study 4/12/2013    ECG READ AS SHOWING A T-WAVE ABNORMALITY     Post-mastectomy lymphedema syndrome     Schizophrenia        Past Surgical History:   Procedure Laterality Date    BREAST RECONSTRUCTION  11/23/11    B/L SHLOMO flap reconstruction S/P left MRM, right prophylactic mastectomy    BREAST RECONSTRUCTION Bilateral 3/13/2015    NIPPLE RECONSTRUCTION    MASTECTOMY Bilateral 01/2011    bilateral    TUBAL LIGATION         Review of patient's allergies indicates:  No Known Allergies    No current facility-administered medications on file prior to encounter.      Current Outpatient Medications on File Prior to Encounter   Medication Sig    arm brace (WRIST SUPPORT LARGE-XLARGE MISC)     BD ULTRA-FINE MINI PEN NEEDLE 31 gauge x 3/16" Ndle Inject 1 each into the skin once daily.    blood-glucose meter (FREESTYLE SYSTEM KIT) kit Pt to check BG up to QID. Dispense strips compatible with pt brand meter    diclofenac sodium (VOLTAREN) 1 % Gel Apply 2 g topically 4 (four) times daily.    divalproex (DEPAKOTE) 250 MG EC tablet Take 1 tablet (250 mg total) by mouth 2 (two) times daily.    EASY TOUCH 31 gauge x 1/4" Ndle     insulin (LANTUS SOLOSTAR U-100 INSULIN) glargine 100 units/mL (3mL) SubQ pen INJECT  23 UNITS SUBCUTANEOUSLY EVERY EVENING    lancets (ONETOUCH ULTRASOFT LANCETS) [facility] Pt to check BG up to QID. Dispense strips compatible with pt brand meter    lidocaine (LIDODERM) 5 % Apply up to 3 patches to painful areas. Remove in 12 hours. " "No more than 3 patches should be used in a 24-hour period.    lisinopril-hydrochlorothiazide (PRINZIDE,ZESTORETIC) 10-12.5 mg per tablet TAKE 1 TABLET BY MOUTH EVERY DAY    metFORMIN (GLUCOPHAGE) 500 MG tablet Take 1 tablet (500 mg total) by mouth 2 (two) times daily with meals.    OLANZapine (ZYPREXA) 10 MG tablet TK 1 T PO QHS    oxybutynin (DITROPAN-XL) 10 MG 24 hr tablet TAKE 1 TABLET(10 MG) BY MOUTH EVERY DAY    pen needle, diabetic (BD ULTRA-FINE [place] PEN NEEDLES) 32 gauge x 5/32" Ndle Pt is injecting once daily    pen needle, diabetic, safety 29 gauge x 3/16" Ndle Use as instructed    pregabalin (LYRICA) 100 MG capsule TAKE 1 CAPSULE TWICE DAILY    QUEtiapine (SEROQUEL) 100 MG Tab Take 1 tablet (100 mg total) by mouth every evening.    TRUE METRIX GLUCOSE METER Misc     TRUEPLUS LANCETS 30 gauge Misc USE TO TEST BLOOD SUGART QID    VICTOZA 3-HERMANN 0.6 mg/0.1 mL (18 mg/3 mL) PnIj ADMINISTER 1.8 MG UNDER THE SKIN EVERY DAY    ziprasidone (GEODON) 40 MG Cap TAKE 1 CAPSULE(40 MG) BY MOUTH EVERY EVENING    [DISCONTINUED] blood sugar diagnostic Strp Pt to check BG up to QID. Dispense strips compatible with pt brand meter    [DISCONTINUED] lisinopril-hydrochlorothiazide (PRINZIDE,ZESTORETIC) 10-12.5 mg per tablet TAKE 1 TABLET EVERY DAY    naproxen (NAPROSYN) 500 MG tablet Take 1 tablet (500 mg total) by mouth 2 (two) times daily with meals. for 14 days    TRUEPLUS LANCETS 33 gauge Misc 100 lancets by Subconjunctival route 4 (four) times daily.    [DISCONTINUED] arm brace Misc 1 application by Misc.(Non-Drug; Combo Route) route once daily.    [DISCONTINUED] blood-glucose meter kit Use as instructed    [DISCONTINUED] flu vac [id number] 36mos up,PF, 60 mcg (15 mcg x 4)/0.5 mL Syrg inject into the muscle    [DISCONTINUED] lancets Misc 1 Device by Misc.(Non-Drug; Combo Route) route 4 (four) times daily. Please provide with appropriate lancets for device covered by insurance.    [DISCONTINUED] " methylPREDNISolone (MEDROL DOSEPACK) 4 mg tablet use as directed     Family History     Problem Relation (Age of Onset)    Cervical cancer Daughter    Deep vein thrombosis Mother    Diabetes Mother, Sister, Brother, Son    Diabetic kidney disease Sister    Glaucoma Mother    Hypertension Mother    Kidney disease Mother    Lung cancer Sister    No Known Problems Father, Maternal Grandmother, Maternal Grandfather, Paternal Grandmother, Paternal Grandfather, Son, Daughter, Daughter, Daughter        Tobacco Use    Smoking status: Current Every Day Smoker     Packs/day: 0.25     Years: 25.00     Pack years: 6.25     Types: Cigarettes    Smokeless tobacco: Never Used    Tobacco comment: currently at less than 1/2 pack pd   Substance and Sexual Activity    Alcohol use: No     Alcohol/week: 0.0 standard drinks    Drug use: No     Comment: 1991 - stopped using crack cocaine    Sexual activity: Never     Partners: Male     Review of Systems   Constitutional: Negative for chills and fever.   HENT: Negative for congestion and trouble swallowing.    Eyes: Negative for photophobia and visual disturbance.   Respiratory: Negative for cough, chest tightness and shortness of breath.    Cardiovascular: Negative for chest pain and palpitations.   Gastrointestinal: Negative for abdominal pain, diarrhea, nausea and vomiting.   Endocrine: Negative.    Genitourinary: Negative for dysuria, hematuria and urgency.   Musculoskeletal: Positive for arthralgias, back pain, gait problem and myalgias.   Neurological: Positive for speech difficulty. Negative for seizures, weakness, numbness and headaches.   Psychiatric/Behavioral: Negative for agitation and hallucinations. The patient is not nervous/anxious.      Objective:     Vital Signs (Most Recent):  Temp: 98.4 °F (36.9 °C) (11/11/19 1656)  Pulse: 67 (11/11/19 1656)  Resp: 18 (11/11/19 1656)  BP: 129/81 (11/11/19 1656)  SpO2: 98 % (11/11/19 1656) Vital Signs (24h Range):  Temp:  [98.4 °F  (36.9 °C)-98.5 °F (36.9 °C)] 98.4 °F (36.9 °C)  Pulse:  [67-94] 67  Resp:  [18-22] 18  SpO2:  [96 %-98 %] 98 %  BP: (100-129)/(67-81) 129/81     Weight: 85.8 kg (189 lb 1.6 oz)  Body mass index is 33.5 kg/m².    Physical Exam   Constitutional: She is oriented to person, place, and time. She appears well-developed and well-nourished.   HENT:   Head: Normocephalic and atraumatic.   Mouth/Throat: Oropharynx is clear and moist.   Eyes: Pupils are equal, round, and reactive to light. Conjunctivae and lids are normal.   Neck: Normal range of motion. Neck supple. No JVD present.   Cardiovascular: Normal rate, regular rhythm, normal heart sounds and intact distal pulses.   Pulmonary/Chest: Effort normal and breath sounds normal.   Abdominal: Soft. Normal appearance and bowel sounds are normal. There is no tenderness.   Musculoskeletal: She exhibits no deformity.        Right hip: She exhibits decreased range of motion and decreased strength.        Lumbar back: She exhibits tenderness.   Neurological: She is alert and oriented to person, place, and time. She has normal strength. No sensory deficit.   Skin: Skin is warm, dry and intact.   Psychiatric: She has a normal mood and affect. Her behavior is normal. Thought content normal. Her speech is slurred. Cognition and memory are normal.   Nursing note and vitals reviewed.        CRANIAL NERVES     CN III, IV, VI   Pupils are equal, round, and reactive to light.    Stroke Team Times:   Date and Time Taken: 2019 4:14 PM    NIH Stroke Scale:  Interval: baseline (upon arrival/admit)  Level of Consciousness: 0 - alert  LOC Questions: 0 - answers both correctly  LOC Commands: 0 - performs both correctly  Best Gaze: 0 - normal  Visual: 0 - no visual loss  Facial Palsy: 0 - normal  Motor Left Arm: 0 - no drift  Motor Right Arm: 0 - no drift  Motor Left Le - no drift  Motor Right Le - no drift  Limb Ataxia: 1 - present in one limb  Sensory: 0 - normal  Best Language: 0  - no aphasia  Dysarthria: 1 - mild to moderate dysarthria  Extinction and Inattention: 0 - no neglect  NIH Stroke Scale Total: 2         Significant Labs:   CBC:   Recent Labs   Lab 11/11/19  1419   WBC 5.96   HGB 12.0   HCT 36.6*        CMP:   Recent Labs   Lab 11/11/19  1419      K 3.9      CO2 28   GLU 80   BUN 17   CREATININE 1.3   CALCIUM 8.9   PROT 7.4   ALBUMIN 3.2*   BILITOT 0.2   ALKPHOS 55   AST 29   ALT 27   ANIONGAP 10   EGFRNONAA 45*          All pertinent labs within the past 24 hours have been reviewed.    Significant Imaging: I have reviewed all pertinent imaging results/findings within the past 24 hours.

## 2019-11-12 ENCOUNTER — HOSPITAL ENCOUNTER (OUTPATIENT)
Dept: CARDIOLOGY | Facility: OTHER | Age: 59
Discharge: HOME | End: 2019-11-12
Attending: EMERGENCY MEDICINE
Payer: MEDICARE

## 2019-11-12 LAB
ALBUMIN SERPL BCP-MCNC: 3 G/DL (ref 3.5–5.2)
ALP SERPL-CCNC: 57 U/L (ref 55–135)
ALT SERPL W/O P-5'-P-CCNC: 28 U/L (ref 10–44)
ANION GAP SERPL CALC-SCNC: 13 MMOL/L (ref 8–16)
APTT BLDCRRT: 31 SEC (ref 21–32)
AST SERPL-CCNC: 34 U/L (ref 10–40)
BASOPHILS # BLD AUTO: ABNORMAL K/UL (ref 0–0.2)
BASOPHILS NFR BLD: 0 % (ref 0–1.9)
BILIRUB SERPL-MCNC: 0.2 MG/DL (ref 0.1–1)
BUN SERPL-MCNC: 14 MG/DL (ref 6–20)
CALCIUM SERPL-MCNC: 9 MG/DL (ref 8.7–10.5)
CHLORIDE SERPL-SCNC: 102 MMOL/L (ref 95–110)
CHOLEST SERPL-MCNC: 231 MG/DL (ref 120–199)
CHOLEST/HDLC SERPL: 7.7 {RATIO} (ref 2–5)
CK MB SERPL-MCNC: 1.3 NG/ML (ref 0.1–6.5)
CK MB SERPL-RTO: 1.1 % (ref 0–5)
CK SERPL-CCNC: 117 U/L (ref 20–180)
CO2 SERPL-SCNC: 24 MMOL/L (ref 23–29)
CREAT SERPL-MCNC: 1.1 MG/DL (ref 0.5–1.4)
DIFFERENTIAL METHOD: ABNORMAL
EOSINOPHIL # BLD AUTO: ABNORMAL K/UL (ref 0–0.5)
EOSINOPHIL NFR BLD: 3 % (ref 0–8)
ERYTHROCYTE [DISTWIDTH] IN BLOOD BY AUTOMATED COUNT: 12.9 % (ref 11.5–14.5)
EST. GFR  (AFRICAN AMERICAN): >60 ML/MIN/1.73 M^2
EST. GFR  (NON AFRICAN AMERICAN): 55 ML/MIN/1.73 M^2
ESTIMATED AVG GLUCOSE: 128 MG/DL (ref 68–131)
GIANT PLATELETS BLD QL SMEAR: PRESENT
GLUCOSE SERPL-MCNC: 92 MG/DL (ref 70–110)
HBA1C MFR BLD HPLC: 6.1 % (ref 4–5.6)
HCT VFR BLD AUTO: 34.4 % (ref 37–48.5)
HDLC SERPL-MCNC: 30 MG/DL (ref 40–75)
HDLC SERPL: 13 % (ref 20–50)
HGB BLD-MCNC: 11.2 G/DL (ref 12–16)
IMM GRANULOCYTES # BLD AUTO: ABNORMAL K/UL (ref 0–0.04)
IMM GRANULOCYTES NFR BLD AUTO: ABNORMAL % (ref 0–0.5)
INR PPP: 0.9 (ref 0.8–1.2)
LDLC SERPL CALC-MCNC: ABNORMAL MG/DL (ref 63–159)
LYMPHOCYTES # BLD AUTO: ABNORMAL K/UL (ref 1–4.8)
LYMPHOCYTES NFR BLD: 50 % (ref 18–48)
MAGNESIUM SERPL-MCNC: 1.5 MG/DL (ref 1.6–2.6)
MCH RBC QN AUTO: 29.7 PG (ref 27–31)
MCHC RBC AUTO-ENTMCNC: 32.6 G/DL (ref 32–36)
MCV RBC AUTO: 91 FL (ref 82–98)
MONOCYTES # BLD AUTO: ABNORMAL K/UL (ref 0.3–1)
MONOCYTES NFR BLD: 6 % (ref 4–15)
NEUTROPHILS NFR BLD: 41 % (ref 38–73)
NONHDLC SERPL-MCNC: 201 MG/DL
NRBC BLD-RTO: 0 /100 WBC
PHOSPHATE SERPL-MCNC: 3.1 MG/DL (ref 2.7–4.5)
PLATELET # BLD AUTO: 327 K/UL (ref 150–350)
PLATELET BLD QL SMEAR: ABNORMAL
PMV BLD AUTO: 9.6 FL (ref 9.2–12.9)
POCT GLUCOSE: 102 MG/DL (ref 70–110)
POCT GLUCOSE: 104 MG/DL (ref 70–110)
POCT GLUCOSE: 77 MG/DL (ref 70–110)
POCT GLUCOSE: 94 MG/DL (ref 70–110)
POTASSIUM SERPL-SCNC: 3.6 MMOL/L (ref 3.5–5.1)
PROT SERPL-MCNC: 7.4 G/DL (ref 6–8.4)
PROTHROMBIN TIME: 9.8 SEC (ref 9–12.5)
RBC # BLD AUTO: 3.77 M/UL (ref 4–5.4)
SODIUM SERPL-SCNC: 139 MMOL/L (ref 136–145)
TRIGL SERPL-MCNC: 641 MG/DL (ref 30–150)
TROPONIN I SERPL DL<=0.01 NG/ML-MCNC: 0.01 NG/ML (ref 0–0.03)
TSH SERPL DL<=0.005 MIU/L-ACNC: 1.7 UIU/ML (ref 0.4–4)
VALPROATE SERPL-MCNC: 24.5 UG/ML (ref 50–100)
WBC # BLD AUTO: 5.4 K/UL (ref 3.9–12.7)

## 2019-11-12 PROCEDURE — 30200315 PPD INTRADERMAL TEST REV CODE 302: Mod: HCNC | Performed by: INTERNAL MEDICINE

## 2019-11-12 PROCEDURE — 85730 THROMBOPLASTIN TIME PARTIAL: CPT | Mod: HCNC

## 2019-11-12 PROCEDURE — 85610 PROTHROMBIN TIME: CPT | Mod: HCNC

## 2019-11-12 PROCEDURE — 93306 TTE W/DOPPLER COMPLETE: CPT | Mod: HCNC

## 2019-11-12 PROCEDURE — 25000003 PHARM REV CODE 250: Mod: HCNC | Performed by: PHYSICIAN ASSISTANT

## 2019-11-12 PROCEDURE — 84443 ASSAY THYROID STIM HORMONE: CPT | Mod: HCNC

## 2019-11-12 PROCEDURE — 97166 OT EVAL MOD COMPLEX 45 MIN: CPT | Mod: HCNC

## 2019-11-12 PROCEDURE — 83735 ASSAY OF MAGNESIUM: CPT | Mod: HCNC

## 2019-11-12 PROCEDURE — 82550 ASSAY OF CK (CPK): CPT | Mod: HCNC

## 2019-11-12 PROCEDURE — 97116 GAIT TRAINING THERAPY: CPT | Mod: HCNC

## 2019-11-12 PROCEDURE — 85007 BL SMEAR W/DIFF WBC COUNT: CPT | Mod: HCNC,NCS

## 2019-11-12 PROCEDURE — 63600175 PHARM REV CODE 636 W HCPCS: Mod: HCNC | Performed by: NURSE PRACTITIONER

## 2019-11-12 PROCEDURE — 93306 ECHO (CUPID ONLY): ICD-10-PCS | Mod: 26,HCNC,, | Performed by: INTERNAL MEDICINE

## 2019-11-12 PROCEDURE — 25000003 PHARM REV CODE 250: Mod: HCNC | Performed by: NURSE PRACTITIONER

## 2019-11-12 PROCEDURE — 84484 ASSAY OF TROPONIN QUANT: CPT | Mod: HCNC

## 2019-11-12 PROCEDURE — 80164 ASSAY DIPROPYLACETIC ACD TOT: CPT | Mod: HCNC

## 2019-11-12 PROCEDURE — 97162 PT EVAL MOD COMPLEX 30 MIN: CPT | Mod: HCNC

## 2019-11-12 PROCEDURE — 80061 LIPID PANEL: CPT | Mod: HCNC

## 2019-11-12 PROCEDURE — 85027 COMPLETE CBC AUTOMATED: CPT | Mod: HCNC

## 2019-11-12 PROCEDURE — 99214 OFFICE O/P EST MOD 30 MIN: CPT | Mod: ,,, | Performed by: PSYCHIATRY & NEUROLOGY

## 2019-11-12 PROCEDURE — 93306 TTE W/DOPPLER COMPLETE: CPT | Mod: 26,HCNC,, | Performed by: INTERNAL MEDICINE

## 2019-11-12 PROCEDURE — 84100 ASSAY OF PHOSPHORUS: CPT | Mod: HCNC

## 2019-11-12 PROCEDURE — G0378 HOSPITAL OBSERVATION PER HR: HCPCS | Mod: HCNC

## 2019-11-12 PROCEDURE — 99226 PR SUBSEQUENT OBSERVATION CARE,LEVEL III: ICD-10-PCS | Mod: HCNC,,, | Performed by: PHYSICIAN ASSISTANT

## 2019-11-12 PROCEDURE — 99226 PR SUBSEQUENT OBSERVATION CARE,LEVEL III: CPT | Mod: HCNC,,, | Performed by: PHYSICIAN ASSISTANT

## 2019-11-12 PROCEDURE — 82553 CREATINE MB FRACTION: CPT | Mod: HCNC

## 2019-11-12 PROCEDURE — 92610 EVALUATE SWALLOWING FUNCTION: CPT | Mod: HCNC

## 2019-11-12 PROCEDURE — 80053 COMPREHEN METABOLIC PANEL: CPT | Mod: HCNC

## 2019-11-12 PROCEDURE — 86580 TB INTRADERMAL TEST: CPT | Mod: HCNC | Performed by: INTERNAL MEDICINE

## 2019-11-12 PROCEDURE — 36415 COLL VENOUS BLD VENIPUNCTURE: CPT | Mod: HCNC

## 2019-11-12 PROCEDURE — 96372 THER/PROPH/DIAG INJ SC/IM: CPT | Mod: 59 | Performed by: EMERGENCY MEDICINE

## 2019-11-12 PROCEDURE — 94761 N-INVAS EAR/PLS OXIMETRY MLT: CPT | Mod: HCNC

## 2019-11-12 PROCEDURE — 99214 PR OFFICE/OUTPT VISIT, EST, LEVL IV, 30-39 MIN: ICD-10-PCS | Mod: ,,, | Performed by: PSYCHIATRY & NEUROLOGY

## 2019-11-12 RX ORDER — IBUPROFEN 200 MG
800 TABLET ORAL 2 TIMES DAILY
Status: ON HOLD | COMMUNITY
End: 2019-11-13 | Stop reason: HOSPADM

## 2019-11-12 RX ORDER — ATORVASTATIN CALCIUM 20 MG/1
80 TABLET, FILM COATED ORAL DAILY
Status: DISCONTINUED | OUTPATIENT
Start: 2019-11-13 | End: 2019-11-14 | Stop reason: HOSPADM

## 2019-11-12 RX ORDER — CLOPIDOGREL BISULFATE 75 MG/1
75 TABLET ORAL DAILY
Status: DISCONTINUED | OUTPATIENT
Start: 2019-11-13 | End: 2019-11-14 | Stop reason: HOSPADM

## 2019-11-12 RX ORDER — CLOPIDOGREL 300 MG/1
600 TABLET, FILM COATED ORAL ONCE
Status: COMPLETED | OUTPATIENT
Start: 2019-11-12 | End: 2019-11-12

## 2019-11-12 RX ADMIN — TUBERCULIN PURIFIED PROTEIN DERIVATIVE 5 UNITS: 5 INJECTION, SOLUTION INTRADERMAL at 04:11

## 2019-11-12 RX ADMIN — ENOXAPARIN SODIUM 40 MG: 100 INJECTION SUBCUTANEOUS at 04:11

## 2019-11-12 RX ADMIN — ATORVASTATIN CALCIUM 40 MG: 20 TABLET, FILM COATED ORAL at 08:11

## 2019-11-12 RX ADMIN — QUETIAPINE FUMARATE 100 MG: 100 TABLET ORAL at 09:11

## 2019-11-12 RX ADMIN — OXYBUTYNIN CHLORIDE 5 MG: 5 TABLET, EXTENDED RELEASE ORAL at 08:11

## 2019-11-12 RX ADMIN — ASPIRIN 81 MG: 81 TABLET, COATED ORAL at 08:11

## 2019-11-12 RX ADMIN — PREGABALIN 100 MG: 75 CAPSULE ORAL at 09:11

## 2019-11-12 RX ADMIN — CLOPIDOGREL BISULFATE 600 MG: 300 TABLET, FILM COATED ORAL at 01:11

## 2019-11-12 RX ADMIN — PREGABALIN 100 MG: 75 CAPSULE ORAL at 08:11

## 2019-11-12 RX ADMIN — DIVALPROEX SODIUM 250 MG: 250 TABLET, DELAYED RELEASE ORAL at 09:11

## 2019-11-12 RX ADMIN — DIVALPROEX SODIUM 250 MG: 250 TABLET, DELAYED RELEASE ORAL at 08:11

## 2019-11-12 NOTE — PLAN OF CARE
Pt will need SNF placement.     Referrals faxed to 15 facilities in [facility], awaiting accepting facility.    Order placed for PPD, Chest xray and referral to [facility], awaiting callback from Enriqueta at [facility] for bed availability.

## 2019-11-12 NOTE — PROGRESS NOTES
[facility] Hospital Medicine  Progress Note    Patient Name: Jonathan Gonzales  MRN: [id number]  Patient Class: OP- Observation   Admission Date: 11/11/2019  Length of Stay: 0 days  Attending Physician: FRANSISCO Hung MD  Primary Care Provider: Ghulam Contreras MD        Subjective:     Principal Problem:Stroke-like symptoms      HPI:  Jonathan Gonzales is a 59 y.o. female who  presented to the [facility] ED from her Primary Care office for evaluation of family report patient with dysarthria and right lower extremities weakness.  The patient reported that she was noted to have slurred speech noted three days prior to arrival.  The patient reports right sided weakness and right hip pain that she reports is not new.  Per medical record, the patient was evaluated for this right lower extremity weakness that was thought to be associated with pre-existing sciatica.  She was discharged home on topical anti-inflammatory gel and lidocaine patches that she reports have not been effective.  She states she receives the most relief with ibuprofen 800 mg as needed.  Initial evaluation with CT head that revealed no evidence of acute infarct or hemorrhage. Her labs and vital signs were essentially normal. NIHSS = 2 for mild dysarthria and lower ataxia.   She will be admitted under Observation status for stroke work up.      She ha medical history of bipolar disorder, breast cancer (2010, 2012) s/p mastectomy with post mastectomy lymphedema left upper extremity, depression, type 2 diabetes mellitus uncontrolled with peripheral neuropathy, and hypertension.  She has history of sciatica and lumbar spine pain as well as left hip pain.       Overview/Hospital Course:  No notes on file    Interval History: no overnight events    Review of Systems   Constitutional: Negative for chills and fever.   HENT: Negative for congestion and trouble swallowing.    Eyes: Negative for photophobia and visual  disturbance.   Respiratory: Negative for cough, chest tightness and shortness of breath.    Cardiovascular: Negative for chest pain and palpitations.   Gastrointestinal: Negative for abdominal pain, diarrhea, nausea and vomiting.   Endocrine: Negative.    Genitourinary: Negative for dysuria, hematuria and urgency.   Musculoskeletal: Positive for arthralgias, back pain and gait problem. Negative for myalgias.   Neurological: Positive for speech difficulty. Negative for seizures, weakness, numbness and headaches.   Psychiatric/Behavioral: Negative for agitation and hallucinations. The patient is not nervous/anxious.      Objective:     Vital Signs (Most Recent):  Temp: 98.2 °F (36.8 °C) (11/12/19 1534)  Pulse: 76 (11/12/19 1534)  Resp: 16 (11/12/19 1534)  BP: 125/66 (11/12/19 1534)  SpO2: 97 % (11/12/19 1534) Vital Signs (24h Range):  Temp:  [97.9 °F (36.6 °C)-98.7 °F (37.1 °C)] 98.2 °F (36.8 °C)  Pulse:  [66-78] 76  Resp:  [16-20] 16  SpO2:  [94 %-98 %] 97 %  BP: (106-134)/(59-93) 125/66     Weight: 85.8 kg (189 lb 1.6 oz)  Body mass index is 33.5 kg/m².  No intake or output data in the 24 hours ending 11/12/19 1751   Physical Exam   Constitutional: She is oriented to person, place, and time. She appears well-developed and well-nourished.   HENT:   Head: Normocephalic and atraumatic.   Mouth/Throat: Oropharynx is clear and moist.   Eyes: Pupils are equal, round, and reactive to light. Conjunctivae and lids are normal.   Neck: Normal range of motion. Neck supple. No JVD present.   Cardiovascular: Normal rate, regular rhythm, normal heart sounds and intact distal pulses.   Pulmonary/Chest: Effort normal and breath sounds normal.   Abdominal: Soft. Normal appearance and bowel sounds are normal. There is no tenderness.   Musculoskeletal: She exhibits no deformity.        Right hip: She exhibits decreased range of motion and decreased strength.        Lumbar back: She exhibits tenderness.   Neurological: She is alert and  oriented to person, place, and time. She has normal strength. No sensory deficit.   dysarthric speech   Skin: Skin is warm, dry and intact.   Psychiatric: She has a normal mood and affect. Cognition and memory are normal.   Nursing note and vitals reviewed.      Significant Labs:   CBC:   Recent Labs   Lab 11/11/19  1419 11/12/19  0502   WBC 5.96 5.40   HGB 12.0 11.2*   HCT 36.6* 34.4*    327     CMP:   Recent Labs   Lab 11/11/19  1419 11/12/19  0502    139   K 3.9 3.6    102   CO2 28 24   GLU 80 92   BUN 17 14   CREATININE 1.3 1.1   CALCIUM 8.9 9.0   PROT 7.4 7.4   ALBUMIN 3.2* 3.0*   BILITOT 0.2 0.2   ALKPHOS 55 57   AST 29 34   ALT 27 28   ANIONGAP 10 13   EGFRNONAA 45* 55*     All pertinent labs within the past 24 hours have been reviewed.    Significant Imaging: I have reviewed all pertinent imaging results/findings within the past 24 hours.   Imaging Results          MRA Brain (Final result)  Result time 11/11/19 20:08:19    Final result by Arcelia Hawley MD (11/11/19 20:08:19)                 Impression:      1. No acute intracranial abnormalities identified.  No evidence of acute infarction.  2. MRA brain and neck without significant focal stenosis or occlusion.      Electronically signed by: Arcelia Hawley MD  Date:    11/11/2019  Time:    20:08             Narrative:    EXAMINATION:  MRI BRAIN W WO CONTRAST; MRA NECK WITH CONTRAST; MRA BRAIN WITHOUT CONTRAST    CLINICAL HISTORY:  Stroke;    TECHNIQUE:  Multiplanar multisequence MR imaging of the brain was performed before and after the administration of 10 mL Gadavist  Intravenous contrast.    Obtained as a separate acquisition from MRI Brain, noncontrast 3D time-of-flight intracranial MR angiography was performed for visualization of intracranial vasculature.  MIP 3D reformatted images were created.    COMPARISON:  None    FINDINGS:  The brain is normal in contour and morphology.  There is mild chronic microvascular ischemic disease.   No diffusion signal abnormality to suggest acute infarction.  Ventricles are normal in size and configuration.  No intracranial hemorrhage or extraaxial fluid collection.  No mass or mass effect.  After administration of gadolinium, no pathologic foci of enhancement.    Visualized paranasal sinuses and mastoid air cells are clear. Orbits appear normal.    The bilateral distal cervical, petrous, cavernous and supraclinoid segments of the ICA's and the visualized bilateral anterior and middle cerebral arteries are patent without evidence for significant focal stenosis or occlusion.  The distal vertebral arteries, basilar artery and posterior cerebral arteries are patent without evidence for significant focal stenosis or occlusion.    The origins of the right brachiocephalic,  left common carotid and left subclavian arteries from the arch are within normal limits.  Vertebral artery origins are within normal limits.  The vertebral arteries are unremarkable throughout their course without evidence for focal stenosis or occlusion.  The bilateral common carotid arteries, carotid bifurcation, and extra cranial portions of the internal carotid arteries are patent without evidence for focal stenosis or occlusion.                               CT Head Without Contrast (Final result)  Result time 11/11/19 13:48:47    Final result by Marc Starkey MD (11/11/19 13:48:47)                 Impression:      No acute large vascular territory infarct or intracranial hemorrhage identified.  Further evaluation/follow-up as warranted      Electronically signed by: Marc Starkey MD  Date:    11/11/2019  Time:    13:48             Narrative:    EXAMINATION:  CT HEAD WITHOUT CONTRAST    CLINICAL HISTORY:  Focal neuro deficit, new, fixed or worsening, >6 hours;    TECHNIQUE:  Low dose axial CT images obtained throughout the head without intravenous contrast. Sagittal and coronal reconstructions were performed.    COMPARISON:  Head CT  02/25/2017    FINDINGS:  Intracranial compartment:    Brain appears normally formed.  Mild generalized cerebral volume loss.  Ventricles are midline and stable in size configuration without distortion by mass effect or acute hydrocephalus.  No extra-axial blood or fluid collections.  Nonspecific empty sella configuration, unchanged.    Grossly stable distribution of mild periventricular white matter hypoattenuation, likely sequela of chronic small vessel ischemic change.  No definite new focal areas of abnormal parenchymal attenuation.  Skull base atherosclerotic vascular calcifications noted.  No parenchymal mass, hemorrhage, edema or major vascular distribution infarct.    Skull/extracranial contents (limited evaluation): No fracture. Mastoid air cells and paranasal sinuses are essentially clear.                                    Assessment/Plan:      * Stroke-like symptoms - TIA  -  report of dysarthria and lower extremity weakness several days prior to arrival  - CT head and MRI/MRA brain/neck negative for acute process; no evidence for infarct or hemorrhage.  - Neuro recs  - Increase atorvastatin to 80mg po qhs, continue Aspirin 81 mg daily and additionally load with plavix 600mg followed by plavix 75 mg starting tomorrow  X 21 days followed by aspirin 81 mg daily ( based on Point/Chance trial)   - speech recs noted  - follow ECHO         Chronic bilateral low back pain with bilateral sciatica  - appears to be chronic problem   - ambulates with cane at baseline  - followed  pain management at one point  - bilateral lower extremity weakness that is likely related to debility and chronic low back pain  - PT/OT evaluation        Post-mastectomy lymphedema syndrome  - breast cancer 2011 and s/p mastectomy  - lymphedema left upper extremity with sleeve in place  - PT/OT evaluation appreciated      Cervical radiculopathy  - chronic and per medical record follows pain management with multiple cervical RFA  - suspect  related to upper extremity weakness and sensory deficit  - Neurology consulted and appreciate recommendations      Uncontrolled type 2 diabetes mellitus, with long-term current use of insulin  - A1c 6.1  - home regimen metformin 500 mg bid and Victoza 1.8 mg daily  - will start with moderate dose sliding scale and will likely need to adjust  - diabetic diet      Hypertension  - stable      Tobacco abuse  Patient was counseled on smoknig cessation for 6 minutes.  Nicotine replacement offered.  Patient declined and not currently intersted in quiting smoking at this time.      Peripheral neuropathy  - chronic and continue lyrica  - ambulates with cane  - fall precautions      VTE Risk Mitigation (From admission, onward)         Ordered     enoxaparin injection 40 mg  Daily      11/11/19 1615     IP VTE HIGH RISK PATIENT  Once      11/11/19 1659     Place sequential compression device  Until discontinued      11/11/19 1615                      Meghna Watson PA-C  Department of Hospital Medicine   [facility]

## 2019-11-12 NOTE — PLAN OF CARE
Problem: Physical Therapy Goal  Goal: Physical Therapy Goal  Description  Goals to be met by: 19    Patient will increase functional independence with mobility by performin. Sit<>stand with mod(I) with LRAD.  2. Gait x 150 feet with mod(I) with LRAD.  3. Demo safety awareness with use of rollator during gait bout with seated rest break without cues.  4. Ascend/descend 4 steps with unilateral HR and cane with supervision.       Outcome: Ongoing, Progressing     Patient evaluated by PT and goals established. Patient strongly opposed to rec for SNF, preferring d/c to home with increased assistance from family. Gait trained with RW and rollator with decreased pain in R hip and increased stability and endurance, patient with preference for rollator. Amb x20 ft with cane with CGA-Blair, x20 ft with RW with CGA-SBA, and x40 ft with rollator and SBA. Sit<>stand with CGA-SBA with above devices thru-out session. PT will continue to follow and progress as tolerated. Rec for d/c to home with HH PT/OT, rollator, and increased assistance from family upon initial dc. Please see progress note for detailed plan of care and recommendations.

## 2019-11-12 NOTE — PT/OT/SLP PROGRESS
Physical Therapy  Not Seen    Patient Name:  Jonathan Gonzales   MRN:  [id number]    Patient not seen today secondary to Unavailable (Comment)(Finishing OT eval, starting SLP eval). Will follow-up in PM.    Racquel Yeh, PT

## 2019-11-12 NOTE — ASSESSMENT & PLAN NOTE
- report of dysarthria and lower extremity weakness several days prior to arrival  - CT head and MRI/MRA brain/neck negative for acute process; no evidence for infarct or hemorrhage.  - some dysarthria and extremity weakness that may be related to medications for bipolar and neuropathy  - continue stroke work up for possible TIA   - depakote level pending to assess for toxicity

## 2019-11-12 NOTE — PT/OT/SLP EVAL
Speech Language Pathology Evaluation  Bedside Swallow    Patient Name:  Jonathan Gonzales   MRN:  [id number]  Admitting Diagnosis: Stroke-like symptoms    Recommendations:                 General Recommendations:  1. Skilled dysphagia intervention to target oral and pharyngeal dysphagia  2. Skilled dysphagia intervention for diet texture analysis and tolerance  3. Skilled speech intervention for ongoing assessment of cognitive linguistic function     Diet recommendations:  1. Mechanical soft chopped and thin liquids  2. NO STRAWS, small single cup sips  3. 1:1 assistance with meals  4. Meds as tolerated    Aspiration Precautions: 1 bite/sip at a time, Alternating bites/sips, Assistance with meals, Check for pocketing/oral residue, Frequent oral care, HOB to 90 degrees and No straws   General Precautions: Standard, aspiration  Communication strategies:  allow additional time for responses, clarification as needed    History:     Past Medical History:   Diagnosis Date    Bipolar disorder     Cancer of female breast 10/2010    T2 N1 Mx, Stage IIB left breast cancer    Cancer of upper-outer quadrant of female breast 7/9/2012    Depression     Diabetes mellitus type II     Disturbances of sensation of smell and taste 6/29/2012    Hypertension     Malignant neoplasm of breast (female), unspecified site 4/27/2015    Neuropathy     Nonspecific abnormal unspecified cardiovascular function study 4/12/2013    ECG READ AS SHOWING A T-WAVE ABNORMALITY     Post-mastectomy lymphedema syndrome     Schizophrenia        Past Surgical History:   Procedure Laterality Date    BREAST RECONSTRUCTION  11/23/11    B/L SHLOMO flap reconstruction S/P left MRM, right prophylactic mastectomy    BREAST RECONSTRUCTION Bilateral 3/13/2015    NIPPLE RECONSTRUCTION    MASTECTOMY Bilateral 01/2011    bilateral    TUBAL LIGATION         Social History: Patient lives alone but reports that she has assistance from her children with taking  her to get her groceries and to the laundromat. Patient was mainly eating microwave meals.     Prior Intubation HX:  None reported     Modified Barium Swallow: denies h/o swallow difficulties    Chest X-Rays: none on file    Prior diet: baseline diet is regular solids and thin liquids- whole meds.      Subjective     Patient presents up and washing hands at sink with assistance from Occupational therapy. She is sequencing well without cues from Occupational therapist. Patient returned to chaired, visibly out of breath and fatigued. Patient states she is tired.     Patient goals: get back to what she was doing before. She is concerned about the amount of help she will need when she goes home.      Pain/Comfort:  · Pain Rating 1: 0/10  · Pain Rating Post-Intervention 1: 0/10    Objective:     Oral Musculature Evaluation  · Oral Musculature: general weakness  · Dentition: present and adequate  · Secretion Management: adequate  · Mucosal Quality: good  · Oral Labial Strength and Mobility: other (see comments)(unable to maintain oral pressure with resistance)  · Lingual Strength and Mobility: impaired strength  · Velar Elevation: WFL  · Volitional Swallow: unable to initiate with PO   · Voice Prior to PO Intake: clear, low volume    Bedside Swallow Eval:   Consistencies Assessed:  · Thin liquids ice chips, single straw sips, single controlled cup sips 3 ounces  · Puree 5 full tsp bites of pudding  · Solids 1 tonio cracker 6 bites     Oral Phase:   · Adequate labial seal around spoon, spoon stripping effective, good bolus manipulation with ice chips and pureed solids  · Mildly delayed a-p transit  · Prolonged mastication with solids   · Mild oral residuals with solids, attempting to clear on own with lingual sweeps, re-swallow and liquid wash    Pharyngeal Phase:   · Delayed swallow trigger  · Coughing immediately after straw sips  · Coughing 1/12 cup sips with large cup sip independently administered  · Multiple  swallows with straw sip  · Multiple swallows with puree - pt reporting mild globus sensation with puree 2/5 tsp bites  · No changes in vocal quality noted    Compensatory Strategies  · Lingual sweep  · Multiple swallows      Assessment:     Jonathan Gonzales is a 59 y.o. female with an SLP diagnosis of mild oropharyngeal dysphagia.  She presented to [facility] from her Primary Care office with dysarthria and right lower extremity weakness. Patient reported slurred speech three days ago. Initial evaluation with CT head that revealed no evidence of acute infarct or hemorrhage. Her labs and vital signs were essentially normal. NIHSS = 2 for mild dysarthria and lower ataxia. PMH+ bipolar disorder, breast cancer (2010, 2012) s/p mastectomy with post mastectomy lymphedema left upper extremity, depression, type 2 diabetes mellitus uncontrolled with peripheral neuropathy, and hypertension.  She has history of sciatica and lumbar spine pain as well as left hip pain.     MRA BRAIN: No acute intracranial abnormalities identified.  No evidence of acute infarction. MRA brain and neck without significant focal stenosis or occlusion.  MRA Neck with contrast: No acute intracranial abnormalities identified.  No evidence of acute infarction    Cognitive linguistic function:  Patient continues to present with dysarthric speech, generalized oral weakness, dcr accuracy and ROM of lingual movements. Patient reports that she is having difficulty getting her words out. She denies difficulties understanding speech.   Orientation: Patient is oriented to person, place, time and situation.   Auditory comprehension: follow 1-2 part directions 100% accy   Expression: delayed but appropriate responses in conversation with SLP  Cognitive linguistic assessment was not completed 2/2 transport arrived to take patient for further testing.     Swallowing: Patient presents with overt s/s of airway threat and aspiration with thin liquids by straw  and large cup sips secondary to delayed swallow trigger. Patient presents with characteristics of oropharyngeal dysphagia. She is at risk for aspiration. Given weakness of extremities and fatigue she will need 1:1 assistance with meals. Appropriate to initiate mechanical soft chopped solids and thin liquids diet. NO STRAW. Safe swallow strategies include: HOB 90 deg, 1:1 assistance with PO intake, small bites/sips, SINGLE SMALL CUP SIPS only, checking for oral pocketing or residuals, alternating liquids and solids, multiple swallows. Skilled dysphagia and speech intervention indicated to target dysphagia and further assess cognitive linguistic function.     Goals:   Multidisciplinary Problems     SLP Goals        Problem: SLP Goal    Goal Priority Disciplines Outcome   SLP Goal    High SLP    Description:  1. Patient will be able to consume mechanical soft chopped solids and thin liquids with moderate assistance without overt s/s of airway threat or aspiration   2. Ongoing assessment of cognitive linguistic function to determine current level of function                     Plan:     · Patient to be seen:  5 x/week, 6 x/week   · Plan of Care expires:  11/19/19  · Plan of Care reviewed with:  patient   · SLP Follow-Up:  Yes       Discharge recommendations:  home health speech therapy   Barriers to Discharge:  Decreased Care Giver Support    Time Tracking:     SLP Treatment Date:   11/12/19  Speech Start Time:  0944  Speech Stop Time:  1000     Speech Total Time (min):  16 min    Billable Minutes: Laura 16 min     Judith Vasquez CCC-SLP  11/12/2019

## 2019-11-12 NOTE — CONSULTS
NEUROLOGY CONSULT NOTE  [facility]    Patient Name:  Jonathan Gonzales  Date of Consult: 2019  Admit Date:  1111  MR #:  [id number]  Acct #:  [id number]  :  1960    Physicians:  Ghulam Contreras MD (Family); FRANSISCO Hung MD  Consulting Physician:  Courtney Garsia MD       Chief Complaint/Reason for Consult: Evaluation of slurred speech       Assessment and Plan:  Jonathan Gonzales is a 59 y.o. w/ a h/o hypertension, chronic smoking, pre-diabetes and hypercholesterolemia presents for evaluation of slurred speech in the setting of chronic right hip pain and right leg weakness.    The patient states that on , 11/10/2019 she developed slurred speech which was mainly noted by her daughter, she noted that she was mixing her words. This episode lasted about 2 hours. Since then her speech has mostly improved except for intermittent episodes of slurred speech that come and go.     She denies any weakness. MRI brain, MRA head and neck are within normal limits. Her ABCD2 score is 4.         Assessment & plan notes cannot be loaded without a specified hospital service.    1. Transient Ischemic Attack  2. Hypertension  3. Chronic smoking       Recommendations:  1. Increase atorvastatin to 80mg po qhs  2. Continue Aspirin 81 mg daily and additionally load with plavix 600mg followed by plavix 75 mg starting tomorrow  X 21 days followed by aspirin 81 mg daily ( based on Point/Chance trial)   2. Patient counseled about need for continued blood pressure control  3. I additionally counseled her to quit smoking since this significantly increases her risk of stroke.   4. Follow up with Neurology in 3 weeks , patient given information to contact office   5. Follow up results of echocardiogram        History of Present Illness:  Jonathan Gonzales is a 59 y.o. female on whom I have been asked to consult for evaluation and treatment of slurred speech.     The patient states that she suddenly  "developed right hip pain. She has been in the ER twice for this and none of the medications she has been prescribed have helped. As a consequence of the pain she developed difficulty walking due to the same.    On Derrick 11/10/2019 she developed a 2 hour episode of slurred speech which was noted by her daughter more than the patient. She states that she noticed she was "mixing" her words. This episode subsequently resolved but she continued to have episodes of slurred speech lasting a few minutes or so.     She was seen at her primary care physicians office who recommended that she come in for an inpatient evaluation     Duration: 2 hours   Location: speech  Intensity: moderate   Aggravating factors: none   Relieving factors: none   Frequency: several times a day  Timing: On 11/10/2019   Associated symptoms: difficulty finding words             Outside reports reviewed: none.    Tests to date: All imaging reviewed personally by me in addition to cardiology reports.  MRA Brain   Final Result      1. No acute intracranial abnormalities identified.  No evidence of acute infarction.   2. MRA brain and neck without significant focal stenosis or occlusion.         Electronically signed by: Arcelia Hawley MD   Date:    11/11/2019   Time:    20:08      MRA Neck with contrast   Final Result      1. No acute intracranial abnormalities identified.  No evidence of acute infarction.   2. MRA brain and neck without significant focal stenosis or occlusion.         Electronically signed by: Arcelia Hawley MD   Date:    11/11/2019   Time:    20:08      MRI Brain W WO Contrast   Final Result      1. No acute intracranial abnormalities identified.  No evidence of acute infarction.   2. MRA brain and neck without significant focal stenosis or occlusion.         Electronically signed by: Arcelia Hawley MD   Date:    11/11/2019   Time:    20:08      CT Head Without Contrast   Final Result      No acute large vascular territory infarct or " intracranial hemorrhage identified.  Further evaluation/follow-up as warranted         Electronically signed by: Marc Starkey MD   Date:    11/11/2019   Time:    13:48          Labs: All labs reviewed by me.  Lab Results   Component Value Date    WBC 5.40 11/12/2019    HGB 11.2 (L) 11/12/2019    HCT 34.4 (L) 11/12/2019     11/12/2019    CHOL 189 02/16/2018    TRIG 215 (H) 02/16/2018    HDL 38 (L) 02/16/2018    LDLCALC 108.0 02/16/2018    ALT 28 11/12/2019    AST 34 11/12/2019     11/12/2019    K 3.6 11/12/2019     11/12/2019    CREATININE 1.1 11/12/2019    BUN 14 11/12/2019    TSH 1.698 11/12/2019    INR 0.9 11/12/2019    HGBA1C 6.1 (H) 11/11/2019         History:   Past Medical History:   Diagnosis Date    Bipolar disorder     Cancer of female breast 10/2010    T2 N1 Mx, Stage IIB left breast cancer    Cancer of upper-outer quadrant of female breast 7/9/2012    Depression     Diabetes mellitus type II     Disturbances of sensation of smell and taste 6/29/2012    Hypertension     Malignant neoplasm of breast (female), unspecified site 4/27/2015    Neuropathy     Nonspecific abnormal unspecified cardiovascular function study 4/12/2013    ECG READ AS SHOWING A T-WAVE ABNORMALITY     Post-mastectomy lymphedema syndrome     Schizophrenia      Past Surgical History:   Procedure Laterality Date    BREAST RECONSTRUCTION  11/23/11    B/L SHLOMO flap reconstruction S/P left MRM, right prophylactic mastectomy    BREAST RECONSTRUCTION Bilateral 3/13/2015    NIPPLE RECONSTRUCTION    MASTECTOMY Bilateral 01/2011    bilateral    TUBAL LIGATION       Family History   Problem Relation Age of Onset    Kidney disease Mother         Dialysis    Hypertension Mother     Diabetes Mother     Deep vein thrombosis Mother     Glaucoma Mother     Diabetic kidney disease Sister     Lung cancer Sister     Diabetes Sister     Diabetes Brother     Diabetes Son     No Known Problems Father     No Known  Problems Maternal Grandmother     No Known Problems Maternal Grandfather     No Known Problems Paternal Grandmother     No Known Problems Paternal Grandfather     No Known Problems Son     Cervical cancer Daughter     No Known Problems Daughter     No Known Problems Daughter     No Known Problems Daughter     Breast cancer Neg Hx     Ovarian cancer Neg Hx      Social History     Socioeconomic History    Marital status:      Spouse name: Not on file    Number of children: 6    Years of education: Not on file    Highest education level: Not on file   Occupational History    Occupation: Disability   Social Needs    Financial resource strain: Not on file    Food insecurity:     Worry: Not on file     Inability: Not on file    Transportation needs:     Medical: Not on file     Non-medical: Not on file   Tobacco Use    Smoking status: Current Every Day Smoker     Packs/day: 0.25     Years: 25.00     Pack years: 6.25     Types: Cigarettes    Smokeless tobacco: Never Used    Tobacco comment: currently at less than 1/2 pack pd   Substance and Sexual Activity    Alcohol use: No     Alcohol/week: 0.0 standard drinks    Drug use: No     Comment: 1991 - stopped using crack cocaine    Sexual activity: Never     Partners: Male   Lifestyle    Physical activity:     Days per week: Not on file     Minutes per session: Not on file    Stress: Not on file   Relationships    Social connections:     Talks on phone: Not on file     Gets together: Not on file     Attends Caodaism service: Not on file     Active member of club or organization: Not on file     Attends meetings of clubs or organizations: Not on file     Relationship status: Not on file   Other Topics Concern    Not on file   Social History Narrative    Not on file       Allergy Information:        I have reviewed the patient's allergies.       Patient has no known allergies.    Home Medications:   No current facility-administered  "medications on file prior to encounter.      Current Outpatient Medications on File Prior to Encounter   Medication Sig Dispense Refill    arm brace (WRIST SUPPORT LARGE-XLARGE MISC)       BD ULTRA-FINE MINI PEN NEEDLE 31 gauge x 3/16" Ndle Inject 1 each into the skin once daily. 100 each 2    blood-glucose meter (FREESTYLE SYSTEM KIT) kit Pt to check BG up to QID. Dispense strips compatible with pt brand meter 1 each 0    diclofenac sodium (VOLTAREN) 1 % Gel Apply 2 g topically 4 (four) times daily. 100 g 0    divalproex (DEPAKOTE) 250 MG EC tablet Take 1 tablet (250 mg total) by mouth 2 (two) times daily. 180 tablet 3    EASY TOUCH 31 gauge x 1/4" Ndle   5    insulin (LANTUS SOLOSTAR U-100 INSULIN) glargine 100 units/mL (3mL) SubQ pen INJECT  23 UNITS SUBCUTANEOUSLY EVERY EVENING 15 mL 0    lancets (ONETOUCH ULTRASOFT LANCETS) [facility] [place] to check BG up to QID. Dispense strips compatible with pt brand meter 100 each 11    lidocaine (LIDODERM) 5 % Apply up to 3 patches to painful areas. Remove in 12 hours. No more than 3 patches should be used in a 24-hour period. 15 patch 0    lisinopril-hydrochlorothiazide (PRINZIDE,ZESTORETIC) 10-12.5 mg per tablet TAKE 1 TABLET BY MOUTH EVERY DAY 30 tablet 11    metFORMIN (GLUCOPHAGE) 500 MG tablet Take 1 tablet (500 mg total) by mouth 2 (two) times daily with meals. 180 tablet 3    OLANZapine (ZYPREXA) 10 MG tablet TK 1 T PO QHS 90 tablet 3    oxybutynin (DITROPAN-XL) 10 MG 24 hr tablet TAKE 1 TABLET(10 MG) BY MOUTH EVERY DAY 90 tablet 3    pen needle, diabetic (BD ULTRA-FINE [place] PEN NEEDLES) 32 gauge x 5/32" Ndle Pt is injecting once daily 30 each 11    pen needle, diabetic, safety 29 gauge x 3/16" Ndle Use as instructed 200 each 11    pregabalin (LYRICA) 100 MG capsule TAKE 1 CAPSULE TWICE DAILY 60 capsule 0    QUEtiapine (SEROQUEL) 100 MG Tab Take 1 tablet (100 mg total) by mouth every evening. 90 tablet 0    TRUE METRIX GLUCOSE METER Misc       TRUEPLUS " LANCETS 30 gauge Misc USE TO TEST BLOOD SUGART QID  11    VICTOZA 3-HERMANN 0.6 mg/0.1 mL (18 mg/3 mL) PnIj ADMINISTER 1.8 MG UNDER THE SKIN EVERY DAY 27 mL 0    ziprasidone (GEODON) 40 MG Cap TAKE 1 CAPSULE(40 MG) BY MOUTH EVERY EVENING 30 capsule 0    naproxen (NAPROSYN) 500 MG tablet Take 1 tablet (500 mg total) by mouth 2 (two) times daily with meals. for 14 days 28 tablet 0    TRUEPLUS LANCETS 33 gauge Misc 100 lancets by Subconjunctival route 4 (four) times daily. 100 each 0       Hospital Medications Reviewed in EPIC   aspirin  81 mg Oral Daily    atorvastatin  40 mg Oral Daily    divalproex  250 mg Oral BID    enoxaparin  40 mg Subcutaneous Daily    oxybutynin  5 mg Oral Daily    pregabalin  100 mg Oral BID    QUEtiapine  100 mg Oral QHS       Review of Systems:    Review of Systems   Constitutional: Negative for chills, fever and weight loss.   HENT: Negative for hearing loss.    Eyes: Negative for blurred vision and double vision.   Respiratory: Negative for cough and hemoptysis.    Cardiovascular: Negative for chest pain and palpitations.   Gastrointestinal: Positive for constipation.   Genitourinary: Negative for dysuria and urgency.   Musculoskeletal: Positive for back pain, joint pain and neck pain.   Skin: Negative for rash.   Neurological: Positive for sensory change, speech change and headaches. Negative for tingling and tremors.   Endo/Heme/Allergies: Does not bruise/bleed easily.   Psychiatric/Behavioral: Positive for depression and substance abuse.             GENERAL:  General appearance: Well, non-toxic appearing.  No apparent distress.    Neck: supple.    Extremities: normal.    MENTAL STATUS:  Alertness, attention span & concentration: normal.  Language: normal.  Orientation to self, place & time:  normal.  Memory, recent & remote: normal.  Fund of knowledge: normal.      SPEECH:  Clear and fluent.  Follows complex commands.    CRANIAL NERVES:  Cranial Nerves II-XII were examined.  II  - Visual fields: normal.  III, IV, VI: PERRL, EOMI, No ptosis, No nystagmus.  V - Diminished sensation to light touch over V1, V2 regions on the right and diminished sensation to   VII - Face symmetry & mobility: normal.  VIII - Hearing: normal.  IX, X - Palate: mobile & midline.  XI - Shoulder shrug: normal.  XII - Tongue protrusion: normal.    GROSS MOTOR:  Gait & station: Limping gait walks with cane   Tone: normal.  Abnormal movements: none.  Finger-nose & Heel-knee-shin: normal.  Rapid alternating movements & drift: normal.    MUSCLE STRENGTH:     Fascics Atrophy RIGHT    LEFT Atrophy Fascics     5 Neck Ext. 5       5 Neck Flex 5       5 Deltoids 5       5 Sh.Ext.Rot. 5       5 Sh.Int.Rot. 5       5 Biceps 5       5 Triceps 5       5 Forearm.Pr. 5       5 Wrist Ext. 5       5 Wrist Flex 5       5 Finger Ext. 5       5 Finger Flex 5        FPL        5 Inteross. 5        FDI                 5 Iliopsoas 5       5 Hip Abduct 5       5 Hip Adduct 5       4 Quads 5       5 Hams 5       5 Dorsiflex 5       5 Plantar Flex 5        Ankle Hemant         Ankle Invert        5 Toe Ext. 5       5 Toe Flex 5                         REFLEXES:    RIGHT Reflex   LEFT   2+ Biceps 2+   2+ Brachiorad. 2+   2+ Triceps 2+        0 Patellar 0   0 Ankle 0        Down PLANTAR Down     SENSORY:  Light touch: Normal throughout.  Sharp touch: See face exam and additionally has gradient to pinprick up to the level of the ankles bilaterally  Vibration:   5 seconds at the great toes  Temperature: Gradient to ankles bilaterally           Courtney Garsia M.D      This note was created with voice recognition software.  Grammatical, syntax and spelling errors may be inevitable.

## 2019-11-12 NOTE — ASSESSMENT & PLAN NOTE
- TIA, report of dysarthria and lower extremity weakness several days prior to arrival  - CT head and MRI/MRA brain/neck negative for acute process; no evidence for infarct or hemorrhage.  - some dysarthria and extremity weakness that may be related to medications for bipolar and neuropathy  - continue stroke work up for possible TIA   - Neuro recs  - Increase atorvastatin to 80mg po qhs, continue Aspirin 81 mg daily and additionally load with plavix 600mg followed by plavix 75 mg starting tomorrow  X 21 days followed by aspirin 81 mg daily ( based on Point/Chance trial)

## 2019-11-12 NOTE — ASSESSMENT & PLAN NOTE
- A1c 6.1  - home regimen metformin 500 mg bid and Victoza 1.8 mg daily  - will start with moderate dose sliding scale and will likely need to adjust  - diabetic diet

## 2019-11-12 NOTE — PLAN OF CARE
Problem: Occupational Therapy Goal  Goal: Occupational Therapy Goal  Description  Goals to be met by: 11/22/19    Patient will increase functional independence with ADLs by performing:    UE Dressing with Set-up Assistance.  LE Dressing with Supervision.  Grooming while standing with Supervision.  Step transfer with Supervision  Toilet transfer to toilet with Supervision.  Toileting at Supervision level.      Outcome: Ongoing, Progressing   Evaluation complete.  Pt reports that she lives alone and that her children check in on her but she doesn't have 24 hr assist.  Pt is needing assist with all self care tasks and ADL mobility at this time.

## 2019-11-12 NOTE — ASSESSMENT & PLAN NOTE
- A1c pending with morning labs; most recent 6.3 in 2/2019  - home regimen metformin 500 mg bid and Victoza 1.8 mg daily  - will start with moderate dose sliding scale and will likely need to adjust  - diabetic diet

## 2019-11-12 NOTE — ASSESSMENT & PLAN NOTE
Patient was counseled on smoknig cessation for 6 minutes.  Nicotine replacement offered.  Patient declined and not currently intersted in quiting smoking at this time.

## 2019-11-12 NOTE — PT/OT/SLP EVAL
Physical Therapy Evaluation and Treatment    Patient Name:  Jonathan Gonzales   MRN:  [id number]    Recommendations:     Discharge Recommendations:  home health PT, home health OT(Pt refusing SNF, will require intial increased assistance from family upon d/c)   Discharge Equipment Recommendations: rollator(if rollator not covered, RW)   Barriers to discharge: Inaccessible home and Decreased caregiver support    Assessment:     Jonathan Gonzales is a 59 y.o. female admitted with a medical diagnosis of Stroke-like symptoms.  She presents with the following impairments/functional limitations:  weakness, impaired endurance, impaired sensation, impaired self care skills, impaired functional mobilty, gait instability, impaired balance, decreased coordination, decreased lower extremity function, decreased upper extremity function, decreased safety awareness, pain, decreased ROM, impaired muscle length.    Patient evaluated by PT and goals established. Patient strongly opposed to rec for SNF, preferring d/c to home with increased assistance from family. Gait trained with RW and rollator with decreased pain in R hip and increased stability and endurance, patient with preference for rollator. Amb x20 ft with cane with CGA-Blair, x20 ft with RW with CGA-SBA, and x40 ft with rollator and SBA. Sit<>stand with CGA-SBA with above devices thru-out session. PT will continue to follow and progress as tolerated. Rec for d/c to home with HH PT/OT, rollator, and increased assistance from family upon initial dc.    Rehab Prognosis: Fair; patient would benefit from acute skilled PT services to address these deficits and reach maximum level of function.    Recent Surgery: * No surgery found *      Plan:     During this hospitalization, patient to be seen 5 x/week to address the identified rehab impairments via gait training, therapeutic activities, therapeutic exercises, neuromuscular re-education and progress toward the following  "goals:    · Plan of Care Expires:  11/26/19    Subjective     Chief Complaint: Pain in R hip/leg  Patient/Family Comments/goals: To go home; "I'm not gonna go to a nursing home!"  Pain/Comfort:  · Pain Rating 1: 0/10  · Location - Side 1: Right  · Location - Orientation 1: posterior  · Location 1: gluteal  · Pain Addressed 1: Reposition, Distraction  · Pain Rating Post-Intervention 1: 0/10(Pain with amb, improved with use of BUE support on AD)    Patients cultural, spiritual, Sikhism conflicts given the current situation: no    Living Environment: Per OT eval by Mini Mccoy and confirmed by patient:  "Lives alone in one story home with 3 steps to enter, handrail on left side, tub/shower  Previous level of function: Mod I, Pt doesn't drive; use of cane for all ambulation  Roles and Routines: Pt unable to get out into the community without assist from children  Equipment Used at Home:  cane, straight  Assistance upon Discharge: Pt will not have 24 hr assist"    Objective:     Communicated with CHONG Ochoa prior to session.  Patient found HOB elevated with SCD, peripheral IV  upon PT entry to room. Patient agreeable to evaluation.    General Precautions: Standard, diabetic, fall   Orthopedic Precautions:N/A   Braces: N/A(compression sleeve to LUE)     Patient donned yellow socks and gait belt for OOB mobility.    Exams:  · Cognition:   · Patient is oriented x4.  · Pt follows approximately 100% of one and two step commands.    · Mood: Pleasant and cooperative.  · Musculoskeletal:  · Posture:    · Rounded shoulders  · Forward head  · Special Tests:  · (+) SLR on R, (-) SLR on L  · LE ROM/Strength:   · R ROM: WFL, except limited hamstring flexibility due to sciatic nerve pain  · L ROM: WFL  · R Strength:   · Hip flexion: 2/5  · Knee extension: 4/5  · Dorsiflexion: 4/5   · L Strength:   · Hip flexion: 3+/5  · Knee extension: 5/5  · Dorsiflexion: 5/5   · Neuromuscular:  · Sensation: Pt reports neuropathy in (B) " feet  · Tone/Reflexes: No impairments identified with functional mobility. No formal testing performed.   · Coordination:  · Heel to shin: Intact  · Balance:   · Static sitting: Good  · Static standing: Fair-, requires UE support for >30 sec standing  · Dynamic standing: Fair-, requires UE support for amb  · Visual-vestibular: No impairments identified with functional mobility. No formal testing performed.  · Integument: Visible skin intact  · Cardiopulmonary:  · Edema: Noted swelling to dorsum of (B) feet      Functional Mobility:  · Bed Mobility:     · Supine to Sit: stand by assistance - HOB elevated  · Transfers:     · Sit to Stand:  stand by assistance and contact guard assistance with no AD, rolling walker, 4 wheeled walker and straight cane  · Gait: x20 ft with cane and CGA-Stephanie, x20 ft with RW and CGA-SBA, and x40 ft with rollator and SBA.  · With cane, Gait deviations noted: decreased margy, increased time in double stance, decreased step length and increased lateral sway with step to gait and increased fatigue at end of gait bout   · With RW, improved to step thru and decreased lateral sway with continued decreased step length (B)  · With rollator, continued as above with slight increase in lateral sway vs with RW  · Patient with preference for rollator, due to decreased endurance would be appropriate device to allow for safe seated rest breaks during longer gait bouts  · Stairs:  Pt ascended/descended 4 stair(s) with Straight Cane with right handrail with Stand-by Assistance.   · Performed with step to pattern leading with R LE, when instructed to try leading with LLE when ascending pt reports more difficult      Therapeutic Activities and Exercises:  · Gait training with cane, RW, and rollator  PT educated patient re:   PT plan of care/role of PT  Safety with OOB mobility  Use of RW vs rollator  Discharge disposition   - therapy recs for SNF, patient refusing, reinforced need for increased assistance  upon d/c to home  Pt verbalized understanding       AM-PAC 6 CLICK MOBILITY  Total Score:19     Patient left up in chair with all lines intact, call button in reach and RN Don and  Ziggy notified.    GOALS:   Multidisciplinary Problems     Physical Therapy Goals        Problem: Physical Therapy Goal    Goal Priority Disciplines Outcome Goal Variances Interventions   Physical Therapy Goal     PT, PT/OT Ongoing, Progressing     Description:  Goals to be met by: 19    Patient will increase functional independence with mobility by performin. Sit<>stand with mod(I) with LRAD.  2. Gait x 150 feet with mod(I) with LRAD.  3. Demo safety awareness with use of rollator during gait bout with seated rest break without cues.  4. Ascend/descend 4 steps with unilateral HR and cane with supervision.                        History:     Past Medical History:   Diagnosis Date    Bipolar disorder     Cancer of female breast 10/2010    T2 N1 Mx, Stage IIB left breast cancer    Cancer of upper-outer quadrant of female breast 2012    Depression     Diabetes mellitus type II     Disturbances of sensation of smell and taste 2012    Hypertension     Malignant neoplasm of breast (female), unspecified site 2015    Neuropathy     Nonspecific abnormal unspecified cardiovascular function study 2013    ECG READ AS SHOWING A T-WAVE ABNORMALITY     Post-mastectomy lymphedema syndrome     Schizophrenia        Past Surgical History:   Procedure Laterality Date    BREAST RECONSTRUCTION  11    B/L SHLOMO flap reconstruction S/P left MRM, right prophylactic mastectomy    BREAST RECONSTRUCTION Bilateral 3/13/2015    NIPPLE RECONSTRUCTION    MASTECTOMY Bilateral 2011    bilateral    TUBAL LIGATION         Time Tracking:     PT Received On: 19  PT Start Time: 1317     PT Stop Time: 1350  PT Total Time (min): 33 min     Billable Minutes: Evaluation 18 and Gait Training 15      Racquel  Rao, PT  11/12/2019

## 2019-11-12 NOTE — PT/OT/SLP EVAL
Occupational Therapy   Evaluation    Name: Jonathan Gonzales  MRN: [id number]  Admitting Diagnosis:  Stroke-like symptoms      Recommendations:     Discharge Recommendations: nursing facility, skilled  Discharge Equipment Recommendations:  3-in-1 commode, bath bench(See PT evaluation for recommended ambulation device)  Barriers to discharge:  Inaccessible home environment, Decreased caregiver support    Assessment:     Jonathan Gonzales is a 59 y.o. female with a medical diagnosis of Stroke-like symptoms.  She presents agreeable to OT evaluation. Performance deficits affecting function: impaired endurance, weakness, impaired sensation, impaired self care skills, impaired functional mobilty, impaired balance, gait instability, decreased coordination, decreased lower extremity function, decreased upper extremity function, decreased safety awareness.      First recommendation is SNF with OT, PT and SLP.  Due to pt not receptive to SNF recommendation, recommend Home with assist and Home Health OT, PT and SLP.  Pt is a fall risk.  Pt is needing a BSC and extended tub bench.  Pt is not safe to get down into tub as she usually does when bathing.     Rehab Prognosis: Good; patient would benefit from acute skilled OT services to address these deficits and reach maximum level of function.       Plan:     Patient to be seen 6 x/week to address the above listed problems via self-care/home management, therapeutic activities  · Plan of Care Expires: 12/12/19  · Plan of Care Reviewed with: patient    Subjective     Chief Complaint: Pt reports she can't get around like she used to and fatigues easily  Patient/Family Comments/goals: Pt wants to return to PLOF.     Occupational Profile:  Living Environment: Lives alone in one story home with 3 steps to enter, handrail on left side, tub/shower  Previous level of function: Mod I, Pt doesn't drive  Roles and Routines: Pt unable to get out into the community without assist from  children  Equipment Used at Home:  cane, straight  Assistance upon Discharge: Pt will not have 24 hr assist    Pain/Comfort:  · Pain Rating 1: 0/10  · Pain Rating Post-Intervention 1: 0/10    Patients cultural, spiritual, Holiness conflicts given the current situation: no    Objective:     Communicated with: nurse prior to session.  Patient found HOB elevated with peripheral IV upon OT entry to room.    General Precautions: Standard, fall, diabetic, other (see comments)(Peripheral neuropathy bilateral hands/feet)   Orthopedic Precautions:N/A   Braces: (Compression sleeve to left UE)     Occupational Performance:    Bed Mobility:    · Patient completed Rolling/Turning to Right with stand by assistance  · Patient completed Supine to Sit with contact guard assistance    Functional Mobility/Transfers:  · Patient completed Sit <> Stand Transfer with contact guard assistance  with  straight cane   · Patient completed Chair Transfer using Step Transfer technique with minimum assistance with straight cane; Pt was very fatigued after ambulating to chair from sink after performing hand hygiene after toileting using BSC.   · Patient completed BSC Transfer Step Transfer technique with minimum assistance with  straight cane  · Functional Mobility: Pt used a SPC for mobility at home.      Activities of Daily Living:  · Upper Body Dressing: minimum assistance    · Lower Body Dressing: contact guard assistance    · Toileting: minimum assistance using BSC    Cognitive/Visual Perceptual:  Cognitive/Psychosocial Skills:     -       Oriented to: Person, Place, Time and Situation   -       Follows Commands/attention:Follows one-step commands  -       Communication: dysarthria  -       Memory: Needs further assessement  -       Safety awareness/insight to disability: Fair   -       Mood/Affect/Coping skills/emotional control: Cooperative    Physical Exam:  Skin integrity: Visible skin intact  Edema:  None noted  Sensation:  Neuropathy  bilateral feet and hands  Motor Planning:  Decreased   Dominant hand:  Right  Upper Extremity Range of Motion:     -       Right Upper Extremity: WFL  -       Left Upper Extremity: WFL  Upper Extremity Strength:    -       Right Upper Extremity: 4/5  -       Left Upper Extremity: 4/5   Strength:    -       Right Upper Extremity: WFL  -       Left Upper Extremity: WFL  Fine Motor Coordination:  Fair  Gross motor coordination:   Fair    AMPAC 6 Click ADL:  AMPAC Total Score: 19    Treatment & Education:  Role of OT, POC, need for 24 hr assistance and continued therapy at this time, pt is a fall risk  Education:    Patient left up in chair with all lines intact, nurse notified and SLP present    GOALS:   Multidisciplinary Problems     Occupational Therapy Goals        Problem: Occupational Therapy Goal    Goal Priority Disciplines Outcome Interventions   Occupational Therapy Goal     OT, PT/OT Ongoing, Progressing    Description:  Goals to be met by: 11/22/19    Patient will increase functional independence with ADLs by performing:    UE Dressing with Set-up Assistance.  LE Dressing with Supervision.  Grooming while standing with Supervision.  Step transfer with Supervision  Toilet transfer to toilet with Supervision.  Toileting at Supervision level.                       History:     Past Medical History:   Diagnosis Date    Bipolar disorder     Cancer of female breast 10/2010    T2 N1 Mx, Stage IIB left breast cancer    Cancer of upper-outer quadrant of female breast 7/9/2012    Depression     Diabetes mellitus type II     Disturbances of sensation of smell and taste 6/29/2012    Hypertension     Malignant neoplasm of breast (female), unspecified site 4/27/2015    Neuropathy     Nonspecific abnormal unspecified cardiovascular function study 4/12/2013    ECG READ AS SHOWING A T-WAVE ABNORMALITY     Post-mastectomy lymphedema syndrome     Schizophrenia        Past Surgical History:   Procedure  Laterality Date    BREAST RECONSTRUCTION  11/23/11    B/L SHLOMO flap reconstruction S/P left MRM, right prophylactic mastectomy    BREAST RECONSTRUCTION Bilateral 3/13/2015    NIPPLE RECONSTRUCTION    MASTECTOMY Bilateral 01/2011    bilateral    TUBAL LIGATION         Time Tracking:     OT Date of Treatment: 11/12/19  OT Start Time: 0930  OT Stop Time: 0942  OT Total Time (min): 12 min    Billable Minutes:Evaluation 12    ROSALIE Guerrero  11/12/2019

## 2019-11-12 NOTE — ASSESSMENT & PLAN NOTE
- chronic and per medical record follows pain management with multiple cervical RFA  - suspect related to upper extremity weakness and sensory deficit  - Neurology consulted and appreciate recommendations

## 2019-11-12 NOTE — PLAN OF CARE
Plan of care reviewed with pt. Pt AAOx4, NAD. Pt reports chronic pain to right hip. Pt denies n/v, SOB, dizziness or lightheadedness. VS stable. Pt remains afebrile. Glucose monitored and wnl. Pt has a cane for ambulation at bedside. Cardiac monitoring in place. Sinus rhythm noted. Neuro check performed. JUAN performed. Pt tolerated pudding. She reports she does not eat or like applesauce. Pt reports coughing occasionally with thin liquids. Pt remains free from injury or falls. Safety measures implemented. Bed is lowered and locked. Call light is within reach. Will continue to monitor pt.

## 2019-11-12 NOTE — ASSESSMENT & PLAN NOTE
- appears to be chronic problem   - ambulates with cane at baseline  - followed  pain management at one point  - bilateral lower extremity weakness that is likely related to debility and chronic low back pain  - PT/OT evaluation pending

## 2019-11-12 NOTE — ASSESSMENT & PLAN NOTE
- breast cancer 2011 and s/p mastectomy  - lymphedema left upper extremity with sleeve in place  - PT/OT evaluation appreciated

## 2019-11-12 NOTE — H&P
[facility] Hospital Medicine  History & Physical    Patient Name: Jonathan Gonzales  MRN: [id number]  Admission Date: 11/11/2019  Attending Physician: Masoud Peña MD   Primary Care Provider: Ghulam Contreras MD         Patient information was obtained from patient, past medical records and ER records.     Subjective:     Principal Problem:Stroke-like symptoms    Chief Complaint:   Chief Complaint   Patient presents with    Aphasia     pt was in the internal med clinic for follow up from ED visit for right hip pain. pt report she noted her speech became thick about 2-3 days ago and pt report increased weakness. upon evaluation at triage both upper extremeties weak, speech thick, finger to nose test equal bilaterally. sensation on the right side of face and shoulder different.          HPI: Jonathan Gonzales is a 59 y.o. female who  presented to the [facility] ED from her Primary Care office for evaluation of family report patient with dysarthria and right lower extremities weakness.  The patient reported that she was noted to have slurred speech noted three days prior to arrival.  The patient reports right sided weakness and right hip pain that she reports is not new.  Per medical record, the patient was evaluated for this right lower extremity weakness that was thought to be associated with pre-existing sciatica.  She was discharged home on topical anti-inflammatory gel and lidocaine patches that she reports have not been effective.  She states she receives the most relief with ibuprofen 800 mg as needed.  Initial evaluation with CT head that revealed no evidence of acute infarct or hemorrhage. Her labs and vital signs were essentially normal. NIHSS = 2 for mild dysarthria and lower ataxia.   She will be admitted under Observation status for stroke work up.      She ha medical history of bipolar disorder, breast cancer (2010, 2012) s/p mastectomy with post mastectomy lymphedema  "left upper extremity, depression, type 2 diabetes mellitus uncontrolled with peripheral neuropathy, and hypertension.  She has history of sciatica and lumbar spine pain as well as left hip pain.       Past Medical History:   Diagnosis Date    Bipolar disorder     Cancer of female breast 10/2010    T2 N1 Mx, Stage IIB left breast cancer    Cancer of upper-outer quadrant of female breast 7/9/2012    Depression     Diabetes mellitus type II     Disturbances of sensation of smell and taste 6/29/2012    Hypertension     Malignant neoplasm of breast (female), unspecified site 4/27/2015    Neuropathy     Nonspecific abnormal unspecified cardiovascular function study 4/12/2013    ECG READ AS SHOWING A T-WAVE ABNORMALITY     Post-mastectomy lymphedema syndrome     Schizophrenia        Past Surgical History:   Procedure Laterality Date    BREAST RECONSTRUCTION  11/23/11    B/L SHLOMO flap reconstruction S/P left MRM, right prophylactic mastectomy    BREAST RECONSTRUCTION Bilateral 3/13/2015    NIPPLE RECONSTRUCTION    MASTECTOMY Bilateral 01/2011    bilateral    TUBAL LIGATION         Review of patient's allergies indicates:  No Known Allergies    No current facility-administered medications on file prior to encounter.      Current Outpatient Medications on File Prior to Encounter   Medication Sig    arm brace (WRIST SUPPORT LARGE-XLARGE MISC)     BD ULTRA-FINE MINI PEN NEEDLE 31 gauge x 3/16" Ndle Inject 1 each into the skin once daily.    blood-glucose meter (FREESTYLE SYSTEM KIT) kit Pt to check BG up to QID. Dispense strips compatible with pt brand meter    diclofenac sodium (VOLTAREN) 1 % Gel Apply 2 g topically 4 (four) times daily.    divalproex (DEPAKOTE) 250 MG EC tablet Take 1 tablet (250 mg total) by mouth 2 (two) times daily.    EASY TOUCH 31 gauge x 1/4" Ndle     insulin (LANTUS SOLOSTAR U-100 INSULIN) glargine 100 units/mL (3mL) SubQ pen INJECT  23 UNITS SUBCUTANEOUSLY EVERY EVENING    " "lancets (ONETOUCH ULTRASOFT LANCETS) [facility] Pt to check BG up to QID. Dispense strips compatible with pt brand meter    lidocaine (LIDODERM) 5 % Apply up to 3 patches to painful areas. Remove in 12 hours. No more than 3 patches should be used in a 24-hour period.    lisinopril-hydrochlorothiazide (PRINZIDE,ZESTORETIC) 10-12.5 mg per tablet TAKE 1 TABLET BY MOUTH EVERY DAY    metFORMIN (GLUCOPHAGE) 500 MG tablet Take 1 tablet (500 mg total) by mouth 2 (two) times daily with meals.    OLANZapine (ZYPREXA) 10 MG tablet TK 1 T PO QHS    oxybutynin (DITROPAN-XL) 10 MG 24 hr tablet TAKE 1 TABLET(10 MG) BY MOUTH EVERY DAY    pen needle, diabetic (BD ULTRA-FINE [place] PEN NEEDLES) 32 gauge x 5/32" Ndle Pt is injecting once daily    pen needle, diabetic, safety 29 gauge x 3/16" Ndle Use as instructed    pregabalin (LYRICA) 100 MG capsule TAKE 1 CAPSULE TWICE DAILY    QUEtiapine (SEROQUEL) 100 MG Tab Take 1 tablet (100 mg total) by mouth every evening.    TRUE METRIX GLUCOSE METER Misc     TRUEPLUS LANCETS 30 gauge Misc USE TO TEST BLOOD SUGART QID    VICTOZA 3-HERMANN 0.6 mg/0.1 mL (18 mg/3 mL) PnIj ADMINISTER 1.8 MG UNDER THE SKIN EVERY DAY    ziprasidone (GEODON) 40 MG Cap TAKE 1 CAPSULE(40 MG) BY MOUTH EVERY EVENING    [DISCONTINUED] blood sugar diagnostic Strp Pt to check BG up to QID. Dispense strips compatible with pt brand meter    [DISCONTINUED] lisinopril-hydrochlorothiazide (PRINZIDE,ZESTORETIC) 10-12.5 mg per tablet TAKE 1 TABLET EVERY DAY    naproxen (NAPROSYN) 500 MG tablet Take 1 tablet (500 mg total) by mouth 2 (two) times daily with meals. for 14 days    TRUEPLUS LANCETS 33 gauge Misc 100 lancets by Subconjunctival route 4 (four) times daily.    [DISCONTINUED] arm brace Misc 1 application by Misc.(Non-Drug; Combo Route) route once daily.    [DISCONTINUED] blood-glucose meter kit Use as instructed    [DISCONTINUED] flu vac [id number] 36mos up,PF, 60 mcg (15 mcg x 4)/0.5 mL Syrg inject into the " muscle    [DISCONTINUED] lancets Misc 1 Device by Misc.(Non-Drug; Combo Route) route 4 (four) times daily. Please provide with appropriate lancets for device covered by insurance.    [DISCONTINUED] methylPREDNISolone (MEDROL DOSEPACK) 4 mg tablet use as directed     Family History     Problem Relation (Age of Onset)    Cervical cancer Daughter    Deep vein thrombosis Mother    Diabetes Mother, Sister, Brother, Son    Diabetic kidney disease Sister    Glaucoma Mother    Hypertension Mother    Kidney disease Mother    Lung cancer Sister    No Known Problems Father, Maternal Grandmother, Maternal Grandfather, Paternal Grandmother, Paternal Grandfather, Son, Daughter, Daughter, Daughter        Tobacco Use    Smoking status: Current Every Day Smoker     Packs/day: 0.25     Years: 25.00     Pack years: 6.25     Types: Cigarettes    Smokeless tobacco: Never Used    Tobacco comment: currently at less than 1/2 pack pd   Substance and Sexual Activity    Alcohol use: No     Alcohol/week: 0.0 standard drinks    Drug use: No     Comment: 1991 - stopped using crack cocaine    Sexual activity: Never     Partners: Male     Review of Systems   Constitutional: Negative for chills and fever.   HENT: Negative for congestion and trouble swallowing.    Eyes: Negative for photophobia and visual disturbance.   Respiratory: Negative for cough, chest tightness and shortness of breath.    Cardiovascular: Negative for chest pain and palpitations.   Gastrointestinal: Negative for abdominal pain, diarrhea, nausea and vomiting.   Endocrine: Negative.    Genitourinary: Negative for dysuria, hematuria and urgency.   Musculoskeletal: Positive for arthralgias, back pain, gait problem and myalgias.   Neurological: Positive for speech difficulty. Negative for seizures, weakness, numbness and headaches.   Psychiatric/Behavioral: Negative for agitation and hallucinations. The patient is not nervous/anxious.      Objective:     Vital Signs (Most  Recent):  Temp: 98.4 °F (36.9 °C) (11/11/19 1656)  Pulse: 67 (11/11/19 1656)  Resp: 18 (11/11/19 1656)  BP: 129/81 (11/11/19 1656)  SpO2: 98 % (11/11/19 1656) Vital Signs (24h Range):  Temp:  [98.4 °F (36.9 °C)-98.5 °F (36.9 °C)] 98.4 °F (36.9 °C)  Pulse:  [67-94] 67  Resp:  [18-22] 18  SpO2:  [96 %-98 %] 98 %  BP: (100-129)/(67-81) 129/81     Weight: 85.8 kg (189 lb 1.6 oz)  Body mass index is 33.5 kg/m².    Physical Exam   Constitutional: She is oriented to person, place, and time. She appears well-developed and well-nourished.   HENT:   Head: Normocephalic and atraumatic.   Mouth/Throat: Oropharynx is clear and moist.   Eyes: Pupils are equal, round, and reactive to light. Conjunctivae and lids are normal.   Neck: Normal range of motion. Neck supple. No JVD present.   Cardiovascular: Normal rate, regular rhythm, normal heart sounds and intact distal pulses.   Pulmonary/Chest: Effort normal and breath sounds normal.   Abdominal: Soft. Normal appearance and bowel sounds are normal. There is no tenderness.   Musculoskeletal: She exhibits no deformity.        Right hip: She exhibits decreased range of motion and decreased strength.        Lumbar back: She exhibits tenderness.   Neurological: She is alert and oriented to person, place, and time. She has normal strength. No sensory deficit.   Skin: Skin is warm, dry and intact.   Psychiatric: She has a normal mood and affect. Her behavior is normal. Thought content normal. Her speech is slurred. Cognition and memory are normal.   Nursing note and vitals reviewed.        CRANIAL NERVES     CN III, IV, VI   Pupils are equal, round, and reactive to light.    Stroke Team Times:   Date and Time Taken: 11/11/2019 4:14 PM    NIH Stroke Scale:  Interval: baseline (upon arrival/admit)  Level of Consciousness: 0 - alert  LOC Questions: 0 - answers both correctly  LOC Commands: 0 - performs both correctly  Best Gaze: 0 - normal  Visual: 0 - no visual loss  Facial Palsy: 0 -  normal  Motor Left Arm: 0 - no drift  Motor Right Arm: 0 - no drift  Motor Left Le - no drift  Motor Right Le - no drift  Limb Ataxia: 1 - present in one limb  Sensory: 0 - normal  Best Language: 0 - no aphasia  Dysarthria: 1 - mild to moderate dysarthria  Extinction and Inattention: 0 - no neglect  NIH Stroke Scale Total: 2         Significant Labs:   CBC:   Recent Labs   Lab 19  1419   WBC 5.96   HGB 12.0   HCT 36.6*        CMP:   Recent Labs   Lab 19  1419      K 3.9      CO2 28   GLU 80   BUN 17   CREATININE 1.3   CALCIUM 8.9   PROT 7.4   ALBUMIN 3.2*   BILITOT 0.2   ALKPHOS 55   AST 29   ALT 27   ANIONGAP 10   EGFRNONAA 45*          All pertinent labs within the past 24 hours have been reviewed.    Significant Imaging: I have reviewed all pertinent imaging results/findings within the past 24 hours.    Assessment/Plan:     * Stroke-like symptoms  - report of dysarthria and lower extremity weakness several days prior to arrival  - CT head and MRI/MRA brain/neck negative for acute process; no evidence for infarct or hemorrhage.  - some dysarthria and extremity weakness that may be related to medications for bipolar and neuropathy  - continue stroke work up for possible TIA   - depakote level pending to assess for toxicity      Hip pain        Chronic bilateral low back pain with bilateral sciatica  - appears to be chronic problem   - ambulates with cane at baseline  - followed  pain management at one point  - bilateral lower extremity weakness that is likely related to debility and chronic low back pain  - PT/OT evaluation pending      Post-mastectomy lymphedema syndrome  - breast cancer  and s/p mastectomy  - lymphedema left upper extremity with sleeve in place  - PT/OT evaluation appreciated      Cervical radiculopathy  - chronic and per medical record follows pain management with multiple cervical RFA  - suspect related to upper extremity weakness and sensory  deficit  - Neurology consulted and appreciate recommendations      Uncontrolled type 2 diabetes mellitus, with long-term current use of insulin  - A1c pending with morning labs; most recent 6.3 in 2/2019  - home regimen metformin 500 mg bid and Victoza 1.8 mg daily  - will start with moderate dose sliding scale and will likely need to adjust  - diabetic diet      Tobacco abuse  Patient was counseled on smoknig cessation for 6 minutes.  Nicotine replacement offered.  Patient declined and not currently intersted in quiting smoking at this time.      Peripheral neuropathy  - chronic and continue lyrica  - ambulates with cane  - fall precautions      VTE Risk Mitigation (From admission, onward)         Ordered     enoxaparin injection 40 mg  Daily      11/11/19 1615     IP VTE HIGH RISK PATIENT  Once      11/11/19 1659     Place sequential compression device  Until discontinued      11/11/19 1615                   Jewell Martinez NP  Department of Hospital Medicine   [facility]

## 2019-11-12 NOTE — SUBJECTIVE & OBJECTIVE
Interval History: no overnight events    Review of Systems   Constitutional: Negative for chills and fever.   HENT: Negative for congestion and trouble swallowing.    Eyes: Negative for photophobia and visual disturbance.   Respiratory: Negative for cough, chest tightness and shortness of breath.    Cardiovascular: Negative for chest pain and palpitations.   Gastrointestinal: Negative for abdominal pain, diarrhea, nausea and vomiting.   Endocrine: Negative.    Genitourinary: Negative for dysuria, hematuria and urgency.   Musculoskeletal: Positive for arthralgias, back pain and gait problem. Negative for myalgias.   Neurological: Positive for speech difficulty. Negative for seizures, weakness, numbness and headaches.   Psychiatric/Behavioral: Negative for agitation and hallucinations. The patient is not nervous/anxious.      Objective:     Vital Signs (Most Recent):  Temp: 98.2 °F (36.8 °C) (11/12/19 1534)  Pulse: 76 (11/12/19 1534)  Resp: 16 (11/12/19 1534)  BP: 125/66 (11/12/19 1534)  SpO2: 97 % (11/12/19 1534) Vital Signs (24h Range):  Temp:  [97.9 °F (36.6 °C)-98.7 °F (37.1 °C)] 98.2 °F (36.8 °C)  Pulse:  [66-78] 76  Resp:  [16-20] 16  SpO2:  [94 %-98 %] 97 %  BP: (106-134)/(59-93) 125/66     Weight: 85.8 kg (189 lb 1.6 oz)  Body mass index is 33.5 kg/m².  No intake or output data in the 24 hours ending 11/12/19 1751   Physical Exam   Constitutional: She is oriented to person, place, and time. She appears well-developed and well-nourished.   HENT:   Head: Normocephalic and atraumatic.   Mouth/Throat: Oropharynx is clear and moist.   Eyes: Pupils are equal, round, and reactive to light. Conjunctivae and lids are normal.   Neck: Normal range of motion. Neck supple. No JVD present.   Cardiovascular: Normal rate, regular rhythm, normal heart sounds and intact distal pulses.   Pulmonary/Chest: Effort normal and breath sounds normal.   Abdominal: Soft. Normal appearance and bowel sounds are normal. There is no  tenderness.   Musculoskeletal: She exhibits no deformity.        Right hip: She exhibits decreased range of motion and decreased strength.        Lumbar back: She exhibits tenderness.   Neurological: She is alert and oriented to person, place, and time. She has normal strength. No sensory deficit.   dysarthric speech   Skin: Skin is warm, dry and intact.   Psychiatric: She has a normal mood and affect. Cognition and memory are normal.   Nursing note and vitals reviewed.      Significant Labs:   CBC:   Recent Labs   Lab 11/11/19  1419 11/12/19  0502   WBC 5.96 5.40   HGB 12.0 11.2*   HCT 36.6* 34.4*    327     CMP:   Recent Labs   Lab 11/11/19  1419 11/12/19  0502    139   K 3.9 3.6    102   CO2 28 24   GLU 80 92   BUN 17 14   CREATININE 1.3 1.1   CALCIUM 8.9 9.0   PROT 7.4 7.4   ALBUMIN 3.2* 3.0*   BILITOT 0.2 0.2   ALKPHOS 55 57   AST 29 34   ALT 27 28   ANIONGAP 10 13   EGFRNONAA 45* 55*     All pertinent labs within the past 24 hours have been reviewed.    Significant Imaging: I have reviewed all pertinent imaging results/findings within the past 24 hours.   Imaging Results          MRA Brain (Final result)  Result time 11/11/19 20:08:19    Final result by Arcelia Hawley MD (11/11/19 20:08:19)                 Impression:      1. No acute intracranial abnormalities identified.  No evidence of acute infarction.  2. MRA brain and neck without significant focal stenosis or occlusion.      Electronically signed by: Arcelia Hawley MD  Date:    11/11/2019  Time:    20:08             Narrative:    EXAMINATION:  MRI BRAIN W WO CONTRAST; MRA NECK WITH CONTRAST; MRA BRAIN WITHOUT CONTRAST    CLINICAL HISTORY:  Stroke;    TECHNIQUE:  Multiplanar multisequence MR imaging of the brain was performed before and after the administration of 10 mL Gadavist  Intravenous contrast.    Obtained as a separate acquisition from MRI Brain, noncontrast 3D time-of-flight intracranial MR angiography was performed for  visualization of intracranial vasculature.  MIP 3D reformatted images were created.    COMPARISON:  None    FINDINGS:  The brain is normal in contour and morphology.  There is mild chronic microvascular ischemic disease.  No diffusion signal abnormality to suggest acute infarction.  Ventricles are normal in size and configuration.  No intracranial hemorrhage or extraaxial fluid collection.  No mass or mass effect.  After administration of gadolinium, no pathologic foci of enhancement.    Visualized paranasal sinuses and mastoid air cells are clear. Orbits appear normal.    The bilateral distal cervical, petrous, cavernous and supraclinoid segments of the ICA's and the visualized bilateral anterior and middle cerebral arteries are patent without evidence for significant focal stenosis or occlusion.  The distal vertebral arteries, basilar artery and posterior cerebral arteries are patent without evidence for significant focal stenosis or occlusion.    The origins of the right brachiocephalic,  left common carotid and left subclavian arteries from the arch are within normal limits.  Vertebral artery origins are within normal limits.  The vertebral arteries are unremarkable throughout their course without evidence for focal stenosis or occlusion.  The bilateral common carotid arteries, carotid bifurcation, and extra cranial portions of the internal carotid arteries are patent without evidence for focal stenosis or occlusion.                               CT Head Without Contrast (Final result)  Result time 11/11/19 13:48:47    Final result by Marc Starkey MD (11/11/19 13:48:47)                 Impression:      No acute large vascular territory infarct or intracranial hemorrhage identified.  Further evaluation/follow-up as warranted      Electronically signed by: Marc Starkey MD  Date:    11/11/2019  Time:    13:48             Narrative:    EXAMINATION:  CT HEAD WITHOUT CONTRAST    CLINICAL HISTORY:  Focal neuro  deficit, new, fixed or worsening, >6 hours;    TECHNIQUE:  Low dose axial CT images obtained throughout the head without intravenous contrast. Sagittal and coronal reconstructions were performed.    COMPARISON:  Head CT 02/25/2017    FINDINGS:  Intracranial compartment:    Brain appears normally formed.  Mild generalized cerebral volume loss.  Ventricles are midline and stable in size configuration without distortion by mass effect or acute hydrocephalus.  No extra-axial blood or fluid collections.  Nonspecific empty sella configuration, unchanged.    Grossly stable distribution of mild periventricular white matter hypoattenuation, likely sequela of chronic small vessel ischemic change.  No definite new focal areas of abnormal parenchymal attenuation.  Skull base atherosclerotic vascular calcifications noted.  No parenchymal mass, hemorrhage, edema or major vascular distribution infarct.    Skull/extracranial contents (limited evaluation): No fracture. Mastoid air cells and paranasal sinuses are essentially clear.

## 2019-11-12 NOTE — NURSING
Patient leaving unit for MRI via transport. Will resume care once patient back on unit. isaiah Villagomez RN traveling with patient

## 2019-11-12 NOTE — PLAN OF CARE
Discharge Planning:  Patient admitted on 11-11-19  LOS-day 1  Chart reviewed, Care plan discussed with Mrs Gonzales   Discussed care plan with treatment team,  attending Dr Hung/SAMRA Coffman  Consults following are: case mgt, PT, OT, ST, neuro  PCP updated in Epic: yes  Pharmacy, updated in [facility]:  Insurance: [employer] medicare/medicaid-take charge  DME at home: n/a  Current dispo: home health vs Rehab vs SNF  Transportation: has reliable  Case management to follow       11/12/19 1150   Discharge Assessment   Assessment Type Discharge Planning Assessment   Confirmed/corrected address and phone number on facesheet? Yes   Assessment information obtained from? Patient;Caregiver;Medical Record   Communicated expected length of stay with patient/caregiver yes   Prior to hospitilization cognitive status: Alert/Oriented   Prior to hospitalization functional status: Independent   Current cognitive status: Alert/Oriented   Current Functional Status: Assistive Equipment   Able to Return to Prior Arrangements yes   Is patient able to care for self after discharge? Yes   Patient currently being followed by outpatient case management? No   Patient currently receives any other outside agency services? No   Do you have any problems affording any of your prescribed medications? No   Is the patient taking medications as prescribed? yes   Does the patient have transportation home? Yes   Transportation Anticipated family or friend will provide;health plan transportation   Discharge Plan A Home Health   Discharge Plan B Rehab   DME Needed Upon Discharge  other (see comments)  (await PT/OT rec's )   Patient/Family in Agreement with Plan yes

## 2019-11-13 LAB
ALBUMIN SERPL BCP-MCNC: 3.1 G/DL (ref 3.5–5.2)
ALP SERPL-CCNC: 54 U/L (ref 55–135)
ALT SERPL W/O P-5'-P-CCNC: 26 U/L (ref 10–44)
ANION GAP SERPL CALC-SCNC: 13 MMOL/L (ref 8–16)
ASCENDING AORTA: 2.46 CM
AST SERPL-CCNC: 31 U/L (ref 10–40)
AV INDEX (PROSTH): 0.94
AV MEAN GRADIENT: 3 MMHG
AV PEAK GRADIENT: 5 MMHG
AV VALVE AREA: 2.83 CM2
AV VELOCITY RATIO: 0.94
BASOPHILS # BLD AUTO: 0.03 K/UL (ref 0–0.2)
BASOPHILS NFR BLD: 0.6 % (ref 0–1.9)
BILIRUB SERPL-MCNC: 0.4 MG/DL (ref 0.1–1)
BSA FOR ECHO PROCEDURE: 1.95 M2
BUN SERPL-MCNC: 13 MG/DL (ref 6–20)
CALCIUM SERPL-MCNC: 9.4 MG/DL (ref 8.7–10.5)
CHLORIDE SERPL-SCNC: 102 MMOL/L (ref 95–110)
CO2 SERPL-SCNC: 26 MMOL/L (ref 23–29)
CREAT SERPL-MCNC: 1.2 MG/DL (ref 0.5–1.4)
CV ECHO LV RWT: 0.59 CM
DIFFERENTIAL METHOD: ABNORMAL
DOP CALC AO PEAK VEL: 1.09 M/S
DOP CALC AO VTI: 21.35 CM
DOP CALC LVOT AREA: 3 CM2
DOP CALC LVOT DIAMETER: 1.96 CM
DOP CALC LVOT PEAK VEL: 1.03 M/S
DOP CALC LVOT STROKE VOLUME: 60.4 CM3
DOP CALCLVOT PEAK VEL VTI: 20.03 CM
E WAVE DECELERATION TIME: 314.95 MSEC
E/A RATIO: 0.62
E/E' RATIO: 8.77 M/S
ECHO LV POSTERIOR WALL: 1.09 CM (ref 0.6–1.1)
EOSINOPHIL # BLD AUTO: 0.2 K/UL (ref 0–0.5)
EOSINOPHIL NFR BLD: 2.9 % (ref 0–8)
ERYTHROCYTE [DISTWIDTH] IN BLOOD BY AUTOMATED COUNT: 12.8 % (ref 11.5–14.5)
EST. GFR  (AFRICAN AMERICAN): 57 ML/MIN/1.73 M^2
EST. GFR  (NON AFRICAN AMERICAN): 49 ML/MIN/1.73 M^2
FRACTIONAL SHORTENING: 17 % (ref 28–44)
GLUCOSE SERPL-MCNC: 98 MG/DL (ref 70–110)
HCT VFR BLD AUTO: 33.8 % (ref 37–48.5)
HGB BLD-MCNC: 11 G/DL (ref 12–16)
IMM GRANULOCYTES # BLD AUTO: 0.05 K/UL (ref 0–0.04)
IMM GRANULOCYTES NFR BLD AUTO: 1 % (ref 0–0.5)
INTERVENTRICULAR SEPTUM: 1.12 CM (ref 0.6–1.1)
IVRT: 0.08 MSEC
LA MAJOR: 5.3 CM
LA MINOR: 4.28 CM
LA WIDTH: 2.9 CM
LEFT ATRIUM SIZE: 2.49 CM
LEFT ATRIUM VOLUME INDEX: 15.4 ML/M2
LEFT ATRIUM VOLUME: 29.07 CM3
LEFT INTERNAL DIMENSION IN SYSTOLE: 3.06 CM (ref 2.1–4)
LEFT VENTRICLE DIASTOLIC VOLUME INDEX: 30.46 ML/M2
LEFT VENTRICLE DIASTOLIC VOLUME: 57.52 ML
LEFT VENTRICLE MASS INDEX: 68 G/M2
LEFT VENTRICLE SYSTOLIC VOLUME INDEX: 19.5 ML/M2
LEFT VENTRICLE SYSTOLIC VOLUME: 36.79 ML
LEFT VENTRICULAR INTERNAL DIMENSION IN DIASTOLE: 3.68 CM (ref 3.5–6)
LEFT VENTRICULAR MASS: 129.14 G
LV LATERAL E/E' RATIO: 8.14 M/S
LV SEPTAL E/E' RATIO: 9.5 M/S
LYMPHOCYTES # BLD AUTO: 3.1 K/UL (ref 1–4.8)
LYMPHOCYTES NFR BLD: 61.1 % (ref 18–48)
MCH RBC QN AUTO: 28.6 PG (ref 27–31)
MCHC RBC AUTO-ENTMCNC: 32.5 G/DL (ref 32–36)
MCV RBC AUTO: 88 FL (ref 82–98)
MONOCYTES # BLD AUTO: 0.4 K/UL (ref 0.3–1)
MONOCYTES NFR BLD: 7 % (ref 4–15)
MV PEAK A VEL: 0.92 M/S
MV PEAK E VEL: 0.57 M/S
NEUTROPHILS # BLD AUTO: 1.4 K/UL (ref 1.8–7.7)
NEUTROPHILS NFR BLD: 27.4 % (ref 38–73)
NRBC BLD-RTO: 0 /100 WBC
PISA TR MAX VEL: 2.29 M/S
PLATELET # BLD AUTO: 315 K/UL (ref 150–350)
PMV BLD AUTO: 9.9 FL (ref 9.2–12.9)
POCT GLUCOSE: 103 MG/DL (ref 70–110)
POCT GLUCOSE: 120 MG/DL (ref 70–110)
POCT GLUCOSE: 96 MG/DL (ref 70–110)
POTASSIUM SERPL-SCNC: 3.6 MMOL/L (ref 3.5–5.1)
PROT SERPL-MCNC: 7.1 G/DL (ref 6–8.4)
PV PEAK VELOCITY: 0.73 CM/S
RA MAJOR: 4.49 CM
RA WIDTH: 2.67 CM
RBC # BLD AUTO: 3.85 M/UL (ref 4–5.4)
RIGHT VENTRICULAR END-DIASTOLIC DIMENSION: 2.88 CM
SINUS: 2.97 CM
SODIUM SERPL-SCNC: 141 MMOL/L (ref 136–145)
STJ: 2.53 CM
TDI LATERAL: 0.07 M/S
TDI SEPTAL: 0.06 M/S
TDI: 0.07 M/S
TR MAX PG: 21 MMHG
WBC # BLD AUTO: 5.14 K/UL (ref 3.9–12.7)

## 2019-11-13 PROCEDURE — 36415 COLL VENOUS BLD VENIPUNCTURE: CPT | Mod: HCNC

## 2019-11-13 PROCEDURE — 99226 PR SUBSEQUENT OBSERVATION CARE,LEVEL III: ICD-10-PCS | Mod: HCNC,,, | Performed by: PHYSICIAN ASSISTANT

## 2019-11-13 PROCEDURE — 96372 THER/PROPH/DIAG INJ SC/IM: CPT | Performed by: EMERGENCY MEDICINE

## 2019-11-13 PROCEDURE — 80053 COMPREHEN METABOLIC PANEL: CPT | Mod: HCNC

## 2019-11-13 PROCEDURE — 94761 N-INVAS EAR/PLS OXIMETRY MLT: CPT | Mod: HCNC

## 2019-11-13 PROCEDURE — 97116 GAIT TRAINING THERAPY: CPT | Mod: HCNC,59

## 2019-11-13 PROCEDURE — 92526 ORAL FUNCTION THERAPY: CPT | Mod: HCNC

## 2019-11-13 PROCEDURE — 25000003 PHARM REV CODE 250: Mod: HCNC | Performed by: NURSE PRACTITIONER

## 2019-11-13 PROCEDURE — 99226 PR SUBSEQUENT OBSERVATION CARE,LEVEL III: CPT | Mod: HCNC,,, | Performed by: PHYSICIAN ASSISTANT

## 2019-11-13 PROCEDURE — 92523 SPEECH SOUND LANG COMPREHEN: CPT | Mod: HCNC

## 2019-11-13 PROCEDURE — 97530 THERAPEUTIC ACTIVITIES: CPT | Mod: HCNC

## 2019-11-13 PROCEDURE — 85025 COMPLETE CBC W/AUTO DIFF WBC: CPT | Mod: HCNC

## 2019-11-13 PROCEDURE — 97535 SELF CARE MNGMENT TRAINING: CPT | Mod: HCNC,59

## 2019-11-13 PROCEDURE — G0378 HOSPITAL OBSERVATION PER HR: HCPCS | Mod: HCNC

## 2019-11-13 PROCEDURE — 63600175 PHARM REV CODE 636 W HCPCS: Mod: HCNC | Performed by: NURSE PRACTITIONER

## 2019-11-13 PROCEDURE — 25000003 PHARM REV CODE 250: Mod: HCNC | Performed by: PHYSICIAN ASSISTANT

## 2019-11-13 RX ORDER — CLOPIDOGREL BISULFATE 75 MG/1
75 TABLET ORAL DAILY
Qty: 21 TABLET | Refills: 0 | Status: SHIPPED | OUTPATIENT
Start: 2019-11-13 | End: 2020-10-22

## 2019-11-13 RX ORDER — ATORVASTATIN CALCIUM 80 MG/1
80 TABLET, FILM COATED ORAL DAILY
Qty: 30 TABLET | Refills: 0 | Status: SHIPPED | OUTPATIENT
Start: 2019-11-13 | End: 2019-12-16 | Stop reason: SDUPTHER

## 2019-11-13 RX ORDER — ASPIRIN 81 MG/1
81 TABLET ORAL DAILY
Refills: 0 | COMMUNITY
Start: 2019-11-13 | End: 2023-12-11

## 2019-11-13 RX ORDER — INSULIN GLARGINE 100 [IU]/ML
INJECTION, SOLUTION SUBCUTANEOUS
Qty: 15 ML | Refills: 0 | Status: SHIPPED | OUTPATIENT
Start: 2019-11-13 | End: 2020-03-31 | Stop reason: SDUPTHER

## 2019-11-13 RX ADMIN — OXYBUTYNIN CHLORIDE 5 MG: 5 TABLET, EXTENDED RELEASE ORAL at 10:11

## 2019-11-13 RX ADMIN — PREGABALIN 100 MG: 75 CAPSULE ORAL at 10:11

## 2019-11-13 RX ADMIN — QUETIAPINE FUMARATE 100 MG: 100 TABLET ORAL at 08:11

## 2019-11-13 RX ADMIN — ASPIRIN 81 MG: 81 TABLET, COATED ORAL at 10:11

## 2019-11-13 RX ADMIN — CLOPIDOGREL BISULFATE 75 MG: 75 TABLET ORAL at 10:11

## 2019-11-13 RX ADMIN — ENOXAPARIN SODIUM 40 MG: 100 INJECTION SUBCUTANEOUS at 06:11

## 2019-11-13 RX ADMIN — DIVALPROEX SODIUM 250 MG: 250 TABLET, DELAYED RELEASE ORAL at 10:11

## 2019-11-13 RX ADMIN — ATORVASTATIN CALCIUM 80 MG: 20 TABLET, FILM COATED ORAL at 09:11

## 2019-11-13 RX ADMIN — PREGABALIN 100 MG: 75 CAPSULE ORAL at 08:11

## 2019-11-13 RX ADMIN — DIVALPROEX SODIUM 250 MG: 250 TABLET, DELAYED RELEASE ORAL at 08:11

## 2019-11-13 NOTE — PLAN OF CARE
[facility]  [address] [place]. [address]    Facility Transfer Orders                        11/14/2019  Admit to: SNF    Diagnoses:  Active Hospital Problems    Diagnosis  POA    *Stroke-like symptoms [R29.90]  Yes    Chronic bilateral low back pain with bilateral sciatica [M54.42, M54.41, G89.29]  Yes    Post-mastectomy lymphedema syndrome [I97.2]  Yes    Cervical radiculopathy [M54.12]  Yes     Chronic    Uncontrolled type 2 diabetes mellitus, with long-term current use of insulin [E11.65, Z79.4]  Not Applicable    Hypertension [I10]  Yes    Tobacco abuse [Z72.0]  Yes    Peripheral neuropathy [G62.9]  Yes      Resolved Hospital Problems   No resolved problems to display.       Allergies:Review of patient's allergies indicates:  No Known Allergies    Vitals:       Every shift (Skilled Nursing patients)    Diet: Diabetic 2000 calories  Diet recommendations:  1. Mechanical soft chopped and thin liquids  2. NO STRAWS, small single cup sips  3. 1:1 assistance with meals  4. Meds as tolerated     Aspiration Precautions: 1 bite/sip at a time, Alternating bites/sips, Assistance with meals, Check for pocketing/oral residue, Frequent oral care, HOB to 90 degrees and No straws     Communication strategies:  allow additional time for responses, clarification as needed    Activity:      - Up in a chair each morning as tolerated   - Ambulate with assistance to bathroom    Nursing Precautions:     - Fall precautions    CONSULTS:     PT to evaluate and treat - five times a week     OT to evaluate and treat - five times a week     ST to evaluate and treat     Nutrition to evaluate and recommend diet      DIABETES CARE:      Check blood sugar:       Fingerstick blood sugar AC and HS      Report CBG < 60 or > 400 to physician.                                          Insulin Sliding Scale          Glucose  Novolog Insulin Subcutaneous        0 - 60   Orange juice or glucose tablet, hold  "insulin      No insulin   201-250  2 units   251-300  4 units   301-350  6 units   351-400  8 units   >400   10 units then call physician      Medications: Discontinue all previous medication orders, if any. See new list below.       Jonathan Gonzales   Home Medication Instructions FABIANA:[id number]    Printed on:11/13/19 8444   Medication Information                      arm brace (WRIST SUPPORT LARGE-XLARGE MISC)               aspirin (ECOTRIN) 81 MG EC tablet  Take 1 tablet (81 mg total) by mouth once daily.             atorvastatin (LIPITOR) 80 MG tablet  Take 1 tablet (80 mg total) by mouth once daily.             BD ULTRA-FINE MINI PEN NEEDLE 31 gauge x 3/16" Ndle  Inject 1 each into the skin once daily.             blood-glucose meter (FREESTYLE SYSTEM KIT) kit  Pt to check BG up to QID. Dispense strips compatible with pt brand meter             clopidogrel (PLAVIX) 75 mg tablet  Take 1 tablet (75 mg total) by mouth once daily. for 21 days; stop 12/4/2019             diclofenac sodium (VOLTAREN) 1 % Gel  Apply 2 g topically 4 (four) times daily.             divalproex (DEPAKOTE) 250 MG EC tablet  Take 1 tablet (250 mg total) by mouth 2 (two) times daily.             EASY TOUCH 31 gauge x 1/4" Ndle               HOLD insulin (LANTUS SOLOSTAR U-100 INSULIN) glargine 100 units/mL (3mL) SubQ pen  INJECT  23 UNITS SUBCUTANEOUSLY EVERY EVENING - patient home medication, HOLD for now as blood glucose has been stable             lancets (ONETOUCH ULTRASOFT LANCETS) [facility]  Pt to check BG up to QID. Dispense strips compatible with pt brand meter             lidocaine (LIDODERM) 5 %  Apply up to 3 patches to painful areas. Remove in 12 hours. No more than 3 patches should be used in a 24-hour period.             metFORMIN (GLUCOPHAGE) 500 MG tablet  Take 1 tablet (500 mg total) by mouth 2 (two) times daily with meals.             OLANZapine (ZYPREXA) 10 MG tablet  TK 1 T PO QHS             oxybutynin (DITROPAN-XL) " "10 MG 24 hr tablet  TAKE 1 TABLET(10 MG) BY MOUTH EVERY DAY             pen needle, diabetic (BD ULTRA-FINE [place] PEN NEEDLES) 32 gauge x 5/32" Ndle  Pt is injecting once daily             pen needle, diabetic, safety 29 gauge x 3/16" Ndle  Use as instructed             pregabalin (LYRICA) 100 MG capsule  TAKE 1 CAPSULE TWICE DAILY             QUEtiapine (SEROQUEL) 100 MG Tab  Take 1 tablet (100 mg total) by mouth every evening.             TRUE METRIX GLUCOSE METER Misc               TRUEPLUS LANCETS 30 gauge Misc  USE TO TEST BLOOD SUGART QID             TRUEPLUS LANCETS 33 gauge Misc  100 lancets by Subconjunctival route 4 (four) times daily.             VICTOZA 3-HERMANN 0.6 mg/0.1 mL (18 mg/3 mL) PnIj  ADMINISTER 1.8 MG UNDER THE SKIN EVERY DAY             ziprasidone (GEODON) 40 MG Cap  TAKE 1 CAPSULE(40 MG) BY MOUTH EVERY EVENING                 Was on lisinopril-hydrochlorothiazide (PRINZIDE,ZESTORETIC) 10-12.5 mg per tablet daily on hold d/t stable blood pressures.          _________________________________  Meghna Watson PA-C  11/13/2019    "

## 2019-11-13 NOTE — PT/OT/SLP PROGRESS
Occupational Therapy   Treatment    Name: Jonathan Gonzales  MRN: [id number]  Admitting Diagnosis:  Stroke-like symptoms       Recommendations:     Discharge Recommendations: nursing facility, skilled  Discharge Equipment Recommendations:  3-in-1 commode, bath bench  Barriers to discharge:  Inaccessible home environment, Decreased caregiver support    Assessment:     Jonathan Gonzales is a 59 y.o. female with a medical diagnosis of Stroke-like symptoms.  She presents receptive to OT tx session. Performance deficits affecting function are impaired endurance, impaired sensation, impaired self care skills, impaired functional mobilty, gait instability, impaired balance, decreased coordination, pain, impaired cardiopulmonary response to activity.     Pt is needing to progress to Mod I level with ADLs and IADLs.  Pt lives alone and was previously only using a SPC for ADL mobility.  Recommend SNF with OT, PT and SLP.     Rehab Prognosis:  Good; patient would benefit from acute skilled OT services to address these deficits and reach maximum level of function.       Plan:     Patient to be seen 6 x/week to address the above listed problems via self-care/home management, therapeutic activities  · Plan of Care Expires: 12/12/19  · Plan of Care Reviewed with: patient    Subjective     Pain/Comfort:  · Pain Rating 1: 0/10(right gluteal pain when ambulating only today)  · Location - Side 1: Right  · Location - Orientation 1: posterior  · Location 1: gluteal  · Pain Addressed 1: Reposition, Distraction  · Pain Rating Post-Intervention 1: 0/10    Objective:     Communicated with: nurse and PTA prior to session.  Patient found up in chair with peripheral IV(Compression sleeve to left UE (Tubigrip) due to lymphedema) upon OT entry to room.    General Precautions: Standard, aspiration, diabetic, fall   Orthopedic Precautions:N/A   Braces: N/A     Occupational Performance:       Functional Mobility/Transfers:  · Patient completed Sit  <> Stand Transfer with stand by assistance  with  rolling walker and cues for hand placement using RW  · Patient completed Toilet Transfer Step Transfer technique with contact guard assistance with  rolling walker and cues for hand placement using RW  · Functional Mobility: Pt with improved balance for self care tasks and ADL mobility using RW today as opposed to using SPC yesterday.      Activities of Daily Living:  · Upper Body Dressing: set up seated in chair  · Lower Body Dressing: stand by assistance and Set up  crossing foot over opposite knee to reach each foot to avoid bending over to floor level  · Toileting: contact guard assistance with cues for hand placement during sit<>stand using RW.       [facility] 6 Click ADL: 19    Treatment & Education:  Role of OT, safety strategies for ADLs and ADL mobility using RW.     Patient left up in chair with all lines intact, call button in reach and nurse notifiedEducation:      GOALS:   Multidisciplinary Problems     Occupational Therapy Goals        Problem: Occupational Therapy Goal    Goal Priority Disciplines Outcome Interventions   Occupational Therapy Goal     OT, PT/OT Ongoing, Progressing    Description:  Goals to be met by: 11/22/19    Patient will increase functional independence with ADLs by performing:    UE Dressing with Set-up Assistance. MET on 11/13/19  LE Dressing with Supervision.    Grooming while standing with Supervision.  Step transfer with Supervision  Toilet transfer to toilet with Supervision.  Toileting at Supervision level.                         Time Tracking:     OT Date of Treatment: 11/13/19  OT Start Time: 1150  OT Stop Time: 1210  OT Total Time (min): 20 min    Billable Minutes:Self Care/Home Management 20    ROSALIE Guerrero  11/13/2019

## 2019-11-13 NOTE — PT/OT/SLP PROGRESS
Physical Therapy Treatment    Patient Name:  Jonathan Gonzales   MRN:  [id number]    Recommendations:     Discharge Recommendations:  nursing facility, skilled (spoke with PT Racquel, and this was her initial  Recommendation, although pt was refusing, now pt is agreeable.  If pt did d/c home, would need inc. Assistance from family initially).  Discharge Equipment Recommendations: (TBD at next level of care)   Barriers to discharge: Decreased caregiver support    Assessment:     Jonathan Gonzales is a 59 y.o. female admitted with a medical diagnosis of Stroke-like symptoms.  She presents with the following impairments/functional limitations:  weakness, impaired endurance, impaired self care skills, impaired functional mobilty, gait instability, impaired balance, decreased lower extremity function, pain, decreased safety awareness ; pt with good mobility today, though still needing CGA with RW for safety with amb., pt lives alone and would benefit from cont PT  In SNF facility prior to going home.    Rehab Prognosis: Good; patient would benefit from acute skilled PT services to address these deficits and reach maximum level of function.    Recent Surgery: * No surgery found *      Plan:     During this hospitalization, patient to be seen 5 x/week to address the identified rehab impairments via gait training, therapeutic activities, therapeutic exercises, neuromuscular re-education and progress toward the following goals:    · Plan of Care Expires:  11/26/19    Subjective     Chief Complaint: R leg pain  Patient/Family Comments/goals: pt agreeable to session, now agreeable to going to SNF.  Pain/Comfort:  · Pain Rating 1: 8/10  · Location - Side 1: Right  · Location - Orientation 1: posterior  · Location 1: leg(from low back down leg)  · Pain Addressed 1: Reposition, Distraction  · Pain Rating Post-Intervention 1: 0/10(pt at rest)      Objective:     Communicated with nurse prior to session.  Patient found HOB elevated  with peripheral IV(compression sleeve to LUE) upon PT entry to room.     General Precautions: Standard, diabetic, fall, aspiration   Orthopedic Precautions:N/A   Braces: N/A     Functional Mobility:  · Bed Mobility:     · Supine to Sit: contact guard assistance  · Transfers:     · Sit to Stand:  stand by assistance and contact guard assistance with rolling walker  · Gait: amb'd 90' with RW and CGA      AM-PAC 6 CLICK MOBILITY  Turning over in bed (including adjusting bedclothes, sheets and blankets)?: 4  Sitting down on and standing up from a chair with arms (e.g., wheelchair, bedside commode, etc.): 3  Moving from lying on back to sitting on the side of the bed?: 3  Moving to and from a bed to a chair (including a wheelchair)?: 3  Need to walk in hospital room?: 3  Climbing 3-5 steps with a railing?: 3  Basic Mobility Total Score: 19       Therapeutic Activities and Exercises:   perf'd seated LE ex's of AP's, hip flex, LAQ's x 10 ea.   Perf'd commode t/f's and hand hygiene at sink with SBA.  Patient left up in chair with all lines intact, call button in reach and PCT present..    GOALS:   Multidisciplinary Problems     Physical Therapy Goals        Problem: Physical Therapy Goal    Goal Priority Disciplines Outcome Goal Variances Interventions   Physical Therapy Goal     PT, PT/OT Ongoing, Progressing     Description:  Goals to be met by: 19    Patient will increase functional independence with mobility by performin. Sit<>stand with mod(I) with LRAD.  2. Gait x 150 feet with mod(I) with LRAD.  3. Demo safety awareness with use of rollator during gait bout with seated rest break without cues.  4. Ascend/descend 4 steps with unilateral HR and cane with supervision.                        Time Tracking:     PT Received On: 19  PT Start Time: 1117     PT Stop Time: 1145  PT Total Time (min): 28 min     Billable Minutes: Gait Training 15 and Therapeutic Activity 13    Treatment Type:  Treatment  PT/PTA: PTA     PTA Visit Number: 1     Catrina Miles, PTA  11/13/2019

## 2019-11-13 NOTE — SUBJECTIVE & OBJECTIVE
Interval History: no overnight events    Review of Systems   Constitutional: Negative for chills and fever.   HENT: Negative for congestion and trouble swallowing.    Eyes: Negative for photophobia and visual disturbance.   Respiratory: Negative for cough, chest tightness and shortness of breath.    Cardiovascular: Negative for chest pain and palpitations.   Gastrointestinal: Negative for abdominal pain, diarrhea, nausea and vomiting.   Endocrine: Negative.    Genitourinary: Negative for dysuria, hematuria and urgency.   Musculoskeletal: Positive for arthralgias, back pain and gait problem. Negative for myalgias.   Neurological: Positive for speech difficulty. Negative for seizures, weakness, numbness and headaches.   Psychiatric/Behavioral: Negative for agitation and hallucinations. The patient is not nervous/anxious.      Objective:     Vital Signs (Most Recent):  Temp: 97.9 °F (36.6 °C) (11/13/19 1149)  Pulse: 67 (11/13/19 1149)  Resp: 16 (11/13/19 1149)  BP: 106/64 (11/13/19 1149)  SpO2: 98 % (11/13/19 1149) Vital Signs (24h Range):  Temp:  [97.9 °F (36.6 °C)-98.8 °F (37.1 °C)] 97.9 °F (36.6 °C)  Pulse:  [61-76] 67  Resp:  [16-18] 16  SpO2:  [94 %-98 %] 98 %  BP: ()/(54-75) 106/64     Weight: 85.8 kg (189 lb 1.6 oz)  Body mass index is 33.5 kg/m².  No intake or output data in the 24 hours ending 11/13/19 1445   Physical Exam   Constitutional: She is oriented to person, place, and time. She appears well-developed and well-nourished.   HENT:   Head: Normocephalic and atraumatic.   Mouth/Throat: Oropharynx is clear and moist.   Eyes: Pupils are equal, round, and reactive to light. Conjunctivae and lids are normal.   Neck: Normal range of motion. Neck supple. No JVD present.   Cardiovascular: Normal rate, regular rhythm, normal heart sounds and intact distal pulses.   Pulmonary/Chest: Effort normal and breath sounds normal.   Abdominal: Soft. Normal appearance and bowel sounds are normal. There is no  tenderness.   Musculoskeletal: She exhibits no deformity.        Right hip: She exhibits decreased range of motion and decreased strength.        Lumbar back: She exhibits tenderness.   Neurological: She is alert and oriented to person, place, and time. She has normal strength. No sensory deficit.   dysarthric speech   Skin: Skin is warm, dry and intact.   Psychiatric: She has a normal mood and affect. Cognition and memory are normal.   Nursing note and vitals reviewed.      Significant Labs:   CBC:   Recent Labs   Lab 11/12/19  0502 11/13/19  0500   WBC 5.40 5.14   HGB 11.2* 11.0*   HCT 34.4* 33.8*    315     CMP:   Recent Labs   Lab 11/12/19  0502 11/13/19  0500    141   K 3.6 3.6    102   CO2 24 26   GLU 92 98   BUN 14 13   CREATININE 1.1 1.2   CALCIUM 9.0 9.4   PROT 7.4 7.1   ALBUMIN 3.0* 3.1*   BILITOT 0.2 0.4   ALKPHOS 57 54*   AST 34 31   ALT 28 26   ANIONGAP 13 13   EGFRNONAA 55* 49*     All pertinent labs within the past 24 hours have been reviewed.    Significant Imaging: I have reviewed all pertinent imaging results/findings within the past 24 hours.   Imaging Results          MRA Brain (Final result)  Result time 11/11/19 20:08:19    Final result by Arcelia Hawley MD (11/11/19 20:08:19)                 Impression:      1. No acute intracranial abnormalities identified.  No evidence of acute infarction.  2. MRA brain and neck without significant focal stenosis or occlusion.      Electronically signed by: Arcelia Hawley MD  Date:    11/11/2019  Time:    20:08             Narrative:    EXAMINATION:  MRI BRAIN W WO CONTRAST; MRA NECK WITH CONTRAST; MRA BRAIN WITHOUT CONTRAST    CLINICAL HISTORY:  Stroke;    TECHNIQUE:  Multiplanar multisequence MR imaging of the brain was performed before and after the administration of 10 mL Gadavist  Intravenous contrast.    Obtained as a separate acquisition from MRI Brain, noncontrast 3D time-of-flight intracranial MR angiography was performed for  visualization of intracranial vasculature.  MIP 3D reformatted images were created.    COMPARISON:  None    FINDINGS:  The brain is normal in contour and morphology.  There is mild chronic microvascular ischemic disease.  No diffusion signal abnormality to suggest acute infarction.  Ventricles are normal in size and configuration.  No intracranial hemorrhage or extraaxial fluid collection.  No mass or mass effect.  After administration of gadolinium, no pathologic foci of enhancement.    Visualized paranasal sinuses and mastoid air cells are clear. Orbits appear normal.    The bilateral distal cervical, petrous, cavernous and supraclinoid segments of the ICA's and the visualized bilateral anterior and middle cerebral arteries are patent without evidence for significant focal stenosis or occlusion.  The distal vertebral arteries, basilar artery and posterior cerebral arteries are patent without evidence for significant focal stenosis or occlusion.    The origins of the right brachiocephalic,  left common carotid and left subclavian arteries from the arch are within normal limits.  Vertebral artery origins are within normal limits.  The vertebral arteries are unremarkable throughout their course without evidence for focal stenosis or occlusion.  The bilateral common carotid arteries, carotid bifurcation, and extra cranial portions of the internal carotid arteries are patent without evidence for focal stenosis or occlusion.                               CT Head Without Contrast (Final result)  Result time 11/11/19 13:48:47    Final result by Marc Starkey MD (11/11/19 13:48:47)                 Impression:      No acute large vascular territory infarct or intracranial hemorrhage identified.  Further evaluation/follow-up as warranted      Electronically signed by: Marc Starkey MD  Date:    11/11/2019  Time:    13:48             Narrative:    EXAMINATION:  CT HEAD WITHOUT CONTRAST    CLINICAL HISTORY:  Focal neuro  deficit, new, fixed or worsening, >6 hours;    TECHNIQUE:  Low dose axial CT images obtained throughout the head without intravenous contrast. Sagittal and coronal reconstructions were performed.    COMPARISON:  Head CT 02/25/2017    FINDINGS:  Intracranial compartment:    Brain appears normally formed.  Mild generalized cerebral volume loss.  Ventricles are midline and stable in size configuration without distortion by mass effect or acute hydrocephalus.  No extra-axial blood or fluid collections.  Nonspecific empty sella configuration, unchanged.    Grossly stable distribution of mild periventricular white matter hypoattenuation, likely sequela of chronic small vessel ischemic change.  No definite new focal areas of abnormal parenchymal attenuation.  Skull base atherosclerotic vascular calcifications noted.  No parenchymal mass, hemorrhage, edema or major vascular distribution infarct.    Skull/extracranial contents (limited evaluation): No fracture. Mastoid air cells and paranasal sinuses are essentially clear.

## 2019-11-13 NOTE — PROGRESS NOTES
[facility] Hospital Medicine  Progress Note    Patient Name: Jonathan Gonzales  MRN: [id number]  Patient Class: OP- Observation   Admission Date: 11/11/2019  Length of Stay: 0 days  Attending Physician: FRANSISCO Hung MD  Primary Care Provider: Ghulam Contreras MD        Subjective:     Principal Problem:Stroke-like symptoms        HPI:  Jonathan Gonzales is a 59 y.o. female who  presented to the [facility] ED from her Primary Care office for evaluation of family report patient with dysarthria and right lower extremities weakness.  The patient reported that she was noted to have slurred speech noted three days prior to arrival.  The patient reports right sided weakness and right hip pain that she reports is not new.  Per medical record, the patient was evaluated for this right lower extremity weakness that was thought to be associated with pre-existing sciatica.  She was discharged home on topical anti-inflammatory gel and lidocaine patches that she reports have not been effective.  She states she receives the most relief with ibuprofen 800 mg as needed.  Initial evaluation with CT head that revealed no evidence of acute infarct or hemorrhage. Her labs and vital signs were essentially normal. NIHSS = 2 for mild dysarthria and lower ataxia.   She will be admitted under Observation status for stroke work up.      She ha medical history of bipolar disorder, breast cancer (2010, 2012) s/p mastectomy with post mastectomy lymphedema left upper extremity, depression, type 2 diabetes mellitus uncontrolled with peripheral neuropathy, and hypertension.  She has history of sciatica and lumbar spine pain as well as left hip pain.       Overview/Hospital Course:  59 y.o.  female  h/o hypertension, chronic smoking, pre-diabetes and hypercholesterolemia placed in observation for evaluation of slurred speech in the setting of chronic right hip pain and right leg weakness. MRI brain, MRA head and  neck unremarkable. Neuro consulted, impression TIA, with recommendation to increase atorvastatin to 80mg po qhs, continue Aspirin 81 mg daily and additionally load with plavix 600mg followed by plavix 75 mg starting tomorrow  X 21 days followed by aspirin 81 mg daily ( based on Point/Chance trial).     Interval History: no overnight events    Review of Systems   Constitutional: Negative for chills and fever.   HENT: Negative for congestion and trouble swallowing.    Eyes: Negative for photophobia and visual disturbance.   Respiratory: Negative for cough, chest tightness and shortness of breath.    Cardiovascular: Negative for chest pain and palpitations.   Gastrointestinal: Negative for abdominal pain, diarrhea, nausea and vomiting.   Endocrine: Negative.    Genitourinary: Negative for dysuria, hematuria and urgency.   Musculoskeletal: Positive for arthralgias, back pain and gait problem. Negative for myalgias.   Neurological: Positive for speech difficulty. Negative for seizures, weakness, numbness and headaches.   Psychiatric/Behavioral: Negative for agitation and hallucinations. The patient is not nervous/anxious.      Objective:     Vital Signs (Most Recent):  Temp: 97.9 °F (36.6 °C) (11/13/19 1149)  Pulse: 67 (11/13/19 1149)  Resp: 16 (11/13/19 1149)  BP: 106/64 (11/13/19 1149)  SpO2: 98 % (11/13/19 1149) Vital Signs (24h Range):  Temp:  [97.9 °F (36.6 °C)-98.8 °F (37.1 °C)] 97.9 °F (36.6 °C)  Pulse:  [61-76] 67  Resp:  [16-18] 16  SpO2:  [94 %-98 %] 98 %  BP: ()/(54-75) 106/64     Weight: 85.8 kg (189 lb 1.6 oz)  Body mass index is 33.5 kg/m².  No intake or output data in the 24 hours ending 11/13/19 1445   Physical Exam   Constitutional: She is oriented to person, place, and time. She appears well-developed and well-nourished.   HENT:   Head: Normocephalic and atraumatic.   Mouth/Throat: Oropharynx is clear and moist.   Eyes: Pupils are equal, round, and reactive to light. Conjunctivae and lids are  normal.   Neck: Normal range of motion. Neck supple. No JVD present.   Cardiovascular: Normal rate, regular rhythm, normal heart sounds and intact distal pulses.   Pulmonary/Chest: Effort normal and breath sounds normal.   Abdominal: Soft. Normal appearance and bowel sounds are normal. There is no tenderness.   Musculoskeletal: She exhibits no deformity.        Right hip: She exhibits decreased range of motion and decreased strength.        Lumbar back: She exhibits tenderness.   Neurological: She is alert and oriented to person, place, and time. She has normal strength. No sensory deficit.   dysarthric speech   Skin: Skin is warm, dry and intact.   Psychiatric: She has a normal mood and affect. Cognition and memory are normal.   Nursing note and vitals reviewed.      Significant Labs:   CBC:   Recent Labs   Lab 11/12/19  0502 11/13/19  0500   WBC 5.40 5.14   HGB 11.2* 11.0*   HCT 34.4* 33.8*    315     CMP:   Recent Labs   Lab 11/12/19  0502 11/13/19  0500    141   K 3.6 3.6    102   CO2 24 26   GLU 92 98   BUN 14 13   CREATININE 1.1 1.2   CALCIUM 9.0 9.4   PROT 7.4 7.1   ALBUMIN 3.0* 3.1*   BILITOT 0.2 0.4   ALKPHOS 57 54*   AST 34 31   ALT 28 26   ANIONGAP 13 13   EGFRNONAA 55* 49*     All pertinent labs within the past 24 hours have been reviewed.    Significant Imaging: I have reviewed all pertinent imaging results/findings within the past 24 hours.   Imaging Results          MRA Brain (Final result)  Result time 11/11/19 20:08:19    Final result by Arcelia Hawley MD (11/11/19 20:08:19)                 Impression:      1. No acute intracranial abnormalities identified.  No evidence of acute infarction.  2. MRA brain and neck without significant focal stenosis or occlusion.      Electronically signed by: Arcelia Hawley MD  Date:    11/11/2019  Time:    20:08             Narrative:    EXAMINATION:  MRI BRAIN W WO CONTRAST; MRA NECK WITH CONTRAST; MRA BRAIN WITHOUT CONTRAST    CLINICAL  HISTORY:  Stroke;    TECHNIQUE:  Multiplanar multisequence MR imaging of the brain was performed before and after the administration of 10 mL Gadavist  Intravenous contrast.    Obtained as a separate acquisition from MRI Brain, noncontrast 3D time-of-flight intracranial MR angiography was performed for visualization of intracranial vasculature.  MIP 3D reformatted images were created.    COMPARISON:  None    FINDINGS:  The brain is normal in contour and morphology.  There is mild chronic microvascular ischemic disease.  No diffusion signal abnormality to suggest acute infarction.  Ventricles are normal in size and configuration.  No intracranial hemorrhage or extraaxial fluid collection.  No mass or mass effect.  After administration of gadolinium, no pathologic foci of enhancement.    Visualized paranasal sinuses and mastoid air cells are clear. Orbits appear normal.    The bilateral distal cervical, petrous, cavernous and supraclinoid segments of the ICA's and the visualized bilateral anterior and middle cerebral arteries are patent without evidence for significant focal stenosis or occlusion.  The distal vertebral arteries, basilar artery and posterior cerebral arteries are patent without evidence for significant focal stenosis or occlusion.    The origins of the right brachiocephalic,  left common carotid and left subclavian arteries from the arch are within normal limits.  Vertebral artery origins are within normal limits.  The vertebral arteries are unremarkable throughout their course without evidence for focal stenosis or occlusion.  The bilateral common carotid arteries, carotid bifurcation, and extra cranial portions of the internal carotid arteries are patent without evidence for focal stenosis or occlusion.                               CT Head Without Contrast (Final result)  Result time 11/11/19 13:48:47    Final result by Marc Starkey MD (11/11/19 13:48:47)                 Impression:      No acute  large vascular territory infarct or intracranial hemorrhage identified.  Further evaluation/follow-up as warranted      Electronically signed by: Marc Starkey MD  Date:    11/11/2019  Time:    13:48             Narrative:    EXAMINATION:  CT HEAD WITHOUT CONTRAST    CLINICAL HISTORY:  Focal neuro deficit, new, fixed or worsening, >6 hours;    TECHNIQUE:  Low dose axial CT images obtained throughout the head without intravenous contrast. Sagittal and coronal reconstructions were performed.    COMPARISON:  Head CT 02/25/2017    FINDINGS:  Intracranial compartment:    Brain appears normally formed.  Mild generalized cerebral volume loss.  Ventricles are midline and stable in size configuration without distortion by mass effect or acute hydrocephalus.  No extra-axial blood or fluid collections.  Nonspecific empty sella configuration, unchanged.    Grossly stable distribution of mild periventricular white matter hypoattenuation, likely sequela of chronic small vessel ischemic change.  No definite new focal areas of abnormal parenchymal attenuation.  Skull base atherosclerotic vascular calcifications noted.  No parenchymal mass, hemorrhage, edema or major vascular distribution infarct.    Skull/extracranial contents (limited evaluation): No fracture. Mastoid air cells and paranasal sinuses are essentially clear.                                    Assessment/Plan:      * Stroke-like symptoms/TIA  - TIA, report of dysarthria and lower extremity weakness several days prior to arrival  - CT head and MRI/MRA brain/neck negative for acute process; no evidence for infarct or hemorrhage.  - some dysarthria and extremity weakness that may be related to medications for bipolar and neuropathy  - continue stroke work up for possible TIA   - Neuro recs  - Increase atorvastatin to 80mg po qhs, continue Aspirin 81 mg daily and additionally load with plavix 600mg followed by plavix 75 mg  X 21 days followed by aspirin 81 mg daily ( based  on Point/Chance trial)   - recommendations for SNF, pending        Chronic bilateral low back pain with bilateral sciatica  - appears to be chronic problem   - ambulates with cane at baseline  - followed  pain management at one point  - bilateral lower extremity weakness that is likely related to debility and chronic low back pain  - PT/OT evaluation pending      Post-mastectomy lymphedema syndrome  - breast cancer 2011 and s/p mastectomy  - lymphedema left upper extremity with sleeve in place  - PT/OT evaluation appreciated      Cervical radiculopathy  - chronic and per medical record follows pain management with multiple cervical RFA  - suspect related to upper extremity weakness and sensory deficit  - Neurology consulted and appreciate recommendations      Uncontrolled type 2 diabetes mellitus, with long-term current use of insulin  - A1c 6.1, controlled  - home regimen metformin 500 mg bid and Victoza 1.8 mg daily  - cont SSI/accuchecks  - diabetic diet      Hypertension  - stable off medications  - monitor      Tobacco abuse  Patient was counseled on smoknig cessation for 6 minutes.  Nicotine replacement offered.  Patient declined and not currently intersted in quiting smoking at this time.      Peripheral neuropathy  - chronic and continue lyrica  - ambulates with cane  - fall precautions      VTE Risk Mitigation (From admission, onward)         Ordered     enoxaparin injection 40 mg  Daily      11/11/19 1615     IP VTE HIGH RISK PATIENT  Once      11/11/19 1659     Place sequential compression device  Until discontinued      11/11/19 1615                      Meghna Watson PA-C  Department of Hospital Medicine   [facility], [place]

## 2019-11-13 NOTE — ASSESSMENT & PLAN NOTE
- A1c 6.1, controlled  - home regimen metformin 500 mg bid and Victoza 1.8 mg daily  - cont SSI/accuchecks  - diabetic diet

## 2019-11-13 NOTE — NURSING
Pt AOX4, NAD. Denies CP, SOB, back or hip or any type of pain. VSS, Pt slept throughout the shift, waking easily for care. Glucose monitored. No coverage needed. Bed low and locked. Call bell within reach. Will continue to monitor.

## 2019-11-13 NOTE — HOSPITAL COURSE
59 y.o. female  h/o hypertension, chronic smoking, pre-diabetes and hypercholesterolemia placed in observation for evaluation of slurred speech in the setting of chronic right hip pain and right leg weakness. MRI brain, MRA head and neck unremarkable. Neuro consulted, impression TIA, with recommendation to increase atorvastatin to 80mg po qhs, continue Aspirin 81 mg daily and additionally load with plavix 600mg followed by plavix 75 mg starting tomorrow  X 21 days followed by aspirin 81 mg daily ( based on Point/Chance trial). Speech recs  Mechanical soft, Other (see comments)(chopped), Thin, Other (see comments)(NO STRAWS) 1 bite/sip at a time, Alternating bites/sips, Assistance with meals, Check for pocketing/oral residue, Frequent oral care, HOB to 90 degrees and No straws. BP well controlled off BP meds. LA insulin held due to well controlled blood glucose. Recommendation for SNF. Vitals stable, discharged to SNF.

## 2019-11-13 NOTE — PT/OT/SLP EVAL
Speech Language Pathology Evaluation  Cognitive/Bedside Swallow    Patient Name:  Jonathan Gonzales   MRN:  [id number]  Admitting Diagnosis: Stroke-like symptoms    Recommendations:                  General Recommendations:   1. Skilled dysphagia intervention to target oral and pharyngeal dysphagia  2. Skilled dysphagia intervention for diet texture analysis and tolerance  3. Skilled speech intervention for cognitive linguistic deficits    Diet recommendations:  Mechanical soft, Other (see comments)(chopped), Thin, Other (see comments)(NO STRAWS)   Aspiration Precautions: 1 bite/sip at a time, Alternating bites/sips, Assistance with meals, Check for pocketing/oral residue, Frequent oral care, HOB to 90 degrees and No straws     General Precautions: Standard, aspiration  Communication strategies: allow additional time for responses, clarification as needed        History:     Past Medical History:   Diagnosis Date    Bipolar disorder     Cancer of female breast 10/2010    T2 N1 Mx, Stage IIB left breast cancer    Cancer of upper-outer quadrant of female breast 7/9/2012    Depression     Diabetes mellitus type II     Disturbances of sensation of smell and taste 6/29/2012    Hypertension     Malignant neoplasm of breast (female), unspecified site 4/27/2015    Neuropathy     Nonspecific abnormal unspecified cardiovascular function study 4/12/2013    ECG READ AS SHOWING A T-WAVE ABNORMALITY     Post-mastectomy lymphedema syndrome     Schizophrenia        Past Surgical History:   Procedure Laterality Date    BREAST RECONSTRUCTION  11/23/11    B/L SHLOMO flap reconstruction S/P left MRM, right prophylactic mastectomy    BREAST RECONSTRUCTION Bilateral 3/13/2015    NIPPLE RECONSTRUCTION    MASTECTOMY Bilateral 01/2011    bilateral    TUBAL LIGATION         Social History: Patient lives alone but reports that she has assistance from her children with taking her to get her groceries and to the laundromat. Patient  was mainly eating microwave meals.      Prior Intubation HX:  None reported      Modified Barium Swallow: denies h/o swallow difficulties     Chest X-Rays: none on file     Prior diet: baseline diet is regular solids and thin liquids- whole meds.    Subjective     Patient presents at bedside, she reports that her speech and language production are improved but still not the same. She states that she is still coughing with thin liquids when she takes large sips.     Patient goals: return home     Pain/Comfort:  · Pain Rating 1: 0/10  · Pain Rating Post-Intervention 1: 0/10    Objective:     Cognitive Status:    Arousal/Alertness Appropriate response to stimuli  Orientation Oriented x4  Memory Immediate Recall 40% indep; increased to 100% given binary choices and verbal cues, Delayedx0/3 with binary choices and verbal cues and long term recall intact for long term memory 100%  Problem Solving Sequencing able to sequence ADLs 100% without cues and Solutions dcr problem solving with safety and ADLs 60% accy  Safety awareness dcr safety awareness, states that she can get up independently, need reminders that she needs to ask for help and will need help upon discharge  Simple calculation able to complete simple calculations related to med management with moderate verbal cues  Reasoning Inferences      Receptive Language:   Comprehension:      Questions Simple yes/no 100% without cues  Complex yes/no 100% without cues  Commands  One step 100% without cues  two step basic commands 100% without cues  Object identifications 5 in Fo 5  Picture identification 5 in Fo 5    Pragmatics:    initiation dcr initiation of conversation, topic maintenance able to maintain topic for 1-2 minutes, intermittently perseverates on previous topic and Eye contact appropriate eye contact    Expressive Language:  Verbal:    Automatic speech: counting 1-10; 10-1 and days of week; speech is slow and labored, dysarthric but improved from  yesterday  Responsive naming: intact 100%  Imitation of words: 100% without cues  Imitation of phrases and sentences: delayed responses, 100% accy        Motor Speech:  Dysarthric speech    Voice:   Intensity dcr intensity, breathy      Reading:   Word 100% delayed responses and Sentence 100% delayed responses       Oral Musculature Evaluation  · Oral Musculature: general weakness  · Dentition: present and adequate  · Secretion Management: adequate  · Mucosal Quality: good  · Oral Labial Strength and Mobility: other (see comments)(unable to maintain oral pressure with resistance)  · Lingual Strength and Mobility: impaired strength  · Velar Elevation: WFL  · Volitional Swallow: unable to initiate with PO   · Voice Prior to PO Intake: clear, low volume    SWALLOWING TREATMENT:  Consistencies Assessed:  · Thin liquids single straw sips and single cup sips 3 ounces total  · Solids 2 bites of toast   · Whole meds 1-2 at a time    Oral Phase:   · Prolonged mastication  · Timely a-p transit  · No oral residuals noted    Pharyngeal Phase:   · Delayed swallow initiation   · Coughing immediately after swallow with thin liquids by straw  · coughing 1/7 single cup sips  · No changes in vocal quality noted    Compensatory Strategies  · small cup sips only  · Chin tuck    Treatment: 10 minutes of swallow treatment.     Assessment:     Jonathan Gonzales is a 59 y.o. female with an SLP diagnosis of mild oropharyngeal dysphagia and cognitive linguistic deficits.  Patient is not performing at baseline with swallowing or cognitive linguistic function. She continues to present with dysarthric speech. Patient is appropriate for skilled speech therapy services to target dysphagia and cognitive linguistic deficits. Ongoing skilled speech therapy intervention indicated following discharge from [facility].     Goals:   Multidisciplinary Problems     SLP Goals        Problem: SLP Goal    Goal Priority Disciplines Outcome   SLP Goal     High SLP    Description:  1. Patient will be able to consume mechanical soft chopped solids and thin liquids with moderate assistance without overt s/s of airway threat or aspiration   2. Ongoing assessment of cognitive linguistic function to determine current level of function  - GOAL MET  3. Patient will demonstrate immediate recall of novel information with 60% accy with moderate verbal and visual cues  4. Patient will complete problem solving related to ADLs and safety with 80% accy with moderate verbal and visual cues                    Plan:     · Patient to be seen:  5 x/week, 6 x/week   · Plan of Care expires:  11/19/19  · Plan of Care reviewed with:  patient   · SLP Follow-Up:  Yes       Discharge recommendations:  Discharge Facility/Level of Care Needs: nursing facility, skilled   Barriers to Discharge:  Level of Skilled Assistance Needed swallowing deficits, cognitive linguistic deficits impacting ability to complete ADLs and concerns with safety awareness and problem solving    Time Tracking:     SLP Treatment Date:   11/13/19  Speech Start Time:  0940  Speech Stop Time:  1010     Speech Total Time (min):  30 min    Billable Minutes: Eval 20 min  and Treatment Swallowing Dysfunction 10    Judith Vasquez CCC-SLP  11/13/2019

## 2019-11-13 NOTE — CONSULTS
Food & Nutrition Education     Diet Education: Diabetic & Cardiac Diet Education  Time Spent: 15 minutes   Learners: Patient     Nutrition Education provided handouts: Type 2 DM nutrition therapy, DM label reading, Lower sodium food list, Cardiac TLC nutrition therapy.   Comments: Reviewed handout with pateint. Patient engaged in conversation, asked questions, and seems invested in her health. Discussed with patient the importance of eating the same amount of CHO's at the same time daily to stabilize blood glucose levels. Also discussed how to read a nutrition label for CHO servings.   All questions and concerns answered. Dietitian's contact info provided.       Thanks,  Paris POTTER, LDN

## 2019-11-13 NOTE — NURSING
Pt AAOx4, NAD noted. Pt complained of back and hip pain that is chronic. Pt denied SOB, N/V/D. Vitals monitored and stable. Glucose monitored. Pt had echo during shift. Pt seen by Speech/PT/OT. Pt assisted to and from commode, remained free from falls during shift. Pt currently in bed, safety measures implemented. Will continue to monitor.

## 2019-11-13 NOTE — PLAN OF CARE
Problem: Occupational Therapy Goal  Goal: Occupational Therapy Goal  Description  Goals to be met by: 11/22/19    Patient will increase functional independence with ADLs by performing:    UE Dressing with Set-up Assistance. MET on 11/13/19  LE Dressing with Supervision.    Grooming while standing with Supervision.  Step transfer with Supervision  Toilet transfer to toilet with Supervision.   Toileting at Supervision level.      Outcome: Ongoing, Progressing   Pt has met one established goal.  Pt is grossly at SBA<>CGA level with ADLs and ADL mobility.  Pt needs tactile/verbal cues for correct hand placement during sit<>stand with RW.

## 2019-11-13 NOTE — ASSESSMENT & PLAN NOTE
- TIA, report of dysarthria and lower extremity weakness several days prior to arrival  - CT head and MRI/MRA brain/neck negative for acute process; no evidence for infarct or hemorrhage.  - some dysarthria and extremity weakness that may be related to medications for bipolar and neuropathy  - continue stroke work up for possible TIA   - Neuro recs  - Increase atorvastatin to 80mg po qhs, continue Aspirin 81 mg daily and additionally load with plavix 600mg followed by plavix 75 mg  X 21 days followed by aspirin 81 mg daily ( based on Point/Chance trial)   - recommendations for SNF, pending

## 2019-11-13 NOTE — PLAN OF CARE
Problem: Physical Therapy Goal  Goal: Physical Therapy Goal  Description  Goals to be met by: 19    Patient will increase functional independence with mobility by performin. Sit<>stand with mod(I) with LRAD.  2. Gait x 150 feet with mod(I) with LRAD.  3. Demo safety awareness with use of rollator during gait bout with seated rest break without cues.  4. Ascend/descend 4 steps with unilateral HR and cane with supervision.       Outcome: Ongoing, Progressing   Pt sup to sit CGA, sit to stand CGA/SBA, amb'd 90' with RW and CGA. Recommend cont PT in SNF.

## 2019-11-13 NOTE — PLAN OF CARE
LOCET/PASSR completed.  Awaiting 142.    First choice was [facility], spoke with ANI Latham in admissions.  No beds available until Friday 11/15/2049 at the earliest.      Multiple other referrals in process.  Pt has been accepted to [facility] and three facilities on .  Updated clinicals, MAR, PT/OT sent to all referrals.    Spoke with pt at bedside.  She has family in [place] and on the [facility]. Family (daughter Glo Etienne [phone]) lives close to [facility] and would prefer pt to go there.   left with Glo to confirm she is available to sign paperwork, awaiting callback.     Spoke with:  Kathy at [place] [phone]  Diana at [facility] [phone]  Mary at [facility] [phone]  They will reach out to family.    Awaiting accepting facility and then auth from [facility].       to continue to follow.

## 2019-11-14 VITALS
RESPIRATION RATE: 12 BRPM | OXYGEN SATURATION: 100 % | HEART RATE: 56 BPM | WEIGHT: 189.13 LBS | HEIGHT: 63 IN | TEMPERATURE: 99 F | DIASTOLIC BLOOD PRESSURE: 56 MMHG | SYSTOLIC BLOOD PRESSURE: 118 MMHG | BODY MASS INDEX: 33.51 KG/M2

## 2019-11-14 LAB
POCT GLUCOSE: 106 MG/DL (ref 70–110)
POCT GLUCOSE: 110 MG/DL (ref 70–110)

## 2019-11-14 PROCEDURE — 90471 IMMUNIZATION ADMIN: CPT | Mod: HCNC | Performed by: PHYSICIAN ASSISTANT

## 2019-11-14 PROCEDURE — 90686 IIV4 VACC NO PRSV 0.5 ML IM: CPT | Mod: HCNC | Performed by: PHYSICIAN ASSISTANT

## 2019-11-14 PROCEDURE — 97535 SELF CARE MNGMENT TRAINING: CPT | Mod: HCNC

## 2019-11-14 PROCEDURE — 99217 PR OBSERVATION CARE DISCHARGE: CPT | Mod: HCNC,,, | Performed by: PHYSICIAN ASSISTANT

## 2019-11-14 PROCEDURE — 63600175 PHARM REV CODE 636 W HCPCS: Mod: HCNC | Performed by: PHYSICIAN ASSISTANT

## 2019-11-14 PROCEDURE — 97110 THERAPEUTIC EXERCISES: CPT | Mod: HCNC

## 2019-11-14 PROCEDURE — 99217 PR OBSERVATION CARE DISCHARGE: ICD-10-PCS | Mod: HCNC,,, | Performed by: PHYSICIAN ASSISTANT

## 2019-11-14 PROCEDURE — G0008 ADMIN INFLUENZA VIRUS VAC: HCPCS | Mod: HCNC | Performed by: PHYSICIAN ASSISTANT

## 2019-11-14 PROCEDURE — 94761 N-INVAS EAR/PLS OXIMETRY MLT: CPT | Mod: HCNC

## 2019-11-14 PROCEDURE — 97116 GAIT TRAINING THERAPY: CPT | Mod: HCNC

## 2019-11-14 PROCEDURE — G0378 HOSPITAL OBSERVATION PER HR: HCPCS | Mod: HCNC

## 2019-11-14 PROCEDURE — 25000003 PHARM REV CODE 250: Mod: HCNC | Performed by: NURSE PRACTITIONER

## 2019-11-14 PROCEDURE — 25000003 PHARM REV CODE 250: Mod: HCNC | Performed by: PHYSICIAN ASSISTANT

## 2019-11-14 RX ORDER — LISINOPRIL AND HYDROCHLOROTHIAZIDE 10; 12.5 MG/1; MG/1
1 TABLET ORAL DAILY
Qty: 30 TABLET | Refills: 11 | Status: SHIPPED | OUTPATIENT
Start: 2019-11-14 | End: 2019-12-16 | Stop reason: SDUPTHER

## 2019-11-14 RX ADMIN — INFLUENZA VIRUS VACCINE 0.5 ML: 15; 15; 15; 15 SUSPENSION INTRAMUSCULAR at 02:11

## 2019-11-14 RX ADMIN — CLOPIDOGREL BISULFATE 75 MG: 75 TABLET ORAL at 08:11

## 2019-11-14 RX ADMIN — ASPIRIN 81 MG: 81 TABLET, COATED ORAL at 08:11

## 2019-11-14 RX ADMIN — PREGABALIN 100 MG: 75 CAPSULE ORAL at 08:11

## 2019-11-14 RX ADMIN — ATORVASTATIN CALCIUM 80 MG: 20 TABLET, FILM COATED ORAL at 08:11

## 2019-11-14 RX ADMIN — OXYBUTYNIN CHLORIDE 5 MG: 5 TABLET, EXTENDED RELEASE ORAL at 08:11

## 2019-11-14 RX ADMIN — DIVALPROEX SODIUM 250 MG: 250 TABLET, DELAYED RELEASE ORAL at 08:11

## 2019-11-14 NOTE — PLAN OF CARE
Problem: Occupational Therapy Goal  Goal: Occupational Therapy Goal  Description  Goals to be met by: 11/22/19    Patient will increase functional independence with ADLs by performing:    UE Dressing with Set-up Assistance. MET on 11/13/19  LE Dressing with Supervision.   MET on 11/14/19  Grooming while standing with Supervision.  Step transfer with Supervision  Toilet transfer to toilet with Supervision.  Toileting at Supervision level.         Outcome: Ongoing, Progressing  Pt has met goal of performing LB dressing at Supervision level today.   Pt needing rest breaks during dressing tasks due to fatigue.  Pt reports she normally fatigues with dressing.  Unable to address transfers and grooming standing at sink due to housekeeping mopping the floor and floor being wet.

## 2019-11-14 NOTE — PLAN OF CARE
Problem: Physical Therapy Goal  Goal: Physical Therapy Goal  Description  Goals to be met by: 19    Patient will increase functional independence with mobility by performin. Sit<>stand with mod(I) with LRAD.  2. Gait x 150 feet with mod(I) with LRAD.  3. Demo safety awareness with use of rollator during gait bout with seated rest break without cues.  4. Ascend/descend 4 steps with unilateral HR and cane with supervision.       Outcome: Ongoing, Progressing   Pt sit to stand CGA/SBA, amb'd 60' x 2 with rollator and CGA, seated rest break 2/2 fatigue. Recommend cont PT in SNF.

## 2019-11-14 NOTE — PLAN OF CARE
Pt's V/S were monitored throughout the shift. V/S remained stable. The pt ambulated to the restroom with walker. The pt remained free from falls and trauma. The pt denied any dizziness, nausea, or discomfort. Pt's glucose monitored, no insulin needed. Purposeful rounding was performed. Pt rested well throughout the night. Pt's bed remained in the lowest position with the bed wheels locked. NAD noted. Will continue to monitor.

## 2019-11-14 NOTE — NURSING
Pt AAOx4, NAD noted. Pt complained of chronic back and hip pain. Pt denied SOB, N/V/D. Vitals monitored and stable. Glucose monitored, no insulin needed. Pt seen by PT,OT and speech. Pt helped to and from commode, remained free from falls during shift. Pt currently in bed, safety measures implemented. Will continue to monitor.

## 2019-11-14 NOTE — PT/OT/SLP PROGRESS
Occupational Therapy   Treatment    Name: Jonathan Gonzales  MRN: [id number]  Admitting Diagnosis:  Stroke-like symptoms       Recommendations:     Discharge Recommendations: nursing facility, skilled  Discharge Equipment Recommendations:  3-in-1 commode, bath bench  Barriers to discharge:  Decreased caregiver support, Inaccessible home environment, Other (Comment)(Pt reports all of her children work.)    Assessment:     Jonathan Gonzales is a 59 y.o. female with a medical diagnosis of Stroke-like symptoms.  She presents agreeable to OT tx session focusing on increased Indep with self care tasks. Performance deficits affecting function are impaired endurance, impaired self care skills, impaired functional mobilty, gait instability, impaired balance, decreased coordination, decreased upper extremity function, decreased lower extremity function, impaired cardiopulmonary response to activity.     Pt needing cues for correct hand placement during sit<>stand with RW.  Pt takes extended time to perform all self care tasks and needs rest breaks due to decreased activity tolerance for functional tasks.  Recommend SNF with OT and PT so pt can return to home setting at Mod I level.  Pt reports none of her children are available 24 hrs a day to assist her.     Rehab Prognosis:  Good; patient would benefit from acute skilled OT services to address these deficits and reach maximum level of function.       Plan:     Patient to be seen 6 x/week to address the above listed problems via self-care/home management, therapeutic activities  · Plan of Care Expires: 12/12/19  · Plan of Care Reviewed with: patient    Subjective     Pain/Comfort:  · Pain Rating 1: 0/10  · Pain Rating Post-Intervention 1: 0/10    Objective:     Communicated with: nurse prior to session.  Patient found up in chair with peripheral IV upon OT entry to room.    General Precautions: Standard, fall   Orthopedic Precautions:N/A   Braces: N/A     Occupational  Performance:     Functional Mobility/Transfers:  · Patient completed Sit <> Stand Transfer with supervision  with  rolling walker  with verbal cues for hand placement  · Functional Mobility: Using RW and no LOB noted.    Activities of Daily Living:  · Upper Body Dressing: set up seated in chair; able to fasten all buttons on shirt but needs extended time to complete.  · Lower Body Dressing: supervision after set up      [facility] 6 Click ADL: 19    Treatment & Education:  Role of OT, POC, safety with ADLs and ADL mobility using RW    Patient left up in chair with all lines intact, call button in reach and nurse notifiedEducation:      GOALS:   Multidisciplinary Problems     Occupational Therapy Goals        Problem: Occupational Therapy Goal    Goal Priority Disciplines Outcome Interventions   Occupational Therapy Goal     OT, PT/OT Ongoing, Progressing    Description:  Goals to be met by: 11/22/19    Patient will increase functional independence with ADLs by performing:    UE Dressing with Set-up Assistance. MET on 11/13/19  LE Dressing with Supervision.   MET on 11/14/19  Grooming while standing with Supervision.  Step transfer with Supervision  Toilet transfer to toilet with Supervision.  Toileting at Supervision level.                          Time Tracking:     OT Date of Treatment: 11/14/19  OT Start Time: 0940  OT Stop Time: 1003  OT Total Time (min): 23 min    Billable Minutes:Self Care/Home Management 23    ROSALIE Guerrero  11/14/2019

## 2019-11-14 NOTE — PLAN OF CARE
CAROLA left voicemail for patient's daughter, Glo [phone] to confirm she will go to [facility] today and fill out paperwork    Spoke with Mary in admission at [facility]'s  [phone] and she state the patient's daughter hadn't come to facility to fill our paperwork. However, she will call the patient's daughter and ask her to come to the faciility to fill out paperwork . If Mary isn't able to reach the daughter she will come to hospital and have the patient fill out facility paperwork.

## 2019-11-14 NOTE — DISCHARGE SUMMARY
[facility] Hospital Medicine  Discharge Summary      Patient Name: Jonathan Gonzales  MRN: [id number]  Admission Date: 11/11/2019  Hospital Length of Stay: 0 days  Discharge Date and Time: 11/14/2019  4:07 PM  Attending Physician: Gracy att. providers found   Discharging Provider: Meghna Watson PA-C  Primary Care Provider: Ghulam Contreras MD      HPI:   Jonathan Gonzales is a 59 y.o. female who  presented to the [facility] ED from her Primary Care office for evaluation of family report patient with dysarthria and right lower extremities weakness.  The patient reported that she was noted to have slurred speech noted three days prior to arrival.  The patient reports right sided weakness and right hip pain that she reports is not new.  Per medical record, the patient was evaluated for this right lower extremity weakness that was thought to be associated with pre-existing sciatica.  She was discharged home on topical anti-inflammatory gel and lidocaine patches that she reports have not been effective.  She states she receives the most relief with ibuprofen 800 mg as needed.  Initial evaluation with CT head that revealed no evidence of acute infarct or hemorrhage. Her labs and vital signs were essentially normal. NIHSS = 2 for mild dysarthria and lower ataxia.   She will be admitted under Observation status for stroke work up.      She ha medical history of bipolar disorder, breast cancer (2010, 2012) s/p mastectomy with post mastectomy lymphedema left upper extremity, depression, type 2 diabetes mellitus uncontrolled with peripheral neuropathy, and hypertension.  She has history of sciatica and lumbar spine pain as well as left hip pain.       * No surgery found *      Hospital Course:   59 y.o.  female  h/o hypertension, chronic smoking, pre-diabetes and hypercholesterolemia placed in observation for evaluation of slurred speech in the setting of chronic right hip pain and right leg  weakness. MRI brain, MRA head and neck unremarkable. Neuro consulted, impression TIA, with recommendation to increase atorvastatin to 80mg po qhs, continue Aspirin 81 mg daily and additionally load with plavix 600mg followed by plavix 75 mg starting tomorrow  X 21 days followed by aspirin 81 mg daily ( based on Point/Chance trial). Speech recs  Mechanical soft, Other (see comments)(chopped), Thin, Other (see comments)(NO STRAWS) 1 bite/sip at a time, Alternating bites/sips, Assistance with meals, Check for pocketing/oral residue, Frequent oral care, HOB to 90 degrees and No straws. BP well controlled off BP meds. LA insulin held due to well controlled blood glucose. Recommendation for SNF. Vitals stable, discharged to SNF.              Consults:   Consults (From admission, onward)        Status Ordering Provider     Inpatient consult to Neurology  Once     Provider:  Courtney Garsia MD    Completed IVÁN RILEY     Inpatient consult to Registered Dietitian/Nutritionist  Once     Provider:  (Not yet assigned)    Completed IVÁN RILEY     Inpatient consult to SNF  Once     Provider:  (Not yet assigned)    Acknowledged FRANSISCO CERVANTES     IP consult to case management/social work  Once     Provider:  (Not yet assigned)    Completed IVÁN RILEY.          No new Assessment & Plan notes have been filed under this hospital service since the last note was generated.  Service: Hospital Medicine    Final Active Diagnoses:    Diagnosis Date Noted POA    PRINCIPAL PROBLEM:  Stroke-like symptoms/TIA [R29.90] 11/11/2019 Yes    Chronic bilateral low back pain with bilateral sciatica [M54.42, M54.41, G89.29] 02/26/2018 Yes    Post-mastectomy lymphedema syndrome [I97.2] 05/30/2014 Yes    Cervical radiculopathy [M54.12] 05/09/2014 Yes     Chronic    Uncontrolled type 2 diabetes mellitus, with long-term current use of insulin [E11.65, Z79.4] 05/07/2014 Not Applicable    Hypertension [I10] 05/07/2014 Yes    Tobacco  "abuse [Z72.0] 11/05/2012 Yes    Peripheral neuropathy [G62.9] 06/29/2012 Yes      Problems Resolved During this Admission:       Discharged Condition: stable    Disposition: Skilled Nursing Facility    Follow Up:  Follow-up Information     Schedule an appointment as soon as possible for a visit with Ghulam Contreras MD.    Specialty:  Family Medicine  Why:  post hospital follow-up  Contact information:  [address]  [address]  [facility] [address]  [phone]             Courtney Garsia MD In 3 weeks.    Specialty:  Neurology  Why:  post hospital follow-up  Contact information:  [address]  [address]  [facility] [address]  [phone]             Go to [facility].    Specialties:  Nursing Home Agency, SNF Agency  Why:  for skilled nursing  Contact information:  [address]   [place] [address]  [phone]                 Patient Instructions:      WALKER FOR HOME USE     Order Specific Question Answer Comments   Type of Walker: Rollator    With wheels? Yes    Height: 5' 3" (1.6 m)    Weight: 85.8 kg (189 lb 1.6 oz)    Length of need (1-99 months): 99    Does patient have medical equipment at home? cane, straight    Please check all that apply: Patient's condition impairs ambulation.    Please check all that apply: Patient is unable to safely ambulate without equipment.      Activity as tolerated     Call MD for:  temperature >100.4     Call MD for:  persistent nausea and vomiting or diarrhea     Call MD for:  severe uncontrolled pain     Call MD for:  difficulty breathing or increased cough     Call MD for:  severe persistent headache     Call MD for:  persistent dizziness, light-headedness, or visual disturbances     Call MD for:  increased confusion or weakness       Significant Diagnostic Studies: Labs:   CMP   Recent Labs   Lab 11/13/19  0500      K 3.6      CO2 26   GLU 98   BUN 13   CREATININE 1.2   CALCIUM 9.4   PROT 7.1   ALBUMIN 3.1*   BILITOT 0.4   ALKPHOS " 54*   AST 31   ALT 26   ANIONGAP 13   ESTGFRAFRICA 57*   EGFRNONAA 49*   , CBC   Recent Labs   Lab 11/13/19  0500   WBC 5.14   HGB 11.0*   HCT 33.8*      , A1C:   Recent Labs   Lab 11/11/19  1847   HGBA1C 6.1*    and All labs within the past 24 hours have been reviewed  Radiology:   Imaging Results          MRA Brain (Final result)  Result time 11/11/19 20:08:19    Final result by Arcelia Hawley MD (11/11/19 20:08:19)                 Impression:      1. No acute intracranial abnormalities identified.  No evidence of acute infarction.  2. MRA brain and neck without significant focal stenosis or occlusion.      Electronically signed by: Arcelia Hawley MD  Date:    11/11/2019  Time:    20:08             Narrative:    EXAMINATION:  MRI BRAIN W WO CONTRAST; MRA NECK WITH CONTRAST; MRA BRAIN WITHOUT CONTRAST    CLINICAL HISTORY:  Stroke;    TECHNIQUE:  Multiplanar multisequence MR imaging of the brain was performed before and after the administration of 10 mL Gadavist  Intravenous contrast.    Obtained as a separate acquisition from MRI Brain, noncontrast 3D time-of-flight intracranial MR angiography was performed for visualization of intracranial vasculature.  MIP 3D reformatted images were created.    COMPARISON:  None    FINDINGS:  The brain is normal in contour and morphology.  There is mild chronic microvascular ischemic disease.  No diffusion signal abnormality to suggest acute infarction.  Ventricles are normal in size and configuration.  No intracranial hemorrhage or extraaxial fluid collection.  No mass or mass effect.  After administration of gadolinium, no pathologic foci of enhancement.    Visualized paranasal sinuses and mastoid air cells are clear. Orbits appear normal.    The bilateral distal cervical, petrous, cavernous and supraclinoid segments of the ICA's and the visualized bilateral anterior and middle cerebral arteries are patent without evidence for significant focal stenosis or occlusion.   The distal vertebral arteries, basilar artery and posterior cerebral arteries are patent without evidence for significant focal stenosis or occlusion.    The origins of the right brachiocephalic,  left common carotid and left subclavian arteries from the arch are within normal limits.  Vertebral artery origins are within normal limits.  The vertebral arteries are unremarkable throughout their course without evidence for focal stenosis or occlusion.  The bilateral common carotid arteries, carotid bifurcation, and extra cranial portions of the internal carotid arteries are patent without evidence for focal stenosis or occlusion.                               CT Head Without Contrast (Final result)  Result time 11/11/19 13:48:47    Final result by Marc Starkey MD (11/11/19 13:48:47)                 Impression:      No acute large vascular territory infarct or intracranial hemorrhage identified.  Further evaluation/follow-up as warranted      Electronically signed by: Marc Starkey MD  Date:    11/11/2019  Time:    13:48             Narrative:    EXAMINATION:  CT HEAD WITHOUT CONTRAST    CLINICAL HISTORY:  Focal neuro deficit, new, fixed or worsening, >6 hours;    TECHNIQUE:  Low dose axial CT images obtained throughout the head without intravenous contrast. Sagittal and coronal reconstructions were performed.    COMPARISON:  Head CT 02/25/2017    FINDINGS:  Intracranial compartment:    Brain appears normally formed.  Mild generalized cerebral volume loss.  Ventricles are midline and stable in size configuration without distortion by mass effect or acute hydrocephalus.  No extra-axial blood or fluid collections.  Nonspecific empty sella configuration, unchanged.    Grossly stable distribution of mild periventricular white matter hypoattenuation, likely sequela of chronic small vessel ischemic change.  No definite new focal areas of abnormal parenchymal attenuation.  Skull base atherosclerotic vascular calcifications  "noted.  No parenchymal mass, hemorrhage, edema or major vascular distribution infarct.    Skull/extracranial contents (limited evaluation): No fracture. Mastoid air cells and paranasal sinuses are essentially clear.                                  Pending Diagnostic Studies:     None         Medications:  Reconciled Home Medications:         Jonathan Gonzales   Home Medication Instructions FABIANA:[id number]    Printed on:11/14/19 9364   Medication Information                      arm brace (WRIST SUPPORT LARGE-XLARGE MISC)               aspirin (ECOTRIN) 81 MG EC tablet  Take 1 tablet (81 mg total) by mouth once daily.             atorvastatin (LIPITOR) 80 MG tablet  Take 1 tablet (80 mg total) by mouth once daily.             BD ULTRA-FINE MINI PEN NEEDLE 31 gauge x 3/16" Ndle  Inject 1 each into the skin once daily.             blood-glucose meter (FREESTYLE SYSTEM KIT) kit  Pt to check BG up to QID. Dispense strips compatible with pt brand meter             clopidogrel (PLAVIX) 75 mg tablet  Take 1 tablet (75 mg total) by mouth once daily. for 21 days             diclofenac sodium (VOLTAREN) 1 % Gel  Apply 2 g topically 4 (four) times daily.             divalproex (DEPAKOTE) 250 MG EC tablet  Take 1 tablet (250 mg total) by mouth 2 (two) times daily.             EASY TOUCH 31 gauge x 1/4" Ndle               insulin (LANTUS SOLOSTAR U-100 INSULIN) glargine 100 units/mL (3mL) SubQ pen  INJECT  23 UNITS SUBCUTANEOUSLY EVERY EVENING - on hold d/t controlled BG             lancets (ONETOUCH ULTRASOFT LANCETS) [facility]  Pt to check BG up to QID. Dispense strips compatible with pt brand meter             lidocaine (LIDODERM) 5 %  Apply up to 3 patches to painful areas. Remove in 12 hours. No more than 3 patches should be used in a 24-hour period.             lisinopril-hydrochlorothiazide (PRINZIDE,ZESTORETIC) 10-12.5 mg per tablet  Take 1 tablet by mouth once daily. Hold d/t low BP             metFORMIN (GLUCOPHAGE) 500 " "MG tablet  Take 1 tablet (500 mg total) by mouth 2 (two) times daily with meals.             OLANZapine (ZYPREXA) 10 MG tablet  TK 1 T PO QHS             oxybutynin (DITROPAN-XL) 10 MG 24 hr tablet  TAKE 1 TABLET(10 MG) BY MOUTH EVERY DAY             pen needle, diabetic (BD ULTRA-FINE [place] PEN NEEDLES) 32 gauge x 5/32" Ndle  Pt is injecting once daily             pen needle, diabetic, safety 29 gauge x 3/16" Ndle  Use as instructed             pregabalin (LYRICA) 100 MG capsule  TAKE 1 CAPSULE TWICE DAILY             QUEtiapine (SEROQUEL) 100 MG Tab  Take 1 tablet (100 mg total) by mouth every evening.             TRUE METRIX GLUCOSE METER Misc               TRUEPLUS LANCETS 30 gauge Misc  USE TO TEST BLOOD SUGART QID             TRUEPLUS LANCETS 33 gauge Misc  100 lancets by Subconjunctival route 4 (four) times daily.             VICTOZA 3-HERMANN 0.6 mg/0.1 mL (18 mg/3 mL) PnIj  ADMINISTER 1.8 MG UNDER THE SKIN EVERY DAY             ziprasidone (GEODON) 40 MG Cap  TAKE 1 CAPSULE(40 MG) BY MOUTH EVERY EVENING               Indwelling Lines/Drains at time of discharge:   Lines/Drains/Airways     Central Venous Catheter Line                 Port A Cath Right Forearm -- days                Time spent on the discharge of patient: >30 minutes  Patient was seen and examined on the date of discharge and determined to be suitable for discharge.         Meghna Watson PA-C  Department of Hospital Medicine  [facility]  "

## 2019-11-14 NOTE — PLAN OF CARE
Patient will discharge from hospital to [facility] SN[facility]. Attending nurse to call report to nurse Isa at [facility] [phone] Room 1339. Notified daughter, Lai [phone] about disposition after discharge, no further concerns noted. ADT 30 placed expected  1600 by wheelchair van. Patient notified and provided contact info.        11/14/19 1501   Final Note   Assessment Type Final Discharge Note   Anticipated Discharge Disposition SNF   What phone number can be called within the next 1-3 days to see how you are doing after discharge? [phone]   Hospital Follow Up  Appt(s) scheduled? Yes   Discharge plans and expectations educations in teach back method with documentation complete? Yes   Right Care Referral Info   Post Acute Recommendation SNF / Sub-Acute Rehab

## 2019-11-14 NOTE — PT/OT/SLP PROGRESS
Physical Therapy Treatment    Patient Name:  Jonathan Gonzales   MRN:  [id number]    Recommendations:     Discharge Recommendations:  nursing facility, skilled   Discharge Equipment Recommendations: (TBD at next level of care)   Barriers to discharge: Decreased caregiver support    Assessment:     Jonathan Gonzales is a 59 y.o. female admitted with a medical diagnosis of Stroke-like symptoms.  She presents with the following impairments/functional limitations:  weakness, impaired endurance, impaired self care skills, impaired functional mobilty, gait instability, impaired balance, decreased coordination, decreased lower extremity function, pain, decreased safety awareness ;pt with good mobility today, amb'd with rolllator, though very slow pace and needing rest breaks 2/2 fatigue.    Rehab Prognosis: Good; patient would benefit from acute skilled PT services to address these deficits and reach maximum level of function.    Recent Surgery: * No surgery found *      Plan:     During this hospitalization, patient to be seen 5 x/week to address the identified rehab impairments via gait training, therapeutic activities, therapeutic exercises, neuromuscular re-education and progress toward the following goals:    · Plan of Care Expires:  11/26/19    Subjective     Chief Complaint: R leg pain   Patient/Family Comments/goals: pt agreeable to session, excited about going to another facility to get stronger.  Pain/Comfort:  · Pain Rating 1: 8/10  · Location - Side 1: Right  · Location - Orientation 1: posterior  · Location 1: leg(and buttock)  · Pain Addressed 1: Reposition, Distraction  · Pain Rating Post-Intervention 1: 8/10      Objective:     Communicated with nurse prior to session.  Patient found up in chair with peripheral IV upon PT entry to room.     General Precautions: Standard, fall, diabetic   Orthopedic Precautions:N/A   Braces: N/A     Functional Mobility:  · Transfers:     · Sit to Stand:  stand by assistance  "and contact guard assistance with 4 wheeled walker  · Gait: amb'd 60' x 2 with rollator and CGA, amb slowly and seated rest x 3-4 min. 2/2 fatigue.      AM-PAC 6 CLICK MOBILITY  Turning over in bed (including adjusting bedclothes, sheets and blankets)?: 4  Sitting down on and standing up from a chair with arms (e.g., wheelchair, bedside commode, etc.): 3  Moving from lying on back to sitting on the side of the bed?: 3  Moving to and from a bed to a chair (including a wheelchair)?: 3  Need to walk in hospital room?: 3  Climbing 3-5 steps with a railing?: 3  Basic Mobility Total Score: 19       Therapeutic Activities and Exercises:   pt perf'd seated LE ex's of hip flex. And LAQ's x 10 ea. , sit to stand act's with modA (no use of UE"s on chair) x 5 reps and marching in standing with HHA x 5 reps.    Patient left up in chair with all lines intact, call button in reach and nurse notified..    GOALS:   Multidisciplinary Problems     Physical Therapy Goals        Problem: Physical Therapy Goal    Goal Priority Disciplines Outcome Goal Variances Interventions   Physical Therapy Goal     PT, PT/OT Ongoing, Progressing     Description:  Goals to be met by: 19    Patient will increase functional independence with mobility by performin. Sit<>stand with mod(I) with LRAD.  2. Gait x 150 feet with mod(I) with LRAD.  3. Demo safety awareness with use of rollator during gait bout with seated rest break without cues.  4. Ascend/descend 4 steps with unilateral HR and cane with supervision.                        Time Tracking:     PT Received On: 19  PT Start Time: 1037     PT Stop Time: 1101  PT Total Time (min): 24 min     Billable Minutes: Gait Training 14 and Therapeutic Exercise 10    Treatment Type: Treatment  PT/PTA: PTA     PTA Visit Number: 2     Catrina Miles PTA  2019  "

## 2019-11-14 NOTE — NURSING
[facility] van here. Pt transferred via wheelchair to [place]. Pt transferred to [facility] @ this time. Safety maintained.

## 2019-11-14 NOTE — NURSING
11/14/2019        Pt verbalized understanding D/C instructions and education provided pertinent to D/C needs. IV cath removed fully intact. No distress noted.           Jo-Ann Zelaya RN

## 2019-11-15 ENCOUNTER — TELEPHONE (OUTPATIENT)
Dept: NEUROLOGY | Facility: CLINIC | Age: 59
End: 2019-11-15

## 2019-11-15 NOTE — TELEPHONE ENCOUNTER
----- Message from Ashwini Verdin sent at 11/12/2019 12:09 PM CST -----  Contact: [phone]/ Pt  Patient is requesting a eulalio back in regards to scheduling appt    Please call    Phone [phone]

## 2019-11-15 NOTE — PT/OT/SLP DISCHARGE
Occupational Therapy Discharge Summary    Jonathan Gonzales  MRN: [id number]   Principal Problem: Stroke-like symptoms      Patient Discharged from acute Occupational Therapy on 11/14/19.  Please refer to prior OT note dated 11/14/19 for functional status.    Assessment:      Goals partially met. Patient appropriate for care in another setting.    Objective:     GOALS:   Multidisciplinary Problems     Occupational Therapy Goals        Problem: Occupational Therapy Goal    Goal Priority Disciplines Outcome Interventions   Occupational Therapy Goal     OT, PT/OT Ongoing, Progressing    Description:  Goals to be met by: 11/22/19    Patient will increase functional independence with ADLs by performing:    UE Dressing with Set-up Assistance. MET on 11/13/19  LE Dressing with Supervision.   MET on 11/14/19  Grooming while standing with Supervision.  Step transfer with Supervision  Toilet transfer to toilet with Supervision.  Toileting at Supervision level.                          Reasons for Discontinuation of Therapy Services  Transfer to alternate level of care.      Plan:     Patient Discharged to: Skilled Nursing Facility    ROSALIE Guerrero  11/14/2019

## 2019-11-25 ENCOUNTER — PATIENT OUTREACH (OUTPATIENT)
Dept: ADMINISTRATIVE | Facility: OTHER | Age: 59
End: 2019-11-25

## 2019-11-25 DIAGNOSIS — Z12.11 ENCOUNTER FOR FECAL IMMUNOCHEMICAL TEST SCREENING: Primary | ICD-10-CM

## 2019-12-16 ENCOUNTER — OFFICE VISIT (OUTPATIENT)
Dept: INTERNAL MEDICINE | Facility: CLINIC | Age: 59
End: 2019-12-16
Attending: FAMILY MEDICINE
Payer: MEDICARE

## 2019-12-16 VITALS
WEIGHT: 188.69 LBS | OXYGEN SATURATION: 97 % | DIASTOLIC BLOOD PRESSURE: 79 MMHG | HEART RATE: 88 BPM | SYSTOLIC BLOOD PRESSURE: 115 MMHG | BODY MASS INDEX: 33.43 KG/M2 | HEIGHT: 63 IN

## 2019-12-16 DIAGNOSIS — E87.6 HYPOPOTASSEMIA: ICD-10-CM

## 2019-12-16 DIAGNOSIS — I10 HTN (HYPERTENSION), BENIGN: ICD-10-CM

## 2019-12-16 DIAGNOSIS — M50.30 DDD (DEGENERATIVE DISC DISEASE), CERVICAL: ICD-10-CM

## 2019-12-16 DIAGNOSIS — R47.1 DYSARTHRIA: ICD-10-CM

## 2019-12-16 DIAGNOSIS — F25.0 SCHIZOAFFECTIVE DISORDER, BIPOLAR TYPE: ICD-10-CM

## 2019-12-16 DIAGNOSIS — R29.90 STROKE-LIKE SYMPTOMS: Primary | ICD-10-CM

## 2019-12-16 DIAGNOSIS — M47.812 CERVICAL SPONDYLOSIS WITHOUT MYELOPATHY: ICD-10-CM

## 2019-12-16 DIAGNOSIS — M54.12 CERVICAL RADICULOPATHY: ICD-10-CM

## 2019-12-16 DIAGNOSIS — E11.42 DM TYPE 2 WITH DIABETIC PERIPHERAL NEUROPATHY: ICD-10-CM

## 2019-12-16 PROCEDURE — 99499 RISK ADDL DX/OHS AUDIT: ICD-10-PCS | Mod: HCNC,S$GLB,, | Performed by: FAMILY MEDICINE

## 2019-12-16 PROCEDURE — 99214 PR OFFICE/OUTPT VISIT, EST, LEVL IV, 30-39 MIN: ICD-10-PCS | Mod: HCNC,S$GLB,, | Performed by: FAMILY MEDICINE

## 2019-12-16 PROCEDURE — 99999 PR PBB SHADOW E&M-EST. PATIENT-LVL III: CPT | Mod: PBBFAC,HCNC,, | Performed by: FAMILY MEDICINE

## 2019-12-16 PROCEDURE — 3074F PR MOST RECENT SYSTOLIC BLOOD PRESSURE < 130 MM HG: ICD-10-PCS | Mod: HCNC,CPTII,S$GLB, | Performed by: FAMILY MEDICINE

## 2019-12-16 PROCEDURE — 99214 OFFICE O/P EST MOD 30 MIN: CPT | Mod: HCNC,S$GLB,, | Performed by: FAMILY MEDICINE

## 2019-12-16 PROCEDURE — 3078F DIAST BP <80 MM HG: CPT | Mod: HCNC,CPTII,S$GLB, | Performed by: FAMILY MEDICINE

## 2019-12-16 PROCEDURE — 3044F HG A1C LEVEL LT 7.0%: CPT | Mod: HCNC,CPTII,S$GLB, | Performed by: FAMILY MEDICINE

## 2019-12-16 PROCEDURE — 99999 PR PBB SHADOW E&M-EST. PATIENT-LVL III: ICD-10-PCS | Mod: PBBFAC,HCNC,, | Performed by: FAMILY MEDICINE

## 2019-12-16 PROCEDURE — 99499 UNLISTED E&M SERVICE: CPT | Mod: HCNC,S$GLB,, | Performed by: FAMILY MEDICINE

## 2019-12-16 PROCEDURE — 3008F BODY MASS INDEX DOCD: CPT | Mod: HCNC,CPTII,S$GLB, | Performed by: FAMILY MEDICINE

## 2019-12-16 PROCEDURE — 3044F PR MOST RECENT HEMOGLOBIN A1C LEVEL <7.0%: ICD-10-PCS | Mod: HCNC,CPTII,S$GLB, | Performed by: FAMILY MEDICINE

## 2019-12-16 PROCEDURE — 3074F SYST BP LT 130 MM HG: CPT | Mod: HCNC,CPTII,S$GLB, | Performed by: FAMILY MEDICINE

## 2019-12-16 PROCEDURE — 3078F PR MOST RECENT DIASTOLIC BLOOD PRESSURE < 80 MM HG: ICD-10-PCS | Mod: HCNC,CPTII,S$GLB, | Performed by: FAMILY MEDICINE

## 2019-12-16 PROCEDURE — 3008F PR BODY MASS INDEX (BMI) DOCUMENTED: ICD-10-PCS | Mod: HCNC,CPTII,S$GLB, | Performed by: FAMILY MEDICINE

## 2019-12-16 RX ORDER — ATORVASTATIN CALCIUM 80 MG/1
80 TABLET, FILM COATED ORAL DAILY
Qty: 30 TABLET | Refills: 11 | Status: SHIPPED | OUTPATIENT
Start: 2019-12-16 | End: 2021-06-04 | Stop reason: SDUPTHER

## 2019-12-16 RX ORDER — OLANZAPINE 10 MG/1
TABLET ORAL
Qty: 90 TABLET | Refills: 3 | Status: SHIPPED | OUTPATIENT
Start: 2019-12-16 | End: 2023-01-19

## 2019-12-16 RX ORDER — CLOPIDOGREL BISULFATE 75 MG/1
75 TABLET ORAL DAILY
Qty: 21 TABLET | Refills: 0 | OUTPATIENT
Start: 2019-12-16 | End: 2020-01-06

## 2019-12-16 RX ORDER — CLOPIDOGREL BISULFATE 75 MG/1
75 TABLET ORAL DAILY
Qty: 21 TABLET | Refills: 0 | Status: CANCELLED | OUTPATIENT
Start: 2019-12-16 | End: 2020-01-06

## 2019-12-16 RX ORDER — PREGABALIN 100 MG/1
CAPSULE ORAL
Qty: 60 CAPSULE | Refills: 5 | Status: SHIPPED | OUTPATIENT
Start: 2019-12-16 | End: 2020-03-31 | Stop reason: SDUPTHER

## 2019-12-16 RX ORDER — LISINOPRIL AND HYDROCHLOROTHIAZIDE 10; 12.5 MG/1; MG/1
1 TABLET ORAL DAILY
Qty: 30 TABLET | Refills: 11 | Status: SHIPPED | OUTPATIENT
Start: 2019-12-16 | End: 2020-07-17

## 2019-12-16 RX ORDER — ZIPRASIDONE HYDROCHLORIDE 40 MG/1
CAPSULE ORAL
Qty: 30 CAPSULE | Refills: 5 | Status: SHIPPED | OUTPATIENT
Start: 2019-12-16 | End: 2020-04-08 | Stop reason: SDUPTHER

## 2019-12-16 NOTE — PROGRESS NOTES
CHIEF COMPLAINT:   F/U CVA in a patient with diabetes and hypertension    HISTORY OF PRESENT ILLNESS: The patient is a 59 year-old black female.  The patient has a long and complicated medical history.  She was recently seen in the ED for RLE weakness and dysarthria.  Just got out of SNF for CVA.    She continues to have problems with neuropathic pain as well as central pain.  She does well w/ her Lyrica 100 twice a day.    The patient has a history of diabetes.  Recent blood sugars have been less than 200.  There have been no episodes of hypoglycemia.    The patient has a history of stable hypertension on current medications.  Patient denies chest pain or shortness of breath today.    The patient has a history of stable hyperlipidemia on current medications.  The patient denies chest pain or shortness of breath today.  The patient denies muscle aches or myalgias suggestive of myositis.    She has a history of breast cancer for which she underwent bilateral mastectomies and chemotherapy.    REVIEW OF SYSTEMS:  GENERAL: No fever, chills or weight loss.  SKIN: No rashes, itching or changes in color or texture of skin.  HEAD: No headaches or recent head trauma.  EYES: Visual acuity fine. No photophobia, ocular pain or diplopia.  EARS: Denies ear pain, discharge or vertigo.  NOSE: No loss of smell, no epistaxis or postnasal drip.  MOUTH & THROAT: No hoarseness or change in voice. No excessive gum bleeding.  NODES: Denies swollen glands.  CHEST: Denies AWAD, cyanosis, wheezing, cough and sputum production.  CARDIOVASCULAR: Denies chest pain, PND, orthopnea or reduced exercise tolerance.  ABDOMEN: Appetite fine. No weight loss. Denies diarrhea, abdominal pain, hematemesis or blood in stool.  URINARY: No flank pain, dysuria or hematuria.  PERIPHERAL VASCULAR: No claudication or cyanosis.  MUSCULOSKELETAL: No joint stiffness or swelling. Except as noted above.  NEUROLOGIC: No history of seizures    SOCIAL HISTORY: The  "patient does smoke.  The patient consumes alcohol socially.      PHYSICAL EXAMINATION:     Blood pressure 115/79, pulse 88, height 5' 3" (1.6 m), weight 85.6 kg (188 lb 11.4 oz), SpO2 97 %.    APPEARANCE: Well nourished, well developed, in no acute distress.    HEAD: Normocephalic, atraumatic.  EYES: PERRL. EOMI.  Conjunctivae without injection and  anicteric  EARS: TM's intact. Light reflex normal. No retraction or perforation.    NOSE: Mucosa pink. Airway clear.  MOUTH & THROAT: No tonsillar enlargement. No pharyngeal erythema or exudate. No stridor.  NECK: Supple.   NODES: No cervical, axillary or inguinal lymph node enlargement.  CHEST: Lungs clear to auscultation.  No retractions are noted.  No rales or rhonchi are present.  CARDIOVASCULAR: Normal S1, S2. No rubs, murmurs or gallops.  ABDOMEN: Bowel sounds normal. Not distended. Soft. No tenderness or masses.  No ascites is noted.  MUSCULOSKELETAL:  There is no clubbing, cyanosis, or edema of the extremities x4.  There is full range of motion of the lumbar spine.  There is full range of motion of the extremities x4.  There is no deformity noted.    NEUROLOGIC:       Normal speech development.      Hearing normal.      paretic gait.      Motor and sensory exams grossly normal.  Mild RLE weakness noted  PSYCHIATRIC: Patient is alert and oriented x3.  Thought processes are all normal.  There is no homicidality.  There is no suicidality.  There is no evidence of psychosis.    LABORATORY/RADIOLOGY:   Chart reviewed . including surgeries and blood work    ASSESSMENT:   CVA  Breast cancer with resultant lymphedema  Hypertension, condition is well controlled on current medication regimen  Diabetes, condition is well controlled on current medication regimen  Neuropathic pain  Cervical radiculopathy    PLAN:  Home health ordered  Depakote 250 mg p.o. b.i.d.  Her diabetes is very well controlled with a recent A1c of 6.3   Pain clinic   KCl 40 po qd  Prinzide  Pravachol 20 " mg by mouth daily    Return to clinic in 6 month with blood work prior

## 2019-12-16 NOTE — TELEPHONE ENCOUNTER
----- Message from Rickey Lopez sent at 12/16/2019 11:11 AM CST -----  Contact: Self  Type: Patient Call Back    Who called: self    What is the request in detail: Patient at pharmacy waiting for medications to be called in     Can the clinic reply by [facility]? No     Would the patient rather a call back or a response via [facility]?  Call     Best call back number: [phone]

## 2019-12-24 DIAGNOSIS — N39.41 URGE INCONTINENCE: ICD-10-CM

## 2019-12-24 RX ORDER — OXYBUTYNIN CHLORIDE 10 MG/1
TABLET, EXTENDED RELEASE ORAL
Qty: 90 TABLET | Refills: 3 | Status: SHIPPED | OUTPATIENT
Start: 2019-12-24 | End: 2020-10-08

## 2019-12-24 RX ORDER — INSULIN PUMP SYRINGE, 3 ML
EACH MISCELLANEOUS
Qty: 1 EACH | Refills: 0 | Status: SHIPPED | OUTPATIENT
Start: 2019-12-24 | End: 2020-03-13

## 2019-12-24 RX ORDER — LIRAGLUTIDE 6 MG/ML
INJECTION SUBCUTANEOUS
Qty: 27 ML | Refills: 0 | Status: SHIPPED | OUTPATIENT
Start: 2019-12-24 | End: 2020-03-13

## 2020-01-13 DIAGNOSIS — M47.812 CERVICAL SPONDYLOSIS WITHOUT MYELOPATHY: ICD-10-CM

## 2020-01-13 RX ORDER — QUETIAPINE FUMARATE 100 MG/1
100 TABLET, FILM COATED ORAL NIGHTLY
Qty: 90 TABLET | Refills: 0 | Status: SHIPPED | OUTPATIENT
Start: 2020-01-13 | End: 2020-04-25 | Stop reason: SDUPTHER

## 2020-01-13 NOTE — TELEPHONE ENCOUNTER
----- Message from Norma Kuldip sent at 1/13/2020 10:23 AM CST -----  Contact: Self [phone]  Type: RX Refill Request    Who Called: Self    Have you contacted your pharmacy:    Refill or New Rx:Refill    RX Name and Strength:QUEtiapine (SEROQUEL) 100 MG Tab     Is this a 30 day or 90 day RX:90 day    Preferred Pharmacy with phone number:  [id number] - [place], [place] - [address] AT [facility] [address]  [address]  [facility] [address]  Phone: [phone] Fax: [phone]    Local or Mail Order:Local    Would the patient rather a call back or a response via [facility]? Call back    Best Call Back Number:[phone]

## 2020-01-20 ENCOUNTER — PES CALL (OUTPATIENT)
Dept: ADMINISTRATIVE | Facility: CLINIC | Age: 60
End: 2020-01-20

## 2020-01-28 RX ORDER — DIVALPROEX SODIUM 250 MG/1
TABLET, DELAYED RELEASE ORAL
Qty: 180 TABLET | Refills: 3 | Status: SHIPPED | OUTPATIENT
Start: 2020-01-28 | End: 2020-11-23

## 2020-01-28 RX ORDER — METFORMIN HYDROCHLORIDE 500 MG/1
TABLET ORAL
Qty: 180 TABLET | Refills: 3 | Status: SHIPPED | OUTPATIENT
Start: 2020-01-28 | End: 2020-11-23

## 2020-02-26 ENCOUNTER — PES CALL (OUTPATIENT)
Dept: ADMINISTRATIVE | Facility: CLINIC | Age: 60
End: 2020-02-26

## 2020-03-04 ENCOUNTER — OFFICE VISIT (OUTPATIENT)
Dept: HEMATOLOGY/ONCOLOGY | Facility: CLINIC | Age: 60
End: 2020-03-04
Payer: MEDICARE

## 2020-03-04 VITALS
WEIGHT: 176.13 LBS | HEIGHT: 64 IN | TEMPERATURE: 100 F | HEART RATE: 84 BPM | DIASTOLIC BLOOD PRESSURE: 66 MMHG | OXYGEN SATURATION: 95 % | BODY MASS INDEX: 30.07 KG/M2 | SYSTOLIC BLOOD PRESSURE: 115 MMHG

## 2020-03-04 DIAGNOSIS — E11.9 TYPE 2 DIABETES MELLITUS WITHOUT COMPLICATION, UNSPECIFIED WHETHER LONG TERM INSULIN USE: ICD-10-CM

## 2020-03-04 DIAGNOSIS — F25.0 SCHIZOAFFECTIVE DISORDER, BIPOLAR TYPE: ICD-10-CM

## 2020-03-04 DIAGNOSIS — C50.912 MALIGNANT NEOPLASM OF LEFT FEMALE BREAST, UNSPECIFIED ESTROGEN RECEPTOR STATUS, UNSPECIFIED SITE OF BREAST: Primary | ICD-10-CM

## 2020-03-04 PROCEDURE — 3008F PR BODY MASS INDEX (BMI) DOCUMENTED: ICD-10-PCS | Mod: HCNC,CPTII,S$GLB, | Performed by: INTERNAL MEDICINE

## 2020-03-04 PROCEDURE — 3044F HG A1C LEVEL LT 7.0%: CPT | Mod: HCNC,CPTII,S$GLB, | Performed by: INTERNAL MEDICINE

## 2020-03-04 PROCEDURE — 99499 UNLISTED E&M SERVICE: CPT | Mod: HCNC,S$GLB,, | Performed by: INTERNAL MEDICINE

## 2020-03-04 PROCEDURE — 3078F DIAST BP <80 MM HG: CPT | Mod: HCNC,CPTII,S$GLB, | Performed by: INTERNAL MEDICINE

## 2020-03-04 PROCEDURE — 99999 PR PBB SHADOW E&M-EST. PATIENT-LVL V: ICD-10-PCS | Mod: PBBFAC,HCNC,, | Performed by: INTERNAL MEDICINE

## 2020-03-04 PROCEDURE — 3044F PR MOST RECENT HEMOGLOBIN A1C LEVEL <7.0%: ICD-10-PCS | Mod: HCNC,CPTII,S$GLB, | Performed by: INTERNAL MEDICINE

## 2020-03-04 PROCEDURE — 3074F PR MOST RECENT SYSTOLIC BLOOD PRESSURE < 130 MM HG: ICD-10-PCS | Mod: HCNC,CPTII,S$GLB, | Performed by: INTERNAL MEDICINE

## 2020-03-04 PROCEDURE — 99999 PR PBB SHADOW E&M-EST. PATIENT-LVL V: CPT | Mod: PBBFAC,HCNC,, | Performed by: INTERNAL MEDICINE

## 2020-03-04 PROCEDURE — 3008F BODY MASS INDEX DOCD: CPT | Mod: HCNC,CPTII,S$GLB, | Performed by: INTERNAL MEDICINE

## 2020-03-04 PROCEDURE — 3074F SYST BP LT 130 MM HG: CPT | Mod: HCNC,CPTII,S$GLB, | Performed by: INTERNAL MEDICINE

## 2020-03-04 PROCEDURE — 3078F PR MOST RECENT DIASTOLIC BLOOD PRESSURE < 80 MM HG: ICD-10-PCS | Mod: HCNC,CPTII,S$GLB, | Performed by: INTERNAL MEDICINE

## 2020-03-04 PROCEDURE — 99214 PR OFFICE/OUTPT VISIT, EST, LEVL IV, 30-39 MIN: ICD-10-PCS | Mod: HCNC,S$GLB,, | Performed by: INTERNAL MEDICINE

## 2020-03-04 PROCEDURE — 99499 RISK ADDL DX/OHS AUDIT: ICD-10-PCS | Mod: HCNC,S$GLB,, | Performed by: INTERNAL MEDICINE

## 2020-03-04 PROCEDURE — 99214 OFFICE O/P EST MOD 30 MIN: CPT | Mod: HCNC,S$GLB,, | Performed by: INTERNAL MEDICINE

## 2020-03-04 NOTE — PROGRESS NOTES
Subjective:       Patient ID: Jonathan Gonzales is a 60 y.o. female.    Chief Complaint: Follow-up       Diagnosis: Stage IIB lt Breast CA T2N1MO ER weak pos/CO negative, HER-2/brdiget amplified dx'd 11/2011  Therapy:       1.  s/p bilateral mastectomy with SHLOMO flap reconstruction 11/23/2011                         2.  completed   Phase III 0221 protocol 3/12/13                   HPI 59 -year-old female with stage IIB breast cancer, left breast originally diagnosed in November 2011, status post bilateral skin -sparing mastectomy with a right prophylactic and a left radical mastectomy for a T2 N1 MO ,Stage 2b. breast cancer of the left breast on 11/23/2011 with immediate autologous tissue reconstruction with SHLOMO flap. She underwent second stage reconstructive surgery on 4/27/2015 which was complicated by a hematoma for which She was taken back to OR emergently  for aspiration.          Pt previously Lost to  follow-up ( >1yr) and has re established care 2017      She was seen in the ED 10/2019 for RLE weakness and dysarthria. She was then admitted to  SNF for CVA.     She is doing much better  Denies any weakness  Appetite diminished  No SOB/CP/cough  No fevers, night sweats      She reports lymphedema improved  She wears  compression sleeve LUE   Previously followed by PT    She continues to have problems with neuropathic pain as well as central pain.    She continues with  Lyrica 100 twice a day.     She has a history of diabetes.   She reports glucose levels in 120's   HbA1c 6.1%        She is followed by psychiatry Dr. Fierro  at [facility]   for bipolar disorder and schizophrenia      She is followed by PCP for  history of type 2  DM w/neuropathy , HTN and hyperlipidemia          PrevHx: Tumor was ER weak pos /CO negative, HER-2/bridget amplified with 1 of total of 24 lymph nodes removed which revealed metastatic carcinoma, 0.4 cm in diameter with no extranodal extension.She had abnormal PET  "imaging 12/2011 which revealed. Findings suggestive of metastatic disease to supraclavicular, mediastinal and right hilar lymph nodes.She underwent rt supraclavicular lymph node biopsy which was negative for malignancy. PET/CT 10/12 revealed resolution of LAD and no evid of mets.Patient was enrolled in 0221 study. She completed 1 yr of Herceptin therapy3/12/2013.     Tx; Phase III 0221 protocol- DD AC ( Adriamycin 60mg/m2/Cytoxan 60mg/2) x4 followed by weekly Paclitaxel 175mg/m2/Herceptin and completion of Herceptin therapy (6mg/kg q3wks) 1 yr 3/12/13          Review of Systems   Constitutional: Negative for appetite change, chills and fatigue.   HENT: Negative for congestion, hearing loss and nosebleeds.    Eyes: Negative for visual disturbance.   Respiratory: Negative for cough, chest tightness, shortness of breath and wheezing.    Cardiovascular: Negative for chest pain, palpitations and leg swelling.   Gastrointestinal: Negative for abdominal distention, abdominal pain, blood in stool, constipation, diarrhea, nausea and vomiting.   Genitourinary: Negative for dysuria, flank pain, frequency and hematuria.   Musculoskeletal: Negative for arthralgias, back pain, gait problem, joint swelling and neck pain.   Skin: Negative for rash.        No petechiae, ecchymoses   Neurological: Negative for dizziness, light-headedness, numbness and headaches.   Hematological: Negative for adenopathy. Does not bruise/bleed easily.   Psychiatric/Behavioral: Negative for dysphoric mood. The patient is not nervous/anxious.        Objective:       Vitals:    03/04/20 1312   BP: 115/66   BP Location: Left arm   Patient Position: Sitting   BP Method: Medium (Automatic)   Pulse: 84   Temp: 99.6 °F (37.6 °C)   TempSrc: Oral   SpO2: 95%   Weight: 79.9 kg (176 lb 2.4 oz)   Height: 5' 4" (1.626 m)       Physical Exam   Constitutional: She is oriented to person, place, and time. She appears well-developed and well-nourished.   HENT:   Head: " Normocephalic.   Mouth/Throat: Oropharynx is clear and moist. No oropharyngeal exudate.   Eyes: Pupils are equal, round, and reactive to light. Conjunctivae and lids are normal. No scleral icterus.   Neck: Normal range of motion. Neck supple. No thyromegaly present.   Cardiovascular: Normal rate, regular rhythm and normal heart sounds.   No murmur heard.  Pulmonary/Chest: Breath sounds normal. She has no wheezes. She has no rales.   S/p Rt reconstructed breast- palpable fibrosis noted  Well-healed surg incision site, no axillary LAD    S/p Lt reconstructed breast- well-healed surg incision site, no palpable masses or axillary LAD noted   Abdominal: Soft. Bowel sounds are normal. She exhibits no mass. There is no hepatosplenomegaly. There is no tenderness. There is no rebound and no guarding.   Musculoskeletal: Normal range of motion. She exhibits edema. She exhibits no tenderness.   Lymphadenopathy:     She has no cervical adenopathy.     She has no axillary adenopathy.        Right: No supraclavicular adenopathy present.        Left: No supraclavicular adenopathy present.   Neurological: She is alert and oriented to person, place, and time. No cranial nerve deficit. Coordination normal.   Skin: Skin is warm and dry. No ecchymosis, no petechiae and no rash noted. No erythema.   Psychiatric: She has a normal mood and affect.           Results for JOYA MUNOZ (MRN [id number]) as of 6/8/2015 13:40   Ref. Range 6/2/2015 07:50   Vit D, 25-Hydroxy Latest Range: 30-96 ng/mL 33     Lab Results   Component Value Date    WBC 5.14 11/13/2019    HGB 11.0 (L) 11/13/2019    HCT 33.8 (L) 11/13/2019    MCV 88 11/13/2019     11/13/2019           10/19/2016 Bone Density-nl    Labs: none   Assessment:       1. Malignant neoplasm of left female breast, unspecified estrogen receptor status, unspecified site of breast    2. Schizoaffective disorder, bipolar type    3. Type 2 diabetes mellitus without complication, unspecified  whether long term insulin use        Plan:   Jonathan was seen today for follow-up.  Pt clinically stable         Breast cancer, Stage IIB lt Breast CA T2N1MO ER weak pos/ TX neg Her 2 pos diagnosed  11/2011   - s/p bilateral mastectomy with SHLOMO flap reconstruction 11/23/2011   - s/p chemotherapy per  Phase III 0221 protocol      S/p second stage reconstructive surgery      Rt Breast US 10/2015- Area in the right breast is benign-negative.  .   ACR BI-RADS Category 2: Benign   - CUAUHTEMOC  recurrence clinically       Bipolar d/o  Follow-up with psych    DM type 2  Well controlled  Follow up with PCP    Advance Care Planning     Power of   I initiated the process of advance care planning today and explained the importance of this process to the patient.  I introduced the concept of advance directives to the patient, as well. Then the patient received detailed information about the importance of designating a Health Care Power of  (HCPOA). She was also instructed to communicate with this person about their wishes for future healthcare, should she become sick and lose decision-making capacity. The patient has not previously appointed a HCPOA. After our discussion, the patient has decided to complete a HCPOA and has appointed her daughter and NAME:    Rojelio Daniels    I spent a total time of 16  minutes discussing this issue with the patient.            F/u 6  mos with cbc,cmp     CC: Ghulam Contreras MD

## 2020-03-13 RX ORDER — INSULIN PUMP SYRINGE, 3 ML
EACH MISCELLANEOUS
Qty: 1 EACH | Refills: 0 | Status: SHIPPED | OUTPATIENT
Start: 2020-03-13 | End: 2020-12-09 | Stop reason: SDUPTHER

## 2020-03-13 RX ORDER — LIRAGLUTIDE 6 MG/ML
INJECTION SUBCUTANEOUS
Qty: 27 ML | Refills: 0 | Status: SHIPPED | OUTPATIENT
Start: 2020-03-13 | End: 2020-06-18

## 2020-03-17 ENCOUNTER — OFFICE VISIT (OUTPATIENT)
Dept: INTERNAL MEDICINE | Facility: CLINIC | Age: 60
End: 2020-03-17
Payer: MEDICARE

## 2020-03-17 VITALS
DIASTOLIC BLOOD PRESSURE: 72 MMHG | SYSTOLIC BLOOD PRESSURE: 118 MMHG | WEIGHT: 173.31 LBS | HEART RATE: 95 BPM | OXYGEN SATURATION: 97 % | HEIGHT: 64 IN | BODY MASS INDEX: 29.59 KG/M2

## 2020-03-17 DIAGNOSIS — R47.1 DYSARTHRIA: ICD-10-CM

## 2020-03-17 DIAGNOSIS — M25.551 PAIN OF RIGHT HIP JOINT: ICD-10-CM

## 2020-03-17 DIAGNOSIS — I97.2 POST-MASTECTOMY LYMPHEDEMA SYNDROME: ICD-10-CM

## 2020-03-17 DIAGNOSIS — I77.1 TORTUOUS AORTA: ICD-10-CM

## 2020-03-17 DIAGNOSIS — N39.41 URGE INCONTINENCE OF URINE: ICD-10-CM

## 2020-03-17 DIAGNOSIS — G89.29 CHRONIC BILATERAL LOW BACK PAIN WITH BILATERAL SCIATICA: ICD-10-CM

## 2020-03-17 DIAGNOSIS — M50.30 DDD (DEGENERATIVE DISC DISEASE), CERVICAL: ICD-10-CM

## 2020-03-17 DIAGNOSIS — Z00.00 ENCOUNTER FOR PREVENTIVE HEALTH EXAMINATION: Primary | ICD-10-CM

## 2020-03-17 DIAGNOSIS — I10 HYPERTENSION, UNSPECIFIED TYPE: ICD-10-CM

## 2020-03-17 DIAGNOSIS — E11.42 DM TYPE 2 WITH DIABETIC PERIPHERAL NEUROPATHY: ICD-10-CM

## 2020-03-17 DIAGNOSIS — G89.29 CHRONIC LOW BACK PAIN, UNSPECIFIED BACK PAIN LATERALITY, UNSPECIFIED WHETHER SCIATICA PRESENT: Chronic | ICD-10-CM

## 2020-03-17 DIAGNOSIS — M62.81 MUSCLE WEAKNESS: ICD-10-CM

## 2020-03-17 DIAGNOSIS — M48.00 SPINAL STENOSIS, UNSPECIFIED SPINAL REGION: ICD-10-CM

## 2020-03-17 DIAGNOSIS — E66.9 OBESITY, CLASS I, BMI 30-34.9: ICD-10-CM

## 2020-03-17 DIAGNOSIS — M47.817 LUMBOSACRAL SPONDYLOSIS WITHOUT MYELOPATHY: ICD-10-CM

## 2020-03-17 DIAGNOSIS — N32.81 OAB (OVERACTIVE BLADDER): ICD-10-CM

## 2020-03-17 DIAGNOSIS — M47.812 FACET ARTHROPATHY, CERVICAL: ICD-10-CM

## 2020-03-17 DIAGNOSIS — M43.16 SPONDYLOLISTHESIS OF LUMBAR REGION: ICD-10-CM

## 2020-03-17 DIAGNOSIS — N18.30 CKD (CHRONIC KIDNEY DISEASE) STAGE 3, GFR 30-59 ML/MIN: ICD-10-CM

## 2020-03-17 DIAGNOSIS — M54.2 NECK PAIN: Chronic | ICD-10-CM

## 2020-03-17 DIAGNOSIS — M25.60 JOINT STIFFNESS: ICD-10-CM

## 2020-03-17 DIAGNOSIS — M54.42 CHRONIC BILATERAL LOW BACK PAIN WITH BILATERAL SCIATICA: ICD-10-CM

## 2020-03-17 DIAGNOSIS — M54.50 CHRONIC LOW BACK PAIN, UNSPECIFIED BACK PAIN LATERALITY, UNSPECIFIED WHETHER SCIATICA PRESENT: Chronic | ICD-10-CM

## 2020-03-17 DIAGNOSIS — M54.41 CHRONIC BILATERAL LOW BACK PAIN WITH BILATERAL SCIATICA: ICD-10-CM

## 2020-03-17 DIAGNOSIS — M54.12 CERVICAL RADICULOPATHY: Chronic | ICD-10-CM

## 2020-03-17 DIAGNOSIS — E11.9 TYPE 2 DIABETES MELLITUS WITHOUT COMPLICATION, UNSPECIFIED WHETHER LONG TERM INSULIN USE: ICD-10-CM

## 2020-03-17 DIAGNOSIS — E11.8 DIABETIC FOOT: ICD-10-CM

## 2020-03-17 DIAGNOSIS — Z72.0 TOBACCO ABUSE: ICD-10-CM

## 2020-03-17 DIAGNOSIS — R29.90 STROKE-LIKE SYMPTOMS: ICD-10-CM

## 2020-03-17 DIAGNOSIS — M47.812 CERVICAL SPONDYLOSIS WITHOUT MYELOPATHY: Chronic | ICD-10-CM

## 2020-03-17 DIAGNOSIS — G62.9 PERIPHERAL POLYNEUROPATHY: ICD-10-CM

## 2020-03-17 DIAGNOSIS — M54.16 LUMBAR RADICULITIS: ICD-10-CM

## 2020-03-17 PROCEDURE — G0439 PR MEDICARE ANNUAL WELLNESS SUBSEQUENT VISIT: ICD-10-PCS | Mod: HCNC,S$GLB,, | Performed by: NURSE PRACTITIONER

## 2020-03-17 PROCEDURE — G0439 PPPS, SUBSEQ VISIT: HCPCS | Mod: HCNC,S$GLB,, | Performed by: NURSE PRACTITIONER

## 2020-03-17 PROCEDURE — 3074F PR MOST RECENT SYSTOLIC BLOOD PRESSURE < 130 MM HG: ICD-10-PCS | Mod: HCNC,CPTII,S$GLB, | Performed by: NURSE PRACTITIONER

## 2020-03-17 PROCEDURE — 3044F HG A1C LEVEL LT 7.0%: CPT | Mod: HCNC,CPTII,S$GLB, | Performed by: NURSE PRACTITIONER

## 2020-03-17 PROCEDURE — 3044F PR MOST RECENT HEMOGLOBIN A1C LEVEL <7.0%: ICD-10-PCS | Mod: HCNC,CPTII,S$GLB, | Performed by: NURSE PRACTITIONER

## 2020-03-17 PROCEDURE — 99999 PR PBB SHADOW E&M-EST. PATIENT-LVL IV: ICD-10-PCS | Mod: PBBFAC,HCNC,, | Performed by: NURSE PRACTITIONER

## 2020-03-17 PROCEDURE — 3074F SYST BP LT 130 MM HG: CPT | Mod: HCNC,CPTII,S$GLB, | Performed by: NURSE PRACTITIONER

## 2020-03-17 PROCEDURE — 3078F DIAST BP <80 MM HG: CPT | Mod: HCNC,CPTII,S$GLB, | Performed by: NURSE PRACTITIONER

## 2020-03-17 PROCEDURE — 99499 RISK ADDL DX/OHS AUDIT: ICD-10-PCS | Mod: HCNC,S$GLB,, | Performed by: NURSE PRACTITIONER

## 2020-03-17 PROCEDURE — 99499 UNLISTED E&M SERVICE: CPT | Mod: HCNC,S$GLB,, | Performed by: NURSE PRACTITIONER

## 2020-03-17 PROCEDURE — 3078F PR MOST RECENT DIASTOLIC BLOOD PRESSURE < 80 MM HG: ICD-10-PCS | Mod: HCNC,CPTII,S$GLB, | Performed by: NURSE PRACTITIONER

## 2020-03-17 PROCEDURE — 99999 PR PBB SHADOW E&M-EST. PATIENT-LVL IV: CPT | Mod: PBBFAC,HCNC,, | Performed by: NURSE PRACTITIONER

## 2020-03-17 NOTE — PROGRESS NOTES
"Jonathan Gonzales presented for a  Medicare AWV and comprehensive Health Risk Assessment today. The following components were reviewed and updated:    · Medical history  · Family History  · Social history  · Allergies and Current Medications  · Health Risk Assessment  · Health Maintenance  · Care Team     ** See Completed Assessments for Annual Wellness Visit within the encounter summary.**       The following assessments were completed:  · Living Situation  · CAGE  · Depression Screening  · Timed Get Up and Go  · Whisper Test  · Cognitive Function Screening  · Nutrition Screening  · ADL Screening  · PAQ Screening          Vitals:    03/17/20 1134   BP: 118/72   BP Location: Right arm   Patient Position: Sitting   Pulse: 95   SpO2: 97%   Weight: 78.6 kg (173 lb 4.5 oz)   Height: 5' 4" (1.626 m)     Body mass index is 29.74 kg/m².     Physical Exam   Constitutional: She is oriented to person, place, and time. She appears well-developed and well-nourished.   HENT:   Head: Normocephalic and atraumatic. Not macrocephalic and not microcephalic. Head is without raccoon's eyes, without Connor's sign, without abrasion, without contusion, without laceration, without right periorbital erythema and without left periorbital erythema. Hair is normal.   Right Ear: No lacerations. No drainage, swelling or tenderness. No foreign bodies. No mastoid tenderness. Tympanic membrane is not injected, not scarred, not perforated, not erythematous, not retracted and not bulging. Tympanic membrane mobility is normal. No middle ear effusion. No hemotympanum. No decreased hearing is noted.   Left Ear: No lacerations. No drainage, swelling or tenderness. No foreign bodies. No mastoid tenderness. Tympanic membrane is not injected, not scarred, not perforated, not erythematous, not retracted and not bulging. Tympanic membrane mobility is normal.  No middle ear effusion. No hemotympanum. No decreased hearing is noted.   Nose: Nose normal. No mucosal " edema, rhinorrhea, nose lacerations, sinus tenderness or nasal deformity.   Mouth/Throat: Uvula is midline.   Eyes: Conjunctivae and lids are normal. No scleral icterus.   Neck: Trachea normal. Neck supple. No spinous process tenderness and no muscular tenderness present. No neck rigidity. No edema, no erythema and normal range of motion present. No thyroid mass and no thyromegaly present.   Cardiovascular: Normal rate, regular rhythm, normal heart sounds and intact distal pulses. Exam reveals no gallop and no friction rub.   No murmur heard.  Pulmonary/Chest: Effort normal and breath sounds normal. No stridor. No respiratory distress. She has no wheezes. She has no rales. She exhibits no tenderness.   Abdominal: Soft. Bowel sounds are normal.   Musculoskeletal: Normal range of motion.   Uses a cane   Lymphadenopathy:        Head (right side): No submental, no submandibular, no tonsillar, no preauricular and no posterior auricular adenopathy present.        Head (left side): No submental, no submandibular, no tonsillar, no preauricular, no posterior auricular and no occipital adenopathy present.   Neurological: She is alert and oriented to person, place, and time.   Skin: Skin is warm and dry.   Psychiatric: She has a normal mood and affect. Her behavior is normal. Judgment and thought content normal.   Vitals reviewed.      Diagnoses and health risks identified today and associated recommendations/orders:    1. Encounter for preventive health examination  Annual Health Risk Assessment (HRA) visit today.  Counseling and referral of health maintenance and preventative health measures performed.  Patient given annual wellness paperwork to take home.  Encouraged to return in 1 year for subsequent HRA visit.     2. Tortuous aorta  Chronic. Stable. CXR from 11/12/19. Continue current treatment plan as previously prescribed by PCP.    3. CKD (chronic kidney disease) stage 3, GFR 30-59 ml/min  Chronic. Stable. Continue  current treatment plan as previously prescribed by PCP.    4. Uncontrolled type 2 diabetes mellitus, with long-term current use of insulin  Chronic. Stable. Controlled. Last Hgb A1c= 6.1 from 11/11/19. Continue current treatment plan as previously prescribed by PCP.  - Ambulatory referral/consult to Podiatry; Future    5. DM type 2 with diabetic peripheral neuropathy  Chronic. Stable. Continue current treatment plan as previously prescribed by PCP.    6. Spondylolisthesis of lumbar region  Chronic. Stable. Continue current treatment plan as previously prescribed by PCP.    7. Hypertension, unspecified type  Chronic. Stable. Controlled. Encouraged to increase exercise as tolerated (moderate-intensity aerobic activity and muscle-strengthening activities) improve diet to heart healthy, low sodium diet. Continue current treatment plan as previously prescribed by PCP.    8. Obesity, Class I, BMI 30-34.9  Chronic. Stable. Continue current treatment plan as previously prescribed by PCP.\    9. Diabetic foot  Chronic. Stable. Continue current treatment plan as previously prescribed by PCP.    10. Type 2 diabetes mellitus without complication, unspecified whether long term insulin use  Chronic. Stable. Continue current treatment plan as previously prescribed by PCP.    11. Urge incontinence of urine  Chronic. Stable. Continue current treatment plan as previously prescribed by PCP.    12. OAB (overactive bladder)  Chronic. Stable. Continue current treatment plan as previously prescribed by PCP.    13. Post-mastectomy lymphedema syndrome  Chronic. Stable. Continue current treatment plan as previously prescribed by PCP.    14. Lumbar radiculitis  Chronic. Stable. Continue current treatment plan as previously prescribed by PCP.    15. DDD (degenerative disc disease), cervical  Chronic. Stable. Continue current treatment plan as previously prescribed by PCP.    16. Cervical spondylosis without myelopathy  Chronic. Stable.  Continue current treatment plan as previously prescribed by PCP.    17. Lumbosacral spondylosis without myelopathy  Chronic. Stable. Continue current treatment plan as previously prescribed by PCP.    18. Stroke-like symptoms/TIA  Chronic. Stable. Continue current treatment plan as previously prescribed by PCP.    19. Dysarthria  Chronic. Stable. Continue current treatment plan as previously prescribed by PCP.    20. Cervical radiculopathy  Chronic. Stable. Continue current treatment plan as previously prescribed by PCP.    21. Peripheral polyneuropathy  Chronic. Stable. Continue current treatment plan as previously prescribed by PCP.    22. Spinal stenosis, unspecified spinal region  Chronic. Stable. Continue current treatment plan as previously prescribed by PCP.    23. Neck pain  Chronic. Stable. Continue current treatment plan as previously prescribed by PCP.    24. Chronic bilateral low back pain with bilateral sciatica  Chronic. Stable. Continue current treatment plan as previously prescribed by PCP.    25. Joint stiffness  Chronic. Stable. Continue current treatment plan as previously prescribed by PCP.    26. Muscle weakness  Chronic. Stable. Continue current treatment plan as previously prescribed by PCP.    27. Pain of right hip joint  Chronic. Stable. Continue current treatment plan as previously prescribed by PCP.    28. Chronic low back pain, unspecified back pain laterality, unspecified whether sciatica present  Chronic. Stable. Continue current treatment plan as previously prescribed by PCP.    29. Facet arthropathy, cervical  Chronic. Stable. Continue current treatment plan as previously prescribed by PCP.    30. Tobacco abuse  Chronic. Stable. Continue current treatment plan as previously prescribed by PCP.        Provided Scobey with a 5-10 year written screening schedule and personal prevention plan. Recommendations were developed using the USPSTF age appropriate recommendations. Education,  counseling, and referrals were provided as needed. After Visit Summary printed and given to patient which includes a list of additional screenings\tests needed.    Follow up in about 1 year (around 3/17/2021).    Fernie Coffey NP  I offered to discuss end of life issues, including information on how to make advance directives that the patient could use to name someone who would make medical decisions on their behalf if they became too ill to make themselves.    ___Patient declined  _X_Patient is interested, I provided paper work and offered to discuss.

## 2020-03-17 NOTE — PATIENT INSTRUCTIONS
Counseling and Referral of Other Preventative  (Italic type indicates deductible and co-insurance are waived)    Patient Name: Jonathan Gonzales  Today's Date: 3/17/2020    Health Maintenance       Date Due Completion Date    HIV Screening 02/02/1974 ---    Shingles Vaccine (1 of 2) 02/02/2009 ---    Colonoscopy 03/15/2018 3/15/2011    Foot Exam 12/20/2018 12/20/2017    Pneumococcal Vaccine (Highest Risk) (3 of 3 - PPSV23) 01/28/2020 10/26/2015    Hemoglobin A1c 05/11/2020 11/11/2019    Eye Exam 07/08/2020 7/8/2019    Override on 1/28/2015: Done    Lipid Panel 11/12/2020 11/12/2019    Pap Smear with HPV Cotest 10/29/2021 10/29/2018    TETANUS VACCINE 09/05/2028 9/5/2018        No orders of the defined types were placed in this encounter.    The following information is provided to all patients.  This information is to help you find resources for any of the problems found today that may be affecting your health:                Living healthy guide: www.[facility].[place].gov      Understanding Diabetes: www.diabetes.org      Eating healthy: www.cdc.gov/healthyweight      CDC home safety checklist: www.cdc.gov/steadi/patient.html      Agency on Aging: www.goea.[place].gov      Alcoholics anonymous (AA): www.aa.org      Physical Activity: www.michelle.nih.gov/gg0kqte      Tobacco use: www.quitwithusla.org

## 2020-03-18 ENCOUNTER — IMMUNIZATION (OUTPATIENT)
Dept: PHARMACY | Facility: CLINIC | Age: 60
End: 2020-03-18

## 2020-03-18 ENCOUNTER — IMMUNIZATION (OUTPATIENT)
Dept: PHARMACY | Facility: CLINIC | Age: 60
End: 2020-03-18
Payer: MEDICARE

## 2020-03-31 DIAGNOSIS — M54.12 CERVICAL RADICULOPATHY: ICD-10-CM

## 2020-03-31 DIAGNOSIS — E11.42 DM TYPE 2 WITH DIABETIC PERIPHERAL NEUROPATHY: ICD-10-CM

## 2020-03-31 DIAGNOSIS — Z79.4 TYPE 2 DIABETES MELLITUS WITHOUT COMPLICATION, WITH LONG-TERM CURRENT USE OF INSULIN: ICD-10-CM

## 2020-03-31 DIAGNOSIS — E11.9 TYPE 2 DIABETES MELLITUS WITHOUT COMPLICATION, WITH LONG-TERM CURRENT USE OF INSULIN: ICD-10-CM

## 2020-03-31 DIAGNOSIS — M47.812 CERVICAL SPONDYLOSIS WITHOUT MYELOPATHY: ICD-10-CM

## 2020-03-31 DIAGNOSIS — M50.30 DDD (DEGENERATIVE DISC DISEASE), CERVICAL: ICD-10-CM

## 2020-03-31 RX ORDER — INSULIN GLARGINE 100 [IU]/ML
INJECTION, SOLUTION SUBCUTANEOUS
Qty: 15 ML | Refills: 0 | Status: SHIPPED | OUTPATIENT
Start: 2020-03-31 | End: 2020-09-08

## 2020-03-31 RX ORDER — PREGABALIN 100 MG/1
CAPSULE ORAL
Qty: 60 CAPSULE | Refills: 5 | Status: SHIPPED | OUTPATIENT
Start: 2020-03-31 | End: 2020-04-08 | Stop reason: SDUPTHER

## 2020-03-31 NOTE — TELEPHONE ENCOUNTER
----- Message from Bob Watson sent at 3/31/2020 11:04 AM CDT -----  Contact: JOYA MUNOZ [[id number]]  Can the clinic reply in [facility]:     Please refill the medication(s) listed below. The patient can be reached at this phone number once it is called into the pharmacy.  [phone]      Medication #1insulin (LANTUS SOLOSTAR U-100 INSULIN) glargine 100 units/mL (3mL) SubQ pen    Medication #2pregabalin (LYRICA) 100 MG capsule    Preferred Pharmacy: [facility] #[id number] - [place] [address] AT [facility] [address]

## 2020-04-02 NOTE — TELEPHONE ENCOUNTER
Spoke with pt and asked what strips she was using. She is using accu chek denzel plus. She needs refill.     ----- Message from Monserrat Zamoar sent at 4/2/2020  3:33 PM CDT -----  Contact: JOYA MUNOZ [[id number]]  Name of Who is Calling: JOYA MUNOZ [[id number]]      What is the request in detail: Pt is calling to speak to staff in regards to a PA for diabetic strips .... Please call to further assist .       Can the clinic reply by [facility]: N       What Number to Call Back if not in [facility]: [phone]

## 2020-04-08 DIAGNOSIS — M47.812 CERVICAL SPONDYLOSIS WITHOUT MYELOPATHY: ICD-10-CM

## 2020-04-08 DIAGNOSIS — M54.12 CERVICAL RADICULOPATHY: ICD-10-CM

## 2020-04-08 DIAGNOSIS — M50.30 DDD (DEGENERATIVE DISC DISEASE), CERVICAL: ICD-10-CM

## 2020-04-08 DIAGNOSIS — E11.42 DM TYPE 2 WITH DIABETIC PERIPHERAL NEUROPATHY: ICD-10-CM

## 2020-04-08 RX ORDER — PREGABALIN 100 MG/1
CAPSULE ORAL
Qty: 60 CAPSULE | Refills: 5 | Status: SHIPPED | OUTPATIENT
Start: 2020-04-08 | End: 2020-04-30 | Stop reason: SDUPTHER

## 2020-04-08 RX ORDER — ZIPRASIDONE HYDROCHLORIDE 40 MG/1
CAPSULE ORAL
Qty: 30 CAPSULE | Refills: 5 | Status: SHIPPED | OUTPATIENT
Start: 2020-04-08 | End: 2021-02-22 | Stop reason: SDUPTHER

## 2020-04-08 NOTE — TELEPHONE ENCOUNTER
----- Message from Monserrat Zamora sent at 4/8/2020 10:18 AM CDT -----  Contact: JOYA MUNOZ [[id number]]  Can the clinic reply in [facility]: N      Please refill the medication(s) listed below. Please call the patient when the prescription(s) is ready for  at this phone number  [phone]      Medication #1 pregabalin (LYRICA) 100 MG capsule        Preferred Pharmacy: [facility] #[id number] - [place] [address] AT [facility] [address]

## 2020-04-30 DIAGNOSIS — M47.812 CERVICAL SPONDYLOSIS WITHOUT MYELOPATHY: ICD-10-CM

## 2020-04-30 DIAGNOSIS — E11.42 DM TYPE 2 WITH DIABETIC PERIPHERAL NEUROPATHY: ICD-10-CM

## 2020-04-30 DIAGNOSIS — M54.12 CERVICAL RADICULOPATHY: ICD-10-CM

## 2020-04-30 DIAGNOSIS — M50.30 DDD (DEGENERATIVE DISC DISEASE), CERVICAL: ICD-10-CM

## 2020-04-30 RX ORDER — PREGABALIN 100 MG/1
CAPSULE ORAL
Qty: 60 CAPSULE | Refills: 5 | Status: SHIPPED | OUTPATIENT
Start: 2020-04-30 | End: 2020-07-22 | Stop reason: SDUPTHER

## 2020-04-30 NOTE — TELEPHONE ENCOUNTER
----- Message from Amy Samuel sent at 4/30/2020  9:24 AM CDT -----  Contact: JOYA MUNOZ [[id number]]  Who Called: JOYA MUNOZ [[id number]]    RX Name and Strength: pregabalin (LYRICA) 100 MG capsule    Preferred Pharmacy with phone number: [facility] #[id number] [address] Call Back Number: [phone]    Additional Information: N/A

## 2020-05-11 ENCOUNTER — TELEPHONE (OUTPATIENT)
Dept: SMOKING CESSATION | Facility: CLINIC | Age: 60
End: 2020-05-11

## 2020-05-21 ENCOUNTER — TELEPHONE (OUTPATIENT)
Dept: SMOKING CESSATION | Facility: CLINIC | Age: 60
End: 2020-05-21

## 2020-06-04 ENCOUNTER — TELEPHONE (OUTPATIENT)
Dept: INTERNAL MEDICINE | Facility: CLINIC | Age: 60
End: 2020-06-04

## 2020-06-04 NOTE — LETTER
2020    Jonathan Gonzales  [address] [facility] [place] [address]             [facility] [place] [address] [facility] [address]  Phone: [phone]  Fax: [phone] [facility],    Dr. Contreras approved clearance for Jonathan Gonzales  1960.  She can stop taking aspirin for 2 days prior to her dental extraction.    Thank you,    Ghulam Contreras MD

## 2020-06-04 NOTE — TELEPHONE ENCOUNTER
----- Message from Maylin Broges sent at 6/4/2020  2:08 PM CDT -----  Contact: Alize from [facility] [phone]  Type: Patient Call Back    Who called:Alize     What is the request in detail: Alize from [facility] called to get a clearance for the patient. She stated that the clearance is for the patient to stop taking the aspirin 2 days prior to her extraction in about a week. It can be faxed to: [phone]    Can the clinic reply by [facility]?no    Would the patient rather a call back or a response via [facility]? Call back

## 2020-06-18 RX ORDER — LIRAGLUTIDE 6 MG/ML
INJECTION SUBCUTANEOUS
Qty: 27 ML | Refills: 0 | Status: SHIPPED | OUTPATIENT
Start: 2020-06-18 | End: 2020-07-02

## 2020-06-30 ENCOUNTER — PATIENT OUTREACH (OUTPATIENT)
Dept: ADMINISTRATIVE | Facility: OTHER | Age: 60
End: 2020-06-30

## 2020-07-01 NOTE — PROGRESS NOTES
Chart reviewed.   Immunizations: Triggered Imm Registry     Orders placed: n/a  Upcoming appts to satisfy BOOGIE topics: n/a

## 2020-07-07 ENCOUNTER — OFFICE VISIT (OUTPATIENT)
Dept: PODIATRY | Facility: CLINIC | Age: 60
End: 2020-07-07
Payer: MEDICARE

## 2020-07-07 VITALS
TEMPERATURE: 98 F | WEIGHT: 173 LBS | DIASTOLIC BLOOD PRESSURE: 67 MMHG | SYSTOLIC BLOOD PRESSURE: 108 MMHG | HEIGHT: 64 IN | BODY MASS INDEX: 29.53 KG/M2 | HEART RATE: 87 BPM

## 2020-07-07 DIAGNOSIS — E11.42 DIABETIC PERIPHERAL NEUROPATHY ASSOCIATED WITH TYPE 2 DIABETES MELLITUS: Primary | ICD-10-CM

## 2020-07-07 DIAGNOSIS — M20.42 HAMMER TOES OF BOTH FEET: ICD-10-CM

## 2020-07-07 DIAGNOSIS — M20.41 HAMMER TOES OF BOTH FEET: ICD-10-CM

## 2020-07-07 DIAGNOSIS — B35.1 ONYCHOMYCOSIS DUE TO DERMATOPHYTE: ICD-10-CM

## 2020-07-07 DIAGNOSIS — L84 CORN OR CALLUS: ICD-10-CM

## 2020-07-07 PROCEDURE — 3044F PR MOST RECENT HEMOGLOBIN A1C LEVEL <7.0%: ICD-10-PCS | Mod: HCNC,CPTII,S$GLB, | Performed by: PODIATRIST

## 2020-07-07 PROCEDURE — 11721 PR DEBRIDEMENT OF NAILS, 6 OR MORE: ICD-10-PCS | Mod: 59,Q9,HCNC,S$GLB | Performed by: PODIATRIST

## 2020-07-07 PROCEDURE — 3078F PR MOST RECENT DIASTOLIC BLOOD PRESSURE < 80 MM HG: ICD-10-PCS | Mod: HCNC,CPTII,S$GLB, | Performed by: PODIATRIST

## 2020-07-07 PROCEDURE — 11721 DEBRIDE NAIL 6 OR MORE: CPT | Mod: 59,Q9,HCNC,S$GLB | Performed by: PODIATRIST

## 2020-07-07 PROCEDURE — 11055 PARING/CUTG B9 HYPRKER LES 1: CPT | Mod: Q9,HCNC,S$GLB, | Performed by: PODIATRIST

## 2020-07-07 PROCEDURE — 11055 PR TRIM HYPERKERATOTIC SKIN LESION, ONE: ICD-10-PCS | Mod: Q9,HCNC,S$GLB, | Performed by: PODIATRIST

## 2020-07-07 PROCEDURE — 99213 PR OFFICE/OUTPT VISIT, EST, LEVL III, 20-29 MIN: ICD-10-PCS | Mod: 25,HCNC,S$GLB, | Performed by: PODIATRIST

## 2020-07-07 PROCEDURE — 3044F HG A1C LEVEL LT 7.0%: CPT | Mod: HCNC,CPTII,S$GLB, | Performed by: PODIATRIST

## 2020-07-07 PROCEDURE — 99999 PR PBB SHADOW E&M-EST. PATIENT-LVL V: ICD-10-PCS | Mod: PBBFAC,HCNC,, | Performed by: PODIATRIST

## 2020-07-07 PROCEDURE — 99213 OFFICE O/P EST LOW 20 MIN: CPT | Mod: 25,HCNC,S$GLB, | Performed by: PODIATRIST

## 2020-07-07 PROCEDURE — 99999 PR PBB SHADOW E&M-EST. PATIENT-LVL V: CPT | Mod: PBBFAC,HCNC,, | Performed by: PODIATRIST

## 2020-07-07 PROCEDURE — 3008F BODY MASS INDEX DOCD: CPT | Mod: HCNC,CPTII,S$GLB, | Performed by: PODIATRIST

## 2020-07-07 PROCEDURE — 3074F PR MOST RECENT SYSTOLIC BLOOD PRESSURE < 130 MM HG: ICD-10-PCS | Mod: HCNC,CPTII,S$GLB, | Performed by: PODIATRIST

## 2020-07-07 PROCEDURE — 3078F DIAST BP <80 MM HG: CPT | Mod: HCNC,CPTII,S$GLB, | Performed by: PODIATRIST

## 2020-07-07 PROCEDURE — 3074F SYST BP LT 130 MM HG: CPT | Mod: HCNC,CPTII,S$GLB, | Performed by: PODIATRIST

## 2020-07-07 PROCEDURE — 3008F PR BODY MASS INDEX (BMI) DOCUMENTED: ICD-10-PCS | Mod: HCNC,CPTII,S$GLB, | Performed by: PODIATRIST

## 2020-07-07 RX ORDER — CICLOPIROX 80 MG/ML
SOLUTION TOPICAL NIGHTLY
Qty: 6.6 ML | Refills: 11 | Status: SHIPPED | OUTPATIENT
Start: 2020-07-07 | End: 2021-06-01

## 2020-07-07 RX ORDER — AMMONIUM LACTATE 12 G/100G
CREAM TOPICAL
Qty: 140 G | Refills: 11 | Status: SHIPPED | OUTPATIENT
Start: 2020-07-07 | End: 2021-06-01

## 2020-07-07 NOTE — LETTER
July 7, 2020      Fernie Coffey, MARGOTH  [address]  [address]  [facility] [address]           [facility]  [address] [address]  Phone: [phone]  Fax: [phone]          Patient: Jonathan Gonzales   MR Number: [id number]   YOB: 1960   Date of Visit: 7/7/2020       Dear Fernie Coffey:    Thank you for referring Jonathan Gonzales to me for evaluation. Attached you will find relevant portions of my assessment and plan of care.    If you have questions, please do not hesitate to call me. I look forward to following Jonathan Gonzales along with you.    Sincerely,    Philippe Julien, EDI    Enclosure  CC:  No Recipients    If you would like to receive this communication electronically, please contact [email or web address] or [phone] to request more information on [employer] access.    For providers and/or their staff who would like to refer a patient to [facility], please contact us through our one-stop-shop provider referral line, [facility], at [phone].    If you feel you have received this communication in error or would no longer like to receive these types of communications, please e-mail [email or web address]

## 2020-07-07 NOTE — PROGRESS NOTES
Subjective:      Patient ID: Jonathan Gonzales is a 60 y.o. female.    Chief Complaint: Diabetes Mellitus (Internal Med: Fernie Coffey 03/17/2020  A1C:11/11/2019 / 6.1) and Numbness (Bilateral )    Jonathan is a 60 y.o. female who presents to the clinic for evaluation and treatment of high risk feet. Jonathan has a past medical history of Bipolar disorder, Cancer of female breast (10/2010), Cancer of upper-outer quadrant of female breast (7/9/2012), Depression, Diabetes mellitus type II, Disturbances of sensation of smell and taste (6/29/2012), Hypertension, Malignant neoplasm of breast (female), unspecified site (4/27/2015), Neuropathy, Nonspecific abnormal unspecified cardiovascular function study (4/12/2013), Post-mastectomy lymphedema syndrome, Schizophrenia, and Stroke. The patient's chief complaint is long, thick toenails both feet and  Callus right forefoot.  Both problems Gradual onset, worsening over past several weeks, aggravated by increased weight bearing, shoe gear, pressure. periodic debridement helps symptoms. .    PCP: Ghulam Contreras MD    Date Last Seen by PCP:   Chief Complaint   Patient presents with    Diabetes Mellitus     Internal Med: Fernie Coffey 03/17/2020  A1C:11/11/2019 / 6.1    Numbness     Bilateral          Current shoe gear:  Affected Foot: Rx diabetic extra depth shoes and custom accommodative insoles     Unaffected Foot: Rx diabetic extra depth shoes and custom accommodative insoles    Hemoglobin A1C   Date Value Ref Range Status   11/11/2019 6.1 (H) 4.0 - 5.6 % Final     Comment:     ADA Screening Guidelines:  5.7-6.4%  Consistent with prediabetes  >or=6.5%  Consistent with diabetes  High levels of fetal hemoglobin interfere with the HbA1C  assay. Heterozygous hemoglobin variants (HbS, HgC, etc)do  not significantly interfere with this assay.   However, presence of multiple variants may affect accuracy.     02/11/2019 6.3 (H) 4.0 - 5.6 % Final     Comment:     ADA  Screening Guidelines:  5.7-6.4%  Consistent with prediabetes  >or=6.5%  Consistent with diabetes  High levels of fetal hemoglobin interfere with the HbA1C  assay. Heterozygous hemoglobin variants (HbS, HgC, etc)do  not significantly interfere with this assay.   However, presence of multiple variants may affect accuracy.     02/16/2018 6.3 (H) 4.0 - 5.6 % Final     Comment:     According to ADA guidelines, hemoglobin A1c <7.0% represents  optimal control in non-pregnant diabetic patients. Different  metrics may apply to specific patient populations.   Standards of Medical Care in Diabetes-2016.  For the purpose of screening for the presence of diabetes:  <5.7%     Consistent with the absence of diabetes  5.7-6.4%  Consistent with increasing risk for diabetes   (prediabetes)  >or=6.5%  Consistent with diabetes  Currently, no consensus exists for use of hemoglobin A1c  for diagnosis of diabetes for children.  This Hemoglobin A1c assay has significant interference with fetal   hemoglobin   (HbF). The results are invalid for patients with abnormal amounts of   HbF,   including those with known Hereditary Persistence   of Fetal Hemoglobin. Heterozygous hemoglobin variants (HbAS, HbAC,   HbAD, HbAE, HbA2) do not significantly interfere with this assay;   however, presence of multiple variants in a sample may impact the %   interference.         Review of Systems   Constitution: Negative for chills, diaphoresis, fever, malaise/fatigue and night sweats.   Cardiovascular: Negative for claudication, cyanosis, leg swelling and syncope.   Skin: Positive for nail changes and suspicious lesions. Negative for color change, dry skin, rash and unusual hair distribution.   Musculoskeletal: Negative for falls, joint pain, joint swelling, muscle cramps, muscle weakness and stiffness.   Gastrointestinal: Negative for constipation, diarrhea, nausea and vomiting.   Neurological: Positive for paresthesias and sensory change. Negative for  brief paralysis, disturbances in coordination, focal weakness, numbness and tremors.           Objective:      Physical Exam  Constitutional:       Appearance: She is well-developed. She is not diaphoretic.      Comments: Oriented to time, place, and person.   Cardiovascular:      Pulses:           Dorsalis pedis pulses are 1+ on the right side and 1+ on the left side.        Posterior tibial pulses are 1+ on the right side and 1+ on the left side.      Comments: Capillary fill time 3-5 seconds.  All toes warm to touch.      Negative lower extremity edema bilateral.    Negative elevational pallor and dependent rubor bilateral.    Musculoskeletal:      Comments: Normal angle, base, station of gait. Decreased stride length, early heel off, moderately propulsive toe off bilateral.    All ten toes without clubbing, cyanosis, or signs of ischemia.      Patient has hammertoes of digits    2-5 bilateral               partially reducible without symptom today.      No pain to palpation bilateral lower extremities.      Range of motion, stability, muscle strength, and muscle tone are age and health appropriate normal bilateral feet and legs.       Lymphadenopathy:      Comments: Negative lymphadenopathy bilateral popliteal fossa and tarsal tunnel.  Negative lymphangitic streaking bilateral foot/ankle bilateral.     Skin:     General: Skin is warm and dry.      Coloration: Skin is not pale.      Findings: No abrasion, bruising, burn, ecchymosis, erythema, laceration, lesion, petechiae or rash.      Nails: There is no clubbing.        Comments: Focal hyperkeratotic lesion consisting entirely of hyperkeratotic tissue without open skin, drainage, pus, fluctuance, malodor, or signs of infection plantar right 5th mtpj.    Otherwise, Skin thin, atrophic, with decreased density and distribution of pedal hair bilateral, but without hyperpigmentation, zain discoloration,  ulcers, masses, nodules or cords palpated bilateral feet and  legs.      Toenails 1st, 2nd, 3rd, 4th, 5th  bilateral are hypertrophic thickened 2-3 mm, dystrophic, discolored tanish brown with tan, gray crumbly subungual debris.  Not tender to distal nail plate pressure, without periungual skin abnormality of each.     Neurological:      Mental Status: She is alert and oriented to person, place, and time. She is not disoriented.      Sensory: Sensory deficit present.      Motor: No tremor, atrophy or abnormal muscle tone.      Deep Tendon Reflexes:      Reflex Scores:       Patellar reflexes are 2+ on the right side and 2+ on the left side.       Achilles reflexes are 2+ on the right side and 2+ on the left side.     Comments: Decreased/absent vibratory sensation bilateral feet to 128Hz tuning fork.    Paresthesias, and burning bilateral feet with no clearly identified trigger or source.     Psychiatric:         Behavior: Behavior is cooperative.               Assessment:       Encounter Diagnoses   Name Primary?    Uncontrolled type 2 diabetes mellitus, with long-term current use of insulin     Diabetic peripheral neuropathy associated with type 2 diabetes mellitus Yes    Hammer toes of both feet     Corn or callus     Onychomycosis due to dermatophyte          Plan:       Jontahan was seen today for diabetes mellitus and numbness.    Diagnoses and all orders for this visit:    Diabetic peripheral neuropathy associated with type 2 diabetes mellitus  -     DIABETIC SHOES FOR HOME USE    Uncontrolled type 2 diabetes mellitus, with long-term current use of insulin  -     Ambulatory referral/consult to Podiatry  -     DIABETIC SHOES FOR HOME USE    Hammer toes of both feet  -     DIABETIC SHOES FOR HOME USE    Corn or callus  -     DIABETIC SHOES FOR HOME USE    Onychomycosis due to dermatophyte  -     DIABETIC SHOES FOR HOME USE    Other orders  -     ammonium lactate 12 % Crea; Apply twice daily to affected parts both feet as needed.  -     ciclopirox (PENLAC) 8 % Soln;  Apply topically nightly.      I counseled the patient on her conditions, their implications and medical management.        - Shoe inspection. Diabetic Foot Education. Patient reminded of the importance of good nutrition and blood sugar control to help prevent podiatric complications of diabetes. Patient instructed on proper foot hygeine. We discussed wearing proper shoe gear, daily foot inspections, never walking without protective shoe gear, never putting sharp instruments to feet, routine podiatric visits at least annually.      - With patient's permission, nails were aggressively reduced and debrided x 10 to their soft tissue attachment mechanically, removing all offending nail and debris. Patient relates relief following the procedure. She will continue to monitor the areas daily, inspect her feet, wear protective shoe gear when ambulatory, moisturizer to maintain skin integrity and follow in this office  p.r.n.      With the patient's permission, I debrided hyperkeratotic lesion(s) as above totaling    1            to, not  Including dermis with sterile #15 blade.  Patient tolerated the procedure well and related significant relief.    Discussed conservative treatment with shoes of adequate dimensions, material, and style to alleviate symptoms and delay or prevent surgical intervention.    Rx DM shoes, custom inserts, penlac, lac hydrin.    Follow up in about 1 year (around 7/7/2021).

## 2020-07-22 DIAGNOSIS — M50.30 DDD (DEGENERATIVE DISC DISEASE), CERVICAL: ICD-10-CM

## 2020-07-22 DIAGNOSIS — E11.42 DM TYPE 2 WITH DIABETIC PERIPHERAL NEUROPATHY: ICD-10-CM

## 2020-07-22 DIAGNOSIS — M54.12 CERVICAL RADICULOPATHY: ICD-10-CM

## 2020-07-22 DIAGNOSIS — M47.812 CERVICAL SPONDYLOSIS WITHOUT MYELOPATHY: ICD-10-CM

## 2020-07-22 RX ORDER — PREGABALIN 100 MG/1
CAPSULE ORAL
Qty: 60 CAPSULE | Refills: 5 | Status: SHIPPED | OUTPATIENT
Start: 2020-07-22 | End: 2020-10-22 | Stop reason: SDUPTHER

## 2020-07-28 ENCOUNTER — TELEPHONE (OUTPATIENT)
Dept: INTERNAL MEDICINE | Facility: CLINIC | Age: 60
End: 2020-07-28

## 2020-07-28 NOTE — TELEPHONE ENCOUNTER
----- Message from Monserrat Zamora sent at 7/28/2020  1:29 PM CDT -----  Name of Who is Calling: JOYA MUNOZ [[id number]]      What is the request in detail: Pt is calling to speak to staff in regards to the status of an order for diabetic shoes ... Please call to further assist .       Can the clinic reply by [facility]: N       What Number to Call Back if not in [facility]: [phone]

## 2020-07-28 NOTE — TELEPHONE ENCOUNTER
Patient stated that a fax from [facility] is coming for Dr. Contreras to sign. Informed patient when we receive the fax it will be given to Dr. Contreras and sent back to the sender.

## 2020-08-11 ENCOUNTER — TELEPHONE (OUTPATIENT)
Dept: INTERNAL MEDICINE | Facility: CLINIC | Age: 60
End: 2020-08-11

## 2020-08-11 NOTE — TELEPHONE ENCOUNTER
Pt stated the diabetes company needs more information form provider office. Contacted diabetes [employer] and they stated pt needs to be seen by a podiatrist and they will be faxing over a one of their physicians order form for  to fill out. Physician order filled out by provider and pt recent clinical notes needs to be fax to [phone].

## 2020-08-11 NOTE — TELEPHONE ENCOUNTER
----- Message from Monserrat Zamora sent at 8/11/2020  3:22 PM CDT -----  Name of Who is Calling: JOYA MUNOZ [[id number]]      What is the request in detail: Pt is calling to speak to staff in regards to a refill on a script and paperwork beng completed in order for pt to received her diabetic shoes. Please call to further assist .       Can the clinic reply by [facility]: N      What Number to Call Back if not in [facility]: [phone]

## 2020-08-14 ENCOUNTER — LAB VISIT (OUTPATIENT)
Dept: LAB | Facility: OTHER | Age: 60
End: 2020-08-14
Attending: INTERNAL MEDICINE
Payer: MEDICARE

## 2020-08-14 ENCOUNTER — PATIENT OUTREACH (OUTPATIENT)
Dept: ADMINISTRATIVE | Facility: OTHER | Age: 60
End: 2020-08-14

## 2020-08-14 DIAGNOSIS — C50.912 MALIGNANT NEOPLASM OF LEFT FEMALE BREAST, UNSPECIFIED ESTROGEN RECEPTOR STATUS, UNSPECIFIED SITE OF BREAST: ICD-10-CM

## 2020-08-14 LAB
ALBUMIN SERPL BCP-MCNC: 3.8 G/DL (ref 3.5–5.2)
ALP SERPL-CCNC: 75 U/L (ref 55–135)
ALT SERPL W/O P-5'-P-CCNC: 17 U/L (ref 10–44)
ANION GAP SERPL CALC-SCNC: 13 MMOL/L (ref 8–16)
AST SERPL-CCNC: 26 U/L (ref 10–40)
BASOPHILS # BLD AUTO: 0.04 K/UL (ref 0–0.2)
BASOPHILS NFR BLD: 0.7 % (ref 0–1.9)
BILIRUB SERPL-MCNC: 0.6 MG/DL (ref 0.1–1)
BUN SERPL-MCNC: 16 MG/DL (ref 8–23)
CALCIUM SERPL-MCNC: 9.4 MG/DL (ref 8.7–10.5)
CHLORIDE SERPL-SCNC: 100 MMOL/L (ref 95–110)
CO2 SERPL-SCNC: 22 MMOL/L (ref 23–29)
CREAT SERPL-MCNC: 1.2 MG/DL (ref 0.5–1.4)
DIFFERENTIAL METHOD: ABNORMAL
EOSINOPHIL # BLD AUTO: 0.1 K/UL (ref 0–0.5)
EOSINOPHIL NFR BLD: 2.2 % (ref 0–8)
ERYTHROCYTE [DISTWIDTH] IN BLOOD BY AUTOMATED COUNT: 13.9 % (ref 11.5–14.5)
EST. GFR  (AFRICAN AMERICAN): 56 ML/MIN/1.73 M^2
EST. GFR  (NON AFRICAN AMERICAN): 49 ML/MIN/1.73 M^2
GLUCOSE SERPL-MCNC: 139 MG/DL (ref 70–110)
HCT VFR BLD AUTO: 39.1 % (ref 37–48.5)
HGB BLD-MCNC: 12.8 G/DL (ref 12–16)
IMM GRANULOCYTES # BLD AUTO: 0.01 K/UL (ref 0–0.04)
IMM GRANULOCYTES NFR BLD AUTO: 0.2 % (ref 0–0.5)
LYMPHOCYTES # BLD AUTO: 3.3 K/UL (ref 1–4.8)
LYMPHOCYTES NFR BLD: 54.8 % (ref 18–48)
MCH RBC QN AUTO: 28.2 PG (ref 27–31)
MCHC RBC AUTO-ENTMCNC: 32.7 G/DL (ref 32–36)
MCV RBC AUTO: 86 FL (ref 82–98)
MONOCYTES # BLD AUTO: 0.5 K/UL (ref 0.3–1)
MONOCYTES NFR BLD: 8.5 % (ref 4–15)
NEUTROPHILS # BLD AUTO: 2 K/UL (ref 1.8–7.7)
NEUTROPHILS NFR BLD: 33.6 % (ref 38–73)
NRBC BLD-RTO: 0 /100 WBC
PLATELET # BLD AUTO: 323 K/UL (ref 150–350)
PMV BLD AUTO: 10.4 FL (ref 9.2–12.9)
POTASSIUM SERPL-SCNC: 3.5 MMOL/L (ref 3.5–5.1)
PROT SERPL-MCNC: 8.1 G/DL (ref 6–8.4)
RBC # BLD AUTO: 4.54 M/UL (ref 4–5.4)
SODIUM SERPL-SCNC: 135 MMOL/L (ref 136–145)
WBC # BLD AUTO: 5.97 K/UL (ref 3.9–12.7)

## 2020-08-14 PROCEDURE — 36415 COLL VENOUS BLD VENIPUNCTURE: CPT | Mod: HCNC

## 2020-08-14 PROCEDURE — 85025 COMPLETE CBC W/AUTO DIFF WBC: CPT | Mod: HCNC

## 2020-08-14 PROCEDURE — 80053 COMPREHEN METABOLIC PANEL: CPT | Mod: HCNC

## 2020-08-14 NOTE — PROGRESS NOTES
Chart reviewed.   Immunizations: updated  Orders placed: n/a  Upcoming appts to satisfy BOOGIE topics: Optometry 8/17

## 2020-08-17 ENCOUNTER — OFFICE VISIT (OUTPATIENT)
Dept: OPTOMETRY | Facility: CLINIC | Age: 60
End: 2020-08-17
Payer: MEDICARE

## 2020-08-17 DIAGNOSIS — Z01.00 ROUTINE EYE EXAM: Primary | ICD-10-CM

## 2020-08-17 DIAGNOSIS — H52.4 PRESBYOPIA: ICD-10-CM

## 2020-08-17 DIAGNOSIS — E11.9 TYPE 2 DIABETES MELLITUS WITHOUT OPHTHALMIC MANIFESTATIONS: ICD-10-CM

## 2020-08-17 DIAGNOSIS — H04.203 BILATERAL EPIPHORA: ICD-10-CM

## 2020-08-17 DIAGNOSIS — Z83.511 FAMILY HISTORY OF GLAUCOMA: ICD-10-CM

## 2020-08-17 PROCEDURE — 92014 COMPRE OPH EXAM EST PT 1/>: CPT | Mod: HCNC,S$GLB,, | Performed by: OPTOMETRIST

## 2020-08-17 PROCEDURE — 92014 PR EYE EXAM, EST PATIENT,COMPREHESV: ICD-10-PCS | Mod: HCNC,S$GLB,, | Performed by: OPTOMETRIST

## 2020-08-17 PROCEDURE — 99999 PR PBB SHADOW E&M-EST. PATIENT-LVL III: CPT | Mod: PBBFAC,HCNC,, | Performed by: OPTOMETRIST

## 2020-08-17 PROCEDURE — 99999 PR PBB SHADOW E&M-EST. PATIENT-LVL III: ICD-10-PCS | Mod: PBBFAC,HCNC,, | Performed by: OPTOMETRIST

## 2020-08-17 PROCEDURE — 92015 PR REFRACTION: ICD-10-PCS | Mod: HCNC,S$GLB,, | Performed by: OPTOMETRIST

## 2020-08-17 PROCEDURE — 92015 DETERMINE REFRACTIVE STATE: CPT | Mod: HCNC,S$GLB,, | Performed by: OPTOMETRIST

## 2020-08-17 RX ORDER — PENICILLIN V POTASSIUM 500 MG/1
TABLET, FILM COATED ORAL
COMMUNITY
Start: 2020-06-04 | End: 2021-06-28 | Stop reason: SDUPTHER

## 2020-08-17 RX ORDER — ACETAMINOPHEN AND CODEINE PHOSPHATE 300; 60 MG/1; MG/1
TABLET ORAL
COMMUNITY
Start: 2020-06-15 | End: 2020-10-22

## 2020-08-17 NOTE — PROGRESS NOTES
HPI     Last eye exam was 7/8/19 with   Pt here for routine eye exam.    Pt states she is doing well.   Pt would like new glasses rx if it has changed.  Patient denies diplopia, headaches, flashes/floaters, and pain.  No eye drops, and no eye sx.    Last edited by Najma Hallman on 8/17/2020  2:33 PM. (History)            Assessment /Plan     For exam results, see Encounter Report.    Routine eye exam    Presbyopia    Type 2 diabetes mellitus without ophthalmic manifestations    Bilateral epiphora    Family history of glaucoma            1-2.  Bifocal rx given.  Ok to continue with current rx.  3. No retinopathy--monitor yearly.  BS control.  4.  Patient states tearing occurs when outside.  Recommend wearing sunglasses.  5.  No signs of glaucoma today.  Monitor yearly.

## 2020-08-18 ENCOUNTER — TELEPHONE (OUTPATIENT)
Dept: PODIATRY | Facility: CLINIC | Age: 60
End: 2020-08-18

## 2020-08-18 NOTE — TELEPHONE ENCOUNTER
Filled out and faxed an Rx for along with her last clinic note to [facility] at [phone].

## 2020-08-19 ENCOUNTER — OFFICE VISIT (OUTPATIENT)
Dept: HEMATOLOGY/ONCOLOGY | Facility: CLINIC | Age: 60
End: 2020-08-19
Payer: MEDICARE

## 2020-08-19 VITALS
TEMPERATURE: 97 F | WEIGHT: 177.94 LBS | OXYGEN SATURATION: 97 % | HEART RATE: 109 BPM | DIASTOLIC BLOOD PRESSURE: 72 MMHG | SYSTOLIC BLOOD PRESSURE: 99 MMHG | BODY MASS INDEX: 30.38 KG/M2 | HEIGHT: 64 IN

## 2020-08-19 DIAGNOSIS — F25.0 SCHIZOAFFECTIVE DISORDER, BIPOLAR TYPE: ICD-10-CM

## 2020-08-19 DIAGNOSIS — E11.9 TYPE 2 DIABETES MELLITUS WITHOUT COMPLICATION, UNSPECIFIED WHETHER LONG TERM INSULIN USE: ICD-10-CM

## 2020-08-19 DIAGNOSIS — C50.912 MALIGNANT NEOPLASM OF LEFT FEMALE BREAST, UNSPECIFIED ESTROGEN RECEPTOR STATUS, UNSPECIFIED SITE OF BREAST: Primary | ICD-10-CM

## 2020-08-19 DIAGNOSIS — I89.0 LYMPHEDEMA: ICD-10-CM

## 2020-08-19 PROCEDURE — 3008F BODY MASS INDEX DOCD: CPT | Mod: HCNC,CPTII,S$GLB, | Performed by: INTERNAL MEDICINE

## 2020-08-19 PROCEDURE — 3078F DIAST BP <80 MM HG: CPT | Mod: HCNC,CPTII,S$GLB, | Performed by: INTERNAL MEDICINE

## 2020-08-19 PROCEDURE — 3074F SYST BP LT 130 MM HG: CPT | Mod: HCNC,CPTII,S$GLB, | Performed by: INTERNAL MEDICINE

## 2020-08-19 PROCEDURE — 99999 PR PBB SHADOW E&M-EST. PATIENT-LVL V: ICD-10-PCS | Mod: PBBFAC,HCNC,, | Performed by: INTERNAL MEDICINE

## 2020-08-19 PROCEDURE — 3044F PR MOST RECENT HEMOGLOBIN A1C LEVEL <7.0%: ICD-10-PCS | Mod: HCNC,CPTII,S$GLB, | Performed by: INTERNAL MEDICINE

## 2020-08-19 PROCEDURE — 3008F PR BODY MASS INDEX (BMI) DOCUMENTED: ICD-10-PCS | Mod: HCNC,CPTII,S$GLB, | Performed by: INTERNAL MEDICINE

## 2020-08-19 PROCEDURE — 99999 PR PBB SHADOW E&M-EST. PATIENT-LVL V: CPT | Mod: PBBFAC,HCNC,, | Performed by: INTERNAL MEDICINE

## 2020-08-19 PROCEDURE — 3074F PR MOST RECENT SYSTOLIC BLOOD PRESSURE < 130 MM HG: ICD-10-PCS | Mod: HCNC,CPTII,S$GLB, | Performed by: INTERNAL MEDICINE

## 2020-08-19 PROCEDURE — 99499 UNLISTED E&M SERVICE: CPT | Mod: HCNC,S$GLB,, | Performed by: INTERNAL MEDICINE

## 2020-08-19 PROCEDURE — 3078F PR MOST RECENT DIASTOLIC BLOOD PRESSURE < 80 MM HG: ICD-10-PCS | Mod: HCNC,CPTII,S$GLB, | Performed by: INTERNAL MEDICINE

## 2020-08-19 PROCEDURE — 99499 RISK ADDL DX/OHS AUDIT: ICD-10-PCS | Mod: HCNC,S$GLB,, | Performed by: INTERNAL MEDICINE

## 2020-08-19 PROCEDURE — 99214 PR OFFICE/OUTPT VISIT, EST, LEVL IV, 30-39 MIN: ICD-10-PCS | Mod: HCNC,S$GLB,, | Performed by: INTERNAL MEDICINE

## 2020-08-19 PROCEDURE — 99214 OFFICE O/P EST MOD 30 MIN: CPT | Mod: HCNC,S$GLB,, | Performed by: INTERNAL MEDICINE

## 2020-08-19 PROCEDURE — 3044F HG A1C LEVEL LT 7.0%: CPT | Mod: HCNC,CPTII,S$GLB, | Performed by: INTERNAL MEDICINE

## 2020-08-19 RX ORDER — PEN NEEDLE, DIABETIC 30 GX3/16"
NEEDLE, DISPOSABLE MISCELLANEOUS
Qty: 30 EACH | Refills: 11 | Status: SHIPPED | OUTPATIENT
Start: 2020-08-19 | End: 2023-04-05 | Stop reason: CLARIF

## 2020-08-19 NOTE — PROGRESS NOTES
Subjective:       Patient ID: Jonathan Gonzales is a 60 y.o. female.    Chief Complaint: Breast Cancer (6 month followup)       Diagnosis: Stage IIB lt Breast CA T2N1MO ER weak pos/FL negative, HER-2/bridget amplified dx'd 11/2011  Therapy:       1.  s/p bilateral mastectomy with SHLOMO flap reconstruction 11/23/2011                         2.  completed   Phase III 0221 protocol 3/12/13                   HPI 60 -year-old female with stage IIB breast cancer, left breast originally diagnosed in November 2011, status post bilateral skin -sparing mastectomy with a right prophylactic and a left radical mastectomy for a T2 N1 MO ,Stage 2b. breast cancer of the left breast on 11/23/2011 with immediate autologous tissue reconstruction with SHLOMO flap. She underwent second stage reconstructive surgery on 4/27/2015 which was complicated by a hematoma for which She was taken back to OR emergently  for aspiration.          Pt previously Lost to  follow-up ( >1yr) and has re established care 2017      She was seen in the ED 10/2019 for RLE weakness and dysarthria. She was then admitted to  SNF for CVA.     Doing well   No new issues  Appetite and weight stable  No SOB/CP/cough  No fevers, night sweats      She reports lymphedema stable  She wears  compression sleeve LUE   Previously followed by PT     She has a history of diabetes.    HbA1c 6.1% ( 11/2019)    She is followed by psychiatry Dr. Fierro  [facility]   for bipolar disorder and schizophrenia  She reports her psych meds recently adjusted    She is followed by PCP for  history of type 2  DM w/neuropathy , HTN and hyperlipidemia      PrevHx: Tumor was ER weak pos /FL negative, HER-2/bridget amplified with 1 of total of 24 lymph nodes removed which revealed metastatic carcinoma, 0.4 cm in diameter with no extranodal extension.She had abnormal PET imaging 12/2011 which revealed. Findings suggestive of metastatic disease to supraclavicular, mediastinal and  "right hilar lymph nodes.She underwent rt supraclavicular lymph node biopsy which was negative for malignancy. PET/CT 10/12 revealed resolution of LAD and no evid of mets.Patient was enrolled in 0221 study. She completed 1 yr of Herceptin therapy3/12/2013.     Tx; Phase III 0221 protocol- DD AC ( Adriamycin 60mg/m2/Cytoxan 60mg/2) x4 followed by weekly Paclitaxel 175mg/m2/Herceptin and completion of Herceptin therapy (6mg/kg q3wks) 1 yr 3/12/13          Review of Systems   Constitutional: Negative for appetite change, chills and fatigue.   HENT: Negative for congestion, hearing loss and nosebleeds.    Eyes: Negative for visual disturbance.   Respiratory: Negative for cough, chest tightness, shortness of breath and wheezing.    Cardiovascular: Negative for chest pain, palpitations and leg swelling.   Gastrointestinal: Negative for abdominal distention, abdominal pain, blood in stool, constipation, diarrhea, nausea and vomiting.   Genitourinary: Negative for dysuria, flank pain, frequency and hematuria.   Musculoskeletal: Negative for arthralgias, back pain, gait problem, joint swelling and neck pain.   Skin: Negative for rash.        No petechiae, ecchymoses   Neurological: Negative for dizziness, light-headedness, numbness and headaches.   Hematological: Negative for adenopathy. Does not bruise/bleed easily.   Psychiatric/Behavioral: Negative for dysphoric mood. The patient is not nervous/anxious.        Objective:       Vitals:    08/19/20 1009   BP: 99/72   BP Location: Right arm   Patient Position: Sitting   BP Method: Medium (Automatic)   Pulse: 109   Temp: 97.2 °F (36.2 °C)   TempSrc: Oral   SpO2: 97%   Weight: 80.7 kg (177 lb 14.6 oz)   Height: 5' 4" (1.626 m)       Physical Exam  Constitutional:       Appearance: She is well-developed.   HENT:      Head: Normocephalic.      Mouth/Throat:      Pharynx: No oropharyngeal exudate.   Eyes:      General: Lids are normal. No scleral icterus.     Conjunctiva/sclera: " Conjunctivae normal.      Pupils: Pupils are equal, round, and reactive to light.   Neck:      Musculoskeletal: Normal range of motion and neck supple.      Thyroid: No thyromegaly.   Cardiovascular:      Rate and Rhythm: Normal rate and regular rhythm.      Heart sounds: Normal heart sounds. No murmur.   Pulmonary:      Breath sounds: Normal breath sounds. No wheezing or rales.   Abdominal:      General: Bowel sounds are normal.      Palpations: Abdomen is soft. There is no mass.      Tenderness: There is no abdominal tenderness. There is no guarding or rebound.   Musculoskeletal: Normal range of motion.         General: No tenderness.   Lymphadenopathy:      Cervical: No cervical adenopathy.      Upper Body:      Right upper body: No supraclavicular adenopathy.      Left upper body: No supraclavicular adenopathy.   Skin:     General: Skin is warm and dry.      Findings: No ecchymosis, erythema, petechiae or rash.   Neurological:      Mental Status: She is alert and oriented to person, place, and time.      Cranial Nerves: No cranial nerve deficit.      Coordination: Coordination normal.             Results for JONATHAN MUNOZ (MRN [id number]) as of 6/8/2015 13:40   Ref. Range 6/2/2015 07:50   Vit D, 25-Hydroxy Latest Range: 30-96 ng/mL 33     Lab Results   Component Value Date    WBC 5.97 08/14/2020    HGB 12.8 08/14/2020    HCT 39.1 08/14/2020    MCV 86 08/14/2020     08/14/2020           10/19/2016 Bone Density-nl    Labs: none   Assessment:       1. Malignant neoplasm of left female breast, unspecified estrogen receptor status, unspecified site of breast    2. Schizoaffective disorder, bipolar type    3. Type 2 diabetes mellitus without complication, unspecified whether long term insulin use    4. Lymphedema        Plan:   Jonathan was seen today for follow-up.  Pt clinically stable         Breast cancer, Stage IIB lt Breast CA T2N1MO ER weak pos/ IL neg Her 2 pos diagnosed  11/2011   - s/p bilateral  mastectomy with SHLOMO flap reconstruction 11/23/2011   - s/p chemotherapy per  Phase III 0221 protocol      S/p second stage reconstructive surgery      Rt Breast US 10/2015- Area in the right breast is benign-negative.  .   ACR BI-RADS Category 2: Benign   - CUAUHTEMOC  recurrence clinically     Lymphedema   Stable  Cont compression sleeves      Bipolar d/o  Follow-up with psych  Cont meds as prescribed per psych    DM type 2  Well controlled  Follow up with PCP    F/u 1 yr  mos with cbc,cmp     Advance Care Planning     Power of   I previously initiated the process of advance care planning today and explained the importance of this process to the patient.  I introduced the concept of advance directives to the patient, as well. Then the patient received detailed information about the importance of designating a Health Care Power of  (HCPOA). She was also instructed to communicate with this person about their wishes for future healthcare, should she become sick and lose decision-making capacity. The patient has not previously appointed a HCPOA. After our discussion, the patient has decided to complete a HCPOA and has appointed her daughter and NAME:    Rojelio Daniels    I spent a total time of 16  minutes discussing this issue with the patient.           CC: Ghulam Contreras MD

## 2020-08-20 NOTE — TELEPHONE ENCOUNTER
----- Message from Levy Barboza sent at 8/20/2020  1:42 PM CDT -----  Regarding: Diabetic shoes  Type: Patient Call Back    Who called:Self    What is the request in detail:Patient called to get a update on Diabetic shoes, please call to advise    Can the clinic reply by [facility]? no    Would the patient rather a call back or a response via [facility]? Callback    Best call back number:[phone]

## 2020-08-20 NOTE — TELEPHONE ENCOUNTER
Informed pt fax for diabetic shoes where received. Will fax papers over to diabetes shoe store once completed. Pt verbalized understanding

## 2020-08-27 ENCOUNTER — TELEPHONE (OUTPATIENT)
Dept: INTERNAL MEDICINE | Facility: CLINIC | Age: 60
End: 2020-08-27

## 2020-08-28 NOTE — TELEPHONE ENCOUNTER
Paperwork located on the right side of desk in file drawer. I can resend on Monday when I am in the office

## 2020-08-28 NOTE — TELEPHONE ENCOUNTER
"----- Message from Anjana Pelletier sent at 8/28/2020  8:12 AM CDT -----  Regarding: PATIENT ACCESS  Name of Who is Calling: [facility]      What is the request in detail:  [facility] called stating, "the paper work received was not signed and completed, also no chart notes where included. Please further advise      Reply by [facility]: no      Call Back: [phone] ext# [phone]                                        "

## 2020-09-02 ENCOUNTER — TELEPHONE (OUTPATIENT)
Dept: INTERNAL MEDICINE | Facility: CLINIC | Age: 60
End: 2020-09-02

## 2020-09-02 NOTE — TELEPHONE ENCOUNTER
----- Message from Meri Murillo sent at 9/2/2020  8:56 AM CDT -----  Contact: Johny with Diabetic shoe company  Type: Patient Call Back    Who called: Johny with Diabetic [employer] company     What is the request in detail: Johny with [employer]    is requesting a call back. He states that some paperwork from office. He states that he needs most recent chart notes and signature on podiatry notes. He states that he faxed over the request.   Please advise.    Can the clinic reply by [facility]? No    Best call back number: [phone] ext [phone]    Additional Information: N/A

## 2020-09-14 NOTE — TELEPHONE ENCOUNTER
----- Message from Amy Samuel sent at 9/14/2020  1:48 PM CDT -----  Regarding: Refill Request  Who Called: JOYA MUNOZ [[id number]]    RX Name and Strength: ACCU-CHEK AMBER PLUS TEST STRP Strp    Preferred Pharmacy with phone number: [facility] - [facility] [address]    Best Call Back Number: [phone]    Additional Information: N/A

## 2020-09-15 RX ORDER — BLOOD SUGAR DIAGNOSTIC
STRIP MISCELLANEOUS
Qty: 200 STRIP | Refills: 0 | Status: SHIPPED | OUTPATIENT
Start: 2020-09-15 | End: 2020-11-23

## 2020-09-15 NOTE — TELEPHONE ENCOUNTER
----- Message from Amy Samuel sent at 9/15/2020 12:41 PM CDT -----  Regarding: patient call back  Who called:JOYA MUNOZ [[id number]]    What is the request in detail: Patient is requesting a call back. She states [employer] has still not received her blood sugar diagnostic (ACCU-CHEK AMBER PLUS TEST STRP) Strp, though her chart states otherwise   Please advise.    Can the clinic reply by [facility]? No    Best call back number: [phone]    Additional Information: N/A

## 2020-09-15 NOTE — TELEPHONE ENCOUNTER
Contacted [facility] pharmacy in regards to test strips. [facility] stated they received the order earlier this morning and pt will be receiving rx through mail

## 2020-10-22 ENCOUNTER — OFFICE VISIT (OUTPATIENT)
Dept: INTERNAL MEDICINE | Facility: CLINIC | Age: 60
End: 2020-10-22
Attending: FAMILY MEDICINE
Payer: MEDICARE

## 2020-10-22 ENCOUNTER — TELEPHONE (OUTPATIENT)
Dept: INTERNAL MEDICINE | Facility: CLINIC | Age: 60
End: 2020-10-22

## 2020-10-22 ENCOUNTER — OFFICE VISIT (OUTPATIENT)
Dept: PAIN MEDICINE | Facility: CLINIC | Age: 60
End: 2020-10-22
Attending: FAMILY MEDICINE
Payer: MEDICARE

## 2020-10-22 ENCOUNTER — LAB VISIT (OUTPATIENT)
Dept: LAB | Facility: OTHER | Age: 60
End: 2020-10-22
Attending: FAMILY MEDICINE
Payer: MEDICARE

## 2020-10-22 ENCOUNTER — PATIENT OUTREACH (OUTPATIENT)
Dept: ADMINISTRATIVE | Facility: OTHER | Age: 60
End: 2020-10-22

## 2020-10-22 VITALS
RESPIRATION RATE: 20 BRPM | WEIGHT: 177.94 LBS | OXYGEN SATURATION: 97 % | DIASTOLIC BLOOD PRESSURE: 88 MMHG | HEIGHT: 64 IN | HEART RATE: 77 BPM | BODY MASS INDEX: 30.38 KG/M2 | DIASTOLIC BLOOD PRESSURE: 78 MMHG | HEART RATE: 77 BPM | BODY MASS INDEX: 30.54 KG/M2 | TEMPERATURE: 97 F | SYSTOLIC BLOOD PRESSURE: 137 MMHG | HEIGHT: 64 IN | SYSTOLIC BLOOD PRESSURE: 104 MMHG

## 2020-10-22 DIAGNOSIS — E11.42 DM TYPE 2 WITH DIABETIC PERIPHERAL NEUROPATHY: ICD-10-CM

## 2020-10-22 DIAGNOSIS — N18.31 STAGE 3A CHRONIC KIDNEY DISEASE: ICD-10-CM

## 2020-10-22 DIAGNOSIS — M47.812 CERVICAL SPONDYLOSIS WITHOUT MYELOPATHY: ICD-10-CM

## 2020-10-22 DIAGNOSIS — M50.30 DDD (DEGENERATIVE DISC DISEASE), CERVICAL: ICD-10-CM

## 2020-10-22 DIAGNOSIS — M54.9 DORSALGIA, UNSPECIFIED: ICD-10-CM

## 2020-10-22 DIAGNOSIS — Z79.4 TYPE 2 DIABETES MELLITUS WITHOUT COMPLICATION, WITH LONG-TERM CURRENT USE OF INSULIN: ICD-10-CM

## 2020-10-22 DIAGNOSIS — M54.31 RIGHT SCIATIC NERVE PAIN: ICD-10-CM

## 2020-10-22 DIAGNOSIS — I10 HYPERTENSION, UNSPECIFIED TYPE: ICD-10-CM

## 2020-10-22 DIAGNOSIS — M54.16 LUMBAR RADICULOPATHY: Primary | ICD-10-CM

## 2020-10-22 DIAGNOSIS — M54.31 RIGHT SCIATIC NERVE PAIN: Primary | ICD-10-CM

## 2020-10-22 DIAGNOSIS — E11.9 TYPE 2 DIABETES MELLITUS WITHOUT COMPLICATION, WITH LONG-TERM CURRENT USE OF INSULIN: ICD-10-CM

## 2020-10-22 DIAGNOSIS — M54.12 CERVICAL RADICULOPATHY: ICD-10-CM

## 2020-10-22 LAB
ESTIMATED AVG GLUCOSE: 117 MG/DL (ref 68–131)
HBA1C MFR BLD HPLC: 5.7 % (ref 4–5.6)

## 2020-10-22 PROCEDURE — 96372 PR INJECTION,THERAP/PROPH/DIAG2ST, IM OR SUBCUT: ICD-10-PCS | Mod: HCNC,S$GLB,, | Performed by: NURSE PRACTITIONER

## 2020-10-22 PROCEDURE — 96372 THER/PROPH/DIAG INJ SC/IM: CPT | Mod: HCNC,S$GLB,, | Performed by: NURSE PRACTITIONER

## 2020-10-22 PROCEDURE — 99214 OFFICE O/P EST MOD 30 MIN: CPT | Mod: HCNC,S$GLB,, | Performed by: FAMILY MEDICINE

## 2020-10-22 PROCEDURE — 3079F PR MOST RECENT DIASTOLIC BLOOD PRESSURE 80-89 MM HG: ICD-10-PCS | Mod: HCNC,CPTII,S$GLB, | Performed by: FAMILY MEDICINE

## 2020-10-22 PROCEDURE — 99204 PR OFFICE/OUTPT VISIT, NEW, LEVL IV, 45-59 MIN: ICD-10-PCS | Mod: 25,HCNC,S$GLB, | Performed by: NURSE PRACTITIONER

## 2020-10-22 PROCEDURE — 3044F HG A1C LEVEL LT 7.0%: CPT | Mod: HCNC,CPTII,S$GLB, | Performed by: FAMILY MEDICINE

## 2020-10-22 PROCEDURE — 3075F PR MOST RECENT SYSTOLIC BLOOD PRESS GE 130-139MM HG: ICD-10-PCS | Mod: HCNC,CPTII,S$GLB, | Performed by: FAMILY MEDICINE

## 2020-10-22 PROCEDURE — 99214 PR OFFICE/OUTPT VISIT, EST, LEVL IV, 30-39 MIN: ICD-10-PCS | Mod: HCNC,S$GLB,, | Performed by: FAMILY MEDICINE

## 2020-10-22 PROCEDURE — 99999 PR PBB SHADOW E&M-EST. PATIENT-LVL V: CPT | Mod: PBBFAC,HCNC,, | Performed by: NURSE PRACTITIONER

## 2020-10-22 PROCEDURE — 3008F PR BODY MASS INDEX (BMI) DOCUMENTED: ICD-10-PCS | Mod: HCNC,CPTII,S$GLB, | Performed by: NURSE PRACTITIONER

## 2020-10-22 PROCEDURE — 3079F DIAST BP 80-89 MM HG: CPT | Mod: HCNC,CPTII,S$GLB, | Performed by: FAMILY MEDICINE

## 2020-10-22 PROCEDURE — 99999 PR PBB SHADOW E&M-EST. PATIENT-LVL V: ICD-10-PCS | Mod: PBBFAC,HCNC,, | Performed by: NURSE PRACTITIONER

## 2020-10-22 PROCEDURE — 36415 COLL VENOUS BLD VENIPUNCTURE: CPT | Mod: HCNC

## 2020-10-22 PROCEDURE — 83036 HEMOGLOBIN GLYCOSYLATED A1C: CPT | Mod: HCNC

## 2020-10-22 PROCEDURE — 3008F BODY MASS INDEX DOCD: CPT | Mod: HCNC,CPTII,S$GLB, | Performed by: FAMILY MEDICINE

## 2020-10-22 PROCEDURE — 3078F DIAST BP <80 MM HG: CPT | Mod: HCNC,CPTII,S$GLB, | Performed by: NURSE PRACTITIONER

## 2020-10-22 PROCEDURE — 99999 PR PBB SHADOW E&M-EST. PATIENT-LVL V: CPT | Mod: PBBFAC,HCNC,, | Performed by: FAMILY MEDICINE

## 2020-10-22 PROCEDURE — 3074F SYST BP LT 130 MM HG: CPT | Mod: HCNC,CPTII,S$GLB, | Performed by: NURSE PRACTITIONER

## 2020-10-22 PROCEDURE — 99999 PR PBB SHADOW E&M-EST. PATIENT-LVL V: ICD-10-PCS | Mod: PBBFAC,HCNC,, | Performed by: FAMILY MEDICINE

## 2020-10-22 PROCEDURE — 99204 OFFICE O/P NEW MOD 45 MIN: CPT | Mod: 25,HCNC,S$GLB, | Performed by: NURSE PRACTITIONER

## 2020-10-22 PROCEDURE — 3074F PR MOST RECENT SYSTOLIC BLOOD PRESSURE < 130 MM HG: ICD-10-PCS | Mod: HCNC,CPTII,S$GLB, | Performed by: NURSE PRACTITIONER

## 2020-10-22 PROCEDURE — 99499 UNLISTED E&M SERVICE: CPT | Mod: S$GLB,,, | Performed by: FAMILY MEDICINE

## 2020-10-22 PROCEDURE — 3008F BODY MASS INDEX DOCD: CPT | Mod: HCNC,CPTII,S$GLB, | Performed by: NURSE PRACTITIONER

## 2020-10-22 PROCEDURE — 3075F SYST BP GE 130 - 139MM HG: CPT | Mod: HCNC,CPTII,S$GLB, | Performed by: FAMILY MEDICINE

## 2020-10-22 PROCEDURE — 99499 RISK ADDL DX/OHS AUDIT: ICD-10-PCS | Mod: S$GLB,,, | Performed by: FAMILY MEDICINE

## 2020-10-22 PROCEDURE — 3078F PR MOST RECENT DIASTOLIC BLOOD PRESSURE < 80 MM HG: ICD-10-PCS | Mod: HCNC,CPTII,S$GLB, | Performed by: NURSE PRACTITIONER

## 2020-10-22 PROCEDURE — 3008F PR BODY MASS INDEX (BMI) DOCUMENTED: ICD-10-PCS | Mod: HCNC,CPTII,S$GLB, | Performed by: FAMILY MEDICINE

## 2020-10-22 PROCEDURE — 3044F PR MOST RECENT HEMOGLOBIN A1C LEVEL <7.0%: ICD-10-PCS | Mod: HCNC,CPTII,S$GLB, | Performed by: FAMILY MEDICINE

## 2020-10-22 RX ORDER — INSULIN GLARGINE 100 [IU]/ML
INJECTION, SOLUTION SUBCUTANEOUS
Qty: 15 ML | Refills: 0 | Status: SHIPPED | OUTPATIENT
Start: 2020-10-22 | End: 2020-12-10

## 2020-10-22 RX ORDER — METHYLPREDNISOLONE ACETATE 40 MG/ML
40 INJECTION, SUSPENSION INTRA-ARTICULAR; INTRALESIONAL; INTRAMUSCULAR; SOFT TISSUE
Status: COMPLETED | OUTPATIENT
Start: 2020-10-22 | End: 2020-10-22

## 2020-10-22 RX ADMIN — METHYLPREDNISOLONE ACETATE 40 MG: 40 INJECTION, SUSPENSION INTRA-ARTICULAR; INTRALESIONAL; INTRAMUSCULAR; SOFT TISSUE at 01:10

## 2020-10-22 NOTE — LETTER
October 22, 2020      Ghulam Contreras MD  [address]  [address]  [facility] [address]           [facility] [address]  [address]  [facility] [address]  Phone: [phone]  Fax: [phone]          Patient: Jonathan Gonzales   MR Number: [id number]   YOB: 1960   Date of Visit: 10/22/2020       Dear Dr. Ghulam Contreras:    Thank you for referring Jonathan Gonzales to me for evaluation. Attached you will find relevant portions of my assessment and plan of care.    If you have questions, please do not hesitate to call me. I look forward to following Jonathan Gonzales along with you.    Sincerely,    Navya Nair, [facility]    Enclosure  CC:  No Recipients    If you would like to receive this communication electronically, please contact [email or web address] or [phone] to request more information on [employer] access.    For providers and/or their staff who would like to refer a patient to [facility], please contact us through our one-stop-shop provider referral line, [facility], at [phone].    If you feel you have received this communication in error or would no longer like to receive these types of communications, please e-mail [email or web address]

## 2020-10-22 NOTE — PROGRESS NOTES
Chronic patient Established Note (Follow up visit)      SUBJECTIVE:  60 year old female with PMH of breast cancer (s/p mastectomy bilaterally), DM2, HTN, bipolar disorder and schizophrenia. patient presents today to discuss severe back and right leg pain.  We have not seen the patient in clinic in over 3 years and she reports that she was mainly doing well with her back and neck pain.  She was sent up here today by her primary care doctor, Dr. Contreras, as she saw him today.  She reports that she has had back pain for many years but it has been well-controlled until 3 days ago.  She has been having severe right sided lower back pain with radiation down the back of the right leg with numbness.  She has pain and numbness to her right heel and calf.  She is not having significant symptoms on the left.  Her leg pain is greater than her back pain.  She is unable to walk without assistance.  She is feeling subjective weakness to her right foot.  She takes Lyrica twice daily from PCP for neuropathy.  She says that her sugars have been running 130-150 in the morning. She had a recent visit with oncology and was stable.       Past Medical History:   Diagnosis Date    Bipolar disorder     Cancer of female breast 10/2010    T2 N1 Mx, Stage IIB left breast cancer    Cancer of upper-outer quadrant of female breast 7/9/2012    Depression     Diabetes mellitus type II     Disturbances of sensation of smell and taste 6/29/2012    Hypertension     Malignant neoplasm of breast (female), unspecified site 4/27/2015    Neuropathy     Nonspecific abnormal unspecified cardiovascular function study 4/12/2013    ECG READ AS SHOWING A T-WAVE ABNORMALITY     Post-mastectomy lymphedema syndrome     Schizophrenia     Stroke        Pain Disability Index Review:  Last 3 PDI Scores 10/22/2020 10/19/2017 10/6/2017   Pain Disability Index (PDI) 50 60 0     Pain Medications:    - Opioids: None  - Adjuvant Medications: Lyrica (  Pregabalin)  - Anti-Coagulants: None  - Others: See med list    Opioid Contract: no     report:  Reviewed and consistent with medication use as prescribed.    Pain Procedures:  5/9/14 C7- T1 IL CARLEE  6/20/14 C7-T1 IL CARLEE  5/22/15 Left C3,4,5,6 MBB  6/26/15 Left C3,4,5,6 MBB  7/31/15 Left C3,4,5,6 RFA- no relief  9/24/15 Right C5-6 TF CARLEE (IR)- limited benefit     Physical Therapy/Home Exercise: not currently due to pain    Imaging:MRI Lumbar Spine W WO Contrast   Narrative     HISTORY: Lumbosacral radiculopathy. History of breast cancer    TECHNIQUE: Multiplanar, multisequence MR images were acquired from the thoracolumbar junction to the sacrum before and after intravenous administration of 8.5 mL Gadavist contrast.      COMPARISON: MRI lumbar spine 8/22/12    FINDINGS:     Alignment: Minimal grade 1 anterolisthesis of 2-3 mm L4-L5.    Vertebrae: Normal marrow signal. No fracture.    Discs:  Disc desiccation at L4-L5.    Cord: Normal. Conus terminates at L1-L2.    Degenerative findings:        T10-L1: Incompletely imaged. No obvious disc bulge, spinal canal stenosis, or foraminal stenosis.       L1-L4: No significant disc disease.  No spinal canal stenosis.  Mild facet arthropathy at L3-L4. No neural foraminal stenosis.       L4-L5: Aforementioned anterolisthesis with circumferential disc bulge and uncovering of the disc in conjunction with bilateral facet arthropathy results in severe spinal canal stenosis (more pronounced than 8/22/12) with mild foraminal stenoses.       L5-S1: No significant disc disease.  No central canal stenosis.  No neural foraminal stenosis.    Paraspinal muscles & soft tissues: Normal.   Impression       Anterolisthesis with disc bulge and facet arthropathy at L4-L5 resulting in severe spinal canal stenosis and mild foraminal stenoses, overall worsened from 8/22/12.      Electronically signed by: OSCAR NASCIMENTO  Date: 10/17/17  Time: 10:56        X-Ray Lumbar Complete With Flex And Ext    Narrative     Technique: AP, oblique, and lateral views including flexion/extension views of the lumbar spine.    Comparison: Same-day MRI    Findings:   Degenerative changes throughout the lumbar spine better evaluated on same day MRI. There is left greater than right facet arthropathy throughout the lumbar spine, worse inferiorly. Degenerative disc disease at L4-5 and L5-S1. Grade one anterolisthesis of L4 on L5 (approximately 0.8 cm) without significant translation of listhesis on flexion/extension views. Vertebral body heights are maintained. Paravertebral soft tissues are unremarkable.   Impression        Degenerative changes as above with grade 1 anterolisthesis L4 on L5 without instability.      Electronically signed by: ZHENG DIAZ MD  Date: 10/17/17  Time: 11:52          MRI Cervical Spine Without Contrast 02/15/13     Result Narrative      MRI of cervical spine    Technique: MRI of cervical spine performed without contrast. Following sequences were obtained: Localizer; sagittal T1, T2, and STIR; axial gradient and T2.    Comparison: Not available.    Findings:    There is heterogeneity of bone marrow signal, with region of T1 hypointensity within the occipital bone. Vertebral body height and alignment are maintained. There is straightening of cervical lordosis. Craniocervical junction is unremarkable. Spinal   cord demonstrates normal signal. Vertebral artery flow voids are maintained.    C2-C3: No spinal canal stenosis or neuroforaminal narrowing.    C3-C4: Small central disk osteophyte complex noted. No spinal canal stenosis or neuroforaminal narrowing.    C4-C5: Small central disk osteophyte complex and left uncovertebral arthrosis noted. No spinal canal stenosis or neuroforaminal narrowing.    C5-C6: Broad-based disk osteophyte complex with bilateral uncovertebral arthrosis resulting in mild central canal stenosis and mild right neuroforaminal narrowing.    C6-C7: Broad-based left paracentral  "disk osteophyte complex results in moderate spinal canal stenosis, predominately in the left lateral recess and deforms the spinal cord. There is no neuroforaminal narrowing.    C7-T1: No spinal canal stenosis or neuroforaminal narrowing.        Result Impression        1. Degenerative changes of the lower cervical spine as detailed above.  2. Heterogeneity of bone marrow signal, particularly the posterior calvarium. Although red marrow hyperplasia is more likely, recommend whole-body bone scan to exclude osseous metastases in this patient with history of breast cancer.      Electronically signed by: YAMILEX DAHL MD  Date: 02/15/13  Time: 10:25          Allergies:   Review of patient's allergies indicates:   Allergen Reactions    Banana Hives and Nausea And Vomiting       Current Medications:   Current Outpatient Medications   Medication Sig Dispense Refill    acetaminophen-codeine 300-60mg (TYLENOL #4) 300-60 mg Tab TK 1 T PO Q 4 TO 6 H PRN P      ammonium lactate 12 % Crea Apply twice daily to affected parts both feet as needed. 140 g 11    arm brace (WRIST SUPPORT LARGE-XLARGE MISC)       aspirin (ECOTRIN) 81 MG EC tablet Take 1 tablet (81 mg total) by mouth once daily.  0    atorvastatin (LIPITOR) 80 MG tablet Take 1 tablet (80 mg total) by mouth once daily. 30 tablet 11    blood sugar diagnostic (ACCU-CHEK AMBER PLUS TEST STRP) Strp TEST BLOOD SUGAR TWICE DAILY 200 strip 0    blood-glucose meter kit USE  AS  INSTRUCTED 1 each 0    ciclopirox (PENLAC) 8 % Soln Apply topically nightly. 6.6 mL 11    clopidogrel (PLAVIX) 75 mg tablet Take 1 tablet (75 mg total) by mouth once daily. for 21 days 21 tablet 0    divalproex (DEPAKOTE) 250 MG EC tablet TAKE 1 TABLET TWICE DAILY 180 tablet 3    EASY TOUCH 31 gauge x 1/4" Ndle   5    insulin (LANTUS SOLOSTAR U-100 INSULIN) glargine 100 units/mL (3mL) SubQ pen INJECT  23 UNITS SUBCUTANEOUSLY EVERY EVENING 15 mL 0    lancets (ONETOUCH ULTRASOFT LANCETS) Misc " "Pt to check BG up to QID. Dispense strips compatible with pt brand meter 100 each 11    lisinopriL-hydrochlorothiazide (PRINZIDE,ZESTORETIC) 10-12.5 mg per tablet TAKE 1 TABLET EVERY DAY 90 tablet 2    metFORMIN (GLUCOPHAGE) 500 MG tablet TAKE 1 TABLET TWICE DAILY  WITH  FOOD 180 tablet 3    OLANZapine (ZYPREXA) 10 MG tablet TK 1 T PO QHS 90 tablet 3    oxybutynin (DITROPAN-XL) 10 MG 24 hr tablet TAKE 1 TABLET EVERY DAY 90 tablet 3    pen needle, diabetic (BD ULTRA-FINE [place]ALO PEN NEEDLE) 32 gauge x 5/32" Ndle Pt is injecting once daily 30 each 11    pen needle, diabetic, safety 29 gauge x 3/16" Ndle Use as instructed 200 each 11    penicillin v potassium (VEETID) 500 MG tablet TK 1 T PO Q 6 H FOR 7-10 DAYS      pregabalin (LYRICA) 100 MG capsule TAKE 1 CAPSULE TWICE DAILY 60 capsule 5    QUEtiapine (SEROQUEL) 100 MG Tab Take 1 tablet (100 mg total) by mouth every evening. 90 tablet 3    TRUE METRIX GLUCOSE METER Misc       TRUEPLUS LANCETS 30 gauge Misc USE TO TEST BLOOD SUGART QID  11    TRUEPLUS LANCETS 33 gauge Misc 100 lancets by Subconjunctival route 4 (four) times daily. 100 each 0    VICTOZA 3-HERMANN 0.6 mg/0.1 mL (18 mg/3 mL) PnIj pen INJECT  1.8MG SUBCUTANEOUSLY EVERY DAY 27 mL 0    ziprasidone (GEODON) 40 MG Cap TAKE 1 CAPSULE(40 MG) BY MOUTH EVERY EVENING 30 capsule 5     No current facility-administered medications for this visit.        REVIEW OF SYSTEMS:    GENERAL:  No weight loss, malaise or fevers.  HEENT:  Negative for frequent or significant headaches.  NECK:  Negative for lumps, goiter, pain and significant neck swelling.  RESPIRATORY:  Negative for cough, wheezing or shortness of breath.  CARDIOVASCULAR:  Negative for chest pain, leg swelling or palpitations. Hypertension.  GI:  Negative for abdominal discomfort, blood in stools or black stools or change in bowel habits.  MUSCULOSKELETAL:  See HPI.  SKIN:  Negative for lesions, rash, and itching.  PSYCH:  Positive for sleep " disturbance.  H/O depression, bipolar disorder and schizophrenia managed by outside psych.  HEMATOLOGY/LYMPHOLOGY:  Negative for prolonged bleeding, bruising easily or swollen nodes. H/O breast cancer.  NEURO:   No history of headaches, syncope, paralysis, seizures or tremors.  ENDO: Diabetes.  All other reviewed and negative other than HPI.    Past Medical History:  Past Medical History:   Diagnosis Date    Bipolar disorder     Cancer of female breast 10/2010    T2 N1 Mx, Stage IIB left breast cancer    Cancer of upper-outer quadrant of female breast 7/9/2012    Depression     Diabetes mellitus type II     Disturbances of sensation of smell and taste 6/29/2012    Hypertension     Malignant neoplasm of breast (female), unspecified site 4/27/2015    Neuropathy     Nonspecific abnormal unspecified cardiovascular function study 4/12/2013    ECG READ AS SHOWING A T-WAVE ABNORMALITY     Post-mastectomy lymphedema syndrome     Schizophrenia     Stroke        Past Surgical History:  Past Surgical History:   Procedure Laterality Date    BREAST RECONSTRUCTION  11/23/11    B/L SHLOMO flap reconstruction S/P left MRM, right prophylactic mastectomy    BREAST RECONSTRUCTION Bilateral 3/13/2015    NIPPLE RECONSTRUCTION    MASTECTOMY Bilateral 01/2011    bilateral    TUBAL LIGATION         Family History:  Family History   Problem Relation Age of Onset    Kidney disease Mother         Dialysis    Hypertension Mother     Diabetes Mother     Deep vein thrombosis Mother     Glaucoma Mother     Diabetic kidney disease Sister     Lung cancer Sister     Diabetes Sister     Diabetes Brother     Diabetes Son     No Known Problems Father     No Known Problems Maternal Grandmother     No Known Problems Maternal Grandfather     No Known Problems Paternal Grandmother     No Known Problems Paternal Grandfather     No Known Problems Son     Cervical cancer Daughter     No Known Problems Daughter     No Known  "Problems Daughter     No Known Problems Daughter     Breast cancer Neg Hx     Ovarian cancer Neg Hx        Social History:  Social History     Socioeconomic History    Marital status:      Spouse name: Not on file    Number of children: 6    Years of education: Not on file    Highest education level: Not on file   Occupational History    Occupation: Disability   Social Needs    Financial resource strain: Not on file    Food insecurity     Worry: Not on file     Inability: Not on file    Transportation needs     Medical: Not on file     Non-medical: Not on file   Tobacco Use    Smoking status: Current Every Day Smoker     Packs/day: 0.25     Years: 25.00     Pack years: 6.25     Types: Cigarettes    Smokeless tobacco: Never Used    Tobacco comment: currently at less than 1/2 pack pd   Substance and Sexual Activity    Alcohol use: No     Alcohol/week: 0.0 standard drinks    Drug use: No     Comment: 1991 - stopped using crack cocaine    Sexual activity: Not Currently     Partners: Male   Lifestyle    Physical activity     Days per week: Not on file     Minutes per session: Not on file    Stress: Not on file   Relationships    Social connections     Talks on phone: Not on file     Gets together: Not on file     Attends Zoroastrian service: Not on file     Active member of club or organization: Not on file     Attends meetings of clubs or organizations: Not on file     Relationship status: Not on file   Other Topics Concern    Not on file   Social History Narrative    Not on file       OBJECTIVE:    /78   Pulse 77   Temp 97 °F (36.1 °C) (Oral)   Resp 20   Ht 5' 4" (1.626 m)   LMP  (LMP Unknown)   BMI 30.54 kg/m²     PHYSICAL EXAMINATION:    General appearance: Well appearing, in no acute distress. The patient is frequently drifting off during exam.    Psych:  Mood and affect appropriate.  Skin: Skin color, texture, turgor normal, no rashes or lesions, in both upper and lower " body.  Head/face:  Atraumatic, normocephalic. No palpable lymph nodes  Back: There is pain with palpation to lumbar paraspinals and facet joints.  Limited flexion and extension.  Bilateral facet loading.  SLR is positive at right S1 distribution.  Extremities: Peripheral joint ROM is full and pain free without obvious instability or laxity in all four extremities. Lymphedema to LUE.  Musculoskeletal: 4/5 strength in right ankle with plantar and 3/5 on dorsiflexion. 4/5 strength in left ankle with plantar and dorsiflexion. 5/5 strength with right knee flexion and extension. 5/5 strength with left knee flexion and extension. 4/5 strength in right EHL, 5/5 strength in left EHL. No atrophy or tone abnormalities are noted.  Bilateral hand  strength 4/5.    Neuro: Bilateral upper and lower extremity coordination and muscle stretch reflexes are physiologic and symmetric.  Plantar response are downgoing. Decreased sensation at right L5 and S1 distribution.  Gait: Antalgic.    ASSESSMENT: 60 y.o. year old female with neck pain, consistent with     1. Lumbar radiculopathy  MRI Lumbar Spine Without Contrast   2. Right sciatic nerve pain  Ambulatory referral/consult to Pain Clinic   3. Dorsalgia, unspecified            PLAN:     - Her previous lumbar MRI shows L4/5 anterolisthesis with circumferential disc bulge and uncovering of the disc in conjunction with bilateral facet arthropathy results in severe spinal canal stenosis with mild foraminal stenoses.    - Her symptoms today are consistent with L5/S1 disc herniation with compression of L5 and S1 nerves.  I will order an updated MRI without contrast.  She does have a remote history of breast cancer and was recently found to be doing well by oncology.  I am trying to get her MRI today due to severe pain.  If any abnormalities needing contrast, will schedule with contrast.    - Schedule for right L5/S1 and S1 TF CARLEE.  Will check MRI and update levels if indicated.    - I  have counseled the patient on importance of smoking cessation and specifically poor outcomes related to spine health.    - She is insisting that she no longer takes Plavix and takes only a baby aspirin.    - She takes Lyrica 100 mg BID.  Will increase to TID and send refill to test company.    - Will give 40 mg IM depomedrol today to help with pain until we can obtain MRI.  She is aware to monitor her sugars.    - I have stressed the importance of physical activity and a home exercise plan to help with pain and improve health.    - Counseled patient regarding the importance of activity modification, smoking cessation, constant sleeping habits and physical therapy.    - RTC 2 weeks after CARLEE.      The above plan and management options were discussed at length with patient. Patient is in agreement with the above and verbalized understanding.     ANUJ Irene  10/22/2020

## 2020-10-22 NOTE — PROGRESS NOTES
CHIEF COMPLAINT:   F/U in a patient with diabetes and hypertension    HISTORY OF PRESENT ILLNESS: The patient is a 60 year-old black female.  The patient has a long and complicated medical history.      She has new onset left sciatica.  No myelopathy.    She continues to have problems with neuropathic pain as well as central pain.  She does well w/ her Lyrica 100 twice a day.    The patient has a history of diabetes.  Recent blood sugars have been less than 200.  There have been no episodes of hypoglycemia.    The patient has a history of stable hypertension on current medications.  Patient denies chest pain or shortness of breath today.    The patient has a history of stable hyperlipidemia on current medications.  The patient denies chest pain or shortness of breath today.  The patient denies muscle aches or myalgias suggestive of myositis.    She has a history of breast cancer for which she underwent bilateral mastectomies and chemotherapy.    REVIEW OF SYSTEMS:  GENERAL: No fever, chills or weight loss.  SKIN: No rashes, itching or changes in color or texture of skin.  HEAD: No headaches or recent head trauma.  EYES: Visual acuity fine. No photophobia, ocular pain or diplopia.  EARS: Denies ear pain, discharge or vertigo.  NOSE: No loss of smell, no epistaxis or postnasal drip.  MOUTH & THROAT: No hoarseness or change in voice. No excessive gum bleeding.  NODES: Denies swollen glands.  CHEST: Denies AWAD, cyanosis, wheezing, cough and sputum production.  CARDIOVASCULAR: Denies chest pain, PND, orthopnea or reduced exercise tolerance.  ABDOMEN: Appetite fine. No weight loss. Denies diarrhea, abdominal pain, hematemesis or blood in stool.  URINARY: No flank pain, dysuria or hematuria.  PERIPHERAL VASCULAR: No claudication or cyanosis.  MUSCULOSKELETAL: No joint stiffness or swelling. Except as noted above.  NEUROLOGIC: No history of seizures    SOCIAL HISTORY: The patient does smoke.  The patient consumes alcohol  "socially.      PHYSICAL EXAMINATION:     Blood pressure (!) 137/90, pulse 77, height 5' 4" (1.626 m), weight 80.7 kg (177 lb 14.6 oz), SpO2 97 %.    APPEARANCE: Well nourished, well developed, in no acute distress.    HEAD: Normocephalic, atraumatic.  EYES: PERRL. EOMI.  Conjunctivae without injection and  anicteric  EARS: TM's intact. Light reflex normal. No retraction or perforation.    NOSE: Mucosa pink. Airway clear.  MOUTH & THROAT: No tonsillar enlargement. No pharyngeal erythema or exudate. No stridor.  NECK: Supple.   NODES: No cervical, axillary or inguinal lymph node enlargement.  CHEST: Lungs clear to auscultation.  No retractions are noted.  No rales or rhonchi are present.  CARDIOVASCULAR: Normal S1, S2. No rubs, murmurs or gallops.  ABDOMEN: Bowel sounds normal. Not distended. Soft. No tenderness or masses.  No ascites is noted.  MUSCULOSKELETAL:  There is no clubbing, cyanosis, or edema of the extremities x4.  There is full range of motion of the lumbar spine.  There is full range of motion of the extremities x4.  There is no deformity noted.    NEUROLOGIC:       Normal speech development.      Hearing normal.      paretic gait.      Motor and sensory exams grossly normal.  Mild RLE weakness noted  PSYCHIATRIC: Patient is alert and oriented x3.  Thought processes are all normal.  There is no homicidality.  There is no suicidality.  There is no evidence of psychosis.    LABORATORY/RADIOLOGY:   Chart reviewed . including surgeries and blood work    ASSESSMENT:   CVA  Breast cancer with resultant lymphedema  Hypertension, condition is well controlled on current medication regimen  Diabetes, condition is well controlled on current medication regimen  Neuropathic pain  Cervical radiculopathy  Right sciatica    PLAN:  A1c today  Depakote 250 mg p.o. b.i.d.  Her diabetes is very well controlled    Pain clinic   KCl 40 po qd  Prinzide  Pravachol 20 mg by mouth daily    Return to clinic in 6 month with blood " work prior

## 2020-10-22 NOTE — LETTER
"October 23, 2020    Jonathan Gonzales  [address] [facility] [facility] [place] [address]             [facility] [id number]  [facility] [address]  Phone: [phone]  Fax: [phone] Dear Ms. Jonathan Gonzales:    Below are the results from your recent visit:    Resulted Orders   Hemoglobin A1C   Result Value Ref Range    Hemoglobin A1C 5.7 (H) 4.0 - 5.6 %      Comment:      ADA Screening Guidelines:  5.7-6.4%  Consistent with prediabetes  >or=6.5%  Consistent with diabetes  High levels of fetal hemoglobin interfere with the HbA1C  assay. Heterozygous hemoglobin variants (HbS, HgC, etc)do  not significantly interfere with this assay.   However, presence of multiple variants may affect accuracy.      Estimated Avg Glucose 117 68 - 131 mg/dL     Your results are within an acceptable range. Dr. Contreras states "A1c remains good.  No changes in medications.  Follow up as scheduled." Please don't hesitate to call our office if you have any questions or concerns.      Sincerely,        Ghulma Contreras MD     "

## 2020-10-22 NOTE — H&P (VIEW-ONLY)
Chronic patient Established Note (Follow up visit)      SUBJECTIVE:  60 year old female with PMH of breast cancer (s/p mastectomy bilaterally), DM2, HTN, bipolar disorder and schizophrenia. patient presents today to discuss severe back and right leg pain.  We have not seen the patient in clinic in over 3 years and she reports that she was mainly doing well with her back and neck pain.  She was sent up here today by her primary care doctor, Dr. Contreras, as she saw him today.  She reports that she has had back pain for many years but it has been well-controlled until 3 days ago.  She has been having severe right sided lower back pain with radiation down the back of the right leg with numbness.  She has pain and numbness to her right heel and calf.  She is not having significant symptoms on the left.  Her leg pain is greater than her back pain.  She is unable to walk without assistance.  She is feeling subjective weakness to her right foot.  She takes Lyrica twice daily from PCP for neuropathy.  She says that her sugars have been running 130-150 in the morning. She had a recent visit with oncology and was stable.       Past Medical History:   Diagnosis Date    Bipolar disorder     Cancer of female breast 10/2010    T2 N1 Mx, Stage IIB left breast cancer    Cancer of upper-outer quadrant of female breast 7/9/2012    Depression     Diabetes mellitus type II     Disturbances of sensation of smell and taste 6/29/2012    Hypertension     Malignant neoplasm of breast (female), unspecified site 4/27/2015    Neuropathy     Nonspecific abnormal unspecified cardiovascular function study 4/12/2013    ECG READ AS SHOWING A T-WAVE ABNORMALITY     Post-mastectomy lymphedema syndrome     Schizophrenia     Stroke        Pain Disability Index Review:  Last 3 PDI Scores 10/22/2020 10/19/2017 10/6/2017   Pain Disability Index (PDI) 50 60 0     Pain Medications:    - Opioids: None  - Adjuvant Medications: Lyrica (  Pregabalin)  - Anti-Coagulants: None  - Others: See med list    Opioid Contract: no     report:  Reviewed and consistent with medication use as prescribed.    Pain Procedures:  5/9/14 C7- T1 IL CARLEE  6/20/14 C7-T1 IL CARLEE  5/22/15 Left C3,4,5,6 MBB  6/26/15 Left C3,4,5,6 MBB  7/31/15 Left C3,4,5,6 RFA- no relief  9/24/15 Right C5-6 TF CARLEE (IR)- limited benefit     Physical Therapy/Home Exercise: not currently due to pain    Imaging:MRI Lumbar Spine W WO Contrast   Narrative     HISTORY: Lumbosacral radiculopathy. History of breast cancer    TECHNIQUE: Multiplanar, multisequence MR images were acquired from the thoracolumbar junction to the sacrum before and after intravenous administration of 8.5 mL Gadavist contrast.      COMPARISON: MRI lumbar spine 8/22/12    FINDINGS:     Alignment: Minimal grade 1 anterolisthesis of 2-3 mm L4-L5.    Vertebrae: Normal marrow signal. No fracture.    Discs:  Disc desiccation at L4-L5.    Cord: Normal. Conus terminates at L1-L2.    Degenerative findings:        T10-L1: Incompletely imaged. No obvious disc bulge, spinal canal stenosis, or foraminal stenosis.       L1-L4: No significant disc disease.  No spinal canal stenosis.  Mild facet arthropathy at L3-L4. No neural foraminal stenosis.       L4-L5: Aforementioned anterolisthesis with circumferential disc bulge and uncovering of the disc in conjunction with bilateral facet arthropathy results in severe spinal canal stenosis (more pronounced than 8/22/12) with mild foraminal stenoses.       L5-S1: No significant disc disease.  No central canal stenosis.  No neural foraminal stenosis.    Paraspinal muscles & soft tissues: Normal.   Impression       Anterolisthesis with disc bulge and facet arthropathy at L4-L5 resulting in severe spinal canal stenosis and mild foraminal stenoses, overall worsened from 8/22/12.      Electronically signed by: OSCAR NASCIMENTO  Date: 10/17/17  Time: 10:56        X-Ray Lumbar Complete With Flex And Ext    Narrative     Technique: AP, oblique, and lateral views including flexion/extension views of the lumbar spine.    Comparison: Same-day MRI    Findings:   Degenerative changes throughout the lumbar spine better evaluated on same day MRI. There is left greater than right facet arthropathy throughout the lumbar spine, worse inferiorly. Degenerative disc disease at L4-5 and L5-S1. Grade one anterolisthesis of L4 on L5 (approximately 0.8 cm) without significant translation of listhesis on flexion/extension views. Vertebral body heights are maintained. Paravertebral soft tissues are unremarkable.   Impression        Degenerative changes as above with grade 1 anterolisthesis L4 on L5 without instability.      Electronically signed by: ZHENG DIAZ MD  Date: 10/17/17  Time: 11:52          MRI Cervical Spine Without Contrast 02/15/13     Result Narrative      MRI of cervical spine    Technique: MRI of cervical spine performed without contrast. Following sequences were obtained: Localizer; sagittal T1, T2, and STIR; axial gradient and T2.    Comparison: Not available.    Findings:    There is heterogeneity of bone marrow signal, with region of T1 hypointensity within the occipital bone. Vertebral body height and alignment are maintained. There is straightening of cervical lordosis. Craniocervical junction is unremarkable. Spinal   cord demonstrates normal signal. Vertebral artery flow voids are maintained.    C2-C3: No spinal canal stenosis or neuroforaminal narrowing.    C3-C4: Small central disk osteophyte complex noted. No spinal canal stenosis or neuroforaminal narrowing.    C4-C5: Small central disk osteophyte complex and left uncovertebral arthrosis noted. No spinal canal stenosis or neuroforaminal narrowing.    C5-C6: Broad-based disk osteophyte complex with bilateral uncovertebral arthrosis resulting in mild central canal stenosis and mild right neuroforaminal narrowing.    C6-C7: Broad-based left paracentral  "disk osteophyte complex results in moderate spinal canal stenosis, predominately in the left lateral recess and deforms the spinal cord. There is no neuroforaminal narrowing.    C7-T1: No spinal canal stenosis or neuroforaminal narrowing.        Result Impression        1. Degenerative changes of the lower cervical spine as detailed above.  2. Heterogeneity of bone marrow signal, particularly the posterior calvarium. Although red marrow hyperplasia is more likely, recommend whole-body bone scan to exclude osseous metastases in this patient with history of breast cancer.      Electronically signed by: YAMILEX DAHL MD  Date: 02/15/13  Time: 10:25          Allergies:   Review of patient's allergies indicates:   Allergen Reactions    Banana Hives and Nausea And Vomiting       Current Medications:   Current Outpatient Medications   Medication Sig Dispense Refill    acetaminophen-codeine 300-60mg (TYLENOL #4) 300-60 mg Tab TK 1 T PO Q 4 TO 6 H PRN P      ammonium lactate 12 % Crea Apply twice daily to affected parts both feet as needed. 140 g 11    arm brace (WRIST SUPPORT LARGE-XLARGE MISC)       aspirin (ECOTRIN) 81 MG EC tablet Take 1 tablet (81 mg total) by mouth once daily.  0    atorvastatin (LIPITOR) 80 MG tablet Take 1 tablet (80 mg total) by mouth once daily. 30 tablet 11    blood sugar diagnostic (ACCU-CHEK AMBER PLUS TEST STRP) Strp TEST BLOOD SUGAR TWICE DAILY 200 strip 0    blood-glucose meter kit USE  AS  INSTRUCTED 1 each 0    ciclopirox (PENLAC) 8 % Soln Apply topically nightly. 6.6 mL 11    clopidogrel (PLAVIX) 75 mg tablet Take 1 tablet (75 mg total) by mouth once daily. for 21 days 21 tablet 0    divalproex (DEPAKOTE) 250 MG EC tablet TAKE 1 TABLET TWICE DAILY 180 tablet 3    EASY TOUCH 31 gauge x 1/4" Ndle   5    insulin (LANTUS SOLOSTAR U-100 INSULIN) glargine 100 units/mL (3mL) SubQ pen INJECT  23 UNITS SUBCUTANEOUSLY EVERY EVENING 15 mL 0    lancets (ONETOUCH ULTRASOFT LANCETS) Misc " "Pt to check BG up to QID. Dispense strips compatible with pt brand meter 100 each 11    lisinopriL-hydrochlorothiazide (PRINZIDE,ZESTORETIC) 10-12.5 mg per tablet TAKE 1 TABLET EVERY DAY 90 tablet 2    metFORMIN (GLUCOPHAGE) 500 MG tablet TAKE 1 TABLET TWICE DAILY  WITH  FOOD 180 tablet 3    OLANZapine (ZYPREXA) 10 MG tablet TK 1 T PO QHS 90 tablet 3    oxybutynin (DITROPAN-XL) 10 MG 24 hr tablet TAKE 1 TABLET EVERY DAY 90 tablet 3    pen needle, diabetic (BD ULTRA-FINE ITALO PEN NEEDLE) 32 gauge x 5/32" Ndle Pt is injecting once daily 30 each 11    pen needle, diabetic, safety 29 gauge x 3/16" Ndle Use as instructed 200 each 11    penicillin v potassium (VEETID) 500 MG tablet TK 1 T PO Q 6 H FOR 7-10 DAYS      pregabalin (LYRICA) 100 MG capsule TAKE 1 CAPSULE TWICE DAILY 60 capsule 5    QUEtiapine (SEROQUEL) 100 MG Tab Take 1 tablet (100 mg total) by mouth every evening. 90 tablet 3    TRUE METRIX GLUCOSE METER Misc       TRUEPLUS LANCETS 30 gauge Misc USE TO TEST BLOOD SUGART QID  11    TRUEPLUS LANCETS 33 gauge Misc 100 lancets by Subconjunctival route 4 (four) times daily. 100 each 0    VICTOZA 3-HERMANN 0.6 mg/0.1 mL (18 mg/3 mL) PnIj pen INJECT  1.8MG SUBCUTANEOUSLY EVERY DAY 27 mL 0    ziprasidone (GEODON) 40 MG Cap TAKE 1 CAPSULE(40 MG) BY MOUTH EVERY EVENING 30 capsule 5     No current facility-administered medications for this visit.        REVIEW OF SYSTEMS:    GENERAL:  No weight loss, malaise or fevers.  HEENT:  Negative for frequent or significant headaches.  NECK:  Negative for lumps, goiter, pain and significant neck swelling.  RESPIRATORY:  Negative for cough, wheezing or shortness of breath.  CARDIOVASCULAR:  Negative for chest pain, leg swelling or palpitations. Hypertension.  GI:  Negative for abdominal discomfort, blood in stools or black stools or change in bowel habits.  MUSCULOSKELETAL:  See HPI.  SKIN:  Negative for lesions, rash, and itching.  PSYCH:  Positive for sleep " disturbance.  H/O depression, bipolar disorder and schizophrenia managed by outside psych.  HEMATOLOGY/LYMPHOLOGY:  Negative for prolonged bleeding, bruising easily or swollen nodes. H/O breast cancer.  NEURO:   No history of headaches, syncope, paralysis, seizures or tremors.  ENDO: Diabetes.  All other reviewed and negative other than HPI.    Past Medical History:  Past Medical History:   Diagnosis Date    Bipolar disorder     Cancer of female breast 10/2010    T2 N1 Mx, Stage IIB left breast cancer    Cancer of upper-outer quadrant of female breast 7/9/2012    Depression     Diabetes mellitus type II     Disturbances of sensation of smell and taste 6/29/2012    Hypertension     Malignant neoplasm of breast (female), unspecified site 4/27/2015    Neuropathy     Nonspecific abnormal unspecified cardiovascular function study 4/12/2013    ECG READ AS SHOWING A T-WAVE ABNORMALITY     Post-mastectomy lymphedema syndrome     Schizophrenia     Stroke        Past Surgical History:  Past Surgical History:   Procedure Laterality Date    BREAST RECONSTRUCTION  11/23/11    B/L SHLOMO flap reconstruction S/P left MRM, right prophylactic mastectomy    BREAST RECONSTRUCTION Bilateral 3/13/2015    NIPPLE RECONSTRUCTION    MASTECTOMY Bilateral 01/2011    bilateral    TUBAL LIGATION         Family History:  Family History   Problem Relation Age of Onset    Kidney disease Mother         Dialysis    Hypertension Mother     Diabetes Mother     Deep vein thrombosis Mother     Glaucoma Mother     Diabetic kidney disease Sister     Lung cancer Sister     Diabetes Sister     Diabetes Brother     Diabetes Son     No Known Problems Father     No Known Problems Maternal Grandmother     No Known Problems Maternal Grandfather     No Known Problems Paternal Grandmother     No Known Problems Paternal Grandfather     No Known Problems Son     Cervical cancer Daughter     No Known Problems Daughter     No Known  "Problems Daughter     No Known Problems Daughter     Breast cancer Neg Hx     Ovarian cancer Neg Hx        Social History:  Social History     Socioeconomic History    Marital status:      Spouse name: Not on file    Number of children: 6    Years of education: Not on file    Highest education level: Not on file   Occupational History    Occupation: Disability   Social Needs    Financial resource strain: Not on file    Food insecurity     Worry: Not on file     Inability: Not on file    Transportation needs     Medical: Not on file     Non-medical: Not on file   Tobacco Use    Smoking status: Current Every Day Smoker     Packs/day: 0.25     Years: 25.00     Pack years: 6.25     Types: Cigarettes    Smokeless tobacco: Never Used    Tobacco comment: currently at less than 1/2 pack pd   Substance and Sexual Activity    Alcohol use: No     Alcohol/week: 0.0 standard drinks    Drug use: No     Comment: 1991 - stopped using crack cocaine    Sexual activity: Not Currently     Partners: Male   Lifestyle    Physical activity     Days per week: Not on file     Minutes per session: Not on file    Stress: Not on file   Relationships    Social connections     Talks on phone: Not on file     Gets together: Not on file     Attends Restoration service: Not on file     Active member of club or organization: Not on file     Attends meetings of clubs or organizations: Not on file     Relationship status: Not on file   Other Topics Concern    Not on file   Social History Narrative    Not on file       OBJECTIVE:    /78   Pulse 77   Temp 97 °F (36.1 °C) (Oral)   Resp 20   Ht 5' 4" (1.626 m)   LMP  (LMP Unknown)   BMI 30.54 kg/m²     PHYSICAL EXAMINATION:    General appearance: Well appearing, in no acute distress. The patient is frequently drifting off during exam.    Psych:  Mood and affect appropriate.  Skin: Skin color, texture, turgor normal, no rashes or lesions, in both upper and lower " body.  Head/face:  Atraumatic, normocephalic. No palpable lymph nodes  Back: There is pain with palpation to lumbar paraspinals and facet joints.  Limited flexion and extension.  Bilateral facet loading.  SLR is positive at right S1 distribution.  Extremities: Peripheral joint ROM is full and pain free without obvious instability or laxity in all four extremities. Lymphedema to LUE.  Musculoskeletal: 4/5 strength in right ankle with plantar and 3/5 on dorsiflexion. 4/5 strength in left ankle with plantar and dorsiflexion. 5/5 strength with right knee flexion and extension. 5/5 strength with left knee flexion and extension. 4/5 strength in right EHL, 5/5 strength in left EHL. No atrophy or tone abnormalities are noted.  Bilateral hand  strength 4/5.    Neuro: Bilateral upper and lower extremity coordination and muscle stretch reflexes are physiologic and symmetric.  Plantar response are downgoing. Decreased sensation at right L5 and S1 distribution.  Gait: Antalgic.    ASSESSMENT: 60 y.o. year old female with neck pain, consistent with     1. Lumbar radiculopathy  MRI Lumbar Spine Without Contrast   2. Right sciatic nerve pain  Ambulatory referral/consult to Pain Clinic   3. Dorsalgia, unspecified            PLAN:     - Her previous lumbar MRI shows L4/5 anterolisthesis with circumferential disc bulge and uncovering of the disc in conjunction with bilateral facet arthropathy results in severe spinal canal stenosis with mild foraminal stenoses.    - Her symptoms today are consistent with L5/S1 disc herniation with compression of L5 and S1 nerves.  I will order an updated MRI without contrast.  She does have a remote history of breast cancer and was recently found to be doing well by oncology.  I am trying to get her MRI today due to severe pain.  If any abnormalities needing contrast, will schedule with contrast.    - Schedule for right L5/S1 and S1 TF CARLEE.  Will check MRI and update levels if indicated.    - I  have counseled the patient on importance of smoking cessation and specifically poor outcomes related to spine health.    - She is insisting that she no longer takes Plavix and takes only a baby aspirin.    - She takes Lyrica 100 mg BID.  Will increase to TID and send refill to [facility].    - Will give 40 mg IM depomedrol today to help with pain until we can obtain MRI.  She is aware to monitor her sugars.    - I have stressed the importance of physical activity and a home exercise plan to help with pain and improve health.    - Counseled patient regarding the importance of activity modification, smoking cessation, constant sleeping habits and physical therapy.    - RTC 2 weeks after CARLEE.      The above plan and management options were discussed at length with patient. Patient is in agreement with the above and verbalized understanding.     ANUJ Irene  10/22/2020

## 2020-10-23 RX ORDER — PREGABALIN 100 MG/1
100 CAPSULE ORAL 3 TIMES DAILY
Qty: 90 CAPSULE | Refills: 2 | Status: SHIPPED | OUTPATIENT
Start: 2020-10-23 | End: 2021-01-13 | Stop reason: SDUPTHER

## 2020-10-23 NOTE — TELEPHONE ENCOUNTER
----- Message from Ghulam Contreras MD sent at 10/23/2020 11:17 AM CDT -----  A1c remains good.  No changes in medications.  Followup as scheduled.

## 2020-10-23 NOTE — PROGRESS NOTES
Health Maintenance Due   Topic Date Due    Foot Exam  12/20/2018    Hemoglobin A1c  05/11/2020    Shingles Vaccine (2 of 2) 05/12/2020    Influenza Vaccine (1) 08/01/2020     Updates were requested from care everywhere.  Chart was reviewed for overdue Proactive [facility] Encounters (BOOGIE) topics (CRS, Breast Cancer Screening, Eye exam)  Health Maintenance has been updated.  LINKS immunization registry triggered.  Immunizations were reconciled.

## 2020-10-26 DIAGNOSIS — M47.812 CERVICAL SPONDYLOSIS WITHOUT MYELOPATHY: ICD-10-CM

## 2020-10-26 RX ORDER — QUETIAPINE FUMARATE 100 MG/1
100 TABLET, FILM COATED ORAL NIGHTLY
Qty: 90 TABLET | Refills: 3 | Status: SHIPPED | OUTPATIENT
Start: 2020-10-26 | End: 2021-04-01 | Stop reason: SDUPTHER

## 2020-10-27 ENCOUNTER — HOSPITAL ENCOUNTER (OUTPATIENT)
Dept: RADIOLOGY | Facility: OTHER | Age: 60
Discharge: HOME | End: 2020-10-27
Attending: NURSE PRACTITIONER
Payer: MEDICARE

## 2020-10-27 ENCOUNTER — TELEPHONE (OUTPATIENT)
Dept: PAIN MEDICINE | Facility: CLINIC | Age: 60
End: 2020-10-27

## 2020-10-27 DIAGNOSIS — M54.16 LUMBAR RADICULOPATHY: ICD-10-CM

## 2020-10-27 PROCEDURE — 72148 MRI LUMBAR SPINE W/O DYE: CPT | Mod: 26,HCNC,, | Performed by: RADIOLOGY

## 2020-10-27 PROCEDURE — 72148 MRI LUMBAR SPINE WITHOUT CONTRAST: ICD-10-PCS | Mod: 26,HCNC,, | Performed by: RADIOLOGY

## 2020-10-27 PROCEDURE — 72148 MRI LUMBAR SPINE W/O DYE: CPT | Mod: TC,HCNC

## 2020-10-27 NOTE — TELEPHONE ENCOUNTER
Left Vm for patient to return my call regarding imaging results and possibly changing levels of upcoming procedure if needed.

## 2020-11-03 ENCOUNTER — TELEPHONE (OUTPATIENT)
Dept: INTERNAL MEDICINE | Facility: CLINIC | Age: 60
End: 2020-11-03

## 2020-11-03 NOTE — TELEPHONE ENCOUNTER
----- Message from Leticia Sanchez sent at 11/3/2020  3:00 PM CST -----  Contact: JOYA MUNOZ [[id number]]  Type: Patient Call Back Who called:JOYA MUNOZ [[id number]] What is the request in detail:Patient would like to inform you that a Mr. Mcclain Diabetic manager will be sending a fax to confirm the patient is under Dr. Contreras care. The form is for the patient to receive her diabetic shoes.  Can the clinic reply by [facility]?no Would the patient rather a call back or a response via [facility]? Call back Best call back number:[phone] (mobile)  Additional Information:

## 2020-11-04 ENCOUNTER — HOSPITAL ENCOUNTER (OUTPATIENT)
Facility: OTHER | Age: 60
Discharge: HOME | End: 2020-11-04
Attending: ANESTHESIOLOGY | Admitting: ANESTHESIOLOGY
Payer: MEDICARE

## 2020-11-04 ENCOUNTER — TELEPHONE (OUTPATIENT)
Dept: PAIN MEDICINE | Facility: CLINIC | Age: 60
End: 2020-11-04

## 2020-11-04 VITALS
TEMPERATURE: 98 F | RESPIRATION RATE: 14 BRPM | OXYGEN SATURATION: 98 % | WEIGHT: 170 LBS | BODY MASS INDEX: 30.12 KG/M2 | DIASTOLIC BLOOD PRESSURE: 94 MMHG | HEART RATE: 66 BPM | HEIGHT: 63 IN | SYSTOLIC BLOOD PRESSURE: 151 MMHG

## 2020-11-04 DIAGNOSIS — M54.17 LUMBOSACRAL RADICULOPATHY: ICD-10-CM

## 2020-11-04 DIAGNOSIS — M51.37 DDD (DEGENERATIVE DISC DISEASE), LUMBOSACRAL: Primary | ICD-10-CM

## 2020-11-04 DIAGNOSIS — G89.29 CHRONIC PAIN: ICD-10-CM

## 2020-11-04 LAB — POCT GLUCOSE: 65 MG/DL (ref 70–110)

## 2020-11-04 PROCEDURE — 25500020 PHARM REV CODE 255: Mod: HCNC | Performed by: ANESTHESIOLOGY

## 2020-11-04 PROCEDURE — 64483 NJX AA&/STRD TFRM EPI L/S 1: CPT | Mod: 50,HCNC | Performed by: ANESTHESIOLOGY

## 2020-11-04 PROCEDURE — 64483 NJX AA&/STRD TFRM EPI L/S 1: CPT | Mod: 50,HCNC,, | Performed by: ANESTHESIOLOGY

## 2020-11-04 PROCEDURE — 82947 ASSAY GLUCOSE BLOOD QUANT: CPT | Mod: HCNC | Performed by: ANESTHESIOLOGY

## 2020-11-04 PROCEDURE — 25000003 PHARM REV CODE 250: Mod: HCNC | Performed by: ANESTHESIOLOGY

## 2020-11-04 PROCEDURE — 63600175 PHARM REV CODE 636 W HCPCS: Mod: HCNC | Performed by: ANESTHESIOLOGY

## 2020-11-04 PROCEDURE — 64483 PR EPIDURAL INJ, ANES/STEROID, TRANSFORAMINAL, LUMB/SACR, SNGL LEVL: ICD-10-PCS | Mod: 50,HCNC,, | Performed by: ANESTHESIOLOGY

## 2020-11-04 RX ORDER — FENTANYL CITRATE 50 UG/ML
INJECTION, SOLUTION INTRAMUSCULAR; INTRAVENOUS
Status: DISCONTINUED | OUTPATIENT
Start: 2020-11-04 | End: 2020-11-04 | Stop reason: HOSPADM

## 2020-11-04 RX ORDER — LIDOCAINE HYDROCHLORIDE 10 MG/ML
INJECTION INFILTRATION; PERINEURAL
Status: DISCONTINUED | OUTPATIENT
Start: 2020-11-04 | End: 2020-11-04 | Stop reason: HOSPADM

## 2020-11-04 RX ORDER — SODIUM CHLORIDE 9 MG/ML
500 INJECTION, SOLUTION INTRAVENOUS CONTINUOUS
Status: DISCONTINUED | OUTPATIENT
Start: 2020-11-04 | End: 2020-11-04 | Stop reason: HOSPADM

## 2020-11-04 RX ORDER — LIDOCAINE HYDROCHLORIDE 5 MG/ML
INJECTION, SOLUTION INFILTRATION; INTRAVENOUS
Status: DISCONTINUED | OUTPATIENT
Start: 2020-11-04 | End: 2020-11-04 | Stop reason: HOSPADM

## 2020-11-04 RX ORDER — DEXAMETHASONE SODIUM PHOSPHATE 10 MG/ML
INJECTION INTRAMUSCULAR; INTRAVENOUS
Status: DISCONTINUED | OUTPATIENT
Start: 2020-11-04 | End: 2020-11-04 | Stop reason: HOSPADM

## 2020-11-04 RX ORDER — MIDAZOLAM HYDROCHLORIDE 1 MG/ML
INJECTION INTRAMUSCULAR; INTRAVENOUS
Status: DISCONTINUED | OUTPATIENT
Start: 2020-11-04 | End: 2020-11-04 | Stop reason: HOSPADM

## 2020-11-04 RX ADMIN — SODIUM CHLORIDE 500 ML: 0.9 INJECTION, SOLUTION INTRAVENOUS at 10:11

## 2020-11-04 NOTE — TELEPHONE ENCOUNTER
----- Message from [facility] sent at 11/4/2020 11:20 AM CST -----  Name of Caller: Ranjith     Pharmacy Name: [facility] - [facility] [address]    Prescription Name: pregabalin (LYRICA) 100 MG capsule    What do they need to clarify?: Directions on the prescription    Best Call Back Number: [phone]    Additional Information:

## 2020-11-04 NOTE — OP NOTE
Patient Name: Jonathan Gonzales  MRN: [id number]    INFORMED CONSENT: The procedure, risks, benefits and options were discussed with patient. There are no contraindications to the procedure. The patient expressed understanding and agreed to proceed. The personnel performing the procedure was discussed. I verify that I personally obtained Jonathan's consent prior to the start of the procedure and the signed consent can be found on the patient's chart.    Procedure Date: 11/04/2020    Anesthesia: Topical    Pre Procedure diagnosis: Lumbar radiculopathy [M54.16]  1. DDD (degenerative disc disease), lumbosacral    2. Lumbosacral radiculopathy    3. Chronic pain      Post-Procedure diagnosis: SAME      Sedation: Yes - Fentanyl 50 mcg and Midazolam 2 mg    PROCEDURE:Bilateral L4/5 TRANSFORAMINAL EPIDURAL STEROID INJECTION        DESCRIPTION OF PROCEDURE: The patient was brought to the procedure room. After performing time out IV access was obtained prior to the procedure. The patient was positioned prone on the fluoroscopy table. Continuous hemodynamic monitoring was initiated including blood pressure, EKG, and pulse oximetry. . The skin was prepped with chlorhexidine three times and draped in a sterile fashion. Skin anesthesia was achieved using 3 mL of lidocaine 1% over the respective injection site.     An oblique fluoroscopic view was obtained, with the superior articular process of the inferior vertebral body aligned with the pedicle. The tip of a 22-gauge 3.5-inch Quincke-type spinal needle was advanced toward the 6 oclock position of the pedicle under intermittent fluoroscopic guidance. Confirmation of proper needle position was made with AP, oblique, and lateral fluoroscopic views. Negative aspiration for blood or CSF was confirmed. 2 mL of Omnipaque 300 was injected. Live fluoroscopic imaging revealed a clear outline of the spinal nerve with proximal spread of agent through the neural foramen into the anterior  epidural space. A total combination of 3 mL of Lidocaine 0.5% and 10 mg decadron was injected at each level. Contrast spread was noted from L4 to L5 level. There was no pain on injection. The needle was removed and bleeding was nil.  A sterile dressing was applied. [place] was taken back to the recovery room for further observation.     Blood Loss: Nill  Specimen: None    Sedrick Vincent MD

## 2020-11-04 NOTE — TELEPHONE ENCOUNTER
Spoke with Jenny at [facility] to clarify Pregabalin  prescription that was sent over with 2 sets of directions    Informed that medication was increased from 100 mg bid to tid on 10/22/2020

## 2020-11-04 NOTE — INTERVAL H&P NOTE
The patient has been examined and the H&P has been reviewed:    I concur with the findings and changes have been noted since the H&P was written: Patient states she is now having significant pain on the left side so the procedure will now include Bilateral Transforaminal Epidural Injection at the L4-L5 level based on review of the MRI.     Anesthesia/Surgery risks, benefits and alternative options discussed and understood by patient/family.          Active Hospital Problems    Diagnosis  POA    Chronic pain [G89.29]  Yes      Resolved Hospital Problems   No resolved problems to display.

## 2020-11-04 NOTE — DISCHARGE INSTRUCTIONS

## 2020-11-04 NOTE — DISCHARGE SUMMARY
"Discharge Note  Short Stay      SUMMARY     Admit Date: 11/4/2020    Attending Physician: Sedrick Vincent      Discharge Physician: Sedrick Vincent      Discharge Date: 11/4/2020 12:07 PM    Procedure(s) (LRB):  INJECTION, STEROID, EPIDURAL, TRANSFORAMINAL APPROACH L4/L5 (Bilateral)    Final Diagnosis: Lumbar radiculopathy [M54.16]    Disposition: Home or self care    Patient Instructions:   Current Discharge Medication List      CONTINUE these medications which have NOT CHANGED    Details   ammonium lactate 12 % Crea Apply twice daily to affected parts both feet as needed.  Qty: 140 g, Refills: 11      arm brace (WRIST SUPPORT LARGE-XLARGE MISC)       aspirin (ECOTRIN) 81 MG EC tablet Take 1 tablet (81 mg total) by mouth once daily.  Refills: 0      atorvastatin (LIPITOR) 80 MG tablet Take 1 tablet (80 mg total) by mouth once daily.  Qty: 30 tablet, Refills: 11      blood sugar diagnostic (ACCU-CHEK AMBER PLUS TEST STRP) Strp TEST BLOOD SUGAR TWICE DAILY  Qty: 200 strip, Refills: 0    Associated Diagnoses: Uncontrolled type 2 diabetes mellitus, with long-term current use of insulin      blood-glucose meter kit USE  AS  INSTRUCTED  Qty: 1 each, Refills: 0      ciclopirox (PENLAC) 8 % Soln Apply topically nightly.  Qty: 6.6 mL, Refills: 11      divalproex (DEPAKOTE) 250 MG EC tablet TAKE 1 TABLET TWICE DAILY  Qty: 180 tablet, Refills: 3      EASY TOUCH 31 gauge x 1/4" Ndle Refills: 5      !! lancets (ONETOUCH ULTRASOFT LANCETS) [facility] Pt to check BG up to QID. Dispense strips compatible with pt brand meter  Qty: 100 each, Refills: 11      LANTUS SOLOSTAR U-100 INSULIN glargine 100 units/mL (3mL) SubQ pen INJECT  23 UNITS SUBCUTANEOUSLY EVERY EVENING  Qty: 15 mL, Refills: 0    Associated Diagnoses: Type 2 diabetes mellitus without complication, with long-term current use of insulin      lisinopriL-hydrochlorothiazide (PRINZIDE,ZESTORETIC) 10-12.5 mg per tablet TAKE 1 TABLET EVERY DAY  Qty: 90 tablet, Refills: 2    " "Associated Diagnoses: Hypopotassemia; HTN (hypertension), benign      metFORMIN (GLUCOPHAGE) 500 MG tablet TAKE 1 TABLET TWICE DAILY  WITH  FOOD  Qty: 180 tablet, Refills: 3      OLANZapine (ZYPREXA) 10 MG tablet TK 1 T PO QHS  Qty: 90 tablet, Refills: 3    Associated Diagnoses: Schizoaffective disorder, bipolar type      oxybutynin (DITROPAN-XL) 10 MG 24 hr tablet TAKE 1 TABLET EVERY DAY  Qty: 90 tablet, Refills: 3    Associated Diagnoses: Urge incontinence      pen needle, diabetic (BD ULTRA-FINE [place] PEN NEEDLE) 32 gauge x 5/32" Ndle Pt is injecting once daily  Qty: 30 each, Refills: 11      pen needle, diabetic, safety 29 gauge x 3/16" Ndle Use as instructed  Qty: 200 each, Refills: 11      penicillin v potassium (VEETID) 500 MG tablet TK 1 T PO Q 6 H FOR 7-10 DAYS      pregabalin (LYRICA) 100 MG capsule Take 1 capsule (100 mg total) by mouth 3 (three) times daily. TAKE 1 CAPSULE TWICE DAILY  Qty: 90 capsule, Refills: 2    Associated Diagnoses: Cervical radiculopathy; Cervical spondylosis without myelopathy; DDD (degenerative disc disease), cervical; DM type 2 with diabetic peripheral neuropathy      QUEtiapine (SEROQUEL) 100 MG Tab Take 1 tablet (100 mg total) by mouth every evening.  Qty: 90 tablet, Refills: 3    Associated Diagnoses: Cervical spondylosis without myelopathy      TRUE METRIX GLUCOSE METER Misc       !! TRUEPLUS LANCETS 30 gauge Misc USE TO TEST BLOOD SUGART QID  Refills: 11      !! TRUEPLUS LANCETS 33 gauge Misc 100 lancets by Subconjunctival route 4 (four) times daily.  Qty: 100 each, Refills: 0    Associated Diagnoses: DM type 2 with diabetic peripheral neuropathy      VICTOZA 3-HERMANN 0.6 mg/0.1 mL (18 mg/3 mL) PnIj pen INJECT  1.8MG SUBCUTANEOUSLY EVERY DAY  Qty: 27 mL, Refills: 0    Associated Diagnoses: Uncontrolled type 2 diabetes mellitus with complication      ziprasidone (GEODON) 40 MG Cap TAKE 1 CAPSULE(40 MG) BY MOUTH EVERY EVENING  Qty: 30 capsule, Refills: 5       !! - Potential " duplicate medications found. Please discuss with provider.              Discharge Diagnosis: Lumbar radiculopathy [M54.16]  Condition on Discharge: Stable with no complications to procedure   Diet on Discharge: Same as before.  Activity: as per instruction sheet.  Discharge to: Home with a responsible adult.  Follow up: 2-4 weeks       Please call my office or pager at [phone] if experienced any weakness or loss of sensation, fever > 101.5, pain uncontrolled with oral medications, persistent nausea/vomiting/or diarrhea, redness or drainage from the incisions, or any other worrisome concerns. If physician on call was not reached or could not communicate with our office for any reason please go to the nearest emergency department

## 2020-11-06 ENCOUNTER — TELEPHONE (OUTPATIENT)
Dept: INTERNAL MEDICINE | Facility: CLINIC | Age: 60
End: 2020-11-06

## 2020-11-06 NOTE — TELEPHONE ENCOUNTER
----- Message from Kaila Song sent at 11/6/2020  8:03 AM CST -----  Contact: JOYA MUNOZ [[id number]]  Type: Patient Call Back    Who called:JOYA MUNOZ [[id number]]    What is the request in detail: Patient is requesting a call back. JOYA MUNOZ [[id number]] wanted to follow up to see if the office received a fax from a MR. LEAL in regards to the patient's diabetic shoes.  Please advise.    Can the clinic reply by [facility]? No    Best call back number: ..[phone]    Additional Information: N/A

## 2020-11-12 DIAGNOSIS — E11.69 TYPE 2 DIABETES MELLITUS WITH OTHER SPECIFIED COMPLICATION, UNSPECIFIED WHETHER LONG TERM INSULIN USE: Primary | ICD-10-CM

## 2020-11-12 RX ORDER — ISOPROPYL ALCOHOL 70 ML/100ML
1 SWAB TOPICAL
Qty: 100 EACH | Refills: 11 | Status: SHIPPED | OUTPATIENT
Start: 2020-11-12 | End: 2023-01-19

## 2020-11-12 NOTE — TELEPHONE ENCOUNTER
----- Message from Keerthi Mederos MA sent at 11/11/2020  3:06 PM CST -----  Regarding: FW: Refill Request  Can you help me pend the correct ones     Thanks  Keerthi CORTEZ  ----- Message -----  From: Lisa Corona  Sent: 11/11/2020  12:24 PM CST  To: [facility] Staff  Subject: Refill Request                                   Please refill the medication(s) listed below :    Medication # 1 :Accucheck solution    Medication # 2 :Accucheck BD Single use swap     Please call the patient when the prescription is sent to pharmacy :[phone]    Can the clinic reply in [facility] :No     Preferred Pharmacy : [facility] - [facility] [address] [place] [address] [phone] (Phone)  [phone] (Fax)

## 2020-11-13 DIAGNOSIS — E11.9 TYPE 2 DIABETES MELLITUS WITHOUT COMPLICATION: ICD-10-CM

## 2020-11-30 ENCOUNTER — TELEPHONE (OUTPATIENT)
Dept: PAIN MEDICINE | Facility: CLINIC | Age: 60
End: 2020-11-30

## 2020-11-30 NOTE — TELEPHONE ENCOUNTER
"This message is for patient in regards to his/her appointment 12/1/20 at 11:20 am.      [facility] Policy: For the safety of all patients and staff members.     Patient Visitor policy: Due to social distancing and limited seating staff are requesting patient to arrive to their schedule appointments alone. If patient do not need assistance with their visit, we're asking all visitors to remain outside the waiting area.    Upon arriving to facility; patient will have temperature taking and are required to wear a face mask, if patient do not have a face mask one will be provided. Upon arriving patient we ask patients to contact clinic at this number [phone] to notify staff that they have arrived or they may do do by utilizing the Mobile checked in [place](if patient have patient portal; clinical staff will send a message through there letting them know it's okay to proceed to their visit). Staff will give the patient the "okay" to come up or wait inside their vehicle until clinic is ready for patient to be seen by Navya Donis Np If you have any questions or concerns please contact [phone]        "

## 2020-12-01 ENCOUNTER — OFFICE VISIT (OUTPATIENT)
Dept: PAIN MEDICINE | Facility: CLINIC | Age: 60
End: 2020-12-01
Payer: MEDICARE

## 2020-12-01 VITALS
BODY MASS INDEX: 31.53 KG/M2 | DIASTOLIC BLOOD PRESSURE: 75 MMHG | SYSTOLIC BLOOD PRESSURE: 100 MMHG | HEART RATE: 76 BPM | WEIGHT: 177.94 LBS | TEMPERATURE: 97 F | HEIGHT: 63 IN

## 2020-12-01 DIAGNOSIS — G89.29 OTHER CHRONIC PAIN: ICD-10-CM

## 2020-12-01 DIAGNOSIS — M54.17 LUMBOSACRAL RADICULOPATHY: ICD-10-CM

## 2020-12-01 DIAGNOSIS — M51.37 DDD (DEGENERATIVE DISC DISEASE), LUMBOSACRAL: ICD-10-CM

## 2020-12-01 DIAGNOSIS — M79.18 MYOFASCIAL PAIN: Primary | ICD-10-CM

## 2020-12-01 PROCEDURE — 1125F PR PAIN SEVERITY QUANTIFIED, PAIN PRESENT: ICD-10-PCS | Mod: HCNC,S$GLB,, | Performed by: NURSE PRACTITIONER

## 2020-12-01 PROCEDURE — 3008F BODY MASS INDEX DOCD: CPT | Mod: HCNC,CPTII,S$GLB, | Performed by: NURSE PRACTITIONER

## 2020-12-01 PROCEDURE — 20553 PR INJECT TRIGGER POINTS, > 3: ICD-10-PCS | Mod: HCNC,S$GLB,, | Performed by: NURSE PRACTITIONER

## 2020-12-01 PROCEDURE — 3074F PR MOST RECENT SYSTOLIC BLOOD PRESSURE < 130 MM HG: ICD-10-PCS | Mod: HCNC,CPTII,S$GLB, | Performed by: NURSE PRACTITIONER

## 2020-12-01 PROCEDURE — 3008F PR BODY MASS INDEX (BMI) DOCUMENTED: ICD-10-PCS | Mod: HCNC,CPTII,S$GLB, | Performed by: NURSE PRACTITIONER

## 2020-12-01 PROCEDURE — 1125F AMNT PAIN NOTED PAIN PRSNT: CPT | Mod: HCNC,S$GLB,, | Performed by: NURSE PRACTITIONER

## 2020-12-01 PROCEDURE — 99214 PR OFFICE/OUTPT VISIT, EST, LEVL IV, 30-39 MIN: ICD-10-PCS | Mod: 25,HCNC,S$GLB, | Performed by: NURSE PRACTITIONER

## 2020-12-01 PROCEDURE — 3074F SYST BP LT 130 MM HG: CPT | Mod: HCNC,CPTII,S$GLB, | Performed by: NURSE PRACTITIONER

## 2020-12-01 PROCEDURE — 99214 OFFICE O/P EST MOD 30 MIN: CPT | Mod: 25,HCNC,S$GLB, | Performed by: NURSE PRACTITIONER

## 2020-12-01 PROCEDURE — 3078F PR MOST RECENT DIASTOLIC BLOOD PRESSURE < 80 MM HG: ICD-10-PCS | Mod: HCNC,CPTII,S$GLB, | Performed by: NURSE PRACTITIONER

## 2020-12-01 PROCEDURE — 20553 NJX 1/MLT TRIGGER POINTS 3/>: CPT | Mod: HCNC,S$GLB,, | Performed by: NURSE PRACTITIONER

## 2020-12-01 PROCEDURE — 3072F LOW RISK FOR RETINOPATHY: CPT | Mod: HCNC,S$GLB,, | Performed by: NURSE PRACTITIONER

## 2020-12-01 PROCEDURE — 99999 PR PBB SHADOW E&M-EST. PATIENT-LVL III: CPT | Mod: PBBFAC,HCNC,, | Performed by: NURSE PRACTITIONER

## 2020-12-01 PROCEDURE — 3072F PR LOW RISK FOR RETINOPATHY: ICD-10-PCS | Mod: HCNC,S$GLB,, | Performed by: NURSE PRACTITIONER

## 2020-12-01 PROCEDURE — 99999 PR PBB SHADOW E&M-EST. PATIENT-LVL III: ICD-10-PCS | Mod: PBBFAC,HCNC,, | Performed by: NURSE PRACTITIONER

## 2020-12-01 PROCEDURE — 3078F DIAST BP <80 MM HG: CPT | Mod: HCNC,CPTII,S$GLB, | Performed by: NURSE PRACTITIONER

## 2020-12-01 RX ORDER — BUPIVACAINE HYDROCHLORIDE 2.5 MG/ML
9 INJECTION, SOLUTION EPIDURAL; INFILTRATION; INTRACAUDAL
Status: COMPLETED | OUTPATIENT
Start: 2020-12-01 | End: 2020-12-01

## 2020-12-01 RX ORDER — METHYLPREDNISOLONE ACETATE 40 MG/ML
40 INJECTION, SUSPENSION INTRA-ARTICULAR; INTRALESIONAL; INTRAMUSCULAR; SOFT TISSUE
Status: COMPLETED | OUTPATIENT
Start: 2020-12-01 | End: 2020-12-01

## 2020-12-01 RX ADMIN — METHYLPREDNISOLONE ACETATE 40 MG: 40 INJECTION, SUSPENSION INTRA-ARTICULAR; INTRALESIONAL; INTRAMUSCULAR; SOFT TISSUE at 12:12

## 2020-12-01 RX ADMIN — BUPIVACAINE HYDROCHLORIDE 22.5 MG: 2.5 INJECTION, SOLUTION EPIDURAL; INFILTRATION; INTRACAUDAL at 12:12

## 2020-12-01 NOTE — PROGRESS NOTES
Chronic patient Established Note (Follow up visit)      SUBJECTIVE:    Interval History 12/1/2020:  The patient presents today for follow up of back and right leg pain.  She is now s/p bilateral L4 TF CARLEE on 11/4/20. She is reporting 90% relief of bilateral leg pain.  She still has some pain to the left buttock which she says is worse when she sits and when she stands.  She is no longer having significant shooting pain into the legs.  She continues to have long-standing numbness to her feet consistent with previous diagnosis of neuropathy.  Her biggest complaint today is left-sided neck pain.  She has had neck pain for many years.  She underwent cervical radiofrequency ablation in 2015.  Documentation after that time states that she had limited benefit although she did have relief short-term from the blocks.  She knows says that she feels that it did help her significantly after a couple of months.  Her pain started to worsen over the past few weeks.  She has been evaluated by neurosurgery in the past and cervical fusion was discussed, however, she does not wish to have surgery at this time.  She is not having any shooting pain into her arms at this time.  It is mostly across the left side of her neck and into her left shoulder.  She denies any new weakness or dropping of objects.  Her pain today is 8/10.    Previous Encounter:  60 year old female with PMH of breast cancer (s/p mastectomy bilaterally), DM2, HTN, bipolar disorder and schizophrenia. patient presents today to discuss severe back and right leg pain.  We have not seen the patient in clinic in over 3 years and she reports that she was mainly doing well with her back and neck pain.  She was sent up here today by her primary care doctor, Dr. Contreras, as she saw him today.  She reports that she has had back pain for many years but it has been well-controlled until 3 days ago.  She has been having severe right sided lower back pain with radiation down the  back of the right leg with numbness.  She has pain and numbness to her right heel and calf.  She is not having significant symptoms on the left.  Her leg pain is greater than her back pain.  She is unable to walk without assistance.  She is feeling subjective weakness to her right foot.  She takes Lyrica twice daily from PCP for neuropathy.  She says that her sugars have been running 130-150 in the morning. She had a recent visit with oncology and was stable.       Past Medical History:   Diagnosis Date    Bipolar disorder     Cancer of female breast 10/2010    T2 N1 Mx, Stage IIB left breast cancer    Cancer of upper-outer quadrant of female breast 7/9/2012    Depression     Diabetes mellitus type II     Disturbances of sensation of smell and taste 6/29/2012    Hypertension     Malignant neoplasm of breast (female), unspecified site 4/27/2015    Neuropathy     Nonspecific abnormal unspecified cardiovascular function study 4/12/2013    ECG READ AS SHOWING A T-WAVE ABNORMALITY     Post-mastectomy lymphedema syndrome     Schizophrenia     Stroke        Pain Disability Index Review:  Last 3 PDI Scores 12/1/2020 10/22/2020 10/19/2017   Pain Disability Index (PDI) 70 50 60     Pain Medications:    - Opioids: None  - Adjuvant Medications: Lyrica ( Pregabalin)  - Anti-Coagulants: None  - Others: See med list    Opioid Contract: no     report:  Reviewed and consistent with medication use as prescribed.    Pain Procedures:  5/9/14 C7- T1 IL CARLEE  6/20/14 C7-T1 IL CARLEE  5/22/15 Left C3,4,5,6 MBB  6/26/15 Left C3,4,5,6 MBB  7/31/15 Left C3,4,5,6 RFA- no relief  9/24/15 Right C5-6 TF CARLEE (IR)- limited benefit  11/4/20 Bilateral L4/5 TF CARLEE- 90% relief     Physical Therapy/Home Exercise: yes in the past     Imaging:  Narrative & Impression     EXAMINATION:  MRI LUMBAR SPINE WITHOUT CONTRAST     CLINICAL HISTORY:  Back pain or radiculopathy, > 6 wks; Radiculopathy, lumbar region     TECHNIQUE:  Multiplanar,  multisequence MR images were acquired from the thoracolumbar junction to the sacrum without the administration of contrast.     COMPARISON:  10/17/2017     FINDINGS:  The marrow demonstrates homogeneous signal.  Vertebral body heights are maintained.  Disc desiccation at L4-5. conus terminates appropriately at L1.  Slight grade 1 anterolisthesis L4 on L5.     Multilevel degenerative change as diesel below:     L1-2: No significant canal or neural foraminal narrowing.     L2-3: No significant canal or neural foraminal narrowing.     L3-4: Degenerative facet and ligamentum flavum hypertrophic changes with no significant canal or neural foraminal narrowing.     L4-5: Diffuse posterior broad-based disc bulge.  Moderate degenerative facet hypertrophic changes with ligamentum flavum hypertrophy.  Resultant severe central canal narrowing, similar compared to previous.  Mild bilateral neural foraminal narrowing.     L5-S1: Mild degenerative facet hypertrophic changes without significant canal or neural foraminal narrowing.     Impression:     Overall, similar exam compared to October 2017.  Multilevel degenerative change, worst at L4-5 where severe central canal narrowing results.             MRI Cervical Spine Without Contrast 02/15/13     Result Narrative      MRI of cervical spine    Technique: MRI of cervical spine performed without contrast. Following sequences were obtained: Localizer; sagittal T1, T2, and STIR; axial gradient and T2.    Comparison: Not available.    Findings:    There is heterogeneity of bone marrow signal, with region of T1 hypointensity within the occipital bone. Vertebral body height and alignment are maintained. There is straightening of cervical lordosis. Craniocervical junction is unremarkable. Spinal   cord demonstrates normal signal. Vertebral artery flow voids are maintained.    C2-C3: No spinal canal stenosis or neuroforaminal narrowing.    C3-C4: Small central disk osteophyte complex  noted. No spinal canal stenosis or neuroforaminal narrowing.    C4-C5: Small central disk osteophyte complex and left uncovertebral arthrosis noted. No spinal canal stenosis or neuroforaminal narrowing.    C5-C6: Broad-based disk osteophyte complex with bilateral uncovertebral arthrosis resulting in mild central canal stenosis and mild right neuroforaminal narrowing.    C6-C7: Broad-based left paracentral disk osteophyte complex results in moderate spinal canal stenosis, predominately in the left lateral recess and deforms the spinal cord. There is no neuroforaminal narrowing.    C7-T1: No spinal canal stenosis or neuroforaminal narrowing.        Result Impression        1. Degenerative changes of the lower cervical spine as detailed above.  2. Heterogeneity of bone marrow signal, particularly the posterior calvarium. Although red marrow hyperplasia is more likely, recommend whole-body bone scan to exclude osseous metastases in this patient with history of breast cancer.      Electronically signed by: YAMILEX DAHL MD  Date: 02/15/13  Time: 10:25          Allergies:   Review of patient's allergies indicates:   Allergen Reactions    Banana Hives and Nausea And Vomiting       Current Medications:   Current Outpatient Medications   Medication Sig Dispense Refill    ACCU-CHEK AMBER PLUS TEST STRP Strp TEST BLOOD SUGAR TWICE DAILY 200 strip 0    alcohol swabs (BD ALCOHOL SWABS) PadM Apply 1 each topically as needed. 100 each 11    arm brace (WRIST SUPPORT LARGE-XLARGE MISC)       atorvastatin (LIPITOR) 80 MG tablet Take 1 tablet (80 mg total) by mouth once daily. 30 tablet 11    blood glucose control, high Soln 1 drop by Misc.(Non-Drug; Combo Route) route as needed. 1 each 0    blood glucose control, low Soln 1 drop by Misc.(Non-Drug; Combo Route) route as needed. 1 each 0    blood-glucose meter kit USE  AS  INSTRUCTED 1 each 0    ciclopirox (PENLAC) 8 % Soln Apply topically nightly. 6.6 mL 11    divalproex  "(DEPAKOTE) 250 MG EC tablet TAKE 1 TABLET TWICE DAILY 180 tablet 3    EASY TOUCH 31 gauge x 1/4" Ndle   5    lancets (ONETOUCH ULTRASOFT LANCETS) [facility] Pt to check BG up to QID. Dispense strips compatible with pt brand meter 100 each 11    LANTUS SOLOSTAR U-100 INSULIN glargine 100 units/mL (3mL) SubQ pen INJECT  23 UNITS SUBCUTANEOUSLY EVERY EVENING 15 mL 0    lisinopriL-hydrochlorothiazide (PRINZIDE,ZESTORETIC) 10-12.5 mg per tablet TAKE 1 TABLET EVERY DAY 90 tablet 2    metFORMIN (GLUCOPHAGE) 500 MG tablet TAKE 1 TABLET TWICE DAILY  WITH  FOOD 180 tablet 3    OLANZapine (ZYPREXA) 10 MG tablet TK 1 T PO QHS 90 tablet 3    oxybutynin (DITROPAN-XL) 10 MG 24 hr tablet TAKE 1 TABLET EVERY DAY 90 tablet 3    pen needle, diabetic (BD ULTRA-FINE [place] PEN NEEDLE) 32 gauge x 5/32" Ndle Pt is injecting once daily 30 each 11    pen needle, diabetic, safety 29 gauge x 3/16" Ndle Use as instructed 200 each 11    pregabalin (LYRICA) 100 MG capsule Take 1 capsule (100 mg total) by mouth 3 (three) times daily. TAKE 1 CAPSULE TWICE DAILY 90 capsule 2    QUEtiapine (SEROQUEL) 100 MG Tab Take 1 tablet (100 mg total) by mouth every evening. 90 tablet 3    TRUE METRIX GLUCOSE METER Misc       TRUEPLUS LANCETS 30 gauge Misc USE TO TEST BLOOD SUGART QID  11    TRUEPLUS LANCETS 33 gauge Misc 100 lancets by Subconjunctival route 4 (four) times daily. 100 each 0    VICTOZA 3-HERMANN 0.6 mg/0.1 mL (18 mg/3 mL) PnIj pen INJECT  1.8MG SUBCUTANEOUSLY EVERY DAY 27 mL 0    ziprasidone (GEODON) 40 MG Cap TAKE 1 CAPSULE(40 MG) BY MOUTH EVERY EVENING 30 capsule 5    ammonium lactate 12 % Crea Apply twice daily to affected parts both feet as needed. (Patient not taking: Reported on 12/1/2020) 140 g 11    aspirin (ECOTRIN) 81 MG EC tablet Take 1 tablet (81 mg total) by mouth once daily.  0    penicillin v potassium (VEETID) 500 MG tablet TK 1 T PO Q 6 H FOR 7-10 DAYS       No current facility-administered medications for this visit.  "       REVIEW OF SYSTEMS:    GENERAL:  No weight loss, malaise or fevers.  HEENT:  Negative for frequent or significant headaches.  NECK:  Negative for lumps, goiter, pain and significant neck swelling.  RESPIRATORY:  Negative for cough, wheezing or shortness of breath.  CARDIOVASCULAR:  Negative for chest pain, leg swelling or palpitations. Hypertension.  GI:  Negative for abdominal discomfort, blood in stools or black stools or change in bowel habits.  MUSCULOSKELETAL:  See HPI.  SKIN:  Negative for lesions, rash, and itching.  PSYCH:  Positive for sleep disturbance.  H/O depression, bipolar disorder and schizophrenia managed by outside psych.  HEMATOLOGY/LYMPHOLOGY:  Negative for prolonged bleeding, bruising easily or swollen nodes. H/O breast cancer.  NEURO:   No history of headaches, syncope, paralysis, seizures or tremors.  ENDO: Diabetes.  All other reviewed and negative other than HPI.    Past Medical History:  Past Medical History:   Diagnosis Date    Bipolar disorder     Cancer of female breast 10/2010    T2 N1 Mx, Stage IIB left breast cancer    Cancer of upper-outer quadrant of female breast 7/9/2012    Depression     Diabetes mellitus type II     Disturbances of sensation of smell and taste 6/29/2012    Hypertension     Malignant neoplasm of breast (female), unspecified site 4/27/2015    Neuropathy     Nonspecific abnormal unspecified cardiovascular function study 4/12/2013    ECG READ AS SHOWING A T-WAVE ABNORMALITY     Post-mastectomy lymphedema syndrome     Schizophrenia     Stroke        Past Surgical History:  Past Surgical History:   Procedure Laterality Date    BREAST RECONSTRUCTION  11/23/11    B/L SHLOMO flap reconstruction S/P left MRM, right prophylactic mastectomy    BREAST RECONSTRUCTION Bilateral 3/13/2015    NIPPLE RECONSTRUCTION    MASTECTOMY Bilateral 01/2011    bilateral    TRANSFORAMINAL EPIDURAL INJECTION OF STEROID Bilateral 11/4/2020    Procedure: INJECTION, STEROID,  EPIDURAL, TRANSFORAMINAL APPROACH L4/L5;  Surgeon: Darren Jerry MD;  Location: [facility] PAIN T;  Service: Pain Management;  Laterality: Bilateral;  Bilateral L4/L5    TUBAL LIGATION         Family History:  Family History   Problem Relation Age of Onset    Kidney disease Mother         Dialysis    Hypertension Mother     Diabetes Mother     Deep vein thrombosis Mother     Glaucoma Mother     Diabetic kidney disease Sister     Lung cancer Sister     Diabetes Sister     Diabetes Brother     Diabetes Son     No Known Problems Father     No Known Problems Maternal Grandmother     No Known Problems Maternal Grandfather     No Known Problems Paternal Grandmother     No Known Problems Paternal Grandfather     No Known Problems Son     Cervical cancer Daughter     No Known Problems Daughter     No Known Problems Daughter     No Known Problems Daughter     Breast cancer Neg Hx     Ovarian cancer Neg Hx        Social History:  Social History     Socioeconomic History    Marital status:      Spouse name: Not on file    Number of children: 6    Years of education: Not on file    Highest education level: Not on file   Occupational History    Occupation: Disability   Social Needs    Financial resource strain: Not on file    Food insecurity     Worry: Not on file     Inability: Not on file    Transportation needs     Medical: Not on file     Non-medical: Not on file   Tobacco Use    Smoking status: Current Every Day Smoker     Packs/day: 0.25     Years: 25.00     Pack years: 6.25     Types: Cigarettes    Smokeless tobacco: Never Used    Tobacco comment: currently at less than 1/2 pack pd   Substance and Sexual Activity    Alcohol use: No     Alcohol/week: 0.0 standard drinks    Drug use: No     Comment: 1991 - stopped using crack cocaine    Sexual activity: Not Currently     Partners: Male   Lifestyle    Physical activity     Days per week: Not on file     Minutes per session: Not  "on file    Stress: Not on file   Relationships    Social connections     Talks on phone: Not on file     Gets together: Not on file     Attends Mandaeism service: Not on file     Active member of club or organization: Not on file     Attends meetings of clubs or organizations: Not on file     Relationship status: Not on file   Other Topics Concern    Not on file   Social History Narrative    Not on file       OBJECTIVE:    /75   Pulse 76   Temp 97.2 °F (36.2 °C)   Ht 5' 3" (1.6 m)   Wt 80.7 kg (177 lb 14.6 oz)   LMP  (LMP Unknown)   BMI 31.52 kg/m²     PHYSICAL EXAMINATION:    General appearance: Well appearing, in no acute distress.   Psych:  Mood and affect appropriate.  Skin: Skin color, texture, turgor normal, no rashes or lesions, in both upper and lower body.  Head/face:  Atraumatic, normocephalic. No palpable lymph nodes.  Neck: TTP over left cervical paraspinals and trapezius muscles.  TTP over left cervical facet joints.  Limited ROM with pain on extension.  Positive facet loading on the left side.  Back: There is pain with palpation to lumbar paraspinals on the left,  Limited flexion and extension with mild pain.  Positive facet loading on the left.  SLR is negative bilaterally.  Extremities: Peripheral joint ROM is full and pain free without obvious instability or laxity in all four extremities. Lymphedema to LUE.  Musculoskeletal: 4/5 strength in right ankle with plantar and 4/5 on dorsiflexion. 4/5 strength in left ankle with plantar and dorsiflexion. 5/5 strength with right knee flexion and extension. 5/5 strength with left knee flexion and extension. 4/5 strength in right EHL, 5/5 strength in left EHL. No atrophy or tone abnormalities are noted.  Bilateral hand  strength 4/5.    Neuro: Bilateral upper and lower extremity coordination and muscle stretch reflexes are physiologic and symmetric.  Plantar response are downgoing. Decreased sensation at right L5 and S1 " distribution.  Gait: Antalgic.    ASSESSMENT: 60 y.o. year old female with neck pain, consistent with     1. Myofascial pain  methylPREDNISolone acetate injection 40 mg    bupivacaine (PF) 0.25% (2.5 mg/ml) injection 22.5 mg   2. Lumbosacral radiculopathy     3. DDD (degenerative disc disease), lumbosacral     4. Other chronic pain            PLAN:     - Recent lumbar MRI reviewed in detail with patient.    - TPIs today for cervical myofascial pain as below.  She previously underwent left C3, 4, 5, 6 radiofrequency ablation in 2015.  However, at that time she stated that it did not provide her much relief.  Today, she says that she feels that it did eventually provide her significant relief of neck pain.  Her pain today is consistent with cervical facet arthropathy.  However, we will repeat left-sided C3, 4, 5, 6 MBB.  If significant relief will schedule for radiofrequency ablation of the affected nerves.  She will call with her pain diary.    - Back and leg pain is currently well controlled after recent epidural steroid injection.    - I have counseled the patient on importance of smoking cessation and specifically poor outcomes related to spine health.    - Continue Lyrica 100 mg TID.      - I have stressed the importance of physical activity and a home exercise plan to help with pain and improve health.    - Counseled patient regarding the importance of activity modification, smoking cessation, constant sleeping habits and physical therapy.    - RTC after completion of procedures.      The above plan and management options were discussed at length with patient. Patient is in agreement with the above and verbalized understanding.     Navya Nair, ANUJ  12/01/2020      Trigger Point Injection:   The procedure was discussed with the patient including complications of nerve damage,  bleeding, infection, and failure of pain relief.   Trigger points were identified by palpation and marked. Alcohol prep of sites  done. A mixture of 9mL 0.25% bupivacaine +40mg Depo-Medrol was prepared (10 mL total).   A 27-gauge needle was advanced to the point of maximal tenderness, and medication was injected after negative aspiration. All sites done in the same manner. Patient tolerated the procedure well and without complications. Sites injected included: cervical paraspinal and trapezius muscles on the left; left gluteal muscle (5 sites total). The patient was observed for 30 minutes after the procedure.  She denies any adverse effects including dizziness or shortness or breath.

## 2020-12-09 ENCOUNTER — HOSPITAL ENCOUNTER (OUTPATIENT)
Facility: OTHER | Age: 60
Discharge: HOME | End: 2020-12-09
Attending: ANESTHESIOLOGY | Admitting: ANESTHESIOLOGY
Payer: MEDICARE

## 2020-12-09 VITALS
SYSTOLIC BLOOD PRESSURE: 143 MMHG | HEIGHT: 63 IN | DIASTOLIC BLOOD PRESSURE: 77 MMHG | RESPIRATION RATE: 14 BRPM | BODY MASS INDEX: 31.36 KG/M2 | WEIGHT: 177 LBS | TEMPERATURE: 99 F | OXYGEN SATURATION: 98 % | HEART RATE: 89 BPM

## 2020-12-09 DIAGNOSIS — M47.819 SPONDYLOSIS WITHOUT MYELOPATHY: ICD-10-CM

## 2020-12-09 DIAGNOSIS — M47.9 OSTEOARTHRITIS OF SPINE, UNSPECIFIED SPINAL OSTEOARTHRITIS COMPLICATION STATUS, UNSPECIFIED SPINAL REGION: Primary | ICD-10-CM

## 2020-12-09 DIAGNOSIS — G89.29 CHRONIC PAIN: ICD-10-CM

## 2020-12-09 DIAGNOSIS — E11.42 DM TYPE 2 WITH DIABETIC PERIPHERAL NEUROPATHY: Primary | ICD-10-CM

## 2020-12-09 LAB — POCT GLUCOSE: 88 MG/DL (ref 70–110)

## 2020-12-09 PROCEDURE — 64490 INJ PARAVERT F JNT C/T 1 LEV: CPT | Mod: HCNC,LT,, | Performed by: ANESTHESIOLOGY

## 2020-12-09 PROCEDURE — 25000003 PHARM REV CODE 250: Mod: HCNC | Performed by: ANESTHESIOLOGY

## 2020-12-09 PROCEDURE — 64492 INJ PARAVERT F JNT C/T 3 LEV: CPT | Mod: HCNC,LT,, | Performed by: ANESTHESIOLOGY

## 2020-12-09 PROCEDURE — 63600175 PHARM REV CODE 636 W HCPCS: Mod: HCNC | Performed by: ANESTHESIOLOGY

## 2020-12-09 PROCEDURE — 64491 PR INJ DX/THER AGNT PARAVERT FACET JOINT,IMG GUIDE,CERV/THORAC, 2ND LEVEL: ICD-10-PCS | Mod: HCNC,LT,, | Performed by: ANESTHESIOLOGY

## 2020-12-09 PROCEDURE — 64492 PR INJ DX/THER AGNT PARAVERT FACET JOINT,IMG GUIDE,CERV/THORAC, ADD LEVEL: ICD-10-PCS | Mod: HCNC,LT,, | Performed by: ANESTHESIOLOGY

## 2020-12-09 PROCEDURE — 64490 INJ PARAVERT F JNT C/T 1 LEV: CPT | Mod: HCNC,LT | Performed by: ANESTHESIOLOGY

## 2020-12-09 PROCEDURE — 64491 INJ PARAVERT F JNT C/T 2 LEV: CPT | Mod: HCNC,LT | Performed by: ANESTHESIOLOGY

## 2020-12-09 PROCEDURE — 64491 INJ PARAVERT F JNT C/T 2 LEV: CPT | Mod: HCNC,LT,, | Performed by: ANESTHESIOLOGY

## 2020-12-09 PROCEDURE — 64492 INJ PARAVERT F JNT C/T 3 LEV: CPT | Mod: HCNC,LT | Performed by: ANESTHESIOLOGY

## 2020-12-09 PROCEDURE — 64490 PR INJ DX/THER AGNT PARAVERT FACET JOINT,IMG GUIDE,CERV/THORAC, 1ST LEVEL: ICD-10-PCS | Mod: HCNC,LT,, | Performed by: ANESTHESIOLOGY

## 2020-12-09 RX ORDER — INSULIN PUMP SYRINGE, 3 ML
EACH MISCELLANEOUS
Qty: 1 EACH | Refills: 0 | Status: ON HOLD | OUTPATIENT
Start: 2020-12-09 | End: 2021-09-29 | Stop reason: HOSPADM

## 2020-12-09 RX ORDER — SODIUM CHLORIDE 9 MG/ML
INJECTION, SOLUTION INTRAVENOUS ONCE
Status: COMPLETED | OUTPATIENT
Start: 2020-12-09 | End: 2020-12-09

## 2020-12-09 RX ORDER — DEXAMETHASONE SODIUM PHOSPHATE 4 MG/ML
INJECTION, SOLUTION INTRA-ARTICULAR; INTRALESIONAL; INTRAMUSCULAR; INTRAVENOUS; SOFT TISSUE
Status: DISCONTINUED | OUTPATIENT
Start: 2020-12-09 | End: 2020-12-09 | Stop reason: HOSPADM

## 2020-12-09 RX ORDER — LIDOCAINE HYDROCHLORIDE 10 MG/ML
INJECTION INFILTRATION; PERINEURAL
Status: DISCONTINUED | OUTPATIENT
Start: 2020-12-09 | End: 2020-12-09 | Stop reason: HOSPADM

## 2020-12-09 RX ORDER — MIDAZOLAM HYDROCHLORIDE 1 MG/ML
INJECTION INTRAMUSCULAR; INTRAVENOUS
Status: DISCONTINUED | OUTPATIENT
Start: 2020-12-09 | End: 2020-12-09 | Stop reason: HOSPADM

## 2020-12-09 RX ORDER — BUPIVACAINE HYDROCHLORIDE 2.5 MG/ML
INJECTION, SOLUTION EPIDURAL; INFILTRATION; INTRACAUDAL
Status: DISCONTINUED | OUTPATIENT
Start: 2020-12-09 | End: 2020-12-09 | Stop reason: HOSPADM

## 2020-12-09 RX ORDER — ALPRAZOLAM 0.5 MG/1
0.5 TABLET ORAL
Status: DISCONTINUED | OUTPATIENT
Start: 2020-12-09 | End: 2020-12-09 | Stop reason: HOSPADM

## 2020-12-09 RX ADMIN — SODIUM CHLORIDE 25 ML/HR: 0.9 INJECTION, SOLUTION INTRAVENOUS at 02:12

## 2020-12-09 NOTE — TELEPHONE ENCOUNTER
----- Message from Joanie Huizar sent at 12/9/2020 11:20 AM CST -----  Who Called: JOYA MUNOZ     What is the reqeust in detail: Patient is requesting a new glucose meter due to the stick not working anymore to read. Please advise.     Can the clinic reply by [facility]?: No    Best Call Back Number: [phone]

## 2020-12-09 NOTE — OP NOTE
"Patient Name: Jonathan Gonzales  MRN: [id number]    INFORMED CONSENT: The procedure, risks, benefits and options were discussed with patient. There are no contraindications to the procedure. The patient expressed understanding and agreed to proceed. The personnel performing the procedure was discussed. I verify that I personally obtained Jonathan's consent prior to the start of the procedure and the signed consent can be found on the patient's chart.    Procedure Date: 12/09/2020    Anesthesia: Topical    Pre Procedure diagnosis: Cervical spondylosis [M47.812]  1. Osteoarthritis of spine, unspecified spinal osteoarthritis complication status, unspecified spinal region    2. Spondylosis without myelopathy    3. Chronic pain      Post-Procedure diagnosis: SAME      Sedation: None    PROCEDURE: LEFT C3,4,5,6 CERVICAL FACET MEDIAL BRANCH NERVE BLOCK (Prone)        DESCRIPTION OF PROCEDURE: The patient was brought to the procedure room.  After performing time out. IV access was obtained prior to the procedure.  The patient was positioned prone on the fluoroscopy table.  Continuous hemodynamic monitoring was initiated including blood pressure, EKG, and pulse oximetry.  The skin overlying the cervical spine was prepped with chlorhexidine and draped into a sterile field.  Fluoroscopy was used to identify the location of the left side C3 to C6 medial branch nerves.  Skin anesthesia was achieved using 5 cc of Lidocaine 1% over the injection sites. A 22 gauge, 3 1/2" spinal needle was slowly inserted at each level using AP, lateral and oblique fluoroscopic imaging. Final needle position was at the midpoint of the waist of the articular pillars. Negative aspiration for blood or CSF was confirmed.  4 ml bupivacaine 0.25%was injected at all sites..  The needles were removed and bleeding was nil. A sterile dressing was applied.No specimens collected. The patient was brought to recovery in stable condition. Jonathan was taken back to the " recovery room for further observation.     Blood Loss: Nill  Specimen: None    Darren Jerry MD

## 2020-12-09 NOTE — DISCHARGE INSTRUCTIONS

## 2020-12-09 NOTE — DISCHARGE SUMMARY
Discharge Note  Short Stay      SUMMARY     Admit Date: 12/9/2020    Attending Physician: Mirna Yates      Discharge Physician: Mirna Yates      Discharge Date: 12/9/2020 3:13 PM    Procedure(s) (LRB):  BLOCK, NERVE, C3-C4-C5-C6 MEDIAL BRANCH (Left)    Final Diagnosis: Cervical spondylosis [M47.812]    Disposition: Home or self care    Patient Instructions:   Current Discharge Medication List      CONTINUE these medications which have NOT CHANGED    Details   ACCU-CHEK AMBER PLUS TEST STRP Strp TEST BLOOD SUGAR TWICE DAILY  Qty: 200 strip, Refills: 0    Associated Diagnoses: Uncontrolled type 2 diabetes mellitus, with long-term current use of insulin      alcohol swabs (BD ALCOHOL SWABS) PadM Apply 1 each topically as needed.  Qty: 100 each, Refills: 11    Associated Diagnoses: Type 2 diabetes mellitus with other specified complication, unspecified whether long term insulin use      ammonium lactate 12 % Crea Apply twice daily to affected parts both feet as needed.  Qty: 140 g, Refills: 11      arm brace (WRIST SUPPORT LARGE-XLARGE MISC)       aspirin (ECOTRIN) 81 MG EC tablet Take 1 tablet (81 mg total) by mouth once daily.  Refills: 0      atorvastatin (LIPITOR) 80 MG tablet Take 1 tablet (80 mg total) by mouth once daily.  Qty: 30 tablet, Refills: 11      blood glucose control, high Soln 1 drop by Misc.(Non-Drug; Combo Route) route as needed.  Qty: 1 each, Refills: 0    Associated Diagnoses: Type 2 diabetes mellitus with other specified complication, unspecified whether long term insulin use      blood glucose control, low Soln 1 drop by Misc.(Non-Drug; Combo Route) route as needed.  Qty: 1 each, Refills: 0    Associated Diagnoses: Type 2 diabetes mellitus with other specified complication, unspecified whether long term insulin use      blood-glucose meter kit USE  AS  INSTRUCTED  Qty: 1 each, Refills: 0      ciclopirox (PENLAC) 8 % Soln Apply topically nightly.  Qty: 6.6 mL, Refills: 11      divalproex  "(DEPAKOTE) 250 MG EC tablet TAKE 1 TABLET TWICE DAILY  Qty: 180 tablet, Refills: 3      EASY TOUCH 31 gauge x 1/4" Ndle Refills: 5      !! lancets (ONETOUCH ULTRASOFT LANCETS) [facility] Pt to check BG up to QID. Dispense strips compatible with pt brand meter  Qty: 100 each, Refills: 11      LANTUS SOLOSTAR U-100 INSULIN glargine 100 units/mL (3mL) SubQ pen INJECT  23 UNITS SUBCUTANEOUSLY EVERY EVENING  Qty: 15 mL, Refills: 0    Associated Diagnoses: Type 2 diabetes mellitus without complication, with long-term current use of insulin      lisinopriL-hydrochlorothiazide (PRINZIDE,ZESTORETIC) 10-12.5 mg per tablet TAKE 1 TABLET EVERY DAY  Qty: 90 tablet, Refills: 2    Associated Diagnoses: Hypopotassemia; HTN (hypertension), benign      metFORMIN (GLUCOPHAGE) 500 MG tablet TAKE 1 TABLET TWICE DAILY  WITH  FOOD  Qty: 180 tablet, Refills: 3      OLANZapine (ZYPREXA) 10 MG tablet TK 1 T PO QHS  Qty: 90 tablet, Refills: 3    Associated Diagnoses: Schizoaffective disorder, bipolar type      oxybutynin (DITROPAN-XL) 10 MG 24 hr tablet TAKE 1 TABLET EVERY DAY  Qty: 90 tablet, Refills: 3    Associated Diagnoses: Urge incontinence      pen needle, diabetic (BD ULTRA-FINE [place] PEN NEEDLE) 32 gauge x 5/32" Ndle Pt is injecting once daily  Qty: 30 each, Refills: 11      pen needle, diabetic, safety 29 gauge x 3/16" Ndle Use as instructed  Qty: 200 each, Refills: 11      penicillin v potassium (VEETID) 500 MG tablet TK 1 T PO Q 6 H FOR 7-10 DAYS      pregabalin (LYRICA) 100 MG capsule Take 1 capsule (100 mg total) by mouth 3 (three) times daily. TAKE 1 CAPSULE TWICE DAILY  Qty: 90 capsule, Refills: 2    Associated Diagnoses: Cervical radiculopathy; Cervical spondylosis without myelopathy; DDD (degenerative disc disease), cervical; DM type 2 with diabetic peripheral neuropathy      QUEtiapine (SEROQUEL) 100 MG Tab Take 1 tablet (100 mg total) by mouth every evening.  Qty: 90 tablet, Refills: 3    Associated Diagnoses: Cervical " spondylosis without myelopathy      TRUE METRIX GLUCOSE METER Misc       !! TRUEPLUS LANCETS 30 gauge Misc USE TO TEST BLOOD SUGART QID  Refills: 11      !! TRUEPLUS LANCETS 33 gauge Misc 100 lancets by Subconjunctival route 4 (four) times daily.  Qty: 100 each, Refills: 0    Associated Diagnoses: DM type 2 with diabetic peripheral neuropathy      VICTOZA 3-HERMANN 0.6 mg/0.1 mL (18 mg/3 mL) PnIj pen INJECT  1.8MG SUBCUTANEOUSLY EVERY DAY  Qty: 27 mL, Refills: 0    Associated Diagnoses: Uncontrolled type 2 diabetes mellitus with complication      ziprasidone (GEODON) 40 MG Cap TAKE 1 CAPSULE(40 MG) BY MOUTH EVERY EVENING  Qty: 30 capsule, Refills: 5       !! - Potential duplicate medications found. Please discuss with provider.              Discharge Diagnosis: Cervical spondylosis [M47.812]  Condition on Discharge: Stable with no complications to procedure   Diet on Discharge: Same as before.  Activity: as per instruction sheet.  Discharge to: Home with a responsible adult.  Follow up: 2-4 weeks       Please call my office or pager at [phone] if experienced any weakness or loss of sensation, fever > 101.5, pain uncontrolled with oral medications, persistent nausea/vomiting/or diarrhea, redness or drainage from the incisions, or any other worrisome concerns. If physician on call was not reached or could not communicate with our office for any reason please go to the nearest emergency department

## 2020-12-10 RX ORDER — DEXTROSE 4 G
TABLET,CHEWABLE ORAL
Qty: 1 EACH | Refills: 0 | Status: SHIPPED | OUTPATIENT
Start: 2020-12-10 | End: 2022-03-11 | Stop reason: SDUPTHER

## 2020-12-10 NOTE — TELEPHONE ENCOUNTER
----- Message from Anjana Pelletier sent at 12/10/2020  7:30 AM CST -----  Regarding: Refill      Medication  Refill  Request      Please refill the medication(s) listed below. Please call the patient when the prescription(s) is ready for  at this phone number    [phone]      Medication #1  ACCU-CHEK AMBER PLUS METER          Preferred Pharmacy:   [id number] - [place] - [address] AT [facility] [address]     [phone] (Phone)  [phone] (Fax)

## 2020-12-14 ENCOUNTER — TELEPHONE (OUTPATIENT)
Dept: PAIN MEDICINE | Facility: CLINIC | Age: 60
End: 2020-12-14

## 2020-12-14 NOTE — TELEPHONE ENCOUNTER
Patient was contacted staff left a message on VM informing her to contact the office regarding her pain diary

## 2020-12-14 NOTE — TELEPHONE ENCOUNTER
----- Message from Amy Samuel sent at 12/14/2020  8:40 AM CST -----  Regarding: Patient call back  Who called:JOYA MUNOZ [[id number]]    What is the request in detail: Patient is requesting a call back. She states she is ready to report her pain diary from her 12/9 nerve block. She would like to further discuss.   Please advise.    Can the clinic reply by [facility]? No    Best call back number: [phone]    Additional Information: N/A

## 2020-12-16 ENCOUNTER — TELEPHONE (OUTPATIENT)
Dept: PAIN MEDICINE | Facility: CLINIC | Age: 60
End: 2020-12-16

## 2020-12-16 NOTE — TELEPHONE ENCOUNTER
Contacted patient in regards to her message. She needs to report her pain diary results. We require the results from the 1st hour the procedure up to 24 hours after the procedure. We requested a return call to discuss  .

## 2020-12-16 NOTE — TELEPHONE ENCOUNTER
----- Message from Sarai Velez sent at 12/16/2020  2:55 PM CST -----  Type: Patient Call Back    Who called:self    What is the request in detail: patient would like to speak to nurse to give her an update on her surgery. Please call    Can the clinic reply by [facility]? no    Would the patient rather a call back or a response via [facility]? call    Best call back number:[phone]

## 2020-12-17 NOTE — TELEPHONE ENCOUNTER
----- Message from Phyllis Barrett sent at 12/16/2020  5:41 PM CST -----  Name Of Caller: Jonathan     Provider Name: Darren Jerry    Does patient feel the need to be seen today? no    Relationship to the Pt?:pt     Contact Preference?: [phone]    What is the nature of the call?: Pt called and had a missed call from Sheron jordan to please call her back

## 2020-12-22 ENCOUNTER — TELEPHONE (OUTPATIENT)
Dept: PAIN MEDICINE | Facility: CLINIC | Age: 60
End: 2020-12-22

## 2020-12-22 NOTE — TELEPHONE ENCOUNTER
----- Message from Mikala Gabriel sent at 12/22/2020  2:02 PM CST -----  Name of Who is Calling: JOYA MUNOZ  What is the request in detail: The patient is calling to speak to the nurse in regards to her being in a lot of pain and would like to come in to have another shot asap. Please advise Can the clinic reply by [facility]: No What Number to Call Back if not in [facility]: [phone]

## 2020-12-22 NOTE — TELEPHONE ENCOUNTER
Patient was contacted as per patient she stated that her pain has worsened she is dragging her left foot and requesting to be seen sooner than 01/07/21. Staff scheduled patient an appt with the MARLEE and informed her that her pain diary indicated that the procedure did not help and  recommended that she followed up with an MAR[facility]. Patient verbalized understanding

## 2020-12-23 ENCOUNTER — TELEPHONE (OUTPATIENT)
Dept: PAIN MEDICINE | Facility: CLINIC | Age: 60
End: 2020-12-23

## 2020-12-23 NOTE — TELEPHONE ENCOUNTER
Staff contacted patient regarding her message regarding medication for pain.    Staff informed patient that she does have refills left on her pain medication Pregablin.    Patient stated she was going to contact her pharmacy in regard to refilling her medcation

## 2020-12-23 NOTE — TELEPHONE ENCOUNTER
----- Message from Kaila Song sent at 12/23/2020  8:27 AM CST -----  Contact: JOYA MUNOZ [[id number]]  Type: Patient Call Back    Who called:JOYA MUNOZ [[id number]]    What is the request in detail: Patient is requesting a call back. JOYA MUNOZ [[id number]] states that her pain is severe and that she requests to be prescribed medication.  Please advise.    Can the clinic reply by [facility]? No    Best call back number: .[phone]    Additional Information: N/A

## 2021-01-12 ENCOUNTER — TELEPHONE (OUTPATIENT)
Dept: PAIN MEDICINE | Facility: CLINIC | Age: 61
End: 2021-01-12

## 2021-01-13 ENCOUNTER — OFFICE VISIT (OUTPATIENT)
Dept: PAIN MEDICINE | Facility: CLINIC | Age: 61
End: 2021-01-13
Payer: MEDICARE

## 2021-01-13 DIAGNOSIS — M47.819 SPONDYLOSIS WITHOUT MYELOPATHY: ICD-10-CM

## 2021-01-13 DIAGNOSIS — M47.9 OSTEOARTHRITIS OF SPINE, UNSPECIFIED SPINAL OSTEOARTHRITIS COMPLICATION STATUS, UNSPECIFIED SPINAL REGION: Primary | ICD-10-CM

## 2021-01-13 DIAGNOSIS — M51.37 DDD (DEGENERATIVE DISC DISEASE), LUMBOSACRAL: ICD-10-CM

## 2021-01-13 DIAGNOSIS — M47.812 CERVICAL SPONDYLOSIS WITHOUT MYELOPATHY: ICD-10-CM

## 2021-01-13 DIAGNOSIS — M54.12 CERVICAL RADICULOPATHY: ICD-10-CM

## 2021-01-13 DIAGNOSIS — E11.42 DM TYPE 2 WITH DIABETIC PERIPHERAL NEUROPATHY: ICD-10-CM

## 2021-01-13 DIAGNOSIS — M50.30 DDD (DEGENERATIVE DISC DISEASE), CERVICAL: ICD-10-CM

## 2021-01-13 DIAGNOSIS — M54.17 LUMBOSACRAL RADICULOPATHY: ICD-10-CM

## 2021-01-13 PROCEDURE — 3078F DIAST BP <80 MM HG: CPT | Mod: HCNC,CPTII,S$GLB, | Performed by: NURSE PRACTITIONER

## 2021-01-13 PROCEDURE — 99213 OFFICE O/P EST LOW 20 MIN: CPT | Mod: HCNC,S$GLB,, | Performed by: NURSE PRACTITIONER

## 2021-01-13 PROCEDURE — 3074F PR MOST RECENT SYSTOLIC BLOOD PRESSURE < 130 MM HG: ICD-10-PCS | Mod: HCNC,CPTII,S$GLB, | Performed by: NURSE PRACTITIONER

## 2021-01-13 PROCEDURE — 3072F LOW RISK FOR RETINOPATHY: CPT | Mod: HCNC,S$GLB,, | Performed by: NURSE PRACTITIONER

## 2021-01-13 PROCEDURE — 3008F BODY MASS INDEX DOCD: CPT | Mod: HCNC,CPTII,S$GLB, | Performed by: NURSE PRACTITIONER

## 2021-01-13 PROCEDURE — 3074F SYST BP LT 130 MM HG: CPT | Mod: HCNC,CPTII,S$GLB, | Performed by: NURSE PRACTITIONER

## 2021-01-13 PROCEDURE — 1125F AMNT PAIN NOTED PAIN PRSNT: CPT | Mod: HCNC,S$GLB,, | Performed by: NURSE PRACTITIONER

## 2021-01-13 PROCEDURE — 99999 PR PBB SHADOW E&M-EST. PATIENT-LVL II: CPT | Mod: PBBFAC,HCNC,, | Performed by: NURSE PRACTITIONER

## 2021-01-13 PROCEDURE — 99499 RISK ADDL DX/OHS AUDIT: ICD-10-PCS | Mod: S$GLB,,, | Performed by: NURSE PRACTITIONER

## 2021-01-13 PROCEDURE — 99999 PR PBB SHADOW E&M-EST. PATIENT-LVL II: ICD-10-PCS | Mod: PBBFAC,HCNC,, | Performed by: NURSE PRACTITIONER

## 2021-01-13 PROCEDURE — 3072F PR LOW RISK FOR RETINOPATHY: ICD-10-PCS | Mod: HCNC,S$GLB,, | Performed by: NURSE PRACTITIONER

## 2021-01-13 PROCEDURE — 99213 PR OFFICE/OUTPT VISIT, EST, LEVL III, 20-29 MIN: ICD-10-PCS | Mod: HCNC,S$GLB,, | Performed by: NURSE PRACTITIONER

## 2021-01-13 PROCEDURE — 3008F PR BODY MASS INDEX (BMI) DOCUMENTED: ICD-10-PCS | Mod: HCNC,CPTII,S$GLB, | Performed by: NURSE PRACTITIONER

## 2021-01-13 PROCEDURE — 99499 UNLISTED E&M SERVICE: CPT | Mod: S$GLB,,, | Performed by: NURSE PRACTITIONER

## 2021-01-13 PROCEDURE — 1125F PR PAIN SEVERITY QUANTIFIED, PAIN PRESENT: ICD-10-PCS | Mod: HCNC,S$GLB,, | Performed by: NURSE PRACTITIONER

## 2021-01-13 PROCEDURE — 3078F PR MOST RECENT DIASTOLIC BLOOD PRESSURE < 80 MM HG: ICD-10-PCS | Mod: HCNC,CPTII,S$GLB, | Performed by: NURSE PRACTITIONER

## 2021-01-13 RX ORDER — PREGABALIN 100 MG/1
100 CAPSULE ORAL 3 TIMES DAILY
Qty: 270 CAPSULE | Refills: 1 | Status: SHIPPED | OUTPATIENT
Start: 2021-01-13 | End: 2021-07-06 | Stop reason: SDUPTHER

## 2021-01-14 VITALS
WEIGHT: 183.63 LBS | SYSTOLIC BLOOD PRESSURE: 100 MMHG | HEART RATE: 84 BPM | BODY MASS INDEX: 32.54 KG/M2 | HEIGHT: 63 IN | DIASTOLIC BLOOD PRESSURE: 70 MMHG

## 2021-01-19 ENCOUNTER — TELEPHONE (OUTPATIENT)
Dept: PAIN MEDICINE | Facility: CLINIC | Age: 61
End: 2021-01-19

## 2021-02-01 DIAGNOSIS — E11.42 DM TYPE 2 WITH DIABETIC PERIPHERAL NEUROPATHY: ICD-10-CM

## 2021-02-01 RX ORDER — LANCETS 33 GAUGE
100 EACH MISCELLANEOUS 4 TIMES DAILY
Qty: 100 EACH | Refills: 0 | Status: SHIPPED | OUTPATIENT
Start: 2021-02-01 | End: 2023-04-05 | Stop reason: CLARIF

## 2021-02-03 ENCOUNTER — HOSPITAL ENCOUNTER (OUTPATIENT)
Facility: OTHER | Age: 61
Discharge: HOME | End: 2021-02-03
Attending: ANESTHESIOLOGY | Admitting: ANESTHESIOLOGY
Payer: MEDICARE

## 2021-02-03 VITALS
DIASTOLIC BLOOD PRESSURE: 72 MMHG | HEART RATE: 75 BPM | SYSTOLIC BLOOD PRESSURE: 134 MMHG | RESPIRATION RATE: 14 BRPM | OXYGEN SATURATION: 98 %

## 2021-02-03 DIAGNOSIS — M47.812 CERVICAL SPONDYLOSIS WITHOUT MYELOPATHY: Primary | ICD-10-CM

## 2021-02-03 DIAGNOSIS — G89.29 CHRONIC PAIN: ICD-10-CM

## 2021-02-03 LAB — POCT GLUCOSE: 110 MG/DL (ref 70–110)

## 2021-02-03 PROCEDURE — 64633 DESTROY CERV/THOR FACET JNT: CPT | Mod: LT | Performed by: ANESTHESIOLOGY

## 2021-02-03 PROCEDURE — 64633 DESTROY CERV/THOR FACET JNT: CPT | Mod: LT,,, | Performed by: ANESTHESIOLOGY

## 2021-02-03 PROCEDURE — 64634 DESTROY C/TH FACET JNT ADDL: CPT | Mod: LT | Performed by: ANESTHESIOLOGY

## 2021-02-03 PROCEDURE — 25000003 PHARM REV CODE 250: Performed by: ANESTHESIOLOGY

## 2021-02-03 PROCEDURE — 64633 PR DESTROY CERV/THOR FACET JNT: ICD-10-PCS | Mod: LT,,, | Performed by: ANESTHESIOLOGY

## 2021-02-03 PROCEDURE — 63600175 PHARM REV CODE 636 W HCPCS: Performed by: ANESTHESIOLOGY

## 2021-02-03 PROCEDURE — 64634 DESTROY C/TH FACET JNT ADDL: CPT | Mod: LT,,, | Performed by: ANESTHESIOLOGY

## 2021-02-03 PROCEDURE — 64634 PR DESTROY C/TH FACET JNT ADDL: ICD-10-PCS | Mod: LT,,, | Performed by: ANESTHESIOLOGY

## 2021-02-03 RX ORDER — DEXAMETHASONE SODIUM PHOSPHATE 4 MG/ML
INJECTION, SOLUTION INTRA-ARTICULAR; INTRALESIONAL; INTRAMUSCULAR; INTRAVENOUS; SOFT TISSUE
Status: DISCONTINUED | OUTPATIENT
Start: 2021-02-03 | End: 2021-02-03 | Stop reason: HOSPADM

## 2021-02-03 RX ORDER — MIDAZOLAM HYDROCHLORIDE 1 MG/ML
INJECTION INTRAMUSCULAR; INTRAVENOUS
Status: DISCONTINUED | OUTPATIENT
Start: 2021-02-03 | End: 2021-02-03 | Stop reason: HOSPADM

## 2021-02-03 RX ORDER — SODIUM CHLORIDE 9 MG/ML
INJECTION, SOLUTION INTRAVENOUS CONTINUOUS
Status: ACTIVE | OUTPATIENT
Start: 2021-02-03

## 2021-02-03 RX ORDER — LIDOCAINE HYDROCHLORIDE 10 MG/ML
INJECTION INFILTRATION; PERINEURAL
Status: DISCONTINUED | OUTPATIENT
Start: 2021-02-03 | End: 2021-02-03 | Stop reason: HOSPADM

## 2021-02-03 RX ORDER — FENTANYL CITRATE 50 UG/ML
INJECTION, SOLUTION INTRAMUSCULAR; INTRAVENOUS
Status: DISCONTINUED | OUTPATIENT
Start: 2021-02-03 | End: 2021-02-03 | Stop reason: HOSPADM

## 2021-02-03 RX ORDER — BUPIVACAINE HYDROCHLORIDE 2.5 MG/ML
INJECTION, SOLUTION EPIDURAL; INFILTRATION; INTRACAUDAL
Status: DISCONTINUED | OUTPATIENT
Start: 2021-02-03 | End: 2021-02-03 | Stop reason: HOSPADM

## 2021-02-03 RX ADMIN — SODIUM CHLORIDE: 0.9 INJECTION, SOLUTION INTRAVENOUS at 02:02

## 2021-02-10 ENCOUNTER — HOSPITAL ENCOUNTER (OUTPATIENT)
Facility: OTHER | Age: 61
Discharge: HOME | End: 2021-02-10
Attending: ANESTHESIOLOGY | Admitting: ANESTHESIOLOGY
Payer: MEDICARE

## 2021-02-10 VITALS
WEIGHT: 176 LBS | RESPIRATION RATE: 14 BRPM | HEIGHT: 63 IN | HEART RATE: 73 BPM | TEMPERATURE: 99 F | BODY MASS INDEX: 31.18 KG/M2 | SYSTOLIC BLOOD PRESSURE: 127 MMHG | OXYGEN SATURATION: 98 % | DIASTOLIC BLOOD PRESSURE: 87 MMHG

## 2021-02-10 DIAGNOSIS — M54.16 LUMBAR RADICULOPATHY: ICD-10-CM

## 2021-02-10 DIAGNOSIS — M51.36 DDD (DEGENERATIVE DISC DISEASE), LUMBAR: Primary | ICD-10-CM

## 2021-02-10 DIAGNOSIS — G89.29 CHRONIC PAIN: ICD-10-CM

## 2021-02-10 LAB — POCT GLUCOSE: 79 MG/DL (ref 70–110)

## 2021-02-10 PROCEDURE — 82947 ASSAY GLUCOSE BLOOD QUANT: CPT | Performed by: ANESTHESIOLOGY

## 2021-02-10 PROCEDURE — 64483 PR EPIDURAL INJ, ANES/STEROID, TRANSFORAMINAL, LUMB/SACR, SNGL LEVL: ICD-10-PCS | Mod: 50,,, | Performed by: ANESTHESIOLOGY

## 2021-02-10 PROCEDURE — 25000003 PHARM REV CODE 250: Performed by: ANESTHESIOLOGY

## 2021-02-10 PROCEDURE — 25000003 PHARM REV CODE 250: Performed by: STUDENT IN AN ORGANIZED HEALTH CARE EDUCATION/TRAINING PROGRAM

## 2021-02-10 PROCEDURE — 25500020 PHARM REV CODE 255: Performed by: ANESTHESIOLOGY

## 2021-02-10 PROCEDURE — 64483 NJX AA&/STRD TFRM EPI L/S 1: CPT | Mod: 50,,, | Performed by: ANESTHESIOLOGY

## 2021-02-10 PROCEDURE — 64483 NJX AA&/STRD TFRM EPI L/S 1: CPT | Mod: 50 | Performed by: ANESTHESIOLOGY

## 2021-02-10 PROCEDURE — 63600175 PHARM REV CODE 636 W HCPCS: Performed by: ANESTHESIOLOGY

## 2021-02-10 RX ORDER — MIDAZOLAM HYDROCHLORIDE 1 MG/ML
INJECTION INTRAMUSCULAR; INTRAVENOUS
Status: DISCONTINUED | OUTPATIENT
Start: 2021-02-10 | End: 2021-02-10 | Stop reason: HOSPADM

## 2021-02-10 RX ORDER — LIDOCAINE HYDROCHLORIDE 5 MG/ML
INJECTION, SOLUTION INFILTRATION; INTRAVENOUS
Status: DISCONTINUED | OUTPATIENT
Start: 2021-02-10 | End: 2021-02-10 | Stop reason: HOSPADM

## 2021-02-10 RX ORDER — FENTANYL CITRATE 50 UG/ML
INJECTION, SOLUTION INTRAMUSCULAR; INTRAVENOUS
Status: DISCONTINUED | OUTPATIENT
Start: 2021-02-10 | End: 2021-02-10 | Stop reason: HOSPADM

## 2021-02-10 RX ORDER — LIDOCAINE HYDROCHLORIDE 10 MG/ML
INJECTION INFILTRATION; PERINEURAL
Status: DISCONTINUED | OUTPATIENT
Start: 2021-02-10 | End: 2021-02-10 | Stop reason: HOSPADM

## 2021-02-10 RX ORDER — SODIUM CHLORIDE 9 MG/ML
INJECTION, SOLUTION INTRAVENOUS CONTINUOUS
Status: DISCONTINUED | OUTPATIENT
Start: 2021-02-10 | End: 2021-02-10 | Stop reason: HOSPADM

## 2021-02-10 RX ORDER — DEXAMETHASONE SODIUM PHOSPHATE 10 MG/ML
INJECTION INTRAMUSCULAR; INTRAVENOUS
Status: DISCONTINUED | OUTPATIENT
Start: 2021-02-10 | End: 2021-02-10 | Stop reason: HOSPADM

## 2021-02-10 RX ADMIN — SODIUM CHLORIDE: 0.9 INJECTION, SOLUTION INTRAVENOUS at 01:02

## 2021-03-03 ENCOUNTER — OFFICE VISIT (OUTPATIENT)
Dept: PAIN MEDICINE | Facility: CLINIC | Age: 61
End: 2021-03-03
Payer: MEDICARE

## 2021-03-03 VITALS
WEIGHT: 176.38 LBS | SYSTOLIC BLOOD PRESSURE: 99 MMHG | TEMPERATURE: 98 F | BODY MASS INDEX: 31.25 KG/M2 | DIASTOLIC BLOOD PRESSURE: 65 MMHG | HEIGHT: 63 IN | HEART RATE: 78 BPM | RESPIRATION RATE: 18 BRPM

## 2021-03-03 DIAGNOSIS — M54.17 LUMBOSACRAL RADICULOPATHY: ICD-10-CM

## 2021-03-03 DIAGNOSIS — M54.16 LUMBAR RADICULOPATHY: ICD-10-CM

## 2021-03-03 DIAGNOSIS — M47.819 SPONDYLOSIS WITHOUT MYELOPATHY: ICD-10-CM

## 2021-03-03 DIAGNOSIS — M51.36 DDD (DEGENERATIVE DISC DISEASE), LUMBAR: Primary | ICD-10-CM

## 2021-03-03 PROBLEM — M51.369 DDD (DEGENERATIVE DISC DISEASE), LUMBAR: Status: ACTIVE | Noted: 2021-03-03

## 2021-03-03 PROCEDURE — 3074F PR MOST RECENT SYSTOLIC BLOOD PRESSURE < 130 MM HG: ICD-10-PCS | Mod: CPTII,S$GLB,, | Performed by: ANESTHESIOLOGY

## 2021-03-03 PROCEDURE — 3078F DIAST BP <80 MM HG: CPT | Mod: CPTII,S$GLB,, | Performed by: ANESTHESIOLOGY

## 2021-03-03 PROCEDURE — 3008F BODY MASS INDEX DOCD: CPT | Mod: CPTII,S$GLB,, | Performed by: ANESTHESIOLOGY

## 2021-03-03 PROCEDURE — 3072F PR LOW RISK FOR RETINOPATHY: ICD-10-PCS | Mod: S$GLB,,, | Performed by: ANESTHESIOLOGY

## 2021-03-03 PROCEDURE — 3074F SYST BP LT 130 MM HG: CPT | Mod: CPTII,S$GLB,, | Performed by: ANESTHESIOLOGY

## 2021-03-03 PROCEDURE — 3078F PR MOST RECENT DIASTOLIC BLOOD PRESSURE < 80 MM HG: ICD-10-PCS | Mod: CPTII,S$GLB,, | Performed by: ANESTHESIOLOGY

## 2021-03-03 PROCEDURE — 99999 PR PBB SHADOW E&M-EST. PATIENT-LVL III: ICD-10-PCS | Mod: PBBFAC,,, | Performed by: ANESTHESIOLOGY

## 2021-03-03 PROCEDURE — 99214 OFFICE O/P EST MOD 30 MIN: CPT | Mod: S$GLB,,, | Performed by: ANESTHESIOLOGY

## 2021-03-03 PROCEDURE — 1125F PR PAIN SEVERITY QUANTIFIED, PAIN PRESENT: ICD-10-PCS | Mod: S$GLB,,, | Performed by: ANESTHESIOLOGY

## 2021-03-03 PROCEDURE — 3008F PR BODY MASS INDEX (BMI) DOCUMENTED: ICD-10-PCS | Mod: CPTII,S$GLB,, | Performed by: ANESTHESIOLOGY

## 2021-03-03 PROCEDURE — 99999 PR PBB SHADOW E&M-EST. PATIENT-LVL III: CPT | Mod: PBBFAC,,, | Performed by: ANESTHESIOLOGY

## 2021-03-03 PROCEDURE — 99214 PR OFFICE/OUTPT VISIT, EST, LEVL IV, 30-39 MIN: ICD-10-PCS | Mod: S$GLB,,, | Performed by: ANESTHESIOLOGY

## 2021-03-03 PROCEDURE — 1125F AMNT PAIN NOTED PAIN PRSNT: CPT | Mod: S$GLB,,, | Performed by: ANESTHESIOLOGY

## 2021-03-03 PROCEDURE — 3072F LOW RISK FOR RETINOPATHY: CPT | Mod: S$GLB,,, | Performed by: ANESTHESIOLOGY

## 2021-03-10 ENCOUNTER — HOSPITAL ENCOUNTER (OUTPATIENT)
Facility: OTHER | Age: 61
Discharge: HOME | End: 2021-03-10
Attending: ANESTHESIOLOGY | Admitting: ANESTHESIOLOGY
Payer: MEDICARE

## 2021-03-10 VITALS
SYSTOLIC BLOOD PRESSURE: 99 MMHG | TEMPERATURE: 98 F | HEART RATE: 67 BPM | RESPIRATION RATE: 14 BRPM | HEIGHT: 63 IN | OXYGEN SATURATION: 95 % | DIASTOLIC BLOOD PRESSURE: 66 MMHG | BODY MASS INDEX: 31.89 KG/M2 | WEIGHT: 180 LBS

## 2021-03-10 DIAGNOSIS — M51.36 DDD (DEGENERATIVE DISC DISEASE), LUMBAR: ICD-10-CM

## 2021-03-10 DIAGNOSIS — M48.00 SPINAL STENOSIS, UNSPECIFIED SPINAL REGION: Primary | ICD-10-CM

## 2021-03-10 DIAGNOSIS — G89.29 CHRONIC PAIN: ICD-10-CM

## 2021-03-10 LAB — POCT GLUCOSE: 113 MG/DL (ref 70–110)

## 2021-03-10 PROCEDURE — 25500020 PHARM REV CODE 255: Performed by: ANESTHESIOLOGY

## 2021-03-10 PROCEDURE — 63600175 PHARM REV CODE 636 W HCPCS: Performed by: ANESTHESIOLOGY

## 2021-03-10 PROCEDURE — 25000003 PHARM REV CODE 250: Performed by: ANESTHESIOLOGY

## 2021-03-10 PROCEDURE — 64483 NJX AA&/STRD TFRM EPI L/S 1: CPT | Mod: 50 | Performed by: ANESTHESIOLOGY

## 2021-03-10 PROCEDURE — 64483 PR EPIDURAL INJ, ANES/STEROID, TRANSFORAMINAL, LUMB/SACR, SNGL LEVL: ICD-10-PCS | Mod: 50,,, | Performed by: ANESTHESIOLOGY

## 2021-03-10 PROCEDURE — 25000003 PHARM REV CODE 250: Performed by: STUDENT IN AN ORGANIZED HEALTH CARE EDUCATION/TRAINING PROGRAM

## 2021-03-10 PROCEDURE — 64483 NJX AA&/STRD TFRM EPI L/S 1: CPT | Mod: 50,,, | Performed by: ANESTHESIOLOGY

## 2021-03-10 PROCEDURE — 82947 ASSAY GLUCOSE BLOOD QUANT: CPT | Performed by: ANESTHESIOLOGY

## 2021-03-10 RX ORDER — ALPRAZOLAM 0.5 MG/1
1 TABLET ORAL ONCE
Status: COMPLETED | OUTPATIENT
Start: 2021-03-10 | End: 2021-03-10

## 2021-03-10 RX ORDER — SODIUM CHLORIDE 9 MG/ML
INJECTION, SOLUTION INTRAVENOUS CONTINUOUS
Status: DISCONTINUED | OUTPATIENT
Start: 2021-03-10 | End: 2021-03-10 | Stop reason: HOSPADM

## 2021-03-10 RX ORDER — LIDOCAINE HYDROCHLORIDE 10 MG/ML
INJECTION INFILTRATION; PERINEURAL
Status: DISCONTINUED | OUTPATIENT
Start: 2021-03-10 | End: 2021-03-10 | Stop reason: HOSPADM

## 2021-03-10 RX ORDER — LIDOCAINE HYDROCHLORIDE 5 MG/ML
INJECTION, SOLUTION INFILTRATION; INTRAVENOUS
Status: DISCONTINUED | OUTPATIENT
Start: 2021-03-10 | End: 2021-03-10 | Stop reason: HOSPADM

## 2021-03-10 RX ORDER — DEXAMETHASONE SODIUM PHOSPHATE 10 MG/ML
INJECTION INTRAMUSCULAR; INTRAVENOUS
Status: DISCONTINUED | OUTPATIENT
Start: 2021-03-10 | End: 2021-03-10 | Stop reason: HOSPADM

## 2021-03-10 RX ADMIN — SODIUM CHLORIDE: 0.9 INJECTION, SOLUTION INTRAVENOUS at 09:03

## 2021-03-10 RX ADMIN — ALPRAZOLAM 1 MG: 0.5 TABLET ORAL at 09:03

## 2021-04-01 DIAGNOSIS — M47.812 CERVICAL SPONDYLOSIS WITHOUT MYELOPATHY: ICD-10-CM

## 2021-04-01 RX ORDER — QUETIAPINE FUMARATE 100 MG/1
100 TABLET, FILM COATED ORAL NIGHTLY
Qty: 90 TABLET | Refills: 3 | Status: SHIPPED | OUTPATIENT
Start: 2021-04-01 | End: 2022-01-31

## 2021-04-05 ENCOUNTER — PATIENT OUTREACH (OUTPATIENT)
Dept: ADMINISTRATIVE | Facility: HOSPITAL | Age: 61
End: 2021-04-05

## 2021-04-05 DIAGNOSIS — Z12.11 SCREENING FOR COLON CANCER: ICD-10-CM

## 2021-04-05 DIAGNOSIS — E11.9 DIABETES MELLITUS WITHOUT COMPLICATION: Primary | ICD-10-CM

## 2021-04-07 ENCOUNTER — PATIENT OUTREACH (OUTPATIENT)
Dept: ADMINISTRATIVE | Facility: OTHER | Age: 61
End: 2021-04-07

## 2021-04-07 RX ORDER — LIRAGLUTIDE 6 MG/ML
INJECTION SUBCUTANEOUS
Qty: 27 ML | Refills: 0 | Status: SHIPPED | OUTPATIENT
Start: 2021-04-07 | End: 2021-06-17

## 2021-04-08 ENCOUNTER — TELEPHONE (OUTPATIENT)
Dept: PAIN MEDICINE | Facility: CLINIC | Age: 61
End: 2021-04-08

## 2021-04-09 ENCOUNTER — OFFICE VISIT (OUTPATIENT)
Dept: PAIN MEDICINE | Facility: CLINIC | Age: 61
End: 2021-04-09
Payer: MEDICARE

## 2021-04-09 VITALS
RESPIRATION RATE: 18 BRPM | DIASTOLIC BLOOD PRESSURE: 75 MMHG | BODY MASS INDEX: 32.19 KG/M2 | HEIGHT: 63 IN | HEART RATE: 88 BPM | WEIGHT: 181.69 LBS | SYSTOLIC BLOOD PRESSURE: 105 MMHG

## 2021-04-09 DIAGNOSIS — M70.50 PES ANSERINE BURSITIS: ICD-10-CM

## 2021-04-09 DIAGNOSIS — G89.29 OTHER CHRONIC PAIN: ICD-10-CM

## 2021-04-09 DIAGNOSIS — M54.16 LUMBAR RADICULOPATHY: ICD-10-CM

## 2021-04-09 DIAGNOSIS — M17.12 PRIMARY OSTEOARTHRITIS OF LEFT KNEE: ICD-10-CM

## 2021-04-09 DIAGNOSIS — M51.36 DDD (DEGENERATIVE DISC DISEASE), LUMBAR: Primary | ICD-10-CM

## 2021-04-09 PROCEDURE — 99214 PR OFFICE/OUTPT VISIT, EST, LEVL IV, 30-39 MIN: ICD-10-PCS | Mod: S$GLB,,, | Performed by: NURSE PRACTITIONER

## 2021-04-09 PROCEDURE — 1125F AMNT PAIN NOTED PAIN PRSNT: CPT | Mod: S$GLB,,, | Performed by: NURSE PRACTITIONER

## 2021-04-09 PROCEDURE — 3072F LOW RISK FOR RETINOPATHY: CPT | Mod: S$GLB,,, | Performed by: NURSE PRACTITIONER

## 2021-04-09 PROCEDURE — 99999 PR PBB SHADOW E&M-EST. PATIENT-LVL III: ICD-10-PCS | Mod: PBBFAC,,, | Performed by: NURSE PRACTITIONER

## 2021-04-09 PROCEDURE — 99214 OFFICE O/P EST MOD 30 MIN: CPT | Mod: S$GLB,,, | Performed by: NURSE PRACTITIONER

## 2021-04-09 PROCEDURE — 1125F PR PAIN SEVERITY QUANTIFIED, PAIN PRESENT: ICD-10-PCS | Mod: S$GLB,,, | Performed by: NURSE PRACTITIONER

## 2021-04-09 PROCEDURE — 3008F BODY MASS INDEX DOCD: CPT | Mod: CPTII,S$GLB,, | Performed by: NURSE PRACTITIONER

## 2021-04-09 PROCEDURE — 3008F PR BODY MASS INDEX (BMI) DOCUMENTED: ICD-10-PCS | Mod: CPTII,S$GLB,, | Performed by: NURSE PRACTITIONER

## 2021-04-09 PROCEDURE — 3072F PR LOW RISK FOR RETINOPATHY: ICD-10-PCS | Mod: S$GLB,,, | Performed by: NURSE PRACTITIONER

## 2021-04-09 PROCEDURE — 99999 PR PBB SHADOW E&M-EST. PATIENT-LVL III: CPT | Mod: PBBFAC,,, | Performed by: NURSE PRACTITIONER

## 2021-04-09 RX ORDER — DICLOFENAC SODIUM 10 MG/G
2 GEL TOPICAL 3 TIMES DAILY
Qty: 3 TUBE | Refills: 1 | Status: SHIPPED | OUTPATIENT
Start: 2021-04-09 | End: 2021-04-09 | Stop reason: SDUPTHER

## 2021-04-09 RX ORDER — DICLOFENAC SODIUM 10 MG/G
2 GEL TOPICAL 3 TIMES DAILY
Qty: 3 TUBE | Refills: 1 | Status: SHIPPED | OUTPATIENT
Start: 2021-04-09 | End: 2021-05-10

## 2021-04-19 ENCOUNTER — LAB VISIT (OUTPATIENT)
Dept: LAB | Facility: OTHER | Age: 61
End: 2021-04-19
Attending: FAMILY MEDICINE
Payer: MEDICARE

## 2021-04-19 ENCOUNTER — OFFICE VISIT (OUTPATIENT)
Dept: INTERNAL MEDICINE | Facility: CLINIC | Age: 61
End: 2021-04-19
Attending: FAMILY MEDICINE
Payer: MEDICARE

## 2021-04-19 VITALS
SYSTOLIC BLOOD PRESSURE: 128 MMHG | DIASTOLIC BLOOD PRESSURE: 78 MMHG | BODY MASS INDEX: 31.99 KG/M2 | WEIGHT: 180.56 LBS | HEART RATE: 75 BPM | HEIGHT: 63 IN | OXYGEN SATURATION: 97 %

## 2021-04-19 DIAGNOSIS — Z79.4 TYPE 2 DIABETES MELLITUS WITHOUT COMPLICATION, WITH LONG-TERM CURRENT USE OF INSULIN: ICD-10-CM

## 2021-04-19 DIAGNOSIS — E11.9 TYPE 2 DIABETES MELLITUS WITHOUT COMPLICATION, WITH LONG-TERM CURRENT USE OF INSULIN: Primary | ICD-10-CM

## 2021-04-19 DIAGNOSIS — N18.31 STAGE 3A CHRONIC KIDNEY DISEASE: ICD-10-CM

## 2021-04-19 DIAGNOSIS — Z79.4 TYPE 2 DIABETES MELLITUS WITHOUT COMPLICATION, WITH LONG-TERM CURRENT USE OF INSULIN: Primary | ICD-10-CM

## 2021-04-19 DIAGNOSIS — E11.9 TYPE 2 DIABETES MELLITUS WITHOUT COMPLICATION, WITH LONG-TERM CURRENT USE OF INSULIN: ICD-10-CM

## 2021-04-19 DIAGNOSIS — I10 HYPERTENSION, UNSPECIFIED TYPE: ICD-10-CM

## 2021-04-19 DIAGNOSIS — E11.9 TYPE 2 DIABETES MELLITUS WITHOUT COMPLICATION: ICD-10-CM

## 2021-04-19 LAB
ALBUMIN SERPL BCP-MCNC: 3.5 G/DL (ref 3.5–5.2)
ALP SERPL-CCNC: 68 U/L (ref 55–135)
ALT SERPL W/O P-5'-P-CCNC: 18 U/L (ref 10–44)
ANION GAP SERPL CALC-SCNC: 10 MMOL/L (ref 8–16)
AST SERPL-CCNC: 21 U/L (ref 10–40)
BILIRUB SERPL-MCNC: 0.3 MG/DL (ref 0.1–1)
BUN SERPL-MCNC: 21 MG/DL (ref 8–23)
CALCIUM SERPL-MCNC: 9.9 MG/DL (ref 8.7–10.5)
CHLORIDE SERPL-SCNC: 100 MMOL/L (ref 95–110)
CHOLEST SERPL-MCNC: 213 MG/DL (ref 120–199)
CHOLEST/HDLC SERPL: 6.3 {RATIO} (ref 2–5)
CO2 SERPL-SCNC: 29 MMOL/L (ref 23–29)
CREAT SERPL-MCNC: 1 MG/DL (ref 0.5–1.4)
EST. GFR  (AFRICAN AMERICAN): >60 ML/MIN/1.73 M^2
EST. GFR  (NON AFRICAN AMERICAN): >60 ML/MIN/1.73 M^2
ESTIMATED AVG GLUCOSE: 128 MG/DL (ref 68–131)
GLUCOSE SERPL-MCNC: 78 MG/DL (ref 70–110)
HBA1C MFR BLD: 6.1 % (ref 4–5.6)
HDLC SERPL-MCNC: 34 MG/DL (ref 40–75)
HDLC SERPL: 16 % (ref 20–50)
LDLC SERPL CALC-MCNC: 119.4 MG/DL (ref 63–159)
NONHDLC SERPL-MCNC: 179 MG/DL
POTASSIUM SERPL-SCNC: 3.8 MMOL/L (ref 3.5–5.1)
PROT SERPL-MCNC: 7.9 G/DL (ref 6–8.4)
SODIUM SERPL-SCNC: 139 MMOL/L (ref 136–145)
TRIGL SERPL-MCNC: 298 MG/DL (ref 30–150)

## 2021-04-19 PROCEDURE — 36415 COLL VENOUS BLD VENIPUNCTURE: CPT | Performed by: FAMILY MEDICINE

## 2021-04-19 PROCEDURE — 3072F LOW RISK FOR RETINOPATHY: CPT | Mod: S$GLB,,, | Performed by: FAMILY MEDICINE

## 2021-04-19 PROCEDURE — 80053 COMPREHEN METABOLIC PANEL: CPT | Performed by: FAMILY MEDICINE

## 2021-04-19 PROCEDURE — 99499 RISK ADDL DX/OHS AUDIT: ICD-10-PCS | Mod: HCNC,S$GLB,, | Performed by: FAMILY MEDICINE

## 2021-04-19 PROCEDURE — 3008F BODY MASS INDEX DOCD: CPT | Mod: CPTII,S$GLB,, | Performed by: FAMILY MEDICINE

## 2021-04-19 PROCEDURE — 99214 OFFICE O/P EST MOD 30 MIN: CPT | Mod: S$GLB,,, | Performed by: FAMILY MEDICINE

## 2021-04-19 PROCEDURE — 3008F PR BODY MASS INDEX (BMI) DOCUMENTED: ICD-10-PCS | Mod: CPTII,S$GLB,, | Performed by: FAMILY MEDICINE

## 2021-04-19 PROCEDURE — 1125F PR PAIN SEVERITY QUANTIFIED, PAIN PRESENT: ICD-10-PCS | Mod: S$GLB,,, | Performed by: FAMILY MEDICINE

## 2021-04-19 PROCEDURE — 80061 LIPID PANEL: CPT | Performed by: FAMILY MEDICINE

## 2021-04-19 PROCEDURE — 83036 HEMOGLOBIN GLYCOSYLATED A1C: CPT | Performed by: FAMILY MEDICINE

## 2021-04-19 PROCEDURE — 3078F PR MOST RECENT DIASTOLIC BLOOD PRESSURE < 80 MM HG: ICD-10-PCS | Mod: CPTII,S$GLB,, | Performed by: FAMILY MEDICINE

## 2021-04-19 PROCEDURE — 1125F AMNT PAIN NOTED PAIN PRSNT: CPT | Mod: S$GLB,,, | Performed by: FAMILY MEDICINE

## 2021-04-19 PROCEDURE — 99999 PR PBB SHADOW E&M-EST. PATIENT-LVL V: ICD-10-PCS | Mod: PBBFAC,,, | Performed by: FAMILY MEDICINE

## 2021-04-19 PROCEDURE — 99214 PR OFFICE/OUTPT VISIT, EST, LEVL IV, 30-39 MIN: ICD-10-PCS | Mod: S$GLB,,, | Performed by: FAMILY MEDICINE

## 2021-04-19 PROCEDURE — 3074F PR MOST RECENT SYSTOLIC BLOOD PRESSURE < 130 MM HG: ICD-10-PCS | Mod: CPTII,S$GLB,, | Performed by: FAMILY MEDICINE

## 2021-04-19 PROCEDURE — 3078F DIAST BP <80 MM HG: CPT | Mod: CPTII,S$GLB,, | Performed by: FAMILY MEDICINE

## 2021-04-19 PROCEDURE — 99999 PR PBB SHADOW E&M-EST. PATIENT-LVL V: CPT | Mod: PBBFAC,,, | Performed by: FAMILY MEDICINE

## 2021-04-19 PROCEDURE — 3072F PR LOW RISK FOR RETINOPATHY: ICD-10-PCS | Mod: S$GLB,,, | Performed by: FAMILY MEDICINE

## 2021-04-19 PROCEDURE — 99499 UNLISTED E&M SERVICE: CPT | Mod: HCNC,S$GLB,, | Performed by: FAMILY MEDICINE

## 2021-04-19 PROCEDURE — 3074F SYST BP LT 130 MM HG: CPT | Mod: CPTII,S$GLB,, | Performed by: FAMILY MEDICINE

## 2021-04-20 ENCOUNTER — TELEPHONE (OUTPATIENT)
Dept: INTERNAL MEDICINE | Facility: CLINIC | Age: 61
End: 2021-04-20

## 2021-04-26 ENCOUNTER — PES CALL (OUTPATIENT)
Dept: ADMINISTRATIVE | Facility: CLINIC | Age: 61
End: 2021-04-26

## 2021-05-06 DIAGNOSIS — E11.9 TYPE 2 DIABETES MELLITUS WITHOUT COMPLICATION: ICD-10-CM

## 2021-05-07 ENCOUNTER — TELEPHONE (OUTPATIENT)
Dept: PAIN MEDICINE | Facility: CLINIC | Age: 61
End: 2021-05-07

## 2021-05-08 ENCOUNTER — PATIENT OUTREACH (OUTPATIENT)
Dept: ADMINISTRATIVE | Facility: OTHER | Age: 61
End: 2021-05-08

## 2021-05-10 ENCOUNTER — OFFICE VISIT (OUTPATIENT)
Dept: PAIN MEDICINE | Facility: CLINIC | Age: 61
End: 2021-05-10
Payer: MEDICARE

## 2021-05-10 VITALS
HEART RATE: 83 BPM | SYSTOLIC BLOOD PRESSURE: 98 MMHG | WEIGHT: 183 LBS | DIASTOLIC BLOOD PRESSURE: 78 MMHG | OXYGEN SATURATION: 96 % | RESPIRATION RATE: 18 BRPM | BODY MASS INDEX: 32.43 KG/M2 | HEIGHT: 63 IN

## 2021-05-10 DIAGNOSIS — M51.36 DDD (DEGENERATIVE DISC DISEASE), LUMBAR: ICD-10-CM

## 2021-05-10 DIAGNOSIS — M70.50 PES ANSERINE BURSITIS: ICD-10-CM

## 2021-05-10 DIAGNOSIS — M48.00 SPINAL STENOSIS, UNSPECIFIED SPINAL REGION: ICD-10-CM

## 2021-05-10 DIAGNOSIS — M17.12 PRIMARY OSTEOARTHRITIS OF LEFT KNEE: Primary | ICD-10-CM

## 2021-05-10 PROCEDURE — 3072F PR LOW RISK FOR RETINOPATHY: ICD-10-PCS | Mod: S$GLB,,, | Performed by: NURSE PRACTITIONER

## 2021-05-10 PROCEDURE — 1125F PR PAIN SEVERITY QUANTIFIED, PAIN PRESENT: ICD-10-PCS | Mod: S$GLB,,, | Performed by: NURSE PRACTITIONER

## 2021-05-10 PROCEDURE — 3008F PR BODY MASS INDEX (BMI) DOCUMENTED: ICD-10-PCS | Mod: CPTII,S$GLB,, | Performed by: NURSE PRACTITIONER

## 2021-05-10 PROCEDURE — 3008F BODY MASS INDEX DOCD: CPT | Mod: CPTII,S$GLB,, | Performed by: NURSE PRACTITIONER

## 2021-05-10 PROCEDURE — 3072F LOW RISK FOR RETINOPATHY: CPT | Mod: S$GLB,,, | Performed by: NURSE PRACTITIONER

## 2021-05-10 PROCEDURE — 99999 PR PBB SHADOW E&M-EST. PATIENT-LVL III: ICD-10-PCS | Mod: PBBFAC,,, | Performed by: NURSE PRACTITIONER

## 2021-05-10 PROCEDURE — 99213 PR OFFICE/OUTPT VISIT, EST, LEVL III, 20-29 MIN: ICD-10-PCS | Mod: S$GLB,,, | Performed by: NURSE PRACTITIONER

## 2021-05-10 PROCEDURE — 99213 OFFICE O/P EST LOW 20 MIN: CPT | Mod: S$GLB,,, | Performed by: NURSE PRACTITIONER

## 2021-05-10 PROCEDURE — 99999 PR PBB SHADOW E&M-EST. PATIENT-LVL III: CPT | Mod: PBBFAC,,, | Performed by: NURSE PRACTITIONER

## 2021-05-10 PROCEDURE — 1125F AMNT PAIN NOTED PAIN PRSNT: CPT | Mod: S$GLB,,, | Performed by: NURSE PRACTITIONER

## 2021-05-10 RX ORDER — DICLOFENAC SODIUM 10 MG/G
2 GEL TOPICAL 4 TIMES DAILY
Qty: 1 TUBE | Refills: 1 | Status: SHIPPED | OUTPATIENT
Start: 2021-05-10 | End: 2021-09-22

## 2021-05-10 RX ORDER — DICLOFENAC SODIUM 10 MG/G
2 GEL TOPICAL 4 TIMES DAILY
Qty: 1 TUBE | Refills: 1 | Status: SHIPPED | OUTPATIENT
Start: 2021-05-10 | End: 2021-05-10 | Stop reason: SDUPTHER

## 2021-05-31 PROBLEM — M24.28 LIGAMENTUM FLAVUM HYPERTROPHY: Status: ACTIVE | Noted: 2021-05-31

## 2021-06-01 ENCOUNTER — OFFICE VISIT (OUTPATIENT)
Dept: HOME HEALTH SERVICES | Facility: CLINIC | Age: 61
End: 2021-06-01
Payer: MEDICARE

## 2021-06-01 VITALS
OXYGEN SATURATION: 96 % | WEIGHT: 177 LBS | TEMPERATURE: 98 F | BODY MASS INDEX: 31.36 KG/M2 | DIASTOLIC BLOOD PRESSURE: 84 MMHG | HEIGHT: 63 IN | HEART RATE: 99 BPM | SYSTOLIC BLOOD PRESSURE: 102 MMHG

## 2021-06-01 DIAGNOSIS — I97.2 POST-MASTECTOMY LYMPHEDEMA SYNDROME: ICD-10-CM

## 2021-06-01 DIAGNOSIS — Z72.0 TOBACCO ABUSE: ICD-10-CM

## 2021-06-01 DIAGNOSIS — E11.65 TYPE 2 DIABETES MELLITUS WITH HYPERGLYCEMIA, WITHOUT LONG-TERM CURRENT USE OF INSULIN: ICD-10-CM

## 2021-06-01 DIAGNOSIS — G62.0 CHEMOTHERAPY-INDUCED NEUROPATHY: ICD-10-CM

## 2021-06-01 DIAGNOSIS — Z00.00 ENCOUNTER FOR PREVENTIVE HEALTH EXAMINATION: Primary | ICD-10-CM

## 2021-06-01 DIAGNOSIS — Z99.89 DEPENDENCE ON OTHER ENABLING MACHINES AND DEVICES: ICD-10-CM

## 2021-06-01 DIAGNOSIS — Z74.09 OTHER REDUCED MOBILITY: ICD-10-CM

## 2021-06-01 DIAGNOSIS — T45.1X5A CHEMOTHERAPY-INDUCED NEUROPATHY: ICD-10-CM

## 2021-06-01 DIAGNOSIS — M24.28 LIGAMENTUM FLAVUM HYPERTROPHY: ICD-10-CM

## 2021-06-01 DIAGNOSIS — F20.9 SCHIZOPHRENIA, UNSPECIFIED TYPE: ICD-10-CM

## 2021-06-01 DIAGNOSIS — E66.9 OBESITY, CLASS I, BMI 30-34.9: ICD-10-CM

## 2021-06-01 PROCEDURE — G0439 PPPS, SUBSEQ VISIT: HCPCS | Mod: S$GLB,,, | Performed by: NURSE PRACTITIONER

## 2021-06-01 PROCEDURE — G0439 PR MEDICARE ANNUAL WELLNESS SUBSEQUENT VISIT: ICD-10-PCS | Mod: S$GLB,,, | Performed by: NURSE PRACTITIONER

## 2021-06-01 PROCEDURE — 3008F BODY MASS INDEX DOCD: CPT | Mod: CPTII,S$GLB,, | Performed by: NURSE PRACTITIONER

## 2021-06-01 PROCEDURE — G9919 SCRN ND POS ND PROV OF REC: HCPCS | Mod: CPTII,S$GLB,, | Performed by: NURSE PRACTITIONER

## 2021-06-01 PROCEDURE — G9919 PR SCREENING AND POSITIVE: ICD-10-PCS | Mod: CPTII,S$GLB,, | Performed by: NURSE PRACTITIONER

## 2021-06-01 PROCEDURE — 99499 UNLISTED E&M SERVICE: CPT | Mod: S$GLB,,, | Performed by: NURSE PRACTITIONER

## 2021-06-01 PROCEDURE — 3072F PR LOW RISK FOR RETINOPATHY: ICD-10-PCS | Mod: S$GLB,,, | Performed by: NURSE PRACTITIONER

## 2021-06-01 PROCEDURE — 3008F PR BODY MASS INDEX (BMI) DOCUMENTED: ICD-10-PCS | Mod: CPTII,S$GLB,, | Performed by: NURSE PRACTITIONER

## 2021-06-01 PROCEDURE — 99499 RISK ADDL DX/OHS AUDIT: ICD-10-PCS | Mod: S$GLB,,, | Performed by: NURSE PRACTITIONER

## 2021-06-01 PROCEDURE — 3072F LOW RISK FOR RETINOPATHY: CPT | Mod: S$GLB,,, | Performed by: NURSE PRACTITIONER

## 2021-06-01 PROCEDURE — 1125F PR PAIN SEVERITY QUANTIFIED, PAIN PRESENT: ICD-10-PCS | Mod: S$GLB,,, | Performed by: NURSE PRACTITIONER

## 2021-06-01 PROCEDURE — 1125F AMNT PAIN NOTED PAIN PRSNT: CPT | Mod: S$GLB,,, | Performed by: NURSE PRACTITIONER

## 2021-06-04 PROBLEM — G62.0 CHEMOTHERAPY-INDUCED NEUROPATHY: Status: ACTIVE | Noted: 2021-06-04

## 2021-06-04 PROBLEM — T45.1X5A CHEMOTHERAPY-INDUCED NEUROPATHY: Status: ACTIVE | Noted: 2021-06-04

## 2021-06-04 PROBLEM — I77.1 TORTUOUS AORTA: Status: RESOLVED | Noted: 2020-03-17 | Resolved: 2021-06-04

## 2021-06-04 PROBLEM — N18.30 CKD (CHRONIC KIDNEY DISEASE) STAGE 3, GFR 30-59 ML/MIN: Status: RESOLVED | Noted: 2019-11-11 | Resolved: 2021-06-04

## 2021-06-04 RX ORDER — ATORVASTATIN CALCIUM 80 MG/1
80 TABLET, FILM COATED ORAL DAILY
Qty: 30 TABLET | Refills: 0 | Status: SHIPPED | OUTPATIENT
Start: 2021-06-04 | End: 2021-08-06 | Stop reason: SDUPTHER

## 2021-06-28 ENCOUNTER — OFFICE VISIT (OUTPATIENT)
Dept: INTERNAL MEDICINE | Facility: CLINIC | Age: 61
End: 2021-06-28
Attending: FAMILY MEDICINE
Payer: MEDICARE

## 2021-06-28 DIAGNOSIS — E87.6 HYPOPOTASSEMIA: ICD-10-CM

## 2021-06-28 DIAGNOSIS — I10 HTN (HYPERTENSION), BENIGN: ICD-10-CM

## 2021-06-28 DIAGNOSIS — E11.9 TYPE 2 DIABETES MELLITUS WITHOUT COMPLICATION, WITH LONG-TERM CURRENT USE OF INSULIN: Primary | ICD-10-CM

## 2021-06-28 DIAGNOSIS — Z79.4 TYPE 2 DIABETES MELLITUS WITHOUT COMPLICATION, WITH LONG-TERM CURRENT USE OF INSULIN: Primary | ICD-10-CM

## 2021-06-28 DIAGNOSIS — N18.31 STAGE 3A CHRONIC KIDNEY DISEASE: ICD-10-CM

## 2021-06-28 PROCEDURE — 99499 RISK ADDL DX/OHS AUDIT: ICD-10-PCS | Mod: HCNC,S$GLB,, | Performed by: FAMILY MEDICINE

## 2021-06-28 PROCEDURE — 99999 PR PBB SHADOW E&M-EST. PATIENT-LVL III: ICD-10-PCS | Mod: PBBFAC,,, | Performed by: FAMILY MEDICINE

## 2021-06-28 PROCEDURE — 3078F DIAST BP <80 MM HG: CPT | Mod: CPTII,S$GLB,, | Performed by: FAMILY MEDICINE

## 2021-06-28 PROCEDURE — 3075F PR MOST RECENT SYSTOLIC BLOOD PRESS GE 130-139MM HG: ICD-10-PCS | Mod: CPTII,S$GLB,, | Performed by: FAMILY MEDICINE

## 2021-06-28 PROCEDURE — 99999 PR PBB SHADOW E&M-EST. PATIENT-LVL III: CPT | Mod: PBBFAC,,, | Performed by: FAMILY MEDICINE

## 2021-06-28 PROCEDURE — 3008F BODY MASS INDEX DOCD: CPT | Mod: CPTII,S$GLB,, | Performed by: FAMILY MEDICINE

## 2021-06-28 PROCEDURE — 3075F SYST BP GE 130 - 139MM HG: CPT | Mod: CPTII,S$GLB,, | Performed by: FAMILY MEDICINE

## 2021-06-28 PROCEDURE — 99499 UNLISTED E&M SERVICE: CPT | Mod: HCNC,S$GLB,, | Performed by: FAMILY MEDICINE

## 2021-06-28 PROCEDURE — 3072F PR LOW RISK FOR RETINOPATHY: ICD-10-PCS | Mod: S$GLB,,, | Performed by: FAMILY MEDICINE

## 2021-06-28 PROCEDURE — 3072F LOW RISK FOR RETINOPATHY: CPT | Mod: S$GLB,,, | Performed by: FAMILY MEDICINE

## 2021-06-28 PROCEDURE — 3078F PR MOST RECENT DIASTOLIC BLOOD PRESSURE < 80 MM HG: ICD-10-PCS | Mod: CPTII,S$GLB,, | Performed by: FAMILY MEDICINE

## 2021-06-28 PROCEDURE — 99214 PR OFFICE/OUTPT VISIT, EST, LEVL IV, 30-39 MIN: ICD-10-PCS | Mod: S$GLB,,, | Performed by: FAMILY MEDICINE

## 2021-06-28 PROCEDURE — 3008F PR BODY MASS INDEX (BMI) DOCUMENTED: ICD-10-PCS | Mod: CPTII,S$GLB,, | Performed by: FAMILY MEDICINE

## 2021-06-28 PROCEDURE — 99214 OFFICE O/P EST MOD 30 MIN: CPT | Mod: S$GLB,,, | Performed by: FAMILY MEDICINE

## 2021-06-28 RX ORDER — PEN NEEDLE, DIABETIC 31 G X1/4"
NEEDLE, DISPOSABLE MISCELLANEOUS
Qty: 100 EACH | Refills: 5 | Status: SHIPPED | OUTPATIENT
Start: 2021-06-28 | End: 2023-04-05 | Stop reason: CLARIF

## 2021-06-28 RX ORDER — LISINOPRIL AND HYDROCHLOROTHIAZIDE 20; 25 MG/1; MG/1
1 TABLET ORAL DAILY
Qty: 90 TABLET | Refills: 3 | Status: SHIPPED | OUTPATIENT
Start: 2021-06-28 | End: 2021-08-02 | Stop reason: SDUPTHER

## 2021-06-28 RX ORDER — PENICILLIN V POTASSIUM 500 MG/1
TABLET, FILM COATED ORAL
Qty: 40 TABLET | Refills: 0 | Status: SHIPPED | OUTPATIENT
Start: 2021-06-28 | End: 2021-09-22

## 2021-06-29 VITALS
BODY MASS INDEX: 32.23 KG/M2 | HEIGHT: 63 IN | WEIGHT: 181.88 LBS | DIASTOLIC BLOOD PRESSURE: 67 MMHG | OXYGEN SATURATION: 95 % | SYSTOLIC BLOOD PRESSURE: 138 MMHG | HEART RATE: 87 BPM

## 2021-07-01 ENCOUNTER — PATIENT OUTREACH (OUTPATIENT)
Dept: ADMINISTRATIVE | Facility: OTHER | Age: 61
End: 2021-07-01

## 2021-07-06 ENCOUNTER — OFFICE VISIT (OUTPATIENT)
Dept: PAIN MEDICINE | Facility: CLINIC | Age: 61
End: 2021-07-06
Payer: MEDICARE

## 2021-07-06 VITALS
TEMPERATURE: 98 F | BODY MASS INDEX: 32.43 KG/M2 | HEART RATE: 57 BPM | DIASTOLIC BLOOD PRESSURE: 73 MMHG | WEIGHT: 183 LBS | RESPIRATION RATE: 18 BRPM | SYSTOLIC BLOOD PRESSURE: 100 MMHG | HEIGHT: 63 IN

## 2021-07-06 DIAGNOSIS — M17.12 PRIMARY OSTEOARTHRITIS OF LEFT KNEE: ICD-10-CM

## 2021-07-06 DIAGNOSIS — M48.00 SPINAL STENOSIS, UNSPECIFIED SPINAL REGION: ICD-10-CM

## 2021-07-06 DIAGNOSIS — M50.30 DDD (DEGENERATIVE DISC DISEASE), CERVICAL: ICD-10-CM

## 2021-07-06 DIAGNOSIS — M47.812 CERVICAL SPONDYLOSIS WITHOUT MYELOPATHY: ICD-10-CM

## 2021-07-06 DIAGNOSIS — G89.29 OTHER CHRONIC PAIN: Primary | ICD-10-CM

## 2021-07-06 DIAGNOSIS — E11.42 DM TYPE 2 WITH DIABETIC PERIPHERAL NEUROPATHY: ICD-10-CM

## 2021-07-06 DIAGNOSIS — M54.12 CERVICAL RADICULOPATHY: ICD-10-CM

## 2021-07-06 PROCEDURE — 3072F LOW RISK FOR RETINOPATHY: CPT | Mod: S$GLB,,, | Performed by: NURSE PRACTITIONER

## 2021-07-06 PROCEDURE — 99213 OFFICE O/P EST LOW 20 MIN: CPT | Mod: S$GLB,,, | Performed by: NURSE PRACTITIONER

## 2021-07-06 PROCEDURE — 1125F PR PAIN SEVERITY QUANTIFIED, PAIN PRESENT: ICD-10-PCS | Mod: S$GLB,,, | Performed by: NURSE PRACTITIONER

## 2021-07-06 PROCEDURE — 3072F PR LOW RISK FOR RETINOPATHY: ICD-10-PCS | Mod: S$GLB,,, | Performed by: NURSE PRACTITIONER

## 2021-07-06 PROCEDURE — 99213 PR OFFICE/OUTPT VISIT, EST, LEVL III, 20-29 MIN: ICD-10-PCS | Mod: S$GLB,,, | Performed by: NURSE PRACTITIONER

## 2021-07-06 PROCEDURE — 1125F AMNT PAIN NOTED PAIN PRSNT: CPT | Mod: S$GLB,,, | Performed by: NURSE PRACTITIONER

## 2021-07-06 PROCEDURE — 99999 PR PBB SHADOW E&M-EST. PATIENT-LVL III: ICD-10-PCS | Mod: PBBFAC,,, | Performed by: NURSE PRACTITIONER

## 2021-07-06 PROCEDURE — 3008F BODY MASS INDEX DOCD: CPT | Mod: CPTII,S$GLB,, | Performed by: NURSE PRACTITIONER

## 2021-07-06 PROCEDURE — 99999 PR PBB SHADOW E&M-EST. PATIENT-LVL III: CPT | Mod: PBBFAC,,, | Performed by: NURSE PRACTITIONER

## 2021-07-06 PROCEDURE — 3008F PR BODY MASS INDEX (BMI) DOCUMENTED: ICD-10-PCS | Mod: CPTII,S$GLB,, | Performed by: NURSE PRACTITIONER

## 2021-07-06 RX ORDER — PREGABALIN 100 MG/1
100 CAPSULE ORAL 3 TIMES DAILY
Qty: 270 CAPSULE | Refills: 1 | Status: SHIPPED | OUTPATIENT
Start: 2021-07-06 | End: 2022-01-18 | Stop reason: SDUPTHER

## 2021-07-12 RX ORDER — ZIPRASIDONE HYDROCHLORIDE 40 MG/1
CAPSULE ORAL
Qty: 30 CAPSULE | Refills: 5 | Status: SHIPPED | OUTPATIENT
Start: 2021-07-12 | End: 2021-08-09 | Stop reason: SDUPTHER

## 2021-07-14 DIAGNOSIS — Z12.11 SPECIAL SCREENING FOR MALIGNANT NEOPLASM OF COLON: Primary | ICD-10-CM

## 2021-07-28 ENCOUNTER — HOSPITAL ENCOUNTER (OUTPATIENT)
Facility: OTHER | Age: 61
Discharge: HOME | End: 2021-07-28
Attending: ANESTHESIOLOGY | Admitting: ANESTHESIOLOGY
Payer: MEDICARE

## 2021-07-28 VITALS
WEIGHT: 179 LBS | OXYGEN SATURATION: 96 % | HEART RATE: 79 BPM | TEMPERATURE: 98 F | SYSTOLIC BLOOD PRESSURE: 132 MMHG | RESPIRATION RATE: 16 BRPM | BODY MASS INDEX: 32.94 KG/M2 | DIASTOLIC BLOOD PRESSURE: 70 MMHG | HEIGHT: 62 IN

## 2021-07-28 DIAGNOSIS — M47.812 CERVICAL SPONDYLOSIS: ICD-10-CM

## 2021-07-28 LAB — POCT GLUCOSE: 103 MG/DL (ref 70–110)

## 2021-07-28 PROCEDURE — 25000003 PHARM REV CODE 250: Performed by: ANESTHESIOLOGY

## 2021-07-28 PROCEDURE — 64633 DESTROY CERV/THOR FACET JNT: CPT | Mod: RT | Performed by: ANESTHESIOLOGY

## 2021-07-28 PROCEDURE — 64634 DESTROY C/TH FACET JNT ADDL: CPT | Mod: RT | Performed by: ANESTHESIOLOGY

## 2021-07-28 PROCEDURE — 64633 PR DESTROY CERV/THOR FACET JNT: ICD-10-PCS | Mod: RT,,, | Performed by: ANESTHESIOLOGY

## 2021-07-28 PROCEDURE — 63600175 PHARM REV CODE 636 W HCPCS: Performed by: ANESTHESIOLOGY

## 2021-07-28 PROCEDURE — 64634 DESTROY C/TH FACET JNT ADDL: CPT | Mod: RT,,, | Performed by: ANESTHESIOLOGY

## 2021-07-28 PROCEDURE — 64634 PR DESTROY C/TH FACET JNT ADDL: ICD-10-PCS | Mod: RT,,, | Performed by: ANESTHESIOLOGY

## 2021-07-28 PROCEDURE — 64633 DESTROY CERV/THOR FACET JNT: CPT | Mod: RT,,, | Performed by: ANESTHESIOLOGY

## 2021-07-28 PROCEDURE — 82947 ASSAY GLUCOSE BLOOD QUANT: CPT | Performed by: ANESTHESIOLOGY

## 2021-07-28 RX ORDER — BUPIVACAINE HYDROCHLORIDE 2.5 MG/ML
INJECTION, SOLUTION EPIDURAL; INFILTRATION; INTRACAUDAL
Status: DISCONTINUED | OUTPATIENT
Start: 2021-07-28 | End: 2021-07-28 | Stop reason: HOSPADM

## 2021-07-28 RX ORDER — FENTANYL CITRATE 50 UG/ML
INJECTION, SOLUTION INTRAMUSCULAR; INTRAVENOUS
Status: DISCONTINUED | OUTPATIENT
Start: 2021-07-28 | End: 2021-07-28 | Stop reason: HOSPADM

## 2021-07-28 RX ORDER — SODIUM CHLORIDE 9 MG/ML
INJECTION, SOLUTION INTRAVENOUS CONTINUOUS
Status: DISCONTINUED | OUTPATIENT
Start: 2021-07-28 | End: 2021-07-28 | Stop reason: HOSPADM

## 2021-07-28 RX ORDER — MIDAZOLAM HYDROCHLORIDE 1 MG/ML
INJECTION INTRAMUSCULAR; INTRAVENOUS
Status: DISCONTINUED | OUTPATIENT
Start: 2021-07-28 | End: 2021-07-28 | Stop reason: HOSPADM

## 2021-07-28 RX ORDER — DEXAMETHASONE SODIUM PHOSPHATE 4 MG/ML
INJECTION, SOLUTION INTRA-ARTICULAR; INTRALESIONAL; INTRAMUSCULAR; INTRAVENOUS; SOFT TISSUE
Status: DISCONTINUED | OUTPATIENT
Start: 2021-07-28 | End: 2021-07-28 | Stop reason: HOSPADM

## 2021-07-28 RX ORDER — LIDOCAINE HYDROCHLORIDE 10 MG/ML
INJECTION INFILTRATION; PERINEURAL
Status: DISCONTINUED | OUTPATIENT
Start: 2021-07-28 | End: 2021-07-28 | Stop reason: HOSPADM

## 2021-07-28 RX ADMIN — SODIUM CHLORIDE: 0.9 INJECTION, SOLUTION INTRAVENOUS at 01:07

## 2021-08-04 ENCOUNTER — TELEPHONE (OUTPATIENT)
Dept: INTERNAL MEDICINE | Facility: CLINIC | Age: 61
End: 2021-08-04

## 2021-08-04 DIAGNOSIS — Z12.11 SCREEN FOR COLON CANCER: Primary | ICD-10-CM

## 2021-08-06 RX ORDER — ATORVASTATIN CALCIUM 80 MG/1
80 TABLET, FILM COATED ORAL DAILY
Qty: 30 TABLET | Refills: 0 | Status: SHIPPED | OUTPATIENT
Start: 2021-08-06 | End: 2021-08-09

## 2021-08-09 RX ORDER — ZIPRASIDONE HYDROCHLORIDE 40 MG/1
CAPSULE ORAL
Qty: 30 CAPSULE | Refills: 5 | Status: SHIPPED | OUTPATIENT
Start: 2021-08-09 | End: 2021-08-17 | Stop reason: SDUPTHER

## 2021-08-09 RX ORDER — ATORVASTATIN CALCIUM 80 MG/1
TABLET, FILM COATED ORAL
Qty: 90 TABLET | Refills: 3 | Status: SHIPPED | OUTPATIENT
Start: 2021-08-09 | End: 2021-09-22 | Stop reason: SDUPTHER

## 2021-08-13 NOTE — PROGRESS NOTES
Subjective:       Patient ID: Jonathan Gonzales is a 59 y.o. female who  has a past medical history of Bipolar disorder, Cancer of female breast (10/2010), Cancer of upper-outer quadrant of female breast (7/9/2012), Depression, Diabetes mellitus type II, Disturbances of sensation of smell and taste (6/29/2012), Hypertension, Malignant neoplasm of breast (female), unspecified site (4/27/2015), Neuropathy, Nonspecific abnormal unspecified cardiovascular function study (4/12/2013), Post-mastectomy lymphedema syndrome, and Schizophrenia.    Chief Complaint: Hip Pain     History was obtained from the patient and supplemented through chart review.  She is a patient of my colleague, Dr. Contreras.  Was last seen  for  Dm 2.    Hip Pain    Associated symptoms include numbness.     R Hip pain with sciatica:  ED visit last week for acute right-sided back pain with RLE sciatica.  No right hip pain on exam, but reproducible pain with rotation of the hip. Was given topical Voltaren, lidocaine without relief.  Has been improving with ibuprofen 800.  +CKD 3.  Normally ambulates with a cane, but difficulty walking lately d/t RLE weakness despite using a cane.  H/o breast cancer.    X-ray right hip  without acute process.      Dysarthria:  Family reports slurred speech.  Onset over a week ago at the same time as the RLE weakness.  Did not address this during her ED visit last week.  Also with RLE weakness x 1 week.  Reports B/l hand tremors, paresthesias; os on Lyrica for neuropathic pain from DM2.  Denies confusion.  +drooling.  Denies dysphagia.  +tobacco    Tobacco use:  She smokes 0.5 ppd.  Starting smoking cessation class    Review of Systems   Constitutional: Negative for fever and unexpected weight change.   HENT: Negative for rhinorrhea and sneezing.    Eyes: Negative for redness and itching.   Respiratory: Negative for shortness of breath and wheezing.    Cardiovascular: Negative for chest pain and  palpitations.   Gastrointestinal: Negative for abdominal pain and vomiting.   Genitourinary: Negative for dysuria and hematuria.   Musculoskeletal: Positive for arthralgias and gait problem.   Skin: Negative for color change and rash.   Neurological: Positive for weakness and numbness.   Hematological: Negative for adenopathy.   Psychiatric/Behavioral: Negative for confusion. The patient is not nervous/anxious.          Past Medical History:   Diagnosis Date    Bipolar disorder     Cancer of female breast 10/2010    T2 N1 Mx, Stage IIB left breast cancer    Cancer of upper-outer quadrant of female breast 7/9/2012    Depression     Diabetes mellitus type II     Disturbances of sensation of smell and taste 6/29/2012    Hypertension     Malignant neoplasm of breast (female), unspecified site 4/27/2015    Neuropathy     Nonspecific abnormal unspecified cardiovascular function study 4/12/2013    ECG READ AS SHOWING A T-WAVE ABNORMALITY     Post-mastectomy lymphedema syndrome     Schizophrenia      Past Surgical History:   Procedure Laterality Date    BREAST RECONSTRUCTION  11/23/11    B/L SHLOMO flap reconstruction S/P left MRM, right prophylactic mastectomy    BREAST RECONSTRUCTION Bilateral 3/13/2015    NIPPLE RECONSTRUCTION    MASTECTOMY Bilateral 01/2011    bilateral    TUBAL LIGATION       Family History   Problem Relation Age of Onset    Kidney disease Mother         Dialysis    Hypertension Mother     Diabetes Mother     Deep vein thrombosis Mother     Glaucoma Mother     Diabetic kidney disease Sister     Lung cancer Sister     Diabetes Sister     Diabetes Brother     Diabetes Son     No Known Problems Father     No Known Problems Maternal Grandmother     No Known Problems Maternal Grandfather     No Known Problems Paternal Grandmother     No Known Problems Paternal Grandfather     No Known Problems Son     Cervical cancer Daughter     No Known Problems Daughter     No Known  "Problems Daughter     No Known Problems Daughter     Breast cancer Neg Hx     Ovarian cancer Neg Hx      Social History     Socioeconomic History    Marital status:      Spouse name: Not on file    Number of children: 6    Years of education: Not on file    Highest education level: Not on file   Occupational History    Occupation: Disability   Social Needs    Financial resource strain: Not on file    Food insecurity:     Worry: Not on file     Inability: Not on file    Transportation needs:     Medical: Not on file     Non-medical: Not on file   Tobacco Use    Smoking status: Current Every Day Smoker     Packs/day: 0.25     Years: 25.00     Pack years: 6.25     Types: Cigarettes    Smokeless tobacco: Never Used    Tobacco comment: currently at less than 1/2 pack pd   Substance and Sexual Activity    Alcohol use: No     Alcohol/week: 0.0 standard drinks    Drug use: No     Comment: 1991 - stopped using crack cocaine    Sexual activity: Never     Partners: Male   Lifestyle    Physical activity:     Days per week: Not on file     Minutes per session: Not on file    Stress: Not on file   Relationships    Social connections:     Talks on phone: Not on file     Gets together: Not on file     Attends [employer] service: Not on file     Active member of club or organization: Not on file     Attends meetings of clubs or organizations: Not on file     Relationship status: Not on file   Other Topics Concern    Not on file   Social History Narrative    Not on file     Objective:      Vitals:    11/11/19 1121   BP: 100/80   Pulse: 94   SpO2: 96%   Weight: 85.7 kg (188 lb 15 oz)   Height: 5' 3" (1.6 m)      Physical Exam   Constitutional: She appears well-developed and well-nourished. No distress.   HENT:   Head: Normocephalic.   Mouth/Throat: Oropharynx is clear and moist. No oropharyngeal exudate.   Eyes: Pupils are equal, round, and reactive to light. EOM are normal. Right eye exhibits no discharge. " Left eye exhibits no discharge. No scleral icterus.   Neck: Neck supple.   Cardiovascular: Normal rate, regular rhythm and normal heart sounds.   No murmur heard.  Pulmonary/Chest: Effort normal and breath sounds normal. No respiratory distress. She has no wheezes.   Abdominal: Soft. Bowel sounds are normal. She exhibits no distension. There is no tenderness.   Musculoskeletal: She exhibits no edema or deformity.   Slight TTP on palpation of lateral aspect of right hip   Lymphadenopathy:     She has no cervical adenopathy.   Neurological: She is alert. No cranial nerve deficit. Coordination and gait abnormal.   Dysarthria.   No facial asymmetry.  VFFTC. Abnormal L sided FTN, L dysdiadochokinesia.  RLE 3/5. LLE 5/5.  BLE 4/5.   Ambulates with a cane.   Skin: Skin is warm and dry. She is not diaphoretic.   Psychiatric: She has a normal mood and affect. Her behavior is normal.         Lab Results   Component Value Date    WBC 6.33 09/09/2019    HGB 11.8 (L) 09/09/2019    HCT 36.8 (L) 09/09/2019     09/09/2019    CHOL 189 02/16/2018    TRIG 215 (H) 02/16/2018    HDL 38 (L) 02/16/2018    ALT 32 09/09/2019    AST 28 09/09/2019     09/09/2019    K 3.8 09/09/2019     09/09/2019    CREATININE 1.1 09/09/2019    BUN 8 09/09/2019    CO2 23 09/09/2019    TSH 1.293 02/16/2018    INR 1.0 02/26/2017    HGBA1C 6.3 (H) 02/11/2019       The 10-year ASCVD risk score ([place] WELLINGTON Jr., et al., 2013) is: 17.5%    Values used to calculate the score:      Age: 59 years      Sex: Female      Is Non- : Yes      Diabetic: Yes      Tobacco smoker: Yes      Systolic Blood Pressure: 100 mmHg      Is BP treated: Yes      HDL Cholesterol: 38 mg/dL      Total Cholesterol: 189 mg/dL    (Imaging have been independently reviewed)   x-ray right hip  without acute process.    Assessment:       1. Pain of right hip joint    2. Dysarthria    3. CKD (chronic kidney disease) stage 3, GFR 30-59 ml/min    4.  Moderate smoker (20 or less per day)          Plan:       Jonathan was seen today for hip pain.    Diagnoses and all orders for this visit:    Pain of right hip joint  Comments:  May take Tylenol, but avoid NSAIDs due to CKD 3.  Start Medrol Dosepak. Refer to Ortho for steroid injection if needed.  Orders:  -     methylPREDNISolone (MEDROL DOSEPACK) 4 mg tablet; use as directed    Dysarthria  Comments:  Concern for CVA given RLE weakness, dysdiadochokinesia. Discussed with the ED staff. Sending to the ED. Out of window for tPA. Will need swallow eval, PT.    CKD (chronic kidney disease) stage 3, GFR 30-59 ml/min  Comments:  Advised to avoid NSAIDs as above.    Moderate smoker (20 or less per day)  Comments:  Counseled for 5 min on tobacco cessation.    Other orders  -     Cancel: Ambulatory referral/consult to Orthopedics; Future  -     Cancel: cyclobenzaprine (FLEXERIL) 5 MG tablet; Take 1 tablet (5 mg total) by mouth 3 (three) times daily as needed for Muscle spasms.         Side effects of medication(s) were discussed in detail and patient voiced understanding.  Patient will call back for any issues or complications.     RTC: has appt next week with PCP, Dr. Contreras for ED follow up.   [Negative] : Heme/Lymph

## 2021-08-17 ENCOUNTER — TELEPHONE (OUTPATIENT)
Dept: PAIN MEDICINE | Facility: CLINIC | Age: 61
End: 2021-08-17

## 2021-08-17 RX ORDER — ZIPRASIDONE HYDROCHLORIDE 40 MG/1
CAPSULE ORAL
Qty: 30 CAPSULE | Refills: 5 | Status: SHIPPED | OUTPATIENT
Start: 2021-08-17 | End: 2021-12-30 | Stop reason: SDUPTHER

## 2021-08-18 ENCOUNTER — HOSPITAL ENCOUNTER (OUTPATIENT)
Facility: OTHER | Age: 61
Discharge: HOME | End: 2021-08-18
Attending: ANESTHESIOLOGY | Admitting: ANESTHESIOLOGY
Payer: MEDICARE

## 2021-08-18 VITALS
OXYGEN SATURATION: 100 % | WEIGHT: 180 LBS | HEIGHT: 63 IN | DIASTOLIC BLOOD PRESSURE: 73 MMHG | BODY MASS INDEX: 31.89 KG/M2 | RESPIRATION RATE: 14 BRPM | HEART RATE: 78 BPM | SYSTOLIC BLOOD PRESSURE: 130 MMHG | TEMPERATURE: 99 F

## 2021-08-18 DIAGNOSIS — M47.819 SPONDYLOSIS WITHOUT MYELOPATHY: ICD-10-CM

## 2021-08-18 DIAGNOSIS — M47.812 OSTEOARTHRITIS OF CERVICAL SPINE, UNSPECIFIED SPINAL OSTEOARTHRITIS COMPLICATION STATUS: ICD-10-CM

## 2021-08-18 DIAGNOSIS — M50.30 DDD (DEGENERATIVE DISC DISEASE), CERVICAL: Primary | ICD-10-CM

## 2021-08-18 LAB — POCT GLUCOSE: 143 MG/DL (ref 70–110)

## 2021-08-18 PROCEDURE — 64633 DESTROY CERV/THOR FACET JNT: CPT | Mod: LT,,, | Performed by: ANESTHESIOLOGY

## 2021-08-18 PROCEDURE — 64633 PR DESTROY CERV/THOR FACET JNT: ICD-10-PCS | Mod: LT,,, | Performed by: ANESTHESIOLOGY

## 2021-08-18 PROCEDURE — 64634 PR DESTROY C/TH FACET JNT ADDL: ICD-10-PCS | Mod: LT,,, | Performed by: ANESTHESIOLOGY

## 2021-08-18 PROCEDURE — 64634 DESTROY C/TH FACET JNT ADDL: CPT | Mod: LT | Performed by: ANESTHESIOLOGY

## 2021-08-18 PROCEDURE — 63600175 PHARM REV CODE 636 W HCPCS: Performed by: ANESTHESIOLOGY

## 2021-08-18 PROCEDURE — 64634 DESTROY C/TH FACET JNT ADDL: CPT | Mod: LT,,, | Performed by: ANESTHESIOLOGY

## 2021-08-18 PROCEDURE — 25000003 PHARM REV CODE 250: Performed by: ANESTHESIOLOGY

## 2021-08-18 PROCEDURE — 64633 DESTROY CERV/THOR FACET JNT: CPT | Mod: LT | Performed by: ANESTHESIOLOGY

## 2021-08-18 RX ORDER — BUPIVACAINE HYDROCHLORIDE 2.5 MG/ML
INJECTION, SOLUTION EPIDURAL; INFILTRATION; INTRACAUDAL
Status: DISCONTINUED | OUTPATIENT
Start: 2021-08-18 | End: 2021-08-18 | Stop reason: HOSPADM

## 2021-08-18 RX ORDER — MIDAZOLAM HYDROCHLORIDE 1 MG/ML
INJECTION INTRAMUSCULAR; INTRAVENOUS
Status: DISCONTINUED | OUTPATIENT
Start: 2021-08-18 | End: 2021-08-18 | Stop reason: HOSPADM

## 2021-08-18 RX ORDER — DEXAMETHASONE SODIUM PHOSPHATE 4 MG/ML
INJECTION, SOLUTION INTRA-ARTICULAR; INTRALESIONAL; INTRAMUSCULAR; INTRAVENOUS; SOFT TISSUE
Status: DISCONTINUED | OUTPATIENT
Start: 2021-08-18 | End: 2021-08-18 | Stop reason: HOSPADM

## 2021-08-18 RX ORDER — FENTANYL CITRATE 50 UG/ML
INJECTION, SOLUTION INTRAMUSCULAR; INTRAVENOUS
Status: DISCONTINUED | OUTPATIENT
Start: 2021-08-18 | End: 2021-08-18 | Stop reason: HOSPADM

## 2021-08-18 RX ORDER — LIDOCAINE HYDROCHLORIDE 10 MG/ML
INJECTION INFILTRATION; PERINEURAL
Status: DISCONTINUED | OUTPATIENT
Start: 2021-08-18 | End: 2021-08-18 | Stop reason: HOSPADM

## 2021-08-20 DIAGNOSIS — C50.912 MALIGNANT NEOPLASM OF LEFT FEMALE BREAST, UNSPECIFIED ESTROGEN RECEPTOR STATUS, UNSPECIFIED SITE OF BREAST: Primary | ICD-10-CM

## 2021-08-23 DIAGNOSIS — E11.9 TYPE 2 DIABETES MELLITUS WITHOUT COMPLICATION, WITH LONG-TERM CURRENT USE OF INSULIN: Primary | ICD-10-CM

## 2021-08-23 DIAGNOSIS — N39.41 URGE INCONTINENCE: ICD-10-CM

## 2021-08-23 DIAGNOSIS — Z79.4 TYPE 2 DIABETES MELLITUS WITHOUT COMPLICATION, WITH LONG-TERM CURRENT USE OF INSULIN: Primary | ICD-10-CM

## 2021-08-23 RX ORDER — METFORMIN HYDROCHLORIDE 500 MG/1
500 TABLET ORAL 2 TIMES DAILY
Qty: 180 TABLET | Refills: 3 | Status: SHIPPED | OUTPATIENT
Start: 2021-08-23 | End: 2022-06-20 | Stop reason: SDUPTHER

## 2021-08-23 RX ORDER — OXYBUTYNIN CHLORIDE 10 MG/1
10 TABLET, EXTENDED RELEASE ORAL DAILY
Qty: 90 TABLET | Refills: 3 | Status: SHIPPED | OUTPATIENT
Start: 2021-08-23 | End: 2022-01-18 | Stop reason: SDUPTHER

## 2021-08-24 ENCOUNTER — TELEPHONE (OUTPATIENT)
Dept: PAIN MEDICINE | Facility: CLINIC | Age: 61
End: 2021-08-24

## 2021-08-24 ENCOUNTER — OFFICE VISIT (OUTPATIENT)
Dept: HEMATOLOGY/ONCOLOGY | Facility: CLINIC | Age: 61
End: 2021-08-24
Payer: MEDICARE

## 2021-08-24 ENCOUNTER — LAB VISIT (OUTPATIENT)
Dept: LAB | Facility: OTHER | Age: 61
End: 2021-08-24
Attending: INTERNAL MEDICINE
Payer: MEDICAID

## 2021-08-24 VITALS
BODY MASS INDEX: 33.2 KG/M2 | HEIGHT: 63 IN | SYSTOLIC BLOOD PRESSURE: 119 MMHG | DIASTOLIC BLOOD PRESSURE: 80 MMHG | WEIGHT: 187.38 LBS | OXYGEN SATURATION: 99 % | HEART RATE: 104 BPM

## 2021-08-24 DIAGNOSIS — C50.912 MALIGNANT NEOPLASM OF LEFT FEMALE BREAST, UNSPECIFIED ESTROGEN RECEPTOR STATUS, UNSPECIFIED SITE OF BREAST: Primary | ICD-10-CM

## 2021-08-24 DIAGNOSIS — F25.0 SCHIZOAFFECTIVE DISORDER, BIPOLAR TYPE: ICD-10-CM

## 2021-08-24 DIAGNOSIS — I89.0 LYMPHEDEMA: ICD-10-CM

## 2021-08-24 DIAGNOSIS — C50.912 MALIGNANT NEOPLASM OF LEFT FEMALE BREAST, UNSPECIFIED ESTROGEN RECEPTOR STATUS, UNSPECIFIED SITE OF BREAST: ICD-10-CM

## 2021-08-24 DIAGNOSIS — E11.9 TYPE 2 DIABETES MELLITUS WITHOUT COMPLICATION, UNSPECIFIED WHETHER LONG TERM INSULIN USE: ICD-10-CM

## 2021-08-24 LAB
ALBUMIN SERPL BCP-MCNC: 3.5 G/DL (ref 3.5–5.2)
ALP SERPL-CCNC: 60 U/L (ref 55–135)
ALT SERPL W/O P-5'-P-CCNC: 15 U/L (ref 10–44)
ANION GAP SERPL CALC-SCNC: 11 MMOL/L (ref 8–16)
AST SERPL-CCNC: 20 U/L (ref 10–40)
BASOPHILS # BLD AUTO: 0.02 K/UL (ref 0–0.2)
BASOPHILS NFR BLD: 0.3 % (ref 0–1.9)
BILIRUB SERPL-MCNC: 0.5 MG/DL (ref 0.1–1)
BUN SERPL-MCNC: 12 MG/DL (ref 8–23)
CALCIUM SERPL-MCNC: 9.4 MG/DL (ref 8.7–10.5)
CHLORIDE SERPL-SCNC: 103 MMOL/L (ref 95–110)
CO2 SERPL-SCNC: 26 MMOL/L (ref 23–29)
CREAT SERPL-MCNC: 1.3 MG/DL (ref 0.5–1.4)
DIFFERENTIAL METHOD: ABNORMAL
EOSINOPHIL # BLD AUTO: 0.2 K/UL (ref 0–0.5)
EOSINOPHIL NFR BLD: 3.1 % (ref 0–8)
ERYTHROCYTE [DISTWIDTH] IN BLOOD BY AUTOMATED COUNT: 13.7 % (ref 11.5–14.5)
EST. GFR  (AFRICAN AMERICAN): 51 ML/MIN/1.73 M^2
EST. GFR  (NON AFRICAN AMERICAN): 44 ML/MIN/1.73 M^2
GLUCOSE SERPL-MCNC: 212 MG/DL (ref 70–110)
HCT VFR BLD AUTO: 37.4 % (ref 37–48.5)
HGB BLD-MCNC: 12 G/DL (ref 12–16)
IMM GRANULOCYTES # BLD AUTO: 0.05 K/UL (ref 0–0.04)
IMM GRANULOCYTES NFR BLD AUTO: 0.8 % (ref 0–0.5)
LYMPHOCYTES # BLD AUTO: 3.2 K/UL (ref 1–4.8)
LYMPHOCYTES NFR BLD: 53 % (ref 18–48)
MCH RBC QN AUTO: 28.4 PG (ref 27–31)
MCHC RBC AUTO-ENTMCNC: 32.1 G/DL (ref 32–36)
MCV RBC AUTO: 88 FL (ref 82–98)
MONOCYTES # BLD AUTO: 0.4 K/UL (ref 0.3–1)
MONOCYTES NFR BLD: 5.7 % (ref 4–15)
NEUTROPHILS # BLD AUTO: 2.3 K/UL (ref 1.8–7.7)
NEUTROPHILS NFR BLD: 37.1 % (ref 38–73)
NRBC BLD-RTO: 0 /100 WBC
PLATELET # BLD AUTO: 293 K/UL (ref 150–450)
PMV BLD AUTO: 10.1 FL (ref 9.2–12.9)
POTASSIUM SERPL-SCNC: 3.5 MMOL/L (ref 3.5–5.1)
PROT SERPL-MCNC: 7.7 G/DL (ref 6–8.4)
RBC # BLD AUTO: 4.23 M/UL (ref 4–5.4)
SODIUM SERPL-SCNC: 140 MMOL/L (ref 136–145)
WBC # BLD AUTO: 6.09 K/UL (ref 3.9–12.7)

## 2021-08-24 PROCEDURE — 3072F PR LOW RISK FOR RETINOPATHY: ICD-10-PCS | Mod: CPTII,S$GLB,, | Performed by: INTERNAL MEDICINE

## 2021-08-24 PROCEDURE — 1125F AMNT PAIN NOTED PAIN PRSNT: CPT | Mod: CPTII,S$GLB,, | Performed by: INTERNAL MEDICINE

## 2021-08-24 PROCEDURE — 80053 COMPREHEN METABOLIC PANEL: CPT | Performed by: INTERNAL MEDICINE

## 2021-08-24 PROCEDURE — 3079F PR MOST RECENT DIASTOLIC BLOOD PRESSURE 80-89 MM HG: ICD-10-PCS | Mod: CPTII,S$GLB,, | Performed by: INTERNAL MEDICINE

## 2021-08-24 PROCEDURE — 3079F DIAST BP 80-89 MM HG: CPT | Mod: CPTII,S$GLB,, | Performed by: INTERNAL MEDICINE

## 2021-08-24 PROCEDURE — 1159F MED LIST DOCD IN RCRD: CPT | Mod: CPTII,S$GLB,, | Performed by: INTERNAL MEDICINE

## 2021-08-24 PROCEDURE — 85025 COMPLETE CBC W/AUTO DIFF WBC: CPT | Performed by: INTERNAL MEDICINE

## 2021-08-24 PROCEDURE — 1125F PR PAIN SEVERITY QUANTIFIED, PAIN PRESENT: ICD-10-PCS | Mod: CPTII,S$GLB,, | Performed by: INTERNAL MEDICINE

## 2021-08-24 PROCEDURE — 99999 PR PBB SHADOW E&M-EST. PATIENT-LVL III: CPT | Mod: PBBFAC,,, | Performed by: INTERNAL MEDICINE

## 2021-08-24 PROCEDURE — 3072F LOW RISK FOR RETINOPATHY: CPT | Mod: CPTII,S$GLB,, | Performed by: INTERNAL MEDICINE

## 2021-08-24 PROCEDURE — 99214 PR OFFICE/OUTPT VISIT, EST, LEVL IV, 30-39 MIN: ICD-10-PCS | Mod: S$GLB,,, | Performed by: INTERNAL MEDICINE

## 2021-08-24 PROCEDURE — 99499 RISK ADDL DX/OHS AUDIT: ICD-10-PCS | Mod: HCNC,S$GLB,, | Performed by: INTERNAL MEDICINE

## 2021-08-24 PROCEDURE — 36415 COLL VENOUS BLD VENIPUNCTURE: CPT | Performed by: INTERNAL MEDICINE

## 2021-08-24 PROCEDURE — 99499 UNLISTED E&M SERVICE: CPT | Mod: HCNC,S$GLB,, | Performed by: INTERNAL MEDICINE

## 2021-08-24 PROCEDURE — 3008F BODY MASS INDEX DOCD: CPT | Mod: CPTII,S$GLB,, | Performed by: INTERNAL MEDICINE

## 2021-08-24 PROCEDURE — 99999 PR PBB SHADOW E&M-EST. PATIENT-LVL III: ICD-10-PCS | Mod: PBBFAC,,, | Performed by: INTERNAL MEDICINE

## 2021-08-24 PROCEDURE — 3074F PR MOST RECENT SYSTOLIC BLOOD PRESSURE < 130 MM HG: ICD-10-PCS | Mod: CPTII,S$GLB,, | Performed by: INTERNAL MEDICINE

## 2021-08-24 PROCEDURE — 1159F PR MEDICATION LIST DOCUMENTED IN MEDICAL RECORD: ICD-10-PCS | Mod: CPTII,S$GLB,, | Performed by: INTERNAL MEDICINE

## 2021-08-24 PROCEDURE — 3008F PR BODY MASS INDEX (BMI) DOCUMENTED: ICD-10-PCS | Mod: CPTII,S$GLB,, | Performed by: INTERNAL MEDICINE

## 2021-08-24 PROCEDURE — 3074F SYST BP LT 130 MM HG: CPT | Mod: CPTII,S$GLB,, | Performed by: INTERNAL MEDICINE

## 2021-08-24 PROCEDURE — 3044F PR MOST RECENT HEMOGLOBIN A1C LEVEL <7.0%: ICD-10-PCS | Mod: CPTII,S$GLB,, | Performed by: INTERNAL MEDICINE

## 2021-08-24 PROCEDURE — 3044F HG A1C LEVEL LT 7.0%: CPT | Mod: CPTII,S$GLB,, | Performed by: INTERNAL MEDICINE

## 2021-08-24 PROCEDURE — 99214 OFFICE O/P EST MOD 30 MIN: CPT | Mod: S$GLB,,, | Performed by: INTERNAL MEDICINE

## 2021-08-25 RX ORDER — LIRAGLUTIDE 6 MG/ML
INJECTION SUBCUTANEOUS
Qty: 27 ML | Refills: 0 | Status: SHIPPED | OUTPATIENT
Start: 2021-08-25 | End: 2021-12-18

## 2021-09-22 ENCOUNTER — HOSPITAL ENCOUNTER (INPATIENT)
Facility: OTHER | Age: 61
LOS: 6 days | Discharge: HOME HEALTH | DRG: 684 | End: 2021-09-29
Attending: EMERGENCY MEDICINE | Admitting: HOSPITALIST
Payer: MEDICARE

## 2021-09-22 DIAGNOSIS — N17.9 ACUTE KIDNEY INJURY: ICD-10-CM

## 2021-09-22 DIAGNOSIS — R33.9 URINARY RETENTION: ICD-10-CM

## 2021-09-22 DIAGNOSIS — M25.559 HIP PAIN: Primary | ICD-10-CM

## 2021-09-22 DIAGNOSIS — Z79.4 TYPE 2 DIABETES MELLITUS WITH NEUROLOGIC COMPLICATION, WITH LONG-TERM CURRENT USE OF INSULIN: ICD-10-CM

## 2021-09-22 DIAGNOSIS — M48.00 SPINAL STENOSIS, UNSPECIFIED SPINAL REGION: ICD-10-CM

## 2021-09-22 DIAGNOSIS — M47.819 SPONDYLOSIS WITHOUT MYELOPATHY: ICD-10-CM

## 2021-09-22 DIAGNOSIS — Z79.4 TYPE 2 DIABETES MELLITUS WITHOUT COMPLICATION, WITH LONG-TERM CURRENT USE OF INSULIN: ICD-10-CM

## 2021-09-22 DIAGNOSIS — I95.9 HYPOTENSION: ICD-10-CM

## 2021-09-22 DIAGNOSIS — E11.9 TYPE 2 DIABETES MELLITUS WITHOUT COMPLICATION, WITH LONG-TERM CURRENT USE OF INSULIN: ICD-10-CM

## 2021-09-22 DIAGNOSIS — E11.49 TYPE 2 DIABETES MELLITUS WITH NEUROLOGIC COMPLICATION, WITH LONG-TERM CURRENT USE OF INSULIN: ICD-10-CM

## 2021-09-22 DIAGNOSIS — M54.30 SCIATICA, UNSPECIFIED LATERALITY: ICD-10-CM

## 2021-09-22 DIAGNOSIS — M50.30 DDD (DEGENERATIVE DISC DISEASE), CERVICAL: ICD-10-CM

## 2021-09-22 LAB
ALBUMIN SERPL BCP-MCNC: 2.8 G/DL (ref 3.5–5.2)
ALBUMIN SERPL BCP-MCNC: 3.5 G/DL (ref 3.5–5.2)
ALLENS TEST: ABNORMAL
ALP SERPL-CCNC: 56 U/L (ref 55–135)
ALP SERPL-CCNC: 76 U/L (ref 55–135)
ALT SERPL W/O P-5'-P-CCNC: 10 U/L (ref 10–44)
ALT SERPL W/O P-5'-P-CCNC: 10 U/L (ref 10–44)
AMPHET+METHAMPHET UR QL: NEGATIVE
ANION GAP SERPL CALC-SCNC: 14 MMOL/L (ref 8–16)
ANION GAP SERPL CALC-SCNC: 9 MMOL/L (ref 8–16)
AST SERPL-CCNC: 16 U/L (ref 10–40)
AST SERPL-CCNC: 22 U/L (ref 10–40)
BACTERIA #/AREA URNS HPF: ABNORMAL /HPF
BARBITURATES UR QL SCN>200 NG/ML: NEGATIVE
BASOPHILS # BLD AUTO: 0.03 K/UL (ref 0–0.2)
BASOPHILS # BLD AUTO: 0.06 K/UL (ref 0–0.2)
BASOPHILS NFR BLD: 0.4 % (ref 0–1.9)
BASOPHILS NFR BLD: 0.9 % (ref 0–1.9)
BENZODIAZ UR QL SCN>200 NG/ML: NEGATIVE
BILIRUB SERPL-MCNC: 0.6 MG/DL (ref 0.1–1)
BILIRUB SERPL-MCNC: 0.8 MG/DL (ref 0.1–1)
BILIRUB UR QL STRIP: NEGATIVE
BUN SERPL-MCNC: 28 MG/DL (ref 8–23)
BUN SERPL-MCNC: 30 MG/DL (ref 8–23)
BZE UR QL SCN: NEGATIVE
CALCIUM SERPL-MCNC: 8.5 MG/DL (ref 8.7–10.5)
CALCIUM SERPL-MCNC: 9.9 MG/DL (ref 8.7–10.5)
CANNABINOIDS UR QL SCN: NEGATIVE
CHLORIDE SERPL-SCNC: 100 MMOL/L (ref 95–110)
CHLORIDE SERPL-SCNC: 106 MMOL/L (ref 95–110)
CLARITY UR: ABNORMAL
CO2 SERPL-SCNC: 22 MMOL/L (ref 23–29)
CO2 SERPL-SCNC: 24 MMOL/L (ref 23–29)
COLOR UR: YELLOW
CREAT SERPL-MCNC: 2.1 MG/DL (ref 0.5–1.4)
CREAT SERPL-MCNC: 2.5 MG/DL (ref 0.5–1.4)
CREAT SERPL-MCNC: 2.7 MG/DL (ref 0.5–1.4)
CREAT UR-MCNC: 189.4 MG/DL (ref 15–325)
CTP QC/QA: YES
DIFFERENTIAL METHOD: ABNORMAL
DIFFERENTIAL METHOD: NORMAL
EOSINOPHIL # BLD AUTO: 0.1 K/UL (ref 0–0.5)
EOSINOPHIL # BLD AUTO: 0.1 K/UL (ref 0–0.5)
EOSINOPHIL NFR BLD: 1.4 % (ref 0–8)
EOSINOPHIL NFR BLD: 1.4 % (ref 0–8)
ERYTHROCYTE [DISTWIDTH] IN BLOOD BY AUTOMATED COUNT: 13.1 % (ref 11.5–14.5)
ERYTHROCYTE [DISTWIDTH] IN BLOOD BY AUTOMATED COUNT: 13.2 % (ref 11.5–14.5)
EST. GFR  (AFRICAN AMERICAN): 23 ML/MIN/1.73 M^2
EST. GFR  (AFRICAN AMERICAN): 28 ML/MIN/1.73 M^2
EST. GFR  (NON AFRICAN AMERICAN): 20 ML/MIN/1.73 M^2
EST. GFR  (NON AFRICAN AMERICAN): 25 ML/MIN/1.73 M^2
ETHANOL UR-MCNC: <10 MG/DL
GLUCOSE SERPL-MCNC: 68 MG/DL (ref 70–110)
GLUCOSE SERPL-MCNC: 91 MG/DL (ref 70–110)
GLUCOSE UR QL STRIP: NEGATIVE
HCO3 UR-SCNC: 27.5 MMOL/L (ref 24–28)
HCT VFR BLD AUTO: 32.1 % (ref 37–48.5)
HCT VFR BLD AUTO: 38.4 % (ref 37–48.5)
HCT VFR BLD CALC: 34 %PCV (ref 36–54)
HGB BLD-MCNC: 10.4 G/DL (ref 12–16)
HGB BLD-MCNC: 12 G/DL
HGB BLD-MCNC: 12.5 G/DL (ref 12–16)
HGB UR QL STRIP: ABNORMAL
IMM GRANULOCYTES # BLD AUTO: 0.02 K/UL (ref 0–0.04)
IMM GRANULOCYTES # BLD AUTO: 0.02 K/UL (ref 0–0.04)
IMM GRANULOCYTES NFR BLD AUTO: 0.3 % (ref 0–0.5)
IMM GRANULOCYTES NFR BLD AUTO: 0.3 % (ref 0–0.5)
KETONES UR QL STRIP: NEGATIVE
LACTATE SERPL-SCNC: 0.7 MMOL/L (ref 0.5–2.2)
LACTATE SERPL-SCNC: 1.1 MMOL/L (ref 0.5–2.2)
LACTATE SERPL-SCNC: 1.1 MMOL/L (ref 0.5–2.2)
LEUKOCYTE ESTERASE UR QL STRIP: ABNORMAL
LYMPHOCYTES # BLD AUTO: 2.2 K/UL (ref 1–4.8)
LYMPHOCYTES # BLD AUTO: 2.4 K/UL (ref 1–4.8)
LYMPHOCYTES NFR BLD: 31.1 % (ref 18–48)
LYMPHOCYTES NFR BLD: 36.5 % (ref 18–48)
MCH RBC QN AUTO: 28.7 PG (ref 27–31)
MCH RBC QN AUTO: 29 PG (ref 27–31)
MCHC RBC AUTO-ENTMCNC: 32.4 G/DL (ref 32–36)
MCHC RBC AUTO-ENTMCNC: 32.6 G/DL (ref 32–36)
MCV RBC AUTO: 88 FL (ref 82–98)
MCV RBC AUTO: 89 FL (ref 82–98)
METHADONE UR QL SCN>300 NG/ML: NEGATIVE
MICROSCOPIC COMMENT: ABNORMAL
MONOCYTES # BLD AUTO: 0.6 K/UL (ref 0.3–1)
MONOCYTES # BLD AUTO: 0.7 K/UL (ref 0.3–1)
MONOCYTES NFR BLD: 11.3 % (ref 4–15)
MONOCYTES NFR BLD: 8 % (ref 4–15)
NEUTROPHILS # BLD AUTO: 3.3 K/UL (ref 1.8–7.7)
NEUTROPHILS # BLD AUTO: 4.1 K/UL (ref 1.8–7.7)
NEUTROPHILS NFR BLD: 49.6 % (ref 38–73)
NEUTROPHILS NFR BLD: 58.8 % (ref 38–73)
NITRITE UR QL STRIP: NEGATIVE
NONINV COLON CA DNA+OCC BLD SCRN STL QL: NEGATIVE
NRBC BLD-RTO: 0 /100 WBC
NRBC BLD-RTO: 0 /100 WBC
OPIATES UR QL SCN: NEGATIVE
PCO2 BLDA: 48.8 MMHG (ref 35–45)
PCP UR QL SCN>25 NG/ML: NEGATIVE
PH SMN: 7.36 [PH] (ref 7.35–7.45)
PH UR STRIP: 6 [PH] (ref 5–8)
PLATELET # BLD AUTO: 293 K/UL (ref 150–450)
PLATELET # BLD AUTO: 345 K/UL (ref 150–450)
PMV BLD AUTO: 10 FL (ref 9.2–12.9)
PMV BLD AUTO: 9.9 FL (ref 9.2–12.9)
PO2 BLDA: 24 MMHG (ref 40–60)
POC BE: 2 MMOL/L
POC IONIZED CALCIUM: 1.22 MMOL/L (ref 1.06–1.42)
POC SATURATED O2: 41 % (ref 95–100)
POC TCO2: 29 MMOL/L (ref 24–29)
POCT GLUCOSE: 69 MG/DL (ref 70–110)
POCT GLUCOSE: 95 MG/DL (ref 70–110)
POTASSIUM BLD-SCNC: 3.6 MMOL/L (ref 3.5–5.1)
POTASSIUM SERPL-SCNC: 3.6 MMOL/L (ref 3.5–5.1)
POTASSIUM SERPL-SCNC: 4.1 MMOL/L (ref 3.5–5.1)
PROT SERPL-MCNC: 6.6 G/DL (ref 6–8.4)
PROT SERPL-MCNC: 8.5 G/DL (ref 6–8.4)
PROT UR QL STRIP: NEGATIVE
RBC # BLD AUTO: 3.59 M/UL (ref 4–5.4)
RBC # BLD AUTO: 4.36 M/UL (ref 4–5.4)
RBC #/AREA URNS HPF: 0 /HPF (ref 0–4)
SAMPLE: ABNORMAL
SAMPLE: ABNORMAL
SARS-COV-2 RDRP RESP QL NAA+PROBE: NEGATIVE
SITE: ABNORMAL
SODIUM BLD-SCNC: 139 MMOL/L (ref 136–145)
SODIUM SERPL-SCNC: 136 MMOL/L (ref 136–145)
SODIUM SERPL-SCNC: 139 MMOL/L (ref 136–145)
SP GR UR STRIP: 1.02 (ref 1–1.03)
SQUAMOUS #/AREA URNS HPF: 95 /HPF
TOXICOLOGY INFORMATION: NORMAL
URN SPEC COLLECT METH UR: ABNORMAL
UROBILINOGEN UR STRIP-ACNC: NEGATIVE EU/DL
WBC # BLD AUTO: 6.57 K/UL (ref 3.9–12.7)
WBC # BLD AUTO: 6.98 K/UL (ref 3.9–12.7)
WBC #/AREA URNS HPF: 17 /HPF (ref 0–5)

## 2021-09-22 PROCEDURE — 63600175 PHARM REV CODE 636 W HCPCS: Mod: HCNC | Performed by: EMERGENCY MEDICINE

## 2021-09-22 PROCEDURE — 81003 URINALYSIS AUTO W/O SCOPE: CPT | Mod: HCNC | Performed by: EMERGENCY MEDICINE

## 2021-09-22 PROCEDURE — 81000 URINALYSIS NONAUTO W/SCOPE: CPT | Mod: HCNC | Performed by: EMERGENCY MEDICINE

## 2021-09-22 PROCEDURE — 87086 URINE CULTURE/COLONY COUNT: CPT | Mod: HCNC | Performed by: EMERGENCY MEDICINE

## 2021-09-22 PROCEDURE — U0002 COVID-19 LAB TEST NON-CDC: HCPCS | Mod: HCNC | Performed by: EMERGENCY MEDICINE

## 2021-09-22 PROCEDURE — 96374 THER/PROPH/DIAG INJ IV PUSH: CPT | Mod: HCNC

## 2021-09-22 PROCEDURE — 25000003 PHARM REV CODE 250: Mod: HCNC | Performed by: NURSE PRACTITIONER

## 2021-09-22 PROCEDURE — 96361 HYDRATE IV INFUSION ADD-ON: CPT | Mod: HCNC

## 2021-09-22 PROCEDURE — 85025 COMPLETE CBC W/AUTO DIFF WBC: CPT | Mod: HCNC | Performed by: EMERGENCY MEDICINE

## 2021-09-22 PROCEDURE — 82962 GLUCOSE BLOOD TEST: CPT | Mod: HCNC

## 2021-09-22 PROCEDURE — 80307 DRUG TEST PRSMV CHEM ANLYZR: CPT | Mod: HCNC | Performed by: EMERGENCY MEDICINE

## 2021-09-22 PROCEDURE — G0378 HOSPITAL OBSERVATION PER HR: HCPCS | Mod: HCNC

## 2021-09-22 PROCEDURE — 83605 ASSAY OF LACTIC ACID: CPT | Mod: 91,HCNC | Performed by: EMERGENCY MEDICINE

## 2021-09-22 PROCEDURE — 80053 COMPREHEN METABOLIC PANEL: CPT | Mod: 91,HCNC | Performed by: EMERGENCY MEDICINE

## 2021-09-22 PROCEDURE — 99284 EMERGENCY DEPT VISIT MOD MDM: CPT | Mod: 25,HCNC

## 2021-09-22 PROCEDURE — 96372 THER/PROPH/DIAG INJ SC/IM: CPT | Mod: 59,HCNC

## 2021-09-22 PROCEDURE — 25000003 PHARM REV CODE 250: Mod: HCNC | Performed by: EMERGENCY MEDICINE

## 2021-09-22 PROCEDURE — 63600175 PHARM REV CODE 636 W HCPCS: Mod: HCNC

## 2021-09-22 RX ORDER — NALOXONE HCL 0.4 MG/ML
0.4 VIAL (ML) INJECTION
Status: COMPLETED | OUTPATIENT
Start: 2021-09-22 | End: 2021-09-22

## 2021-09-22 RX ORDER — GLUCAGON 1 MG
1 KIT INJECTION
Status: DISCONTINUED | OUTPATIENT
Start: 2021-09-22 | End: 2021-09-29 | Stop reason: HOSPADM

## 2021-09-22 RX ORDER — NALOXONE HCL 0.4 MG/ML
VIAL (ML) INJECTION
Status: COMPLETED
Start: 2021-09-22 | End: 2021-09-22

## 2021-09-22 RX ORDER — INSULIN ASPART 100 [IU]/ML
0-5 INJECTION, SOLUTION INTRAVENOUS; SUBCUTANEOUS
Status: DISCONTINUED | OUTPATIENT
Start: 2021-09-22 | End: 2021-09-29 | Stop reason: HOSPADM

## 2021-09-22 RX ORDER — LISINOPRIL 20 MG/1
20 TABLET ORAL
Status: DISCONTINUED | OUTPATIENT
Start: 2021-09-22 | End: 2021-09-22

## 2021-09-22 RX ORDER — TALC
6 POWDER (GRAM) TOPICAL NIGHTLY PRN
Status: DISCONTINUED | OUTPATIENT
Start: 2021-09-22 | End: 2021-09-29 | Stop reason: HOSPADM

## 2021-09-22 RX ORDER — SODIUM CHLORIDE 9 MG/ML
INJECTION, SOLUTION INTRAVENOUS CONTINUOUS
Status: DISCONTINUED | OUTPATIENT
Start: 2021-09-22 | End: 2021-09-23

## 2021-09-22 RX ORDER — LISINOPRIL 20 MG/1
20 TABLET ORAL NIGHTLY
Status: DISCONTINUED | OUTPATIENT
Start: 2021-09-22 | End: 2021-09-22

## 2021-09-22 RX ORDER — SODIUM CHLORIDE 0.9 % (FLUSH) 0.9 %
10 SYRINGE (ML) INJECTION
Status: DISCONTINUED | OUTPATIENT
Start: 2021-09-22 | End: 2021-09-29 | Stop reason: HOSPADM

## 2021-09-22 RX ORDER — DIVALPROEX SODIUM 250 MG/1
250 TABLET, FILM COATED, EXTENDED RELEASE ORAL NIGHTLY
Status: DISCONTINUED | OUTPATIENT
Start: 2021-09-22 | End: 2021-09-22

## 2021-09-22 RX ORDER — ASPIRIN 81 MG/1
81 TABLET ORAL DAILY
Status: DISCONTINUED | OUTPATIENT
Start: 2021-09-23 | End: 2021-09-23

## 2021-09-22 RX ORDER — IBUPROFEN 200 MG
24 TABLET ORAL
Status: DISCONTINUED | OUTPATIENT
Start: 2021-09-22 | End: 2021-09-29 | Stop reason: HOSPADM

## 2021-09-22 RX ORDER — ORPHENADRINE CITRATE 30 MG/ML
60 INJECTION INTRAMUSCULAR; INTRAVENOUS
Status: COMPLETED | OUTPATIENT
Start: 2021-09-22 | End: 2021-09-22

## 2021-09-22 RX ORDER — IBUPROFEN 200 MG
16 TABLET ORAL
Status: DISCONTINUED | OUTPATIENT
Start: 2021-09-22 | End: 2021-09-29 | Stop reason: HOSPADM

## 2021-09-22 RX ORDER — KETOROLAC TROMETHAMINE 30 MG/ML
10 INJECTION, SOLUTION INTRAMUSCULAR; INTRAVENOUS
Status: COMPLETED | OUTPATIENT
Start: 2021-09-22 | End: 2021-09-22

## 2021-09-22 RX ORDER — ATORVASTATIN CALCIUM 80 MG/1
80 TABLET, FILM COATED ORAL DAILY
Qty: 90 TABLET | Refills: 3 | Status: SHIPPED | OUTPATIENT
Start: 2021-09-22 | End: 2022-09-20

## 2021-09-22 RX ORDER — HYDROCODONE BITARTRATE AND ACETAMINOPHEN 5; 325 MG/1; MG/1
1 TABLET ORAL
Status: DISPENSED | OUTPATIENT
Start: 2021-09-22 | End: 2021-09-22

## 2021-09-22 RX ORDER — SODIUM CHLORIDE 9 MG/ML
INJECTION, SOLUTION INTRAVENOUS
Status: COMPLETED | OUTPATIENT
Start: 2021-09-22 | End: 2021-09-23

## 2021-09-22 RX ORDER — METHYLPREDNISOLONE ACETATE 80 MG/ML
80 INJECTION, SUSPENSION INTRA-ARTICULAR; INTRALESIONAL; INTRAMUSCULAR; SOFT TISSUE
Status: DISCONTINUED | OUTPATIENT
Start: 2021-09-22 | End: 2021-09-22

## 2021-09-22 RX ORDER — DIVALPROEX SODIUM 500 MG/1
500 TABLET, EXTENDED RELEASE ORAL NIGHTLY
COMMUNITY
Start: 2021-09-17 | End: 2023-04-05 | Stop reason: CLARIF

## 2021-09-22 RX ORDER — OXYBUTYNIN CHLORIDE 5 MG/1
10 TABLET, EXTENDED RELEASE ORAL DAILY
Status: DISCONTINUED | OUTPATIENT
Start: 2021-09-23 | End: 2021-09-22

## 2021-09-22 RX ORDER — ATORVASTATIN CALCIUM 20 MG/1
80 TABLET, FILM COATED ORAL DAILY
Status: DISCONTINUED | OUTPATIENT
Start: 2021-09-23 | End: 2021-09-29 | Stop reason: HOSPADM

## 2021-09-22 RX ADMIN — SODIUM CHLORIDE: 0.9 INJECTION, SOLUTION INTRAVENOUS at 02:09

## 2021-09-22 RX ADMIN — KETOROLAC TROMETHAMINE 10 MG: 30 INJECTION, SOLUTION INTRAMUSCULAR at 09:09

## 2021-09-22 RX ADMIN — SODIUM CHLORIDE 1000 ML: 0.9 INJECTION, SOLUTION INTRAVENOUS at 11:09

## 2021-09-22 RX ADMIN — DEXTROSE MONOHYDRATE 12.5 G: 25 INJECTION, SOLUTION INTRAVENOUS at 10:09

## 2021-09-22 RX ADMIN — ORPHENADRINE CITRATE 60 MG: 60 INJECTION INTRAMUSCULAR; INTRAVENOUS at 09:09

## 2021-09-22 RX ADMIN — Medication 0.4 MG: at 11:09

## 2021-09-22 RX ADMIN — NALXONE HYDROCHLORIDE 0.4 MG: 0.4 INJECTION INTRAMUSCULAR; INTRAVENOUS; SUBCUTANEOUS at 11:09

## 2021-09-23 PROBLEM — E11.49 TYPE 2 DIABETES MELLITUS WITH NEUROLOGIC COMPLICATION, WITH LONG-TERM CURRENT USE OF INSULIN: Status: ACTIVE | Noted: 2017-06-19

## 2021-09-23 PROBLEM — I10 ESSENTIAL HYPERTENSION: Status: ACTIVE | Noted: 2021-09-23

## 2021-09-23 PROBLEM — Z79.4 TYPE 2 DIABETES MELLITUS WITH HYPERGLYCEMIA, WITH LONG-TERM CURRENT USE OF INSULIN: Status: ACTIVE | Noted: 2017-06-19

## 2021-09-23 PROBLEM — N17.9 ACUTE KIDNEY INJURY: Status: ACTIVE | Noted: 2021-09-23

## 2021-09-23 PROBLEM — E11.65 TYPE 2 DIABETES MELLITUS WITH HYPERGLYCEMIA, WITH LONG-TERM CURRENT USE OF INSULIN: Status: ACTIVE | Noted: 2017-06-19

## 2021-09-23 LAB
ALBUMIN SERPL BCP-MCNC: 2.9 G/DL (ref 3.5–5.2)
ALP SERPL-CCNC: 67 U/L (ref 55–135)
ALT SERPL W/O P-5'-P-CCNC: 10 U/L (ref 10–44)
ANION GAP SERPL CALC-SCNC: 10 MMOL/L (ref 8–16)
AST SERPL-CCNC: 19 U/L (ref 10–40)
BASOPHILS # BLD AUTO: 0.04 K/UL (ref 0–0.2)
BASOPHILS NFR BLD: 0.7 % (ref 0–1.9)
BILIRUB SERPL-MCNC: 0.6 MG/DL (ref 0.1–1)
BUN SERPL-MCNC: 31 MG/DL (ref 8–23)
CALCIUM SERPL-MCNC: 9 MG/DL (ref 8.7–10.5)
CHLORIDE SERPL-SCNC: 107 MMOL/L (ref 95–110)
CO2 SERPL-SCNC: 23 MMOL/L (ref 23–29)
CREAT SERPL-MCNC: 2 MG/DL (ref 0.5–1.4)
DIFFERENTIAL METHOD: ABNORMAL
EOSINOPHIL # BLD AUTO: 0.2 K/UL (ref 0–0.5)
EOSINOPHIL NFR BLD: 2.6 % (ref 0–8)
ERYTHROCYTE [DISTWIDTH] IN BLOOD BY AUTOMATED COUNT: 13.2 % (ref 11.5–14.5)
EST. GFR  (AFRICAN AMERICAN): 30 ML/MIN/1.73 M^2
EST. GFR  (NON AFRICAN AMERICAN): 26 ML/MIN/1.73 M^2
ESTIMATED AVG GLUCOSE: 131 MG/DL (ref 68–131)
GLUCOSE SERPL-MCNC: 95 MG/DL (ref 70–110)
HBA1C MFR BLD: 6.2 % (ref 4–5.6)
HCT VFR BLD AUTO: 36.5 % (ref 37–48.5)
HGB BLD-MCNC: 11.7 G/DL (ref 12–16)
IMM GRANULOCYTES # BLD AUTO: 0.01 K/UL (ref 0–0.04)
IMM GRANULOCYTES NFR BLD AUTO: 0.2 % (ref 0–0.5)
LYMPHOCYTES # BLD AUTO: 2 K/UL (ref 1–4.8)
LYMPHOCYTES NFR BLD: 34.9 % (ref 18–48)
MCH RBC QN AUTO: 28.7 PG (ref 27–31)
MCHC RBC AUTO-ENTMCNC: 32.1 G/DL (ref 32–36)
MCV RBC AUTO: 90 FL (ref 82–98)
MONOCYTES # BLD AUTO: 0.5 K/UL (ref 0.3–1)
MONOCYTES NFR BLD: 8.6 % (ref 4–15)
NEUTROPHILS # BLD AUTO: 3 K/UL (ref 1.8–7.7)
NEUTROPHILS NFR BLD: 53 % (ref 38–73)
NRBC BLD-RTO: 0 /100 WBC
PLATELET # BLD AUTO: 309 K/UL (ref 150–450)
PMV BLD AUTO: 9.3 FL (ref 9.2–12.9)
POCT GLUCOSE: 120 MG/DL (ref 70–110)
POCT GLUCOSE: 151 MG/DL (ref 70–110)
POCT GLUCOSE: 78 MG/DL (ref 70–110)
POCT GLUCOSE: 90 MG/DL (ref 70–110)
POCT GLUCOSE: 93 MG/DL (ref 70–110)
POTASSIUM SERPL-SCNC: 4 MMOL/L (ref 3.5–5.1)
PROT SERPL-MCNC: 7.1 G/DL (ref 6–8.4)
RBC # BLD AUTO: 4.08 M/UL (ref 4–5.4)
SODIUM SERPL-SCNC: 140 MMOL/L (ref 136–145)
WBC # BLD AUTO: 5.67 K/UL (ref 3.9–12.7)

## 2021-09-23 PROCEDURE — 63600175 PHARM REV CODE 636 W HCPCS: Mod: HCNC | Performed by: HOSPITALIST

## 2021-09-23 PROCEDURE — 36415 COLL VENOUS BLD VENIPUNCTURE: CPT | Mod: HCNC | Performed by: NURSE PRACTITIONER

## 2021-09-23 PROCEDURE — 99222 1ST HOSP IP/OBS MODERATE 55: CPT | Mod: HCNC,,, | Performed by: PSYCHIATRY & NEUROLOGY

## 2021-09-23 PROCEDURE — 25000003 PHARM REV CODE 250: Mod: HCNC | Performed by: NURSE PRACTITIONER

## 2021-09-23 PROCEDURE — 99223 1ST HOSP IP/OBS HIGH 75: CPT | Mod: AI,HCNC,, | Performed by: HOSPITALIST

## 2021-09-23 PROCEDURE — 83036 HEMOGLOBIN GLYCOSYLATED A1C: CPT | Mod: HCNC | Performed by: NURSE PRACTITIONER

## 2021-09-23 PROCEDURE — 99223 PR INITIAL HOSPITAL CARE,LEVL III: ICD-10-PCS | Mod: AI,HCNC,, | Performed by: HOSPITALIST

## 2021-09-23 PROCEDURE — 80053 COMPREHEN METABOLIC PANEL: CPT | Mod: HCNC | Performed by: NURSE PRACTITIONER

## 2021-09-23 PROCEDURE — 99222 PR INITIAL HOSPITAL CARE,LEVL II: ICD-10-PCS | Mod: HCNC,,, | Performed by: PSYCHIATRY & NEUROLOGY

## 2021-09-23 PROCEDURE — 25000003 PHARM REV CODE 250: Mod: HCNC | Performed by: HOSPITALIST

## 2021-09-23 PROCEDURE — 97166 OT EVAL MOD COMPLEX 45 MIN: CPT | Mod: HCNC

## 2021-09-23 PROCEDURE — 85025 COMPLETE CBC W/AUTO DIFF WBC: CPT | Mod: HCNC | Performed by: NURSE PRACTITIONER

## 2021-09-23 PROCEDURE — 21400001 HC TELEMETRY ROOM: Mod: HCNC

## 2021-09-23 PROCEDURE — 97162 PT EVAL MOD COMPLEX 30 MIN: CPT | Mod: HCNC

## 2021-09-23 RX ORDER — ZIPRASIDONE HYDROCHLORIDE 20 MG/1
40 CAPSULE ORAL 2 TIMES DAILY
Status: DISCONTINUED | OUTPATIENT
Start: 2021-09-23 | End: 2021-09-29 | Stop reason: HOSPADM

## 2021-09-23 RX ORDER — HEPARIN SODIUM 5000 [USP'U]/ML
5000 INJECTION, SOLUTION INTRAVENOUS; SUBCUTANEOUS EVERY 8 HOURS
Status: DISCONTINUED | OUTPATIENT
Start: 2021-09-23 | End: 2021-09-26

## 2021-09-23 RX ORDER — DIVALPROEX SODIUM 250 MG/1
250 TABLET, FILM COATED, EXTENDED RELEASE ORAL NIGHTLY
Status: DISCONTINUED | OUTPATIENT
Start: 2021-09-23 | End: 2021-09-29 | Stop reason: HOSPADM

## 2021-09-23 RX ORDER — AMOXICILLIN 250 MG
1 CAPSULE ORAL 2 TIMES DAILY PRN
Status: DISCONTINUED | OUTPATIENT
Start: 2021-09-23 | End: 2021-09-29 | Stop reason: HOSPADM

## 2021-09-23 RX ORDER — SODIUM CHLORIDE, SODIUM LACTATE, POTASSIUM CHLORIDE, CALCIUM CHLORIDE 600; 310; 30; 20 MG/100ML; MG/100ML; MG/100ML; MG/100ML
INJECTION, SOLUTION INTRAVENOUS CONTINUOUS
Status: DISCONTINUED | OUTPATIENT
Start: 2021-09-23 | End: 2021-09-26

## 2021-09-23 RX ORDER — DIVALPROEX SODIUM 500 MG/1
500 TABLET, FILM COATED, EXTENDED RELEASE ORAL NIGHTLY
Status: DISCONTINUED | OUTPATIENT
Start: 2021-09-23 | End: 2021-09-23

## 2021-09-23 RX ORDER — DEXAMETHASONE SODIUM PHOSPHATE 4 MG/ML
10 INJECTION, SOLUTION INTRA-ARTICULAR; INTRALESIONAL; INTRAMUSCULAR; INTRAVENOUS; SOFT TISSUE EVERY 8 HOURS
Status: DISCONTINUED | OUTPATIENT
Start: 2021-09-23 | End: 2021-09-29 | Stop reason: HOSPADM

## 2021-09-23 RX ADMIN — SODIUM CHLORIDE: 0.9 INJECTION, SOLUTION INTRAVENOUS at 01:09

## 2021-09-23 RX ADMIN — ATORVASTATIN CALCIUM 80 MG: 20 TABLET, FILM COATED ORAL at 08:09

## 2021-09-23 RX ADMIN — SODIUM CHLORIDE, SODIUM LACTATE, POTASSIUM CHLORIDE, AND CALCIUM CHLORIDE: .6; .31; .03; .02 INJECTION, SOLUTION INTRAVENOUS at 07:09

## 2021-09-23 RX ADMIN — DIVALPROEX SODIUM 250 MG: 250 TABLET, EXTENDED RELEASE ORAL at 08:09

## 2021-09-23 RX ADMIN — ASPIRIN 81 MG: 81 TABLET, COATED ORAL at 08:09

## 2021-09-23 RX ADMIN — ZIPRASIDONE HCL 40 MG: 20 CAPSULE ORAL at 08:09

## 2021-09-23 RX ADMIN — HEPARIN SODIUM 5000 UNITS: 5000 INJECTION INTRAVENOUS; SUBCUTANEOUS at 09:09

## 2021-09-23 RX ADMIN — DEXAMETHASONE SODIUM PHOSPHATE 10 MG: 4 INJECTION INTRA-ARTICULAR; INTRALESIONAL; INTRAMUSCULAR; INTRAVENOUS; SOFT TISSUE at 06:09

## 2021-09-23 RX ADMIN — DOCUSATE SODIUM AND SENNOSIDES 1 TABLET: 8.6; 5 TABLET, FILM COATED ORAL at 11:09

## 2021-09-24 PROBLEM — M48.061 SPINAL STENOSIS OF LUMBAR REGION: Status: ACTIVE | Noted: 2018-02-26

## 2021-09-24 LAB
ALBUMIN SERPL BCP-MCNC: 2.8 G/DL (ref 3.5–5.2)
ALP SERPL-CCNC: 75 U/L (ref 55–135)
ALT SERPL W/O P-5'-P-CCNC: 15 U/L (ref 10–44)
ANION GAP SERPL CALC-SCNC: 11 MMOL/L (ref 8–16)
AST SERPL-CCNC: 29 U/L (ref 10–40)
BACTERIA UR CULT: NORMAL
BASOPHILS # BLD AUTO: 0.01 K/UL (ref 0–0.2)
BASOPHILS NFR BLD: 0.2 % (ref 0–1.9)
BILIRUB SERPL-MCNC: 0.3 MG/DL (ref 0.1–1)
BUN SERPL-MCNC: 36 MG/DL (ref 8–23)
CALCIUM SERPL-MCNC: 9.2 MG/DL (ref 8.7–10.5)
CHLORIDE SERPL-SCNC: 106 MMOL/L (ref 95–110)
CO2 SERPL-SCNC: 21 MMOL/L (ref 23–29)
CREAT SERPL-MCNC: 1.5 MG/DL (ref 0.5–1.4)
DIFFERENTIAL METHOD: ABNORMAL
EOSINOPHIL # BLD AUTO: 0 K/UL (ref 0–0.5)
EOSINOPHIL NFR BLD: 0 % (ref 0–8)
ERYTHROCYTE [DISTWIDTH] IN BLOOD BY AUTOMATED COUNT: 13.2 % (ref 11.5–14.5)
EST. GFR  (AFRICAN AMERICAN): 43 ML/MIN/1.73 M^2
EST. GFR  (NON AFRICAN AMERICAN): 37 ML/MIN/1.73 M^2
GLUCOSE SERPL-MCNC: 258 MG/DL (ref 70–110)
HCT VFR BLD AUTO: 33.3 % (ref 37–48.5)
HGB BLD-MCNC: 10.8 G/DL (ref 12–16)
IMM GRANULOCYTES # BLD AUTO: 0.01 K/UL (ref 0–0.04)
IMM GRANULOCYTES NFR BLD AUTO: 0.2 % (ref 0–0.5)
LYMPHOCYTES # BLD AUTO: 0.9 K/UL (ref 1–4.8)
LYMPHOCYTES NFR BLD: 18.6 % (ref 18–48)
MAGNESIUM SERPL-MCNC: 1.7 MG/DL (ref 1.6–2.6)
MCH RBC QN AUTO: 28.6 PG (ref 27–31)
MCHC RBC AUTO-ENTMCNC: 32.4 G/DL (ref 32–36)
MCV RBC AUTO: 88 FL (ref 82–98)
MONOCYTES # BLD AUTO: 0.1 K/UL (ref 0.3–1)
MONOCYTES NFR BLD: 1.5 % (ref 4–15)
NEUTROPHILS # BLD AUTO: 3.7 K/UL (ref 1.8–7.7)
NEUTROPHILS NFR BLD: 79.5 % (ref 38–73)
NRBC BLD-RTO: 0 /100 WBC
PHOSPHATE SERPL-MCNC: 2.6 MG/DL (ref 2.7–4.5)
PLATELET # BLD AUTO: 346 K/UL (ref 150–450)
PMV BLD AUTO: 10.3 FL (ref 9.2–12.9)
POCT GLUCOSE: 211 MG/DL (ref 70–110)
POCT GLUCOSE: 268 MG/DL (ref 70–110)
POCT GLUCOSE: 348 MG/DL (ref 70–110)
POTASSIUM SERPL-SCNC: 4.3 MMOL/L (ref 3.5–5.1)
PROT SERPL-MCNC: 7.1 G/DL (ref 6–8.4)
RBC # BLD AUTO: 3.77 M/UL (ref 4–5.4)
SODIUM SERPL-SCNC: 138 MMOL/L (ref 136–145)
WBC # BLD AUTO: 4.68 K/UL (ref 3.9–12.7)

## 2021-09-24 PROCEDURE — 36415 COLL VENOUS BLD VENIPUNCTURE: CPT | Mod: HCNC | Performed by: NURSE PRACTITIONER

## 2021-09-24 PROCEDURE — 25000003 PHARM REV CODE 250: Mod: HCNC | Performed by: NURSE PRACTITIONER

## 2021-09-24 PROCEDURE — 97535 SELF CARE MNGMENT TRAINING: CPT | Mod: HCNC

## 2021-09-24 PROCEDURE — 84100 ASSAY OF PHOSPHORUS: CPT | Mod: HCNC | Performed by: HOSPITALIST

## 2021-09-24 PROCEDURE — 85025 COMPLETE CBC W/AUTO DIFF WBC: CPT | Mod: HCNC | Performed by: NURSE PRACTITIONER

## 2021-09-24 PROCEDURE — 94761 N-INVAS EAR/PLS OXIMETRY MLT: CPT | Mod: HCNC

## 2021-09-24 PROCEDURE — C9399 UNCLASSIFIED DRUGS OR BIOLOG: HCPCS | Mod: HCNC | Performed by: HOSPITALIST

## 2021-09-24 PROCEDURE — 83735 ASSAY OF MAGNESIUM: CPT | Mod: HCNC | Performed by: HOSPITALIST

## 2021-09-24 PROCEDURE — 80053 COMPREHEN METABOLIC PANEL: CPT | Mod: HCNC | Performed by: NURSE PRACTITIONER

## 2021-09-24 PROCEDURE — 63600175 PHARM REV CODE 636 W HCPCS: Mod: HCNC | Performed by: NURSE PRACTITIONER

## 2021-09-24 PROCEDURE — 25000003 PHARM REV CODE 250: Mod: HCNC | Performed by: HOSPITALIST

## 2021-09-24 PROCEDURE — 97116 GAIT TRAINING THERAPY: CPT | Mod: HCNC,CQ

## 2021-09-24 PROCEDURE — 63600175 PHARM REV CODE 636 W HCPCS: Mod: HCNC | Performed by: HOSPITALIST

## 2021-09-24 PROCEDURE — 99233 PR SUBSEQUENT HOSPITAL CARE,LEVL III: ICD-10-PCS | Mod: HCNC,,, | Performed by: HOSPITALIST

## 2021-09-24 PROCEDURE — 21400001 HC TELEMETRY ROOM: Mod: HCNC

## 2021-09-24 PROCEDURE — 99233 SBSQ HOSP IP/OBS HIGH 50: CPT | Mod: HCNC,,, | Performed by: HOSPITALIST

## 2021-09-24 RX ORDER — INSULIN ASPART 100 [IU]/ML
3 INJECTION, SOLUTION INTRAVENOUS; SUBCUTANEOUS
Status: DISCONTINUED | OUTPATIENT
Start: 2021-09-24 | End: 2021-09-29 | Stop reason: HOSPADM

## 2021-09-24 RX ADMIN — HEPARIN SODIUM 5000 UNITS: 5000 INJECTION INTRAVENOUS; SUBCUTANEOUS at 04:09

## 2021-09-24 RX ADMIN — DEXAMETHASONE SODIUM PHOSPHATE 10 MG: 4 INJECTION INTRA-ARTICULAR; INTRALESIONAL; INTRAMUSCULAR; INTRAVENOUS; SOFT TISSUE at 01:09

## 2021-09-24 RX ADMIN — HEPARIN SODIUM 5000 UNITS: 5000 INJECTION INTRAVENOUS; SUBCUTANEOUS at 06:09

## 2021-09-24 RX ADMIN — SODIUM CHLORIDE, SODIUM LACTATE, POTASSIUM CHLORIDE, AND CALCIUM CHLORIDE: .6; .31; .03; .02 INJECTION, SOLUTION INTRAVENOUS at 05:09

## 2021-09-24 RX ADMIN — HEPARIN SODIUM 5000 UNITS: 5000 INJECTION INTRAVENOUS; SUBCUTANEOUS at 09:09

## 2021-09-24 RX ADMIN — ZIPRASIDONE HCL 40 MG: 20 CAPSULE ORAL at 08:09

## 2021-09-24 RX ADMIN — DEXAMETHASONE SODIUM PHOSPHATE 10 MG: 4 INJECTION INTRA-ARTICULAR; INTRALESIONAL; INTRAMUSCULAR; INTRAVENOUS; SOFT TISSUE at 10:09

## 2021-09-24 RX ADMIN — ATORVASTATIN CALCIUM 80 MG: 20 TABLET, FILM COATED ORAL at 08:09

## 2021-09-24 RX ADMIN — INSULIN ASPART 4 UNITS: 100 INJECTION, SOLUTION INTRAVENOUS; SUBCUTANEOUS at 05:09

## 2021-09-24 RX ADMIN — ZIPRASIDONE HCL 40 MG: 20 CAPSULE ORAL at 09:09

## 2021-09-24 RX ADMIN — DEXAMETHASONE SODIUM PHOSPHATE 10 MG: 4 INJECTION INTRA-ARTICULAR; INTRALESIONAL; INTRAMUSCULAR; INTRAVENOUS; SOFT TISSUE at 04:09

## 2021-09-24 RX ADMIN — DIVALPROEX SODIUM 250 MG: 250 TABLET, EXTENDED RELEASE ORAL at 09:09

## 2021-09-24 RX ADMIN — DEXAMETHASONE SODIUM PHOSPHATE 10 MG: 4 INJECTION INTRA-ARTICULAR; INTRALESIONAL; INTRAMUSCULAR; INTRAVENOUS; SOFT TISSUE at 06:09

## 2021-09-24 RX ADMIN — INSULIN ASPART 1 UNITS: 100 INJECTION, SOLUTION INTRAVENOUS; SUBCUTANEOUS at 09:09

## 2021-09-24 RX ADMIN — SODIUM CHLORIDE, SODIUM LACTATE, POTASSIUM CHLORIDE, AND CALCIUM CHLORIDE: .6; .31; .03; .02 INJECTION, SOLUTION INTRAVENOUS at 09:09

## 2021-09-24 RX ADMIN — INSULIN ASPART 3 UNITS: 100 INJECTION, SOLUTION INTRAVENOUS; SUBCUTANEOUS at 12:09

## 2021-09-24 RX ADMIN — INSULIN ASPART 3 UNITS: 100 INJECTION, SOLUTION INTRAVENOUS; SUBCUTANEOUS at 05:09

## 2021-09-24 RX ADMIN — INSULIN DETEMIR 10 UNITS: 100 INJECTION, SOLUTION SUBCUTANEOUS at 05:09

## 2021-09-25 LAB
ALBUMIN SERPL BCP-MCNC: 2.8 G/DL (ref 3.5–5.2)
ALP SERPL-CCNC: 84 U/L (ref 55–135)
ALT SERPL W/O P-5'-P-CCNC: 114 U/L (ref 10–44)
ANION GAP SERPL CALC-SCNC: 10 MMOL/L (ref 8–16)
AST SERPL-CCNC: 109 U/L (ref 10–40)
BASOPHILS # BLD AUTO: 0.01 K/UL (ref 0–0.2)
BASOPHILS NFR BLD: 0.1 % (ref 0–1.9)
BILIRUB SERPL-MCNC: 0.3 MG/DL (ref 0.1–1)
BUN SERPL-MCNC: 35 MG/DL (ref 8–23)
CALCIUM SERPL-MCNC: 9.4 MG/DL (ref 8.7–10.5)
CHLORIDE SERPL-SCNC: 109 MMOL/L (ref 95–110)
CO2 SERPL-SCNC: 21 MMOL/L (ref 23–29)
CREAT SERPL-MCNC: 1.3 MG/DL (ref 0.5–1.4)
DIFFERENTIAL METHOD: ABNORMAL
EOSINOPHIL # BLD AUTO: 0 K/UL (ref 0–0.5)
EOSINOPHIL NFR BLD: 0 % (ref 0–8)
ERYTHROCYTE [DISTWIDTH] IN BLOOD BY AUTOMATED COUNT: 13.2 % (ref 11.5–14.5)
EST. GFR  (AFRICAN AMERICAN): 51 ML/MIN/1.73 M^2
EST. GFR  (NON AFRICAN AMERICAN): 44 ML/MIN/1.73 M^2
GLUCOSE SERPL-MCNC: 191 MG/DL (ref 70–110)
HCT VFR BLD AUTO: 30.8 % (ref 37–48.5)
HGB BLD-MCNC: 10.1 G/DL (ref 12–16)
IMM GRANULOCYTES # BLD AUTO: 0.06 K/UL (ref 0–0.04)
IMM GRANULOCYTES NFR BLD AUTO: 0.7 % (ref 0–0.5)
LYMPHOCYTES # BLD AUTO: 1.2 K/UL (ref 1–4.8)
LYMPHOCYTES NFR BLD: 13.4 % (ref 18–48)
MAGNESIUM SERPL-MCNC: 1.7 MG/DL (ref 1.6–2.6)
MCH RBC QN AUTO: 28.6 PG (ref 27–31)
MCHC RBC AUTO-ENTMCNC: 32.8 G/DL (ref 32–36)
MCV RBC AUTO: 87 FL (ref 82–98)
MONOCYTES # BLD AUTO: 0.5 K/UL (ref 0.3–1)
MONOCYTES NFR BLD: 5.3 % (ref 4–15)
NEUTROPHILS # BLD AUTO: 7.4 K/UL (ref 1.8–7.7)
NEUTROPHILS NFR BLD: 80.5 % (ref 38–73)
NRBC BLD-RTO: 0 /100 WBC
PHOSPHATE SERPL-MCNC: 2.4 MG/DL (ref 2.7–4.5)
PLATELET # BLD AUTO: 341 K/UL (ref 150–450)
PMV BLD AUTO: 10.5 FL (ref 9.2–12.9)
POCT GLUCOSE: 162 MG/DL (ref 70–110)
POCT GLUCOSE: 174 MG/DL (ref 70–110)
POCT GLUCOSE: 195 MG/DL (ref 70–110)
POCT GLUCOSE: 202 MG/DL (ref 70–110)
POCT GLUCOSE: 243 MG/DL (ref 70–110)
POTASSIUM SERPL-SCNC: 4.5 MMOL/L (ref 3.5–5.1)
PROT SERPL-MCNC: 6.9 G/DL (ref 6–8.4)
RBC # BLD AUTO: 3.53 M/UL (ref 4–5.4)
SODIUM SERPL-SCNC: 140 MMOL/L (ref 136–145)
WBC # BLD AUTO: 9.2 K/UL (ref 3.9–12.7)

## 2021-09-25 PROCEDURE — 36415 COLL VENOUS BLD VENIPUNCTURE: CPT | Mod: HCNC | Performed by: NURSE PRACTITIONER

## 2021-09-25 PROCEDURE — 25000003 PHARM REV CODE 250: Mod: HCNC | Performed by: NURSE PRACTITIONER

## 2021-09-25 PROCEDURE — 94761 N-INVAS EAR/PLS OXIMETRY MLT: CPT | Mod: HCNC

## 2021-09-25 PROCEDURE — 83735 ASSAY OF MAGNESIUM: CPT | Mod: HCNC | Performed by: HOSPITALIST

## 2021-09-25 PROCEDURE — 84100 ASSAY OF PHOSPHORUS: CPT | Mod: HCNC | Performed by: HOSPITALIST

## 2021-09-25 PROCEDURE — 25000003 PHARM REV CODE 250: Mod: HCNC | Performed by: HOSPITALIST

## 2021-09-25 PROCEDURE — 11000001 HC ACUTE MED/SURG PRIVATE ROOM: Mod: HCNC

## 2021-09-25 PROCEDURE — 63600175 PHARM REV CODE 636 W HCPCS: Mod: HCNC | Performed by: HOSPITALIST

## 2021-09-25 PROCEDURE — 80053 COMPREHEN METABOLIC PANEL: CPT | Mod: HCNC | Performed by: NURSE PRACTITIONER

## 2021-09-25 PROCEDURE — 99233 SBSQ HOSP IP/OBS HIGH 50: CPT | Mod: HCNC,,, | Performed by: HOSPITALIST

## 2021-09-25 PROCEDURE — 99233 PR SUBSEQUENT HOSPITAL CARE,LEVL III: ICD-10-PCS | Mod: HCNC,,, | Performed by: HOSPITALIST

## 2021-09-25 PROCEDURE — 85025 COMPLETE CBC W/AUTO DIFF WBC: CPT | Mod: HCNC | Performed by: NURSE PRACTITIONER

## 2021-09-25 RX ORDER — HYDRALAZINE HYDROCHLORIDE 20 MG/ML
10 INJECTION INTRAMUSCULAR; INTRAVENOUS EVERY 8 HOURS PRN
Status: DISCONTINUED | OUTPATIENT
Start: 2021-09-25 | End: 2021-09-29 | Stop reason: HOSPADM

## 2021-09-25 RX ADMIN — ZIPRASIDONE HCL 40 MG: 20 CAPSULE ORAL at 09:09

## 2021-09-25 RX ADMIN — SODIUM CHLORIDE, SODIUM LACTATE, POTASSIUM CHLORIDE, AND CALCIUM CHLORIDE: .6; .31; .03; .02 INJECTION, SOLUTION INTRAVENOUS at 04:09

## 2021-09-25 RX ADMIN — HEPARIN SODIUM 5000 UNITS: 5000 INJECTION INTRAVENOUS; SUBCUTANEOUS at 05:09

## 2021-09-25 RX ADMIN — INSULIN ASPART 2 UNITS: 100 INJECTION, SOLUTION INTRAVENOUS; SUBCUTANEOUS at 05:09

## 2021-09-25 RX ADMIN — ATORVASTATIN CALCIUM 80 MG: 20 TABLET, FILM COATED ORAL at 08:09

## 2021-09-25 RX ADMIN — DEXAMETHASONE SODIUM PHOSPHATE 10 MG: 4 INJECTION INTRA-ARTICULAR; INTRALESIONAL; INTRAMUSCULAR; INTRAVENOUS; SOFT TISSUE at 09:09

## 2021-09-25 RX ADMIN — DIVALPROEX SODIUM 250 MG: 250 TABLET, EXTENDED RELEASE ORAL at 09:09

## 2021-09-25 RX ADMIN — HEPARIN SODIUM 5000 UNITS: 5000 INJECTION INTRAVENOUS; SUBCUTANEOUS at 09:09

## 2021-09-25 RX ADMIN — ZIPRASIDONE HCL 40 MG: 20 CAPSULE ORAL at 08:09

## 2021-09-25 RX ADMIN — INSULIN DETEMIR 10 UNITS: 100 INJECTION, SOLUTION SUBCUTANEOUS at 09:09

## 2021-09-25 RX ADMIN — DEXAMETHASONE SODIUM PHOSPHATE 10 MG: 4 INJECTION INTRA-ARTICULAR; INTRALESIONAL; INTRAMUSCULAR; INTRAVENOUS; SOFT TISSUE at 03:09

## 2021-09-25 RX ADMIN — DEXAMETHASONE SODIUM PHOSPHATE 10 MG: 4 INJECTION INTRA-ARTICULAR; INTRALESIONAL; INTRAMUSCULAR; INTRAVENOUS; SOFT TISSUE at 05:09

## 2021-09-25 RX ADMIN — INSULIN ASPART 3 UNITS: 100 INJECTION, SOLUTION INTRAVENOUS; SUBCUTANEOUS at 08:09

## 2021-09-25 RX ADMIN — INSULIN ASPART 3 UNITS: 100 INJECTION, SOLUTION INTRAVENOUS; SUBCUTANEOUS at 05:09

## 2021-09-25 RX ADMIN — INSULIN ASPART 3 UNITS: 100 INJECTION, SOLUTION INTRAVENOUS; SUBCUTANEOUS at 12:09

## 2021-09-25 RX ADMIN — SODIUM CHLORIDE, SODIUM LACTATE, POTASSIUM CHLORIDE, AND CALCIUM CHLORIDE: .6; .31; .03; .02 INJECTION, SOLUTION INTRAVENOUS at 12:09

## 2021-09-25 RX ADMIN — HEPARIN SODIUM 5000 UNITS: 5000 INJECTION INTRAVENOUS; SUBCUTANEOUS at 03:09

## 2021-09-26 LAB
ALBUMIN SERPL BCP-MCNC: 2.8 G/DL (ref 3.5–5.2)
ALP SERPL-CCNC: 84 U/L (ref 55–135)
ALT SERPL W/O P-5'-P-CCNC: 122 U/L (ref 10–44)
ANION GAP SERPL CALC-SCNC: 10 MMOL/L (ref 8–16)
AST SERPL-CCNC: 75 U/L (ref 10–40)
BASOPHILS # BLD AUTO: 0.01 K/UL (ref 0–0.2)
BASOPHILS NFR BLD: 0.1 % (ref 0–1.9)
BILIRUB SERPL-MCNC: 0.3 MG/DL (ref 0.1–1)
BUN SERPL-MCNC: 36 MG/DL (ref 8–23)
CALCIUM SERPL-MCNC: 9.2 MG/DL (ref 8.7–10.5)
CHLORIDE SERPL-SCNC: 110 MMOL/L (ref 95–110)
CO2 SERPL-SCNC: 22 MMOL/L (ref 23–29)
CREAT SERPL-MCNC: 1.3 MG/DL (ref 0.5–1.4)
DIFFERENTIAL METHOD: ABNORMAL
EOSINOPHIL # BLD AUTO: 0 K/UL (ref 0–0.5)
EOSINOPHIL NFR BLD: 0 % (ref 0–8)
ERYTHROCYTE [DISTWIDTH] IN BLOOD BY AUTOMATED COUNT: 13.1 % (ref 11.5–14.5)
EST. GFR  (AFRICAN AMERICAN): 51 ML/MIN/1.73 M^2
EST. GFR  (NON AFRICAN AMERICAN): 44 ML/MIN/1.73 M^2
GLUCOSE SERPL-MCNC: 166 MG/DL (ref 70–110)
HCT VFR BLD AUTO: 30.7 % (ref 37–48.5)
HGB BLD-MCNC: 10.2 G/DL (ref 12–16)
IMM GRANULOCYTES # BLD AUTO: 0.13 K/UL (ref 0–0.04)
IMM GRANULOCYTES NFR BLD AUTO: 1.5 % (ref 0–0.5)
LYMPHOCYTES # BLD AUTO: 1.4 K/UL (ref 1–4.8)
LYMPHOCYTES NFR BLD: 15.4 % (ref 18–48)
MAGNESIUM SERPL-MCNC: 1.6 MG/DL (ref 1.6–2.6)
MCH RBC QN AUTO: 28.7 PG (ref 27–31)
MCHC RBC AUTO-ENTMCNC: 33.2 G/DL (ref 32–36)
MCV RBC AUTO: 87 FL (ref 82–98)
MONOCYTES # BLD AUTO: 0.4 K/UL (ref 0.3–1)
MONOCYTES NFR BLD: 4.5 % (ref 4–15)
NEUTROPHILS # BLD AUTO: 7 K/UL (ref 1.8–7.7)
NEUTROPHILS NFR BLD: 78.5 % (ref 38–73)
NRBC BLD-RTO: 0 /100 WBC
PHOSPHATE SERPL-MCNC: 2.9 MG/DL (ref 2.7–4.5)
PLATELET # BLD AUTO: 329 K/UL (ref 150–450)
PMV BLD AUTO: 9.9 FL (ref 9.2–12.9)
POCT GLUCOSE: 120 MG/DL (ref 70–110)
POCT GLUCOSE: 157 MG/DL (ref 70–110)
POCT GLUCOSE: 191 MG/DL (ref 70–110)
POTASSIUM SERPL-SCNC: 4.4 MMOL/L (ref 3.5–5.1)
PROT SERPL-MCNC: 6.6 G/DL (ref 6–8.4)
RBC # BLD AUTO: 3.55 M/UL (ref 4–5.4)
SODIUM SERPL-SCNC: 142 MMOL/L (ref 136–145)
WBC # BLD AUTO: 8.89 K/UL (ref 3.9–12.7)

## 2021-09-26 PROCEDURE — 63600175 PHARM REV CODE 636 W HCPCS: Mod: HCNC | Performed by: HOSPITALIST

## 2021-09-26 PROCEDURE — 84100 ASSAY OF PHOSPHORUS: CPT | Mod: HCNC | Performed by: HOSPITALIST

## 2021-09-26 PROCEDURE — 94761 N-INVAS EAR/PLS OXIMETRY MLT: CPT | Mod: HCNC

## 2021-09-26 PROCEDURE — 80053 COMPREHEN METABOLIC PANEL: CPT | Mod: HCNC | Performed by: NURSE PRACTITIONER

## 2021-09-26 PROCEDURE — 85025 COMPLETE CBC W/AUTO DIFF WBC: CPT | Mod: HCNC | Performed by: NURSE PRACTITIONER

## 2021-09-26 PROCEDURE — 99233 PR SUBSEQUENT HOSPITAL CARE,LEVL III: ICD-10-PCS | Mod: HCNC,,, | Performed by: HOSPITALIST

## 2021-09-26 PROCEDURE — 25000003 PHARM REV CODE 250: Mod: HCNC | Performed by: NURSE PRACTITIONER

## 2021-09-26 PROCEDURE — 11000001 HC ACUTE MED/SURG PRIVATE ROOM: Mod: HCNC

## 2021-09-26 PROCEDURE — 36415 COLL VENOUS BLD VENIPUNCTURE: CPT | Mod: HCNC | Performed by: NURSE PRACTITIONER

## 2021-09-26 PROCEDURE — 83735 ASSAY OF MAGNESIUM: CPT | Mod: HCNC | Performed by: HOSPITALIST

## 2021-09-26 PROCEDURE — 99233 SBSQ HOSP IP/OBS HIGH 50: CPT | Mod: HCNC,,, | Performed by: HOSPITALIST

## 2021-09-26 PROCEDURE — C9399 UNCLASSIFIED DRUGS OR BIOLOG: HCPCS | Mod: HCNC | Performed by: HOSPITALIST

## 2021-09-26 PROCEDURE — 25000003 PHARM REV CODE 250: Mod: HCNC | Performed by: HOSPITALIST

## 2021-09-26 RX ORDER — ENOXAPARIN SODIUM 100 MG/ML
40 INJECTION SUBCUTANEOUS EVERY 24 HOURS
Status: DISCONTINUED | OUTPATIENT
Start: 2021-09-26 | End: 2021-09-29 | Stop reason: HOSPADM

## 2021-09-26 RX ORDER — LOSARTAN POTASSIUM 25 MG/1
25 TABLET ORAL DAILY
Status: DISCONTINUED | OUTPATIENT
Start: 2021-09-26 | End: 2021-09-29 | Stop reason: HOSPADM

## 2021-09-26 RX ORDER — NIFEDIPINE 30 MG/1
30 TABLET, EXTENDED RELEASE ORAL DAILY
Status: DISCONTINUED | OUTPATIENT
Start: 2021-09-26 | End: 2021-09-27

## 2021-09-26 RX ADMIN — ZIPRASIDONE HCL 40 MG: 20 CAPSULE ORAL at 08:09

## 2021-09-26 RX ADMIN — HEPARIN SODIUM 5000 UNITS: 5000 INJECTION INTRAVENOUS; SUBCUTANEOUS at 05:09

## 2021-09-26 RX ADMIN — DEXAMETHASONE SODIUM PHOSPHATE 10 MG: 4 INJECTION INTRA-ARTICULAR; INTRALESIONAL; INTRAMUSCULAR; INTRAVENOUS; SOFT TISSUE at 05:09

## 2021-09-26 RX ADMIN — DEXAMETHASONE SODIUM PHOSPHATE 10 MG: 4 INJECTION INTRA-ARTICULAR; INTRALESIONAL; INTRAMUSCULAR; INTRAVENOUS; SOFT TISSUE at 01:09

## 2021-09-26 RX ADMIN — SODIUM CHLORIDE, SODIUM LACTATE, POTASSIUM CHLORIDE, AND CALCIUM CHLORIDE: .6; .31; .03; .02 INJECTION, SOLUTION INTRAVENOUS at 05:09

## 2021-09-26 RX ADMIN — INSULIN ASPART 3 UNITS: 100 INJECTION, SOLUTION INTRAVENOUS; SUBCUTANEOUS at 08:09

## 2021-09-26 RX ADMIN — ENOXAPARIN SODIUM 40 MG: 40 INJECTION SUBCUTANEOUS at 05:09

## 2021-09-26 RX ADMIN — ATORVASTATIN CALCIUM 80 MG: 20 TABLET, FILM COATED ORAL at 08:09

## 2021-09-26 RX ADMIN — LOSARTAN POTASSIUM 25 MG: 25 TABLET, FILM COATED ORAL at 09:09

## 2021-09-26 RX ADMIN — INSULIN ASPART 3 UNITS: 100 INJECTION, SOLUTION INTRAVENOUS; SUBCUTANEOUS at 04:09

## 2021-09-26 RX ADMIN — DIVALPROEX SODIUM 250 MG: 250 TABLET, EXTENDED RELEASE ORAL at 08:09

## 2021-09-26 RX ADMIN — DEXAMETHASONE SODIUM PHOSPHATE 10 MG: 4 INJECTION INTRA-ARTICULAR; INTRALESIONAL; INTRAMUSCULAR; INTRAVENOUS; SOFT TISSUE at 08:09

## 2021-09-26 RX ADMIN — NIFEDIPINE 30 MG: 30 TABLET, FILM COATED, EXTENDED RELEASE ORAL at 09:09

## 2021-09-26 RX ADMIN — INSULIN DETEMIR 12 UNITS: 100 INJECTION, SOLUTION SUBCUTANEOUS at 08:09

## 2021-09-26 RX ADMIN — INSULIN ASPART 3 UNITS: 100 INJECTION, SOLUTION INTRAVENOUS; SUBCUTANEOUS at 12:09

## 2021-09-27 LAB
ALBUMIN SERPL BCP-MCNC: 2.8 G/DL (ref 3.5–5.2)
ALP SERPL-CCNC: 82 U/L (ref 55–135)
ALT SERPL W/O P-5'-P-CCNC: 102 U/L (ref 10–44)
ANION GAP SERPL CALC-SCNC: 12 MMOL/L (ref 8–16)
AST SERPL-CCNC: 46 U/L (ref 10–40)
BASOPHILS # BLD AUTO: 0.01 K/UL (ref 0–0.2)
BASOPHILS NFR BLD: 0.1 % (ref 0–1.9)
BILIRUB SERPL-MCNC: 0.5 MG/DL (ref 0.1–1)
BUN SERPL-MCNC: 38 MG/DL (ref 8–23)
CALCIUM SERPL-MCNC: 9 MG/DL (ref 8.7–10.5)
CHLORIDE SERPL-SCNC: 106 MMOL/L (ref 95–110)
CO2 SERPL-SCNC: 24 MMOL/L (ref 23–29)
CREAT SERPL-MCNC: 1.4 MG/DL (ref 0.5–1.4)
DIFFERENTIAL METHOD: ABNORMAL
EOSINOPHIL # BLD AUTO: 0 K/UL (ref 0–0.5)
EOSINOPHIL NFR BLD: 0 % (ref 0–8)
ERYTHROCYTE [DISTWIDTH] IN BLOOD BY AUTOMATED COUNT: 13 % (ref 11.5–14.5)
EST. GFR  (AFRICAN AMERICAN): 46 ML/MIN/1.73 M^2
EST. GFR  (NON AFRICAN AMERICAN): 40 ML/MIN/1.73 M^2
GLUCOSE SERPL-MCNC: 160 MG/DL (ref 70–110)
HCT VFR BLD AUTO: 33.3 % (ref 37–48.5)
HGB BLD-MCNC: 11.1 G/DL (ref 12–16)
IMM GRANULOCYTES # BLD AUTO: 0.18 K/UL (ref 0–0.04)
IMM GRANULOCYTES NFR BLD AUTO: 2.4 % (ref 0–0.5)
LYMPHOCYTES # BLD AUTO: 1.5 K/UL (ref 1–4.8)
LYMPHOCYTES NFR BLD: 20 % (ref 18–48)
MAGNESIUM SERPL-MCNC: 1.5 MG/DL (ref 1.6–2.6)
MCH RBC QN AUTO: 28.7 PG (ref 27–31)
MCHC RBC AUTO-ENTMCNC: 33.3 G/DL (ref 32–36)
MCV RBC AUTO: 86 FL (ref 82–98)
MONOCYTES # BLD AUTO: 0.5 K/UL (ref 0.3–1)
MONOCYTES NFR BLD: 6 % (ref 4–15)
NEUTROPHILS # BLD AUTO: 5.4 K/UL (ref 1.8–7.7)
NEUTROPHILS NFR BLD: 71.5 % (ref 38–73)
NRBC BLD-RTO: 0 /100 WBC
PHOSPHATE SERPL-MCNC: 3.6 MG/DL (ref 2.7–4.5)
PLATELET # BLD AUTO: 361 K/UL (ref 150–450)
PMV BLD AUTO: 9.8 FL (ref 9.2–12.9)
POCT GLUCOSE: 139 MG/DL (ref 70–110)
POCT GLUCOSE: 154 MG/DL (ref 70–110)
POCT GLUCOSE: 161 MG/DL (ref 70–110)
POCT GLUCOSE: 192 MG/DL (ref 70–110)
POTASSIUM SERPL-SCNC: 4.1 MMOL/L (ref 3.5–5.1)
PROT SERPL-MCNC: 6.7 G/DL (ref 6–8.4)
RBC # BLD AUTO: 3.87 M/UL (ref 4–5.4)
SODIUM SERPL-SCNC: 142 MMOL/L (ref 136–145)
WBC # BLD AUTO: 7.61 K/UL (ref 3.9–12.7)

## 2021-09-27 PROCEDURE — 99233 SBSQ HOSP IP/OBS HIGH 50: CPT | Mod: HCNC,,, | Performed by: HOSPITALIST

## 2021-09-27 PROCEDURE — 25000003 PHARM REV CODE 250: Mod: HCNC | Performed by: HOSPITALIST

## 2021-09-27 PROCEDURE — 25000003 PHARM REV CODE 250: Mod: HCNC | Performed by: NURSE PRACTITIONER

## 2021-09-27 PROCEDURE — 84100 ASSAY OF PHOSPHORUS: CPT | Mod: HCNC | Performed by: HOSPITALIST

## 2021-09-27 PROCEDURE — 80053 COMPREHEN METABOLIC PANEL: CPT | Mod: HCNC | Performed by: NURSE PRACTITIONER

## 2021-09-27 PROCEDURE — 97116 GAIT TRAINING THERAPY: CPT | Mod: HCNC,CQ

## 2021-09-27 PROCEDURE — 99233 PR SUBSEQUENT HOSPITAL CARE,LEVL III: ICD-10-PCS | Mod: HCNC,,, | Performed by: HOSPITALIST

## 2021-09-27 PROCEDURE — 63600175 PHARM REV CODE 636 W HCPCS: Mod: HCNC | Performed by: HOSPITALIST

## 2021-09-27 PROCEDURE — C9399 UNCLASSIFIED DRUGS OR BIOLOG: HCPCS | Mod: HCNC | Performed by: HOSPITALIST

## 2021-09-27 PROCEDURE — 97535 SELF CARE MNGMENT TRAINING: CPT | Mod: HCNC

## 2021-09-27 PROCEDURE — 97110 THERAPEUTIC EXERCISES: CPT | Mod: HCNC,CQ

## 2021-09-27 PROCEDURE — 85025 COMPLETE CBC W/AUTO DIFF WBC: CPT | Mod: HCNC | Performed by: NURSE PRACTITIONER

## 2021-09-27 PROCEDURE — 11000001 HC ACUTE MED/SURG PRIVATE ROOM: Mod: HCNC

## 2021-09-27 PROCEDURE — 36415 COLL VENOUS BLD VENIPUNCTURE: CPT | Mod: HCNC | Performed by: NURSE PRACTITIONER

## 2021-09-27 PROCEDURE — 83735 ASSAY OF MAGNESIUM: CPT | Mod: HCNC | Performed by: HOSPITALIST

## 2021-09-27 RX ORDER — NIFEDIPINE 30 MG/1
30 TABLET, EXTENDED RELEASE ORAL 2 TIMES DAILY
Status: DISCONTINUED | OUTPATIENT
Start: 2021-09-27 | End: 2021-09-29 | Stop reason: HOSPADM

## 2021-09-27 RX ADMIN — DEXAMETHASONE SODIUM PHOSPHATE 10 MG: 4 INJECTION INTRA-ARTICULAR; INTRALESIONAL; INTRAMUSCULAR; INTRAVENOUS; SOFT TISSUE at 06:09

## 2021-09-27 RX ADMIN — NIFEDIPINE 30 MG: 30 TABLET, FILM COATED, EXTENDED RELEASE ORAL at 08:09

## 2021-09-27 RX ADMIN — ZIPRASIDONE HCL 40 MG: 20 CAPSULE ORAL at 08:09

## 2021-09-27 RX ADMIN — ENOXAPARIN SODIUM 40 MG: 40 INJECTION SUBCUTANEOUS at 05:09

## 2021-09-27 RX ADMIN — ATORVASTATIN CALCIUM 80 MG: 20 TABLET, FILM COATED ORAL at 08:09

## 2021-09-27 RX ADMIN — DIVALPROEX SODIUM 250 MG: 250 TABLET, EXTENDED RELEASE ORAL at 08:09

## 2021-09-27 RX ADMIN — LOSARTAN POTASSIUM 25 MG: 25 TABLET, FILM COATED ORAL at 08:09

## 2021-09-27 RX ADMIN — INSULIN ASPART 3 UNITS: 100 INJECTION, SOLUTION INTRAVENOUS; SUBCUTANEOUS at 05:09

## 2021-09-27 RX ADMIN — INSULIN DETEMIR 12 UNITS: 100 INJECTION, SOLUTION SUBCUTANEOUS at 08:09

## 2021-09-27 RX ADMIN — DEXAMETHASONE SODIUM PHOSPHATE 10 MG: 4 INJECTION INTRA-ARTICULAR; INTRALESIONAL; INTRAMUSCULAR; INTRAVENOUS; SOFT TISSUE at 01:09

## 2021-09-27 RX ADMIN — INSULIN ASPART 3 UNITS: 100 INJECTION, SOLUTION INTRAVENOUS; SUBCUTANEOUS at 12:09

## 2021-09-27 RX ADMIN — INSULIN ASPART 3 UNITS: 100 INJECTION, SOLUTION INTRAVENOUS; SUBCUTANEOUS at 08:09

## 2021-09-27 RX ADMIN — DEXAMETHASONE SODIUM PHOSPHATE 10 MG: 4 INJECTION INTRA-ARTICULAR; INTRALESIONAL; INTRAMUSCULAR; INTRAVENOUS; SOFT TISSUE at 09:09

## 2021-09-28 ENCOUNTER — TELEPHONE (OUTPATIENT)
Dept: INTERNAL MEDICINE | Facility: CLINIC | Age: 61
End: 2021-09-28

## 2021-09-28 PROBLEM — E66.09 CLASS 1 OBESITY DUE TO EXCESS CALORIES IN ADULT: Status: ACTIVE | Noted: 2021-09-28

## 2021-09-28 PROBLEM — E66.811 CLASS 1 OBESITY DUE TO EXCESS CALORIES IN ADULT: Status: ACTIVE | Noted: 2021-09-28

## 2021-09-28 LAB
ALBUMIN SERPL BCP-MCNC: 2.7 G/DL (ref 3.5–5.2)
ALP SERPL-CCNC: 77 U/L (ref 55–135)
ALT SERPL W/O P-5'-P-CCNC: 78 U/L (ref 10–44)
ANION GAP SERPL CALC-SCNC: 9 MMOL/L (ref 8–16)
AST SERPL-CCNC: 29 U/L (ref 10–40)
BASOPHILS # BLD AUTO: 0.02 K/UL (ref 0–0.2)
BASOPHILS NFR BLD: 0.3 % (ref 0–1.9)
BILIRUB SERPL-MCNC: 0.6 MG/DL (ref 0.1–1)
BUN SERPL-MCNC: 38 MG/DL (ref 8–23)
CALCIUM SERPL-MCNC: 8.8 MG/DL (ref 8.7–10.5)
CHLORIDE SERPL-SCNC: 103 MMOL/L (ref 95–110)
CO2 SERPL-SCNC: 26 MMOL/L (ref 23–29)
CREAT SERPL-MCNC: 1.3 MG/DL (ref 0.5–1.4)
DIFFERENTIAL METHOD: ABNORMAL
EOSINOPHIL # BLD AUTO: 0 K/UL (ref 0–0.5)
EOSINOPHIL NFR BLD: 0 % (ref 0–8)
ERYTHROCYTE [DISTWIDTH] IN BLOOD BY AUTOMATED COUNT: 12.7 % (ref 11.5–14.5)
EST. GFR  (AFRICAN AMERICAN): 51 ML/MIN/1.73 M^2
EST. GFR  (NON AFRICAN AMERICAN): 44 ML/MIN/1.73 M^2
GLUCOSE SERPL-MCNC: 159 MG/DL (ref 70–110)
HCT VFR BLD AUTO: 35.2 % (ref 37–48.5)
HGB BLD-MCNC: 11.7 G/DL (ref 12–16)
IMM GRANULOCYTES # BLD AUTO: 0.18 K/UL (ref 0–0.04)
IMM GRANULOCYTES NFR BLD AUTO: 2.5 % (ref 0–0.5)
LYMPHOCYTES # BLD AUTO: 1.5 K/UL (ref 1–4.8)
LYMPHOCYTES NFR BLD: 20.7 % (ref 18–48)
MAGNESIUM SERPL-MCNC: 1.6 MG/DL (ref 1.6–2.6)
MCH RBC QN AUTO: 28.1 PG (ref 27–31)
MCHC RBC AUTO-ENTMCNC: 33.2 G/DL (ref 32–36)
MCV RBC AUTO: 84 FL (ref 82–98)
MONOCYTES # BLD AUTO: 0.4 K/UL (ref 0.3–1)
MONOCYTES NFR BLD: 5.6 % (ref 4–15)
NEUTROPHILS # BLD AUTO: 5.1 K/UL (ref 1.8–7.7)
NEUTROPHILS NFR BLD: 70.9 % (ref 38–73)
NRBC BLD-RTO: 0 /100 WBC
PHOSPHATE SERPL-MCNC: 3.6 MG/DL (ref 2.7–4.5)
PLATELET # BLD AUTO: 377 K/UL (ref 150–450)
PMV BLD AUTO: 9.9 FL (ref 9.2–12.9)
POCT GLUCOSE: 138 MG/DL (ref 70–110)
POCT GLUCOSE: 173 MG/DL (ref 70–110)
POCT GLUCOSE: 183 MG/DL (ref 70–110)
POCT GLUCOSE: 214 MG/DL (ref 70–110)
POTASSIUM SERPL-SCNC: 4 MMOL/L (ref 3.5–5.1)
PROT SERPL-MCNC: 6.6 G/DL (ref 6–8.4)
RBC # BLD AUTO: 4.17 M/UL (ref 4–5.4)
SODIUM SERPL-SCNC: 138 MMOL/L (ref 136–145)
WBC # BLD AUTO: 7.2 K/UL (ref 3.9–12.7)

## 2021-09-28 PROCEDURE — 99233 PR SUBSEQUENT HOSPITAL CARE,LEVL III: ICD-10-PCS | Mod: HCNC,,, | Performed by: INTERNAL MEDICINE

## 2021-09-28 PROCEDURE — 36415 COLL VENOUS BLD VENIPUNCTURE: CPT | Mod: HCNC | Performed by: NURSE PRACTITIONER

## 2021-09-28 PROCEDURE — 84100 ASSAY OF PHOSPHORUS: CPT | Mod: HCNC | Performed by: HOSPITALIST

## 2021-09-28 PROCEDURE — 94761 N-INVAS EAR/PLS OXIMETRY MLT: CPT | Mod: HCNC

## 2021-09-28 PROCEDURE — 63600175 PHARM REV CODE 636 W HCPCS: Mod: HCNC | Performed by: NURSE PRACTITIONER

## 2021-09-28 PROCEDURE — 97116 GAIT TRAINING THERAPY: CPT | Mod: HCNC

## 2021-09-28 PROCEDURE — 63600175 PHARM REV CODE 636 W HCPCS: Mod: HCNC | Performed by: HOSPITALIST

## 2021-09-28 PROCEDURE — 25000003 PHARM REV CODE 250: Mod: HCNC | Performed by: NURSE PRACTITIONER

## 2021-09-28 PROCEDURE — 85025 COMPLETE CBC W/AUTO DIFF WBC: CPT | Mod: HCNC | Performed by: NURSE PRACTITIONER

## 2021-09-28 PROCEDURE — 83735 ASSAY OF MAGNESIUM: CPT | Mod: HCNC | Performed by: HOSPITALIST

## 2021-09-28 PROCEDURE — 11000001 HC ACUTE MED/SURG PRIVATE ROOM: Mod: HCNC

## 2021-09-28 PROCEDURE — 99233 SBSQ HOSP IP/OBS HIGH 50: CPT | Mod: HCNC,,, | Performed by: INTERNAL MEDICINE

## 2021-09-28 PROCEDURE — 25000003 PHARM REV CODE 250: Mod: HCNC | Performed by: HOSPITALIST

## 2021-09-28 PROCEDURE — 80053 COMPREHEN METABOLIC PANEL: CPT | Mod: HCNC | Performed by: NURSE PRACTITIONER

## 2021-09-28 PROCEDURE — 97535 SELF CARE MNGMENT TRAINING: CPT | Mod: HCNC

## 2021-09-28 RX ADMIN — INSULIN ASPART 2 UNITS: 100 INJECTION, SOLUTION INTRAVENOUS; SUBCUTANEOUS at 12:09

## 2021-09-28 RX ADMIN — DEXAMETHASONE SODIUM PHOSPHATE 10 MG: 4 INJECTION INTRA-ARTICULAR; INTRALESIONAL; INTRAMUSCULAR; INTRAVENOUS; SOFT TISSUE at 10:09

## 2021-09-28 RX ADMIN — INSULIN ASPART 3 UNITS: 100 INJECTION, SOLUTION INTRAVENOUS; SUBCUTANEOUS at 12:09

## 2021-09-28 RX ADMIN — DEXAMETHASONE SODIUM PHOSPHATE 10 MG: 4 INJECTION INTRA-ARTICULAR; INTRALESIONAL; INTRAMUSCULAR; INTRAVENOUS; SOFT TISSUE at 05:09

## 2021-09-28 RX ADMIN — DEXAMETHASONE SODIUM PHOSPHATE 10 MG: 4 INJECTION INTRA-ARTICULAR; INTRALESIONAL; INTRAMUSCULAR; INTRAVENOUS; SOFT TISSUE at 03:09

## 2021-09-28 RX ADMIN — ATORVASTATIN CALCIUM 80 MG: 20 TABLET, FILM COATED ORAL at 08:09

## 2021-09-28 RX ADMIN — INSULIN DETEMIR 12 UNITS: 100 INJECTION, SOLUTION SUBCUTANEOUS at 08:09

## 2021-09-28 RX ADMIN — ENOXAPARIN SODIUM 40 MG: 40 INJECTION SUBCUTANEOUS at 04:09

## 2021-09-28 RX ADMIN — ZIPRASIDONE HCL 40 MG: 20 CAPSULE ORAL at 08:09

## 2021-09-28 RX ADMIN — DIVALPROEX SODIUM 250 MG: 250 TABLET, EXTENDED RELEASE ORAL at 08:09

## 2021-09-28 RX ADMIN — LOSARTAN POTASSIUM 25 MG: 25 TABLET, FILM COATED ORAL at 08:09

## 2021-09-28 RX ADMIN — INSULIN ASPART 3 UNITS: 100 INJECTION, SOLUTION INTRAVENOUS; SUBCUTANEOUS at 04:09

## 2021-09-28 RX ADMIN — NIFEDIPINE 30 MG: 30 TABLET, FILM COATED, EXTENDED RELEASE ORAL at 08:09

## 2021-09-28 RX ADMIN — INSULIN ASPART 3 UNITS: 100 INJECTION, SOLUTION INTRAVENOUS; SUBCUTANEOUS at 08:09

## 2021-09-29 VITALS
HEART RATE: 59 BPM | BODY MASS INDEX: 31.89 KG/M2 | WEIGHT: 180 LBS | RESPIRATION RATE: 18 BRPM | DIASTOLIC BLOOD PRESSURE: 94 MMHG | SYSTOLIC BLOOD PRESSURE: 155 MMHG | TEMPERATURE: 98 F | OXYGEN SATURATION: 100 % | HEIGHT: 63 IN

## 2021-09-29 LAB
ALBUMIN SERPL BCP-MCNC: 2.7 G/DL (ref 3.5–5.2)
ALP SERPL-CCNC: 72 U/L (ref 55–135)
ALT SERPL W/O P-5'-P-CCNC: 60 U/L (ref 10–44)
ANION GAP SERPL CALC-SCNC: 9 MMOL/L (ref 8–16)
AST SERPL-CCNC: 24 U/L (ref 10–40)
BASOPHILS # BLD AUTO: 0.02 K/UL (ref 0–0.2)
BASOPHILS NFR BLD: 0.3 % (ref 0–1.9)
BILIRUB SERPL-MCNC: 0.6 MG/DL (ref 0.1–1)
BUN SERPL-MCNC: 38 MG/DL (ref 8–23)
CALCIUM SERPL-MCNC: 8.9 MG/DL (ref 8.7–10.5)
CHLORIDE SERPL-SCNC: 103 MMOL/L (ref 95–110)
CO2 SERPL-SCNC: 26 MMOL/L (ref 23–29)
CREAT SERPL-MCNC: 1.3 MG/DL (ref 0.5–1.4)
DIFFERENTIAL METHOD: ABNORMAL
EOSINOPHIL # BLD AUTO: 0 K/UL (ref 0–0.5)
EOSINOPHIL NFR BLD: 0 % (ref 0–8)
ERYTHROCYTE [DISTWIDTH] IN BLOOD BY AUTOMATED COUNT: 12.6 % (ref 11.5–14.5)
EST. GFR  (AFRICAN AMERICAN): 51 ML/MIN/1.73 M^2
EST. GFR  (NON AFRICAN AMERICAN): 44 ML/MIN/1.73 M^2
GLUCOSE SERPL-MCNC: 154 MG/DL (ref 70–110)
HCT VFR BLD AUTO: 36.8 % (ref 37–48.5)
HGB BLD-MCNC: 12.6 G/DL (ref 12–16)
IMM GRANULOCYTES # BLD AUTO: 0.16 K/UL (ref 0–0.04)
IMM GRANULOCYTES NFR BLD AUTO: 2.3 % (ref 0–0.5)
LYMPHOCYTES # BLD AUTO: 1.5 K/UL (ref 1–4.8)
LYMPHOCYTES NFR BLD: 21.6 % (ref 18–48)
MAGNESIUM SERPL-MCNC: 1.9 MG/DL (ref 1.6–2.6)
MCH RBC QN AUTO: 28.6 PG (ref 27–31)
MCHC RBC AUTO-ENTMCNC: 34.2 G/DL (ref 32–36)
MCV RBC AUTO: 83 FL (ref 82–98)
MONOCYTES # BLD AUTO: 0.4 K/UL (ref 0.3–1)
MONOCYTES NFR BLD: 5.8 % (ref 4–15)
NEUTROPHILS # BLD AUTO: 4.8 K/UL (ref 1.8–7.7)
NEUTROPHILS NFR BLD: 70 % (ref 38–73)
NRBC BLD-RTO: 0 /100 WBC
PHOSPHATE SERPL-MCNC: 3.8 MG/DL (ref 2.7–4.5)
PLATELET # BLD AUTO: 363 K/UL (ref 150–450)
PMV BLD AUTO: 9.4 FL (ref 9.2–12.9)
POCT GLUCOSE: 130 MG/DL (ref 70–110)
POTASSIUM SERPL-SCNC: 4.2 MMOL/L (ref 3.5–5.1)
PROT SERPL-MCNC: 6.7 G/DL (ref 6–8.4)
RBC # BLD AUTO: 4.41 M/UL (ref 4–5.4)
SODIUM SERPL-SCNC: 138 MMOL/L (ref 136–145)
WBC # BLD AUTO: 6.85 K/UL (ref 3.9–12.7)

## 2021-09-29 PROCEDURE — 94761 N-INVAS EAR/PLS OXIMETRY MLT: CPT | Mod: HCNC

## 2021-09-29 PROCEDURE — 80053 COMPREHEN METABOLIC PANEL: CPT | Mod: HCNC | Performed by: NURSE PRACTITIONER

## 2021-09-29 PROCEDURE — 85025 COMPLETE CBC W/AUTO DIFF WBC: CPT | Mod: HCNC | Performed by: NURSE PRACTITIONER

## 2021-09-29 PROCEDURE — 36415 COLL VENOUS BLD VENIPUNCTURE: CPT | Mod: HCNC | Performed by: NURSE PRACTITIONER

## 2021-09-29 PROCEDURE — 1111F DSCHRG MED/CURRENT MED MERGE: CPT | Mod: HCNC,CPTII,, | Performed by: INTERNAL MEDICINE

## 2021-09-29 PROCEDURE — 63600175 PHARM REV CODE 636 W HCPCS: Mod: HCNC | Performed by: HOSPITALIST

## 2021-09-29 PROCEDURE — 25000003 PHARM REV CODE 250: Mod: HCNC | Performed by: HOSPITALIST

## 2021-09-29 PROCEDURE — 99239 PR HOSPITAL DISCHARGE DAY,>30 MIN: ICD-10-PCS | Mod: HCNC,,, | Performed by: INTERNAL MEDICINE

## 2021-09-29 PROCEDURE — 99239 HOSP IP/OBS DSCHRG MGMT >30: CPT | Mod: HCNC,,, | Performed by: INTERNAL MEDICINE

## 2021-09-29 PROCEDURE — 83735 ASSAY OF MAGNESIUM: CPT | Mod: HCNC | Performed by: HOSPITALIST

## 2021-09-29 PROCEDURE — 25000003 PHARM REV CODE 250: Mod: HCNC | Performed by: NURSE PRACTITIONER

## 2021-09-29 PROCEDURE — 84100 ASSAY OF PHOSPHORUS: CPT | Mod: HCNC | Performed by: HOSPITALIST

## 2021-09-29 PROCEDURE — 1111F PR DISCHARGE MEDS RECONCILED W/ CURRENT OUTPATIENT MED LIST: ICD-10-PCS | Mod: HCNC,CPTII,, | Performed by: INTERNAL MEDICINE

## 2021-09-29 RX ORDER — LOSARTAN POTASSIUM 25 MG/1
25 TABLET ORAL DAILY
Qty: 30 TABLET | Refills: 1 | Status: SHIPPED | OUTPATIENT
Start: 2021-09-30 | End: 2021-11-01

## 2021-09-29 RX ORDER — NIFEDIPINE 30 MG/1
30 TABLET, EXTENDED RELEASE ORAL 2 TIMES DAILY
Qty: 30 TABLET | Refills: 1 | Status: SHIPPED | OUTPATIENT
Start: 2021-09-29 | End: 2023-01-19

## 2021-09-29 RX ORDER — METHYLPREDNISOLONE 4 MG/1
TABLET ORAL
Qty: 1 PACKAGE | Refills: 0 | Status: SHIPPED | OUTPATIENT
Start: 2021-09-29 | End: 2021-10-20

## 2021-09-29 RX ADMIN — INSULIN ASPART 3 UNITS: 100 INJECTION, SOLUTION INTRAVENOUS; SUBCUTANEOUS at 08:09

## 2021-09-29 RX ADMIN — DEXAMETHASONE SODIUM PHOSPHATE 10 MG: 4 INJECTION INTRA-ARTICULAR; INTRALESIONAL; INTRAMUSCULAR; INTRAVENOUS; SOFT TISSUE at 05:09

## 2021-09-29 RX ADMIN — ZIPRASIDONE HCL 40 MG: 20 CAPSULE ORAL at 08:09

## 2021-09-29 RX ADMIN — LOSARTAN POTASSIUM 25 MG: 25 TABLET, FILM COATED ORAL at 08:09

## 2021-09-29 RX ADMIN — NIFEDIPINE 30 MG: 30 TABLET, FILM COATED, EXTENDED RELEASE ORAL at 08:09

## 2021-09-29 RX ADMIN — ATORVASTATIN CALCIUM 80 MG: 20 TABLET, FILM COATED ORAL at 08:09

## 2021-09-30 PROCEDURE — G0180 PR HOME HEALTH MD CERTIFICATION: ICD-10-PCS | Mod: ,,, | Performed by: INTERNAL MEDICINE

## 2021-09-30 PROCEDURE — G0180 MD CERTIFICATION HHA PATIENT: HCPCS | Mod: ,,, | Performed by: INTERNAL MEDICINE

## 2021-10-01 ENCOUNTER — TELEPHONE (OUTPATIENT)
Dept: PAIN MEDICINE | Facility: CLINIC | Age: 61
End: 2021-10-01

## 2021-10-04 ENCOUNTER — OFFICE VISIT (OUTPATIENT)
Dept: PAIN MEDICINE | Facility: CLINIC | Age: 61
End: 2021-10-04
Attending: ANESTHESIOLOGY
Payer: MEDICARE

## 2021-10-04 ENCOUNTER — OFFICE VISIT (OUTPATIENT)
Dept: INTERNAL MEDICINE | Facility: CLINIC | Age: 61
End: 2021-10-04
Attending: FAMILY MEDICINE
Payer: MEDICARE

## 2021-10-04 VITALS
WEIGHT: 175.25 LBS | HEART RATE: 95 BPM | DIASTOLIC BLOOD PRESSURE: 86 MMHG | SYSTOLIC BLOOD PRESSURE: 116 MMHG | HEIGHT: 63 IN | BODY MASS INDEX: 31.05 KG/M2

## 2021-10-04 VITALS
OXYGEN SATURATION: 98 % | BODY MASS INDEX: 31.02 KG/M2 | WEIGHT: 175.06 LBS | HEIGHT: 63 IN | HEART RATE: 100 BPM | SYSTOLIC BLOOD PRESSURE: 94 MMHG | DIASTOLIC BLOOD PRESSURE: 60 MMHG

## 2021-10-04 DIAGNOSIS — M47.812 OSTEOARTHRITIS OF CERVICAL SPINE, UNSPECIFIED SPINAL OSTEOARTHRITIS COMPLICATION STATUS: Primary | ICD-10-CM

## 2021-10-04 DIAGNOSIS — R47.1 DYSARTHRIA: ICD-10-CM

## 2021-10-04 DIAGNOSIS — M47.812 CERVICAL SPONDYLOSIS: ICD-10-CM

## 2021-10-04 DIAGNOSIS — E11.42 TYPE 2 DIABETES MELLITUS WITH DIABETIC POLYNEUROPATHY, WITH LONG-TERM CURRENT USE OF INSULIN: ICD-10-CM

## 2021-10-04 DIAGNOSIS — N17.9 ACUTE KIDNEY INJURY: Primary | ICD-10-CM

## 2021-10-04 DIAGNOSIS — Z79.4 TYPE 2 DIABETES MELLITUS WITH DIABETIC POLYNEUROPATHY, WITH LONG-TERM CURRENT USE OF INSULIN: ICD-10-CM

## 2021-10-04 DIAGNOSIS — I10 ESSENTIAL HYPERTENSION: ICD-10-CM

## 2021-10-04 DIAGNOSIS — M54.16 LUMBAR RADICULOPATHY: ICD-10-CM

## 2021-10-04 DIAGNOSIS — M47.819 SPONDYLOSIS WITHOUT MYELOPATHY: ICD-10-CM

## 2021-10-04 PROCEDURE — 4010F ACE/ARB THERAPY RXD/TAKEN: CPT | Mod: HCNC,CPTII,S$GLB, | Performed by: ANESTHESIOLOGY

## 2021-10-04 PROCEDURE — 1111F DSCHRG MED/CURRENT MED MERGE: CPT | Mod: HCNC,CPTII,S$GLB, | Performed by: FAMILY MEDICINE

## 2021-10-04 PROCEDURE — 3078F PR MOST RECENT DIASTOLIC BLOOD PRESSURE < 80 MM HG: ICD-10-PCS | Mod: HCNC,CPTII,S$GLB, | Performed by: FAMILY MEDICINE

## 2021-10-04 PROCEDURE — 3044F HG A1C LEVEL LT 7.0%: CPT | Mod: HCNC,CPTII,S$GLB, | Performed by: ANESTHESIOLOGY

## 2021-10-04 PROCEDURE — 3074F SYST BP LT 130 MM HG: CPT | Mod: HCNC,CPTII,S$GLB, | Performed by: ANESTHESIOLOGY

## 2021-10-04 PROCEDURE — 3044F PR MOST RECENT HEMOGLOBIN A1C LEVEL <7.0%: ICD-10-PCS | Mod: HCNC,CPTII,S$GLB, | Performed by: FAMILY MEDICINE

## 2021-10-04 PROCEDURE — 99214 PR OFFICE/OUTPT VISIT, EST, LEVL IV, 30-39 MIN: ICD-10-PCS | Mod: HCNC,GC,S$GLB, | Performed by: ANESTHESIOLOGY

## 2021-10-04 PROCEDURE — 3072F PR LOW RISK FOR RETINOPATHY: ICD-10-PCS | Mod: HCNC,CPTII,S$GLB, | Performed by: ANESTHESIOLOGY

## 2021-10-04 PROCEDURE — 3078F DIAST BP <80 MM HG: CPT | Mod: HCNC,CPTII,S$GLB, | Performed by: FAMILY MEDICINE

## 2021-10-04 PROCEDURE — 99999 PR PBB SHADOW E&M-EST. PATIENT-LVL III: ICD-10-PCS | Mod: PBBFAC,HCNC,, | Performed by: FAMILY MEDICINE

## 2021-10-04 PROCEDURE — 1111F PR DISCHARGE MEDS RECONCILED W/ CURRENT OUTPATIENT MED LIST: ICD-10-PCS | Mod: HCNC,CPTII,S$GLB, | Performed by: FAMILY MEDICINE

## 2021-10-04 PROCEDURE — 4010F PR ACE/ARB THEARPY RXD/TAKEN: ICD-10-PCS | Mod: HCNC,CPTII,S$GLB, | Performed by: FAMILY MEDICINE

## 2021-10-04 PROCEDURE — 99214 PR OFFICE/OUTPT VISIT, EST, LEVL IV, 30-39 MIN: ICD-10-PCS | Mod: HCNC,S$GLB,, | Performed by: FAMILY MEDICINE

## 2021-10-04 PROCEDURE — 3008F BODY MASS INDEX DOCD: CPT | Mod: HCNC,CPTII,S$GLB, | Performed by: FAMILY MEDICINE

## 2021-10-04 PROCEDURE — 99214 OFFICE O/P EST MOD 30 MIN: CPT | Mod: HCNC,S$GLB,, | Performed by: FAMILY MEDICINE

## 2021-10-04 PROCEDURE — 3074F PR MOST RECENT SYSTOLIC BLOOD PRESSURE < 130 MM HG: ICD-10-PCS | Mod: HCNC,CPTII,S$GLB, | Performed by: FAMILY MEDICINE

## 2021-10-04 PROCEDURE — 99999 PR PBB SHADOW E&M-EST. PATIENT-LVL III: ICD-10-PCS | Mod: PBBFAC,HCNC,, | Performed by: ANESTHESIOLOGY

## 2021-10-04 PROCEDURE — 1111F PR DISCHARGE MEDS RECONCILED W/ CURRENT OUTPATIENT MED LIST: ICD-10-PCS | Mod: HCNC,CPTII,S$GLB, | Performed by: ANESTHESIOLOGY

## 2021-10-04 PROCEDURE — 99999 PR PBB SHADOW E&M-EST. PATIENT-LVL III: CPT | Mod: PBBFAC,HCNC,, | Performed by: FAMILY MEDICINE

## 2021-10-04 PROCEDURE — 1160F RVW MEDS BY RX/DR IN RCRD: CPT | Mod: HCNC,CPTII,S$GLB, | Performed by: ANESTHESIOLOGY

## 2021-10-04 PROCEDURE — 3044F PR MOST RECENT HEMOGLOBIN A1C LEVEL <7.0%: ICD-10-PCS | Mod: HCNC,CPTII,S$GLB, | Performed by: ANESTHESIOLOGY

## 2021-10-04 PROCEDURE — 99499 UNLISTED E&M SERVICE: CPT | Mod: S$GLB,,, | Performed by: FAMILY MEDICINE

## 2021-10-04 PROCEDURE — 3008F PR BODY MASS INDEX (BMI) DOCUMENTED: ICD-10-PCS | Mod: HCNC,CPTII,S$GLB, | Performed by: FAMILY MEDICINE

## 2021-10-04 PROCEDURE — 1160F PR REVIEW ALL MEDS BY PRESCRIBER/CLIN PHARMACIST DOCUMENTED: ICD-10-PCS | Mod: HCNC,CPTII,S$GLB, | Performed by: ANESTHESIOLOGY

## 2021-10-04 PROCEDURE — 3072F LOW RISK FOR RETINOPATHY: CPT | Mod: HCNC,CPTII,S$GLB, | Performed by: FAMILY MEDICINE

## 2021-10-04 PROCEDURE — 1159F PR MEDICATION LIST DOCUMENTED IN MEDICAL RECORD: ICD-10-PCS | Mod: HCNC,CPTII,S$GLB, | Performed by: ANESTHESIOLOGY

## 2021-10-04 PROCEDURE — 99999 PR PBB SHADOW E&M-EST. PATIENT-LVL III: CPT | Mod: PBBFAC,HCNC,, | Performed by: ANESTHESIOLOGY

## 2021-10-04 PROCEDURE — 3008F PR BODY MASS INDEX (BMI) DOCUMENTED: ICD-10-PCS | Mod: HCNC,CPTII,S$GLB, | Performed by: ANESTHESIOLOGY

## 2021-10-04 PROCEDURE — 3072F PR LOW RISK FOR RETINOPATHY: ICD-10-PCS | Mod: HCNC,CPTII,S$GLB, | Performed by: FAMILY MEDICINE

## 2021-10-04 PROCEDURE — 1159F MED LIST DOCD IN RCRD: CPT | Mod: HCNC,CPTII,S$GLB, | Performed by: ANESTHESIOLOGY

## 2021-10-04 PROCEDURE — 3074F PR MOST RECENT SYSTOLIC BLOOD PRESSURE < 130 MM HG: ICD-10-PCS | Mod: HCNC,CPTII,S$GLB, | Performed by: ANESTHESIOLOGY

## 2021-10-04 PROCEDURE — 3044F HG A1C LEVEL LT 7.0%: CPT | Mod: HCNC,CPTII,S$GLB, | Performed by: FAMILY MEDICINE

## 2021-10-04 PROCEDURE — 3079F PR MOST RECENT DIASTOLIC BLOOD PRESSURE 80-89 MM HG: ICD-10-PCS | Mod: HCNC,CPTII,S$GLB, | Performed by: ANESTHESIOLOGY

## 2021-10-04 PROCEDURE — 4010F PR ACE/ARB THEARPY RXD/TAKEN: ICD-10-PCS | Mod: HCNC,CPTII,S$GLB, | Performed by: ANESTHESIOLOGY

## 2021-10-04 PROCEDURE — 3008F BODY MASS INDEX DOCD: CPT | Mod: HCNC,CPTII,S$GLB, | Performed by: ANESTHESIOLOGY

## 2021-10-04 PROCEDURE — 3072F LOW RISK FOR RETINOPATHY: CPT | Mod: HCNC,CPTII,S$GLB, | Performed by: ANESTHESIOLOGY

## 2021-10-04 PROCEDURE — 3074F SYST BP LT 130 MM HG: CPT | Mod: HCNC,CPTII,S$GLB, | Performed by: FAMILY MEDICINE

## 2021-10-04 PROCEDURE — 99214 OFFICE O/P EST MOD 30 MIN: CPT | Mod: HCNC,GC,S$GLB, | Performed by: ANESTHESIOLOGY

## 2021-10-04 PROCEDURE — 99499 RISK ADDL DX/OHS AUDIT: ICD-10-PCS | Mod: S$GLB,,, | Performed by: FAMILY MEDICINE

## 2021-10-04 PROCEDURE — 3079F DIAST BP 80-89 MM HG: CPT | Mod: HCNC,CPTII,S$GLB, | Performed by: ANESTHESIOLOGY

## 2021-10-04 PROCEDURE — 4010F ACE/ARB THERAPY RXD/TAKEN: CPT | Mod: HCNC,CPTII,S$GLB, | Performed by: FAMILY MEDICINE

## 2021-10-04 PROCEDURE — 1111F DSCHRG MED/CURRENT MED MERGE: CPT | Mod: HCNC,CPTII,S$GLB, | Performed by: ANESTHESIOLOGY

## 2021-10-04 RX ORDER — LISINOPRIL AND HYDROCHLOROTHIAZIDE 10; 12.5 MG/1; MG/1
1 TABLET ORAL DAILY
COMMUNITY
Start: 2021-07-21 | End: 2022-03-17 | Stop reason: SDUPTHER

## 2021-10-08 ENCOUNTER — OFFICE VISIT (OUTPATIENT)
Dept: UROLOGY | Facility: CLINIC | Age: 61
End: 2021-10-08
Payer: MEDICARE

## 2021-10-08 VITALS — HEIGHT: 63 IN | SYSTOLIC BLOOD PRESSURE: 108 MMHG | DIASTOLIC BLOOD PRESSURE: 70 MMHG | BODY MASS INDEX: 31.05 KG/M2

## 2021-10-08 DIAGNOSIS — N17.9 ACUTE KIDNEY INJURY: ICD-10-CM

## 2021-10-08 DIAGNOSIS — R33.9 URINARY RETENTION: ICD-10-CM

## 2021-10-08 PROCEDURE — 3044F PR MOST RECENT HEMOGLOBIN A1C LEVEL <7.0%: ICD-10-PCS | Mod: HCNC,CPTII,S$GLB, | Performed by: NURSE PRACTITIONER

## 2021-10-08 PROCEDURE — 3008F PR BODY MASS INDEX (BMI) DOCUMENTED: ICD-10-PCS | Mod: HCNC,CPTII,S$GLB, | Performed by: NURSE PRACTITIONER

## 2021-10-08 PROCEDURE — 99203 OFFICE O/P NEW LOW 30 MIN: CPT | Mod: HCNC,25,S$GLB, | Performed by: NURSE PRACTITIONER

## 2021-10-08 PROCEDURE — 1159F PR MEDICATION LIST DOCUMENTED IN MEDICAL RECORD: ICD-10-PCS | Mod: HCNC,CPTII,S$GLB, | Performed by: NURSE PRACTITIONER

## 2021-10-08 PROCEDURE — 1160F RVW MEDS BY RX/DR IN RCRD: CPT | Mod: HCNC,CPTII,S$GLB, | Performed by: NURSE PRACTITIONER

## 2021-10-08 PROCEDURE — 4010F ACE/ARB THERAPY RXD/TAKEN: CPT | Mod: HCNC,CPTII,S$GLB, | Performed by: NURSE PRACTITIONER

## 2021-10-08 PROCEDURE — 1111F DSCHRG MED/CURRENT MED MERGE: CPT | Mod: HCNC,CPTII,S$GLB, | Performed by: NURSE PRACTITIONER

## 2021-10-08 PROCEDURE — 3078F DIAST BP <80 MM HG: CPT | Mod: HCNC,CPTII,S$GLB, | Performed by: NURSE PRACTITIONER

## 2021-10-08 PROCEDURE — 3072F LOW RISK FOR RETINOPATHY: CPT | Mod: HCNC,CPTII,S$GLB, | Performed by: NURSE PRACTITIONER

## 2021-10-08 PROCEDURE — 51700 PR IRRIGATION, BLADDER: ICD-10-PCS | Mod: HCNC,S$GLB,, | Performed by: NURSE PRACTITIONER

## 2021-10-08 PROCEDURE — 1111F PR DISCHARGE MEDS RECONCILED W/ CURRENT OUTPATIENT MED LIST: ICD-10-PCS | Mod: HCNC,CPTII,S$GLB, | Performed by: NURSE PRACTITIONER

## 2021-10-08 PROCEDURE — 3044F HG A1C LEVEL LT 7.0%: CPT | Mod: HCNC,CPTII,S$GLB, | Performed by: NURSE PRACTITIONER

## 2021-10-08 PROCEDURE — 1160F PR REVIEW ALL MEDS BY PRESCRIBER/CLIN PHARMACIST DOCUMENTED: ICD-10-PCS | Mod: HCNC,CPTII,S$GLB, | Performed by: NURSE PRACTITIONER

## 2021-10-08 PROCEDURE — 3078F PR MOST RECENT DIASTOLIC BLOOD PRESSURE < 80 MM HG: ICD-10-PCS | Mod: HCNC,CPTII,S$GLB, | Performed by: NURSE PRACTITIONER

## 2021-10-08 PROCEDURE — 1159F MED LIST DOCD IN RCRD: CPT | Mod: HCNC,CPTII,S$GLB, | Performed by: NURSE PRACTITIONER

## 2021-10-08 PROCEDURE — 51700 IRRIGATION OF BLADDER: CPT | Mod: HCNC,S$GLB,, | Performed by: NURSE PRACTITIONER

## 2021-10-08 PROCEDURE — 4010F PR ACE/ARB THEARPY RXD/TAKEN: ICD-10-PCS | Mod: HCNC,CPTII,S$GLB, | Performed by: NURSE PRACTITIONER

## 2021-10-08 PROCEDURE — 99203 PR OFFICE/OUTPT VISIT, NEW, LEVL III, 30-44 MIN: ICD-10-PCS | Mod: HCNC,25,S$GLB, | Performed by: NURSE PRACTITIONER

## 2021-10-08 PROCEDURE — 3008F BODY MASS INDEX DOCD: CPT | Mod: HCNC,CPTII,S$GLB, | Performed by: NURSE PRACTITIONER

## 2021-10-08 PROCEDURE — 3074F PR MOST RECENT SYSTOLIC BLOOD PRESSURE < 130 MM HG: ICD-10-PCS | Mod: HCNC,CPTII,S$GLB, | Performed by: NURSE PRACTITIONER

## 2021-10-08 PROCEDURE — 3074F SYST BP LT 130 MM HG: CPT | Mod: HCNC,CPTII,S$GLB, | Performed by: NURSE PRACTITIONER

## 2021-10-08 PROCEDURE — 3072F PR LOW RISK FOR RETINOPATHY: ICD-10-PCS | Mod: HCNC,CPTII,S$GLB, | Performed by: NURSE PRACTITIONER

## 2021-10-20 ENCOUNTER — OFFICE VISIT (OUTPATIENT)
Dept: OPTOMETRY | Facility: CLINIC | Age: 61
End: 2021-10-20
Payer: COMMERCIAL

## 2021-10-20 ENCOUNTER — EXTERNAL HOME HEALTH (OUTPATIENT)
Dept: HOME HEALTH SERVICES | Facility: HOSPITAL | Age: 61
End: 2021-10-20
Payer: MEDICARE

## 2021-10-20 DIAGNOSIS — E11.9 TYPE 2 DIABETES MELLITUS WITHOUT OPHTHALMIC MANIFESTATIONS: Primary | ICD-10-CM

## 2021-10-20 DIAGNOSIS — H04.203 BILATERAL EPIPHORA: ICD-10-CM

## 2021-10-20 DIAGNOSIS — H52.4 PRESBYOPIA: ICD-10-CM

## 2021-10-20 DIAGNOSIS — Z83.511 FAMILY HISTORY OF GLAUCOMA: ICD-10-CM

## 2021-10-20 PROCEDURE — 92014 PR EYE EXAM, EST PATIENT,COMPREHESV: ICD-10-PCS | Mod: HCNC,S$GLB,, | Performed by: OPTOMETRIST

## 2021-10-20 PROCEDURE — 92015 DETERMINE REFRACTIVE STATE: CPT | Mod: HCNC,S$GLB,, | Performed by: OPTOMETRIST

## 2021-10-20 PROCEDURE — 99999 PR PBB SHADOW E&M-EST. PATIENT-LVL II: CPT | Mod: PBBFAC,HCNC,, | Performed by: OPTOMETRIST

## 2021-10-20 PROCEDURE — 92015 PR REFRACTION: ICD-10-PCS | Mod: HCNC,S$GLB,, | Performed by: OPTOMETRIST

## 2021-10-20 PROCEDURE — 92014 COMPRE OPH EXAM EST PT 1/>: CPT | Mod: HCNC,S$GLB,, | Performed by: OPTOMETRIST

## 2021-10-20 PROCEDURE — 99999 PR PBB SHADOW E&M-EST. PATIENT-LVL II: ICD-10-PCS | Mod: PBBFAC,HCNC,, | Performed by: OPTOMETRIST

## 2021-11-01 RX ORDER — LOSARTAN POTASSIUM 25 MG/1
TABLET ORAL
Qty: 90 TABLET | Refills: 3 | Status: SHIPPED | OUTPATIENT
Start: 2021-11-01 | End: 2022-01-18 | Stop reason: SDUPTHER

## 2021-11-03 ENCOUNTER — TELEPHONE (OUTPATIENT)
Dept: INTERNAL MEDICINE | Facility: CLINIC | Age: 61
End: 2021-11-03
Payer: MEDICARE

## 2021-11-04 PROBLEM — G89.29 CHRONIC PAIN: Status: RESOLVED | Noted: 2020-11-04 | Resolved: 2021-11-04

## 2021-11-04 PROBLEM — M25.60 JOINT STIFFNESS: Status: RESOLVED | Noted: 2018-05-22 | Resolved: 2021-11-04

## 2021-11-04 PROBLEM — M48.061 SPINAL STENOSIS OF LUMBAR REGION: Status: RESOLVED | Noted: 2018-02-26 | Resolved: 2021-11-04

## 2021-11-05 ENCOUNTER — DOCUMENT SCAN (OUTPATIENT)
Dept: HOME HEALTH SERVICES | Facility: HOSPITAL | Age: 61
End: 2021-11-05
Payer: MEDICARE

## 2021-11-10 ENCOUNTER — TELEPHONE (OUTPATIENT)
Dept: INTERNAL MEDICINE | Facility: CLINIC | Age: 61
End: 2021-11-10
Payer: MEDICARE

## 2021-11-16 DIAGNOSIS — E11.42 TYPE 2 DIABETES MELLITUS WITH DIABETIC POLYNEUROPATHY, WITH LONG-TERM CURRENT USE OF INSULIN: Primary | ICD-10-CM

## 2021-11-16 DIAGNOSIS — Z79.4 TYPE 2 DIABETES MELLITUS WITH DIABETIC POLYNEUROPATHY, WITH LONG-TERM CURRENT USE OF INSULIN: Primary | ICD-10-CM

## 2021-11-17 RX ORDER — LANCETS
EACH MISCELLANEOUS
Qty: 100 EACH | Refills: 11 | Status: SHIPPED | OUTPATIENT
Start: 2021-11-17 | End: 2022-03-11 | Stop reason: SDUPTHER

## 2021-11-19 ENCOUNTER — TELEPHONE (OUTPATIENT)
Dept: PAIN MEDICINE | Facility: CLINIC | Age: 61
End: 2021-11-19
Payer: MEDICARE

## 2021-11-19 ENCOUNTER — TELEPHONE (OUTPATIENT)
Dept: INTERNAL MEDICINE | Facility: CLINIC | Age: 61
End: 2021-11-19
Payer: MEDICARE

## 2021-11-22 ENCOUNTER — OFFICE VISIT (OUTPATIENT)
Dept: PAIN MEDICINE | Facility: CLINIC | Age: 61
End: 2021-11-22
Payer: COMMERCIAL

## 2021-11-22 ENCOUNTER — HOSPITAL ENCOUNTER (OUTPATIENT)
Dept: RADIOLOGY | Facility: OTHER | Age: 61
Discharge: HOME | End: 2021-11-22
Attending: NURSE PRACTITIONER
Payer: COMMERCIAL

## 2021-11-22 VITALS
WEIGHT: 172.38 LBS | HEIGHT: 63 IN | RESPIRATION RATE: 18 BRPM | BODY MASS INDEX: 30.54 KG/M2 | SYSTOLIC BLOOD PRESSURE: 97 MMHG | HEART RATE: 99 BPM | OXYGEN SATURATION: 96 % | DIASTOLIC BLOOD PRESSURE: 60 MMHG

## 2021-11-22 DIAGNOSIS — M43.17 ANTEROLISTHESIS OF LUMBOSACRAL SPINE: ICD-10-CM

## 2021-11-22 DIAGNOSIS — M47.812 CERVICAL SPONDYLOSIS WITHOUT MYELOPATHY: ICD-10-CM

## 2021-11-22 DIAGNOSIS — M43.17 ANTEROLISTHESIS OF LUMBOSACRAL SPINE: Primary | ICD-10-CM

## 2021-11-22 DIAGNOSIS — M51.9 LUMBAR DISC DISORDER: ICD-10-CM

## 2021-11-22 PROCEDURE — 99213 OFFICE O/P EST LOW 20 MIN: CPT | Mod: HCNC,S$GLB,, | Performed by: NURSE PRACTITIONER

## 2021-11-22 PROCEDURE — 99999 PR PBB SHADOW E&M-EST. PATIENT-LVL III: CPT | Mod: PBBFAC,HCNC,, | Performed by: NURSE PRACTITIONER

## 2021-11-22 PROCEDURE — 72110 XR LUMBAR SPINE 5 VIEW WITH FLEX AND EXT: ICD-10-PCS | Mod: 26,HCNC,, | Performed by: RADIOLOGY

## 2021-11-22 PROCEDURE — 4010F PR ACE/ARB THEARPY RXD/TAKEN: ICD-10-PCS | Mod: HCNC,CPTII,S$GLB, | Performed by: NURSE PRACTITIONER

## 2021-11-22 PROCEDURE — 99213 OFFICE O/P EST LOW 20 MIN: CPT | Mod: PBBFAC,HCNC | Performed by: NURSE PRACTITIONER

## 2021-11-22 PROCEDURE — 99999 PR PBB SHADOW E&M-EST. PATIENT-LVL III: ICD-10-PCS | Mod: PBBFAC,HCNC,, | Performed by: NURSE PRACTITIONER

## 2021-11-22 PROCEDURE — 99213 PR OFFICE/OUTPT VISIT, EST, LEVL III, 20-29 MIN: ICD-10-PCS | Mod: HCNC,S$GLB,, | Performed by: NURSE PRACTITIONER

## 2021-11-22 PROCEDURE — 72050 X-RAY EXAM NECK SPINE 4/5VWS: CPT | Mod: TC,HCNC,FY

## 2021-11-22 PROCEDURE — 72110 X-RAY EXAM L-2 SPINE 4/>VWS: CPT | Mod: 26,HCNC,, | Performed by: RADIOLOGY

## 2021-11-22 PROCEDURE — 72050 X-RAY EXAM NECK SPINE 4/5VWS: CPT | Mod: 26,HCNC,, | Performed by: RADIOLOGY

## 2021-11-22 PROCEDURE — 4010F ACE/ARB THERAPY RXD/TAKEN: CPT | Mod: HCNC,CPTII,S$GLB, | Performed by: NURSE PRACTITIONER

## 2021-11-22 PROCEDURE — 3072F PR LOW RISK FOR RETINOPATHY: ICD-10-PCS | Mod: HCNC,CPTII,S$GLB, | Performed by: NURSE PRACTITIONER

## 2021-11-22 PROCEDURE — 3072F LOW RISK FOR RETINOPATHY: CPT | Mod: HCNC,CPTII,S$GLB, | Performed by: NURSE PRACTITIONER

## 2021-11-22 PROCEDURE — 72110 X-RAY EXAM L-2 SPINE 4/>VWS: CPT | Mod: TC,HCNC,FY

## 2021-11-22 PROCEDURE — 72050 XR CERVICAL SPINE AP LAT WITH FLEX EXTEN: ICD-10-PCS | Mod: 26,HCNC,, | Performed by: RADIOLOGY

## 2021-11-29 PROCEDURE — G0179 MD RECERTIFICATION HHA PT: HCPCS | Mod: ,,, | Performed by: INTERNAL MEDICINE

## 2021-11-29 PROCEDURE — G0179 PR HOME HEALTH MD RECERTIFICATION: ICD-10-PCS | Mod: ,,, | Performed by: INTERNAL MEDICINE

## 2021-12-01 ENCOUNTER — TELEPHONE (OUTPATIENT)
Dept: INTERNAL MEDICINE | Facility: CLINIC | Age: 61
End: 2021-12-01
Payer: MEDICARE

## 2021-12-03 ENCOUNTER — TELEPHONE (OUTPATIENT)
Dept: INTERNAL MEDICINE | Facility: CLINIC | Age: 61
End: 2021-12-03
Payer: MEDICARE

## 2021-12-15 ENCOUNTER — HOSPITAL ENCOUNTER (OUTPATIENT)
Facility: OTHER | Age: 61
Discharge: HOME | End: 2021-12-15
Attending: ANESTHESIOLOGY | Admitting: ANESTHESIOLOGY
Payer: COMMERCIAL

## 2021-12-15 VITALS
WEIGHT: 172 LBS | RESPIRATION RATE: 14 BRPM | DIASTOLIC BLOOD PRESSURE: 59 MMHG | TEMPERATURE: 98 F | OXYGEN SATURATION: 95 % | HEIGHT: 63 IN | BODY MASS INDEX: 30.48 KG/M2 | HEART RATE: 88 BPM | SYSTOLIC BLOOD PRESSURE: 93 MMHG

## 2021-12-15 DIAGNOSIS — M54.50 CHRONIC LOW BACK PAIN, UNSPECIFIED BACK PAIN LATERALITY, UNSPECIFIED WHETHER SCIATICA PRESENT: Primary | Chronic | ICD-10-CM

## 2021-12-15 DIAGNOSIS — G89.29 CHRONIC LOW BACK PAIN, UNSPECIFIED BACK PAIN LATERALITY, UNSPECIFIED WHETHER SCIATICA PRESENT: Primary | Chronic | ICD-10-CM

## 2021-12-15 LAB — POCT GLUCOSE: 181 MG/DL (ref 70–110)

## 2021-12-15 PROCEDURE — 25000003 PHARM REV CODE 250: Mod: HCNC | Performed by: ANESTHESIOLOGY

## 2021-12-15 PROCEDURE — 63600175 PHARM REV CODE 636 W HCPCS: Mod: HCNC | Performed by: ANESTHESIOLOGY

## 2021-12-15 PROCEDURE — 25500020 PHARM REV CODE 255: Mod: HCNC | Performed by: ANESTHESIOLOGY

## 2021-12-15 PROCEDURE — 64483 NJX AA&/STRD TFRM EPI L/S 1: CPT | Mod: 50 | Performed by: ANESTHESIOLOGY

## 2021-12-15 PROCEDURE — 64483 NJX AA&/STRD TFRM EPI L/S 1: CPT | Mod: 50,,, | Performed by: ANESTHESIOLOGY

## 2021-12-15 PROCEDURE — 64483 PR EPIDURAL INJ, ANES/STEROID, TRANSFORAMINAL, LUMB/SACR, SNGL LEVL: ICD-10-PCS | Mod: 50,,, | Performed by: ANESTHESIOLOGY

## 2021-12-15 RX ORDER — FENTANYL CITRATE 50 UG/ML
INJECTION, SOLUTION INTRAMUSCULAR; INTRAVENOUS
Status: DISCONTINUED | OUTPATIENT
Start: 2021-12-15 | End: 2021-12-15 | Stop reason: HOSPADM

## 2021-12-15 RX ORDER — MIDAZOLAM HYDROCHLORIDE 1 MG/ML
INJECTION INTRAMUSCULAR; INTRAVENOUS
Status: DISCONTINUED | OUTPATIENT
Start: 2021-12-15 | End: 2021-12-15 | Stop reason: HOSPADM

## 2021-12-15 RX ORDER — LIDOCAINE HYDROCHLORIDE 10 MG/ML
INJECTION, SOLUTION EPIDURAL; INFILTRATION; INTRACAUDAL; PERINEURAL
Status: DISCONTINUED | OUTPATIENT
Start: 2021-12-15 | End: 2021-12-15 | Stop reason: HOSPADM

## 2021-12-15 RX ORDER — LIDOCAINE HYDROCHLORIDE 20 MG/ML
INJECTION, SOLUTION INFILTRATION; PERINEURAL
Status: DISCONTINUED | OUTPATIENT
Start: 2021-12-15 | End: 2021-12-15 | Stop reason: HOSPADM

## 2021-12-15 RX ORDER — DEXAMETHASONE SODIUM PHOSPHATE 10 MG/ML
INJECTION INTRAMUSCULAR; INTRAVENOUS
Status: DISCONTINUED | OUTPATIENT
Start: 2021-12-15 | End: 2021-12-15 | Stop reason: HOSPADM

## 2021-12-16 ENCOUNTER — EXTERNAL HOME HEALTH (OUTPATIENT)
Dept: HOME HEALTH SERVICES | Facility: HOSPITAL | Age: 61
End: 2021-12-16
Payer: COMMERCIAL

## 2021-12-17 ENCOUNTER — OFFICE VISIT (OUTPATIENT)
Dept: INTERNAL MEDICINE | Facility: CLINIC | Age: 61
End: 2021-12-17
Attending: FAMILY MEDICINE
Payer: MEDICARE

## 2021-12-17 ENCOUNTER — TELEPHONE (OUTPATIENT)
Dept: INTERNAL MEDICINE | Facility: CLINIC | Age: 61
End: 2021-12-17
Payer: MEDICARE

## 2021-12-17 VITALS
OXYGEN SATURATION: 100 % | SYSTOLIC BLOOD PRESSURE: 82 MMHG | DIASTOLIC BLOOD PRESSURE: 54 MMHG | WEIGHT: 179.25 LBS | HEIGHT: 63 IN | BODY MASS INDEX: 31.76 KG/M2 | HEART RATE: 88 BPM

## 2021-12-17 DIAGNOSIS — Z79.4 TYPE 2 DIABETES MELLITUS WITH DIABETIC POLYNEUROPATHY, WITH LONG-TERM CURRENT USE OF INSULIN: Primary | ICD-10-CM

## 2021-12-17 DIAGNOSIS — E11.42 TYPE 2 DIABETES MELLITUS WITH DIABETIC POLYNEUROPATHY, WITH LONG-TERM CURRENT USE OF INSULIN: Primary | ICD-10-CM

## 2021-12-17 DIAGNOSIS — I10 PRIMARY HYPERTENSION: ICD-10-CM

## 2021-12-17 PROCEDURE — 4010F PR ACE/ARB THEARPY RXD/TAKEN: ICD-10-PCS | Mod: CPTII,S$GLB,, | Performed by: FAMILY MEDICINE

## 2021-12-17 PROCEDURE — 99999 PR PBB SHADOW E&M-EST. PATIENT-LVL IV: ICD-10-PCS | Mod: PBBFAC,,, | Performed by: FAMILY MEDICINE

## 2021-12-17 PROCEDURE — 4010F ACE/ARB THERAPY RXD/TAKEN: CPT | Mod: CPTII,S$GLB,, | Performed by: FAMILY MEDICINE

## 2021-12-17 PROCEDURE — 99214 PR OFFICE/OUTPT VISIT, EST, LEVL IV, 30-39 MIN: ICD-10-PCS | Mod: S$GLB,,, | Performed by: FAMILY MEDICINE

## 2021-12-17 PROCEDURE — 99999 PR PBB SHADOW E&M-EST. PATIENT-LVL IV: CPT | Mod: PBBFAC,,, | Performed by: FAMILY MEDICINE

## 2021-12-17 PROCEDURE — 3072F PR LOW RISK FOR RETINOPATHY: ICD-10-PCS | Mod: CPTII,S$GLB,, | Performed by: FAMILY MEDICINE

## 2021-12-17 PROCEDURE — 99214 OFFICE O/P EST MOD 30 MIN: CPT | Mod: PBBFAC | Performed by: FAMILY MEDICINE

## 2021-12-17 PROCEDURE — 3072F LOW RISK FOR RETINOPATHY: CPT | Mod: CPTII,S$GLB,, | Performed by: FAMILY MEDICINE

## 2021-12-17 PROCEDURE — 99214 OFFICE O/P EST MOD 30 MIN: CPT | Mod: S$GLB,,, | Performed by: FAMILY MEDICINE

## 2021-12-18 RX ORDER — LIRAGLUTIDE 6 MG/ML
1.8 INJECTION SUBCUTANEOUS DAILY
Qty: 27 ML | Refills: 0 | OUTPATIENT
Start: 2021-12-18

## 2021-12-18 RX ORDER — LIRAGLUTIDE 6 MG/ML
INJECTION SUBCUTANEOUS
Qty: 27 ML | Refills: 1 | Status: SHIPPED | OUTPATIENT
Start: 2021-12-18 | End: 2022-06-20 | Stop reason: SDUPTHER

## 2021-12-21 ENCOUNTER — PES CALL (OUTPATIENT)
Dept: ADMINISTRATIVE | Facility: CLINIC | Age: 61
End: 2021-12-21
Payer: MEDICARE

## 2021-12-27 DIAGNOSIS — E11.9 TYPE 2 DIABETES MELLITUS WITHOUT COMPLICATION, WITH LONG-TERM CURRENT USE OF INSULIN: ICD-10-CM

## 2021-12-27 DIAGNOSIS — Z79.4 TYPE 2 DIABETES MELLITUS WITHOUT COMPLICATION, WITH LONG-TERM CURRENT USE OF INSULIN: ICD-10-CM

## 2021-12-27 RX ORDER — INSULIN GLARGINE 100 [IU]/ML
INJECTION, SOLUTION SUBCUTANEOUS
Qty: 15 ML | Refills: 1 | OUTPATIENT
Start: 2021-12-27

## 2021-12-28 ENCOUNTER — TELEPHONE (OUTPATIENT)
Dept: INTERNAL MEDICINE | Facility: CLINIC | Age: 61
End: 2021-12-28
Payer: MEDICARE

## 2021-12-28 DIAGNOSIS — M47.812 CERVICAL SPONDYLOSIS WITHOUT MYELOPATHY: ICD-10-CM

## 2021-12-28 RX ORDER — ZIPRASIDONE HYDROCHLORIDE 40 MG/1
CAPSULE ORAL
Qty: 30 CAPSULE | Refills: 1 | OUTPATIENT
Start: 2021-12-28

## 2021-12-28 RX ORDER — QUETIAPINE FUMARATE 100 MG/1
100 TABLET, FILM COATED ORAL NIGHTLY
Qty: 90 TABLET | Refills: 1 | OUTPATIENT
Start: 2021-12-28

## 2021-12-30 DIAGNOSIS — F25.0 SCHIZOAFFECTIVE DISORDER, BIPOLAR TYPE: Primary | ICD-10-CM

## 2021-12-30 RX ORDER — ZIPRASIDONE HYDROCHLORIDE 40 MG/1
CAPSULE ORAL
Qty: 30 CAPSULE | Refills: 0 | Status: SHIPPED | OUTPATIENT
Start: 2021-12-30 | End: 2022-01-26

## 2022-01-10 ENCOUNTER — DOCUMENT SCAN (OUTPATIENT)
Dept: HOME HEALTH SERVICES | Facility: HOSPITAL | Age: 62
End: 2022-01-10
Payer: MEDICARE

## 2022-01-18 DIAGNOSIS — N39.41 URGE INCONTINENCE: ICD-10-CM

## 2022-01-18 DIAGNOSIS — E11.42 DM TYPE 2 WITH DIABETIC PERIPHERAL NEUROPATHY: ICD-10-CM

## 2022-01-18 DIAGNOSIS — I10 ESSENTIAL HYPERTENSION: Primary | ICD-10-CM

## 2022-01-18 DIAGNOSIS — M54.12 CERVICAL RADICULOPATHY: ICD-10-CM

## 2022-01-18 DIAGNOSIS — M47.812 CERVICAL SPONDYLOSIS WITHOUT MYELOPATHY: ICD-10-CM

## 2022-01-18 DIAGNOSIS — M50.30 DDD (DEGENERATIVE DISC DISEASE), CERVICAL: ICD-10-CM

## 2022-01-18 RX ORDER — PREGABALIN 100 MG/1
100 CAPSULE ORAL 3 TIMES DAILY
Qty: 270 CAPSULE | Refills: 1 | Status: SHIPPED | OUTPATIENT
Start: 2022-01-18 | End: 2022-09-09 | Stop reason: SDUPTHER

## 2022-01-18 RX ORDER — OXYBUTYNIN CHLORIDE 10 MG/1
10 TABLET, EXTENDED RELEASE ORAL DAILY
Qty: 90 TABLET | Refills: 3 | Status: SHIPPED | OUTPATIENT
Start: 2022-01-18 | End: 2022-06-20 | Stop reason: SDUPTHER

## 2022-01-18 RX ORDER — LOSARTAN POTASSIUM 25 MG/1
25 TABLET ORAL DAILY
Qty: 90 TABLET | Refills: 3 | Status: SHIPPED | OUTPATIENT
Start: 2022-01-18 | End: 2022-03-17 | Stop reason: SDUPTHER

## 2022-01-18 NOTE — TELEPHONE ENCOUNTER
----- Message from Osmin Villagomez sent at 1/18/2022 11:28 AM CST -----  Regarding: Refill  Can the clinic reply in [facility]:No          Please refill the medication(s) listed below.         Medication #1-losartan (COZAAR) 25 MG tablet        Medication #2-pregabalin (LYRICA) 100 MG capsule  Medication #3- oxybutynin (DITROPAN-XL) 10 MG 24            Preferred Pharmacy:[facility] - [facility] [address]  [phone]

## 2022-01-18 NOTE — TELEPHONE ENCOUNTER
Care Due:                  Date            Visit Type   Department     Provider  --------------------------------------------------------------------------------                                             [facility] INTERNAL  Ghulam Mendoza  Last Visit: 12-      [facility]  Ghulam Mendoza  Next Visit: 03-      [facility]                                                            Last  Test          Frequency    Reason                     Performed    Due Date  --------------------------------------------------------------------------------    Lipid Panel.  12 months..  atorvastatin.............  Not Found    Overdue    Powered by [employer]. Reference number: [id number].   1/18/2022 1:10:52 PM CST

## 2022-01-19 ENCOUNTER — OFFICE VISIT (OUTPATIENT)
Dept: INTERNAL MEDICINE | Facility: CLINIC | Age: 62
End: 2022-01-19
Payer: COMMERCIAL

## 2022-01-19 VITALS
SYSTOLIC BLOOD PRESSURE: 134 MMHG | OXYGEN SATURATION: 97 % | HEART RATE: 69 BPM | HEIGHT: 63 IN | DIASTOLIC BLOOD PRESSURE: 83 MMHG | BODY MASS INDEX: 33.91 KG/M2 | WEIGHT: 191.38 LBS

## 2022-01-19 DIAGNOSIS — M24.28 LIGAMENTUM FLAVUM HYPERTROPHY: ICD-10-CM

## 2022-01-19 DIAGNOSIS — F25.0 SCHIZOAFFECTIVE DISORDER, BIPOLAR TYPE: ICD-10-CM

## 2022-01-19 DIAGNOSIS — N17.9 ACUTE KIDNEY INJURY: ICD-10-CM

## 2022-01-19 DIAGNOSIS — M54.50 CHRONIC LOW BACK PAIN, UNSPECIFIED BACK PAIN LATERALITY, UNSPECIFIED WHETHER SCIATICA PRESENT: Chronic | ICD-10-CM

## 2022-01-19 DIAGNOSIS — M51.36 DDD (DEGENERATIVE DISC DISEASE), LUMBAR: ICD-10-CM

## 2022-01-19 DIAGNOSIS — M47.812 FACET ARTHROPATHY, CERVICAL: ICD-10-CM

## 2022-01-19 DIAGNOSIS — I10 ESSENTIAL HYPERTENSION: ICD-10-CM

## 2022-01-19 DIAGNOSIS — M47.812 CERVICAL SPONDYLOSIS WITHOUT MYELOPATHY: Chronic | ICD-10-CM

## 2022-01-19 DIAGNOSIS — E66.09 CLASS 1 OBESITY DUE TO EXCESS CALORIES WITHOUT SERIOUS COMORBIDITY WITH BODY MASS INDEX (BMI) OF 30.0 TO 30.9 IN ADULT: ICD-10-CM

## 2022-01-19 DIAGNOSIS — E11.8 DIABETIC FOOT: ICD-10-CM

## 2022-01-19 DIAGNOSIS — R29.90 STROKE-LIKE SYMPTOMS: ICD-10-CM

## 2022-01-19 DIAGNOSIS — N32.81 OAB (OVERACTIVE BLADDER): ICD-10-CM

## 2022-01-19 DIAGNOSIS — G62.9 PERIPHERAL POLYNEUROPATHY: ICD-10-CM

## 2022-01-19 DIAGNOSIS — N18.31 STAGE 3A CHRONIC KIDNEY DISEASE: ICD-10-CM

## 2022-01-19 DIAGNOSIS — G62.0 CHEMOTHERAPY-INDUCED NEUROPATHY: ICD-10-CM

## 2022-01-19 DIAGNOSIS — E11.9 TYPE 2 DIABETES MELLITUS WITHOUT COMPLICATION, UNSPECIFIED WHETHER LONG TERM INSULIN USE: ICD-10-CM

## 2022-01-19 DIAGNOSIS — Z79.4 TYPE 2 DIABETES MELLITUS WITH DIABETIC POLYNEUROPATHY, WITH LONG-TERM CURRENT USE OF INSULIN: ICD-10-CM

## 2022-01-19 DIAGNOSIS — M47.812 CERVICAL SPONDYLOSIS: ICD-10-CM

## 2022-01-19 DIAGNOSIS — R47.1 DYSARTHRIA: ICD-10-CM

## 2022-01-19 DIAGNOSIS — I10 HYPERTENSION, UNSPECIFIED TYPE: ICD-10-CM

## 2022-01-19 DIAGNOSIS — M62.81 MUSCLE WEAKNESS: ICD-10-CM

## 2022-01-19 DIAGNOSIS — M47.819 SPONDYLOSIS WITHOUT MYELOPATHY: ICD-10-CM

## 2022-01-19 DIAGNOSIS — M54.12 CERVICAL RADICULOPATHY: Chronic | ICD-10-CM

## 2022-01-19 DIAGNOSIS — M50.30 DDD (DEGENERATIVE DISC DISEASE), CERVICAL: ICD-10-CM

## 2022-01-19 DIAGNOSIS — G89.29 CHRONIC LOW BACK PAIN, UNSPECIFIED BACK PAIN LATERALITY, UNSPECIFIED WHETHER SCIATICA PRESENT: Chronic | ICD-10-CM

## 2022-01-19 DIAGNOSIS — E66.9 OBESITY, CLASS I, BMI 30-34.9: ICD-10-CM

## 2022-01-19 DIAGNOSIS — M54.2 NECK PAIN: Chronic | ICD-10-CM

## 2022-01-19 DIAGNOSIS — N39.41 URGE INCONTINENCE OF URINE: ICD-10-CM

## 2022-01-19 DIAGNOSIS — M48.00 SPINAL STENOSIS, UNSPECIFIED SPINAL REGION: ICD-10-CM

## 2022-01-19 DIAGNOSIS — I97.2 POST-MASTECTOMY LYMPHEDEMA SYNDROME: ICD-10-CM

## 2022-01-19 DIAGNOSIS — M54.16 LUMBAR RADICULOPATHY: ICD-10-CM

## 2022-01-19 DIAGNOSIS — T45.1X5A CHEMOTHERAPY-INDUCED NEUROPATHY: ICD-10-CM

## 2022-01-19 DIAGNOSIS — Z00.00 ENCOUNTER FOR PREVENTIVE HEALTH EXAMINATION: Primary | ICD-10-CM

## 2022-01-19 DIAGNOSIS — M43.16 SPONDYLOLISTHESIS OF LUMBAR REGION: ICD-10-CM

## 2022-01-19 DIAGNOSIS — E11.42 TYPE 2 DIABETES MELLITUS WITH DIABETIC POLYNEUROPATHY, WITH LONG-TERM CURRENT USE OF INSULIN: ICD-10-CM

## 2022-01-19 PROCEDURE — 3008F BODY MASS INDEX DOCD: CPT | Mod: HCNC,CPTII,S$GLB, | Performed by: NURSE PRACTITIONER

## 2022-01-19 PROCEDURE — 3079F DIAST BP 80-89 MM HG: CPT | Mod: HCNC,CPTII,S$GLB, | Performed by: NURSE PRACTITIONER

## 2022-01-19 PROCEDURE — 3072F PR LOW RISK FOR RETINOPATHY: ICD-10-PCS | Mod: HCNC,CPTII,S$GLB, | Performed by: NURSE PRACTITIONER

## 2022-01-19 PROCEDURE — 1159F PR MEDICATION LIST DOCUMENTED IN MEDICAL RECORD: ICD-10-PCS | Mod: HCNC,CPTII,S$GLB, | Performed by: NURSE PRACTITIONER

## 2022-01-19 PROCEDURE — 1159F MED LIST DOCD IN RCRD: CPT | Mod: HCNC,CPTII,S$GLB, | Performed by: NURSE PRACTITIONER

## 2022-01-19 PROCEDURE — 4010F PR ACE/ARB THEARPY RXD/TAKEN: ICD-10-PCS | Mod: HCNC,CPTII,S$GLB, | Performed by: NURSE PRACTITIONER

## 2022-01-19 PROCEDURE — 3008F PR BODY MASS INDEX (BMI) DOCUMENTED: ICD-10-PCS | Mod: HCNC,CPTII,S$GLB, | Performed by: NURSE PRACTITIONER

## 2022-01-19 PROCEDURE — G0439 PR MEDICARE ANNUAL WELLNESS SUBSEQUENT VISIT: ICD-10-PCS | Mod: HCNC,S$GLB,, | Performed by: NURSE PRACTITIONER

## 2022-01-19 PROCEDURE — 99499 RISK ADDL DX/OHS AUDIT: ICD-10-PCS | Mod: S$PBB,,, | Performed by: NURSE PRACTITIONER

## 2022-01-19 PROCEDURE — 99999 PR PBB SHADOW E&M-EST. PATIENT-LVL V: CPT | Mod: PBBFAC,HCNC,, | Performed by: NURSE PRACTITIONER

## 2022-01-19 PROCEDURE — 99999 PR PBB SHADOW E&M-EST. PATIENT-LVL V: ICD-10-PCS | Mod: PBBFAC,HCNC,, | Performed by: NURSE PRACTITIONER

## 2022-01-19 PROCEDURE — 3072F LOW RISK FOR RETINOPATHY: CPT | Mod: HCNC,CPTII,S$GLB, | Performed by: NURSE PRACTITIONER

## 2022-01-19 PROCEDURE — G0439 PPPS, SUBSEQ VISIT: HCPCS | Mod: HCNC,S$GLB,, | Performed by: NURSE PRACTITIONER

## 2022-01-19 PROCEDURE — 4010F ACE/ARB THERAPY RXD/TAKEN: CPT | Mod: HCNC,CPTII,S$GLB, | Performed by: NURSE PRACTITIONER

## 2022-01-19 PROCEDURE — 3075F SYST BP GE 130 - 139MM HG: CPT | Mod: HCNC,CPTII,S$GLB, | Performed by: NURSE PRACTITIONER

## 2022-01-19 PROCEDURE — 99499 UNLISTED E&M SERVICE: CPT | Mod: S$PBB,,, | Performed by: NURSE PRACTITIONER

## 2022-01-19 PROCEDURE — 3079F PR MOST RECENT DIASTOLIC BLOOD PRESSURE 80-89 MM HG: ICD-10-PCS | Mod: HCNC,CPTII,S$GLB, | Performed by: NURSE PRACTITIONER

## 2022-01-19 PROCEDURE — 3075F PR MOST RECENT SYSTOLIC BLOOD PRESS GE 130-139MM HG: ICD-10-PCS | Mod: HCNC,CPTII,S$GLB, | Performed by: NURSE PRACTITIONER

## 2022-01-19 NOTE — PATIENT INSTRUCTIONS
Counseling and Referral of Other Preventative  (Italic type indicates deductible and co-insurance are waived)    Patient Name: Jonathan Gonzales  Today's Date: 1/19/2022    Health Maintenance       Date Due Completion Date    Diabetes Urine Screening 04/01/2014 4/1/2013    Shingles Vaccine (2 of 2) 05/12/2020 3/17/2020    Cervical Cancer Screening 10/29/2021 10/29/2018    Colorectal Cancer Screening 12/01/2022 (Originally 1960) 9/17/2021    HIV Screening 03/17/2026 (Originally 2/2/1975) ---    Hemoglobin A1c 03/23/2022 9/23/2021    Lipid Panel 04/19/2022 4/19/2021    Foot Exam 06/01/2022 6/1/2021    COVID-19 Vaccine (3 - Booster for Pfizer series) 06/10/2022 12/10/2021    Eye Exam 10/20/2022 10/20/2021    Override on 1/28/2015: Done    Low Dose Statin 01/19/2023 1/19/2022    Pneumococcal Vaccines (Age 0-64) (4 of 4 - PPSV23) 03/17/2025 3/17/2020    TETANUS VACCINE 09/05/2028 9/5/2018        No orders of the defined types were placed in this encounter.    The following information is provided to all patients.  This information is to help you find resources for any of the problems found today that may be affecting your health:                Living healthy guide: www.[facility].[place].gov      Understanding Diabetes: www.diabetes.org      Eating healthy: www.cdc.gov/healthyweight      CDC home safety checklist: www.cdc.gov/steadi/patient.html      Agency on Aging: www.goea.[place].[facility]      Alcoholics anonymous (AA): www.aa.org      Physical Activity: www.michelle.nih.gov/tf4tdut      Tobacco use: www.quitwithusla.org

## 2022-01-19 NOTE — PROGRESS NOTES
"  Jonathan Gonzales presented for a  Medicare AWV and comprehensive Health Risk Assessment today. The following components were reviewed and updated:    · Medical history  · Family History  · Social history  · Allergies and Current Medications  · Health Risk Assessment  · Health Maintenance  · Care Team         ** See Completed Assessments for Annual Wellness Visit within the encounter summary.**         The following assessments were completed:  · Living Situation  · CAGE  · Depression Screening  · Timed Get Up and Go  · Whisper Test  · Cognitive Function Screening  · Nutrition Screening  · ADL Screening  · PAQ Screening            Vitals:    01/19/22 1015   BP: 134/83   BP Location: Left arm   Patient Position: Sitting   BP Method: Medium (Manual)   Pulse: 69   SpO2: 97%   Weight: 86.8 kg (191 lb 5.8 oz)   Height: 5' 3" (1.6 m)     Body mass index is 33.9 kg/m².     Physical Exam  Constitutional:       Appearance: She is well-developed and well-nourished. She is obese.   HENT:      Head: Normocephalic and atraumatic. Not macrocephalic and not microcephalic. No raccoon eyes, Connor's sign, abrasion, contusion, right periorbital erythema, left periorbital erythema or laceration. Hair is normal.      Right Ear: No decreased hearing noted. No laceration, drainage, swelling or tenderness. No middle ear effusion. No foreign body. No mastoid tenderness. No hemotympanum. Tympanic membrane is not injected, scarred, perforated, erythematous, retracted or bulging. Tympanic membrane has normal mobility.      Left Ear: No decreased hearing noted. No laceration, drainage, swelling or tenderness.  No middle ear effusion. No foreign body. No mastoid tenderness. No hemotympanum. Tympanic membrane is not injected, scarred, perforated, erythematous, retracted or bulging. Tympanic membrane has normal mobility.      Nose: No nasal deformity, laceration, sinus tenderness or mucosal edema.      Mouth/Throat:      Pharynx: Uvula midline. "   Eyes:      General: Lids are normal. No scleral icterus.     Extraocular Movements: EOM normal.      Conjunctiva/sclera: Conjunctivae normal.   Neck:      Thyroid: No thyroid mass or thyromegaly.      Trachea: Trachea normal.   Cardiovascular:      Rate and Rhythm: Normal rate and regular rhythm.      Heart sounds: No murmur heard.  No friction rub. No gallop.    Pulmonary:      Effort: Pulmonary effort is normal. No respiratory distress.      Breath sounds: Normal breath sounds. No stridor. No wheezing, rhonchi or rales.   Chest:      Chest wall: No tenderness.   Abdominal:      Palpations: Abdomen is soft.   Musculoskeletal:         General: Normal range of motion.      Cervical back: Neck supple. No edema or erythema. No spinous process tenderness or muscular tenderness. Normal range of motion.   Lymphadenopathy:      Head:      Right side of head: No submental, submandibular, tonsillar, preauricular or posterior auricular adenopathy.      Left side of head: No submental, submandibular, tonsillar, preauricular, posterior auricular or occipital adenopathy.   Skin:     General: Skin is warm and dry.   Neurological:      Mental Status: She is alert and oriented to person, place, and time.   Psychiatric:         Mood and Affect: Mood and affect normal.         Behavior: Behavior normal.         Thought Content: Thought content normal.         Judgment: Judgment normal.             Diagnoses and health risks identified today and associated recommendations/orders:    1. Encounter for preventive health examination  Annual Health Risk Assessment (HRA) visit today.  Counseling and referral of health maintenance and preventative health measures performed.  Patient given annual wellness paperwork to take home.  Encouraged to return in 1 year for subsequent HRA visit.   - Ambulatory referral/consult to Obstetrics / Gynecology; Future    2. Schizoaffective disorder, bipolar type  Chronic. Stable. Continue current treatment  plan as previously prescribed by PCP.    3. Stage 3a chronic kidney disease  Chronic. Stable. Continue current treatment plan as previously prescribed by PCP.    4. Type 2 diabetes mellitus with diabetic polyneuropathy, with long-term current use of insulin  Chronic. Stable. Controlled. Last Hgb A1c= 6.2 from 9/22/21. Continue current treatment plan as previously prescribed by PCP.    5. Diabetic foot  Chronic. Stable. Continue current treatment plan as previously prescribed by PCP.    6. Uncontrolled type 2 diabetes mellitus, with long-term current use of insulin  Chronic. Stable. Continue current treatment plan as previously prescribed by PCP.    7. Obesity, Class I, BMI 30-34.9  Chronic. Stable. Encouraged to increase exercise as tolerated and improve diet to heart healthy, low sodium diet. Continue current treatment plan as previously prescribed by PCP.    8. Spondylosis without myelopathy  Chronic. Stable. Continue current treatment plan as previously prescribed by PCP.    9. Spondylolisthesis of lumbar region  Chronic. Stable. Continue current treatment plan as previously prescribed by PCP.    10. Facet arthropathy, cervical  Chronic. Stable. Continue current treatment plan as previously prescribed by PCP.    11. Neck pain  Chronic. Stable. Continue current treatment plan as previously prescribed by PCP.    12. Chronic low back pain, unspecified back pain laterality, unspecified whether sciatica present  Chronic. Stable. Continue current treatment plan as previously prescribed by PCP.    13. Essential hypertension  Chronic. Stable. Controlled. Encouraged to increase exercise as tolerated (moderate-intensity aerobic activity and muscle-strengthening activities) improve diet to heart healthy, low sodium diet. Continue current treatment plan as previously prescribed by PCP.    14. Acute kidney injury from Urinary Retention  Chronic. Stable. Continue current treatment plan as previously prescribed by PCP.    15.  Urge incontinence of urine  Chronic. Stable. Continue current treatment plan as previously prescribed by PCP.    16. OAB (overactive bladder)  Chronic. Stable. Continue current treatment plan as previously prescribed by PCP.    17. Post-mastectomy lymphedema syndrome  Chronic. Stable. Continue current treatment plan as previously prescribed by PCP.    18. Hypertension, unspecified type  Chronic. Stable. Continue current treatment plan as previously prescribed by PCP.    19. Chemotherapy-induced neuropathy  Chronic. Stable. Continue current treatment plan as previously prescribed by PCP.    20. Cervical spondylosis  Chronic. Stable. Continue current treatment plan as previously prescribed by PCP.    21. DDD (degenerative disc disease), lumbar  Chronic. Stable. Continue current treatment plan as previously prescribed by PCP.    22. Lumbar radiculopathy  Chronic. Stable. Continue current treatment plan as previously prescribed by PCP.    23. Stroke-like symptoms/TIA  Chronic. Stable. Continue current treatment plan as previously prescribed by PCP.    24. Dysarthria  Chronic. Stable. Continue current treatment plan as previously prescribed by PCP.    25. Spinal stenosis, unspecified spinal region  Chronic. Stable. Continue current treatment plan as previously prescribed by PCP.    26. DDD (degenerative disc disease), cervical  Chronic. Stable. Continue current treatment plan as previously prescribed by PCP.    27. Peripheral polyneuropathy  Chronic. Stable. Continue current treatment plan as previously prescribed by PCP.    28. Cervical spondylosis without myelopathy  Chronic. Stable. Continue current treatment plan as previously prescribed by PCP.    29. Cervical radiculopathy  Chronic. Stable. Continue current treatment plan as previously prescribed by PCP.    30. Class 1 obesity due to excess calories without serious comorbidity with body mass index (BMI) of 30.0 to 30.9 in adult  Chronic. Stable. Continue current  treatment plan as previously prescribed by PCP.    31. Type 2 diabetes mellitus without complication, unspecified whether long term insulin use  Chronic. Stable. Continue current treatment plan as previously prescribed by PCP.    32. Ligamentum flavum hypertrophy  Chronic. Stable. Continue current treatment plan as previously prescribed by PCP.    33. Muscle weakness  Chronic. Stable. Continue current treatment plan as previously prescribed by PCP.        Provided [place] with a 5-10 year written screening schedule and personal prevention plan. Recommendations were developed using the USPSTF age appropriate recommendations. Education, counseling, and referrals were provided as needed. After Visit Summary printed and given to patient which includes a list of additional screenings\tests needed.      I offered to discuss end of life issues, including information on how to make advance directives that the patient could use to name someone who would make medical decisions on their behalf if they became too ill to make themselves.    ___Patient declined  _X_Patient is interested, I provided paper work and offered to discuss.    No follow-ups on file.    Fernie Coffey NP  I offered to discuss advanced care planning, including how to pick a person who would make decisions for you if you were unable to make them for yourself, called a health care power of , and what kind of decisions you might make such as use of life sustaining treatments such as ventilators and tube feeding when faced with a life limiting illness recorded on a living will that they will need to know. (How you want to be cared for as you near the end of your natural life)     X Patient is interested in learning more about how to make advanced directives.  I provided them paperwork and offered to discuss this with them.

## 2022-01-20 ENCOUNTER — IMMUNIZATION (OUTPATIENT)
Dept: PHARMACY | Facility: CLINIC | Age: 62
End: 2022-01-20
Payer: MEDICARE

## 2022-01-27 ENCOUNTER — HOSPITAL ENCOUNTER (EMERGENCY)
Facility: OTHER | Age: 62
Discharge: HOME | End: 2022-01-27
Attending: EMERGENCY MEDICINE
Payer: MEDICARE

## 2022-01-27 VITALS
RESPIRATION RATE: 20 BRPM | TEMPERATURE: 98 F | OXYGEN SATURATION: 96 % | HEART RATE: 77 BPM | HEIGHT: 63 IN | SYSTOLIC BLOOD PRESSURE: 135 MMHG | DIASTOLIC BLOOD PRESSURE: 86 MMHG | BODY MASS INDEX: 33.84 KG/M2 | WEIGHT: 191 LBS

## 2022-01-27 DIAGNOSIS — M79.672 FOOT PAIN, LEFT: Primary | ICD-10-CM

## 2022-01-27 DIAGNOSIS — R31.9 URINARY TRACT INFECTION WITH HEMATURIA, SITE UNSPECIFIED: ICD-10-CM

## 2022-01-27 DIAGNOSIS — N39.0 URINARY TRACT INFECTION WITH HEMATURIA, SITE UNSPECIFIED: ICD-10-CM

## 2022-01-27 DIAGNOSIS — R52 PAIN: ICD-10-CM

## 2022-01-27 LAB
ALBUMIN SERPL BCP-MCNC: 3.2 G/DL (ref 3.5–5.2)
ALP SERPL-CCNC: 63 U/L (ref 55–135)
ALT SERPL W/O P-5'-P-CCNC: 11 U/L (ref 10–44)
ANION GAP SERPL CALC-SCNC: 11 MMOL/L (ref 8–16)
ANISOCYTOSIS BLD QL SMEAR: SLIGHT
AST SERPL-CCNC: 19 U/L (ref 10–40)
BACTERIA #/AREA URNS HPF: ABNORMAL /HPF
BASOPHILS # BLD AUTO: 0.02 K/UL (ref 0–0.2)
BASOPHILS NFR BLD: 0.3 % (ref 0–1.9)
BILIRUB SERPL-MCNC: 0.4 MG/DL (ref 0.1–1)
BILIRUB UR QL STRIP: NEGATIVE
BUN SERPL-MCNC: 8 MG/DL (ref 8–23)
BURR CELLS BLD QL SMEAR: ABNORMAL
CALCIUM SERPL-MCNC: 8.9 MG/DL (ref 8.7–10.5)
CHLORIDE SERPL-SCNC: 106 MMOL/L (ref 95–110)
CLARITY UR: ABNORMAL
CO2 SERPL-SCNC: 25 MMOL/L (ref 23–29)
COLOR UR: YELLOW
CREAT SERPL-MCNC: 1.1 MG/DL (ref 0.5–1.4)
DIFFERENTIAL METHOD: ABNORMAL
EOSINOPHIL # BLD AUTO: 0.2 K/UL (ref 0–0.5)
EOSINOPHIL NFR BLD: 2.4 % (ref 0–8)
ERYTHROCYTE [DISTWIDTH] IN BLOOD BY AUTOMATED COUNT: 14.2 % (ref 11.5–14.5)
EST. GFR  (AFRICAN AMERICAN): >60 ML/MIN/1.73 M^2
EST. GFR  (NON AFRICAN AMERICAN): 54 ML/MIN/1.73 M^2
GIANT PLATELETS BLD QL SMEAR: PRESENT
GLUCOSE SERPL-MCNC: 84 MG/DL (ref 70–110)
GLUCOSE UR QL STRIP: NEGATIVE
HCT VFR BLD AUTO: 32.8 % (ref 37–48.5)
HCV AB SERPL QL IA: NEGATIVE
HGB BLD-MCNC: 10.5 G/DL (ref 12–16)
HGB UR QL STRIP: ABNORMAL
HIV 1+2 AB+HIV1 P24 AG SERPL QL IA: NEGATIVE
HYALINE CASTS #/AREA URNS LPF: 0 /LPF
IMM GRANULOCYTES # BLD AUTO: 0.03 K/UL (ref 0–0.04)
IMM GRANULOCYTES NFR BLD AUTO: 0.4 % (ref 0–0.5)
KETONES UR QL STRIP: ABNORMAL
LEUKOCYTE ESTERASE UR QL STRIP: ABNORMAL
LYMPHOCYTES # BLD AUTO: 3.3 K/UL (ref 1–4.8)
LYMPHOCYTES NFR BLD: 42.3 % (ref 18–48)
MCH RBC QN AUTO: 28.8 PG (ref 27–31)
MCHC RBC AUTO-ENTMCNC: 32 G/DL (ref 32–36)
MCV RBC AUTO: 90 FL (ref 82–98)
MICROSCOPIC COMMENT: ABNORMAL
MONOCYTES # BLD AUTO: 0.6 K/UL (ref 0.3–1)
MONOCYTES NFR BLD: 8.2 % (ref 4–15)
NEUTROPHILS # BLD AUTO: 3.6 K/UL (ref 1.8–7.7)
NEUTROPHILS NFR BLD: 46.4 % (ref 38–73)
NITRITE UR QL STRIP: POSITIVE
NRBC BLD-RTO: 0 /100 WBC
PH UR STRIP: 6 [PH] (ref 5–8)
PLATELET # BLD AUTO: 309 K/UL (ref 150–450)
PLATELET BLD QL SMEAR: ABNORMAL
PMV BLD AUTO: 9.9 FL (ref 9.2–12.9)
POCT GLUCOSE: 81 MG/DL (ref 70–110)
POIKILOCYTOSIS BLD QL SMEAR: SLIGHT
POTASSIUM SERPL-SCNC: 3.9 MMOL/L (ref 3.5–5.1)
PROT SERPL-MCNC: 7.1 G/DL (ref 6–8.4)
PROT UR QL STRIP: ABNORMAL
RBC # BLD AUTO: 3.65 M/UL (ref 4–5.4)
RBC #/AREA URNS HPF: 4 /HPF (ref 0–4)
SODIUM SERPL-SCNC: 142 MMOL/L (ref 136–145)
SP GR UR STRIP: >=1.03 (ref 1–1.03)
SQUAMOUS #/AREA URNS HPF: 4 /HPF
URN SPEC COLLECT METH UR: ABNORMAL
UROBILINOGEN UR STRIP-ACNC: NEGATIVE EU/DL
WBC # BLD AUTO: 7.78 K/UL (ref 3.9–12.7)
WBC #/AREA URNS HPF: >100 /HPF (ref 0–5)

## 2022-01-27 PROCEDURE — 87077 CULTURE AEROBIC IDENTIFY: CPT | Mod: HCNC | Performed by: INTERNAL MEDICINE

## 2022-01-27 PROCEDURE — 87086 URINE CULTURE/COLONY COUNT: CPT | Mod: HCNC | Performed by: INTERNAL MEDICINE

## 2022-01-27 PROCEDURE — 80053 COMPREHEN METABOLIC PANEL: CPT | Mod: HCNC | Performed by: INTERNAL MEDICINE

## 2022-01-27 PROCEDURE — 86803 HEPATITIS C AB TEST: CPT | Mod: HCNC | Performed by: EMERGENCY MEDICINE

## 2022-01-27 PROCEDURE — 87389 HIV-1 AG W/HIV-1&-2 AB AG IA: CPT | Mod: HCNC | Performed by: EMERGENCY MEDICINE

## 2022-01-27 PROCEDURE — 82962 GLUCOSE BLOOD TEST: CPT | Mod: HCNC

## 2022-01-27 PROCEDURE — 85025 COMPLETE CBC W/AUTO DIFF WBC: CPT | Mod: HCNC | Performed by: INTERNAL MEDICINE

## 2022-01-27 PROCEDURE — 81000 URINALYSIS NONAUTO W/SCOPE: CPT | Mod: HCNC | Performed by: INTERNAL MEDICINE

## 2022-01-27 PROCEDURE — 87088 URINE BACTERIA CULTURE: CPT | Mod: HCNC | Performed by: INTERNAL MEDICINE

## 2022-01-27 PROCEDURE — 87186 SC STD MICRODIL/AGAR DIL: CPT | Mod: HCNC | Performed by: INTERNAL MEDICINE

## 2022-01-27 PROCEDURE — 99284 EMERGENCY DEPT VISIT MOD MDM: CPT | Mod: 25,HCNC

## 2022-01-27 PROCEDURE — 25000003 PHARM REV CODE 250: Performed by: INTERNAL MEDICINE

## 2022-01-27 RX ORDER — TRAMADOL HYDROCHLORIDE 50 MG/1
50 TABLET ORAL EVERY 6 HOURS PRN
Qty: 12 TABLET | Refills: 0 | Status: SHIPPED | OUTPATIENT
Start: 2022-01-27 | End: 2022-03-17 | Stop reason: SDUPTHER

## 2022-01-27 RX ORDER — COLCHICINE 0.6 MG/1
0.6 TABLET ORAL DAILY
Qty: 10 TABLET | Refills: 0 | Status: SHIPPED | OUTPATIENT
Start: 2022-01-27 | End: 2022-03-17 | Stop reason: SDUPTHER

## 2022-01-27 RX ORDER — TRAMADOL HYDROCHLORIDE 50 MG/1
50 TABLET ORAL
Status: COMPLETED | OUTPATIENT
Start: 2022-01-27 | End: 2022-01-27

## 2022-01-27 RX ORDER — NITROFURANTOIN 25; 75 MG/1; MG/1
100 CAPSULE ORAL
Status: COMPLETED | OUTPATIENT
Start: 2022-01-27 | End: 2022-01-27

## 2022-01-27 RX ORDER — NITROFURANTOIN 25; 75 MG/1; MG/1
CAPSULE ORAL
Status: DISCONTINUED
Start: 2022-01-27 | End: 2022-01-27 | Stop reason: HOSPADM

## 2022-01-27 RX ORDER — TRAMADOL HYDROCHLORIDE 50 MG/1
TABLET ORAL
Status: DISCONTINUED
Start: 2022-01-27 | End: 2022-01-27 | Stop reason: HOSPADM

## 2022-01-27 RX ORDER — NITROFURANTOIN 25; 75 MG/1; MG/1
100 CAPSULE ORAL 2 TIMES DAILY
Qty: 10 CAPSULE | Refills: 0 | Status: SHIPPED | OUTPATIENT
Start: 2022-01-27 | End: 2022-02-01

## 2022-01-27 RX ORDER — COLCHICINE 0.6 MG/1
1.2 TABLET, FILM COATED ORAL
Status: COMPLETED | OUTPATIENT
Start: 2022-01-27 | End: 2022-01-27

## 2022-01-27 RX ORDER — ACETAMINOPHEN 500 MG
1000 TABLET ORAL
Status: COMPLETED | OUTPATIENT
Start: 2022-01-27 | End: 2022-01-27

## 2022-01-27 RX ADMIN — TRAMADOL HYDROCHLORIDE 50 MG: 50 TABLET ORAL at 08:01

## 2022-01-27 RX ADMIN — ACETAMINOPHEN 1000 MG: 500 TABLET, FILM COATED ORAL at 07:01

## 2022-01-27 RX ADMIN — COLCHICINE 1.2 MG: 0.6 TABLET, FILM COATED ORAL at 08:01

## 2022-01-27 RX ADMIN — NITROFURANTOIN (MONOHYDRATE/MACROCRYSTALS) 100 MG: 75; 25 CAPSULE ORAL at 08:01

## 2022-01-28 DIAGNOSIS — M47.812 CERVICAL SPONDYLOSIS WITHOUT MYELOPATHY: ICD-10-CM

## 2022-01-28 NOTE — TELEPHONE ENCOUNTER
No new care gaps identified.  Powered by [employer]. Reference number: [id number].   1/28/2022 1:27:45 PM CST

## 2022-01-28 NOTE — ED TRIAGE NOTES
"Pt c/o left foot pain x 4 days and left foot swelling x 2 days, pt denies injury to left foot or ankle, pt denies hx of gout, pt's bilateral feet are hot to touch, moderate swelling noted to pt's left foot in comparison to right foot, left foot sensation in tact,n left dorsalis and posterior tibial pulses are palpable, digits of left foot cap refill tested on pads of toes and is <2 secs, pt stated "I'm only able to move my first two toes" pt denies taking meds PTA to ER, pt A&O x4 and ambulatory to exam room w/walking cane, pt accompanied by son in-law.  "

## 2022-01-28 NOTE — DISCHARGE INSTRUCTIONS
Diagnosis:   1. Foot pain, left    2. Pain    3. Urinary tract infection with hematuria, site unspecified        Tests you had showed:   Labs Reviewed   CBC W/ AUTO DIFFERENTIAL - Abnormal; Notable for the following components:       Result Value    RBC 3.65 (*)     Hemoglobin 10.5 (*)     Hematocrit 32.8 (*)     All other components within normal limits   COMPREHENSIVE METABOLIC PANEL - Abnormal; Notable for the following components:    Albumin 3.2 (*)     eGFR if non  54 (*)     All other components within normal limits   URINALYSIS, REFLEX TO URINE CULTURE - Abnormal; Notable for the following components:    Appearance, UA Hazy (*)     Specific Gravity, UA >=1.030 (*)     Protein, UA 1+ (*)     Ketones, UA Trace (*)     Occult Blood UA Trace (*)     Nitrite, UA Positive (*)     Leukocytes, UA 2+ (*)     All other components within normal limits    Narrative:     Specimen Source->Urine   URINALYSIS MICROSCOPIC - Abnormal; Notable for the following components:    WBC, UA >100 (*)     Bacteria Many (*)     All other components within normal limits    Narrative:     Specimen Source->Urine   CULTURE, URINE   HEPATITIS C ANTIBODY    Narrative:     Release to patient->Immediate   HIV 1 / 2 ANTIBODY   POCT GLUCOSE   POCT GLUCOSE MONITORING CONTINUOUS      X-Ray Foot Complete Left   Final Result      No acute osseous abnormality identified.         Electronically signed by: Arcelia Hawley MD   Date:    01/27/2022   Time:    19:35      X-Ray Ankle Complete Left   Final Result      No acute osseous abnormality identified.         Electronically signed by: Arcelia Hawley MD   Date:    01/27/2022   Time:    19:35          Treatments you received were:   Medications   acetaminophen tablet 1,000 mg (1,000 mg Oral Given 1/27/22 1921)   colchicine capsule/tablet 1.2 mg (1.2 mg Oral Given 1/27/22 2035)   nitrofurantoin (macrocrystal-monohydrate) 100 MG capsule 100 mg (100 mg Oral Given 1/27/22 2051)   traMADoL  tablet 50 mg (50 mg Oral Given 1/27/22 2051)       Home Care Instructions:  - Medications: Continue taking your home medications as prescribed    Follow-Up Plan:  - Follow-up with: Primary care doctor within 5  days  - Additional testing and/or evaluation will be directed by your primary doctor    Return to the Emergency Department for symptoms including but not limited to: worsening symptoms, severe back pain, shortness of breath or chest pain, vomiting with inability to hold down fluids, blood in vomit or poop, fevers greater than 100.4°F, passing out/fainting/unconsciousness, or other concerning symptoms.     Future Appointments   Date Time Provider Department Center   2/25/2022  2:00 PM Selin Conner MD [id number]   3/16/2022  8:00 AM LAB, [facility]   3/17/2022  1:30 PM [facility]

## 2022-01-28 NOTE — ED PROVIDER NOTES
Encounter date: 6:48 PM 01/28/2022    Source of History   Patient    Chief Complaint   Pt presents with:   Foot Pain (Pt reporting pain and swelling to the left foot since yesterday; no known injury.)      History Of Present Illness   Jonathan Gonzales is a 61 y.o. female with History of diabetes complicated by diabetic neuropahty, depression, HTN, schizophrenia, stroke, who presents to the ED with CC of left foot pain x 2 day. Pt states her left foot has been tender when walking. She states she woke up yesterday and it was swollen and red. She states it is painful when she bears weight. She has not taken anything for pain but she notes that the lyrica has been helping with her pain.  She denies history of gout.    Denies:  trauma or falls, chest pain, fevers, n/v/d   This is the extent to the patients complaints today here in the emergency department.    Review Of Systems   As per HPI and below:  Positive for:  Left foot pain and swelling  Constitutional: No fever.   HEENT: No voice change.   Eyes:  No visual disturbances. No eye pain.   Respiratory:  No shortness of breath. No wheezing. No cough.   Cardio:  No chest pain. No leg swelling. No palpitations.   GI:  No abdominal pain. No nausea or vomiting. No diarrhea.   :  No blood in urine. No difficulty urinating.   MSK:  No back pain. No neck pain.   Skin: No rash.   Neurologic: No headache. No dizziness.       Review of patient's allergies indicates:  No Known Allergies    Current Facility-Administered Medications on File Prior to Encounter   Medication Dose Route Frequency Provider Last Rate Last Admin    0.9%  NaCl infusion   Intravenous Continuous Jacky See MD 25 mL/hr at 02/03/21 1432 New Bag at 02/03/21 1432     Current Outpatient Medications on File Prior to Encounter   Medication Sig Dispense Refill    ACCU-CHEK AMBER PLUS TEST STRP Strp TEST BLOOD SUGAR TWICE DAILY 200 strip 0    alcohol swabs (BD ALCOHOL SWABS) PadM Apply 1 each topically  "as needed. 100 each 11    arm brace (WRIST SUPPORT LARGE-XLARGE MISC)       aspirin (ECOTRIN) 81 MG EC tablet Take 1 tablet (81 mg total) by mouth once daily.  0    atorvastatin (LIPITOR) 80 MG tablet Take 1 tablet (80 mg total) by mouth once daily. 90 tablet 3    blood glucose control, high Soln 1 drop by Misc.(Non-Drug; Combo Route) route as needed. 1 each 0    blood glucose control, low Soln 1 drop by Misc.(Non-Drug; Combo Route) route as needed. 1 each 0    blood-glucose meter (ACCU-CHEK AMBER PLUS METER) Misc Use as directed. 1 each 0    DEPAKOTE  mg Tb24 Take 500 mg by mouth nightly.       EASY TOUCH 31 gauge x 1/4" Ndle Use as directed 100 each 5    insulin (LANTUS SOLOSTAR U-100 INSULIN) glargine 100 units/mL (3mL) SubQ pen Inject 23 units nightly 45 mL 0    lancets (ONETOUCH ULTRASOFT LANCETS) [facility] Pt to check BG up to QID. Dispense strips compatible with pt brand meter 100 each 11    lisinopriL-hydrochlorothiazide (PRINZIDE,ZESTORETIC) 10-12.5 mg per tablet Take 1 tablet by mouth once daily.      losartan (COZAAR) 25 MG tablet Take 1 tablet (25 mg total) by mouth once daily. 90 tablet 3    metFORMIN (GLUCOPHAGE) 500 MG tablet Take 1 tablet (500 mg total) by mouth 2 (two) times daily. Take with food. 180 tablet 3    NIFEdipine (PROCARDIA-XL) 30 MG (OSM) 24 hr tablet Take 1 tablet (30 mg total) by mouth 2 (two) times a day. 30 tablet 1    OLANZapine (ZYPREXA) 10 MG tablet TK 1 T PO QHS 90 tablet 3    oxybutynin (DITROPAN-XL) 10 MG 24 hr tablet Take 1 tablet (10 mg total) by mouth once daily. 90 tablet 3    pen needle, diabetic (BD ULTRA-FINE [place] PEN NEEDLE) 32 gauge x 5/32" Ndle Pt is injecting once daily 30 each 11    pen needle, diabetic, safety 29 gauge x 3/16" Ndle Use as instructed 200 each 11    pregabalin (LYRICA) 100 MG capsule Take 1 capsule (100 mg total) by mouth 3 (three) times daily. 270 capsule 1    QUEtiapine (SEROQUEL) 100 MG Tab Take 1 tablet (100 mg total) by " mouth every evening. 90 tablet 3    TRUE METRIX GLUCOSE METER Misc       TRUEPLUS LANCETS 30 gauge Misc USE TO TEST BLOOD SUGART QID  11    TRUEPLUS LANCETS 33 gauge Misc 100 lancets by Subconjunctival route 4 (four) times daily. 100 each 0    VICTOZA 3-HERMANN 0.6 mg/0.1 mL (18 mg/3 mL) PnIj pen INJECT  1.8MG SUBCUTANEOUSLY EVERY DAY 27 mL 1    ziprasidone (GEODON) 40 MG Cap TAKE 1 CAPSULE EVERY EVENING 30 capsule 0       Past History   As per HPI and below:  Past Medical History:   Diagnosis Date    Bipolar disorder     Cancer of female breast 10/2010    T2 N1 Mx, Stage IIB left breast cancer    Cancer of upper-outer quadrant of female breast 7/9/2012    Depression     Diabetes mellitus type II     Disturbances of sensation of smell and taste 6/29/2012    Hypertension     Malignant neoplasm of breast (female), unspecified site 4/27/2015    Neuropathy     Nonspecific abnormal unspecified cardiovascular function study 4/12/2013    ECG READ AS SHOWING A T-WAVE ABNORMALITY     Post-mastectomy lymphedema syndrome     Schizophrenia     Stroke      Past Surgical History:   Procedure Laterality Date    BREAST RECONSTRUCTION  11/23/11    B/L SHLOMO flap reconstruction S/P left MRM, right prophylactic mastectomy    BREAST RECONSTRUCTION Bilateral 3/13/2015    NIPPLE RECONSTRUCTION    INJECTION OF ANESTHETIC AGENT AROUND NERVE Left 12/9/2020    Procedure: BLOCK, NERVE, C3-C4-C5-C6 MEDIAL BRANCH;  Surgeon: Darren Jerry MD;  Location: [facility];  Service: Pain Management;  Laterality: Left;    MASTECTOMY Bilateral 01/2011    bilateral    RADIOFREQUENCY ABLATION Left 2/3/2021    Procedure: RADIOFREQUENCY ABLATION, C3,C4,C5,C6 MEDIAL BRANCH 1 of 2;  Surgeon: Darren Jerry MD;  Location: [facility];  Service: Pain Management;  Laterality: Left;    RADIOFREQUENCY ABLATION Right 7/28/2021    Procedure: RADIOFREQUENCY ABLATION, C3-C4-C5-C6 MEDIAL BR ANCH 1 IOF 2;  Surgeon: Darren Jerry MD;   Location: [facility];  Service: Pain Management;  Laterality: Right;    RADIOFREQUENCY ABLATION Left 8/18/2021    Procedure: RADIOFREQUENCY ABLATION, C3-C4-C5-C6 MEDIAL BRANCH 2 OF 2;  Surgeon: Darren Jerry MD;  Location: [facility];  Service: Pain Management;  Laterality: Left;    TRANSFORAMINAL EPIDURAL INJECTION OF STEROID Bilateral 11/4/2020    Procedure: INJECTION, STEROID, EPIDURAL, TRANSFORAMINAL APPROACH L4/L5;  Surgeon: Darren Jerry MD;  Location: [facility];  Service: Pain Management;  Laterality: Bilateral;  Bilateral L4/L5    TRANSFORAMINAL EPIDURAL INJECTION OF STEROID Bilateral 2/10/2021    Procedure: INJECTION, STEROID, EPIDURAL, TRANSFORAMINAL APPROACH, L4-L5;  Surgeon: Darren Jerry MD;  Location: [facility];  Service: Pain Management;  Laterality: Bilateral;    TRANSFORAMINAL EPIDURAL INJECTION OF STEROID Bilateral 3/10/2021    Procedure: INJECTION, STEROID, EPIDURAL, TRANSFORAMINAL APPROACH, L4-L5;  Surgeon: Darren Jerry MD;  Location: [facility];  Service: Pain Management;  Laterality: Bilateral;    TRANSFORAMINAL EPIDURAL INJECTION OF STEROID Bilateral 12/15/2021    Procedure: INJECTION, STEROID, EPIDURAL, TRANSFORAMINAL APPROACH  Bilateral L4/5 TFESI / needs consent;  Surgeon: Darren Jerry MD;  Location: [facility];  Service: Pain Management;  Laterality: Bilateral;    TUBAL LIGATION         Social History     Socioeconomic History    Marital status:     Number of children: 6   Occupational History    Occupation: Disability   Tobacco Use    Smoking status: Current Every Day Smoker     Packs/day: 0.25     Years: 25.00     Pack years: 6.25     Types: Cigarettes     Start date: 1994    Smokeless tobacco: Never Used    Tobacco comment: currently at less than 1/2 pack pd   Substance and Sexual Activity    Alcohol use: Yes     Alcohol/week: 0.0 standard drinks     Comment: social - once every 2 weeks    Drug use: No     Comment: 1991  - stopped using crack cocaine    Sexual activity: Not Currently     Partners: Male     Social Determinants of Health     Financial Resource Strain: Medium Risk    Difficulty of Paying Living Expenses: Somewhat hard   Food Insecurity: No Food Insecurity    Worried About Running Out of Food in the Last Year: Never true    Ran Out of Food in the Last Year: Never true   Transportation Needs: No Transportation Needs    Lack of Transportation (Medical): No    Lack of Transportation (Non-Medical): No   Physical Activity: Insufficiently Active    Days of Exercise per Week: 7 days    Minutes of Exercise per Session: 10 min   Stress: Stress Concern Present    Feeling of Stress : Very much   Social Connections: Socially Isolated    Frequency of Communication with Friends and Family: More than three times a week    Frequency of Social Gatherings with Friends and Family: More than three times a week    Attends Jainism Services: Never    Active Member of Clubs or Organizations: No    Attends Club or Organization Meetings: Never    Marital Status:    Housing Stability: Low Risk     Unable to Pay for Housing in the Last Year: No    Number of Places Lived in the Last Year: 1    Unstable Housing in the Last Year: No       Family History   Problem Relation Age of Onset    Kidney disease Mother         Dialysis    Hypertension Mother     Deep vein thrombosis Mother     Glaucoma Mother     Diabetic kidney disease Sister     Lung cancer Sister     Diabetes Sister     Cancer Sister         lung    Diabetes Brother     Diabetes Son     No Known Problems Father     No Known Problems Maternal Grandmother     No Known Problems Maternal Grandfather     No Known Problems Paternal Grandmother     No Known Problems Paternal Grandfather     No Known Problems Son     Cervical cancer Daughter     Cancer Daughter         cervical cancer    No Known Problems Daughter     No Known Problems Daughter     No  Known Problems Daughter     Breast cancer Neg Hx     Ovarian cancer Neg Hx        Physical Exam     Vitals:    01/27/22 1905 01/27/22 2021 01/27/22 2051 01/27/22 2100   BP: (!) 134/90 (!) 120/92  135/86   BP Location: Right arm   Right arm   Patient Position: Lying   Sitting   Pulse: 83 78  77   Resp: 18 18 18 20   Temp:  98.6 °F (37 °C)  98.4 °F (36.9 °C)   TempSrc:  Oral  Oral   SpO2: 96% 95%  96%   Weight:       Height:         Physical Exam:   Nursing note and vitals reviewed.  Appearance:  Well-appearing, nontoxic adult female in no acute respiratory distress.  Making purposeful movements.  Speaking in full sentences.  Skin: No rashes seen.  Good turgor.  No abrasions.  No ecchymoses.  Eyes: No conjunctival injection. EOMI, PERRL.  Head: NC/AT  ENT: Oropharynx clear.    Chest: CTAB. No increased work of breathing, bilateral chest rise.  Cardiovascular: Regular rate and rhythm.  Normal equal bilateral radial pulses.  Abdomen: Soft.  Not distended.  Nontender.  No guarding.  No rebound. No Masses  Musculoskeletal: Good range of motion all joints.  No deformities.  Neck supple, full range of motion, no obvious deformity.  Mild tenderness to palpation on the plantar surface of left foot.  Full range of motion of the left lower extremity including hip knee and ankle.  She has 2+ DP pulses in lower extremity.  No crepitus, purulent drainage, or signs of cellulitis on left foot.  Mild swelling around the ankle appreciated.  Neurologic: Moves all extremities.  Normal sensation.  No facial droop.  Normal speech.    Mental Status:  Alert and oriented x 3.  Appropriate, conversant.      Results and Medications    Procedures    Labs Reviewed   CBC W/ AUTO DIFFERENTIAL - Abnormal; Notable for the following components:       Result Value    RBC 3.65 (*)     Hemoglobin 10.5 (*)     Hematocrit 32.8 (*)     All other components within normal limits   COMPREHENSIVE METABOLIC PANEL - Abnormal; Notable for the following  components:    Albumin 3.2 (*)     eGFR if non  54 (*)     All other components within normal limits   URINALYSIS, REFLEX TO URINE CULTURE - Abnormal; Notable for the following components:    Appearance, UA Hazy (*)     Specific Gravity, UA >=1.030 (*)     Protein, UA 1+ (*)     Ketones, UA Trace (*)     Occult Blood UA Trace (*)     Nitrite, UA Positive (*)     Leukocytes, UA 2+ (*)     All other components within normal limits    Narrative:     Specimen Source->Urine   URINALYSIS MICROSCOPIC - Abnormal; Notable for the following components:    WBC, UA >100 (*)     Bacteria Many (*)     All other components within normal limits    Narrative:     Specimen Source->Urine   CULTURE, URINE   HIV 1 / 2 ANTIBODY    Narrative:     Release to patient->Immediate   HEPATITIS C ANTIBODY    Narrative:     Release to patient->Immediate   POCT GLUCOSE       Imaging Results          X-Ray Foot Complete Left (Final result)  Result time 01/27/22 19:35:22    Final result by Arcelia Hawley MD (01/27/22 19:35:22)                 Impression:      No acute osseous abnormality identified.      Electronically signed by: Arcelia Hawley MD  Date:    01/27/2022  Time:    19:35             Narrative:    EXAMINATION:  XR ANKLE COMPLETE 3 VIEW LEFT; XR FOOT COMPLETE 3 VIEW LEFT    CLINICAL HISTORY:  Pain, unspecified    TECHNIQUE:  AP, lateral and oblique views of the left ankle were performed.  Left foot three views.    COMPARISON:  None    FINDINGS:  No evidence of acute displaced fracture, dislocation, or osseous destructive process.  Ankle mortise is maintained.  Lisfranc articulation appears congruent.                               X-Ray Ankle Complete Left (Final result)  Result time 01/27/22 19:35:22    Final result by Arcelia Hawley MD (01/27/22 19:35:22)                 Impression:      No acute osseous abnormality identified.      Electronically signed by: Arcelia Hawley MD  Date:    01/27/2022  Time:    19:35              Narrative:    EXAMINATION:  XR ANKLE COMPLETE 3 VIEW LEFT; XR FOOT COMPLETE 3 VIEW LEFT    CLINICAL HISTORY:  Pain, unspecified    TECHNIQUE:  AP, lateral and oblique views of the left ankle were performed.  Left foot three views.    COMPARISON:  None    FINDINGS:  No evidence of acute displaced fracture, dislocation, or osseous destructive process.  Ankle mortise is maintained.  Lisfranc articulation appears congruent.                                    Medications   acetaminophen tablet 1,000 mg (1,000 mg Oral Given 1/27/22 1921)   colchicine capsule/tablet 1.2 mg (1.2 mg Oral Given 1/27/22 2035)   nitrofurantoin (macrocrystal-monohydrate) 100 MG capsule 100 mg (100 mg Oral Given 1/27/22 2051)   traMADoL tablet 50 mg (50 mg Oral Given 1/27/22 2051)       MDM, Impression and Plan   Previous Records:  I decided to obtain old medical records which showed:  History of chronic back pain for which she receives steroid injections.    Initial Assessment:   Urgent evaluation of 61 y.o. female with history above who presents the ED with chief complaint of left foot and ankle pain and swelling x2 days.  On arrival to the ED she is afebrile, hemodynamically stable and in no acute respiratory distress.  Differential Diagnosis:   Gout, doubt fracture versus dislocation but will obtain x-rays, doubt septic joint based off of full range of motion no increased warmth and history, unlikely cellulitis based off of physical exam  Independently Interpreted Test(s):   IMAGING:  I have ordered and independently interpreted X-rays and my findings are as follow:  Please see ED course.  No fractures or dislocation noted on left foot or ankle x-rays.    Clinical Tests:   Lab Tests: Ordered and Reviewed  Radiological Study: Ordered and Reviewed  Medical Tests: Ordered and Reviewed    ED Management:    Patient was noted to have UTI for which she was given Macrobid and discharged with a script for Macrobid.  Urine culture pending.   Previous cultures viewed and noted to have E coli which was sensitive to Macrobid.    In regards to her left ankle pain I believe that her presentation is consistent with gout.  X-rays of left lower foot and ankle showed no acute fracture dislocation.  Based off physical exam does not appear that this is a septic joint.  There does not appear to be an effusion that would be able to be tapped.  Hemoglobin is down 2 points from 4 months ago yet patient denies any chest pain shortness of breath or active bleeding.  No leukocytosis.  Creatinine near baseline.  She was given colchicine for gout and Tylenol.  She was given tramadol with subsequent reduction in her pain.  She was sent home with tramadol and colchicine.  I instructed her to follow-up with her PCP in the next 1-2 days.  She and her family member are agreeable with the plan.  Patient deemed stable for discharge.  Please see ED course for discussion of labs.            Final diagnoses:  [R52] Pain  [M79.672] Foot pain, left (Primary)  [N39.0, R31.9] Urinary tract infection with hematuria, site unspecified          ED Disposition Condition    Discharge Stable        ED Prescriptions     Medication Sig Dispense Start Date End Date Auth. Provider    nitrofurantoin, macrocrystal-monohydrate, (MACROBID) 100 MG capsule Take 1 capsule (100 mg total) by mouth 2 (two) times daily. for 5 days 10 capsule 1/27/2022 2/1/2022 Bill Little MD    traMADoL (ULTRAM) 50 mg tablet Take 1 tablet (50 mg total) by mouth every 6 (six) hours as needed (Severe left foot pain). 12 tablet 1/27/2022  Bill Little MD    colchicine (COLCRYS) 0.6 mg tablet Take 1 tablet (0.6 mg total) by mouth once daily. Please take this medication once daily until your potential gout flare in your foot clears for 10 days 10 tablet 1/27/2022 2/6/2022 Bill Little MD        Follow-up Information     Follow up With Specialties Details Why Contact Info    Ghulam Contreras MD Family Medicine In  2 days  [address]  [address]  [facility] [address]  [phone]      [facility] - Emergency Dept Emergency Medicine  If symptoms worsen [address]  [facility] [address]  [phone]          ED Course as of 01/28/22 0030   Thu Jan 27, 2022 2041 Creatinine: 1.1 [HM]      ED Course User Index  [HM] MD Bill Nuñez MD   Emergency Resident      Bill Little MD  Resident  01/28/22 0030

## 2022-01-30 ENCOUNTER — TELEPHONE (OUTPATIENT)
Dept: EMERGENCY MEDICINE | Facility: OTHER | Age: 62
End: 2022-01-30
Payer: MEDICARE

## 2022-01-30 RX ORDER — NITROFURANTOIN 25; 75 MG/1; MG/1
100 CAPSULE ORAL 2 TIMES DAILY
Qty: 10 CAPSULE | Refills: 0 | Status: SHIPPED | OUTPATIENT
Start: 2022-01-30 | End: 2022-02-04

## 2022-01-31 LAB — BACTERIA UR CULT: ABNORMAL

## 2022-01-31 RX ORDER — QUETIAPINE FUMARATE 100 MG/1
TABLET, FILM COATED ORAL
Qty: 90 TABLET | Refills: 3 | Status: ON HOLD | OUTPATIENT
Start: 2022-01-31 | End: 2023-01-24 | Stop reason: SDUPTHER

## 2022-02-03 RX ORDER — BLOOD SUGAR DIAGNOSTIC
STRIP MISCELLANEOUS
Qty: 200 STRIP | Refills: 5 | Status: SHIPPED | OUTPATIENT
Start: 2022-02-03 | End: 2023-10-25 | Stop reason: SDUPTHER

## 2022-02-03 NOTE — TELEPHONE ENCOUNTER
This Rx Request does not qualify for assessment with the OR.    Please review protocol details and the Care Due Message for extra clinical information    Reasons Rx Request may be deferred:  Labs/Vitals overdue  Labs/Vitals abnormal  Patient has been seen in the ED/Hospital since the last PCP visit  Medication is non-delegated  Provider is a non-participating provider

## 2022-02-25 ENCOUNTER — OFFICE VISIT (OUTPATIENT)
Dept: OBSTETRICS AND GYNECOLOGY | Facility: CLINIC | Age: 62
End: 2022-02-25
Payer: MEDICARE

## 2022-02-25 VITALS
DIASTOLIC BLOOD PRESSURE: 74 MMHG | WEIGHT: 183.63 LBS | HEIGHT: 63 IN | BODY MASS INDEX: 32.54 KG/M2 | SYSTOLIC BLOOD PRESSURE: 116 MMHG

## 2022-02-25 DIAGNOSIS — Z12.31 BREAST CANCER SCREENING BY MAMMOGRAM: ICD-10-CM

## 2022-02-25 DIAGNOSIS — Z72.0 TOBACCO USE: ICD-10-CM

## 2022-02-25 DIAGNOSIS — Z11.3 SCREEN FOR STD (SEXUALLY TRANSMITTED DISEASE): ICD-10-CM

## 2022-02-25 DIAGNOSIS — Z01.419 ENCOUNTER FOR ANNUAL ROUTINE GYNECOLOGICAL EXAMINATION: Primary | ICD-10-CM

## 2022-02-25 DIAGNOSIS — Z00.00 ENCOUNTER FOR PREVENTIVE HEALTH EXAMINATION: ICD-10-CM

## 2022-02-25 PROCEDURE — 3078F DIAST BP <80 MM HG: CPT | Mod: CPTII,S$GLB,, | Performed by: OBSTETRICS & GYNECOLOGY

## 2022-02-25 PROCEDURE — 1159F PR MEDICATION LIST DOCUMENTED IN MEDICAL RECORD: ICD-10-PCS | Mod: CPTII,S$GLB,, | Performed by: OBSTETRICS & GYNECOLOGY

## 2022-02-25 PROCEDURE — 3072F LOW RISK FOR RETINOPATHY: CPT | Mod: CPTII,S$GLB,, | Performed by: OBSTETRICS & GYNECOLOGY

## 2022-02-25 PROCEDURE — G0101 CA SCREEN;PELVIC/BREAST EXAM: HCPCS | Mod: S$GLB,,, | Performed by: OBSTETRICS & GYNECOLOGY

## 2022-02-25 PROCEDURE — 99999 PR PBB SHADOW E&M-EST. PATIENT-LVL V: ICD-10-PCS | Mod: PBBFAC,,, | Performed by: OBSTETRICS & GYNECOLOGY

## 2022-02-25 PROCEDURE — 3074F SYST BP LT 130 MM HG: CPT | Mod: CPTII,S$GLB,, | Performed by: OBSTETRICS & GYNECOLOGY

## 2022-02-25 PROCEDURE — 87491 CHLMYD TRACH DNA AMP PROBE: CPT | Performed by: OBSTETRICS & GYNECOLOGY

## 2022-02-25 PROCEDURE — 3074F PR MOST RECENT SYSTOLIC BLOOD PRESSURE < 130 MM HG: ICD-10-PCS | Mod: CPTII,S$GLB,, | Performed by: OBSTETRICS & GYNECOLOGY

## 2022-02-25 PROCEDURE — 99999 PR PBB SHADOW E&M-EST. PATIENT-LVL V: CPT | Mod: PBBFAC,,, | Performed by: OBSTETRICS & GYNECOLOGY

## 2022-02-25 PROCEDURE — 87481 CANDIDA DNA AMP PROBE: CPT | Mod: 59 | Performed by: OBSTETRICS & GYNECOLOGY

## 2022-02-25 PROCEDURE — 4010F PR ACE/ARB THEARPY RXD/TAKEN: ICD-10-PCS | Mod: CPTII,S$GLB,, | Performed by: OBSTETRICS & GYNECOLOGY

## 2022-02-25 PROCEDURE — 87591 N.GONORRHOEAE DNA AMP PROB: CPT | Mod: 59 | Performed by: OBSTETRICS & GYNECOLOGY

## 2022-02-25 PROCEDURE — 88175 CYTOPATH C/V AUTO FLUID REDO: CPT | Performed by: STUDENT IN AN ORGANIZED HEALTH CARE EDUCATION/TRAINING PROGRAM

## 2022-02-25 PROCEDURE — 1159F MED LIST DOCD IN RCRD: CPT | Mod: CPTII,S$GLB,, | Performed by: OBSTETRICS & GYNECOLOGY

## 2022-02-25 PROCEDURE — 3072F PR LOW RISK FOR RETINOPATHY: ICD-10-PCS | Mod: CPTII,S$GLB,, | Performed by: OBSTETRICS & GYNECOLOGY

## 2022-02-25 PROCEDURE — 3078F PR MOST RECENT DIASTOLIC BLOOD PRESSURE < 80 MM HG: ICD-10-PCS | Mod: CPTII,S$GLB,, | Performed by: OBSTETRICS & GYNECOLOGY

## 2022-02-25 PROCEDURE — 88141 PR  CYTOPATH CERV/VAG INTERPRET: ICD-10-PCS | Mod: ,,, | Performed by: STUDENT IN AN ORGANIZED HEALTH CARE EDUCATION/TRAINING PROGRAM

## 2022-02-25 PROCEDURE — 4010F ACE/ARB THERAPY RXD/TAKEN: CPT | Mod: CPTII,S$GLB,, | Performed by: OBSTETRICS & GYNECOLOGY

## 2022-02-25 PROCEDURE — G0101 PR CA SCREEN;PELVIC/BREAST EXAM: ICD-10-PCS | Mod: S$GLB,,, | Performed by: OBSTETRICS & GYNECOLOGY

## 2022-02-25 PROCEDURE — 3008F BODY MASS INDEX DOCD: CPT | Mod: CPTII,S$GLB,, | Performed by: OBSTETRICS & GYNECOLOGY

## 2022-02-25 PROCEDURE — 87624 HPV HI-RISK TYP POOLED RSLT: CPT | Performed by: OBSTETRICS & GYNECOLOGY

## 2022-02-25 PROCEDURE — 3008F PR BODY MASS INDEX (BMI) DOCUMENTED: ICD-10-PCS | Mod: CPTII,S$GLB,, | Performed by: OBSTETRICS & GYNECOLOGY

## 2022-02-25 PROCEDURE — 87801 DETECT AGNT MULT DNA AMPLI: CPT | Performed by: OBSTETRICS & GYNECOLOGY

## 2022-02-25 PROCEDURE — 88141 CYTOPATH C/V INTERPRET: CPT | Mod: ,,, | Performed by: STUDENT IN AN ORGANIZED HEALTH CARE EDUCATION/TRAINING PROGRAM

## 2022-02-25 RX ORDER — LANCING DEVICE/LANCETS
KIT MISCELLANEOUS
COMMUNITY
Start: 2021-11-18 | End: 2023-01-19

## 2022-02-25 NOTE — PROGRESS NOTES
Chief Complaint: Well Woman Exam     HPI:      Jonathan Gonzales is a 62 y.o.  who presents today for well woman exam.  LMP: No LMP recorded (lmp unknown). Patient is postmenopausal.  No issues, problems, or complaints. Specifically, patient denies abnormal vaginal bleeding, discharge, pelvic pain, urinary problems, or changes in appetite. Ms. Gonzales is not currently sexually active. She is currently using no method for contraception. She would like STD screening today.    Previous Pap:  NEM, HPV + (16/18 - ) (No result found)  Previous Mammogram: prior to bilateral mastectomy    Most Recent Dexa: 2016 - normal  Colonoscopy:     COVID: Completed  Flu: Completed  Gardasil:has never had     Patient Active Problem List   Diagnosis    Peripheral neuropathy    Diabetic foot    Chronic back pain    Hypertension    Uncontrolled type 2 diabetes mellitus, with long-term current use of insulin    Cervical radiculopathy    Post-mastectomy lymphedema syndrome    Spondylosis without myelopathy    Cervical spondylosis without myelopathy    Facet arthropathy, cervical    DDD (degenerative disc disease), cervical    Obesity, Class I, BMI 30-34.9    OAB (overactive bladder)    Urge incontinence of urine    Type 2 diabetes mellitus with neurologic complication, with long-term current use of insulin    Spinal stenosis    Neck pain    Spondylolisthesis of lumbar region    Muscle weakness    Dysarthria    CKD (chronic kidney disease) stage 3, GFR 30-59 ml/min    Stroke-like symptoms/TIA    Diabetes mellitus type II    Diabetes    Lumbar radiculopathy    DDD (degenerative disc disease), lumbar    Ligamentum flavum hypertrophy    Chemotherapy-induced neuropathy    Schizoaffective disorder, bipolar type    Cervical spondylosis    Acute kidney injury from Urinary Retention    Essential hypertension    Class 1 obesity due to excess calories in adult       Past Medical History:   Diagnosis Date     Bipolar disorder     Cancer of female breast 10/2010    T2 N1 Mx, Stage IIB left breast cancer    Cancer of upper-outer quadrant of female breast 7/9/2012    Depression     Diabetes mellitus type II     Disturbances of sensation of smell and taste 6/29/2012    Hypertension     Malignant neoplasm of breast (female), unspecified site 4/27/2015    Neuropathy     Nonspecific abnormal unspecified cardiovascular function study 4/12/2013    ECG READ AS SHOWING A T-WAVE ABNORMALITY     Post-mastectomy lymphedema syndrome     Schizophrenia     Stroke        Past Surgical History:   Procedure Laterality Date    BREAST RECONSTRUCTION  11/23/11    B/L SHLOMO flap reconstruction S/P left MRM, right prophylactic mastectomy    BREAST RECONSTRUCTION Bilateral 3/13/2015    NIPPLE RECONSTRUCTION    INJECTION OF ANESTHETIC AGENT AROUND NERVE Left 12/9/2020    Procedure: BLOCK, NERVE, C3-C4-C5-C6 MEDIAL BRANCH;  Surgeon: Darren Jerry MD;  Location: [facility] PAIN [facility]GT;  Service: Pain Management;  Laterality: Left;    MASTECTOMY Bilateral 01/2011    bilateral    RADIOFREQUENCY ABLATION Left 2/3/2021    Procedure: RADIOFREQUENCY ABLATION, C3,C4,C5,C6 MEDIAL BRANCH 1 of 2;  Surgeon: Darren Jerry MD;  Location: [facility]IN [facility];  Service: Pain Management;  Laterality: Left;    RADIOFREQUENCY ABLATION Right 7/28/2021    Procedure: RADIOFREQUENCY ABLATION, C3-C4-C5-C6 MEDIAL BR ANCH 1 IOF 2;  Surgeon: Darren Jerry MD;  Location: [facility]IN [facility]GT;  Service: Pain Management;  Laterality: Right;    RADIOFREQUENCY ABLATION Left 8/18/2021    Procedure: RADIOFREQUENCY ABLATION, C3-C4-C5-C6 MEDIAL BRANCH 2 OF 2;  Surgeon: Darren Jerry MD;  Location: [facility]IN [facility]GT;  Service: Pain Management;  Laterality: Left;    TRANSFORAMINAL EPIDURAL INJECTION OF STEROID Bilateral 11/4/2020    Procedure: INJECTION, STEROID, EPIDURAL, TRANSFORAMINAL APPROACH L4/L5;  Surgeon: Darren Jerry MD;  Location: [facility];   "Service: Pain Management;  Laterality: Bilateral;  Bilateral L4/L5    TRANSFORAMINAL EPIDURAL INJECTION OF STEROID Bilateral 2/10/2021    Procedure: INJECTION, STEROID, EPIDURAL, TRANSFORAMINAL APPROACH, L4-L5;  Surgeon: Darren Jerry MD;  Location: [facility] PAIN [facility]GT;  Service: Pain Management;  Laterality: Bilateral;    TRANSFORAMINAL EPIDURAL INJECTION OF STEROID Bilateral 3/10/2021    Procedure: INJECTION, STEROID, EPIDURAL, TRANSFORAMINAL APPROACH, L4-L5;  Surgeon: Darren Jerry MD;  Location: [facility] PAIN [facility]GT;  Service: Pain Management;  Laterality: Bilateral;    TRANSFORAMINAL EPIDURAL INJECTION OF STEROID Bilateral 12/15/2021    Procedure: INJECTION, STEROID, EPIDURAL, TRANSFORAMINAL APPROACH  Bilateral L4/5 TFESI / needs consent;  Surgeon: Darren Jerry MD;  Location: [facility] PAIN MGT;  Service: Pain Management;  Laterality: Bilateral;    TUBAL LIGATION         OB History        7    Para   6    Term                AB   1    Living   6       SAB   1    IAB        Ectopic        Multiple        Live Births   6           Obstetric Comments     + HPV 2018  Hx gonorrhea   x 6, BB 8#               ROS:     Review of Systems   Constitutional: Negative for activity change, fatigue and unexpected weight change.   Respiratory: Negative for shortness of breath.    Cardiovascular: Negative for chest pain.   Gastrointestinal: Negative for abdominal pain, blood in stool, constipation and diarrhea.   Endocrine: Negative for cold intolerance and heat intolerance.   Genitourinary: Negative for bladder incontinence, dyspareunia, dysuria, frequency, hot flashes, vaginal bleeding and vaginal dryness.   Integumentary:  Negative for breast mass, breast discharge and breast tenderness.   Psychiatric/Behavioral: Negative for dysphoric mood. The patient is not nervous/anxious.    Breast: Negative for mass and tenderness      Physical Exam:      PHYSICAL EXAM:  /74   Ht 5' 3" (1.6 m)   Wt 83.3 " kg (183 lb 10.3 oz)   LMP  (LMP Unknown)   BMI 32.53 kg/m²   Body mass index is 32.53 kg/m².     APPEARANCE: Well nourished, well developed, in no acute distress.  PSYCH: Appropriate mood and affect.  SKIN: No acne or hirsutism  NECK: Neck symmetric without masses or thyromegaly  NODES: No inguinal, axillary, or supraclavicular lymph node enlargement  ABDOMEN: Soft.  No tenderness or masses.    CARDIOVASCULAR: No edema of peripheral extremities  BREASTS: Surgically absent, no chest wall or axillary masses.  PELVIC: Normal external genitalia without lesions.  Normal hair distribution.  Adequate perineal body, normal urethral meatus.  Vagina moist and well rugated without lesions or discharge.  Cervix pink, without lesions, discharge or tenderness.  No significant cystocele or rectocele.  Bimanual exam shows uterus to be normal size, regular, mobile and nontender.  Adnexa without masses or tenderness.      Assessment:     1. Encounter for annual routine gynecological examination     2. Encounter for preventive health examination  Ambulatory referral/consult to Obstetrics / Gynecology    Liquid-Based Pap Smear, Screening    HPV High Risk Genotypes, PCR   3. Breast cancer screening by mammogram     4. Tobacco use  Ambulatory referral/consult to Smoking Cessation Program   5. Screen for STD (sexually transmitted disease)  C. trachomatis/N. gonorrhoeae by AMP DNA [facility]; Cervicovaginal    Vaginosis Screen by DNA Probe         Plan:     1. Pap w HPV.  2. Mammogram not indicated s/p bilateral mastectomy.  3. DEXA at 64.    Follow up in about 1 year (around 2/25/2023).    Counseling:     Patient was counseled today on current ASCCP pap guidelines, the recommendation for yearly pelvic exams, healthy diet and exercise routines.She is to see her PCP for other health maintenance.     Use of the [facility] Patient Portal discussed and encouraged during today's visit.         Amanda N. Thomas, MD [facility]  02/25/2022

## 2022-02-25 NOTE — PATIENT INSTRUCTIONS
Please check out the American College of Obstetricians and Gynecologists PATIENT WEBSITE.  The site has education materials, patient stories, expert views, and a portal for you to ask questions.       [email or web address]      As always, please let me know if you have any questions.     Dr. Selin Conner

## 2022-02-27 LAB
C TRACH DNA SPEC QL NAA+PROBE: NOT DETECTED
N GONORRHOEA DNA SPEC QL NAA+PROBE: NOT DETECTED

## 2022-02-28 DIAGNOSIS — Z79.4 TYPE 2 DIABETES MELLITUS WITHOUT COMPLICATION, WITH LONG-TERM CURRENT USE OF INSULIN: ICD-10-CM

## 2022-02-28 DIAGNOSIS — E11.9 TYPE 2 DIABETES MELLITUS WITHOUT COMPLICATION, WITH LONG-TERM CURRENT USE OF INSULIN: ICD-10-CM

## 2022-03-01 NOTE — TELEPHONE ENCOUNTER
Care Due:                  Date            Visit Type   Department     Provider  --------------------------------------------------------------------------------                                EP -                              [facility] INTERNAL  Ghulam Mendoza  Last Visit: 12-      CARE ([facility])   MEDICINE       [place]                              EP -                              [facility]  Next Visit: 03-      [facility] ([facility]                                                            Last  Test          Frequency    Reason                     Performed    Due Date  --------------------------------------------------------------------------------    HBA1C.......  6 months...  VICTOZA, insulin,          09- 03-                             metFORMIN................    Powered by Sp[employer]. Reference number: [id number].   2/28/2022 7:30:22 PM CST

## 2022-03-02 ENCOUNTER — TELEPHONE (OUTPATIENT)
Dept: OBSTETRICS AND GYNECOLOGY | Facility: CLINIC | Age: 62
End: 2022-03-02
Payer: MEDICARE

## 2022-03-03 LAB
BACTERIAL VAGINOSIS DNA: POSITIVE
CANDIDA GLABRATA DNA: NEGATIVE
CANDIDA KRUSEI DNA: NEGATIVE
CANDIDA RRNA VAG QL PROBE: NEGATIVE
HPV HR 12 DNA SPEC QL NAA+PROBE: POSITIVE
HPV16 AG SPEC QL: NEGATIVE
HPV18 DNA SPEC QL NAA+PROBE: NEGATIVE
T VAGINALIS RRNA GENITAL QL PROBE: NEGATIVE

## 2022-03-07 LAB
FINAL PATHOLOGIC DIAGNOSIS: ABNORMAL
Lab: ABNORMAL

## 2022-03-07 RX ORDER — INSULIN GLARGINE 100 [IU]/ML
INJECTION, SOLUTION SUBCUTANEOUS
Qty: 30 ML | Refills: 5 | Status: ON HOLD | OUTPATIENT
Start: 2022-03-07 | End: 2023-01-24 | Stop reason: SDUPTHER

## 2022-03-09 ENCOUNTER — TELEPHONE (OUTPATIENT)
Dept: OBSTETRICS AND GYNECOLOGY | Facility: CLINIC | Age: 62
End: 2022-03-09
Payer: MEDICARE

## 2022-03-09 NOTE — TELEPHONE ENCOUNTER
Called to reviewed ASCUS, HPV + (16/18 - ). Reviewed ASCCP recommendations for colposcopy.      Patient aware.  Office to call and schedule.        Selin Conner MD  [facility]  03/09/2022

## 2022-03-09 NOTE — TELEPHONE ENCOUNTER
Called patient to get her scheduled for her Colpo. Patient confirmed the date and time offered worked well for her. I also added patient to the wait list for sooner availability. Patient expressed understanding.

## 2022-03-11 DIAGNOSIS — Z79.4 TYPE 2 DIABETES MELLITUS WITH DIABETIC POLYNEUROPATHY, WITH LONG-TERM CURRENT USE OF INSULIN: ICD-10-CM

## 2022-03-11 DIAGNOSIS — E11.42 TYPE 2 DIABETES MELLITUS WITH DIABETIC POLYNEUROPATHY, WITH LONG-TERM CURRENT USE OF INSULIN: ICD-10-CM

## 2022-03-11 DIAGNOSIS — E11.42 DM TYPE 2 WITH DIABETIC PERIPHERAL NEUROPATHY: ICD-10-CM

## 2022-03-11 NOTE — TELEPHONE ENCOUNTER
----- Message from Ashly Smith sent at 3/11/2022  4:22 PM CST -----      Can the clinic reply in [facility]:N        Please refill the medication(s) listed below. Please call the patient when the prescription(s) is ready for  at this phone number         Medication #1 Accu Chek guide meter    Medication #2       Preferred Pharmacy: [facility] - [place], [place] - [address]

## 2022-03-11 NOTE — TELEPHONE ENCOUNTER
No new care gaps identified.  Powered by Posted[employer] by [employer]. Reference number: [id number].   3/11/2022 5:09:00 PM CST

## 2022-03-14 RX ORDER — LANCETS
EACH MISCELLANEOUS
Qty: 100 EACH | Refills: 11 | Status: SHIPPED | OUTPATIENT
Start: 2022-03-14 | End: 2022-03-17 | Stop reason: SDUPTHER

## 2022-03-14 RX ORDER — DEXTROSE 4 G
TABLET,CHEWABLE ORAL
Qty: 1 EACH | Refills: 0 | Status: SHIPPED | OUTPATIENT
Start: 2022-03-14 | End: 2022-06-20 | Stop reason: SDUPTHER

## 2022-03-16 ENCOUNTER — LAB VISIT (OUTPATIENT)
Dept: LAB | Facility: OTHER | Age: 62
End: 2022-03-16
Attending: FAMILY MEDICINE
Payer: MEDICARE

## 2022-03-16 DIAGNOSIS — Z79.4 TYPE 2 DIABETES MELLITUS WITH DIABETIC POLYNEUROPATHY, WITH LONG-TERM CURRENT USE OF INSULIN: ICD-10-CM

## 2022-03-16 DIAGNOSIS — I10 PRIMARY HYPERTENSION: ICD-10-CM

## 2022-03-16 DIAGNOSIS — E11.42 TYPE 2 DIABETES MELLITUS WITH DIABETIC POLYNEUROPATHY, WITH LONG-TERM CURRENT USE OF INSULIN: ICD-10-CM

## 2022-03-16 LAB
ALBUMIN SERPL BCP-MCNC: 3.4 G/DL (ref 3.5–5.2)
ALP SERPL-CCNC: 68 U/L (ref 55–135)
ALT SERPL W/O P-5'-P-CCNC: 14 U/L (ref 10–44)
ANION GAP SERPL CALC-SCNC: 13 MMOL/L (ref 8–16)
AST SERPL-CCNC: 23 U/L (ref 10–40)
BILIRUB SERPL-MCNC: 0.3 MG/DL (ref 0.1–1)
BUN SERPL-MCNC: 15 MG/DL (ref 8–23)
CALCIUM SERPL-MCNC: 8.9 MG/DL (ref 8.7–10.5)
CHLORIDE SERPL-SCNC: 99 MMOL/L (ref 95–110)
CHOLEST SERPL-MCNC: 152 MG/DL (ref 120–199)
CHOLEST/HDLC SERPL: 5.6 {RATIO} (ref 2–5)
CO2 SERPL-SCNC: 26 MMOL/L (ref 23–29)
CREAT SERPL-MCNC: 1.4 MG/DL (ref 0.5–1.4)
EST. GFR  (AFRICAN AMERICAN): 46 ML/MIN/1.73 M^2
EST. GFR  (NON AFRICAN AMERICAN): 40 ML/MIN/1.73 M^2
ESTIMATED AVG GLUCOSE: 157 MG/DL (ref 68–131)
GLUCOSE SERPL-MCNC: 235 MG/DL (ref 70–110)
HBA1C MFR BLD: 7.1 % (ref 4–5.6)
HDLC SERPL-MCNC: 27 MG/DL (ref 40–75)
HDLC SERPL: 17.8 % (ref 20–50)
LDLC SERPL CALC-MCNC: 46.8 MG/DL (ref 63–159)
NONHDLC SERPL-MCNC: 125 MG/DL
POTASSIUM SERPL-SCNC: 4.3 MMOL/L (ref 3.5–5.1)
PROT SERPL-MCNC: 7.7 G/DL (ref 6–8.4)
SODIUM SERPL-SCNC: 138 MMOL/L (ref 136–145)
TRIGL SERPL-MCNC: 391 MG/DL (ref 30–150)
TSH SERPL DL<=0.005 MIU/L-ACNC: 3.89 UIU/ML (ref 0.4–4)

## 2022-03-16 PROCEDURE — 80061 LIPID PANEL: CPT | Performed by: FAMILY MEDICINE

## 2022-03-16 PROCEDURE — 80053 COMPREHEN METABOLIC PANEL: CPT | Performed by: FAMILY MEDICINE

## 2022-03-16 PROCEDURE — 36415 COLL VENOUS BLD VENIPUNCTURE: CPT | Performed by: FAMILY MEDICINE

## 2022-03-16 PROCEDURE — 84443 ASSAY THYROID STIM HORMONE: CPT | Performed by: FAMILY MEDICINE

## 2022-03-16 PROCEDURE — 83036 HEMOGLOBIN GLYCOSYLATED A1C: CPT | Performed by: FAMILY MEDICINE

## 2022-03-17 ENCOUNTER — OFFICE VISIT (OUTPATIENT)
Dept: INTERNAL MEDICINE | Facility: CLINIC | Age: 62
End: 2022-03-17
Attending: FAMILY MEDICINE
Payer: MEDICARE

## 2022-03-17 ENCOUNTER — CLINICAL SUPPORT (OUTPATIENT)
Dept: SMOKING CESSATION | Facility: CLINIC | Age: 62
End: 2022-03-17
Payer: COMMERCIAL

## 2022-03-17 VITALS
WEIGHT: 185.19 LBS | SYSTOLIC BLOOD PRESSURE: 114 MMHG | BODY MASS INDEX: 32.81 KG/M2 | HEART RATE: 94 BPM | HEIGHT: 63 IN | DIASTOLIC BLOOD PRESSURE: 68 MMHG | OXYGEN SATURATION: 98 %

## 2022-03-17 DIAGNOSIS — F17.210 LIGHT CIGARETTE SMOKER (1-9 CIGS/DAY): Primary | ICD-10-CM

## 2022-03-17 DIAGNOSIS — Z79.4 TYPE 2 DIABETES MELLITUS WITH DIABETIC POLYNEUROPATHY, WITH LONG-TERM CURRENT USE OF INSULIN: Primary | ICD-10-CM

## 2022-03-17 DIAGNOSIS — E11.42 TYPE 2 DIABETES MELLITUS WITH DIABETIC POLYNEUROPATHY, WITH LONG-TERM CURRENT USE OF INSULIN: Primary | ICD-10-CM

## 2022-03-17 DIAGNOSIS — I10 ESSENTIAL HYPERTENSION: ICD-10-CM

## 2022-03-17 DIAGNOSIS — N18.31 STAGE 3A CHRONIC KIDNEY DISEASE: ICD-10-CM

## 2022-03-17 DIAGNOSIS — E11.8 DIABETIC FOOT: ICD-10-CM

## 2022-03-17 PROCEDURE — 4010F ACE/ARB THERAPY RXD/TAKEN: CPT | Mod: CPTII,S$GLB,, | Performed by: FAMILY MEDICINE

## 2022-03-17 PROCEDURE — 1160F RVW MEDS BY RX/DR IN RCRD: CPT | Mod: CPTII,S$GLB,, | Performed by: FAMILY MEDICINE

## 2022-03-17 PROCEDURE — 4010F PR ACE/ARB THEARPY RXD/TAKEN: ICD-10-PCS | Mod: CPTII,S$GLB,, | Performed by: FAMILY MEDICINE

## 2022-03-17 PROCEDURE — 3008F PR BODY MASS INDEX (BMI) DOCUMENTED: ICD-10-PCS | Mod: CPTII,S$GLB,, | Performed by: FAMILY MEDICINE

## 2022-03-17 PROCEDURE — 3074F SYST BP LT 130 MM HG: CPT | Mod: CPTII,S$GLB,, | Performed by: FAMILY MEDICINE

## 2022-03-17 PROCEDURE — 3051F PR MOST RECENT HEMOGLOBIN A1C LEVEL 7.0 - < 8.0%: ICD-10-PCS | Mod: CPTII,S$GLB,, | Performed by: FAMILY MEDICINE

## 2022-03-17 PROCEDURE — 3074F PR MOST RECENT SYSTOLIC BLOOD PRESSURE < 130 MM HG: ICD-10-PCS | Mod: CPTII,S$GLB,, | Performed by: FAMILY MEDICINE

## 2022-03-17 PROCEDURE — 99999 PR PBB SHADOW E&M-EST. PATIENT-LVL IV: CPT | Mod: PBBFAC,,, | Performed by: FAMILY MEDICINE

## 2022-03-17 PROCEDURE — 99999 PR PBB SHADOW E&M-EST. PATIENT-LVL IV: ICD-10-PCS | Mod: PBBFAC,,, | Performed by: FAMILY MEDICINE

## 2022-03-17 PROCEDURE — 99499 RISK ADDL DX/OHS AUDIT: ICD-10-PCS | Mod: S$GLB,,, | Performed by: FAMILY MEDICINE

## 2022-03-17 PROCEDURE — 99404 PREV MED CNSL INDIV APPRX 60: CPT | Mod: S$GLB,,,

## 2022-03-17 PROCEDURE — 3072F LOW RISK FOR RETINOPATHY: CPT | Mod: CPTII,S$GLB,, | Performed by: FAMILY MEDICINE

## 2022-03-17 PROCEDURE — 3008F BODY MASS INDEX DOCD: CPT | Mod: CPTII,S$GLB,, | Performed by: FAMILY MEDICINE

## 2022-03-17 PROCEDURE — 3072F PR LOW RISK FOR RETINOPATHY: ICD-10-PCS | Mod: CPTII,S$GLB,, | Performed by: FAMILY MEDICINE

## 2022-03-17 PROCEDURE — 99499 UNLISTED E&M SERVICE: CPT | Mod: S$GLB,,, | Performed by: FAMILY MEDICINE

## 2022-03-17 PROCEDURE — 1159F PR MEDICATION LIST DOCUMENTED IN MEDICAL RECORD: ICD-10-PCS | Mod: CPTII,S$GLB,, | Performed by: FAMILY MEDICINE

## 2022-03-17 PROCEDURE — 3078F PR MOST RECENT DIASTOLIC BLOOD PRESSURE < 80 MM HG: ICD-10-PCS | Mod: CPTII,S$GLB,, | Performed by: FAMILY MEDICINE

## 2022-03-17 PROCEDURE — 99214 PR OFFICE/OUTPT VISIT, EST, LEVL IV, 30-39 MIN: ICD-10-PCS | Mod: S$GLB,,, | Performed by: FAMILY MEDICINE

## 2022-03-17 PROCEDURE — 3051F HG A1C>EQUAL 7.0%<8.0%: CPT | Mod: CPTII,S$GLB,, | Performed by: FAMILY MEDICINE

## 2022-03-17 PROCEDURE — 99999 PR PBB SHADOW E&M-EST. PATIENT-LVL II: CPT | Mod: PBBFAC,,,

## 2022-03-17 PROCEDURE — 99404 PR PREVENT COUNSEL,INDIV,60 MIN: ICD-10-PCS | Mod: S$GLB,,,

## 2022-03-17 PROCEDURE — 1160F PR REVIEW ALL MEDS BY PRESCRIBER/CLIN PHARMACIST DOCUMENTED: ICD-10-PCS | Mod: CPTII,S$GLB,, | Performed by: FAMILY MEDICINE

## 2022-03-17 PROCEDURE — 3078F DIAST BP <80 MM HG: CPT | Mod: CPTII,S$GLB,, | Performed by: FAMILY MEDICINE

## 2022-03-17 PROCEDURE — 99999 PR PBB SHADOW E&M-EST. PATIENT-LVL II: ICD-10-PCS | Mod: PBBFAC,,,

## 2022-03-17 PROCEDURE — 99214 OFFICE O/P EST MOD 30 MIN: CPT | Mod: S$GLB,,, | Performed by: FAMILY MEDICINE

## 2022-03-17 PROCEDURE — 1159F MED LIST DOCD IN RCRD: CPT | Mod: CPTII,S$GLB,, | Performed by: FAMILY MEDICINE

## 2022-03-17 RX ORDER — TRAMADOL HYDROCHLORIDE 50 MG/1
50 TABLET ORAL EVERY 6 HOURS PRN
Qty: 12 TABLET | Refills: 0 | Status: SHIPPED | OUTPATIENT
Start: 2022-03-17 | End: 2022-06-20 | Stop reason: SDUPTHER

## 2022-03-17 RX ORDER — COLCHICINE 0.6 MG/1
0.6 TABLET ORAL DAILY
Qty: 10 TABLET | Refills: 0 | Status: SHIPPED | OUTPATIENT
Start: 2022-03-17 | End: 2022-03-27

## 2022-03-17 RX ORDER — DM/P-EPHED/ACETAMINOPH/DOXYLAM 30-7.5/3
2 LIQUID (ML) ORAL
Qty: 189 LOZENGE | Refills: 0 | Status: SHIPPED | OUTPATIENT
Start: 2022-03-17 | End: 2022-05-17 | Stop reason: SDUPTHER

## 2022-03-17 RX ORDER — COLCHICINE 0.6 MG/1
CAPSULE ORAL
Status: ON HOLD | COMMUNITY
Start: 2022-01-29 | End: 2023-01-24 | Stop reason: HOSPADM

## 2022-03-17 RX ORDER — LISINOPRIL AND HYDROCHLOROTHIAZIDE 10; 12.5 MG/1; MG/1
1 TABLET ORAL DAILY
Qty: 90 TABLET | Refills: 3 | Status: SHIPPED | OUTPATIENT
Start: 2022-03-17 | End: 2022-06-20 | Stop reason: SDUPTHER

## 2022-03-17 RX ORDER — DEXTROSE 4 G
TABLET,CHEWABLE ORAL
Qty: 1 EACH | Refills: 0 | Status: SHIPPED | OUTPATIENT
Start: 2022-03-17 | End: 2023-01-19

## 2022-03-17 RX ORDER — IBUPROFEN 200 MG
1 TABLET ORAL DAILY
Qty: 28 PATCH | Refills: 0 | Status: SHIPPED | OUTPATIENT
Start: 2022-03-17 | End: 2022-04-14 | Stop reason: SDUPTHER

## 2022-03-17 RX ORDER — LOSARTAN POTASSIUM 25 MG/1
25 TABLET ORAL DAILY
Qty: 90 TABLET | Refills: 3 | Status: SHIPPED | OUTPATIENT
Start: 2022-03-17 | End: 2022-09-28

## 2022-03-17 RX ORDER — LANCETS
EACH MISCELLANEOUS
Qty: 200 EACH | Refills: 11 | Status: SHIPPED | OUTPATIENT
Start: 2022-03-17 | End: 2022-09-20

## 2022-03-17 NOTE — Clinical Note
Patient was seen for tobacco cessation intake.  Patient will begin on 14 mg nicotine patches and 2 mg nicotine lozenge.  Patient has agreed to bi weekly follow up.

## 2022-03-17 NOTE — PROGRESS NOTES
CHIEF COMPLAINT:   F/U in a patient with diabetes and hypertension    HISTORY OF PRESENT ILLNESS: The patient is a 62 year-old black female.      The patient has a long and complicated medical history.      She continues to have problems with neuropathic pain as well as central pain.  She does well w/ her Lyrica 100 twice a day.    The patient has a history of diabetes.  Recent blood sugars have been less than 200.  There have been no episodes of hypoglycemia.    The patient has a history of stable hypertension on current medications.  Patient denies chest pain or shortness of breath today.    The patient has a history of stable hyperlipidemia on current medications.  The patient denies chest pain or shortness of breath today.  The patient denies muscle aches or myalgias suggestive of myositis.    She has a history of breast cancer for which she underwent bilateral mastectomies and chemotherapy.    REVIEW OF SYSTEMS:  GENERAL: No fever, chills or weight loss.  SKIN: No rashes, itching or changes in color or texture of skin.  HEAD: No headaches or recent head trauma.  EYES: Visual acuity fine. No photophobia, ocular pain or diplopia.  EARS: Denies ear pain, discharge or vertigo.  NOSE: No loss of smell, no epistaxis or postnasal drip.  MOUTH & THROAT: No hoarseness or change in voice. No excessive gum bleeding.  NODES: Denies swollen glands.  CHEST: Denies AWAD, cyanosis, wheezing, cough and sputum production.  CARDIOVASCULAR: Denies chest pain, PND, orthopnea or reduced exercise tolerance.  ABDOMEN: Appetite fine. No weight loss. Denies diarrhea, abdominal pain, hematemesis or blood in stool.  URINARY: No flank pain, dysuria or hematuria.  PERIPHERAL VASCULAR: No claudication or cyanosis.  MUSCULOSKELETAL: No joint stiffness or swelling. Except as noted above.  NEUROLOGIC: No history of seizures    SOCIAL HISTORY: The patient does smoke.  The patient consumes alcohol socially.      PHYSICAL EXAMINATION:     Blood  "pressure 114/68, pulse 94, height 5' 3" (1.6 m), weight 84 kg (185 lb 3 oz), SpO2 98 %.    APPEARANCE: Well nourished, well developed, in no acute distress.    HEAD: Normocephalic, atraumatic.  EYES: PERRL. EOMI.  Conjunctivae without injection and  Anicteric  NOSE: Mucosa pink. Airway clear.  MOUTH & THROAT: No tonsillar enlargement. No pharyngeal erythema or exudate. No stridor.  NECK: Supple.   NODES: No cervical, axillary or inguinal lymph node enlargement.  CHEST: Lungs clear to auscultation.  No retractions are noted.  No rales or rhonchi are present.  CARDIOVASCULAR: Normal S1, S2. No rubs, murmurs or gallops.  ABDOMEN: Bowel sounds normal. Not distended. Soft. No tenderness or masses.  No ascites is noted.  MUSCULOSKELETAL:  There is no clubbing, cyanosis, or edema of the extremities x4.  There is full range of motion of the lumbar spine.  There is full range of motion of the extremities x4.  There is no deformity noted.    NEUROLOGIC:       Normal speech development.      Hearing normal.      paretic gait.      Motor and sensory exams grossly normal.  Mild RLE weakness noted  PSYCHIATRIC: Patient is alert and oriented x3.  Thought processes are all normal.  There is no homicidality.  There is no suicidality.  There is no evidence of psychosis.    LABORATORY/RADIOLOGY:   Chart reviewed including surgeries and blood work    ASSESSMENT:   CVA  Breast cancer with resultant lymphedema  Hypertension, condition is well controlled on current medication regimen  Diabetes, condition is well controlled on current medication regimen  Neuropathic pain  Cervical radiculopathy    PLAN:  A1c good yesterday  Depakote 250 mg p.o. b.i.d.  Her diabetes is very well controlled    Pain clinic   KCl 40 po qd  Prinzide  Pravachol 20 mg by mouth daily    Return to clinic in 6 month with blood work prior            "

## 2022-03-31 ENCOUNTER — CLINICAL SUPPORT (OUTPATIENT)
Dept: SMOKING CESSATION | Facility: CLINIC | Age: 62
End: 2022-03-31
Payer: COMMERCIAL

## 2022-03-31 ENCOUNTER — TELEPHONE (OUTPATIENT)
Dept: OBSTETRICS AND GYNECOLOGY | Facility: CLINIC | Age: 62
End: 2022-03-31
Payer: MEDICARE

## 2022-03-31 ENCOUNTER — PROCEDURE VISIT (OUTPATIENT)
Dept: OBSTETRICS AND GYNECOLOGY | Facility: CLINIC | Age: 62
End: 2022-03-31
Payer: MEDICARE

## 2022-03-31 VITALS
HEIGHT: 63 IN | SYSTOLIC BLOOD PRESSURE: 100 MMHG | BODY MASS INDEX: 33.2 KG/M2 | DIASTOLIC BLOOD PRESSURE: 70 MMHG | WEIGHT: 187.38 LBS

## 2022-03-31 DIAGNOSIS — F17.210 CIGARETTE NICOTINE DEPENDENCE, UNCOMPLICATED: Primary | ICD-10-CM

## 2022-03-31 DIAGNOSIS — R87.810 ASCUS WITH POSITIVE HIGH RISK HPV CERVICAL: Primary | ICD-10-CM

## 2022-03-31 DIAGNOSIS — R87.610 ATYPICAL SQUAMOUS CELLS OF UNDETERMINED SIGNIFICANCE ON CYTOLOGIC SMEAR OF CERVIX (ASC-US): ICD-10-CM

## 2022-03-31 DIAGNOSIS — R87.610 ASCUS WITH POSITIVE HIGH RISK HPV CERVICAL: Primary | ICD-10-CM

## 2022-03-31 PROBLEM — R87.619 ABNORMAL PAP SMEAR OF CERVIX: Status: ACTIVE | Noted: 2022-03-31

## 2022-03-31 PROCEDURE — 99402 PREV MED CNSL INDIV APPRX 30: CPT | Mod: S$GLB,,,

## 2022-03-31 PROCEDURE — 99999 PR PBB SHADOW E&M-EST. PATIENT-LVL I: ICD-10-PCS | Mod: PBBFAC,,,

## 2022-03-31 PROCEDURE — 88342 IMHCHEM/IMCYTCHM 1ST ANTB: CPT | Performed by: STUDENT IN AN ORGANIZED HEALTH CARE EDUCATION/TRAINING PROGRAM

## 2022-03-31 PROCEDURE — 88342 IMHCHEM/IMCYTCHM 1ST ANTB: CPT | Mod: 26,,, | Performed by: STUDENT IN AN ORGANIZED HEALTH CARE EDUCATION/TRAINING PROGRAM

## 2022-03-31 PROCEDURE — 88305 TISSUE EXAM BY PATHOLOGIST: ICD-10-PCS | Mod: 26,,, | Performed by: STUDENT IN AN ORGANIZED HEALTH CARE EDUCATION/TRAINING PROGRAM

## 2022-03-31 PROCEDURE — 99999 PR PBB SHADOW E&M-EST. PATIENT-LVL I: CPT | Mod: PBBFAC,,,

## 2022-03-31 PROCEDURE — 88305 TISSUE EXAM BY PATHOLOGIST: CPT | Mod: 59 | Performed by: STUDENT IN AN ORGANIZED HEALTH CARE EDUCATION/TRAINING PROGRAM

## 2022-03-31 PROCEDURE — 99499 UNLISTED E&M SERVICE: CPT | Mod: S$GLB,,, | Performed by: OBSTETRICS & GYNECOLOGY

## 2022-03-31 PROCEDURE — 88342 CHG IMMUNOCYTOCHEMISTRY: ICD-10-PCS | Mod: 26,,, | Performed by: STUDENT IN AN ORGANIZED HEALTH CARE EDUCATION/TRAINING PROGRAM

## 2022-03-31 PROCEDURE — 99499 NO LOS: ICD-10-PCS | Mod: S$GLB,,, | Performed by: OBSTETRICS & GYNECOLOGY

## 2022-03-31 PROCEDURE — 57454 BX/CURETT OF CERVIX W/SCOPE: CPT | Mod: S$GLB,,, | Performed by: OBSTETRICS & GYNECOLOGY

## 2022-03-31 PROCEDURE — 88305 TISSUE EXAM BY PATHOLOGIST: CPT | Mod: 26,,, | Performed by: STUDENT IN AN ORGANIZED HEALTH CARE EDUCATION/TRAINING PROGRAM

## 2022-03-31 PROCEDURE — 57454 COLPOSCOPY: ICD-10-PCS | Mod: S$GLB,,, | Performed by: OBSTETRICS & GYNECOLOGY

## 2022-03-31 PROCEDURE — 99402 PR PREVENT COUNSEL,INDIV,30 MIN: ICD-10-PCS | Mod: S$GLB,,,

## 2022-03-31 NOTE — TELEPHONE ENCOUNTER
called patient to get her rescheduled for colpo per message in box. Patient declined scheduling saying she is already on her way. I confirmed and let patient know we will see her soon. Patient expressed understanding.

## 2022-03-31 NOTE — PROGRESS NOTES
Individual Follow-Up Form    3/31/2022    Quit Date:     Clinical Status of Patient: Outpatient    Continuing Medication: yes  Patches    Other Medications: nicotine lozenge     Target Symptoms: Withdrawal and medication side effects. The following were  rated moderate (3) to severe (4) on TCRS:  · Moderate (3): none  · Severe (4): none    Comments: Patient is tobacco free since last visit of 3/17/22.  Patient was commended on her success thus far.  Patient denies any issues with urges or cravings to smoke.  Patient continue to use 14 mg nicotine patch daily and 8/ 2mg nicotine lozenge without any negative side effects at this time.  Patient was instructed to decrease nicotine lozenge to not exceed 5 pieces per day.  We discussed willpower and motivations to quit.  We discussed tobacco cessations strategies and behavioral changes.  The patient denies any abnormal behavioral or mental changes at this time. The patient will continue with  therapy sessions and medication monitoring by CTTS. Prescribed medication management will be by physician.        Diagnosis: F17.210    Next Visit: 2 weeks

## 2022-03-31 NOTE — PROCEDURES
Colposcopy    Date/Time: 3/31/2022 11:00 AM  Performed by: Selin Conner MD  Authorized by: Selin Conner MD     Consent Done?:  Yes (Written)    Colposcopy Site:  Cervix  Acrowhite Lesion? Yes    Atypical Vessels: No    Transformation Zone Adequate?: Yes    Biopsy?: Yes         Location:  Cervix ((6 00))  ECC Performed?: Yes    LEEP Performed?: No    Estimated blood loss (cc):  1   Patient tolerated the procedure well with no immediate complications.   Post-operative instructions were provided for the patient.   Patient was discharged and will follow up if any complications occur          Selin Conner MD  [facility]  03/31/2022

## 2022-04-01 RX ORDER — COLCHICINE 0.6 MG/1
CAPSULE ORAL
Qty: 90 CAPSULE | Refills: 0 | OUTPATIENT
Start: 2022-04-01

## 2022-04-01 NOTE — TELEPHONE ENCOUNTER
No new care gaps identified.  Powered by PureHistory by [employer]. Reference number: [id number].   4/01/2022 8:42:08 AM CDT

## 2022-04-01 NOTE — TELEPHONE ENCOUNTER
[facility] Note  Quick DC. Inappropriate Request   Refill request requires further review by MD: NO   Medication Therapy Plan: Pharmacy is requesting new script(s) for the following medications without required information, (sig/ frequency/qty/etc)     [facility] action(s):  Quick Discontinue      Duplicate Pended Encounter(s)/ Last Prescribed Details:    Pharmacies have been requesting medications for patients without required information, (sig, frequency, qty, etc.). In addition, requests are sent for medication(s) pt. are currently not taking, and medications patients have never taken.    We have spoken to the pharmacies about these request types and advised their teams previously that we are unable to assess these New Script requests and require all details for these requests. This is a known issue and has been reported.        Medication related problems are not assessed for QDC.   Medication Reconciliation Completed? NO Were there pending details that required adjustment? NO     Automatic Epic Generated Protocol Data Below:   Requested Prescriptions   Pending Prescriptions Disp Refills    colchicine (MITIGARE) 0.6 mg Cap [Pharmacy Med Name: MITIGARE 0.6MG CAP] 90 capsule 0              Appointments      Date Provider   Last Visit   3/17/2022 Ghulam Contreras MD   Next Visit   Visit date not found Ghulam Contreras MD        Note composed:10:44 AM 04/01/2022

## 2022-04-04 DIAGNOSIS — E11.8 DIABETIC FOOT: Primary | ICD-10-CM

## 2022-04-04 DIAGNOSIS — M10.9 GOUT, UNSPECIFIED CAUSE, UNSPECIFIED CHRONICITY, UNSPECIFIED SITE: ICD-10-CM

## 2022-04-04 NOTE — TELEPHONE ENCOUNTER
----- Message from Carson Basurto sent at 4/4/2022  4:08 PM CDT -----  Who Called:        Refill or New Rx: new  prescription is needed         RX Name and Strength:  mitigare 0.6 mg capsule      How is the patient currently taking it? (ex. 1XDay)        Is this a 30 day or 90 day RX        Preferred Pharmacy with phone number:  [facility] - [place], [place] - [address]        Local or Mail Order: mail  order         Ordering Provider:        Would the patient rather a call back or a response via [facility]? Call back         Best Call Back Number:  [phone]        Additional Information:

## 2022-04-04 NOTE — TELEPHONE ENCOUNTER
No new care gaps identified.  Powered by [employer] by [employer]. Reference number: [id number].   4/04/2022 5:43:20 PM CDT

## 2022-04-05 RX ORDER — DIVALPROEX SODIUM 250 MG/1
250 TABLET, FILM COATED, EXTENDED RELEASE ORAL DAILY
Qty: 90 TABLET | Refills: 3 | Status: SHIPPED | OUTPATIENT
Start: 2022-04-05 | End: 2023-01-19

## 2022-04-05 RX ORDER — DIVALPROEX SODIUM 250 MG/1
TABLET, FILM COATED, EXTENDED RELEASE ORAL
Refills: 0 | OUTPATIENT
Start: 2022-04-05

## 2022-04-05 NOTE — TELEPHONE ENCOUNTER
Tulio for pt to call and schedule her with podiatry. Dr Contreras is not prescribing any medication for her foot.

## 2022-04-05 NOTE — TELEPHONE ENCOUNTER
This Rx Request does not qualify for assessment with the OR   Please review protocol details and the Care Due Message for extra clinical information    Reasons Rx Request may be deferred:   Medication is non-delegated    Note composed:10:47 AM 04/05/2022

## 2022-04-06 RX ORDER — COLCHICINE 0.6 MG/1
CAPSULE ORAL
Status: CANCELLED | OUTPATIENT
Start: 2022-04-06

## 2022-04-06 NOTE — TELEPHONE ENCOUNTER
Patient was informed that medication Mitigare must come from poditary provider. Patient states that she does not have a podiatry provider and will like a referral. Requested referral has been placed and routed to Dr. Contreras for approval.

## 2022-04-06 NOTE — TELEPHONE ENCOUNTER
----- Message from Carson Basurto sent at 4/6/2022  2:21 PM CDT -----  Who Called:        Refill or New Rx: refill         RX Name and Strength:  MITIGARE 0.6 mg Cap        How is the patient currently taking it? (ex. 1XDay)        Is this a 30 day or 90 day RX        Preferred Pharmacy with phone number:  [facility] - [facility] [address]            Local or Mail Order:  mail order         Ordering Provider:        Would the patient rather a call back or a response via [facility]? Call back         Best Call Back Number:  [phone]        Additional Information:

## 2022-04-08 LAB
FINAL PATHOLOGIC DIAGNOSIS: NORMAL
GROSS: NORMAL
Lab: NORMAL

## 2022-04-12 ENCOUNTER — TELEPHONE (OUTPATIENT)
Dept: OBSTETRICS AND GYNECOLOGY | Facility: CLINIC | Age: 62
End: 2022-04-12
Payer: MEDICARE

## 2022-04-12 NOTE — TELEPHONE ENCOUNTER
Called to review colpo results.      No answer, left message for patient to call office for results.     Component 12 d ago    Final Pathologic Diagnosis 1. Cervix, 6:00 o'clock, biopsy:       - Minute endocervical tissue, non-diagnostic   Note: Multiple deeper levels are examined.   2. Endocervix, curettage:       - Low-grade squamous intraepithelial lesion (CIN1)   Note: A p16 immunostain supports the above diagnosis. Immunostain performed   with appropriate positive and negative controls.    Comment: Interp By Jennifer Garcia M.D., Signed on 04/08/2022 at 10:16     Recommend HPV based screening in 1 year.         Selin Conner MD  [facility]  04/12/2022

## 2022-04-14 ENCOUNTER — TELEPHONE (OUTPATIENT)
Dept: OBSTETRICS AND GYNECOLOGY | Facility: CLINIC | Age: 62
End: 2022-04-14
Payer: MEDICARE

## 2022-04-14 ENCOUNTER — CLINICAL SUPPORT (OUTPATIENT)
Dept: SMOKING CESSATION | Facility: CLINIC | Age: 62
End: 2022-04-14
Payer: COMMERCIAL

## 2022-04-14 DIAGNOSIS — F17.210 CIGARETTE SMOKER: Primary | ICD-10-CM

## 2022-04-14 PROCEDURE — 99999 PR PBB SHADOW E&M-EST. PATIENT-LVL I: CPT | Mod: PBBFAC,,,

## 2022-04-14 PROCEDURE — 99404 PREV MED CNSL INDIV APPRX 60: CPT | Mod: S$GLB,,,

## 2022-04-14 PROCEDURE — 99999 PR PBB SHADOW E&M-EST. PATIENT-LVL I: ICD-10-PCS | Mod: PBBFAC,,,

## 2022-04-14 PROCEDURE — 99404 PR PREVENT COUNSEL,INDIV,60 MIN: ICD-10-PCS | Mod: S$GLB,,,

## 2022-04-14 RX ORDER — IBUPROFEN 200 MG
1 TABLET ORAL DAILY
Qty: 28 PATCH | Refills: 0 | Status: SHIPPED | OUTPATIENT
Start: 2022-04-14 | End: 2022-05-17 | Stop reason: SDUPTHER

## 2022-04-14 NOTE — TELEPHONE ENCOUNTER
----- Message from Evangelina Bingham NP sent at 4/14/2022  3:23 PM CDT -----  Contact: JOYA MUNOZ [[id number]]  Hey- mild changes. Repeat in 1 year.  ----- Message -----  From: Reshma Randall MA  Sent: 4/14/2022   3:01 PM CDT  To: Evangelina Bingham NP    Pt is asking for colpo results  Dr. Conner has a note in but I just wanted to be sure of the results before I tell her anything   ----- Message -----  From: Meri Murillo  Sent: 4/14/2022   1:59 PM CDT  To: [facility] Staff    Type:  Patient Returning Call      Who Called:  JOYA MUNOZ [[id number]]      Who Left Message for Patient: Selin Conner MD       Does the patient know what this is regarding?: Yes       Would the patient rather a call back or a response via [facility]?        Best Call Back Number: [phone] (mobile)      Additional Information:

## 2022-04-14 NOTE — Clinical Note
Patient remains tobacco free at this time.  Patient was commended on her success thus far.  Patient continue to use 14 mg nicotine patch daily without any negative side effects at this time.  Completion of TCRS (Tobacco Cessation Rating Scale) reviewed strategies, cues, and triggers. Introduced the negative impact of tobacco on health, the health advantages of discontinuing the use of tobacco, time line improved health changes after a quit, withdrawal issues to expect from nicotine and habit, and ways to achieve the goal of a quit. The patient denies any abnormal behavioral or mental changes at this time. The patient will continue with  therapy sessions and medication monitoring by CTTS. Prescribed medication management will be by physician.

## 2022-04-14 NOTE — PROGRESS NOTES
Individual Follow-Up Form    4/14/2022    Quit Date:     Clinical Status of Patient: Outpatient    Continuing Medication: yes  Patches    Other Medications: lozenge     Target Symptoms: Withdrawal and medication side effects. The following were  rated moderate (3) to severe (4) on TCRS:  · Moderate (3): none  · Severe (4): none    Comments: Patient remains tobacco free at this time.  Patient was commended on her success thus far.  Patient continue to use 14 mg nicotine patch daily without any negative side effects at this time.  Completion of TCRS (Tobacco Cessation Rating Scale) reviewed strategies, cues, and triggers. Introduced the negative impact of tobacco on health, the health advantages of discontinuing the use of tobacco, time line improved health changes after a quit, withdrawal issues to expect from nicotine and habit, and ways to achieve the goal of a quit. The patient denies any abnormal behavioral or mental changes at this time. The patient will continue with  therapy sessions and medication monitoring by CTTS. Prescribed medication management will be by physician.      Diagnosis: F17.210    Next Visit: 2 weeks

## 2022-04-20 ENCOUNTER — OFFICE VISIT (OUTPATIENT)
Dept: PODIATRY | Facility: CLINIC | Age: 62
End: 2022-04-20
Attending: FAMILY MEDICINE
Payer: MEDICARE

## 2022-04-20 VITALS
WEIGHT: 187 LBS | SYSTOLIC BLOOD PRESSURE: 113 MMHG | DIASTOLIC BLOOD PRESSURE: 75 MMHG | BODY MASS INDEX: 33.13 KG/M2 | HEART RATE: 90 BPM | HEIGHT: 63 IN

## 2022-04-20 DIAGNOSIS — E11.42 DIABETIC PERIPHERAL NEUROPATHY ASSOCIATED WITH TYPE 2 DIABETES MELLITUS: Primary | ICD-10-CM

## 2022-04-20 DIAGNOSIS — E11.8 DIABETIC FOOT: ICD-10-CM

## 2022-04-20 DIAGNOSIS — M20.42 HAMMER TOES OF BOTH FEET: ICD-10-CM

## 2022-04-20 DIAGNOSIS — M20.41 HAMMER TOES OF BOTH FEET: ICD-10-CM

## 2022-04-20 DIAGNOSIS — M10.9 GOUT, UNSPECIFIED CAUSE, UNSPECIFIED CHRONICITY, UNSPECIFIED SITE: ICD-10-CM

## 2022-04-20 PROCEDURE — 3072F PR LOW RISK FOR RETINOPATHY: ICD-10-PCS | Mod: CPTII,S$GLB,, | Performed by: PODIATRIST

## 2022-04-20 PROCEDURE — 3074F SYST BP LT 130 MM HG: CPT | Mod: CPTII,S$GLB,, | Performed by: PODIATRIST

## 2022-04-20 PROCEDURE — 99999 PR PBB SHADOW E&M-EST. PATIENT-LVL V: ICD-10-PCS | Mod: PBBFAC,,, | Performed by: PODIATRIST

## 2022-04-20 PROCEDURE — 1160F PR REVIEW ALL MEDS BY PRESCRIBER/CLIN PHARMACIST DOCUMENTED: ICD-10-PCS | Mod: CPTII,S$GLB,, | Performed by: PODIATRIST

## 2022-04-20 PROCEDURE — 99214 PR OFFICE/OUTPT VISIT, EST, LEVL IV, 30-39 MIN: ICD-10-PCS | Mod: S$GLB,,, | Performed by: PODIATRIST

## 2022-04-20 PROCEDURE — 1160F RVW MEDS BY RX/DR IN RCRD: CPT | Mod: CPTII,S$GLB,, | Performed by: PODIATRIST

## 2022-04-20 PROCEDURE — 3051F HG A1C>EQUAL 7.0%<8.0%: CPT | Mod: CPTII,S$GLB,, | Performed by: PODIATRIST

## 2022-04-20 PROCEDURE — 3072F LOW RISK FOR RETINOPATHY: CPT | Mod: CPTII,S$GLB,, | Performed by: PODIATRIST

## 2022-04-20 PROCEDURE — 3078F DIAST BP <80 MM HG: CPT | Mod: CPTII,S$GLB,, | Performed by: PODIATRIST

## 2022-04-20 PROCEDURE — 99214 OFFICE O/P EST MOD 30 MIN: CPT | Mod: S$GLB,,, | Performed by: PODIATRIST

## 2022-04-20 PROCEDURE — 1159F MED LIST DOCD IN RCRD: CPT | Mod: CPTII,S$GLB,, | Performed by: PODIATRIST

## 2022-04-20 PROCEDURE — 4010F PR ACE/ARB THEARPY RXD/TAKEN: ICD-10-PCS | Mod: CPTII,S$GLB,, | Performed by: PODIATRIST

## 2022-04-20 PROCEDURE — 1159F PR MEDICATION LIST DOCUMENTED IN MEDICAL RECORD: ICD-10-PCS | Mod: CPTII,S$GLB,, | Performed by: PODIATRIST

## 2022-04-20 PROCEDURE — 3074F PR MOST RECENT SYSTOLIC BLOOD PRESSURE < 130 MM HG: ICD-10-PCS | Mod: CPTII,S$GLB,, | Performed by: PODIATRIST

## 2022-04-20 PROCEDURE — 3008F PR BODY MASS INDEX (BMI) DOCUMENTED: ICD-10-PCS | Mod: CPTII,S$GLB,, | Performed by: PODIATRIST

## 2022-04-20 PROCEDURE — 3008F BODY MASS INDEX DOCD: CPT | Mod: CPTII,S$GLB,, | Performed by: PODIATRIST

## 2022-04-20 PROCEDURE — 4010F ACE/ARB THERAPY RXD/TAKEN: CPT | Mod: CPTII,S$GLB,, | Performed by: PODIATRIST

## 2022-04-20 PROCEDURE — 99999 PR PBB SHADOW E&M-EST. PATIENT-LVL V: CPT | Mod: PBBFAC,,, | Performed by: PODIATRIST

## 2022-04-20 PROCEDURE — 3078F PR MOST RECENT DIASTOLIC BLOOD PRESSURE < 80 MM HG: ICD-10-PCS | Mod: CPTII,S$GLB,, | Performed by: PODIATRIST

## 2022-04-20 PROCEDURE — 3051F PR MOST RECENT HEMOGLOBIN A1C LEVEL 7.0 - < 8.0%: ICD-10-PCS | Mod: CPTII,S$GLB,, | Performed by: PODIATRIST

## 2022-04-20 RX ORDER — AMMONIUM LACTATE 12 G/100G
CREAM TOPICAL
Qty: 140 G | Refills: 11 | Status: SHIPPED | OUTPATIENT
Start: 2022-04-20

## 2022-04-20 NOTE — PROGRESS NOTES
Subjective:      Patient ID: Jonathan Gonzales is a 62 y.o. female.    Chief Complaint: Diabetic Foot Exam (PCP Ghulam Contreras MD 3/17/2022)    Jonathan is a 62 y.o. female who presents to the clinic for evaluation and treatment of high risk feet. Jonathan has a past medical history of Bipolar disorder, Cancer of female breast (10/2010), Cancer of upper-outer quadrant of female breast (7/9/2012), Depression, Diabetes mellitus type II, Disturbances of sensation of smell and taste (6/29/2012), Hypertension, Malignant neoplasm of breast (female), unspecified site (4/27/2015), Neuropathy, Nonspecific abnormal unspecified cardiovascular function study (4/12/2013), Post-mastectomy lymphedema syndrome, Schizophrenia, and Stroke. The patient's chief complaint is Diabetes, increased risk amputation needing evaluation/management/optomization of foot care.    .    PCP: Ghulam Contreras MD    Date Last Seen by PCP:   Chief Complaint   Patient presents with    Diabetic Foot Exam     PCP Ghulam Contreras MD 3/17/2022        Current shoe gear:  Affected Foot: Casual shoes     Unaffected Foot: Casual shoes    Hemoglobin A1C   Date Value Ref Range Status   03/16/2022 7.1 (H) 4.0 - 5.6 % Final     Comment:     ADA Screening Guidelines:  5.7-6.4%  Consistent with prediabetes  >or=6.5%  Consistent with diabetes    High levels of fetal hemoglobin interfere with the HbA1C  assay. Heterozygous hemoglobin variants (HbS, HgC, etc)do  not significantly interfere with this assay.   However, presence of multiple variants may affect accuracy.     09/23/2021 6.2 (H) 4.0 - 5.6 % Final     Comment:     ADA Screening Guidelines:  5.7-6.4%  Consistent with prediabetes  >or=6.5%  Consistent with diabetes    High levels of fetal hemoglobin interfere with the HbA1C  assay. Heterozygous hemoglobin variants (HbS, HgC, etc)do  not significantly interfere with this assay.   However, presence of multiple variants may affect accuracy.      04/19/2021 6.1 (H) 4.0 - 5.6 % Final     Comment:     ADA Screening Guidelines:  5.7-6.4%  Consistent with prediabetes  >or=6.5%  Consistent with diabetes    High levels of fetal hemoglobin interfere with the HbA1C  assay. Heterozygous hemoglobin variants (HbS, HgC, etc)do  not significantly interfere with this assay.   However, presence of multiple variants may affect accuracy.         Review of Systems   Constitutional: Negative for chills, diaphoresis, fever, malaise/fatigue and night sweats.   Cardiovascular: Negative for claudication, cyanosis, leg swelling and syncope.   Skin: Negative for color change, dry skin, nail changes, rash, suspicious lesions and unusual hair distribution.   Musculoskeletal: Negative for falls, joint pain, joint swelling, muscle cramps, muscle weakness and stiffness.   Gastrointestinal: Negative for constipation, diarrhea, nausea and vomiting.   Neurological: Positive for numbness, paresthesias and sensory change. Negative for brief paralysis, disturbances in coordination, focal weakness and tremors.           Objective:      Physical Exam  Constitutional:       General: She is not in acute distress.     Appearance: She is well-developed. She is not diaphoretic.   Cardiovascular:      Pulses:           Popliteal pulses are 2+ on the right side and 2+ on the left side.        Dorsalis pedis pulses are 2+ on the right side and 2+ on the left side.        Posterior tibial pulses are 2+ on the right side and 2+ on the left side.      Comments: Capillary refill 3 seconds all toes/distal feet, all toes/both feet warm to touch.      Negative lymphadenopathy bilateral popliteal fossa and tarsal tunnel.      Negavie lower extremity edema bilateral.    Musculoskeletal:      Right ankle: No swelling, deformity, ecchymosis or lacerations. Normal range of motion. Normal pulse.      Right Achilles Tendon: Normal. No defects. Boyd's test negative.      Comments: Patient has hammertoes of  digits    2-5 bilateral               partially reducible without symptom today.      Otherwise, Normal angle, base, station of gait. All ten toes without clubbing, cyanosis, or signs of ischemia.  No pain to palpation bilateral lower extremities.  Range of motion, stability, muscle strength, and muscle tone normal bilateral feet and legs.      Lymphadenopathy:      Lower Body: No right inguinal adenopathy. No left inguinal adenopathy.      Comments: Negative lymphadenopathy bilateral popliteal fossa and tarsal tunnel.    Negative lymphangitic streaking bilateral feet/ankles/legs.   Skin:     General: Skin is warm and dry.      Capillary Refill: Capillary refill takes 2 to 3 seconds.      Coloration: Skin is not pale.      Findings: No abrasion, bruising, burn, ecchymosis, erythema, laceration, lesion or rash.      Nails: There is no clubbing.      Comments:   Skin is normal age and health appropriate color, turgor, texture, and temperature bilateral lower extremities without ulceration, hyperpigmentation, discoloration, masses nodules or cords palpated.  No ecchymosis, erythema, edema, or cardinal signs of infection bilateral lower extremities.    Al ltoenails normal color and trophic qualities.    Neurological:      Mental Status: She is alert and oriented to person, place, and time.      Sensory: Sensory deficit present.      Motor: No tremor, atrophy or abnormal muscle tone.      Gait: Gait normal.      Deep Tendon Reflexes:      Reflex Scores:       Patellar reflexes are 2+ on the right side and 2+ on the left side.       Achilles reflexes are 2+ on the right side and 2+ on the left side.     Comments: Paresthesias, and burning bilateral feet with no clearly identified trigger or source.  Negative tinel sign to percussion sural, superficial peroneal, deep peroneal, saphenous, and posterior tibial nerves right and left ankles and feet.    Decreased/absent vibratory sensation bilateral feet to 128Hz tuning  fork.    Diminished/loss of protective sensation all toes bilateral to 10 gram monofilament.     Psychiatric:         Behavior: Behavior is cooperative.               Assessment:       Encounter Diagnoses   Name Primary?    Diabetic foot     Gout, unspecified cause, unspecified chronicity, unspecified site     Diabetic peripheral neuropathy associated with type 2 diabetes mellitus Yes    Hammer toes of both feet          Plan:       Jonathan was seen today for diabetic foot exam.    Diagnoses and all orders for this visit:    Diabetic peripheral neuropathy associated with type 2 diabetes mellitus  -     DIABETIC SHOES FOR HOME USE  -      DIABETES FOOT EXAM    Diabetic foot  -     Ambulatory referral/consult to Podiatry  -     DIABETIC SHOES FOR HOME USE  -      DIABETES FOOT EXAM    Gout, unspecified cause, unspecified chronicity, unspecified site  -     Ambulatory referral/consult to Podiatry  -     DIABETIC SHOES FOR HOME USE  -      DIABETES FOOT EXAM    Hammer toes of both feet  -     DIABETIC SHOES FOR HOME USE  -      DIABETES FOOT EXAM    Other orders  -     ammonium lactate 12 % Crea; Apply twice daily to affected parts both feet as needed.      I counseled the patient on her conditions, their implications and medical management.        - Shoe inspection. Diabetic Foot Education. Patient reminded of the importance of good nutrition and blood sugar control to help prevent podiatric complications of diabetes. Patient instructed on proper foot hygeine. We discussed wearing proper shoe gear, daily foot inspections, never walking without protective shoe gear, never putting sharp instruments to feet, routine podiatric visits annually.      Discussed conservative treatment with shoes of adequate dimensions, material, and style to alleviate symptoms and delay or prevent surgical intervention.    Rx lac hydrin, DM shoes/inserts.        No follow-ups on file.

## 2022-04-26 ENCOUNTER — TELEPHONE (OUTPATIENT)
Dept: INTERNAL MEDICINE | Facility: CLINIC | Age: 62
End: 2022-04-26
Payer: MEDICARE

## 2022-04-26 NOTE — TELEPHONE ENCOUNTER
----- Message from Meri Aguirreer sent at 4/26/2022 12:57 PM CDT -----  Contact: JOYA MUNOZ [[id number]]  Type: Call Back      Who called: JOYA MUNOZ [[id number]]      What is the request in detail: Patient is requesting a call back. She states that she would like to get an authorization sent over to the insurance company for the blood-glucose meter (ACCU-CHEK AMBER PLUS METER) [facility]. She states that she was sent a meter, but had to return it because she was not able to pay for it. She states that [facility] advised her that the doctor can send over a prior authorization. Please advise.       Can the clinic reply by [facility]? No      Would the patient rather a call back or a response via [facility]? Call back       Best call back number: [phone]      Additional Information: [facility] - [place], [place] - [address]

## 2022-05-02 ENCOUNTER — CLINICAL SUPPORT (OUTPATIENT)
Dept: SMOKING CESSATION | Facility: CLINIC | Age: 62
End: 2022-05-02
Payer: COMMERCIAL

## 2022-05-02 DIAGNOSIS — F17.210 CIGARETTE NICOTINE DEPENDENCE, UNCOMPLICATED: Primary | ICD-10-CM

## 2022-05-02 PROCEDURE — 99404 PR PREVENT COUNSEL,INDIV,60 MIN: ICD-10-PCS | Mod: S$GLB,,,

## 2022-05-02 PROCEDURE — 99999 PR PBB SHADOW E&M-EST. PATIENT-LVL I: ICD-10-PCS | Mod: PBBFAC,,,

## 2022-05-02 PROCEDURE — 99404 PREV MED CNSL INDIV APPRX 60: CPT | Mod: S$GLB,,,

## 2022-05-02 PROCEDURE — 99999 PR PBB SHADOW E&M-EST. PATIENT-LVL I: CPT | Mod: PBBFAC,,,

## 2022-05-02 NOTE — Clinical Note
Patient remains tobacco free at this time.  Patient remains on prescribed tobacco cessation medication regimen of 14 mg patch and 2 mg nicotine  lozenge without any negative side effects at this time. Completion of TCRS (Tobacco Cessation Rating Scale) reviewed strategies, controlling environment, cues, triggers, new goals set. Introduced high risk situations with preparation interventions, caffeine similarities with withdrawal issues of habit and nicotine, Alcohol, Understanding urges, cravings, stress and relaxation. Open discussion with intervention discussion.The patient denies any abnormal behavioral or mental changes at this time. The patient will continue with  therapy sessions and medication monitoring by CTTS. Prescribed medication management will be by physician.

## 2022-05-02 NOTE — PROGRESS NOTES
Individual Follow-Up Form    5/2/2022    Quit Date:     Clinical Status of Patient: Outpatient    Length of Service: 60 minutes    Continuing Medication: yes nicotine patch     Other Medications: nicotine lozenge      Target Symptoms: Withdrawal and medication side effects. The following were  rated moderate (3) to severe (4) on TCRS:  · Moderate (3): none  · Severe (4): none    Comments: Patient remains tobacco free at this time.  Patient remains on prescribed tobacco cessation medication regimen of 14 mg patch and 2 mg nicotine  lozenge without any negative side effects at this time. Completion of TCRS (Tobacco Cessation Rating Scale) reviewed strategies, controlling environment, cues, triggers, new goals set. Introduced high risk situations with preparation interventions, caffeine similarities with withdrawal issues of habit and nicotine, Alcohol, Understanding urges, cravings, stress and relaxation. Open discussion with intervention discussion.The patient denies any abnormal behavioral or mental changes at this time. The patient will continue with  therapy sessions and medication monitoring by CTTS. Prescribed medication management will be by physician.        Diagnosis: F17.210    Next Visit: 2 weeks

## 2022-05-17 ENCOUNTER — CLINICAL SUPPORT (OUTPATIENT)
Dept: SMOKING CESSATION | Facility: CLINIC | Age: 62
End: 2022-05-17
Payer: COMMERCIAL

## 2022-05-17 DIAGNOSIS — F17.210 CIGARETTE NICOTINE DEPENDENCE, UNCOMPLICATED: Primary | ICD-10-CM

## 2022-05-17 DIAGNOSIS — F17.210 LIGHT CIGARETTE SMOKER (1-9 CIGS/DAY): ICD-10-CM

## 2022-05-17 PROCEDURE — 99999 PR PBB SHADOW E&M-EST. PATIENT-LVL I: CPT | Mod: PBBFAC,,,

## 2022-05-17 PROCEDURE — 99999 PR PBB SHADOW E&M-EST. PATIENT-LVL I: ICD-10-PCS | Mod: PBBFAC,,,

## 2022-05-17 PROCEDURE — 99404 PREV MED CNSL INDIV APPRX 60: CPT | Mod: S$GLB,,,

## 2022-05-17 PROCEDURE — 99404 PR PREVENT COUNSEL,INDIV,60 MIN: ICD-10-PCS | Mod: S$GLB,,,

## 2022-05-17 RX ORDER — IBUPROFEN 200 MG
1 TABLET ORAL DAILY
Qty: 28 PATCH | Refills: 0 | Status: SHIPPED | OUTPATIENT
Start: 2022-05-17 | Stop reason: SDUPTHER

## 2022-05-17 RX ORDER — DM/P-EPHED/ACETAMINOPH/DOXYLAM 30-7.5/3
2 LIQUID (ML) ORAL
Qty: 216 LOZENGE | Refills: 0 | Status: SHIPPED | OUTPATIENT
Start: 2022-05-17 | End: 2022-08-03 | Stop reason: SDUPTHER

## 2022-05-17 NOTE — Clinical Note
Patient remains tobacco free at this time.  Patient denies any issues with urges or cravings to smoke.  Patient remains on prescribed tobacco cessation medication regimen of 14 mg patch without any negative side effects at this time.  Completion of TCRS (Tobacco Cessation Rating Scale) reviewed strategies, habitual behavior, stress, and high risk situations. Introduced stress with addition interventions, SOLVE, relaxation with interventions, nutrition, exercise, weight gain, and the importance of rewarding oneself for accomplishments toward becoming tobacco free. Open discussion of all items with interventions. The patient denies any abnormal behavioral or mental changes at this time. The patient will continue with  therapy sessions and medication monitoring by CTTS. Prescribed medication management will be by physician.

## 2022-05-17 NOTE — PROGRESS NOTES
Individual Follow-Up Form    5/17/2022    Quit Date:     Clinical Status of Patient: Outpatient    Continuing Medication: yes  Patches    Other Medications: nicotine lozenge      Target Symptoms: Withdrawal and medication side effects. The following were  rated moderate (3) to severe (4) on TCRS:  · Moderate (3): none  · Severe (4): none    Comments: Patient remains tobacco free at this time.  Patient denies any issues with urges or cravings to smoke.  Patient remains on prescribed tobacco cessation medication regimen of 14 mg patch without any negative side effects at this time.   Completion of TCRS (Tobacco Cessation Rating Scale) reviewed strategies, habitual behavior, stress, and high risk situations. Introduced stress with addition interventions, SOLVE, relaxation with interventions, nutrition, exercise, weight gain, and the importance of rewarding oneself for accomplishments toward becoming tobacco free. Open discussion of all items with interventions. The patient denies any abnormal behavioral or mental changes at this time. The patient will continue with  therapy sessions and medication monitoring by CTTS. Prescribed medication management will be by physician.      Diagnosis: F17.210    Next Visit: 2 weeks

## 2022-05-18 ENCOUNTER — TELEPHONE (OUTPATIENT)
Dept: PODIATRY | Facility: CLINIC | Age: 62
End: 2022-05-18
Payer: MEDICARE

## 2022-05-18 NOTE — TELEPHONE ENCOUNTER
----- Message from Jose Osborn sent at 5/18/2022  2:42 PM CDT -----       Type: Patient Returning Call    Who Called: Pt  Who Left Message for Patient: NA  Does the patient know what this is regarding?: Sign off on diabetic shoes  Would the patient rather a call back or a response via [facility]? Call  Best Call Back Number: [phone]  Additional Information: Please assist, thank you!

## 2022-05-18 NOTE — TELEPHONE ENCOUNTER
Staff spoke with patient to inform her that [employer](orthotic vendor) needs to fax over the paperwork that need to filled out or sent over to them.     Patient informed staff that she will call to inform [employer](orthotic vendor) to fax over information needed.    Staff verbalized understanding.

## 2022-06-08 ENCOUNTER — CLINICAL SUPPORT (OUTPATIENT)
Dept: SMOKING CESSATION | Facility: CLINIC | Age: 62
End: 2022-06-08
Payer: COMMERCIAL

## 2022-06-08 DIAGNOSIS — F17.210 CIGARETTE NICOTINE DEPENDENCE, UNCOMPLICATED: Primary | ICD-10-CM

## 2022-06-08 PROCEDURE — 99404 PREV MED CNSL INDIV APPRX 60: CPT | Mod: S$GLB,,,

## 2022-06-08 PROCEDURE — 99404 PR PREVENT COUNSEL,INDIV,60 MIN: ICD-10-PCS | Mod: S$GLB,,,

## 2022-06-08 PROCEDURE — 99999 PR PBB SHADOW E&M-EST. PATIENT-LVL I: CPT | Mod: PBBFAC,,,

## 2022-06-08 PROCEDURE — 99999 PR PBB SHADOW E&M-EST. PATIENT-LVL I: ICD-10-PCS | Mod: PBBFAC,,,

## 2022-06-09 NOTE — PROGRESS NOTES
Individual Follow-Up Form    6/9/2022    Quit Date:     Clinical Status of Patient: Outpatient    Continuing Medication: yes  Patches    Other Medications:      Target Symptoms: Withdrawal and medication side effects. The following were  rated moderate (3) to severe (4) on TCRS:  · Moderate (3): none  · Severe (4): none    Comments: Patient remains tobacco free at that time. Patient remains on prescribed tobacco cessation medication regimen of  14 mg patch without any negative side effects at this time. Completion of TCRS (Tobacco Cessation Rating Scale) reviewed strategies, habitual behavior, high risks situations, understanding urges and cravings, stress and relaxation with open discussion and additional interventions, Introduced lapses, relapses, understanding them and analyzing the situation of a lapse, conflict issues that may be linked to a lapse. The patient denies any abnormal behavioral or mental changes at this time. The patient will continue with  therapy sessions and medication monitoring by CTTS. Prescribed medication management will be by physician.        Diagnosis: F17.210    Next Visit: 2 weeks

## 2022-06-20 ENCOUNTER — OFFICE VISIT (OUTPATIENT)
Dept: INTERNAL MEDICINE | Facility: CLINIC | Age: 62
End: 2022-06-20
Attending: FAMILY MEDICINE
Payer: MEDICARE

## 2022-06-20 VITALS
BODY MASS INDEX: 33.51 KG/M2 | SYSTOLIC BLOOD PRESSURE: 110 MMHG | WEIGHT: 189.13 LBS | OXYGEN SATURATION: 98 % | HEART RATE: 96 BPM | HEIGHT: 63 IN | DIASTOLIC BLOOD PRESSURE: 62 MMHG

## 2022-06-20 DIAGNOSIS — E11.9 TYPE 2 DIABETES MELLITUS WITHOUT COMPLICATION, WITH LONG-TERM CURRENT USE OF INSULIN: ICD-10-CM

## 2022-06-20 DIAGNOSIS — E11.8 DIABETIC FOOT: ICD-10-CM

## 2022-06-20 DIAGNOSIS — E11.42 DM TYPE 2 WITH DIABETIC PERIPHERAL NEUROPATHY: Primary | ICD-10-CM

## 2022-06-20 DIAGNOSIS — Z79.4 TYPE 2 DIABETES MELLITUS WITHOUT COMPLICATION, WITH LONG-TERM CURRENT USE OF INSULIN: ICD-10-CM

## 2022-06-20 DIAGNOSIS — N39.41 URGE INCONTINENCE: ICD-10-CM

## 2022-06-20 DIAGNOSIS — I10 ESSENTIAL HYPERTENSION: ICD-10-CM

## 2022-06-20 PROCEDURE — 99999 PR PBB SHADOW E&M-EST. PATIENT-LVL III: ICD-10-PCS | Mod: PBBFAC,,, | Performed by: FAMILY MEDICINE

## 2022-06-20 PROCEDURE — 4010F PR ACE/ARB THEARPY RXD/TAKEN: ICD-10-PCS | Mod: CPTII,S$GLB,, | Performed by: FAMILY MEDICINE

## 2022-06-20 PROCEDURE — 3008F PR BODY MASS INDEX (BMI) DOCUMENTED: ICD-10-PCS | Mod: CPTII,S$GLB,, | Performed by: FAMILY MEDICINE

## 2022-06-20 PROCEDURE — 3051F PR MOST RECENT HEMOGLOBIN A1C LEVEL 7.0 - < 8.0%: ICD-10-PCS | Mod: CPTII,S$GLB,, | Performed by: FAMILY MEDICINE

## 2022-06-20 PROCEDURE — 3074F PR MOST RECENT SYSTOLIC BLOOD PRESSURE < 130 MM HG: ICD-10-PCS | Mod: CPTII,S$GLB,, | Performed by: FAMILY MEDICINE

## 2022-06-20 PROCEDURE — 3051F HG A1C>EQUAL 7.0%<8.0%: CPT | Mod: CPTII,S$GLB,, | Performed by: FAMILY MEDICINE

## 2022-06-20 PROCEDURE — 3078F PR MOST RECENT DIASTOLIC BLOOD PRESSURE < 80 MM HG: ICD-10-PCS | Mod: CPTII,S$GLB,, | Performed by: FAMILY MEDICINE

## 2022-06-20 PROCEDURE — 3072F LOW RISK FOR RETINOPATHY: CPT | Mod: CPTII,S$GLB,, | Performed by: FAMILY MEDICINE

## 2022-06-20 PROCEDURE — 3072F PR LOW RISK FOR RETINOPATHY: ICD-10-PCS | Mod: CPTII,S$GLB,, | Performed by: FAMILY MEDICINE

## 2022-06-20 PROCEDURE — 1159F MED LIST DOCD IN RCRD: CPT | Mod: CPTII,S$GLB,, | Performed by: FAMILY MEDICINE

## 2022-06-20 PROCEDURE — 99214 OFFICE O/P EST MOD 30 MIN: CPT | Mod: S$GLB,,, | Performed by: FAMILY MEDICINE

## 2022-06-20 PROCEDURE — 3074F SYST BP LT 130 MM HG: CPT | Mod: CPTII,S$GLB,, | Performed by: FAMILY MEDICINE

## 2022-06-20 PROCEDURE — 4010F ACE/ARB THERAPY RXD/TAKEN: CPT | Mod: CPTII,S$GLB,, | Performed by: FAMILY MEDICINE

## 2022-06-20 PROCEDURE — 3008F BODY MASS INDEX DOCD: CPT | Mod: CPTII,S$GLB,, | Performed by: FAMILY MEDICINE

## 2022-06-20 PROCEDURE — 1160F PR REVIEW ALL MEDS BY PRESCRIBER/CLIN PHARMACIST DOCUMENTED: ICD-10-PCS | Mod: CPTII,S$GLB,, | Performed by: FAMILY MEDICINE

## 2022-06-20 PROCEDURE — 3078F DIAST BP <80 MM HG: CPT | Mod: CPTII,S$GLB,, | Performed by: FAMILY MEDICINE

## 2022-06-20 PROCEDURE — 1160F RVW MEDS BY RX/DR IN RCRD: CPT | Mod: CPTII,S$GLB,, | Performed by: FAMILY MEDICINE

## 2022-06-20 PROCEDURE — 99499 UNLISTED E&M SERVICE: CPT | Mod: S$GLB,,, | Performed by: FAMILY MEDICINE

## 2022-06-20 PROCEDURE — 99499 RISK ADDL DX/OHS AUDIT: ICD-10-PCS | Mod: S$GLB,,, | Performed by: FAMILY MEDICINE

## 2022-06-20 PROCEDURE — 1159F PR MEDICATION LIST DOCUMENTED IN MEDICAL RECORD: ICD-10-PCS | Mod: CPTII,S$GLB,, | Performed by: FAMILY MEDICINE

## 2022-06-20 PROCEDURE — 99999 PR PBB SHADOW E&M-EST. PATIENT-LVL III: CPT | Mod: PBBFAC,,, | Performed by: FAMILY MEDICINE

## 2022-06-20 PROCEDURE — 99214 PR OFFICE/OUTPT VISIT, EST, LEVL IV, 30-39 MIN: ICD-10-PCS | Mod: S$GLB,,, | Performed by: FAMILY MEDICINE

## 2022-06-20 RX ORDER — TRAMADOL HYDROCHLORIDE 50 MG/1
50 TABLET ORAL EVERY 6 HOURS PRN
Qty: 12 TABLET | Refills: 0 | Status: SHIPPED | OUTPATIENT
Start: 2022-06-20 | End: 2023-01-19

## 2022-06-20 RX ORDER — DEXTROSE 4 G
TABLET,CHEWABLE ORAL
Qty: 300 EACH | Refills: 3 | Status: SHIPPED | OUTPATIENT
Start: 2022-06-20 | End: 2023-04-05 | Stop reason: CLARIF

## 2022-06-20 RX ORDER — METFORMIN HYDROCHLORIDE 500 MG/1
500 TABLET ORAL 2 TIMES DAILY
Qty: 180 TABLET | Refills: 3 | Status: ON HOLD | OUTPATIENT
Start: 2022-06-20 | End: 2023-01-24 | Stop reason: SDUPTHER

## 2022-06-20 RX ORDER — OXYBUTYNIN CHLORIDE 10 MG/1
10 TABLET, EXTENDED RELEASE ORAL DAILY
Qty: 90 TABLET | Refills: 3 | Status: ON HOLD | OUTPATIENT
Start: 2022-06-20 | End: 2023-01-24 | Stop reason: HOSPADM

## 2022-06-20 RX ORDER — LIRAGLUTIDE 6 MG/ML
1.8 INJECTION SUBCUTANEOUS DAILY
Qty: 27 ML | Refills: 1 | Status: SHIPPED | OUTPATIENT
Start: 2022-06-20 | End: 2022-12-06

## 2022-06-20 RX ORDER — LISINOPRIL AND HYDROCHLOROTHIAZIDE 10; 12.5 MG/1; MG/1
1 TABLET ORAL DAILY
Qty: 90 TABLET | Refills: 3 | Status: SHIPPED | OUTPATIENT
Start: 2022-06-20 | End: 2022-10-31 | Stop reason: SDUPTHER

## 2022-06-20 NOTE — PROGRESS NOTES
CHIEF COMPLAINT:   F/U in a patient with diabetes and hypertension    HISTORY OF PRESENT ILLNESS: The patient is a 62 year-old black female.      The patient has a long and complicated medical history.      She continues to have problems with neuropathic pain as well as central pain.  She does well w/ her Lyrica 100 twice a day.    The patient has a history of diabetes.  Recent blood sugars have been less than 200.  There have been no episodes of hypoglycemia.    The patient has a history of stable hypertension on current medications.  Patient denies chest pain or shortness of breath today.    The patient has a history of stable hyperlipidemia on current medications.  The patient denies chest pain or shortness of breath today.  The patient denies muscle aches or myalgias suggestive of myositis.    She has a history of breast cancer for which she underwent bilateral mastectomies and chemotherapy.    REVIEW OF SYSTEMS:  GENERAL: No fever, chills or weight loss.  SKIN: No rashes, itching or changes in color or texture of skin.  HEAD: No headaches or recent head trauma.  EYES: Visual acuity fine. No photophobia, ocular pain or diplopia.  EARS: Denies ear pain, discharge or vertigo.  NOSE: No loss of smell, no epistaxis or postnasal drip.  MOUTH & THROAT: No hoarseness or change in voice. No excessive gum bleeding.  NODES: Denies swollen glands.  CHEST: Denies AWAD, cyanosis, wheezing, cough and sputum production.  CARDIOVASCULAR: Denies chest pain, PND, orthopnea or reduced exercise tolerance.  ABDOMEN: Appetite fine. No weight loss. Denies diarrhea, abdominal pain, hematemesis or blood in stool.  URINARY: No flank pain, dysuria or hematuria.  PERIPHERAL VASCULAR: No claudication or cyanosis.  MUSCULOSKELETAL: No joint stiffness or swelling. Except as noted above.  NEUROLOGIC: No history of seizures    SOCIAL HISTORY: The patient does smoke.  The patient consumes alcohol socially.      PHYSICAL EXAMINATION:     Blood  "pressure 110/62, pulse 96, height 5' 3" (1.6 m), weight 85.8 kg (189 lb 2.5 oz), SpO2 98 %.    APPEARANCE: Well nourished, well developed, in no acute distress.    HEAD: Normocephalic, atraumatic.  EYES: PERRL. EOMI.  Conjunctivae without injection and  Anicteric  NOSE: Mucosa pink. Airway clear.  MOUTH & THROAT: No tonsillar enlargement. No pharyngeal erythema or exudate. No stridor.  NECK: Supple.   NODES: No cervical, axillary or inguinal lymph node enlargement.  CHEST: Lungs clear to auscultation.  No retractions are noted.  No rales or rhonchi are present.  CARDIOVASCULAR: Normal S1, S2. No rubs, murmurs or gallops.  ABDOMEN: Bowel sounds normal. Not distended. Soft. No tenderness or masses.  No ascites is noted.  MUSCULOSKELETAL:  There is no clubbing, cyanosis, or edema of the extremities x4.  There is full range of motion of the lumbar spine.  There is full range of motion of the extremities x4.  There is no deformity noted.    NEUROLOGIC:       Normal speech development.      Hearing normal.      paretic gait.      Motor and sensory exams grossly normal.  Mild RLE weakness noted  PSYCHIATRIC: Patient is alert and oriented x3.  Thought processes are all normal.  There is no homicidality.  There is no suicidality.  There is no evidence of psychosis.    LABORATORY/RADIOLOGY:   Chart reviewed including surgeries and blood work    ASSESSMENT:   CVA  Breast cancer with resultant lymphedema  Hypertension, condition is well controlled on current medication regimen  Diabetes, condition is well controlled on current medication regimen  Neuropathic pain  Cervical radiculopathy    PLAN:  A1c good recently  Depakote 250 mg p.o. b.i.d.  Her diabetes is very well controlled    Pain clinic   KCl 40 po qd  Prinzide  Pravachol 20 mg by mouth daily    Return to clinic in 6 month with blood work prior              "

## 2022-06-21 ENCOUNTER — TELEPHONE (OUTPATIENT)
Dept: PODIATRY | Facility: CLINIC | Age: 62
End: 2022-06-21
Payer: MEDICARE

## 2022-06-21 NOTE — TELEPHONE ENCOUNTER
Staff informed patient that a diabetic shoe order will be mail to her.    Patient verbalized understanding.      ----- Message from Carson Basurto sent at 6/21/2022  4:17 PM CDT -----   Name of Who is Calling:     What is the request in detail: patient request call back in reference to need prescription and notify she will get diabetic shoes from  location listed below  Please contact to further discuss and advise      Can the clinic reply by [facility]:     What Number [facility] Back [facility]  [phone]    [facility]  Address: [facility] [place] [address]  Phone: [phone]

## 2022-06-28 ENCOUNTER — CLINICAL SUPPORT (OUTPATIENT)
Dept: SMOKING CESSATION | Facility: CLINIC | Age: 62
End: 2022-06-28
Payer: COMMERCIAL

## 2022-06-28 DIAGNOSIS — F17.210 CIGARETTE NICOTINE DEPENDENCE, UNCOMPLICATED: Primary | ICD-10-CM

## 2022-06-28 PROCEDURE — 99404 PREV MED CNSL INDIV APPRX 60: CPT | Mod: S$GLB,,,

## 2022-06-28 PROCEDURE — 99999 PR PBB SHADOW E&M-EST. PATIENT-LVL I: ICD-10-PCS | Mod: PBBFAC,,,

## 2022-06-28 PROCEDURE — 99404 PR PREVENT COUNSEL,INDIV,60 MIN: ICD-10-PCS | Mod: S$GLB,,,

## 2022-06-28 PROCEDURE — 99999 PR PBB SHADOW E&M-EST. PATIENT-LVL I: CPT | Mod: PBBFAC,,,

## 2022-06-28 RX ORDER — NICOTINE 7MG/24HR
1 PATCH, TRANSDERMAL 24 HOURS TRANSDERMAL DAILY
Qty: 28 PATCH | Refills: 0 | Status: SHIPPED | OUTPATIENT
Start: 2022-06-28 | End: 2022-08-03 | Stop reason: SDUPTHER

## 2022-06-28 NOTE — PROGRESS NOTES
Individual Follow-Up Form    6/28/2022    Quit Date:     Clinical Status of Patient: Outpatient    Continuing Medication: yes  Patches    Other Medications: none     Target Symptoms: Withdrawal and medication side effects. The following were  rated moderate (3) to severe (4) on TCRS:  · Moderate (3): none  · Severe (4): none    Comments: Patient remains tobacco free at this time.  Patient denies any issues with urges or cravings to smoke.  Patient continue to use 14 mg nicotine patch and will decrease to 7 mg nicotine patch.  Patient will wean over the next 3 weeks.  Completion of TCRS (Tobacco Cessation Rating Scale) reviewed strategies, cues, triggers, high risk situations, lapses, relapses, diet, exercise, stress, relaxation, sleep, habitual behavior, and life style changes.The patient denies any abnormal behavioral or mental changes at this time. The patient will continue with  therapy sessions and medication monitoring by CTTS. Prescribed medication management will be by physician.        Diagnosis: F17.210    Next Visit: 2 weeks

## 2022-06-28 NOTE — Clinical Note
Patient remains tobacco free at this time.  Patient denies any issues with urges or cravings to smoke.  Patient continue to use 14 mg nicotine patch and will decrease to 7 mg nicotine patch.  Patient will wean over the next 3 weeks.  Completion of TCRS (Tobacco Cessation Rating Scale) reviewed strategies, cues, triggers, high risk situations, lapses, relapses, diet, exercise, stress, relaxation, sleep, habitual behavior, and life style changes.The patient denies any abnormal behavioral or mental changes at this time. The patient will continue with  therapy sessions and medication monitoring by CTTS. Prescribed medication management will be by physician.

## 2022-07-05 ENCOUNTER — TELEPHONE (OUTPATIENT)
Dept: PODIATRY | Facility: CLINIC | Age: 62
End: 2022-07-05
Payer: MEDICARE

## 2022-07-05 NOTE — TELEPHONE ENCOUNTER
----- Message from Tyrell Barbosa sent at 7/5/2022  3:33 PM CDT -----  Regarding: Diabetic Shoes  Contact: JOYA MUNOZ [[id number]]  Name of Who is Calling: JOYA MUNOZ [[id number]]      What is the request in detail: Would like to speak with staff in regards to diabetic shoe/inserts prescription to go to [facility] ph:[phone]      Can the clinic reply by [facility]: no      What [facility] Back [facility]: [phone]

## 2022-07-05 NOTE — TELEPHONE ENCOUNTER
Staff tried to contact patient, no answer, left a message informing patient that the order for diabetic shoes has been faxed over to [facility] on July 5, 2022 at 6 4:28 pm., along with the progress notes.

## 2022-07-07 DIAGNOSIS — E11.9 TYPE 2 DIABETES MELLITUS WITHOUT COMPLICATION: ICD-10-CM

## 2022-07-13 ENCOUNTER — CLINICAL SUPPORT (OUTPATIENT)
Dept: SMOKING CESSATION | Facility: CLINIC | Age: 62
End: 2022-07-13
Payer: COMMERCIAL

## 2022-07-13 DIAGNOSIS — F17.200 NICOTINE DEPENDENCE: Primary | ICD-10-CM

## 2022-07-13 PROCEDURE — 99999 PR PBB SHADOW E&M-EST. PATIENT-LVL I: ICD-10-PCS | Mod: PBBFAC,,,

## 2022-07-13 PROCEDURE — 99402 PREV MED CNSL INDIV APPRX 30: CPT | Mod: S$GLB,,,

## 2022-07-13 PROCEDURE — 99402 PR PREVENT COUNSEL,INDIV,30 MIN: ICD-10-PCS | Mod: S$GLB,,,

## 2022-07-13 PROCEDURE — 99999 PR PBB SHADOW E&M-EST. PATIENT-LVL I: CPT | Mod: PBBFAC,,,

## 2022-07-13 NOTE — Clinical Note
Patient seen in clinic today, she remains tobacco free. Commended patient on the accomplishment thus far. The patient remains on the prescribed tobacco cessation medication regimen of 7 mg patch without any negative side effects at this time. No refill needed. The patient denies any abnormal behavioral or mental changes at this time. Reviewed strategies, cues, triggers, high risk situations, lapses, relapses, diet, exercise, stress, relaxation, sleep, habitual behavior, and life style changes. The patient will continue with  therapy sessions and medication monitoring by CTTS. Prescribed medication management will be by physician.

## 2022-07-13 NOTE — PROGRESS NOTES
Individual Follow-Up Form    7/13/2022    Quit Date: 3/17/2022    Clinical Status of Patient: Outpatient        Continuing Medication: yes  Patches    Other Medications:      Target Symptoms: Withdrawal and medication side effects. The following were  rated moderate (3) to severe (4) on TCRS:  · Moderate (3): none  · Severe (4): none    Comments: Patient seen in clinic today, she remains tobacco free. Commended patient on the accomplishment thus far. The patient remains on the prescribed tobacco cessation medication regimen of 7 mg patch without any negative side effects at this time. No refill needed. The patient denies any abnormal behavioral or mental changes at this time. Reviewed strategies, cues, triggers, high risk situations, lapses, relapses, diet, exercise, stress, relaxation, sleep, habitual behavior, and life style changes. The patient will continue with  therapy sessions and medication monitoring by CTTS. Prescribed medication management will be by physician.          Diagnosis: F17.200    Next Visit: 2 weeks

## 2022-07-26 ENCOUNTER — TELEPHONE (OUTPATIENT)
Dept: INTERNAL MEDICINE | Facility: CLINIC | Age: 62
End: 2022-07-26
Payer: MEDICARE

## 2022-07-26 NOTE — TELEPHONE ENCOUNTER
Please anticipate a fax coming over from the companies below per patient. Routed to Dr. Contreras staff. Please check incoming fax. Thanks.

## 2022-07-26 NOTE — TELEPHONE ENCOUNTER
----- Message from Carson Basurto sent at 7/26/2022  1:41 PM CDT -----   Name of Who is Calling:     What is the request in detail: patient calling in reference to notify office that  [facility]      Will send  fax  for  diagnosis / diabetic shoes  Please contact to further discuss and advise      Can the clinic reply by [facility]:     What Number to Call Back if not in [facility]:  [phone]

## 2022-07-27 NOTE — TELEPHONE ENCOUNTER
Only 1 page received from [employer]. Called and they open at 9 am. Will call back later to get the rest of the form

## 2022-08-03 ENCOUNTER — CLINICAL SUPPORT (OUTPATIENT)
Dept: SMOKING CESSATION | Facility: CLINIC | Age: 62
End: 2022-08-03
Payer: COMMERCIAL

## 2022-08-03 DIAGNOSIS — F17.200 NICOTINE DEPENDENCE: Primary | ICD-10-CM

## 2022-08-03 PROCEDURE — 99402 PR PREVENT COUNSEL,INDIV,30 MIN: ICD-10-PCS | Mod: S$GLB,,,

## 2022-08-03 PROCEDURE — 99402 PREV MED CNSL INDIV APPRX 30: CPT | Mod: S$GLB,,,

## 2022-08-03 PROCEDURE — 99999 PR PBB SHADOW E&M-EST. PATIENT-LVL I: CPT | Mod: PBBFAC,,,

## 2022-08-03 PROCEDURE — 99999 PR PBB SHADOW E&M-EST. PATIENT-LVL I: ICD-10-PCS | Mod: PBBFAC,,,

## 2022-08-03 RX ORDER — DM/P-EPHED/ACETAMINOPH/DOXYLAM 30-7.5/3
2 LIQUID (ML) ORAL
Qty: 216 LOZENGE | Refills: 0 | Status: SHIPPED | OUTPATIENT
Start: 2022-08-03 | End: 2022-10-24 | Stop reason: SDUPTHER

## 2022-08-03 RX ORDER — NICOTINE 7MG/24HR
1 PATCH, TRANSDERMAL 24 HOURS TRANSDERMAL DAILY
Qty: 28 PATCH | Refills: 0 | Status: SHIPPED | OUTPATIENT
Start: 2022-08-03 | End: 2022-08-31 | Stop reason: SDUPTHER

## 2022-08-03 NOTE — PROGRESS NOTES
Individual Follow-Up Form    8/3/2022    Quit Date:     Clinical Status of Patient: Outpatient    Continuing Medication: yes  Patches    Other Medications: 2 mg nicotine lozenges PRN     Target Symptoms: Withdrawal and medication side effects. The following were  rated moderate (3) to severe (4) on TCRS:  · Moderate (3): none  · Severe (4): none    Comments: Patient seen in clinic today, she remains tobacco free. Commended patient on the accomplishment thus far. The patient remains on the prescribed tobacco cessation medication regimen of 14 mg patch and 2 mg nicotine lozenges PRN( in place of a cigarette) without any negative side effects at this time. Patient will decrease to the 7 mg patch with order sent to pharmacy. Continue to review strategies, cues, triggers, high risk situations, lapses, relapses, diet, exercise, stress, relaxation, sleep, habitual behavior, and life style changes with patient. The patient will continue with  therapy sessions and medication monitoring by CTTS. Prescribed medication management will be by physician.        Diagnosis: F17.200    Next Visit: 8/31/2022

## 2022-08-03 NOTE — Clinical Note
Patient seen in clinic today, she remains tobacco free. Commended patient on the accomplishment thus far. The patient remains on the prescribed tobacco cessation medication regimen of 14 mg patch and 2 mg nicotine lozenges PRN( in place of a cigarette) without any negative side effects at this time. Patient will decrease to the 7 mg patch with order sent to pharmacy. Continue to review strategies, cues, triggers, high risk situations, lapses, relapses, diet, exercise, stress, relaxation, sleep, habitual behavior, and life style changes with patient. The patient will continue with  therapy sessions and medication monitoring by CTTS. Prescribed medication management will be by physician.

## 2022-08-09 DIAGNOSIS — E11.42 TYPE 2 DIABETES MELLITUS WITH DIABETIC POLYNEUROPATHY, WITH LONG-TERM CURRENT USE OF INSULIN: ICD-10-CM

## 2022-08-09 DIAGNOSIS — Z79.4 TYPE 2 DIABETES MELLITUS WITH DIABETIC POLYNEUROPATHY, WITH LONG-TERM CURRENT USE OF INSULIN: ICD-10-CM

## 2022-08-09 RX ORDER — LANCETS
EACH MISCELLANEOUS
Qty: 200 EACH | Refills: 3 | Status: SHIPPED | OUTPATIENT
Start: 2022-08-09 | End: 2023-04-05 | Stop reason: CLARIF

## 2022-08-09 NOTE — TELEPHONE ENCOUNTER
Returned pt's call.  Pt states she tried to get strips and lancets for the new BG meter she rec'd (Accucheck Guide) - when she spoke with Al[facility], they discussed getting her a CGM. She was transferred around and finally told she has an HMO and doesn't qualify. However, they did not fill strips or lancets still.      When she called them back, she was transferred all around and ended up being told to speak with IM for CGM.    Explained that if [facility] doesn't cover for her, then ordering won't help. However, we need to get a son fill of her strips and lancets so pt can check her BG.     Pended as high priority to local [facility],

## 2022-08-09 NOTE — TELEPHONE ENCOUNTER
----- Message from Carson Basurto sent at 8/9/2022 11:19 AM CDT -----   Name of Who is Calling:     What is the request in detail: patient request call back in reference to  prescription for new  diabetic meter and supplies  Please contact to further discuss and advise      Can the clinic reply by [facility]:     What Number to Call Back if not in [facility]:  [phone]

## 2022-08-09 NOTE — TELEPHONE ENCOUNTER
Care Due:                  Date            Visit Type   Department     Provider  --------------------------------------------------------------------------------                                EP -                              PRIMARY      [facility] INTERNAL  Ghulam Reji  Last Visit: 06-      [facility] ([facility])   [facility]  Next Visit: None Scheduled  None         None Found                                                            Last  Test          Frequency    Reason                     Performed    Due Date  --------------------------------------------------------------------------------    HBA1C.......  6 months...  insulin, liraglutide,      03- 09-                             metFORMIN................    [employer] Embedded Care Gaps. Reference number: [id number]. 8/09/2022   12:29:21 PM CDT

## 2022-08-31 ENCOUNTER — CLINICAL SUPPORT (OUTPATIENT)
Dept: SMOKING CESSATION | Facility: CLINIC | Age: 62
End: 2022-08-31
Payer: COMMERCIAL

## 2022-08-31 DIAGNOSIS — F17.200 NICOTINE DEPENDENCE: Primary | ICD-10-CM

## 2022-08-31 PROCEDURE — 99999 PR PBB SHADOW E&M-EST. PATIENT-LVL I: CPT | Mod: PBBFAC,,,

## 2022-08-31 PROCEDURE — 99999 PR PBB SHADOW E&M-EST. PATIENT-LVL I: ICD-10-PCS | Mod: PBBFAC,,,

## 2022-08-31 PROCEDURE — 99402 PREV MED CNSL INDIV APPRX 30: CPT | Mod: S$GLB,,,

## 2022-08-31 PROCEDURE — 99402 PR PREVENT COUNSEL,INDIV,30 MIN: ICD-10-PCS | Mod: S$GLB,,,

## 2022-08-31 RX ORDER — NICOTINE 7MG/24HR
1 PATCH, TRANSDERMAL 24 HOURS TRANSDERMAL DAILY
Qty: 28 PATCH | Refills: 0 | Status: SHIPPED | OUTPATIENT
Start: 2022-08-31 | End: 2022-09-26 | Stop reason: SDUPTHER

## 2022-08-31 NOTE — PROGRESS NOTES
Individual Follow-Up Form    8/31/2022    Quit Date: 4/1/2022    Clinical Status of Patient: Outpatient    Continuing Medication: yes  Patches    Other Medications: 2 mg lozenges PRN     Target Symptoms: Withdrawal and medication side effects. The following were  rated moderate (3) to severe (4) on TCRS:  Moderate (3): none  Severe (4): none    Comments: Patient seen in today, she remains tobacco free. Pt remains on the prescribed tobacco cessation medication regimen of 7 mg nicotine patch without any negative side effects at this time.    Refill sent to pharmacy. Reviewed strategies, cues, triggers, high risk situations, lapses, relapses, diet, exercise, stress, relaxation, sleep, habitual behavior, and life style changes. Encouraged patient to try a day of a dry run without any patch to see how strong her urges or cravings are. The patient will continue with therapy sessions and medication monitoring by CTTS. Prescribed medication management will be by physician.        Diagnosis: F17.200    Next Visit: 9/26/2022

## 2022-09-09 DIAGNOSIS — E11.42 DM TYPE 2 WITH DIABETIC PERIPHERAL NEUROPATHY: ICD-10-CM

## 2022-09-09 DIAGNOSIS — M50.30 DDD (DEGENERATIVE DISC DISEASE), CERVICAL: ICD-10-CM

## 2022-09-09 DIAGNOSIS — M54.12 CERVICAL RADICULOPATHY: ICD-10-CM

## 2022-09-09 DIAGNOSIS — M47.812 CERVICAL SPONDYLOSIS WITHOUT MYELOPATHY: ICD-10-CM

## 2022-09-09 NOTE — TELEPHONE ENCOUNTER
No new care gaps identified.  [facility] Embedded Care Gaps. Reference number: [id number]. 9/09/2022   1:15:52 PM CDT

## 2022-09-09 NOTE — TELEPHONE ENCOUNTER
----- Message from Cathryn Joseph sent at 9/9/2022 12:33 PM CDT -----  .Type: RX Refill Request    Who Called: self    Have you contacted your pharmacy:no    Refill or New Rx:refill    RX Name and Strength:pregabalin (LYRICA) 100 MG capsule      Preferred Pharmacy with phone number:.  [facility] ([facility]) - [facility] [address]  [address] [address]  Phone: [phone] Fax: [phone]    Local or Mail Order:local    Would the patient rather a call back or a response via [facility]? call    Best Call Back Number:.[phone] (home)       \

## 2022-09-09 NOTE — TELEPHONE ENCOUNTER
Requested medication has been pended and routed to Dr. Contreras  for approval. LOV 6/20/22 Thanks.

## 2022-09-12 RX ORDER — PREGABALIN 100 MG/1
100 CAPSULE ORAL 3 TIMES DAILY
Qty: 270 CAPSULE | Refills: 1 | Status: ON HOLD | OUTPATIENT
Start: 2022-09-12 | End: 2023-01-24 | Stop reason: SDUPTHER

## 2022-09-12 NOTE — TELEPHONE ENCOUNTER
----- Message from Jaime Oscar MA sent at 9/12/2022  7:46 AM CDT -----  Type: Patient Call Back    Who called: Self    What is the request in detail: pt. Is following up on a refill request for her medication ..     pregabalin (LYRICA) 100 MG capsule 270 capsule     Can the clinic reply by [facility]?no    Would the patient rather a call back or a response via [facility]? yes    Best call back number: [phone] (home)

## 2022-09-20 DIAGNOSIS — Z79.4 TYPE 2 DIABETES MELLITUS WITH DIABETIC POLYNEUROPATHY, WITH LONG-TERM CURRENT USE OF INSULIN: ICD-10-CM

## 2022-09-20 DIAGNOSIS — E11.42 TYPE 2 DIABETES MELLITUS WITH DIABETIC POLYNEUROPATHY, WITH LONG-TERM CURRENT USE OF INSULIN: ICD-10-CM

## 2022-09-20 RX ORDER — LANCETS
EACH MISCELLANEOUS
Qty: 400 EACH | OUTPATIENT
Start: 2022-09-20

## 2022-09-20 RX ORDER — LANCETS
EACH MISCELLANEOUS
Qty: 400 EACH | Refills: 3 | Status: SHIPPED | OUTPATIENT
Start: 2022-09-20 | End: 2023-01-19

## 2022-09-20 NOTE — TELEPHONE ENCOUNTER
No new care gaps identified.  [facility] Embedded Care Gaps. Reference number: [id number]. 9/20/2022   4:54:23 PM CDT

## 2022-09-20 NOTE — TELEPHONE ENCOUNTER
Refill Decision Note   Jonathan Gonzales  is requesting a refill authorization.  Brief Assessment and Rationale for Refill:  Approve     Medication Therapy Plan:       Medication Reconciliation Completed: No   Comments:     No Care Gaps recommended.     Note composed:5:20 PM 09/20/2022

## 2022-09-26 ENCOUNTER — CLINICAL SUPPORT (OUTPATIENT)
Dept: SMOKING CESSATION | Facility: CLINIC | Age: 62
End: 2022-09-26
Payer: COMMERCIAL

## 2022-09-26 DIAGNOSIS — F17.200 NICOTINE DEPENDENCE: Primary | ICD-10-CM

## 2022-09-26 PROCEDURE — 99403 PREV MED CNSL INDIV APPRX 45: CPT | Mod: S$GLB,,,

## 2022-09-26 PROCEDURE — 99999 PR PBB SHADOW E&M-EST. PATIENT-LVL I: ICD-10-PCS | Mod: PBBFAC,,,

## 2022-09-26 PROCEDURE — 99403 PR PREVENT COUNSEL,INDIV,45 MIN: ICD-10-PCS | Mod: S$GLB,,,

## 2022-09-26 PROCEDURE — 99999 PR PBB SHADOW E&M-EST. PATIENT-LVL I: CPT | Mod: PBBFAC,,,

## 2022-09-26 RX ORDER — NICOTINE 7MG/24HR
1 PATCH, TRANSDERMAL 24 HOURS TRANSDERMAL DAILY
Qty: 28 PATCH | Refills: 0 | Status: SHIPPED | OUTPATIENT
Start: 2022-09-26 | End: 2023-01-19

## 2022-09-26 NOTE — PROGRESS NOTES
Individual Follow-Up Form    9/26/2022    Quit Date:     Clinical Status of Patient: Outpatient    Continuing Medication: yes  Patches    Other Medications: 2 mg nicotine lozenges PRN     Target Symptoms: Withdrawal and medication side effects. The following were  rated moderate (3) to severe (4) on TCRS:  Moderate (3): none  Severe (4): none    Comments: Patient seen in clinic today, she remains tobacco free.  Pt remains on the prescribed tobacco cessation medication regimen of 7 mg nicotine patch without any negative side effects at this time. Refill sent to pharmacy. The patient denies any abnormal behavioral or mental changes at this time. Encouraged patient to try a day without any patch to start the weaning process toward maintaining her quit. Patient states she will try for next visit. The patient will continue with  therapy sessions and medication monitoring by CTTS. Prescribed medication management will be by physician.           Diagnosis: F17.200    Next Visit: 10/17/2022

## 2022-09-26 NOTE — Clinical Note
Patient seen in clinic today, she remains tobacco free.  Pt remains on the prescribed tobacco cessation medication regimen of 7 mg nicotine patch without any negative side effects at this time. Refill sent to pharmacy. The patient denies any abnormal behavioral or mental changes at this time. Encouraged patient to try a day without any patch to start the weaning process toward maintaining her quit. Patient states she will try for next visit. The patient will continue with  therapy sessions and medication monitoring by CTTS. Prescribed medication management will be by physician.

## 2022-09-28 DIAGNOSIS — E11.9 TYPE 2 DIABETES MELLITUS WITHOUT COMPLICATION: ICD-10-CM

## 2022-09-28 NOTE — TELEPHONE ENCOUNTER
No new care gaps identified.  [facility] Embedded Care Gaps. Reference number: [id number]. 9/28/2022   11:27:50 AM CDT

## 2022-09-28 NOTE — TELEPHONE ENCOUNTER
Refill Decision Note   Jonathan Gonzales  is requesting a refill authorization.  Brief Assessment and Rationale for Refill:  Approve     Medication Therapy Plan:       Medication Reconciliation Completed: No   Comments:     No Care Gaps recommended.     Note composed:12:51 PM 09/28/2022

## 2022-10-17 ENCOUNTER — TELEPHONE (OUTPATIENT)
Dept: SMOKING CESSATION | Facility: CLINIC | Age: 62
End: 2022-10-17
Payer: MEDICARE

## 2022-10-17 NOTE — TELEPHONE ENCOUNTER
Called patient to follow-up on missed appointment. Left message with contact information of CTTS, informing patient to call number to reschedule appointment.

## 2022-10-19 ENCOUNTER — TELEPHONE (OUTPATIENT)
Dept: SMOKING CESSATION | Facility: CLINIC | Age: 62
End: 2022-10-19
Payer: MEDICARE

## 2022-10-24 ENCOUNTER — CLINICAL SUPPORT (OUTPATIENT)
Dept: SMOKING CESSATION | Facility: CLINIC | Age: 62
End: 2022-10-24
Payer: COMMERCIAL

## 2022-10-24 DIAGNOSIS — F17.200 NICOTINE DEPENDENCE: ICD-10-CM

## 2022-10-24 PROCEDURE — 99999 PR PBB SHADOW E&M-EST. PATIENT-LVL I: CPT | Mod: PBBFAC,,,

## 2022-10-24 PROCEDURE — 99403 PREV MED CNSL INDIV APPRX 45: CPT | Mod: S$GLB,,, | Performed by: FAMILY MEDICINE

## 2022-10-24 PROCEDURE — 99403 PR PREVENT COUNSEL,INDIV,45 MIN: ICD-10-PCS | Mod: S$GLB,,, | Performed by: FAMILY MEDICINE

## 2022-10-24 PROCEDURE — 99999 PR PBB SHADOW E&M-EST. PATIENT-LVL I: ICD-10-PCS | Mod: PBBFAC,,,

## 2022-10-24 RX ORDER — DM/P-EPHED/ACETAMINOPH/DOXYLAM 30-7.5/3
2 LIQUID (ML) ORAL
Qty: 216 LOZENGE | Refills: 0 | Status: SHIPPED | OUTPATIENT
Start: 2022-10-24 | End: 2022-11-21 | Stop reason: SDUPTHER

## 2022-10-24 NOTE — Clinical Note
Patient seen in clinic today, and reports that she remains tobacco free.  Pt remains on the prescribed tobacco cessation medication regimen of 7 mg nicotine patch and 2 mg lozenge without any negative side effects at this time. Refill sent to pharmacy. The patient denies any abnormal behavioral or mental changes at this time. Patient reports that she wears the patch about 3 times per week and takes about 4 lozenges per day. Encouraged patient to try only 3 lozenge per day. The patient will continue with  therapy sessions and medication monitoring by CTTS. Prescribed medication management will be by physician.

## 2022-10-24 NOTE — PROGRESS NOTES
Individual Follow-Up Form    10/24/2022    Quit Date:     Clinical Status of Patient: Outpatient    Continuing Medication: yes  Patches or Nicotine Lozenges    Other Medications:      Target Symptoms: Withdrawal and medication side effects. The following were  rated moderate (3) to severe (4) on TCRS:  Moderate (3): none  Severe (4): none    Comments: Patient seen in clinic today, and reports that she remains tobacco free.  Pt remains on the prescribed tobacco cessation medication regimen of 7 mg nicotine patch and 2 mg lozenge without any negative side effects at this time. Refill sent to pharmacy. The patient denies any abnormal behavioral or mental changes at this time. Patient reports that she wears the patch about 3 times per week and takes about 4 lozenges per day. Encouraged patient to try only 3 lozenge per day. The patient will continue with  therapy sessions and medication monitoring by CTTS. Prescribed medication management will be by physician.    Diagnosis: F17.200    Next Visit: 11/21/2022

## 2022-10-25 ENCOUNTER — IMMUNIZATION (OUTPATIENT)
Dept: PHARMACY | Facility: CLINIC | Age: 62
End: 2022-10-25
Payer: MEDICARE

## 2022-10-26 ENCOUNTER — IMMUNIZATION (OUTPATIENT)
Dept: INTERNAL MEDICINE | Facility: CLINIC | Age: 62
End: 2022-10-26
Payer: MEDICARE

## 2022-10-26 DIAGNOSIS — Z23 NEED FOR VACCINATION: Primary | ICD-10-CM

## 2022-10-26 PROCEDURE — 91312 COVID-19, MRNA, LNP-S, BIVALENT BOOSTER, PF, 30 MCG/0.3 ML DOSE: CPT | Mod: S$GLB,,, | Performed by: INTERNAL MEDICINE

## 2022-10-26 PROCEDURE — 0124A COVID-19, MRNA, LNP-S, BIVALENT BOOSTER, PF, 30 MCG/0.3 ML DOSE: CPT | Mod: PBBFAC | Performed by: INTERNAL MEDICINE

## 2022-10-26 PROCEDURE — 91312 COVID-19, MRNA, LNP-S, BIVALENT BOOSTER, PF, 30 MCG/0.3 ML DOSE: ICD-10-PCS | Mod: S$GLB,,, | Performed by: INTERNAL MEDICINE

## 2022-10-31 DIAGNOSIS — I10 ESSENTIAL HYPERTENSION: ICD-10-CM

## 2022-10-31 RX ORDER — LISINOPRIL AND HYDROCHLOROTHIAZIDE 10; 12.5 MG/1; MG/1
1 TABLET ORAL DAILY
Qty: 90 TABLET | Refills: 3 | Status: SHIPPED | OUTPATIENT
Start: 2022-10-31 | End: 2023-04-05 | Stop reason: SDUPTHER

## 2022-10-31 NOTE — TELEPHONE ENCOUNTER
Lvm to inform the pt that we sent in a year supply of Lisinopril-HCTZ to [facility] in June for a year.

## 2022-10-31 NOTE — TELEPHONE ENCOUNTER
----- Message from Luisana Herve sent at 10/31/2022  2:56 PM CDT -----  Regarding: refill request  Who Called:JOYA MUNOZ [[id number]]        Refill or New Rx refill          RX Name and Strength: lisinopriL-hydrochlorothiazide (PRINZIDE,ZESTORETIC) 10-12.5 mg per tablet        How is the patient currently taking it? (ex. 1XDay): Route: Take 1 tablet by mouth once daily. - Oral        Is this a 30 day or 90 day RX: 90 day         Preferred Pharmacy with phone number: [facility] - [place], [place] - [address]        Local or Mail Order: Local         Ordering Provider: Ghulam Contreras         Would the patient rather a call back or a response via [facility]?        Best Call Back Number: [phone]        Additional Information:

## 2022-10-31 NOTE — TELEPHONE ENCOUNTER
Care Due:                  Date            Visit Type   Department     Provider  --------------------------------------------------------------------------------                                EP -                              PRIMARY      [facility] INTERNAL  Farzadtammymyriam Reji  Last Visit: 06-      [facility]   [facility]  Next Visit: None Scheduled  None         None Found                                                            Last  Test          Frequency    Reason                     Performed    Due Date  --------------------------------------------------------------------------------    HBA1C.......  6 months...  insulin, liraglutide,      03- 09-                             metFORMIN................    [employer] Embedded Care Gaps. Reference number: [id number]. 10/31/2022   3:32:24 PM CDT

## 2022-10-31 NOTE — TELEPHONE ENCOUNTER
Requested medication has been pended and routed to Dr. Contreras  for approval. LOV 6/20/22 allergies have been verified. Thanks.

## 2022-10-31 NOTE — TELEPHONE ENCOUNTER
----- Message from Kierra Coffey sent at 10/31/2022  2:21 PM CDT -----  Regarding: refill  Can the clinic reply in [facility]: no      Please refill the medication(s) listed below.       Medication #1 lisinopriL-hydrochlorothiazide (PRINZIDE,ZESTORETIC) 10-12.5 mg per tablet    Medication #2       Preferred Pharmacy: [facility] - [place], [place] - [address]

## 2022-11-01 ENCOUNTER — TELEPHONE (OUTPATIENT)
Dept: INTERNAL MEDICINE | Facility: CLINIC | Age: 62
End: 2022-11-01
Payer: MEDICARE

## 2022-11-02 NOTE — PATIENT INSTRUCTIONS
100 fl oz water daily     No soda, sweet tea, juices or lemonade     Continue with exercise. Add some type of resistance training 2-3 days a week. These can be body weight exercises, light weight or elastic bands. [employer] and [employer] are great sources for free work out plans and videos.      3 meals a day made up of the following:  Unlimited green vegetables, tomatoes, mushrooms, spaghetti squash, cauliflower, meat, poultry, seafood, eggs and hard cheeses.   Avoid fried foods  Dressings, seasonings, condiments, etc should have less than 2 g sugars.   beans or nuts can have 1 x a day.   1-2 servings of citrus fruits, berries, pineapple or melon a day (1/2 cup)  Milk and plain yogurt     [email or web address] Moderate carb intake.       
burns

## 2022-11-04 ENCOUNTER — TELEPHONE (OUTPATIENT)
Dept: PAIN MEDICINE | Facility: OTHER | Age: 62
End: 2022-11-04
Payer: MEDICARE

## 2022-11-04 ENCOUNTER — PATIENT MESSAGE (OUTPATIENT)
Dept: PAIN MEDICINE | Facility: OTHER | Age: 62
End: 2022-11-04
Payer: MEDICARE

## 2022-11-04 ENCOUNTER — TELEPHONE (OUTPATIENT)
Dept: INTERNAL MEDICINE | Facility: CLINIC | Age: 62
End: 2022-11-04
Payer: MEDICARE

## 2022-11-04 ENCOUNTER — OFFICE VISIT (OUTPATIENT)
Dept: PAIN MEDICINE | Facility: CLINIC | Age: 62
End: 2022-11-04
Payer: MEDICARE

## 2022-11-04 VITALS
HEIGHT: 64 IN | SYSTOLIC BLOOD PRESSURE: 108 MMHG | WEIGHT: 183.88 LBS | TEMPERATURE: 99 F | HEART RATE: 95 BPM | BODY MASS INDEX: 31.39 KG/M2 | DIASTOLIC BLOOD PRESSURE: 79 MMHG | RESPIRATION RATE: 19 BRPM

## 2022-11-04 DIAGNOSIS — M51.9 LUMBAR DISC DISORDER: Primary | ICD-10-CM

## 2022-11-04 DIAGNOSIS — M47.812 CERVICAL SPONDYLOSIS: ICD-10-CM

## 2022-11-04 DIAGNOSIS — M47.819 SPONDYLOSIS WITHOUT MYELOPATHY: ICD-10-CM

## 2022-11-04 PROCEDURE — 99999 PR PBB SHADOW E&M-EST. PATIENT-LVL V: ICD-10-PCS | Mod: PBBFAC,,, | Performed by: NURSE PRACTITIONER

## 2022-11-04 PROCEDURE — 99214 OFFICE O/P EST MOD 30 MIN: CPT | Mod: S$GLB,,, | Performed by: NURSE PRACTITIONER

## 2022-11-04 PROCEDURE — 1159F MED LIST DOCD IN RCRD: CPT | Mod: CPTII,S$GLB,, | Performed by: NURSE PRACTITIONER

## 2022-11-04 PROCEDURE — 3072F PR LOW RISK FOR RETINOPATHY: ICD-10-PCS | Mod: CPTII,S$GLB,, | Performed by: NURSE PRACTITIONER

## 2022-11-04 PROCEDURE — 1159F PR MEDICATION LIST DOCUMENTED IN MEDICAL RECORD: ICD-10-PCS | Mod: CPTII,S$GLB,, | Performed by: NURSE PRACTITIONER

## 2022-11-04 PROCEDURE — 3074F SYST BP LT 130 MM HG: CPT | Mod: CPTII,S$GLB,, | Performed by: NURSE PRACTITIONER

## 2022-11-04 PROCEDURE — 3072F LOW RISK FOR RETINOPATHY: CPT | Mod: CPTII,S$GLB,, | Performed by: NURSE PRACTITIONER

## 2022-11-04 PROCEDURE — 99214 PR OFFICE/OUTPT VISIT, EST, LEVL IV, 30-39 MIN: ICD-10-PCS | Mod: S$GLB,,, | Performed by: NURSE PRACTITIONER

## 2022-11-04 PROCEDURE — 4010F PR ACE/ARB THEARPY RXD/TAKEN: ICD-10-PCS | Mod: CPTII,S$GLB,, | Performed by: NURSE PRACTITIONER

## 2022-11-04 PROCEDURE — 3008F PR BODY MASS INDEX (BMI) DOCUMENTED: ICD-10-PCS | Mod: CPTII,S$GLB,, | Performed by: NURSE PRACTITIONER

## 2022-11-04 PROCEDURE — 3051F PR MOST RECENT HEMOGLOBIN A1C LEVEL 7.0 - < 8.0%: ICD-10-PCS | Mod: CPTII,S$GLB,, | Performed by: NURSE PRACTITIONER

## 2022-11-04 PROCEDURE — 4010F ACE/ARB THERAPY RXD/TAKEN: CPT | Mod: CPTII,S$GLB,, | Performed by: NURSE PRACTITIONER

## 2022-11-04 PROCEDURE — 3074F PR MOST RECENT SYSTOLIC BLOOD PRESSURE < 130 MM HG: ICD-10-PCS | Mod: CPTII,S$GLB,, | Performed by: NURSE PRACTITIONER

## 2022-11-04 PROCEDURE — 99999 PR PBB SHADOW E&M-EST. PATIENT-LVL V: CPT | Mod: PBBFAC,,, | Performed by: NURSE PRACTITIONER

## 2022-11-04 PROCEDURE — 3078F DIAST BP <80 MM HG: CPT | Mod: CPTII,S$GLB,, | Performed by: NURSE PRACTITIONER

## 2022-11-04 PROCEDURE — 3078F PR MOST RECENT DIASTOLIC BLOOD PRESSURE < 80 MM HG: ICD-10-PCS | Mod: CPTII,S$GLB,, | Performed by: NURSE PRACTITIONER

## 2022-11-04 PROCEDURE — 3051F HG A1C>EQUAL 7.0%<8.0%: CPT | Mod: CPTII,S$GLB,, | Performed by: NURSE PRACTITIONER

## 2022-11-04 PROCEDURE — 3008F BODY MASS INDEX DOCD: CPT | Mod: CPTII,S$GLB,, | Performed by: NURSE PRACTITIONER

## 2022-11-04 NOTE — PROGRESS NOTES
Chronic patient Established Note (Follow up visit)      SUBJECTIVE:    Interval History 11/4/2022:  Mrs Gonzales presents for follow up of lower back pain and returning cervicalgia. She is approx 11 months out of prior B L4/5 TFESI with >90% improved symptoms and ready to repeat this 1st prior to repeating prior Cervical RFAs. She has no newer areas of pain or neurological changes. She does not focally voice any s/s concerning for myelopathy or cauda equina.     Interval History 11/22/2021:  The Pt presents for follow up evaluation. She was doing well until she had fall. She states this was due to legs feeling weak after mourning loss of her brother. Pt states no new areas of pain and lumbar pain is same pain when she had prior B L4/5 TFESIs benefit her. She denies new neurological changes. She is currently prescribed Lyrica 100mg TID with benefit and denies adverse SE.     Interval Hx 10/04/21:  Jonathan Gonzales presents to the clinic for a follow-up appointment s/p bilateral C3,4,5,6 RFA (07/28/21 & 08/10/21) which she reports to have given her 80% relief. She currently has a abernathy after recent admission for urinary retention which she underwent cervical and lumbar MRI that did not showed the cause of her urinary retention to be from a spinal lesion. She has no complaints today, she continues to take Liryca 100mg TID with no side effects. No upper extremity sx.     Interval History 7/6/2021:  The patient is here for follow up of left knee and chronic lower back/neck pain.  She has been having more increased neck pain recently.  She did have left-sided cervical radiofrequency ablation earlier this year with benefit.  However, we did not perform the right because she had a flare-up of back and leg pain which required an epidural steroid injection.  Recently, her back pain has been minimal and she would like to focus on her neck again.  The pain mostly stays in the neck without radiation into the arms.  However, she  does have numbness to all 4 extremities consistent with her diabetic neuropathy.  Voltaren gel has been helpful for her knee pain. Since previous encounter, she had a recent annual follow up with her PCP. Her diabetes has been well controlled with her sugars within range. Her pain today is 7/10.    Interval History 5/10/2021:  The patient presents today for follow up of back, neck and left knee pain. Her back pain bothers her most with standing and walking. Her legs bother her most with walking. Her left knee pain has improved somewhat since previous encounter. She has had benefit with cervical RFAs in the past. She is doing home stretches daily. Her pain today is 8/10.    Interval History 4/9/2021:  The patient here today for follow-up of chronic neck and lower back pain.  She is now status post bilateral L4/5 transforaminal epidural steroid injections with significant benefit of leg pain.  Her neck pain is still mild at this time.  However, she reports new onset left anterior knee pain.  It does not worsen with activity.  It bothers her more when she touches the front of her knee or puts pressure on it.  She denies any recent injury.  She has not tried anything for the pain.  Specifically, she has not tried creams or ice.  Her pain today is 6/10.    Interval history 03/03/2021:  Since previous encounter the patient has had left-sided radiofrequency ablation cervical spine at C3, C4, C5, C6 on 02/03/2021 followed by bilateral L4-5 transforaminal epidural steroid injections on 02/10/2021.  She is doing well in her neck pain and also having improvement in her lower extremity radicular pain symptoms but she states that she has been doing exercises and some her lower extremity radicular symptoms are starting to return but originally she had substantial relief.  No other health changes since previous encounter.    Interval History 1/13/2021:  Here for follow-up of chronic neck and lower back pain.  She is status post  left C3, 4, 5, 6 medial branch blocks with significant benefit for 1 day.  She wishes to proceed with radiofrequency ablation of affected nerves.  She continues to report pain across the neck, worse on the left side.  It mainly stays in the neck without radiation into the arms.  She previously reported benefit for her leg pain with transforaminal lumbar steroid injection in November.  However, she states that her pain has been returning.  It radiates down the side of both legs to her shins.  She did previously have surgical consult and does not wish to pursue surgery at this time.  She does find Lyrica helpful in like a refill today.  Her pain today is 9/10.    Interval History 12/1/2020:  The patient presents today for follow up of back and right leg pain.  She is now s/p bilateral L4 TF CARLEE on 11/4/20. She is reporting 90% relief of bilateral leg pain.  She still has some pain to the left buttock which she says is worse when she sits and when she stands.  She is no longer having significant shooting pain into the legs.  She continues to have long-standing numbness to her feet consistent with previous diagnosis of neuropathy.  Her biggest complaint today is left-sided neck pain.  She has had neck pain for many years.  She underwent cervical radiofrequency ablation in 2015.  Documentation after that time states that she had limited benefit although she did have relief short-term from the blocks.  She knows says that she feels that it did help her significantly after a couple of months.  Her pain started to worsen over the past few weeks.  She has been evaluated by neurosurgery in the past and cervical fusion was discussed, however, she does not wish to have surgery at this time.  She is not having any shooting pain into her arms at this time.  It is mostly across the left side of her neck and into her left shoulder.  She denies any new weakness or dropping of objects.  Her pain today is 8/10.    Previous  Encounter:  60 year old female with PMH of breast cancer (s/p mastectomy bilaterally), DM2, HTN, bipolar disorder and schizophrenia. patient presents today to discuss severe back and right leg pain.  We have not seen the patient in clinic in over 3 years and she reports that she was mainly doing well with her back and neck pain.  She was sent up here today by her primary care doctor, Dr. Contreras, as she saw him today.  She reports that she has had back pain for many years but it has been well-controlled until 3 days ago.  She has been having severe right sided lower back pain with radiation down the back of the right leg with numbness.  She has pain and numbness to her right heel and calf.  She is not having significant symptoms on the left.  Her leg pain is greater than her back pain.  She is unable to walk without assistance.  She is feeling subjective weakness to her right foot.  She takes Lyrica twice daily from PCP for neuropathy.  She says that her sugars have been running 130-150 in the morning. She had a recent visit with oncology and was stable.   Pain Medications:    - Opioids: None  - Adjuvant Medications: Lyrica ( Pregabalin)  - Anti-Coagulants: None  - Others: See med list    Opioid Contract: no     report:  Reviewed and consistent with medication use as prescribed.    Pain Procedures:  5/9/14 C7- T1 IL CARLEE  6/20/14 C7-T1 IL CARLEE  5/22/15 Left C3,4,5,6 MBB  6/26/15 Left C3,4,5,6 MBB  7/31/15 Left C3,4,5,6 RFA- no relief  9/24/15 Right C5-6 TF CARLEE (IR)- limited benefit  11/4/20 Bilateral L4/5 TF CARLEE- 90% relief  12/9/20 Left C3,4,5,6 MBB- 80% relief for one day  2/3/21 Left C3,4,5,6 RFA- 60% relief  3/10/21 Bilateral L4/5 TF CARLEE- 90% relief  07/28/21 Right C3,4,5,6 RFA  08/18/2021 Left C3,4,5,6 RFA   12/15/2021 Bilateral L4/5 TFESI     Physical Therapy/Home Exercise: yes in the past       Imaging:  MRI Cervical 09/23/21  FINDINGS:  There is mild reversal of the normal cervical lordosis.  Cervical  vertebral body heights appear adequately maintained.  There is no evidence of diffuse bone marrow replacement process.     The cervicomeduallary junction is normal.  The cervical cord is normal in contour, caliber, and signal. The adjacent soft tissue structures demonstrate no significant abnormality.     C2-3:  There is central posterior disc osteophyte complex and mild bilateral facet arthropathy.  No significant spinal canal stenosis or neural foraminal narrowing.     C3-4:  There is a posterior disc osteophyte complex with effacement of the anterior thecal sac and at most mild spinal canal stenosis.  There is mild bilateral facet arthropathy, left greater than right, without significant neural foraminal narrowing.     C4-5:  There is a broad-based posterior disc osteophyte complex with effacement of the anterior thecal sac and mild spinal canal stenosis.  There is bilateral uncovertebral spurring with mild left-sided neural foraminal narrowing.     C5-6:  There is a broad-based posterior disc osteophyte complex with effacement of the anterior thecal sac.  There is mild to moderate spinal canal stenosis.  There is bilateral uncovertebral spurring with moderate to severe right-sided and mild left-sided neural foraminal narrowing.     C6-7:  There is a broad-based posterior disc osteophyte complex with prominent asymmetric left paracentral component.  There is effacement of the anterior thecal sac with mild mass effect upon the left anterolateral cervical cord.  There is mild to moderate spinal canal stenosis.  There is right-sided uncovertebral spurring with mild right-sided neural foraminal narrowing.     C7-T1:  No disc herniation, spinal canal narrowing, or neuroforaminal narrowing.     Impression:     Multilevel degenerative changes of the cervical spine, similar to prior examination, most pronounced at the levels of C4-C5 through C6-C7.  Please see above for level by level details.    MRI Lumbar  09/23/21  FINDINGS:  There is grade 1 anterolisthesis of L4 on L5, similar to prior examination.  The vertebral body heights are well maintained, with no fracture.  There is no marrow signal abnormality suspicious for infiltrative process.  There is mild disc desiccation at L4-L5.  The conus is normal in appearance and terminates at the L1-L2 level.     L1-L2: There is no focal disk herniation.  No significant spinal canal or neural foraminal narrowing.     L2-L3: There is no focal disk herniation.  No significant spinal canal or neural foraminal narrowing.     L3-L4: There is no focal disc herniation.  There is ligamentum flavum thickening and mild bilateral facet arthropathy.  No significant spinal canal or neural foraminal narrowing.     L4-L5: There is grade 1 anterolisthesis with uncovering of the disc.  There is a circumferential disc bulge.  There is advanced ligamentum flavum thickening and bilateral facet arthropathy.  There is prominent fluid noted within the right facet articulation.  There is associated mass effect and tapering of the thecal sac with severe spinal canal stenosis, similar to prior examination.  There is mild bilateral neural foraminal narrowing.     L5-S1: There is no focal disc herniation.  There is mild bilateral facet arthropathy.  No significant spinal canal or neural foraminal narrowing.     Limited views of the posterior abdomen demonstrate bilateral renal T2 hyperintensities likely representing cysts.     Impression:     Grade 1 anterolisthesis of L4 on L5 with uncovering of the disc.  There is associated advanced bilateral facet arthropathy at L4-L5 with resulting severe spinal canal stenosis and mild bilateral neural foraminal narrowing, similar to prior MRI examination.  Please see above for additional level by level details    Allergies: Review of patient's allergies indicates:  No Known Allergies    Current Medications:   Current Outpatient Medications   Medication Sig  "Dispense Refill    ACCU-CHEK SOFT DEV LANCETS Kit       alcohol swabs (BD ALCOHOL SWABS) PadM Apply 1 each topically as needed. 100 each 11    ammonium lactate 12 % Crea Apply twice daily to affected parts both feet as needed. 140 g 11    arm brace (WRIST SUPPORT LARGE-XLARGE MISC)       atorvastatin (LIPITOR) 80 MG tablet TAKE 1 TABLET EVERY DAY 90 tablet 0    blood sugar diagnostic (ACCU-CHEK AMBER PLUS TEST STRP) Strp Inject 2 strips into the skin 2 (two) times a day. Test Blood Sugar 200 strip 3    blood sugar diagnostic Strp To check BG 3 times daily, to use with Accu check Guide meter pt has. 200 strip 3    blood-glucose meter (ACCU-CHEK AMBER PLUS METER) Misc Use as directed 1 each 0    blood-glucose meter (ACCU-CHEK AMBER PLUS METER) Misc Use as directed to check blood sugar three times daily 300 each 3    DEPAKOTE  mg Tb24 Take 500 mg by mouth nightly.       divalproex ER (DEPAKOTE ER) 250 MG 24 hr tablet Take 1 tablet (250 mg total) by mouth once daily. 90 tablet 3    EASY TOUCH 31 gauge x 1/4" Ndle Use as directed 100 each 5    insulin (LANTUS SOLOSTAR U-100 INSULIN) glargine 100 units/mL (3mL) SubQ pen INJECT 23 UNITS SUBCUTANEOUSLY NIGHTLY 30 mL 5    lancets (ONETOUCH ULTRASOFT LANCETS) [facility] [place] check BG up to QID. Dispense strips compatible with pt brand meter 400 each 3    lancets Misc To check BG 3 times daily, to use with Accu check Guide meter pt has. 200 each 3    liraglutide 0.6 mg/0.1 mL, 18 mg/3 mL, subq PNIJ (VICTOZA 3-HERMANN) 0.6 mg/0.1 mL (18 mg/3 mL) PnIj pen Inject 1.8 mg into the skin once daily. 27 mL 1    lisinopriL-hydrochlorothiazide (PRINZIDE,ZESTORETIC) 10-12.5 mg per tablet Take 1 tablet by mouth once daily. 90 tablet 3    losartan (COZAAR) 25 MG tablet Take 1 tablet (25 mg total) by mouth once daily. 90 tablet 0    metFORMIN (GLUCOPHAGE) 500 MG tablet Take 1 tablet (500 mg total) by mouth 2 (two) times daily. Take with food. 180 tablet 3    MITIGARE 0.6 mg Cap TAKE 1 CAPSULE " "BY MOUTH DAILY.PLEASE TAKE THIS MEDICATION ONCE DAILY UNTIL YOUR POTENTIAL GOUT FLARE IN YOUR FOOT CLEARS      nicotine (NICODERM CQ) 7 mg/24 hr Place 1 patch onto the skin once daily. 28 patch 0    nicotine polacrilex 2 MG Lozg Take 1 lozenge (2 mg total) by mouth as needed (Use in place of cigarettes). 216 lozenge 0    OLANZapine (ZYPREXA) 10 MG tablet TK 1 T PO QHS 90 tablet 3    oxybutynin (DITROPAN-XL) 10 MG 24 hr tablet Take 1 tablet (10 mg total) by mouth once daily. 90 tablet 3    pen needle, diabetic (BD ULTRA-FINE [place] PEN NEEDLE) 32 gauge x 5/32" Ndle Pt is injecting once daily 30 each 11    pen needle, diabetic, safety 29 gauge x 3/16" Ndle Use as instructed 200 each 11    pregabalin (LYRICA) 100 MG capsule Take 1 capsule (100 mg total) by mouth 3 (three) times daily. 270 capsule 1    QUEtiapine (SEROQUEL) 100 MG Tab TAKE 1 TABLET EVERY EVENING 90 tablet 3    traMADoL (ULTRAM) 50 mg tablet Take 1 tablet (50 mg total) by mouth every 6 (six) hours as needed (Severe left foot pain). 12 tablet 0    TRUE METRIX GLUCOSE METER Misc       TRUEPLUS LANCETS 33 gauge Misc 100 lancets by Subconjunctival route 4 (four) times daily. 100 each 0    ziprasidone (GEODON) 40 MG Cap TAKE 1 CAPSULE EVERY EVENING 30 capsule 0    aspirin (ECOTRIN) 81 MG EC tablet Take 1 tablet (81 mg total) by mouth once daily.  0    blood glucose control, high Soln 1 drop by Misc.(Non-Drug; Combo Route) route as needed. 1 each 0    blood glucose control, low Soln 1 drop by Misc.(Non-Drug; Combo Route) route as needed. 1 each 0    NIFEdipine (PROCARDIA-XL) 30 MG (OSM) 24 hr tablet Take 1 tablet (30 mg total) by mouth 2 (two) times a day. 30 tablet 1     No current facility-administered medications for this visit.     Facility-Administered Medications Ordered in Other Visits   Medication Dose Route Frequency Provider Last Rate Last Admin    0.9%  NaCl infusion   Intravenous Continuous Jacky See MD 25 mL/hr at 02/03/21 1432 New Bag at " 02/03/21 1432       REVIEW OF SYSTEMS:    GENERAL:  No weight loss, malaise or fevers.  HEENT:  Negative for frequent or significant headaches.  NECK:  Negative for lumps, goiter, pain and significant neck swelling.  RESPIRATORY:  Negative for cough, wheezing or shortness of breath.  CARDIOVASCULAR:  Negative for chest pain, leg swelling or palpitations. Hypertension.  GI:  Negative for abdominal discomfort, blood in stools or black stools or change in bowel habits.  MUSCULOSKELETAL:  See HPI.  SKIN:  Negative for lesions, rash, and itching.  PSYCH:  Positive for sleep disturbance.  H/O depression, bipolar disorder and schizophrenia managed by outside psych.  HEMATOLOGY/LYMPHOLOGY:  Negative for prolonged bleeding, bruising easily or swollen nodes. H/O breast cancer.  NEURO:   No history of headaches, syncope, paralysis, seizures or tremors.  ENDO: Diabetes.  All other reviewed and negative other than HPI.    Past Medical History:  Past Medical History:   Diagnosis Date    Bipolar disorder     Cancer of female breast 10/2010    T2 N1 Mx, Stage IIB left breast cancer    Cancer of upper-outer quadrant of female breast 7/9/2012    Depression     Diabetes mellitus type II     Disturbances of sensation of smell and taste 6/29/2012    Hypertension     Malignant neoplasm of breast (female), unspecified site 4/27/2015    Neuropathy     Nonspecific abnormal unspecified cardiovascular function study 4/12/2013    ECG READ AS SHOWING A T-WAVE ABNORMALITY     Post-mastectomy lymphedema syndrome     Schizophrenia     Stroke        Past Surgical History:  Past Surgical History:   Procedure Laterality Date    BREAST RECONSTRUCTION  11/23/11    B/L SHLOMO flap reconstruction S/P left MRM, right prophylactic mastectomy    BREAST RECONSTRUCTION Bilateral 3/13/2015    NIPPLE RECONSTRUCTION    INJECTION OF ANESTHETIC AGENT AROUND NERVE Left 12/9/2020    Procedure: BLOCK, NERVE, C3-C4-C5-C6 MEDIAL BRANCH;  Surgeon: Darren Jerry MD;   Location: [facility];  Service: Pain Management;  Laterality: Left;    MASTECTOMY Bilateral 01/2011    bilateral    RADIOFREQUENCY ABLATION Left 2/3/2021    Procedure: RADIOFREQUENCY ABLATION, C3,C4,C5,C6 MEDIAL BRANCH 1 of 2;  Surgeon: Darren eJrry MD;  Location: [facility];  Service: Pain Management;  Laterality: Left;    RADIOFREQUENCY ABLATION Right 7/28/2021    Procedure: RADIOFREQUENCY ABLATION, C3-C4-C5-C6 MEDIAL BR ANCH 1 IOF 2;  Surgeon: Darren Jerry MD;  Location: [facility];  Service: Pain Management;  Laterality: Right;    RADIOFREQUENCY ABLATION Left 8/18/2021    Procedure: RADIOFREQUENCY ABLATION, C3-C4-C5-C6 MEDIAL BRANCH 2 OF 2;  Surgeon: Darren Jerry MD;  Location: [facility];  Service: Pain Management;  Laterality: Left;    TRANSFORAMINAL EPIDURAL INJECTION OF STEROID Bilateral 11/4/2020    Procedure: INJECTION, STEROID, EPIDURAL, TRANSFORAMINAL APPROACH L4/L5;  Surgeon: Darren Jerry MD;  Location: [facility];  Service: Pain Management;  Laterality: Bilateral;  Bilateral L4/L5    TRANSFORAMINAL EPIDURAL INJECTION OF STEROID Bilateral 2/10/2021    Procedure: INJECTION, STEROID, EPIDURAL, TRANSFORAMINAL APPROACH, L4-L5;  Surgeon: Darren Jerry MD;  Location: [facility];  Service: Pain Management;  Laterality: Bilateral;    TRANSFORAMINAL EPIDURAL INJECTION OF STEROID Bilateral 3/10/2021    Procedure: INJECTION, STEROID, EPIDURAL, TRANSFORAMINAL APPROACH, L4-L5;  Surgeon: Darren Jerry MD;  Location: [facility];  Service: Pain Management;  Laterality: Bilateral;    TRANSFORAMINAL EPIDURAL INJECTION OF STEROID Bilateral 12/15/2021    Procedure: INJECTION, STEROID, EPIDURAL, TRANSFORAMINAL APPROACH  Bilateral L4/5 TFESI / needs consent;  Surgeon: Darren Jerry MD;  Location: [facility];  Service: Pain Management;  Laterality: Bilateral;    TUBAL LIGATION         Family History:  Family History   Problem Relation Age of Onset    Kidney  "disease Mother         Dialysis    Hypertension Mother     Deep vein thrombosis Mother     Glaucoma Mother     Diabetic kidney disease Sister     Lung cancer Sister     Diabetes Sister     Cancer Sister         lung    Diabetes Brother     Diabetes Son     No Known Problems Father     No Known Problems Maternal Grandmother     No Known Problems Maternal Grandfather     No Known Problems Paternal Grandmother     No Known Problems Paternal Grandfather     No Known Problems Son     Cervical cancer Daughter     Cancer Daughter         cervical cancer    No Known Problems Daughter     No Known Problems Daughter     No Known Problems Daughter     Breast cancer Neg Hx     Ovarian cancer Neg Hx        Social History:  Social History     Socioeconomic History    Marital status:     Number of children: 6   Occupational History    Occupation: Disability   Tobacco Use    Smoking status: Former     Packs/day: 0.50     Years: 25.00     Pack years: 12.50     Types: Cigarettes     Start date:      Quit date: 3/17/2022     Years since quittin.6    Smokeless tobacco: Never    Tobacco comments:     currently at less than 1/2 pack pd   Substance and Sexual Activity    Alcohol use: Yes     Alcohol/week: 0.0 standard drinks     Comment: social - once every 2 weeks    Drug use: No     Comment:  - stopped using crack cocaine    Sexual activity: Not Currently     Partners: Male       OBJECTIVE:    /79   Pulse 95   Temp 98.6 °F (37 °C) (Oral)   Resp 19   Ht 5' 3.6" (1.615 m)   Wt 83.4 kg (183 lb 13.8 oz)   LMP  (LMP Unknown)   BMI 31.96 kg/m²     PHYSICAL EXAMINATION:    General appearance: Well appearing, in no acute distress, alert and oriented x3.  Psych:  Mood and affect appropriate.  Skin: Skin color, texture, turgor normal, no rashes or lesions, in both upper and lower body.  Head/face:  Atraumatic, normocephalic. No palpable lymph nodes  Neck: Minimal discomfort to palpation over the cervical " paraspinous muscles. Spurling Negative. No pain with neck flexion, extension, or lateral flexion. .  Extremities: Peripheral joint ROM is full and pain free without obvious instability or laxity in all four extremities. No deformities, edema, or skin discoloration. Good capillary refill.  Genitourinary: Richard in place.   Musculoskeletal: Shoulder, hip, sacroiliac and knee provocative maneuvers are negative. Bilateral upper and lower extremity strength is normal and symmetric.  No atrophy or tone abnormalities are noted.  Neuro: Bilateral upper and lower extremity coordination symmetric.  . No loss of sensation is noted.  Gait: Using Straight Cane for ambulation     ASSESSMENT: 62 y.o. year old female with neck pain, consistent with     1. Lumbar disc disorder  Procedure Order to Pain Management      2. Spondylosis without myelopathy  Procedure Order to Pain Management      3. Cervical spondylosis                PLAN:     - Prior records reviewed  - Will s/f repeat Bilateral L4/5 TFESI as pain returning and last CARLEE provided 11 months of 90% benefit   -Continue Lyrica 100mg TID  - consider repeat Cervical RFAs at FU   - I have stressed the importance of physical activity and a home exercise plan to help with pain and improve health.  - Patient can continue with medications for now since they are providing benefits, using them appropriately, and without side effects.  - Counseled patient regarding the importance of activity modification.  - RTC 2 weeks after procedure, will address cervicalgia at that time as well    The above plan and management options were discussed at length with patient. Patient is in agreement with the above and verbalized understanding.    Moses Coffey   11/04/2022    I spent a total of 30 minutes on the day of the visit.  This includes face to face time and non-face to face time preparing to see the patient by reviewing previous labs/imaging, obtaining and/or reviewing separately obtained  history, documenting clinical information in the electronic or other health record, independently interpreting results and communicating results to the patient/family/caregiver.

## 2022-11-08 ENCOUNTER — TELEPHONE (OUTPATIENT)
Dept: INTERNAL MEDICINE | Facility: CLINIC | Age: 62
End: 2022-11-08
Payer: MEDICARE

## 2022-11-08 NOTE — TELEPHONE ENCOUNTER
Returned [facility] call and spoke to Jael. They are wanting verification of a fax from our office returned to them for Rx of Tramadol a few days ago. Upon chart review, I se notes that fax was rec'd and placed on MD desk. And then another afterwards that it was faxed. Confirmed fax for them.

## 2022-11-08 NOTE — TELEPHONE ENCOUNTER
----- Message from Sadie Leo sent at 11/8/2022  8:50 AM CST -----  Regarding: [facility]  Name of Who is Calling: Olamide geren/ [facility]          What is the request in detail: Olamide needs to verify a faxed prescription that they received. Please assist  Ref # [id number]          Can the clinic reply by [facility]:no          What Number to Call Back if not in [facility]:[phone]

## 2022-11-17 ENCOUNTER — OFFICE VISIT (OUTPATIENT)
Dept: OPTOMETRY | Facility: CLINIC | Age: 62
End: 2022-11-17
Payer: MEDICARE

## 2022-11-17 DIAGNOSIS — H52.4 PRESBYOPIA: ICD-10-CM

## 2022-11-17 DIAGNOSIS — Z83.511 FAMILY HISTORY OF GLAUCOMA: ICD-10-CM

## 2022-11-17 DIAGNOSIS — E11.9 TYPE 2 DIABETES MELLITUS WITHOUT OPHTHALMIC MANIFESTATIONS: Primary | ICD-10-CM

## 2022-11-17 DIAGNOSIS — H04.203 BILATERAL EPIPHORA: ICD-10-CM

## 2022-11-17 DIAGNOSIS — H25.13 NUCLEAR SCLEROSIS OF BOTH EYES: ICD-10-CM

## 2022-11-17 PROCEDURE — 99999 PR PBB SHADOW E&M-EST. PATIENT-LVL III: ICD-10-PCS | Mod: PBBFAC,,, | Performed by: OPTOMETRIST

## 2022-11-17 PROCEDURE — 1159F MED LIST DOCD IN RCRD: CPT | Mod: CPTII,S$GLB,, | Performed by: OPTOMETRIST

## 2022-11-17 PROCEDURE — 4010F ACE/ARB THERAPY RXD/TAKEN: CPT | Mod: CPTII,S$GLB,, | Performed by: OPTOMETRIST

## 2022-11-17 PROCEDURE — 99499 UNLISTED E&M SERVICE: CPT | Mod: S$GLB,,, | Performed by: OPTOMETRIST

## 2022-11-17 PROCEDURE — 1159F PR MEDICATION LIST DOCUMENTED IN MEDICAL RECORD: ICD-10-PCS | Mod: CPTII,S$GLB,, | Performed by: OPTOMETRIST

## 2022-11-17 PROCEDURE — 99999 PR PBB SHADOW E&M-EST. PATIENT-LVL III: CPT | Mod: PBBFAC,,, | Performed by: OPTOMETRIST

## 2022-11-17 PROCEDURE — 2023F PR DILATED RETINAL EXAM W/O EVID OF RETINOPATHY: ICD-10-PCS | Mod: CPTII,S$GLB,, | Performed by: OPTOMETRIST

## 2022-11-17 PROCEDURE — 3051F PR MOST RECENT HEMOGLOBIN A1C LEVEL 7.0 - < 8.0%: ICD-10-PCS | Mod: CPTII,S$GLB,, | Performed by: OPTOMETRIST

## 2022-11-17 PROCEDURE — 92014 COMPRE OPH EXAM EST PT 1/>: CPT | Mod: S$GLB,,, | Performed by: OPTOMETRIST

## 2022-11-17 PROCEDURE — 99499 RISK ADDL DX/OHS AUDIT: ICD-10-PCS | Mod: S$GLB,,, | Performed by: OPTOMETRIST

## 2022-11-17 PROCEDURE — 3051F HG A1C>EQUAL 7.0%<8.0%: CPT | Mod: CPTII,S$GLB,, | Performed by: OPTOMETRIST

## 2022-11-17 PROCEDURE — 92014 PR EYE EXAM, EST PATIENT,COMPREHESV: ICD-10-PCS | Mod: S$GLB,,, | Performed by: OPTOMETRIST

## 2022-11-17 PROCEDURE — 4010F PR ACE/ARB THEARPY RXD/TAKEN: ICD-10-PCS | Mod: CPTII,S$GLB,, | Performed by: OPTOMETRIST

## 2022-11-17 PROCEDURE — 2023F DILAT RTA XM W/O RTNOPTHY: CPT | Mod: CPTII,S$GLB,, | Performed by: OPTOMETRIST

## 2022-11-21 ENCOUNTER — CLINICAL SUPPORT (OUTPATIENT)
Dept: SMOKING CESSATION | Facility: CLINIC | Age: 62
End: 2022-11-21
Payer: COMMERCIAL

## 2022-11-21 DIAGNOSIS — F17.200 NICOTINE DEPENDENCE: ICD-10-CM

## 2022-11-21 PROCEDURE — 99403 PREV MED CNSL INDIV APPRX 45: CPT | Mod: S$GLB,,, | Performed by: FAMILY MEDICINE

## 2022-11-21 PROCEDURE — 99999 PR PBB SHADOW E&M-EST. PATIENT-LVL I: CPT | Mod: PBBFAC,,,

## 2022-11-21 PROCEDURE — 99403 PR PREVENT COUNSEL,INDIV,45 MIN: ICD-10-PCS | Mod: S$GLB,,, | Performed by: FAMILY MEDICINE

## 2022-11-21 PROCEDURE — 99999 PR PBB SHADOW E&M-EST. PATIENT-LVL I: ICD-10-PCS | Mod: PBBFAC,,,

## 2022-11-21 RX ORDER — DM/P-EPHED/ACETAMINOPH/DOXYLAM 30-7.5/3
2 LIQUID (ML) ORAL
Qty: 216 LOZENGE | Refills: 0 | Status: SHIPPED | OUTPATIENT
Start: 2022-11-21 | End: 2023-01-19

## 2022-11-21 NOTE — Clinical Note
Patient was seen in clinic today and reports remaining tobacco free. She is currently on a tobacco cessation medication regimen of 2mg nicotine lozenge. Refill sent to the pharmacy. She states that she no longer needs to wear the patch. No abnormal behaviors or mental changes a this time. Patient states that she takes about 2 lozenge a day. Most times after she eats or first thing in the morning. Patient states that she would like to be nicotine free by her birthday. Commended patient on her current success on being able to maintain her quit. She mentioned that she enjoys being tobacco free and is confident that she will never return to smoking. Discussed and reviewed strategies and triggers to help maintain quit. The patient will continue with  therapy sessions and medication monitoring by CTTS. Prescribed medication management will be by physician.

## 2022-11-21 NOTE — PROGRESS NOTES
Individual Follow-Up Form    11/21/2022    Quit Date:     Clinical Status of Patient: Outpatient    Continuing Medication: yes  Nicotine Lozenges    Other Medications: none     Target Symptoms: Withdrawal and medication side effects. The following were  rated moderate (3) to severe (4) on TCRS:  Moderate (3): ---  Severe (4): ---    Comments: Patient was seen in clinic today and reports remaining tobacco free. She is currently on a tobacco cessation medication regimen of 2mg nicotine lozenge. Refill sent to the pharmacy. She states that she no longer needs to wear the patch. No abnormal behaviors or mental changes a this time. Patient states that she takes about 2 lozenge a day. Most times after she eats or first thing in the morning. Patient states that she would like to be nicotine free by her birthday. Commended patient on her current success on being able to maintain her quit. She mentioned that she enjoys being tobacco free and is confident that she will never return to smoking. Discussed and reviewed strategies and triggers to help maintain quit. The patient will continue with  therapy sessions and medication monitoring by CTTS. Prescribed medication management will be by physician.     Diagnosis: F17.200    Next Visit: 4 weeks

## 2022-12-14 ENCOUNTER — HOSPITAL ENCOUNTER (OUTPATIENT)
Facility: OTHER | Age: 62
Discharge: HOME | End: 2022-12-14
Attending: ANESTHESIOLOGY | Admitting: ANESTHESIOLOGY
Payer: MEDICARE

## 2022-12-14 VITALS
RESPIRATION RATE: 12 BRPM | SYSTOLIC BLOOD PRESSURE: 111 MMHG | TEMPERATURE: 98 F | BODY MASS INDEX: 31.24 KG/M2 | HEART RATE: 62 BPM | DIASTOLIC BLOOD PRESSURE: 62 MMHG | WEIGHT: 183 LBS | HEIGHT: 64 IN | OXYGEN SATURATION: 97 %

## 2022-12-14 DIAGNOSIS — M51.36 DDD (DEGENERATIVE DISC DISEASE), LUMBAR: ICD-10-CM

## 2022-12-14 DIAGNOSIS — G89.29 CHRONIC PAIN: ICD-10-CM

## 2022-12-14 DIAGNOSIS — M54.16 LUMBAR RADICULOPATHY: Primary | ICD-10-CM

## 2022-12-14 LAB — POCT GLUCOSE: 81 MG/DL (ref 70–110)

## 2022-12-14 PROCEDURE — 25500020 PHARM REV CODE 255: Performed by: ANESTHESIOLOGY

## 2022-12-14 PROCEDURE — 25000003 PHARM REV CODE 250: Performed by: STUDENT IN AN ORGANIZED HEALTH CARE EDUCATION/TRAINING PROGRAM

## 2022-12-14 PROCEDURE — 64483 NJX AA&/STRD TFRM EPI L/S 1: CPT | Mod: 50,HCNC | Performed by: ANESTHESIOLOGY

## 2022-12-14 PROCEDURE — 25000003 PHARM REV CODE 250: Performed by: ANESTHESIOLOGY

## 2022-12-14 PROCEDURE — 82947 ASSAY GLUCOSE BLOOD QUANT: CPT | Performed by: ANESTHESIOLOGY

## 2022-12-14 PROCEDURE — 64483 PR EPIDURAL INJ, ANES/STEROID, TRANSFORAMINAL, LUMB/SACR, SNGL LEVL: ICD-10-PCS | Mod: 50,HCNC,, | Performed by: ANESTHESIOLOGY

## 2022-12-14 PROCEDURE — 64483 NJX AA&/STRD TFRM EPI L/S 1: CPT | Mod: 50,HCNC,, | Performed by: ANESTHESIOLOGY

## 2022-12-14 PROCEDURE — 63600175 PHARM REV CODE 636 W HCPCS: Performed by: ANESTHESIOLOGY

## 2022-12-14 RX ORDER — MIDAZOLAM HYDROCHLORIDE 1 MG/ML
INJECTION INTRAMUSCULAR; INTRAVENOUS
Status: DISCONTINUED | OUTPATIENT
Start: 2022-12-14 | End: 2022-12-14 | Stop reason: HOSPADM

## 2022-12-14 RX ORDER — DEXAMETHASONE SODIUM PHOSPHATE 10 MG/ML
INJECTION INTRAMUSCULAR; INTRAVENOUS
Status: DISCONTINUED | OUTPATIENT
Start: 2022-12-14 | End: 2022-12-14 | Stop reason: HOSPADM

## 2022-12-14 RX ORDER — LIDOCAINE HYDROCHLORIDE 10 MG/ML
INJECTION, SOLUTION EPIDURAL; INFILTRATION; INTRACAUDAL; PERINEURAL
Status: DISCONTINUED | OUTPATIENT
Start: 2022-12-14 | End: 2022-12-14 | Stop reason: HOSPADM

## 2022-12-14 RX ORDER — LIDOCAINE HYDROCHLORIDE 20 MG/ML
INJECTION, SOLUTION INFILTRATION; PERINEURAL
Status: DISCONTINUED | OUTPATIENT
Start: 2022-12-14 | End: 2022-12-14 | Stop reason: HOSPADM

## 2022-12-14 RX ORDER — FENTANYL CITRATE 50 UG/ML
INJECTION, SOLUTION INTRAMUSCULAR; INTRAVENOUS
Status: DISCONTINUED | OUTPATIENT
Start: 2022-12-14 | End: 2022-12-14 | Stop reason: HOSPADM

## 2022-12-14 RX ORDER — SODIUM CHLORIDE 9 MG/ML
500 INJECTION, SOLUTION INTRAVENOUS CONTINUOUS
Status: DISCONTINUED | OUTPATIENT
Start: 2022-12-14 | End: 2022-12-14 | Stop reason: HOSPADM

## 2022-12-14 NOTE — DISCHARGE SUMMARY
Discharge Note  Short Stay      SUMMARY     Admit Date: 12/14/2022    Attending Physician: Darren Jerry    Discharge Physician: Davis Mckeon      Discharge Date: 12/14/2022 10:47 AM    Procedure(s) (LRB):  INJECTION, STEROID, EPIDURAL, TRANSFORAMINAL APPROACH BILATERAL L4/5 CONTRAST (Bilateral)    Final Diagnosis: Lumbar disc disorder [M51.9]  Spondylosis without myelopathy [M47.819]    Disposition: Home or self care    Patient Instructions:   Current Discharge Medication List        CONTINUE these medications which have NOT CHANGED    Details   ACCU-CHEK SOFT DEV LANCETS Kit       alcohol swabs (BD ALCOHOL SWABS) PadM Apply 1 each topically as needed.  Qty: 100 each, Refills: 11    Associated Diagnoses: Type 2 diabetes mellitus with other specified complication, unspecified whether long term insulin use      ammonium lactate 12 % Crea Apply twice daily to affected parts both feet as needed.  Qty: 140 g, Refills: 11      arm brace (WRIST SUPPORT LARGE-XLARGE MISC)       aspirin (ECOTRIN) 81 MG EC tablet Take 1 tablet (81 mg total) by mouth once daily.  Refills: 0      atorvastatin (LIPITOR) 80 MG tablet TAKE 1 TABLET EVERY DAY  Qty: 90 tablet, Refills: 0      blood glucose control, high Soln 1 drop by Misc.(Non-Drug; Combo Route) route as needed.  Qty: 1 each, Refills: 0    Associated Diagnoses: Type 2 diabetes mellitus with other specified complication, unspecified whether long term insulin use      blood glucose control, low Soln 1 drop by Misc.(Non-Drug; Combo Route) route as needed.  Qty: 1 each, Refills: 0    Associated Diagnoses: Type 2 diabetes mellitus with other specified complication, unspecified whether long term insulin use      !! blood sugar diagnostic (ACCU-CHEK AMBER PLUS TEST STRP) Strp Inject 2 strips into the skin 2 (two) times a day. Test Blood Sugar  Qty: 200 strip, Refills: 3    Associated Diagnoses: Uncontrolled type 2 diabetes mellitus, with long-term current use of insulin      !! blood  "sugar diagnostic Strp To check BG 3 times daily, to use with Accu check Guide meter pt has.  Qty: 200 strip, Refills: 3    Associated Diagnoses: Type 2 diabetes mellitus with diabetic polyneuropathy, with long-term current use of insulin; Uncontrolled type 2 diabetes mellitus, with long-term current use of insulin      !! blood-glucose meter (ACCU-CHEK AMBER PLUS METER) Misc Use as directed  Qty: 1 each, Refills: 0      !! blood-glucose meter (ACCU-CHEK AMBER PLUS METER) Misc Use as directed to check blood sugar three times daily  Qty: 300 each, Refills: 3    Comments: Or brand covered by insurance  Associated Diagnoses: DM type 2 with diabetic peripheral neuropathy      !! DEPAKOTE  mg Tb24 Take 500 mg by mouth nightly.       !! divalproex ER (DEPAKOTE ER) 250 MG 24 hr tablet Take 1 tablet (250 mg total) by mouth once daily.  Qty: 90 tablet, Refills: 3    Comments: NEW RX NOT [facility], PLEASE RESEND TO [facility]      EASY TOUCH 31 gauge x 1/4" Ndle Use as directed  Qty: 100 each, Refills: 5      insulin (LANTUS SOLOSTAR U-100 INSULIN) glargine 100 units/mL (3mL) SubQ pen INJECT 23 UNITS SUBCUTANEOUSLY NIGHTLY  Qty: 30 mL, Refills: 5    Associated Diagnoses: Type 2 diabetes mellitus without complication, with long-term current use of insulin      !! lancets (ONETOUCH ULTRASOFT LANCETS) Misc Pt to check BG up to QID. Dispense strips compatible with pt brand meter  Qty: 400 each, Refills: 3    Associated Diagnoses: Type 2 diabetes mellitus with diabetic polyneuropathy, with long-term current use of insulin      !! lancets Misc To check BG 3 times daily, to use with Accu check Guide meter pt has.  Qty: 200 each, Refills: 3    Associated Diagnoses: Type 2 diabetes mellitus with diabetic polyneuropathy, with long-term current use of insulin; Uncontrolled type 2 diabetes mellitus, with long-term current use of insulin      liraglutide 0.6 mg/0.1 mL, 18 mg/3 mL, subq PNIJ (VICTOZA 3-HERMANN) 0.6 mg/0.1 mL (18 mg/3 mL) PnIj pen " "INJECT 1.8 MG SUBCUTANEOUSLY ONCE DAILY.  Qty: 27 mL, Refills: 11      lisinopriL-hydrochlorothiazide (PRINZIDE,ZESTORETIC) 10-12.5 mg per tablet Take 1 tablet by mouth once daily.  Qty: 90 tablet, Refills: 3    Comments: .  Associated Diagnoses: Essential hypertension      losartan (COZAAR) 25 MG tablet Take 1 tablet (25 mg total) by mouth once daily.  Qty: 90 tablet, Refills: 0    Associated Diagnoses: Essential hypertension      metFORMIN (GLUCOPHAGE) 500 MG tablet Take 1 tablet (500 mg total) by mouth 2 (two) times daily. Take with food.  Qty: 180 tablet, Refills: 3    Associated Diagnoses: Type 2 diabetes mellitus without complication, with long-term current use of insulin      MITIGARE 0.6 mg Cap TAKE 1 CAPSULE BY MOUTH DAILY.PLEASE TAKE THIS MEDICATION ONCE DAILY UNTIL YOUR POTENTIAL GOUT FLARE IN YOUR FOOT CLEARS      nicotine (NICODERM CQ) 7 mg/24 hr Place 1 patch onto the skin once daily.  Qty: 28 patch, Refills: 0    Comments: SCT# [id number]  Associated Diagnoses: Nicotine dependence      nicotine polacrilex 2 MG Lozg Take 1 lozenge (2 mg total) by mouth as needed (Use in place of cigarettes).  Qty: 216 lozenge, Refills: 0    Comments: SCT: [id number]  Associated Diagnoses: Nicotine dependence      NIFEdipine (PROCARDIA-XL) 30 MG (OSM) 24 hr tablet Take 1 tablet (30 mg total) by mouth 2 (two) times a day.  Qty: 30 tablet, Refills: 1    Comments: .      OLANZapine (ZYPREXA) 10 MG tablet TK 1 T PO QHS  Qty: 90 tablet, Refills: 3    Associated Diagnoses: Schizoaffective disorder, bipolar type      oxybutynin (DITROPAN-XL) 10 MG 24 hr tablet Take 1 tablet (10 mg total) by mouth once daily.  Qty: 90 tablet, Refills: 3    Associated Diagnoses: Urge incontinence      pen needle, diabetic (BD ULTRA-FINE [place] PEN NEEDLE) 32 gauge x 5/32" Ndle Pt is injecting once daily  Qty: 30 each, Refills: 11      pen needle, diabetic, safety 29 gauge x 3/16" Ndle Use as instructed  Qty: 200 each, Refills: 11      pregabalin " (LYRICA) 100 MG capsule Take 1 capsule (100 mg total) by mouth 3 (three) times daily.  Qty: 270 capsule, Refills: 1    Associated Diagnoses: Cervical radiculopathy; Cervical spondylosis without myelopathy; DDD (degenerative disc disease), cervical; DM type 2 with diabetic peripheral neuropathy      QUEtiapine (SEROQUEL) 100 MG Tab TAKE 1 TABLET EVERY EVENING  Qty: 90 tablet, Refills: 3    Associated Diagnoses: Cervical spondylosis without myelopathy      traMADoL (ULTRAM) 50 mg tablet Take 1 tablet (50 mg total) by mouth every 6 (six) hours as needed (Severe left foot pain).  Qty: 12 tablet, Refills: 0    Comments: Quantity prescribed more than 7 day supply? No  Associated Diagnoses: Diabetic foot      !! TRUE METRIX GLUCOSE METER Misc       !! TRUEPLUS LANCETS 33 gauge Misc 100 lancets by Subconjunctival route 4 (four) times daily.  Qty: 100 each, Refills: 0    Associated Diagnoses: DM type 2 with diabetic peripheral neuropathy      ziprasidone (GEODON) 40 MG Cap TAKE 1 CAPSULE EVERY EVENING  Qty: 30 capsule, Refills: 0    Associated Diagnoses: Schizoaffective disorder, bipolar type       !! - Potential duplicate medications found. Please discuss with provider.              Discharge Diagnosis: Lumbar disc disorder [M51.9]  Spondylosis without myelopathy [M47.819]  Condition on Discharge: Stable with no complications to procedure   Diet on Discharge: Same as before.  Activity: as per instruction sheet.  Discharge to: Home with a responsible adult.  Follow up: 2-4 weeks       Please call my office or pager at [phone] if experienced any weakness or loss of sensation, fever > 101.5, pain uncontrolled with oral medications, persistent nausea/vomiting/or diarrhea, redness or drainage from the incisions, or any other worrisome concerns. If physician on call was not reached or could not communicate with our office for any reason please go to the nearest emergency department        Darren Jerry

## 2022-12-14 NOTE — DISCHARGE INSTRUCTIONS

## 2022-12-14 NOTE — OP NOTE
Lumbar Transforaminal Epidural Steroid Injection under Fluoroscopic Guidance    The procedure, risks, benefits, and options were discussed with the patient. There are no contraindications to the procedure. The patent expressed understanding and agreed to the procedure. Informed written consent was obtained prior to the start of the procedure and can be found in the patient's chart.    PATIENT NAME: Jonathan Gonzales   MRN: [id number]     DATE OF PROCEDURE: 12/14/2022    PROCEDURE:  Bilateral  L4/5 Lumbar Transforaminal Epidural Steroid Injection under Fluoroscopic Guidance    PRE-OP DIAGNOSIS: Lumbar disc disorder [M51.9]  DDD  Lumbar radiculopathy [M54.16]    POST-OP DIAGNOSIS: Same    PHYSICIAN: Darren Jerry MD    ASSISTANTS: Davis Mckeon MD [facility]     MEDICATIONS INJECTED: Preservative-free Decadron 10mg with 5cc of Lidocaine 1% MPF     LOCAL ANESTHETIC INJECTED: Xylocaine 2%     SEDATION: Versed 2mg and Fentanyl 50mcg                                                                                                                                                                                     Conscious sedation ordered by M.MARLA. Patient re-evaluation prior to administration of conscious sedation. No changes noted in patient's status from initial evaluation. The patient's vital signs were monitored by RN and patient remained hemodynamically stable throughout the procedure.    Event Time In   Sedation Start 1046   Sedation End 1052       ESTIMATED BLOOD LOSS: None    COMPLICATIONS: None    TECHNIQUE: Time-out was performed to identify the patient and procedure to be performed. With the patient laying in a prone position, the surgical area was prepped and draped in the usual sterile fashion using ChloraPrep and a fenestrated drape.The levels were determined under fluoroscopy guidance. Skin anesthesia was achieved by injecting Lidocaine 2% over the injection sites. The transforaminal spaces were then  approached with a 22 gauge, 3.5 inch spinal quinke needle that was introduced under fluoroscopic guidance in the AP and Lateral views. Once the needle tip was in the area of the transforaminal space, and there was no blood, CSF or paraesthesias, contrast dye Omnipaque (240mg/mL) was injected to confirm placement and there was no vascular runoff. Fluoroscopic imaging in the AP and lateral views revealed a clear outline of the spinal nerve with proximal spread of agent through the neural foramen into the epidural space. 3 mL of the medication mixture listed above was injected slowly at each site. Displacement of the radio opaque contrast after injection of the medication confirmed that the medication went into the area of the transforaminal spaces. The needles were removed and bleeding was nil. A sterile dressing was applied. No specimens collected. The patient tolerated the procedure well.       The patient was monitored after the procedure in the recovery area. They were given post-procedure and discharge instructions to follow at home. The patient was discharged in a stable condition.    Davis Mckeon MD [facility]    I reviewed and edited the fellow's note. I conducted my own interview and physical examination. I agree with the findings. I was present and supervising all critical portions of the procedure.    Darren Jerry MD

## 2022-12-14 NOTE — H&P
HPI  INJECTION, STEROID, EPIDURAL, TRANSFORAMINAL APPROACH BILATERAL L4/5 CONTRAST - Bilateral          Past Medical History:   Diagnosis Date    Bipolar disorder     Cancer of female breast 10/2010    T2 N1 Mx, Stage IIB left breast cancer    Cancer of upper-outer quadrant of female breast 7/9/2012    Depression     Diabetes mellitus type II     Disturbances of sensation of smell and taste 6/29/2012    Hypertension     Malignant neoplasm of breast (female), unspecified site 4/27/2015    Neuropathy     Nonspecific abnormal unspecified cardiovascular function study 4/12/2013    ECG READ AS SHOWING A T-WAVE ABNORMALITY     Post-mastectomy lymphedema syndrome     Schizophrenia     Stroke      Past Surgical History:   Procedure Laterality Date    BREAST RECONSTRUCTION  11/23/11    B/L SHLOMO flap reconstruction S/P left MRM, right prophylactic mastectomy    BREAST RECONSTRUCTION Bilateral 3/13/2015    NIPPLE RECONSTRUCTION    INJECTION OF ANESTHETIC AGENT AROUND NERVE Left 12/9/2020    Procedure: BLOCK, NERVE, C3-C4-C5-C6 MEDIAL BRANCH;  Surgeon: Darren Jerry MD;  Location: [facility];  Service: Pain Management;  Laterality: Left;    MASTECTOMY Bilateral 01/2011    bilateral    RADIOFREQUENCY ABLATION Left 2/3/2021    Procedure: RADIOFREQUENCY ABLATION, C3,C4,C5,C6 MEDIAL BRANCH 1 of 2;  Surgeon: Darren Jerry MD;  Location: [facility];  Service: Pain Management;  Laterality: Left;    RADIOFREQUENCY ABLATION Right 7/28/2021    Procedure: RADIOFREQUENCY ABLATION, C3-C4-C5-C6 MEDIAL BR ANCH 1 IOF 2;  Surgeon: Darren Jerry MD;  Location: [facility];  Service: Pain Management;  Laterality: Right;    RADIOFREQUENCY ABLATION Left 8/18/2021    Procedure: RADIOFREQUENCY ABLATION, C3-C4-C5-C6 MEDIAL BRANCH 2 OF 2;  Surgeon: Darren Jerry MD;  Location: [facility];  Service: Pain Management;  Laterality: Left;    TRANSFORAMINAL EPIDURAL INJECTION OF STEROID Bilateral 11/4/2020    Procedure:  INJECTION, STEROID, EPIDURAL, TRANSFORAMINAL APPROACH L4/L5;  Surgeon: Darren Jerry MD;  Location: [facility];  Service: Pain Management;  Laterality: Bilateral;  Bilateral L4/L5    TRANSFORAMINAL EPIDURAL INJECTION OF STEROID Bilateral 2/10/2021    Procedure: INJECTION, STEROID, EPIDURAL, TRANSFORAMINAL APPROACH, L4-L5;  Surgeon: Darren Jerry MD;  Location: [facility];  Service: Pain Management;  Laterality: Bilateral;    TRANSFORAMINAL EPIDURAL INJECTION OF STEROID Bilateral 3/10/2021    Procedure: INJECTION, STEROID, EPIDURAL, TRANSFORAMINAL APPROACH, L4-L5;  Surgeon: Darren Jerry MD;  Location: [facility];  Service: Pain Management;  Laterality: Bilateral;    TRANSFORAMINAL EPIDURAL INJECTION OF STEROID Bilateral 12/15/2021    Procedure: INJECTION, STEROID, EPIDURAL, TRANSFORAMINAL APPROACH  Bilateral L4/5 TFESI / needs consent;  Surgeon: Darren Jerry MD;  Location: [facility];  Service: Pain Management;  Laterality: Bilateral;    TUBAL LIGATION       Review of patient's allergies indicates:  No Known Allergies     PMHx, PSHx, Allergies, Medications reviewed in epic      ROS negative except pain complaints in HPI    OBJECTIVE:    LMP  (LMP Unknown)   Breastfeeding No     PHYSICAL EXAMINATION:    GENERAL: Well appearing, in no acute distress, alert and oriented x3.  PSYCH:  Mood and affect appropriate.  SKIN: Skin color, texture, turgor normal, no rashes or lesions.  CV: RRR with palpation of the radial artery.  PULM: No evidence of respiratory difficulty, symmetric chest rise. Clear to auscultation.  NEURO: Cranial nerves grossly intact.    Plan:    Proceed with procedure as planned    Toni Elkins  12/14/2022

## 2022-12-15 ENCOUNTER — PATIENT MESSAGE (OUTPATIENT)
Dept: ADMINISTRATIVE | Facility: HOSPITAL | Age: 62
End: 2022-12-15
Payer: MEDICARE

## 2023-01-06 ENCOUNTER — OFFICE VISIT (OUTPATIENT)
Dept: PAIN MEDICINE | Facility: CLINIC | Age: 63
End: 2023-01-06
Payer: MEDICARE

## 2023-01-06 VITALS
RESPIRATION RATE: 18 BRPM | BODY MASS INDEX: 31.24 KG/M2 | SYSTOLIC BLOOD PRESSURE: 115 MMHG | DIASTOLIC BLOOD PRESSURE: 86 MMHG | HEIGHT: 64 IN | HEART RATE: 75 BPM | WEIGHT: 183 LBS

## 2023-01-06 DIAGNOSIS — M47.812 CERVICAL SPONDYLOSIS: Primary | ICD-10-CM

## 2023-01-06 DIAGNOSIS — M51.9 LUMBAR DISC DISORDER: ICD-10-CM

## 2023-01-06 DIAGNOSIS — M54.16 LUMBAR RADICULOPATHY: ICD-10-CM

## 2023-01-06 PROCEDURE — 99999 PR PBB SHADOW E&M-EST. PATIENT-LVL V: CPT | Mod: PBBFAC,,, | Performed by: NURSE PRACTITIONER

## 2023-01-06 PROCEDURE — 99999 PR PBB SHADOW E&M-EST. PATIENT-LVL V: ICD-10-PCS | Mod: PBBFAC,,, | Performed by: NURSE PRACTITIONER

## 2023-01-06 PROCEDURE — 1159F MED LIST DOCD IN RCRD: CPT | Mod: CPTII,S$GLB,, | Performed by: NURSE PRACTITIONER

## 2023-01-06 PROCEDURE — 3072F PR LOW RISK FOR RETINOPATHY: ICD-10-PCS | Mod: CPTII,S$GLB,, | Performed by: NURSE PRACTITIONER

## 2023-01-06 PROCEDURE — 3008F BODY MASS INDEX DOCD: CPT | Mod: CPTII,S$GLB,, | Performed by: NURSE PRACTITIONER

## 2023-01-06 PROCEDURE — 3074F SYST BP LT 130 MM HG: CPT | Mod: CPTII,S$GLB,, | Performed by: NURSE PRACTITIONER

## 2023-01-06 PROCEDURE — 3072F LOW RISK FOR RETINOPATHY: CPT | Mod: CPTII,S$GLB,, | Performed by: NURSE PRACTITIONER

## 2023-01-06 PROCEDURE — 3008F PR BODY MASS INDEX (BMI) DOCUMENTED: ICD-10-PCS | Mod: CPTII,S$GLB,, | Performed by: NURSE PRACTITIONER

## 2023-01-06 PROCEDURE — 99215 OFFICE O/P EST HI 40 MIN: CPT | Mod: PBBFAC | Performed by: NURSE PRACTITIONER

## 2023-01-06 PROCEDURE — 99214 PR OFFICE/OUTPT VISIT, EST, LEVL IV, 30-39 MIN: ICD-10-PCS | Mod: S$GLB,,, | Performed by: NURSE PRACTITIONER

## 2023-01-06 PROCEDURE — 3079F PR MOST RECENT DIASTOLIC BLOOD PRESSURE 80-89 MM HG: ICD-10-PCS | Mod: CPTII,S$GLB,, | Performed by: NURSE PRACTITIONER

## 2023-01-06 PROCEDURE — 3074F PR MOST RECENT SYSTOLIC BLOOD PRESSURE < 130 MM HG: ICD-10-PCS | Mod: CPTII,S$GLB,, | Performed by: NURSE PRACTITIONER

## 2023-01-06 PROCEDURE — 1159F PR MEDICATION LIST DOCUMENTED IN MEDICAL RECORD: ICD-10-PCS | Mod: CPTII,S$GLB,, | Performed by: NURSE PRACTITIONER

## 2023-01-06 PROCEDURE — 99214 OFFICE O/P EST MOD 30 MIN: CPT | Mod: S$GLB,,, | Performed by: NURSE PRACTITIONER

## 2023-01-06 PROCEDURE — 1160F PR REVIEW ALL MEDS BY PRESCRIBER/CLIN PHARMACIST DOCUMENTED: ICD-10-PCS | Mod: CPTII,S$GLB,, | Performed by: NURSE PRACTITIONER

## 2023-01-06 PROCEDURE — 1160F RVW MEDS BY RX/DR IN RCRD: CPT | Mod: CPTII,S$GLB,, | Performed by: NURSE PRACTITIONER

## 2023-01-06 PROCEDURE — 3079F DIAST BP 80-89 MM HG: CPT | Mod: CPTII,S$GLB,, | Performed by: NURSE PRACTITIONER

## 2023-01-06 NOTE — PROGRESS NOTES
Chronic patient Established Note (Follow up )    SUBJECTIVE:    Interval History 1/6/23:  Mrs Gonzales presents for follow up of pain complaints. She is s/p Repeat B L4/5 TFESI and states some improvement. Pt further states exacerbated cervicalgia which was relieved prior by RFAs of approx 80% for over 12 months. Pain is left and right sided. Pain limiting functioning. She denies any myelopathic symptoms.     Interval History 11/4/2022:  Mrs Gonzales presents for follow up of lower back pain and returning cervicalgia. She is approx 11 months out of prior B L4/5 TFESI with >90% improved symptoms and ready to repeat this 1st prior to repeating prior Cervical RFAs. She has no newer areas of pain or neurological changes. She does not focally voice any s/s concerning for myelopathy or cauda equina.     Interval History 11/22/2021:  The Pt presents for follow up evaluation. She was doing well until she had fall. She states this was due to legs feeling weak after mourning loss of her brother. Pt states no new areas of pain and lumbar pain is same pain when she had prior B L4/5 TFESIs benefit her. She denies new neurological changes. She is currently prescribed Lyrica 100mg TID with benefit and denies adverse SE.     Interval Hx 10/04/21:  Jonathan Gonzales presents to the clinic for a follow-up appointment s/p bilateral C3,4,5,6 RFA (07/28/21 & 08/10/21) which she reports to have given her 80% relief. She currently has a abernathy after recent admission for urinary retention which she underwent cervical and lumbar MRI that did not showed the cause of her urinary retention to be from a spinal lesion. She has no complaints today, she continues to take Liryca 100mg TID with no side effects. No upper extremity sx.     Interval History 7/6/2021:  The patient is here for follow up of left knee and chronic lower back/neck pain.  She has been having more increased neck pain recently.  She did have left-sided cervical radiofrequency  ablation earlier this year with benefit.  However, we did not perform the right because she had a flare-up of back and leg pain which required an epidural steroid injection.  Recently, her back pain has been minimal and she would like to focus on her neck again.  The pain mostly stays in the neck without radiation into the arms.  However, she does have numbness to all 4 extremities consistent with her diabetic neuropathy.  Voltaren gel has been helpful for her knee pain. Since previous encounter, she had a recent annual follow up with her PCP. Her diabetes has been well controlled with her sugars within range. Her pain today is 7/10.    Interval History 5/10/2021:  The patient presents today for follow up of back, neck and left knee pain. Her back pain bothers her most with standing and walking. Her legs bother her most with walking. Her left knee pain has improved somewhat since previous encounter. She has had benefit with cervical RFAs in the past. She is doing home stretches daily. Her pain today is 8/10.    Interval History 4/9/2021:  The patient here today for follow-up of chronic neck and lower back pain.  She is now status post bilateral L4/5 transforaminal epidural steroid injections with significant benefit of leg pain.  Her neck pain is still mild at this time.  However, she reports new onset left anterior knee pain.  It does not worsen with activity.  It bothers her more when she touches the front of her knee or puts pressure on it.  She denies any recent injury.  She has not tried anything for the pain.  Specifically, she has not tried creams or ice.  Her pain today is 6/10.    Interval history 03/03/2021:  Since previous encounter the patient has had left-sided radiofrequency ablation cervical spine at C3, C4, C5, C6 on 02/03/2021 followed by bilateral L4-5 transforaminal epidural steroid injections on 02/10/2021.  She is doing well in her neck pain and also having improvement in her lower extremity  radicular pain symptoms but she states that she has been doing exercises and some her lower extremity radicular symptoms are starting to return but originally she had substantial relief.  No other health changes since previous encounter.    Interval History 1/13/2021:  Here for follow-up of chronic neck and lower back pain.  She is status post left C3, 4, 5, 6 medial branch blocks with significant benefit for 1 day.  She wishes to proceed with radiofrequency ablation of affected nerves.  She continues to report pain across the neck, worse on the left side.  It mainly stays in the neck without radiation into the arms.  She previously reported benefit for her leg pain with transforaminal lumbar steroid injection in November.  However, she states that her pain has been returning.  It radiates down the side of both legs to her shins.  She did previously have surgical consult and does not wish to pursue surgery at this time.  She does find Lyrica helpful in like a refill today.  Her pain today is 9/10.    Interval History 12/1/2020:  The patient presents today for follow up of back and right leg pain.  She is now s/p bilateral L4 TF CARLEE on 11/4/20. She is reporting 90% relief of bilateral leg pain.  She still has some pain to the left buttock which she says is worse when she sits and when she stands.  She is no longer having significant shooting pain into the legs.  She continues to have long-standing numbness to her feet consistent with previous diagnosis of neuropathy.  Her biggest complaint today is left-sided neck pain.  She has had neck pain for many years.  She underwent cervical radiofrequency ablation in 2015.  Documentation after that time states that she had limited benefit although she did have relief short-term from the blocks.  She knows says that she feels that it did help her significantly after a couple of months.  Her pain started to worsen over the past few weeks.  She has been evaluated by neurosurgery  in the past and cervical fusion was discussed, however, she does not wish to have surgery at this time.  She is not having any shooting pain into her arms at this time.  It is mostly across the left side of her neck and into her left shoulder.  She denies any new weakness or dropping of objects.  Her pain today is 8/10.    Previous Encounter:  60 year old female with PMH of breast cancer (s/p mastectomy bilaterally), DM2, HTN, bipolar disorder and schizophrenia. patient presents today to discuss severe back and right leg pain.  We have not seen the patient in clinic in over 3 years and she reports that she was mainly doing well with her back and neck pain.  She was sent up here today by her primary care doctor, Dr. Contreras, as she saw him today.  She reports that she has had back pain for many years but it has been well-controlled until 3 days ago.  She has been having severe right sided lower back pain with radiation down the back of the right leg with numbness.  She has pain and numbness to her right heel and calf.  She is not having significant symptoms on the left.  Her leg pain is greater than her back pain.  She is unable to walk without assistance.  She is feeling subjective weakness to her right foot.  She takes Lyrica twice daily from PCP for neuropathy.  She says that her sugars have been running 130-150 in the morning. She had a recent visit with oncology and was stable.   Pain Medications:    - Opioids: None  - Adjuvant Medications: Lyrica ( Pregabalin)  - Anti-Coagulants: None  - Others: See med list    Opioid Contract: no     report:  Reviewed and consistent with medication use as prescribed.    Pain Procedures:  5/9/14 C7- T1 IL CARLEE  6/20/14 C7-T1 IL CARLEE  5/22/15 Left C3,4,5,6 MBB  6/26/15 Left C3,4,5,6 MBB  7/31/15 Left C3,4,5,6 RFA- no relief  9/24/15 Right C5-6 TF CARLEE (IR)- limited benefit  11/4/20 Bilateral L4/5 TF CARLEE- 90% relief  12/9/20 Left C3,4,5,6 MBB- 80% relief for one day  2/3/21 Left  C3,4,5,6 RFA- 60% relief  3/10/21 Bilateral L4/5 TF CARLEE- 90% relief  07/28/21 Right C3,4,5,6 RFA  08/18/2021 Left C3,4,5,6 RFA   12/15/2021 Bilateral L4/5 TFESI  12/14/2022 B L4/5 TFESI      Physical Therapy/Home Exercise: yes in the past       Imaging:  MRI Cervical 09/23/21  FINDINGS:  There is mild reversal of the normal cervical lordosis.  Cervical vertebral body heights appear adequately maintained.  There is no evidence of diffuse bone marrow replacement process.     The cervicomeduallary junction is normal.  The cervical cord is normal in contour, caliber, and signal. The adjacent soft tissue structures demonstrate no significant abnormality.     C2-3:  There is central posterior disc osteophyte complex and mild bilateral facet arthropathy.  No significant spinal canal stenosis or neural foraminal narrowing.     C3-4:  There is a posterior disc osteophyte complex with effacement of the anterior thecal sac and at most mild spinal canal stenosis.  There is mild bilateral facet arthropathy, left greater than right, without significant neural foraminal narrowing.     C4-5:  There is a broad-based posterior disc osteophyte complex with effacement of the anterior thecal sac and mild spinal canal stenosis.  There is bilateral uncovertebral spurring with mild left-sided neural foraminal narrowing.     C5-6:  There is a broad-based posterior disc osteophyte complex with effacement of the anterior thecal sac.  There is mild to moderate spinal canal stenosis.  There is bilateral uncovertebral spurring with moderate to severe right-sided and mild left-sided neural foraminal narrowing.     C6-7:  There is a broad-based posterior disc osteophyte complex with prominent asymmetric left paracentral component.  There is effacement of the anterior thecal sac with mild mass effect upon the left anterolateral cervical cord.  There is mild to moderate spinal canal stenosis.  There is right-sided uncovertebral spurring with mild  right-sided neural foraminal narrowing.     C7-T1:  No disc herniation, spinal canal narrowing, or neuroforaminal narrowing.     Impression:     Multilevel degenerative changes of the cervical spine, similar to prior examination, most pronounced at the levels of C4-C5 through C6-C7.  Please see above for level by level details.    MRI Lumbar 09/23/21  FINDINGS:  There is grade 1 anterolisthesis of L4 on L5, similar to prior examination.  The vertebral body heights are well maintained, with no fracture.  There is no marrow signal abnormality suspicious for infiltrative process.  There is mild disc desiccation at L4-L5.  The conus is normal in appearance and terminates at the L1-L2 level.     L1-L2: There is no focal disk herniation.  No significant spinal canal or neural foraminal narrowing.     L2-L3: There is no focal disk herniation.  No significant spinal canal or neural foraminal narrowing.     L3-L4: There is no focal disc herniation.  There is ligamentum flavum thickening and mild bilateral facet arthropathy.  No significant spinal canal or neural foraminal narrowing.     L4-L5: There is grade 1 anterolisthesis with uncovering of the disc.  There is a circumferential disc bulge.  There is advanced ligamentum flavum thickening and bilateral facet arthropathy.  There is prominent fluid noted within the right facet articulation.  There is associated mass effect and tapering of the thecal sac with severe spinal canal stenosis, similar to prior examination.  There is mild bilateral neural foraminal narrowing.     L5-S1: There is no focal disc herniation.  There is mild bilateral facet arthropathy.  No significant spinal canal or neural foraminal narrowing.     Limited views of the posterior abdomen demonstrate bilateral renal T2 hyperintensities likely representing cysts.     Impression:     Grade 1 anterolisthesis of L4 on L5 with uncovering of the disc.  There is associated advanced bilateral facet arthropathy at  "L4-L5 with resulting severe spinal canal stenosis and mild bilateral neural foraminal narrowing, similar to prior MRI examination.  Please see above for additional level by level details    Allergies: Review of patient's allergies indicates:  No Known Allergies    Current Medications:   Current Outpatient Medications   Medication Sig Dispense Refill    ACCU-CHEK SOFT DEV LANCETS Kit       alcohol swabs (BD ALCOHOL SWABS) PadM Apply 1 each topically as needed. 100 each 11    ammonium lactate 12 % Crea Apply twice daily to affected parts both feet as needed. 140 g 11    arm brace (WRIST SUPPORT LARGE-XLARGE MISC)       atorvastatin (LIPITOR) 80 MG tablet TAKE 1 TABLET EVERY DAY 90 tablet 0    blood sugar diagnostic (ACCU-CHEK AMBER PLUS TEST STRP) Strp Inject 2 strips into the skin 2 (two) times a day. Test Blood Sugar 200 strip 3    blood sugar diagnostic Strp To check BG 3 times daily, to use with Accu check Guide meter pt has. 200 strip 3    blood-glucose meter (ACCU-CHEK AMBER PLUS METER) Misc Use as directed 1 each 0    blood-glucose meter (ACCU-CHEK AMBER PLUS METER) Misc Use as directed to check blood sugar three times daily 300 each 3    DEPAKOTE  mg Tb24 Take 500 mg by mouth nightly.       divalproex ER (DEPAKOTE ER) 250 MG 24 hr tablet Take 1 tablet (250 mg total) by mouth once daily. 90 tablet 3    EASY TOUCH 31 gauge x 1/4" Ndle Use as directed 100 each 5    insulin (LANTUS SOLOSTAR U-100 INSULIN) glargine 100 units/mL (3mL) SubQ pen INJECT 23 UNITS SUBCUTANEOUSLY NIGHTLY 30 mL 5    lancets (ONETOUCH ULTRASOFT LANCETS) [facility] [place] Pt to check BG up to QID. Dispense strips compatible with pt brand meter 400 each 3    lancets Misc To check BG 3 times daily, to use with Accu check Guide meter pt has. 200 each 3    liraglutide 0.6 mg/0.1 mL, 18 mg/3 mL, subq PNIJ (VICTOZA 3-HERMANN) 0.6 mg/0.1 mL (18 mg/3 mL) PnIj pen INJECT 1.8 MG SUBCUTANEOUSLY ONCE DAILY. 27 mL 11    lisinopriL-hydrochlorothiazide " "(PRINZIDE,ZESTORETIC) 10-12.5 mg per tablet Take 1 tablet by mouth once daily. 90 tablet 3    losartan (COZAAR) 25 MG tablet TAKE 1 TABLET ONE TIME DAILY 90 tablet 0    metFORMIN (GLUCOPHAGE) 500 MG tablet Take 1 tablet (500 mg total) by mouth 2 (two) times daily. Take with food. 180 tablet 3    MITIGARE 0.6 mg Cap TAKE 1 CAPSULE BY MOUTH DAILY.PLEASE TAKE THIS MEDICATION ONCE DAILY UNTIL YOUR POTENTIAL GOUT FLARE IN YOUR FOOT CLEARS      nicotine (NICODERM CQ) 7 mg/24 hr Place 1 patch onto the skin once daily. 28 patch 0    nicotine polacrilex 2 MG Lozg Take 1 lozenge (2 mg total) by mouth as needed (Use in place of cigarettes). 216 lozenge 0    OLANZapine (ZYPREXA) 10 MG tablet TK 1 T PO QHS 90 tablet 3    oxybutynin (DITROPAN-XL) 10 MG 24 hr tablet Take 1 tablet (10 mg total) by mouth once daily. 90 tablet 3    pen needle, diabetic (BD ULTRA-FINE [place] PEN NEEDLE) 32 gauge x 5/32" Ndle Pt is injecting once daily 30 each 11    pen needle, diabetic, safety 29 gauge x 3/16" Ndle Use as instructed 200 each 11    pregabalin (LYRICA) 100 MG capsule Take 1 capsule (100 mg total) by mouth 3 (three) times daily. 270 capsule 1    QUEtiapine (SEROQUEL) 100 MG Tab TAKE 1 TABLET EVERY EVENING 90 tablet 3    traMADoL (ULTRAM) 50 mg tablet Take 1 tablet (50 mg total) by mouth every 6 (six) hours as needed (Severe left foot pain). 12 tablet 0    TRUE METRIX GLUCOSE METER Misc       TRUEPLUS LANCETS 33 gauge Misc 100 lancets by Subconjunctival route 4 (four) times daily. 100 each 0    ziprasidone (GEODON) 40 MG Cap TAKE 1 CAPSULE EVERY EVENING 30 capsule 0    aspirin (ECOTRIN) 81 MG EC tablet Take 1 tablet (81 mg total) by mouth once daily.  0    blood glucose control, high Soln 1 drop by Misc.(Non-Drug; Combo Route) route as needed. 1 each 0    blood glucose control, low Soln 1 drop by Misc.(Non-Drug; Combo Route) route as needed. 1 each 0    NIFEdipine (PROCARDIA-XL) 30 MG (OSM) 24 hr tablet Take 1 tablet (30 mg total) by mouth " 2 (two) times a day. 30 tablet 1     No current facility-administered medications for this visit.     Facility-Administered Medications Ordered in Other Visits   Medication Dose Route Frequency Provider Last Rate Last Admin    0.9%  NaCl infusion   Intravenous Continuous Jacky See MD 25 mL/hr at 02/03/21 1432 New Bag at 02/03/21 1432       REVIEW OF SYSTEMS:    GENERAL:  No weight loss, malaise or fevers.  HEENT:  Negative for frequent or significant headaches.  NECK:  Negative for lumps, goiter, pain and significant neck swelling.  RESPIRATORY:  Negative for cough, wheezing or shortness of breath.  CARDIOVASCULAR:  Negative for chest pain, leg swelling or palpitations. Hypertension.  GI:  Negative for abdominal discomfort, blood in stools or black stools or change in bowel habits.  MUSCULOSKELETAL:  See HPI.  SKIN:  Negative for lesions, rash, and itching.  PSYCH:  Positive for sleep disturbance.  H/O depression, bipolar disorder and schizophrenia managed by outside psych.  HEMATOLOGY/LYMPHOLOGY:  Negative for prolonged bleeding, bruising easily or swollen nodes. H/O breast cancer.  NEURO:   No history of headaches, syncope, paralysis, seizures or tremors.  ENDO: Diabetes.  All other reviewed and negative other than HPI.    Past Medical History:  Past Medical History:   Diagnosis Date    Bipolar disorder     Cancer of female breast 10/2010    T2 N1 Mx, Stage IIB left breast cancer    Cancer of upper-outer quadrant of female breast 7/9/2012    Depression     Diabetes mellitus type II     Disturbances of sensation of smell and taste 6/29/2012    Hypertension     Malignant neoplasm of breast (female), unspecified site 4/27/2015    Neuropathy     Nonspecific abnormal unspecified cardiovascular function study 4/12/2013    ECG READ AS SHOWING A T-WAVE ABNORMALITY     Post-mastectomy lymphedema syndrome     Schizophrenia     Stroke        Past Surgical History:  Past Surgical History:   Procedure Laterality Date     BREAST RECONSTRUCTION  11/23/11    B/L SHLOMO flap reconstruction S/P left MRM, right prophylactic mastectomy    BREAST RECONSTRUCTION Bilateral 3/13/2015    NIPPLE RECONSTRUCTION    INJECTION OF ANESTHETIC AGENT AROUND NERVE Left 12/9/2020    Procedure: BLOCK, NERVE, C3-C4-C5-C6 MEDIAL BRANCH;  Surgeon: Darren Jerry MD;  Location: [facility];  Service: Pain Management;  Laterality: Left;    MASTECTOMY Bilateral 01/2011    bilateral    RADIOFREQUENCY ABLATION Left 2/3/2021    Procedure: RADIOFREQUENCY ABLATION, C3,C4,C5,C6 MEDIAL BRANCH 1 of 2;  Surgeon: Darren Jerry MD;  Location: [facility];  Service: Pain Management;  Laterality: Left;    RADIOFREQUENCY ABLATION Right 7/28/2021    Procedure: RADIOFREQUENCY ABLATION, C3-C4-C5-C6 MEDIAL BR ANCH 1 IOF 2;  Surgeon: Darren Jerry MD;  Location: [facility];  Service: Pain Management;  Laterality: Right;    RADIOFREQUENCY ABLATION Left 8/18/2021    Procedure: RADIOFREQUENCY ABLATION, C3-C4-C5-C6 MEDIAL BRANCH 2 OF 2;  Surgeon: Darren Jerry MD;  Location: [facility];  Service: Pain Management;  Laterality: Left;    TRANSFORAMINAL EPIDURAL INJECTION OF STEROID Bilateral 11/4/2020    Procedure: INJECTION, STEROID, EPIDURAL, TRANSFORAMINAL APPROACH L4/L5;  Surgeon: Darren Jerry MD;  Location: [facility];  Service: Pain Management;  Laterality: Bilateral;  Bilateral L4/L5    TRANSFORAMINAL EPIDURAL INJECTION OF STEROID Bilateral 2/10/2021    Procedure: INJECTION, STEROID, EPIDURAL, TRANSFORAMINAL APPROACH, L4-L5;  Surgeon: Darren Jerry MD;  Location: [facility];  Service: Pain Management;  Laterality: Bilateral;    TRANSFORAMINAL EPIDURAL INJECTION OF STEROID Bilateral 3/10/2021    Procedure: INJECTION, STEROID, EPIDURAL, TRANSFORAMINAL APPROACH, L4-L5;  Surgeon: Darren Jerry MD;  Location: [facility];  Service: Pain Management;  Laterality: Bilateral;    TRANSFORAMINAL EPIDURAL INJECTION OF STEROID Bilateral  12/15/2021    Procedure: INJECTION, STEROID, EPIDURAL, TRANSFORAMINAL APPROACH  Bilateral L4/5 TFESI / needs consent;  Surgeon: Darren Jerry MD;  Location: [facility];  Service: Pain Management;  Laterality: Bilateral;    TRANSFORAMINAL EPIDURAL INJECTION OF STEROID Bilateral 2022    Procedure: INJECTION, STEROID, EPIDURAL, TRANSFORAMINAL APPROACH BILATERAL L4/5 CONTRAST;  Surgeon: Darren Jerry MD;  Location: [facility];  Service: Pain Management;  Laterality: Bilateral;    TUBAL LIGATION         Family History:  Family History   Problem Relation Age of Onset    Kidney disease Mother         Dialysis    Hypertension Mother     Deep vein thrombosis Mother     Glaucoma Mother     Diabetic kidney disease Sister     Lung cancer Sister     Diabetes Sister     Cancer Sister         lung    Diabetes Brother     Diabetes Son     No Known Problems Father     No Known Problems Maternal Grandmother     No Known Problems Maternal Grandfather     No Known Problems Paternal Grandmother     No Known Problems Paternal Grandfather     No Known Problems Son     Cervical cancer Daughter     Cancer Daughter         cervical cancer    No Known Problems Daughter     No Known Problems Daughter     No Known Problems Daughter     Breast cancer Neg Hx     Ovarian cancer Neg Hx        Social History:  Social History     Socioeconomic History    Marital status:     Number of children: 6   Occupational History    Occupation: Disability   Tobacco Use    Smoking status: Former     Packs/day: 0.50     Years: 25.00     Pack years: 12.50     Types: Cigarettes     Start date:      Quit date: 3/17/2022     Years since quittin.8    Smokeless tobacco: Never    Tobacco comments:     currently at less than 1/2 pack pd   Substance and Sexual Activity    Alcohol use: Yes     Alcohol/week: 0.0 standard drinks     Comment: social - once every 2 weeks    Drug use: No     Comment:  - stopped using crack cocaine     "Sexual activity: Not Currently     Partners: Male       OBJECTIVE:    /86   Pulse 75   Resp 18   Ht 5' 4" (1.626 m)   Wt 83 kg (182 lb 15.7 oz)   LMP  (LMP Unknown)   BMI 31.41 kg/m²     PHYSICAL EXAMINATION:    General appearance: Well appearing, in no acute distress, alert and oriented x3.  Psych:  Mood and affect appropriate.  Skin: Skin color, texture, turgor normal, no rashes or lesions, in both upper and lower body.  Head/face:  Atraumatic, normocephalic. No palpable lymph nodes  Neck: Minimal discomfort to palpation over the cervical paraspinous muscles. Spurling Negative. No pain with neck flexion, extension, or lateral flexion. .  Extremities: Peripheral joint ROM is full and pain free without obvious instability or laxity in all four extremities. No deformities, edema, or skin discoloration. Good capillary refill.  Genitourinary: Richard in place.   Musculoskeletal: Shoulder, hip, sacroiliac and knee provocative maneuvers are negative. Bilateral upper and lower extremity strength is normal and symmetric.  No atrophy or tone abnormalities are noted.  Neuro: Bilateral upper and lower extremity coordination symmetric.  . No loss of sensation is noted.  Gait: Using Straight Cane for ambulation     ASSESSMENT: 62 y.o. year old female with neck pain, consistent with     1. Cervical spondylosis  Procedure Order to Pain Management      2. Lumbar radiculopathy        3. Lumbar disc disorder                  PLAN:     - Prior records reviewed  -s/p repeat Bilateral L4/5 TFESI with some improvement  - Worsening of cerviclagia previously relieved by RFAs.   - s/f repeat Left and Right C3,4,5,6 RFAs  -Continue Lyrica 100mg TID  - I have stressed the importance of physical activity and a home exercise plan to help with pain and improve health.  - Patient can continue with medications for now since they are providing benefits, using them appropriately, and without side effects.  - Counseled patient regarding " the importance of activity modification.  - RTC 4 weeks after RFAs     The above plan and management options were discussed at length with patient. Patient is in agreement with the above and verbalized understanding.    Moses Coffey   01/09/2023    I spent a total of 30 minutes on the day of the visit.  This includes face to face time and non-face to face time preparing to see the patient by reviewing previous labs/imaging, obtaining and/or reviewing separately obtained history, documenting clinical information in the electronic or other health record, independently interpreting results and communicating results to the patient/family/caregiver.

## 2023-01-18 ENCOUNTER — HOSPITAL ENCOUNTER (INPATIENT)
Facility: HOSPITAL | Age: 63
LOS: 5 days | Discharge: HOME HEALTH | DRG: 565 | End: 2023-01-24
Attending: STUDENT IN AN ORGANIZED HEALTH CARE EDUCATION/TRAINING PROGRAM | Admitting: STUDENT IN AN ORGANIZED HEALTH CARE EDUCATION/TRAINING PROGRAM
Payer: MEDICARE

## 2023-01-18 ENCOUNTER — PATIENT MESSAGE (OUTPATIENT)
Dept: ADMINISTRATIVE | Facility: HOSPITAL | Age: 63
End: 2023-01-18
Payer: MEDICARE

## 2023-01-18 DIAGNOSIS — K04.7 DENTAL INFECTION: Chronic | ICD-10-CM

## 2023-01-18 DIAGNOSIS — M50.30 DDD (DEGENERATIVE DISC DISEASE), CERVICAL: ICD-10-CM

## 2023-01-18 DIAGNOSIS — Z79.4 TYPE 2 DIABETES MELLITUS WITHOUT COMPLICATION, WITH LONG-TERM CURRENT USE OF INSULIN: ICD-10-CM

## 2023-01-18 DIAGNOSIS — R07.9 CHEST PAIN: ICD-10-CM

## 2023-01-18 DIAGNOSIS — R41.82 AMS (ALTERED MENTAL STATUS): ICD-10-CM

## 2023-01-18 DIAGNOSIS — T79.6XXA TRAUMATIC RHABDOMYOLYSIS: ICD-10-CM

## 2023-01-18 DIAGNOSIS — M79.89 FOOT SWELLING: ICD-10-CM

## 2023-01-18 DIAGNOSIS — M47.812 CERVICAL SPONDYLOSIS WITHOUT MYELOPATHY: ICD-10-CM

## 2023-01-18 DIAGNOSIS — E11.9 TYPE 2 DIABETES MELLITUS WITHOUT COMPLICATION, WITH LONG-TERM CURRENT USE OF INSULIN: ICD-10-CM

## 2023-01-18 DIAGNOSIS — M54.12 CERVICAL RADICULOPATHY: ICD-10-CM
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
## 2023-01-18 DIAGNOSIS — R55 SYNCOPE: ICD-10-CM

## 2023-01-18 DIAGNOSIS — E11.42 DM TYPE 2 WITH DIABETIC PERIPHERAL NEUROPATHY: ICD-10-CM

## 2023-01-18 DIAGNOSIS — T79.6XXA TRAUMATIC RHABDOMYOLYSIS, INITIAL ENCOUNTER: Primary | ICD-10-CM

## 2023-01-18 DIAGNOSIS — R00.0 TACHYCARDIA: ICD-10-CM

## 2023-01-18 LAB
ALBUMIN SERPL BCP-MCNC: 3.1 G/DL (ref 3.5–5.2)
ALP SERPL-CCNC: 76 U/L (ref 55–135)
ALT SERPL W/O P-5'-P-CCNC: 27 U/L (ref 10–44)
ANION GAP SERPL CALC-SCNC: 9 MMOL/L (ref 8–16)
AST SERPL-CCNC: 78 U/L (ref 10–40)
BASOPHILS # BLD AUTO: 0.03 K/UL (ref 0–0.2)
BASOPHILS NFR BLD: 0.3 % (ref 0–1.9)
BILIRUB SERPL-MCNC: 0.7 MG/DL (ref 0.1–1)
BUN SERPL-MCNC: 21 MG/DL (ref 8–23)
BUN SERPL-MCNC: 24 MG/DL (ref 6–30)
CALCIUM SERPL-MCNC: 10 MG/DL (ref 8.7–10.5)
CHLORIDE SERPL-SCNC: 106 MMOL/L (ref 95–110)
CHLORIDE SERPL-SCNC: 109 MMOL/L (ref 95–110)
CK SERPL-CCNC: 3352 U/L (ref 20–180)
CO2 SERPL-SCNC: 23 MMOL/L (ref 23–29)
CREAT SERPL-MCNC: 1.3 MG/DL (ref 0.5–1.4)
CREAT SERPL-MCNC: 1.4 MG/DL (ref 0.5–1.4)
DIFFERENTIAL METHOD: ABNORMAL
EOSINOPHIL # BLD AUTO: 0 K/UL (ref 0–0.5)
EOSINOPHIL NFR BLD: 0.1 % (ref 0–8)
ERYTHROCYTE [DISTWIDTH] IN BLOOD BY AUTOMATED COUNT: 15.1 % (ref 11.5–14.5)
EST. GFR  (NO RACE VARIABLE): 46.2 ML/MIN/1.73 M^2
GLUCOSE SERPL-MCNC: 123 MG/DL (ref 70–110)
GLUCOSE SERPL-MCNC: 133 MG/DL (ref 70–110)
HCT VFR BLD AUTO: 37 % (ref 37–48.5)
HCT VFR BLD CALC: 37 %PCV (ref 36–54)
HGB BLD-MCNC: 11.5 G/DL (ref 12–16)
IMM GRANULOCYTES # BLD AUTO: 0.08 K/UL (ref 0–0.04)
IMM GRANULOCYTES NFR BLD AUTO: 0.9 % (ref 0–0.5)
LACTATE SERPL-SCNC: 1.3 MMOL/L (ref 0.5–2.2)
LYMPHOCYTES # BLD AUTO: 1.2 K/UL (ref 1–4.8)
LYMPHOCYTES NFR BLD: 13.2 % (ref 18–48)
MAGNESIUM SERPL-MCNC: 1.8 MG/DL (ref 1.6–2.6)
MCH RBC QN AUTO: 29 PG (ref 27–31)
MCHC RBC AUTO-ENTMCNC: 31.1 G/DL (ref 32–36)
MCV RBC AUTO: 93 FL (ref 82–98)
MONOCYTES # BLD AUTO: 1.1 K/UL (ref 0.3–1)
MONOCYTES NFR BLD: 13.1 % (ref 4–15)
NEUTROPHILS # BLD AUTO: 6.3 K/UL (ref 1.8–7.7)
NEUTROPHILS NFR BLD: 72.4 % (ref 38–73)
NRBC BLD-RTO: 0 /100 WBC
PLATELET # BLD AUTO: 298 K/UL (ref 150–450)
PMV BLD AUTO: 10.7 FL (ref 9.2–12.9)
POC IONIZED CALCIUM: 1.04 MMOL/L (ref 1.06–1.42)
POC TCO2 (MEASURED): 27 MMOL/L (ref 23–29)
POTASSIUM BLD-SCNC: 4.2 MMOL/L (ref 3.5–5.1)
POTASSIUM SERPL-SCNC: 3.9 MMOL/L (ref 3.5–5.1)
PROT SERPL-MCNC: 7.7 G/DL (ref 6–8.4)
RBC # BLD AUTO: 3.97 M/UL (ref 4–5.4)
SAMPLE: ABNORMAL
SODIUM BLD-SCNC: 141 MMOL/L (ref 136–145)
SODIUM SERPL-SCNC: 141 MMOL/L (ref 136–145)
TROPONIN I SERPL DL<=0.01 NG/ML-MCNC: 0.01 NG/ML (ref 0–0.03)
WBC # BLD AUTO: 8.71 K/UL (ref 3.9–12.7)

## 2023-01-18 PROCEDURE — 99285 EMERGENCY DEPT VISIT HI MDM: CPT | Mod: 25

## 2023-01-18 PROCEDURE — 83735 ASSAY OF MAGNESIUM: CPT | Performed by: STUDENT IN AN ORGANIZED HEALTH CARE EDUCATION/TRAINING PROGRAM

## 2023-01-18 PROCEDURE — 87040 BLOOD CULTURE FOR BACTERIA: CPT | Performed by: STUDENT IN AN ORGANIZED HEALTH CARE EDUCATION/TRAINING PROGRAM

## 2023-01-18 PROCEDURE — 84484 ASSAY OF TROPONIN QUANT: CPT | Performed by: STUDENT IN AN ORGANIZED HEALTH CARE EDUCATION/TRAINING PROGRAM

## 2023-01-18 PROCEDURE — 84443 ASSAY THYROID STIM HORMONE: CPT | Performed by: STUDENT IN AN ORGANIZED HEALTH CARE EDUCATION/TRAINING PROGRAM

## 2023-01-18 PROCEDURE — 80048 BASIC METABOLIC PNL TOTAL CA: CPT | Mod: XB

## 2023-01-18 PROCEDURE — 83605 ASSAY OF LACTIC ACID: CPT | Performed by: STUDENT IN AN ORGANIZED HEALTH CARE EDUCATION/TRAINING PROGRAM

## 2023-01-18 PROCEDURE — 85025 COMPLETE CBC W/AUTO DIFF WBC: CPT | Performed by: STUDENT IN AN ORGANIZED HEALTH CARE EDUCATION/TRAINING PROGRAM

## 2023-01-18 PROCEDURE — 82550 ASSAY OF CK (CPK): CPT | Performed by: STUDENT IN AN ORGANIZED HEALTH CARE EDUCATION/TRAINING PROGRAM

## 2023-01-18 PROCEDURE — 93010 ELECTROCARDIOGRAM REPORT: CPT | Mod: ,,, | Performed by: INTERNAL MEDICINE

## 2023-01-18 PROCEDURE — 99285 PR EMERGENCY DEPT VISIT,LEVEL V: ICD-10-PCS | Mod: ,,, | Performed by: STUDENT IN AN ORGANIZED HEALTH CARE EDUCATION/TRAINING PROGRAM

## 2023-01-18 PROCEDURE — 80053 COMPREHEN METABOLIC PANEL: CPT | Performed by: EMERGENCY MEDICINE

## 2023-01-18 PROCEDURE — 93005 ELECTROCARDIOGRAM TRACING: CPT

## 2023-01-18 PROCEDURE — 93010 EKG 12-LEAD: ICD-10-PCS | Mod: ,,, | Performed by: INTERNAL MEDICINE

## 2023-01-18 PROCEDURE — 99285 EMERGENCY DEPT VISIT HI MDM: CPT | Mod: ,,, | Performed by: STUDENT IN AN ORGANIZED HEALTH CARE EDUCATION/TRAINING PROGRAM

## 2023-01-19 PROBLEM — T79.6XXA TRAUMATIC RHABDOMYOLYSIS: Status: ACTIVE | Noted: 2023-01-19

## 2023-01-19 PROBLEM — M54.16 LUMBAR RADICULOPATHY: Status: RESOLVED | Noted: 2021-03-03 | Resolved: 2023-01-19

## 2023-01-19 PROBLEM — E11.9 DIABETES MELLITUS, TYPE II: Status: RESOLVED | Noted: 2020-03-04 | Resolved: 2023-01-19

## 2023-01-19 PROBLEM — R55 VASOVAGAL SYNCOPE: Status: ACTIVE | Noted: 2023-01-19

## 2023-01-19 LAB
ALBUMIN SERPL BCP-MCNC: 2.7 G/DL (ref 3.5–5.2)
ALP SERPL-CCNC: 64 U/L (ref 55–135)
ALT SERPL W/O P-5'-P-CCNC: 22 U/L (ref 10–44)
AMPHET+METHAMPHET UR QL: NEGATIVE
ANION GAP SERPL CALC-SCNC: 13 MMOL/L (ref 8–16)
AST SERPL-CCNC: 60 U/L (ref 10–40)
AV INDEX (PROSTH): 0.99
AV MEAN GRADIENT: 4 MMHG
AV PEAK GRADIENT: 7 MMHG
AV VALVE AREA: 3.25 CM2
AV VELOCITY RATIO: 0.87
BACTERIA #/AREA URNS AUTO: NORMAL /HPF
BARBITURATES UR QL SCN>200 NG/ML: NEGATIVE
BASOPHILS # BLD AUTO: 0.04 K/UL (ref 0–0.2)
BASOPHILS NFR BLD: 0.5 % (ref 0–1.9)
BENZODIAZ UR QL SCN>200 NG/ML: NEGATIVE
BILIRUB SERPL-MCNC: 0.6 MG/DL (ref 0.1–1)
BILIRUB UR QL STRIP: NEGATIVE
BSA FOR ECHO PROCEDURE: 1.93 M2
BUN SERPL-MCNC: 19 MG/DL (ref 8–23)
BZE UR QL SCN: NEGATIVE
CALCIUM SERPL-MCNC: 9.3 MG/DL (ref 8.7–10.5)
CANNABINOIDS UR QL SCN: NEGATIVE
CHLORIDE SERPL-SCNC: 106 MMOL/L (ref 95–110)
CLARITY UR REFRACT.AUTO: CLEAR
CO2 SERPL-SCNC: 22 MMOL/L (ref 23–29)
COLOR UR AUTO: YELLOW
CREAT SERPL-MCNC: 1.1 MG/DL (ref 0.5–1.4)
CREAT UR-MCNC: 268 MG/DL (ref 15–325)
CV ECHO LV RWT: 0.48 CM
DIFFERENTIAL METHOD: ABNORMAL
DOP CALC AO PEAK VEL: 1.36 M/S
DOP CALC AO VTI: 25.68 CM
DOP CALC LVOT AREA: 3.3 CM2
DOP CALC LVOT DIAMETER: 2.05 CM
DOP CALC LVOT PEAK VEL: 1.18 M/S
DOP CALC LVOT STROKE VOLUME: 83.53 CM3
DOP CALCLVOT PEAK VEL VTI: 25.32 CM
E WAVE DECELERATION TIME: 285.32 MSEC
E/A RATIO: 1.05
E/E' RATIO: 9.89 M/S
ECHO LV POSTERIOR WALL: 0.76 CM (ref 0.6–1.1)
EJECTION FRACTION: 65 %
EOSINOPHIL # BLD AUTO: 0 K/UL (ref 0–0.5)
EOSINOPHIL NFR BLD: 0.1 % (ref 0–8)
ERYTHROCYTE [DISTWIDTH] IN BLOOD BY AUTOMATED COUNT: 15.1 % (ref 11.5–14.5)
EST. GFR  (NO RACE VARIABLE): 56.5 ML/MIN/1.73 M^2
FRACTIONAL SHORTENING: 50 % (ref 28–44)
GLUCOSE SERPL-MCNC: 100 MG/DL (ref 70–110)
GLUCOSE SERPL-MCNC: 90 MG/DL (ref 70–110)
GLUCOSE UR QL STRIP: NEGATIVE
HCT VFR BLD AUTO: 31.7 % (ref 37–48.5)
HGB BLD-MCNC: 9.7 G/DL (ref 12–16)
HGB UR QL STRIP: ABNORMAL
HYALINE CASTS UR QL AUTO: 0 /LPF
IMM GRANULOCYTES # BLD AUTO: 0.05 K/UL (ref 0–0.04)
IMM GRANULOCYTES NFR BLD AUTO: 0.7 % (ref 0–0.5)
INTERVENTRICULAR SEPTUM: 0.78 CM (ref 0.6–1.1)
KETONES UR QL STRIP: ABNORMAL
LA MAJOR: 4.02 CM
LA MINOR: 3.95 CM
LA WIDTH: 2.81 CM
LEFT ATRIUM SIZE: 2.33 CM
LEFT ATRIUM VOLUME INDEX MOD: 14.7 ML/M2
LEFT ATRIUM VOLUME INDEX: 11.8 ML/M2
LEFT ATRIUM VOLUME MOD: 27.7 CM3
LEFT ATRIUM VOLUME: 22.18 CM3
LEFT INTERNAL DIMENSION IN SYSTOLE: 1.58 CM (ref 2.1–4)
LEFT VENTRICLE DIASTOLIC VOLUME INDEX: 21.24 ML/M2
LEFT VENTRICLE DIASTOLIC VOLUME: 39.93 ML
LEFT VENTRICLE MASS INDEX: 32 G/M2
LEFT VENTRICLE SYSTOLIC VOLUME INDEX: 3.7 ML/M2
LEFT VENTRICLE SYSTOLIC VOLUME: 6.98 ML
LEFT VENTRICULAR INTERNAL DIMENSION IN DIASTOLE: 3.17 CM (ref 3.5–6)
LEFT VENTRICULAR MASS: 61.03 G
LEUKOCYTE ESTERASE UR QL STRIP: NEGATIVE
LV LATERAL E/E' RATIO: 8.9 M/S
LV SEPTAL E/E' RATIO: 11.13 M/S
LYMPHOCYTES # BLD AUTO: 1.6 K/UL (ref 1–4.8)
LYMPHOCYTES NFR BLD: 21.8 % (ref 18–48)
MAGNESIUM SERPL-MCNC: 1.7 MG/DL (ref 1.6–2.6)
MCH RBC QN AUTO: 28.4 PG (ref 27–31)
MCHC RBC AUTO-ENTMCNC: 30.6 G/DL (ref 32–36)
MCV RBC AUTO: 93 FL (ref 82–98)
METHADONE UR QL SCN>300 NG/ML: NEGATIVE
MICROSCOPIC COMMENT: NORMAL
MONOCYTES # BLD AUTO: 1.2 K/UL (ref 0.3–1)
MONOCYTES NFR BLD: 16.7 % (ref 4–15)
MV PEAK A VEL: 0.85 M/S
MV PEAK E VEL: 0.89 M/S
MV STENOSIS PRESSURE HALF TIME: 82.74 MS
MV VALVE AREA P 1/2 METHOD: 2.66 CM2
NEUTROPHILS # BLD AUTO: 4.4 K/UL (ref 1.8–7.7)
NEUTROPHILS NFR BLD: 60.2 % (ref 38–73)
NITRITE UR QL STRIP: NEGATIVE
NRBC BLD-RTO: 0 /100 WBC
OPIATES UR QL SCN: ABNORMAL
PCP UR QL SCN>25 NG/ML: NEGATIVE
PH UR STRIP: 6 [PH] (ref 5–8)
PHOSPHATE SERPL-MCNC: 2 MG/DL (ref 2.7–4.5)
PISA TR MAX VEL: 2.77 M/S
PLATELET # BLD AUTO: 249 K/UL (ref 150–450)
PMV BLD AUTO: 10.2 FL (ref 9.2–12.9)
POCT GLUCOSE: 100 MG/DL (ref 70–110)
POCT GLUCOSE: 105 MG/DL (ref 70–110)
POCT GLUCOSE: 140 MG/DL (ref 70–110)
POCT GLUCOSE: 59 MG/DL (ref 70–110)
POCT GLUCOSE: 98 MG/DL (ref 70–110)
POTASSIUM SERPL-SCNC: 3.8 MMOL/L (ref 3.5–5.1)
PROT SERPL-MCNC: 6.8 G/DL (ref 6–8.4)
PROT UR QL STRIP: ABNORMAL
RA MAJOR: 3.77 CM
RA PRESSURE: 3 MMHG
RA WIDTH: 3.07 CM
RBC # BLD AUTO: 3.42 M/UL (ref 4–5.4)
RBC #/AREA URNS AUTO: 1 /HPF (ref 0–4)
RIGHT VENTRICULAR END-DIASTOLIC DIMENSION: 2.68 CM
RV TISSUE DOPPLER FREE WALL SYSTOLIC VELOCITY 1 (APICAL 4 CHAMBER VIEW): 18.67 CM/S
SINUS: 2.87 CM
SODIUM SERPL-SCNC: 141 MMOL/L (ref 136–145)
SP GR UR STRIP: >1.03 (ref 1–1.03)
SQUAMOUS #/AREA URNS AUTO: 1 /HPF
TDI LATERAL: 0.1 M/S
TDI SEPTAL: 0.08 M/S
TDI: 0.09 M/S
TOXICOLOGY INFORMATION: ABNORMAL
TR MAX PG: 31 MMHG
TRICUSPID ANNULAR PLANE SYSTOLIC EXCURSION: 2.51 CM
TSH SERPL DL<=0.005 MIU/L-ACNC: 0.58 UIU/ML (ref 0.4–4)
TV REST PULMONARY ARTERY PRESSURE: 34 MMHG
URN SPEC COLLECT METH UR: ABNORMAL
WBC # BLD AUTO: 7.31 K/UL (ref 3.9–12.7)
WBC #/AREA URNS AUTO: 1 /HPF (ref 0–5)

## 2023-01-19 PROCEDURE — 85025 COMPLETE CBC W/AUTO DIFF WBC: CPT | Performed by: INTERNAL MEDICINE

## 2023-01-19 PROCEDURE — 80053 COMPREHEN METABOLIC PANEL: CPT | Performed by: INTERNAL MEDICINE

## 2023-01-19 PROCEDURE — 99223 1ST HOSP IP/OBS HIGH 75: CPT | Mod: ,,, | Performed by: INTERNAL MEDICINE

## 2023-01-19 PROCEDURE — 81001 URINALYSIS AUTO W/SCOPE: CPT | Performed by: STUDENT IN AN ORGANIZED HEALTH CARE EDUCATION/TRAINING PROGRAM

## 2023-01-19 PROCEDURE — 84100 ASSAY OF PHOSPHORUS: CPT | Performed by: INTERNAL MEDICINE

## 2023-01-19 PROCEDURE — 25000003 PHARM REV CODE 250: Performed by: INTERNAL MEDICINE

## 2023-01-19 PROCEDURE — 83735 ASSAY OF MAGNESIUM: CPT | Performed by: INTERNAL MEDICINE

## 2023-01-19 PROCEDURE — 63600175 PHARM REV CODE 636 W HCPCS: Performed by: EMERGENCY MEDICINE

## 2023-01-19 PROCEDURE — 63600175 PHARM REV CODE 636 W HCPCS: Performed by: INTERNAL MEDICINE

## 2023-01-19 PROCEDURE — 12000002 HC ACUTE/MED SURGE SEMI-PRIVATE ROOM

## 2023-01-19 PROCEDURE — 99223 PR INITIAL HOSPITAL CARE,LEVL III: ICD-10-PCS | Mod: ,,, | Performed by: INTERNAL MEDICINE

## 2023-01-19 PROCEDURE — 25500020 PHARM REV CODE 255: Performed by: STUDENT IN AN ORGANIZED HEALTH CARE EDUCATION/TRAINING PROGRAM

## 2023-01-19 PROCEDURE — 80307 DRUG TEST PRSMV CHEM ANLYZR: CPT | Performed by: STUDENT IN AN ORGANIZED HEALTH CARE EDUCATION/TRAINING PROGRAM

## 2023-01-19 RX ORDER — IBUPROFEN 200 MG
16 TABLET ORAL
Status: DISCONTINUED | OUTPATIENT
Start: 2023-01-19 | End: 2023-01-24 | Stop reason: HOSPADM

## 2023-01-19 RX ORDER — INSULIN ASPART 100 [IU]/ML
0-5 INJECTION, SOLUTION INTRAVENOUS; SUBCUTANEOUS
Status: DISCONTINUED | OUTPATIENT
Start: 2023-01-19 | End: 2023-01-24 | Stop reason: HOSPADM

## 2023-01-19 RX ORDER — SODIUM CHLORIDE 0.9 % (FLUSH) 0.9 %
10 SYRINGE (ML) INJECTION
Status: DISCONTINUED | OUTPATIENT
Start: 2023-01-19 | End: 2023-01-24 | Stop reason: HOSPADM

## 2023-01-19 RX ORDER — NIFEDIPINE 30 MG/1
30 TABLET, EXTENDED RELEASE ORAL 2 TIMES DAILY
Status: DISCONTINUED | OUTPATIENT
Start: 2023-01-19 | End: 2023-01-20

## 2023-01-19 RX ORDER — LISINOPRIL AND HYDROCHLOROTHIAZIDE 10; 12.5 MG/1; MG/1
1 TABLET ORAL DAILY
Status: DISCONTINUED | OUTPATIENT
Start: 2023-01-19 | End: 2023-01-19

## 2023-01-19 RX ORDER — DIVALPROEX SODIUM 250 MG/1
500 TABLET, FILM COATED, EXTENDED RELEASE ORAL NIGHTLY
Status: DISCONTINUED | OUTPATIENT
Start: 2023-01-19 | End: 2023-01-24 | Stop reason: HOSPADM

## 2023-01-19 RX ORDER — PENICILLIN V POTASSIUM 500 MG/1
500 TABLET, FILM COATED ORAL EVERY 6 HOURS
Status: DISCONTINUED | OUTPATIENT
Start: 2023-01-19 | End: 2023-01-19

## 2023-01-19 RX ORDER — ENOXAPARIN SODIUM 100 MG/ML
40 INJECTION SUBCUTANEOUS EVERY 24 HOURS
Status: DISCONTINUED | OUTPATIENT
Start: 2023-01-19 | End: 2023-01-24 | Stop reason: HOSPADM

## 2023-01-19 RX ORDER — SIMETHICONE 80 MG
1 TABLET,CHEWABLE ORAL 4 TIMES DAILY PRN
Status: DISCONTINUED | OUTPATIENT
Start: 2023-01-19 | End: 2023-01-24 | Stop reason: HOSPADM

## 2023-01-19 RX ORDER — PENICILLIN V POTASSIUM 500 MG/1
500 TABLET, FILM COATED ORAL EVERY 8 HOURS
Status: DISPENSED | OUTPATIENT
Start: 2023-01-19 | End: 2023-01-20

## 2023-01-19 RX ORDER — LOSARTAN POTASSIUM 25 MG/1
25 TABLET ORAL DAILY
Status: DISCONTINUED | OUTPATIENT
Start: 2023-01-19 | End: 2023-01-19

## 2023-01-19 RX ORDER — ZIPRASIDONE HYDROCHLORIDE 40 MG/1
40 CAPSULE ORAL NIGHTLY
Status: DISCONTINUED | OUTPATIENT
Start: 2023-01-19 | End: 2023-01-24 | Stop reason: HOSPADM

## 2023-01-19 RX ORDER — TALC
6 POWDER (GRAM) TOPICAL NIGHTLY PRN
Status: DISCONTINUED | OUTPATIENT
Start: 2023-01-19 | End: 2023-01-24 | Stop reason: HOSPADM

## 2023-01-19 RX ORDER — ACETAMINOPHEN 325 MG/1
650 TABLET ORAL EVERY 8 HOURS PRN
Status: DISCONTINUED | OUTPATIENT
Start: 2023-01-19 | End: 2023-01-24

## 2023-01-19 RX ORDER — PROCHLORPERAZINE EDISYLATE 5 MG/ML
5 INJECTION INTRAMUSCULAR; INTRAVENOUS EVERY 6 HOURS PRN
Status: DISCONTINUED | OUTPATIENT
Start: 2023-01-19 | End: 2023-01-24 | Stop reason: HOSPADM

## 2023-01-19 RX ORDER — CHLORHEXIDINE GLUCONATE ORAL RINSE 1.2 MG/ML
SOLUTION DENTAL
COMMUNITY
Start: 2023-01-12 | End: 2023-12-11

## 2023-01-19 RX ORDER — ASPIRIN 81 MG/1
81 TABLET ORAL DAILY
Status: DISCONTINUED | OUTPATIENT
Start: 2023-01-19 | End: 2023-01-24 | Stop reason: HOSPADM

## 2023-01-19 RX ORDER — NALOXONE HCL 0.4 MG/ML
0.02 VIAL (ML) INJECTION
Status: DISCONTINUED | OUTPATIENT
Start: 2023-01-19 | End: 2023-01-24 | Stop reason: HOSPADM

## 2023-01-19 RX ORDER — ACETAMINOPHEN AND CODEINE PHOSPHATE 300; 30 MG/1; MG/1
1 TABLET ORAL EVERY 6 HOURS PRN
COMMUNITY
Start: 2023-01-13 | End: 2023-01-30 | Stop reason: SDUPTHER

## 2023-01-19 RX ORDER — SODIUM CHLORIDE 9 MG/ML
INJECTION, SOLUTION INTRAVENOUS CONTINUOUS
Status: DISCONTINUED | OUTPATIENT
Start: 2023-01-19 | End: 2023-01-20

## 2023-01-19 RX ORDER — PREGABALIN 50 MG/1
100 CAPSULE ORAL 3 TIMES DAILY
Status: DISCONTINUED | OUTPATIENT
Start: 2023-01-19 | End: 2023-01-19

## 2023-01-19 RX ORDER — POLYETHYLENE GLYCOL 3350 17 G/17G
17 POWDER, FOR SOLUTION ORAL 2 TIMES DAILY PRN
Status: DISCONTINUED | OUTPATIENT
Start: 2023-01-19 | End: 2023-01-24 | Stop reason: HOSPADM

## 2023-01-19 RX ORDER — PENICILLIN V POTASSIUM 500 MG/1
500 TABLET, FILM COATED ORAL EVERY 8 HOURS
Status: ON HOLD | COMMUNITY
Start: 2023-01-12 | End: 2023-01-24 | Stop reason: HOSPADM

## 2023-01-19 RX ORDER — IBUPROFEN 200 MG
24 TABLET ORAL
Status: DISCONTINUED | OUTPATIENT
Start: 2023-01-19 | End: 2023-01-24 | Stop reason: HOSPADM

## 2023-01-19 RX ORDER — IPRATROPIUM BROMIDE AND ALBUTEROL SULFATE 2.5; .5 MG/3ML; MG/3ML
3 SOLUTION RESPIRATORY (INHALATION) EVERY 6 HOURS PRN
Status: DISCONTINUED | OUTPATIENT
Start: 2023-01-19 | End: 2023-01-24 | Stop reason: HOSPADM

## 2023-01-19 RX ORDER — GLUCAGON 1 MG
1 KIT INJECTION
Status: DISCONTINUED | OUTPATIENT
Start: 2023-01-19 | End: 2023-01-24 | Stop reason: HOSPADM

## 2023-01-19 RX ADMIN — INSULIN DETEMIR 15 UNITS: 100 INJECTION, SOLUTION SUBCUTANEOUS at 08:01

## 2023-01-19 RX ADMIN — ACETAMINOPHEN 650 MG: 325 TABLET ORAL at 10:01

## 2023-01-19 RX ADMIN — SODIUM CHLORIDE: 9 INJECTION, SOLUTION INTRAVENOUS at 04:01

## 2023-01-19 RX ADMIN — ENOXAPARIN SODIUM 40 MG: 40 INJECTION SUBCUTANEOUS at 04:01

## 2023-01-19 RX ADMIN — SODIUM CHLORIDE: 9 INJECTION, SOLUTION INTRAVENOUS at 08:01

## 2023-01-19 RX ADMIN — SODIUM CHLORIDE: 9 INJECTION, SOLUTION INTRAVENOUS at 06:01

## 2023-01-19 RX ADMIN — SODIUM CHLORIDE, POTASSIUM CHLORIDE, SODIUM LACTATE AND CALCIUM CHLORIDE 1000 ML: 600; 310; 30; 20 INJECTION, SOLUTION INTRAVENOUS at 02:01

## 2023-01-19 RX ADMIN — IOHEXOL 100 ML: 350 INJECTION, SOLUTION INTRAVENOUS at 01:01

## 2023-01-19 RX ADMIN — NIFEDIPINE 30 MG: 30 TABLET, FILM COATED, EXTENDED RELEASE ORAL at 08:01

## 2023-01-19 RX ADMIN — ACETAMINOPHEN 650 MG: 325 TABLET ORAL at 05:01

## 2023-01-19 RX ADMIN — SODIUM CHLORIDE: 9 INJECTION, SOLUTION INTRAVENOUS at 01:01

## 2023-01-19 RX ADMIN — ASPIRIN 81 MG: 81 TABLET, COATED ORAL at 08:01

## 2023-01-19 NOTE — ASSESSMENT & PLAN NOTE
Chronic, controlled, continue home medications that patient stated was on however consult pharmacy as holding 1-2 medications as patient stated was not taking them.

## 2023-01-19 NOTE — ED NOTES
Pt returned from US. Changed pt diaper and purewick, replaced with new ones. Elevated pt feet per pt request. Pt now resting comfortably in bed

## 2023-01-19 NOTE — ED NOTES
Pt prescription states to take penicillin 500 mg once/day. Notified Dr. Caballero so order can be adjusted

## 2023-01-19 NOTE — ED TRIAGE NOTES
Jonathan Gonzales, a 62 y.o. female presents to the ED w/ complaint of aphasia, on assessment, pt is speaking slurred, but logical and appropriate sentences.    Triage note:  Chief Complaint   Patient presents with    Aphasia     Garbled speech since 9pm yesterday.     Review of patient's allergies indicates:  No Known Allergies  Past Medical History:   Diagnosis Date    Bipolar disorder     Cancer of female breast 10/2010    T2 N1 Mx, Stage IIB left breast cancer    Cancer of upper-outer quadrant of female breast 7/9/2012    Depression     Diabetes mellitus type II     Disturbances of sensation of smell and taste 6/29/2012    Hypertension     Malignant neoplasm of breast (female), unspecified site 4/27/2015    Neuropathy     Nonspecific abnormal unspecified cardiovascular function study 4/12/2013    ECG READ AS SHOWING A T-WAVE ABNORMALITY     Post-mastectomy lymphedema syndrome     Schizophrenia     Stroke

## 2023-01-19 NOTE — PROVIDER PROGRESS NOTES - EMERGENCY DEPT.
Results for orders placed or performed during the hospital encounter of 01/18/23   Comprehensive metabolic panel   Result Value Ref Range    Sodium 141 136 - 145 mmol/L    Potassium 3.9 3.5 - 5.1 mmol/L    Chloride 109 95 - 110 mmol/L    CO2 23 23 - 29 mmol/L    Glucose 133 (H) 70 - 110 mg/dL    BUN 21 8 - 23 mg/dL    Creatinine 1.3 0.5 - 1.4 mg/dL    Calcium 10.0 8.7 - 10.5 mg/dL    Total Protein 7.7 6.0 - 8.4 g/dL    Albumin 3.1 (L) 3.5 - 5.2 g/dL    Total Bilirubin 0.7 0.1 - 1.0 mg/dL    Alkaline Phosphatase 76 55 - 135 U/L    AST 78 (H) 10 - 40 U/L    ALT 27 10 - 44 U/L    Anion Gap 9 8 - 16 mmol/L    eGFR 46.2 (A) >60 mL/min/1.73 m^2   CBC auto differential   Result Value Ref Range    WBC 8.71 3.90 - 12.70 K/uL    RBC 3.97 (L) 4.00 - 5.40 M/uL    Hemoglobin 11.5 (L) 12.0 - 16.0 g/dL    Hematocrit 37.0 37.0 - 48.5 %    MCV 93 82 - 98 fL    MCH 29.0 27.0 - 31.0 pg    MCHC 31.1 (L) 32.0 - 36.0 g/dL    RDW 15.1 (H) 11.5 - 14.5 %    Platelets 298 150 - 450 K/uL    MPV 10.7 9.2 - 12.9 fL    Immature Granulocytes 0.9 (H) 0.0 - 0.5 %    Gran # (ANC) 6.3 1.8 - 7.7 K/uL    Immature Grans (Abs) 0.08 (H) 0.00 - 0.04 K/uL    Lymph # 1.2 1.0 - 4.8 K/uL    Mono # 1.1 (H) 0.3 - 1.0 K/uL    Eos # 0.0 0.0 - 0.5 K/uL    Baso # 0.03 0.00 - 0.20 K/uL    nRBC 0 0 /100 WBC    Gran % 72.4 38.0 - 73.0 %    Lymph % 13.2 (L) 18.0 - 48.0 %    Mono % 13.1 4.0 - 15.0 %    Eosinophil % 0.1 0.0 - 8.0 %    Basophil % 0.3 0.0 - 1.9 %    Differential Method Automated    CK   Result Value Ref Range    CPK 3352 (H) 20 - 180 U/L   Lactic acid, plasma   Result Value Ref Range    Lactate (Lactic Acid) 1.3 0.5 - 2.2 mmol/L   Magnesium   Result Value Ref Range    Magnesium 1.8 1.6 - 2.6 mg/dL   Troponin I   Result Value Ref Range    Troponin I 0.014 0.000 - 0.026 ng/mL   TSH   Result Value Ref Range    TSH 0.579 0.400 - 4.000 uIU/mL   ISTAT PROCEDURE   Result Value Ref Range    POC Glucose 123 (H) 70 - 110 mg/dL    POC BUN 24 6 - 30 mg/dL    POC  Creatinine 1.4 0.5 - 1.4 mg/dL    POC Sodium 141 136 - 145 mmol/L    POC Potassium 4.2 3.5 - 5.1 mmol/L    POC Chloride 106 95 - 110 mmol/L    POC TCO2 (MEASURED) 27 23 - 29 mmol/L    POC Ionized Calcium 1.04 (L) 1.06 - 1.42 mmol/L    POC Hematocrit 37 36 - 54 %PCV    Sample HUGH      *Note: Due to a large number of results and/or encounters for the requested time period, some results have not been displayed. A complete set of results can be found in Results Review.      Imaging Results              CTA Chest Non-Coronary (PE Studies) (In process)                      CT Head Without Contrast (In process)  Result time 01/19/23 01:46:11                     MRI Brain Without Contrast (Final result)  Result time 01/19/23 00:10:29      Final result by Indra Lee MD (01/19/23 00:10:29)                   Impression:      No acute abnormality      Electronically signed by: Indra Lee  Date:    01/19/2023  Time:    00:10               Narrative:    EXAMINATION:  MRI BRAIN WITHOUT CONTRAST    CLINICAL HISTORY:  slurred speech, generalized AMS. No focal weakness. Possible stroke <12 hours since last known normal.;.    TECHNIQUE:  Multiplanar multisequence MR imaging of the brain was performed without contrast.    COMPARISON:  None    FINDINGS:  Intracranial compartment:    Ventricles and sulci are normal in size for age without evidence of hydrocephalus. No extra-axial blood or fluid collections.    The brain parenchyma demonstrates scattered gliotic signal changes in the deep and subcortical white matter compatible with chronic small vessel changes.  Empty sella configuration is again noted..  No mass lesion, acute hemorrhage, edema or acute infarct.  No restricted diffusion.    Normal vascular flow voids are preserved.    Skull/extracranial contents (limited evaluation): Bone marrow signal intensity is normal.                                       X-Ray Chest AP Portable (Final result)  Result time 01/18/23  22:37:57      Final result by Brynn Downey MD (01/18/23 22:37:57)                   Impression:      No acute intrathoracic abnormality identified on this single radiographic view of the chest.      Electronically signed by: Brynn Downey MD  Date:    01/18/2023  Time:    22:37               Narrative:    EXAMINATION:  XR CHEST AP PORTABLE    CLINICAL HISTORY:  Unspecified fall, initial encounter    TECHNIQUE:  Single frontal view of the chest was performed.    COMPARISON:  09/22/2021    FINDINGS:  Cardiac monitoring leads overlie the chest.  Cardiomediastinal silhouette is unchanged in size and configuration and appears to be within normal limits.  The lungs are symmetrically expanded slight coarse interstitial attenuation and no evidence of confluent airspace consolidation.  No substantial volume of pleural fluid or pneumothorax identified.  Multiple surgical clips project over the thorax.  Osseous structures are intact.                                       X-Ray Foot Complete Left (Final result)  Result time 01/18/23 22:52:33      Final result by Arlyn Lee MD (01/18/23 22:52:33)                   Impression:      No acute fracture or dislocation involving the left foot or ankle.  Soft tissue swelling.      Electronically signed by: Arlyn Lee  Date:    01/18/2023  Time:    22:52               Narrative:    EXAMINATION:  XR FOOT COMPLETE 3 VIEW LEFT; XR ANKLE COMPLETE 3 VIEW LEFT    CLINICAL HISTORY:  .  Other specified soft tissue disorders    TECHNIQUE:  AP, lateral and oblique views of the left foot were performed.    COMPARISON:  Left foot and ankle is 01/27/2022    FINDINGS:  The ankle mortise is well maintained.  The there is no acute fracture involving the left ankle or left foot.  Soft tissue swelling is seen over the ankle.  Degenerative changes noted at the great toe tarsometatarsal joint and medial ankle.                                       X-Ray Ankle Complete Left (Final result)   Result time 01/18/23 22:52:33      Final result by Arlyn Lee MD (01/18/23 22:52:33)                   Impression:      No acute fracture or dislocation involving the left foot or ankle.  Soft tissue swelling.      Electronically signed by: Arlyn Lee  Date:    01/18/2023  Time:    22:52               Narrative:    EXAMINATION:  XR FOOT COMPLETE 3 VIEW LEFT; XR ANKLE COMPLETE 3 VIEW LEFT    CLINICAL HISTORY:  .  Other specified soft tissue disorders    TECHNIQUE:  AP, lateral and oblique views of the left foot were performed.    COMPARISON:  Left foot and ankle is 01/27/2022    FINDINGS:  The ankle mortise is well maintained.  The there is no acute fracture involving the left ankle or left foot.  Soft tissue swelling is seen over the ankle.  Degenerative changes noted at the great toe tarsometatarsal joint and medial ankle.                                     MRI brain negative for stroke or ICH. Labs show elevated CK consistent with rhabdomyolysis. Started on IVF. Pending UA and CTA chest r/o PE. Admitted to hospitalist.

## 2023-01-19 NOTE — ASSESSMENT & PLAN NOTE
Patient's FSGs are uncontrolled due to hyperglycemia on current medication regimen.  Last A1c reviewed-   Lab Results   Component Value Date    HGBA1C 7.1 (H) 03/16/2022     Most recent fingerstick glucose reviewed- No results for input(s): POCTGLUCOSE in the last 24 hours.  Current correctional scale  Low  Maintain anti-hyperglycemic dose as follows-   Antihyperglycemics (From admission, onward)    Start     Stop Route Frequency Ordered    01/19/23 0900  insulin detemir U-100 pen 15 Units         -- SubQ Daily 01/19/23 0335    01/19/23 0434  insulin aspart U-100 pen 0-5 Units         -- SubQ Before meals & nightly PRN 01/19/23 0335        Hold Oral hypoglycemics while patient is in the hospital.    PHARMACY CONSULT, as insulin dosing is completely inconsistent with patient reported dosages

## 2023-01-19 NOTE — H&P
[facility] - Emergency Dept  [facility] Medicine  History & Physical    Patient Name: Jonathan Gonzales  MRN: [id number]  Patient Class: IP- Inpatient  Admission Date: 1/18/2023  Attending Physician: Agustin Cosby MD   Primary Care Provider: Ghulam Contreras MD      Patient information was obtained from patient, past medical records and ER records.     Subjective:     Principal Problem:Traumatic rhabdomyolysis    Chief Complaint:   Chief Complaint   Patient presents with    Aphasia     Garbled speech since 9pm yesterday.        HPI: 61 yo female with DM, schizophrenia, depression, CVA history presenting after EMS found her down 2/2 not answering a call today. On arrival, EMS states she had been on the floor all day, had missed her medications. She states she seemed more fatigued. She is not particularly sure why she presented to the ER, but does think she tripped and then could not get up. She does not report any pain except for pain in her foot which she always has. She denied any chest pain or passing out before the fall, stating that she must have tripped on something. She currently denies any chest pain or shortness of breath. Per the ER, she states she had chest pain and shortness of breath and passed out. She initially had some garbled speech MRI obtained without evidence of acute stroke.     Medicine called for admission after labs showing elevated CPK.      Past Medical History:   Diagnosis Date    Bipolar disorder     Cancer of female breast 10/2010    T2 N1 Mx, Stage IIB left breast cancer    Cancer of upper-outer quadrant of female breast 7/9/2012    Depression     Diabetes mellitus type II     Disturbances of sensation of smell and taste 6/29/2012    Hypertension     Malignant neoplasm of breast (female), unspecified site 4/27/2015    Neuropathy     Nonspecific abnormal unspecified cardiovascular function study 4/12/2013    ECG READ AS SHOWING A T-WAVE ABNORMALITY     Post-mastectomy lymphedema syndrome      Schizophrenia     Stroke        Past Surgical History:   Procedure Laterality Date    BREAST RECONSTRUCTION  11/23/11    B/L SHLOMO flap reconstruction S/P left MRM, right prophylactic mastectomy    BREAST RECONSTRUCTION Bilateral 3/13/2015    NIPPLE RECONSTRUCTION    INJECTION OF ANESTHETIC AGENT AROUND NERVE Left 12/9/2020    Procedure: BLOCK, NERVE, C3-C4-C5-C6 MEDIAL BRANCH;  Surgeon: Darren Jerry MD;  Location: [facility];  Service: Pain Management;  Laterality: Left;    MASTECTOMY Bilateral 01/2011    bilateral    RADIOFREQUENCY ABLATION Left 2/3/2021    Procedure: RADIOFREQUENCY ABLATION, C3,C4,C5,C6 MEDIAL BRANCH 1 of 2;  Surgeon: Darren Jerry MD;  Location: [facility];  Service: Pain Management;  Laterality: Left;    RADIOFREQUENCY ABLATION Right 7/28/2021    Procedure: RADIOFREQUENCY ABLATION, C3-C4-C5-C6 MEDIAL BR ANCH 1 IOF 2;  Surgeon: Darren Jerry MD;  Location: [facility];  Service: Pain Management;  Laterality: Right;    RADIOFREQUENCY ABLATION Left 8/18/2021    Procedure: RADIOFREQUENCY ABLATION, C3-C4-C5-C6 MEDIAL BRANCH 2 OF 2;  Surgeon: Darren Jerry MD;  Location: [facility];  Service: Pain Management;  Laterality: Left;    TRANSFORAMINAL EPIDURAL INJECTION OF STEROID Bilateral 11/4/2020    Procedure: INJECTION, STEROID, EPIDURAL, TRANSFORAMINAL APPROACH L4/L5;  Surgeon: Darren Jerry MD;  Location: [facility];  Service: Pain Management;  Laterality: Bilateral;  Bilateral L4/L5    TRANSFORAMINAL EPIDURAL INJECTION OF STEROID Bilateral 2/10/2021    Procedure: INJECTION, STEROID, EPIDURAL, TRANSFORAMINAL APPROACH, L4-L5;  Surgeon: Darren Jerry MD;  Location: [facility];  Service: Pain Management;  Laterality: Bilateral;    TRANSFORAMINAL EPIDURAL INJECTION OF STEROID Bilateral 3/10/2021    Procedure: INJECTION, STEROID, EPIDURAL, TRANSFORAMINAL APPROACH, L4-L5;  Surgeon: Darren Jerry MD;  Location: [facility];  Service: Pain Management;   Laterality: Bilateral;    TRANSFORAMINAL EPIDURAL INJECTION OF STEROID Bilateral 12/15/2021    Procedure: INJECTION, STEROID, EPIDURAL, TRANSFORAMINAL APPROACH  Bilateral L4/5 TFESI / needs consent;  Surgeon: Darren Jerry MD;  Location: [facility];  Service: Pain Management;  Laterality: Bilateral;    TRANSFORAMINAL EPIDURAL INJECTION OF STEROID Bilateral 12/14/2022    Procedure: INJECTION, STEROID, EPIDURAL, TRANSFORAMINAL APPROACH BILATERAL L4/5 CONTRAST;  Surgeon: Darren Jerry MD;  Location: [facility];  Service: Pain Management;  Laterality: Bilateral;    TUBAL LIGATION         Review of patient's allergies indicates:  No Known Allergies    Current Facility-Administered Medications on File Prior to Encounter   Medication    0.9%  NaCl infusion     Current Outpatient Medications on File Prior to Encounter   Medication Sig    ACCU-CHEK SOFT DEV LANCETS Kit     alcohol swabs (BD ALCOHOL SWABS) PadM Apply 1 each topically as needed.    ammonium lactate 12 % Crea Apply twice daily to affected parts both feet as needed.    arm brace (WRIST SUPPORT LARGE-XLARGE MISC)     aspirin (ECOTRIN) 81 MG EC tablet Take 1 tablet (81 mg total) by mouth once daily.    atorvastatin (LIPITOR) 80 MG tablet TAKE 1 TABLET EVERY DAY    blood glucose control, high Soln 1 drop by Misc.(Non-Drug; Combo Route) route as needed.    blood glucose control, low Soln 1 drop by Misc.(Non-Drug; Combo Route) route as needed.    blood sugar diagnostic (ACCU-CHEK AMBER PLUS TEST STRP) Strp Inject 2 strips into the skin 2 (two) times a day. Test Blood Sugar    blood sugar diagnostic Strp To check BG 3 times daily, to use with Accu check Guide meter pt has.    blood-glucose meter (ACCU-CHEK AMBER PLUS METER) Misc Use as directed    blood-glucose meter (ACCU-CHEK AMBER PLUS METER) Misc Use as directed to check blood sugar three times daily    DEPAKOTE  mg Tb24 Take 500 mg by mouth nightly.     divalproex ER (DEPAKOTE ER) 250 MG 24  "hr tablet Take 1 tablet (250 mg total) by mouth once daily.    EASY TOUCH 31 gauge x 1/4" Ndle Use as directed    insulin (LANTUS SOLOSTAR U-100 INSULIN) glargine 100 units/mL (3mL) SubQ pen INJECT 23 UNITS SUBCUTANEOUSLY NIGHTLY    lancets (ONETOUCH ULTRASOFT LANCETS) [facility] Pt to check BG up to QID. Dispense strips compatible with pt brand meter    lancets Misc To check BG 3 times daily, to use with Accu check Guide meter pt has.    liraglutide 0.6 mg/0.1 mL, 18 mg/3 mL, subq PNIJ (VICTOZA 3-HERMANN) 0.6 mg/0.1 mL (18 mg/3 mL) PnIj pen INJECT 1.8 MG SUBCUTANEOUSLY ONCE DAILY.    lisinopriL-hydrochlorothiazide (PRINZIDE,ZESTORETIC) 10-12.5 mg per tablet Take 1 tablet by mouth once daily.    losartan (COZAAR) 25 MG tablet TAKE 1 TABLET ONE TIME DAILY    metFORMIN (GLUCOPHAGE) 500 MG tablet Take 1 tablet (500 mg total) by mouth 2 (two) times daily. Take with food.    MITIGARE 0.6 mg Cap TAKE 1 CAPSULE BY MOUTH DAILY.PLEASE TAKE THIS MEDICATION ONCE DAILY UNTIL YOUR POTENTIAL GOUT FLARE IN YOUR FOOT CLEARS    nicotine (NICODERM CQ) 7 mg/24 hr Place 1 patch onto the skin once daily.    nicotine polacrilex 2 MG Lozg Take 1 lozenge (2 mg total) by mouth as needed (Use in place of cigarettes).    NIFEdipine (PROCARDIA-XL) 30 MG (OSM) 24 hr tablet Take 1 tablet (30 mg total) by mouth 2 (two) times a day.    OLANZapine (ZYPREXA) 10 MG tablet TK 1 T PO QHS    oxybutynin (DITROPAN-XL) 10 MG 24 hr tablet Take 1 tablet (10 mg total) by mouth once daily.    pen needle, diabetic (BD ULTRA-FINE [place] PEN NEEDLE) 32 gauge x 5/32" Ndle Pt is injecting once daily    pen needle, diabetic, safety 29 gauge x 3/16" Ndle Use as instructed    pregabalin (LYRICA) 100 MG capsule Take 1 capsule (100 mg total) by mouth 3 (three) times daily.    QUEtiapine (SEROQUEL) 100 MG Tab TAKE 1 TABLET EVERY EVENING    traMADoL (ULTRAM) 50 mg tablet Take 1 tablet (50 mg total) by mouth every 6 (six) hours as needed (Severe left foot pain).    TRUE METRIX GLUCOSE " METER Misc     TRUEPLUS LANCETS 33 gauge Misc 100 lancets by Subconjunctival route 4 (four) times daily.    ziprasidone (GEODON) 40 MG Cap TAKE 1 CAPSULE EVERY EVENING    [DISCONTINUED] nicotine (NICODERM CQ) 14 mg/24 hr Place 1 patch onto the skin once daily.     Family History       Problem Relation (Age of Onset)    Cancer Sister, Daughter    Cervical cancer Daughter    Deep vein thrombosis Mother    Diabetes Sister, Brother, Son    Diabetic kidney disease Sister    Glaucoma Mother    Hypertension Mother    Kidney disease Mother    Lung cancer Sister    No Known Problems Father, Maternal Grandmother, Maternal Grandfather, Paternal Grandmother, Paternal Grandfather, Son, Daughter, Daughter, Daughter          Tobacco Use    Smoking status: Former     Packs/day: 0.50     Years: 25.00     Pack years: 12.50     Types: Cigarettes     Start date:      Quit date: 3/17/2022     Years since quittin.8    Smokeless tobacco: Never    Tobacco comments:     currently at less than 1/2 pack pd   Substance and Sexual Activity    Alcohol use: Yes     Alcohol/week: 0.0 standard drinks     Comment: social - once every 2 weeks    Drug use: No     Comment:  - stopped using crack cocaine    Sexual activity: Not Currently     Partners: Male     Review of Systems   Constitutional:  Positive for activity change. Negative for appetite change, chills and fever.   HENT:  Negative for congestion, hearing loss and rhinorrhea.    Eyes:  Negative for discharge, itching and visual disturbance.   Respiratory:  Negative for apnea, cough and shortness of breath.    Cardiovascular:  Negative for chest pain, palpitations and leg swelling.   Gastrointestinal:  Negative for abdominal distention, abdominal pain, constipation, diarrhea, nausea and vomiting.   Endocrine: Negative for cold intolerance and heat intolerance.   Genitourinary:  Negative for dysuria and hematuria.   Musculoskeletal:  Negative for back pain, neck pain and neck  stiffness.   Skin:  Negative for rash and wound.   Neurological:  Positive for dizziness. Negative for seizures, weakness, light-headedness and headaches.   Psychiatric/Behavioral:  Negative for agitation, confusion and suicidal ideas.    Objective:     Vital Signs (Most Recent):  Temp: 99.3 °F (37.4 °C) (01/19/23 0239)  Pulse: 72 (01/19/23 0223)  Resp: 16 (01/18/23 2302)  BP: 126/73 (01/19/23 0223)  SpO2: 97 % (01/19/23 0223) Vital Signs (24h Range):  Temp:  [98.7 °F (37.1 °C)-100.2 °F (37.9 °C)] 99.3 °F (37.4 °C)  Pulse:  [] 72  Resp:  [15-20] 16  SpO2:  [95 %-100 %] 97 %  BP: (110-137)/(73-92) 126/73     Weight: 82.6 kg (182 lb)  Body mass index is 31.24 kg/m².    Physical Exam  Vitals reviewed.   Constitutional:       General: She is not in acute distress.     Appearance: She is well-developed.   HENT:      Head: Normocephalic and atraumatic.      Nose: Nose normal. No rhinorrhea.      Mouth/Throat:      Mouth: Mucous membranes are moist.   Eyes:      General: No scleral icterus.        Right eye: No discharge.         Left eye: No discharge.      Pupils: Pupils are equal, round, and reactive to light.   Neck:      Vascular: No JVD.   Cardiovascular:      Rate and Rhythm: Normal rate and regular rhythm.      Heart sounds: Normal heart sounds. No murmur heard.    No friction rub.   Pulmonary:      Effort: Pulmonary effort is normal. No respiratory distress.      Breath sounds: Normal breath sounds. No wheezing.   Abdominal:      General: Bowel sounds are normal. There is no distension.      Palpations: Abdomen is soft.      Tenderness: There is no abdominal tenderness.   Musculoskeletal:         General: No deformity. Normal range of motion.      Cervical back: Normal range of motion and neck supple.   Skin:     General: Skin is warm and dry.   Neurological:      General: No focal deficit present.      Mental Status: She is alert and oriented to person, place, and time.         CRANIAL NERVES     CN III,  IV, VI   Pupils are equal, round, and reactive to light.     Significant Labs: All pertinent labs within the past 24 hours have been reviewed.  CBC:   Recent Labs   Lab 01/18/23 2233 01/18/23 2243   WBC 8.71  --    HGB 11.5*  --    HCT 37.0 37     --      CMP:   Recent Labs   Lab 01/18/23 2059      K 3.9      CO2 23   *   BUN 21   CREATININE 1.3   CALCIUM 10.0   PROT 7.7   ALBUMIN 3.1*   BILITOT 0.7   ALKPHOS 76   AST 78*   ALT 27   ANIONGAP 9     Troponin:   Recent Labs   Lab 01/18/23 2233   TROPONINI 0.014     CPK: 3352    Significant Imaging: I have reviewed all pertinent imaging results/findings within the past 24 hours.    Assessment/Plan:     * Traumatic rhabdomyolysis  CPK elevated, renal function normal  Will continue with fluids  Hold statin, and losartan a this time  No new acute pain, imaging results without fracture      Vasovagal syncope  Acute, inconsistent story at this time, troponin negative  Will continue telemetry, obtain ECHO      Essential hypertension  Chronic, controlled, continue home medications, holding losartan as above to above BUFFY with lisinopril-HCTZ      Schizoaffective disorder, bipolar type  Chronic, controlled, continue home medications that patient stated was on however consult pharmacy as holding 1-2 medications as patient stated was not taking them.    Type 2 diabetes mellitus with neurologic complication, with long-term current use of insulin  Patient's FSGs are uncontrolled due to hyperglycemia on current medication regimen.  Last A1c reviewed-   Lab Results   Component Value Date    HGBA1C 7.1 (H) 03/16/2022     Most recent fingerstick glucose reviewed- No results for input(s): POCTGLUCOSE in the last 24 hours.  Current correctional scale  Low  Maintain anti-hyperglycemic dose as follows-   Antihyperglycemics (From admission, onward)      Start     Stop Route Frequency Ordered    01/19/23 0900  insulin detemir U-100 pen 15 Units         -- SubQ  Daily 01/19/23 0335    01/19/23 0434  insulin aspart U-100 pen 0-5 Units         -- SubQ Before meals & nightly PRN 01/19/23 0335          Hold Oral hypoglycemics while patient is in the hospital.    PHARMACY CONSULT, as insulin dosing is completely inconsistent with patient reported dosages      VTE Risk Mitigation (From admission, onward)           Ordered     enoxaparin injection 40 mg  Daily         01/19/23 0335     IP VTE HIGH RISK PATIENT  Once         01/19/23 0335     Place sequential compression device  Until discontinued         01/19/23 0335     IP VTE HIGH RISK PATIENT  Once         01/19/23 0335     Place sequential compression device  Until discontinued         01/19/23 0335                       Ras Fegruson MD  Department of Hospital Medicine   [facility] - Emergency Dept

## 2023-01-19 NOTE — ED PROVIDER NOTES
Encounter Date: 1/18/2023       History     Chief Complaint   Patient presents with    Aphasia     Garbled speech since 9pm yesterday.     HPI  62yoF h/o DM, depression, breast CA, schizophrenia, CVA they went to call her today and she didn't answer. When they arrived the patient was found on the floor in urine. She was complaining of pain to her foot. She said she had been on the floor all day. She missed all of her daily meds. She also seemed more fatigued than usual.  When asked the patient says she when asked the patient says she does not know why she is here.  She does endorse left foot and ankle pain.  Patient is able to wiggle her toes and denies numbness.  Patient states that prior to her fall today she developed chest pain, shortness of breath and then passed out.  Patient says she woke up immediately but was unable to get off the floor due to feeling weak.  Patient states she does not have chest pain or shortness of breath now.  Patient denies history of PE or DVT in the past.  Patient states her voice sounds normal to her.  Denies any new weakness or numbness in her arms or legs.    Her baseline: she can complete all of her own ADLS, uses a cane to walk.   Review of patient's allergies indicates:  No Known Allergies  Past Medical History:   Diagnosis Date    Bipolar disorder     Cancer of female breast 10/2010    T2 N1 Mx, Stage IIB left breast cancer    Cancer of upper-outer quadrant of female breast 7/9/2012    Depression     Diabetes mellitus type II     Disturbances of sensation of smell and taste 6/29/2012    Hypertension     Malignant neoplasm of breast (female), unspecified site 4/27/2015    Neuropathy     Nonspecific abnormal unspecified cardiovascular function study 4/12/2013    ECG READ AS SHOWING A T-WAVE ABNORMALITY     Post-mastectomy lymphedema syndrome     Schizophrenia     Stroke      Past Surgical History:   Procedure Laterality Date    BREAST RECONSTRUCTION  11/23/11    B/L SHLOMO flap  reconstruction S/P left MRM, right prophylactic mastectomy    BREAST RECONSTRUCTION Bilateral 3/13/2015    NIPPLE RECONSTRUCTION    INJECTION OF ANESTHETIC AGENT AROUND NERVE Left 12/9/2020    Procedure: BLOCK, NERVE, C3-C4-C5-C6 MEDIAL BRANCH;  Surgeon: Darren Jerry MD;  Location: [facility] PAIN [facility]GT;  Service: Pain Management;  Laterality: Left;    MASTECTOMY Bilateral 01/2011    bilateral    RADIOFREQUENCY ABLATION Left 2/3/2021    Procedure: RADIOFREQUENCY ABLATION, C3,C4,C5,C6 MEDIAL BRANCH 1 of 2;  Surgeon: Darren Jerry MD;  Location: [facility] PAIN [facility]GT;  Service: Pain Management;  Laterality: Left;    RADIOFREQUENCY ABLATION Right 7/28/2021    Procedure: RADIOFREQUENCY ABLATION, C3-C4-C5-C6 MEDIAL BR ANCH 1 IOF 2;  Surgeon: Darren Jerry MD;  Location: [facility] PAIN [facility]GT;  Service: Pain Management;  Laterality: Right;    RADIOFREQUENCY ABLATION Left 8/18/2021    Procedure: RADIOFREQUENCY ABLATION, C3-C4-C5-C6 MEDIAL BRANCH 2 OF 2;  Surgeon: Darren Jerry MD;  Location: [facility] PAIN [facility]GT;  Service: Pain Management;  Laterality: Left;    TRANSFORAMINAL EPIDURAL INJECTION OF STEROID Bilateral 11/4/2020    Procedure: INJECTION, STEROID, EPIDURAL, TRANSFORAMINAL APPROACH L4/L5;  Surgeon: Darren Jerry MD;  Location: [facility] PAIN [facility]GT;  Service: Pain Management;  Laterality: Bilateral;  Bilateral L4/L5    TRANSFORAMINAL EPIDURAL INJECTION OF STEROID Bilateral 2/10/2021    Procedure: INJECTION, STEROID, EPIDURAL, TRANSFORAMINAL APPROACH, L4-L5;  Surgeon: Darren Jerry MD;  Location: [facility] PAIN [facility]GT;  Service: Pain Management;  Laterality: Bilateral;    TRANSFORAMINAL EPIDURAL INJECTION OF STEROID Bilateral 3/10/2021    Procedure: INJECTION, STEROID, EPIDURAL, TRANSFORAMINAL APPROACH, L4-L5;  Surgeon: Darren Jerry MD;  Location: [facility] PAIN [facility]GT;  Service: Pain Management;  Laterality: Bilateral;    TRANSFORAMINAL EPIDURAL INJECTION OF STEROID Bilateral 12/15/2021    Procedure: INJECTION, STEROID, EPIDURAL,  TRANSFORAMINAL APPROACH  Bilateral L4/5 TFESI / needs consent;  Surgeon: Darren Jerry MD;  Location: [facility] PAIN [facility]GT;  Service: Pain Management;  Laterality: Bilateral;    TRANSFORAMINAL EPIDURAL INJECTION OF STEROID Bilateral 2022    Procedure: INJECTION, STEROID, EPIDURAL, TRANSFORAMINAL APPROACH BILATERAL L4/5 CONTRAST;  Surgeon: Darren Jerry MD;  Location: [facility] PAIN [facility]GT;  Service: Pain Management;  Laterality: Bilateral;    TUBAL LIGATION       Family History   Problem Relation Age of Onset    Kidney disease Mother         Dialysis    Hypertension Mother     Deep vein thrombosis Mother     Glaucoma Mother     Diabetic kidney disease Sister     Lung cancer Sister     Diabetes Sister     Cancer Sister         lung    Diabetes Brother     Diabetes Son     No Known Problems Father     No Known Problems Maternal Grandmother     No Known Problems Maternal Grandfather     No Known Problems Paternal Grandmother     No Known Problems Paternal Grandfather     No Known Problems Son     Cervical cancer Daughter     Cancer Daughter         cervical cancer    No Known Problems Daughter     No Known Problems Daughter     No Known Problems Daughter     Breast cancer Neg Hx     Ovarian cancer Neg Hx      Social History     Tobacco Use    Smoking status: Former     Packs/day: 0.50     Years: 25.00     Pack years: 12.50     Types: Cigarettes     Start date:      Quit date: 3/17/2022     Years since quittin.8    Smokeless tobacco: Never    Tobacco comments:     currently at less than 1/2 pack pd   Substance Use Topics    Alcohol use: Yes     Alcohol/week: 0.0 standard drinks     Comment: social - once every 2 weeks    Drug use: No     Comment:  - stopped using crack cocaine     Review of Systems   Constitutional:  Negative for chills, fatigue and fever.   HENT:  Negative for congestion and sore throat.    Respiratory:  Negative for cough, shortness of breath and wheezing.    Cardiovascular:  Negative for  chest pain.   Gastrointestinal:  Negative for abdominal pain, diarrhea, nausea and vomiting.   Genitourinary:  Negative for dysuria.   Musculoskeletal:  Positive for arthralgias, joint swelling and myalgias. Negative for back pain.   Skin:  Negative for rash.   Neurological:  Positive for speech difficulty. Negative for dizziness, weakness and headaches.   Hematological:  Does not bruise/bleed easily.     Physical Exam     Initial Vitals [01/18/23 1948]   BP Pulse Resp Temp SpO2   (!) 110/92 (!) 120 20 98.7 °F (37.1 °C) 100 %      MAP       --         Physical Exam    Nursing note and vitals reviewed.  Constitutional: She appears well-nourished. She is not diaphoretic.   HENT:   Head: Normocephalic and atraumatic.   Left Ear: External ear normal.   Mouth/Throat: Oropharynx is clear and moist.   Eyes: EOM are normal. Pupils are equal, round, and reactive to light.   Neck: Neck supple.   Normal range of motion.  Cardiovascular:  Normal rate and regular rhythm.           No murmur heard.  Pulmonary/Chest: Breath sounds normal. No respiratory distress. She has no wheezes. She has no rhonchi. She has no rales.   Abdominal: Abdomen is soft. Bowel sounds are normal. She exhibits no distension. There is no abdominal tenderness. There is no rebound and no guarding.   Musculoskeletal:         General: No edema. Normal range of motion.      Cervical back: Normal range of motion and neck supple.     Neurological: She is alert. No sensory deficit. GCS score is 15. GCS eye subscore is 4. GCS verbal subscore is 5. GCS motor subscore is 6.   Pt able to answer questions. Intermittently garbled speech. Moving all extremities equally. Complaining of pain to the L foot and ankle. No obvious deformity. 2+ pulses.    Skin: Skin is warm and dry. Capillary refill takes less than 2 seconds.       ED Course   Procedures  Labs Reviewed   COMPREHENSIVE METABOLIC PANEL   URINALYSIS, REFLEX TO URINE CULTURE          Imaging Results    None           Medications - No data to display  Medical Decision Making:   Initial Assessment:   62-year-old female presenting after she was found down today by her family members.  Patient's last known normal was yesterday.  Patient states that she developed chest pain and shortness of breath prior to falling today.  Patient denies symptoms at this time.  Patient complaining of left foot and ankle pain after her fall.  Patient also with some garbled speech.  Patient states this her baseline however family thinks that she is still confused and appears altered to them.  Patient is moving all extremities and denies weakness or numbness.  Strength appears to be intact.  No facial droop. Story is concerning given sob, CP prior to fall today. Pt also tachycardic here. Plan to eval with CT PE study, AMS labs, CK. Garbled speech is the only focal neurologic abnormality appreciated. The patient states her speech is normally like this. Unclear based on history. Will obtain CT and MRI. Patient signed out to DR. Loyola pending labs and imaging results. Anticipate admission.     Arleen Pineda MD  Emergency Medicine Staff   Critical Care Fellow                            Clinical Impression:   Final diagnoses:  [R00.0] Tachycardia               Arleen Pineda MD  01/19/23 0292

## 2023-01-19 NOTE — CONSULTS
"Admission Medication Reconciliation - Pharmacy Consult Note    The home medication history was taken by Tara Paredes CPhT.     You may go to "Admission" then "Reconcile Home Medications" tabs to review and/or act upon these items.     Potentially problematic discrepancies with current MAR  Patient IS taking the following which was not ordered upon admit  Lisinopril 10mg PO daily  HCTZ 12.5 mg PO daily  Oxybutynin XL 10mg PO daily  Pregabalin 100mg PO TID  Quetiapine 200mg PO QHS  Penicillin 500mg PO q8h x7d (was given after her dentist appointment but she is not sure how many tablets she has left to take)  Patient IS NOT taking the following which was ordered upon admit  Nifedipine     Marcelle Lizama, PharmD, BCPS  [phone]      "

## 2023-01-19 NOTE — ED NOTES
I-STAT Chem-8+ Results:   Value Reference Range   Sodium 141 136-145 mmol/L   Potassium  4.2 3.5-5.1 mmol/L   Chloride 106  mmol/L   Ionized Calcium 1.04 1.06-1.42 mmol/L   CO2 (measured) 27 23-29 mmol/L   Glucose 123  mg/dL   BUN 24 6-30 mg/dL   Creatinine 1.4 0.5-1.4 mg/dL   Hematocrit 37 36-54%

## 2023-01-19 NOTE — SUBJECTIVE & OBJECTIVE
Past Medical History:   Diagnosis Date    Bipolar disorder     Cancer of female breast 10/2010    T2 N1 Mx, Stage IIB left breast cancer    Cancer of upper-outer quadrant of female breast 7/9/2012    Depression     Diabetes mellitus type II     Disturbances of sensation of smell and taste 6/29/2012    Hypertension     Malignant neoplasm of breast (female), unspecified site 4/27/2015    Neuropathy     Nonspecific abnormal unspecified cardiovascular function study 4/12/2013    ECG READ AS SHOWING A T-WAVE ABNORMALITY     Post-mastectomy lymphedema syndrome     Schizophrenia     Stroke        Past Surgical History:   Procedure Laterality Date    BREAST RECONSTRUCTION  11/23/11    B/L SHLOMO flap reconstruction S/P left MRM, right prophylactic mastectomy    BREAST RECONSTRUCTION Bilateral 3/13/2015    NIPPLE RECONSTRUCTION    INJECTION OF ANESTHETIC AGENT AROUND NERVE Left 12/9/2020    Procedure: BLOCK, NERVE, C3-C4-C5-C6 MEDIAL BRANCH;  Surgeon: Darren Jerry MD;  Location: [facility] PAIN MGT;  Service: Pain Management;  Laterality: Left;    MASTECTOMY Bilateral 01/2011    bilateral    RADIOFREQUENCY ABLATION Left 2/3/2021    Procedure: RADIOFREQUENCY ABLATION, C3,C4,C5,C6 MEDIAL BRANCH 1 of 2;  Surgeon: Darren Jerry MD;  Location: [facility] PAIN MGT;  Service: Pain Management;  Laterality: Left;    RADIOFREQUENCY ABLATION Right 7/28/2021    Procedure: RADIOFREQUENCY ABLATION, C3-C4-C5-C6 MEDIAL BR ANCH 1 IOF 2;  Surgeon: Darren Jerry MD;  Location: [facility] PAIN MGT;  Service: Pain Management;  Laterality: Right;    RADIOFREQUENCY ABLATION Left 8/18/2021    Procedure: RADIOFREQUENCY ABLATION, C3-C4-C5-C6 MEDIAL BRANCH 2 OF 2;  Surgeon: Darren Jerry MD;  Location: [facility] PAIN [facility]GT;  Service: Pain Management;  Laterality: Left;    TRANSFORAMINAL EPIDURAL INJECTION OF STEROID Bilateral 11/4/2020    Procedure: INJECTION, STEROID, EPIDURAL, TRANSFORAMINAL APPROACH L4/L5;  Surgeon: Darren Jerry MD;  Location: [facility]  PAIN MGT;  Service: Pain Management;  Laterality: Bilateral;  Bilateral L4/L5    TRANSFORAMINAL EPIDURAL INJECTION OF STEROID Bilateral 2/10/2021    Procedure: INJECTION, STEROID, EPIDURAL, TRANSFORAMINAL APPROACH, L4-L5;  Surgeon: Darren Jerry MD;  Location: [facility] PAIN MGT;  Service: Pain Management;  Laterality: Bilateral;    TRANSFORAMINAL EPIDURAL INJECTION OF STEROID Bilateral 3/10/2021    Procedure: INJECTION, STEROID, EPIDURAL, TRANSFORAMINAL APPROACH, L4-L5;  Surgeon: Darren Jerry MD;  Location: [facility] PAIN MGT;  Service: Pain Management;  Laterality: Bilateral;    TRANSFORAMINAL EPIDURAL INJECTION OF STEROID Bilateral 12/15/2021    Procedure: INJECTION, STEROID, EPIDURAL, TRANSFORAMINAL APPROACH  Bilateral L4/5 TFESI / needs consent;  Surgeon: Darren Jerry MD;  Location: [facility] PAIN MGT;  Service: Pain Management;  Laterality: Bilateral;    TRANSFORAMINAL EPIDURAL INJECTION OF STEROID Bilateral 12/14/2022    Procedure: INJECTION, STEROID, EPIDURAL, TRANSFORAMINAL APPROACH BILATERAL L4/5 CONTRAST;  Surgeon: Darern Jerry MD;  Location: [facility] PAIN MGT;  Service: Pain Management;  Laterality: Bilateral;    TUBAL LIGATION         Review of patient's allergies indicates:  No Known Allergies    Current Facility-Administered Medications on File Prior to Encounter   Medication    0.9%  NaCl infusion     Current Outpatient Medications on File Prior to Encounter   Medication Sig    ACCU-CHEK SOFT DEV LANCETS Kit     alcohol swabs (BD ALCOHOL SWABS) PadM Apply 1 each topically as needed.    ammonium lactate 12 % Crea Apply twice daily to affected parts both feet as needed.    arm brace (WRIST SUPPORT LARGE-XLARGE MISC)     aspirin (ECOTRIN) 81 MG EC tablet Take 1 tablet (81 mg total) by mouth once daily.    atorvastatin (LIPITOR) 80 MG tablet TAKE 1 TABLET EVERY DAY    blood glucose control, high Soln 1 drop by Misc.(Non-Drug; Combo Route) route as needed.    blood glucose control, low Soln 1 drop by  "Misc.(Non-Drug; Combo Route) route as needed.    blood sugar diagnostic (ACCU-CHEK AMBER PLUS TEST STRP) Strp Inject 2 strips into the skin 2 (two) times a day. Test Blood Sugar    blood sugar diagnostic Strp To check BG 3 times daily, to use with Accu check Guide meter pt has.    blood-glucose meter (ACCU-CHEK AMBER PLUS METER) Misc Use as directed    blood-glucose meter (ACCU-CHEK AMBER PLUS METER) Misc Use as directed to check blood sugar three times daily    DEPAKOTE  mg Tb24 Take 500 mg by mouth nightly.     divalproex ER (DEPAKOTE ER) 250 MG 24 hr tablet Take 1 tablet (250 mg total) by mouth once daily.    EASY TOUCH 31 gauge x 1/4" Ndle Use as directed    insulin (LANTUS SOLOSTAR U-100 INSULIN) glargine 100 units/mL (3mL) SubQ pen INJECT 23 UNITS SUBCUTANEOUSLY NIGHTLY    lancets (ONETOUCH ULTRASOFT LANCETS) [facility] Pt to check BG up to QID. Dispense strips compatible with pt brand meter    lancets Misc To check BG 3 times daily, to use with Accu check Guide meter pt has.    liraglutide 0.6 mg/0.1 mL, 18 mg/3 mL, subq PNIJ (VICTOZA 3-HERMANN) 0.6 mg/0.1 mL (18 mg/3 mL) PnIj pen INJECT 1.8 MG SUBCUTANEOUSLY ONCE DAILY.    lisinopriL-hydrochlorothiazide (PRINZIDE,ZESTORETIC) 10-12.5 mg per tablet Take 1 tablet by mouth once daily.    losartan (COZAAR) 25 MG tablet TAKE 1 TABLET ONE TIME DAILY    metFORMIN (GLUCOPHAGE) 500 MG tablet Take 1 tablet (500 mg total) by mouth 2 (two) times daily. Take with food.    MITIGARE 0.6 mg Cap TAKE 1 CAPSULE BY MOUTH DAILY.PLEASE TAKE THIS MEDICATION ONCE DAILY UNTIL YOUR POTENTIAL GOUT FLARE IN YOUR FOOT CLEARS    nicotine (NICODERM CQ) 7 mg/24 hr Place 1 patch onto the skin once daily.    nicotine polacrilex 2 MG Lozg Take 1 lozenge (2 mg total) by mouth as needed (Use in place of cigarettes).    NIFEdipine (PROCARDIA-XL) 30 MG (OSM) 24 hr tablet Take 1 tablet (30 mg total) by mouth 2 (two) times a day.    OLANZapine (ZYPREXA) 10 MG tablet TK 1 T PO QHS    oxybutynin " "(DITROPAN-XL) 10 MG 24 hr tablet Take 1 tablet (10 mg total) by mouth once daily.    pen needle, diabetic (BD ULTRA-FINE [place] PEN NEEDLE) 32 gauge x 5/32" Ndle Pt is injecting once daily    pen needle, diabetic, safety 29 gauge x 3/16" Ndle Use as instructed    pregabalin (LYRICA) 100 MG capsule Take 1 capsule (100 mg total) by mouth 3 (three) times daily.    QUEtiapine (SEROQUEL) 100 MG Tab TAKE 1 TABLET EVERY EVENING    traMADoL (ULTRAM) 50 mg tablet Take 1 tablet (50 mg total) by mouth every 6 (six) hours as needed (Severe left foot pain).    TRUE METRIX GLUCOSE METER Misc     TRUEPLUS LANCETS 33 gauge Misc 100 lancets by Subconjunctival route 4 (four) times daily.    ziprasidone (GEODON) 40 MG Cap TAKE 1 CAPSULE EVERY EVENING    [DISCONTINUED] nicotine (NICODERM CQ) 14 mg/24 hr Place 1 patch onto the skin once daily.     Family History       Problem Relation (Age of Onset)    Cancer Sister, Daughter    Cervical cancer Daughter    Deep vein thrombosis Mother    Diabetes Sister, Brother, Son    Diabetic kidney disease Sister    Glaucoma Mother    Hypertension Mother    Kidney disease Mother    Lung cancer Sister    No Known Problems Father, Maternal Grandmother, Maternal Grandfather, Paternal Grandmother, Paternal Grandfather, Son, Daughter, Daughter, Daughter          Tobacco Use    Smoking status: Former     Packs/day: 0.50     Years: 25.00     Pack years: 12.50     Types: Cigarettes     Start date:      Quit date: 3/17/2022     Years since quittin.8    Smokeless tobacco: Never    Tobacco comments:     currently at less than 1/2 pack pd   Substance and Sexual Activity    Alcohol use: Yes     Alcohol/week: 0.0 standard drinks     Comment: social - once every 2 weeks    Drug use: No     Comment:  - stopped using crack cocaine    Sexual activity: Not Currently     Partners: Male     Review of Systems   Constitutional:  Positive for activity change. Negative for appetite change, chills and fever.   HENT: "  Negative for congestion, hearing loss and rhinorrhea.    Eyes:  Negative for discharge, itching and visual disturbance.   Respiratory:  Negative for apnea, cough and shortness of breath.    Cardiovascular:  Negative for chest pain, palpitations and leg swelling.   Gastrointestinal:  Negative for abdominal distention, abdominal pain, constipation, diarrhea, nausea and vomiting.   Endocrine: Negative for cold intolerance and heat intolerance.   Genitourinary:  Negative for dysuria and hematuria.   Musculoskeletal:  Negative for back pain, neck pain and neck stiffness.   Skin:  Negative for rash and wound.   Neurological:  Positive for dizziness. Negative for seizures, weakness, light-headedness and headaches.   Psychiatric/Behavioral:  Negative for agitation, confusion and suicidal ideas.    Objective:     Vital Signs (Most Recent):  Temp: 99.3 °F (37.4 °C) (01/19/23 0239)  Pulse: 72 (01/19/23 0223)  Resp: 16 (01/18/23 2302)  BP: 126/73 (01/19/23 0223)  SpO2: 97 % (01/19/23 0223) Vital Signs (24h Range):  Temp:  [98.7 °F (37.1 °C)-100.2 °F (37.9 °C)] 99.3 °F (37.4 °C)  Pulse:  [] 72  Resp:  [15-20] 16  SpO2:  [95 %-100 %] 97 %  BP: (110-137)/(73-92) 126/73     Weight: 82.6 kg (182 lb)  Body mass index is 31.24 kg/m².    Physical Exam  Vitals reviewed.   Constitutional:       General: She is not in acute distress.     Appearance: She is well-developed.   HENT:      Head: Normocephalic and atraumatic.      Nose: Nose normal. No rhinorrhea.      Mouth/Throat:      Mouth: Mucous membranes are moist.   Eyes:      General: No scleral icterus.        Right eye: No discharge.         Left eye: No discharge.      Pupils: Pupils are equal, round, and reactive to light.   Neck:      Vascular: No JVD.   Cardiovascular:      Rate and Rhythm: Normal rate and regular rhythm.      Heart sounds: Normal heart sounds. No murmur heard.    No friction rub.   Pulmonary:      Effort: Pulmonary effort is normal. No respiratory  distress.      Breath sounds: Normal breath sounds. No wheezing.   Abdominal:      General: Bowel sounds are normal. There is no distension.      Palpations: Abdomen is soft.      Tenderness: There is no abdominal tenderness.   Musculoskeletal:         General: No deformity. Normal range of motion.      Cervical back: Normal range of motion and neck supple.   Skin:     General: Skin is warm and dry.   Neurological:      General: No focal deficit present.      Mental Status: She is alert and oriented to person, place, and time.         CRANIAL NERVES     CN III, IV, VI   Pupils are equal, round, and reactive to light.     Significant Labs: All pertinent labs within the past 24 hours have been reviewed.  CBC:   Recent Labs   Lab 01/18/23 2233 01/18/23 2243   WBC 8.71  --    HGB 11.5*  --    HCT 37.0 37     --      CMP:   Recent Labs   Lab 01/18/23  2059      K 3.9      CO2 23   *   BUN 21   CREATININE 1.3   CALCIUM 10.0   PROT 7.7   ALBUMIN 3.1*   BILITOT 0.7   ALKPHOS 76   AST 78*   ALT 27   ANIONGAP 9     Troponin:   Recent Labs   Lab 01/18/23 2233   TROPONINI 0.014     CPK: 3352    Significant Imaging: I have reviewed all pertinent imaging results/findings within the past 24 hours.

## 2023-01-19 NOTE — PHARMACY MED REC
"Admission Medication History     The home medication history was taken by Tara Paredes.    You may go to "Admission" then "Reconcile Home Medications" tabs to review and/or act upon these items.     The home medication list has been updated by the Pharmacy department.   Please read ALL comments highlighted in yellow.   Please address this information as you see fit.    Feel free to contact us if you have any questions or require assistance.      The medications listed below were removed from the home medication list. Please reorder if appropriate:  Patient reports no longer taking the following medication(s):  NICOTINE LOZG  NICOTINE 7 MG PATCH  NIFEDIPINE XL 30 MG  OLANZAPINE 10 MG  TRAMADOL 50 MG      Medications listed below were obtained from: Patient/family  Current Outpatient Medications on File Prior to Encounter   Medication Sig    acetaminophen-codeine 300-30mg (TYLENOL #3) 300-30 mg Tab Take 1 tablet by mouth every 6 (six) hours as needed for Pain.    ammonium lactate 12 % Crea Apply twice daily to affected parts both feet as needed.    aspirin (ECOTRIN) 81 MG EC tablet Take 1 tablet (81 mg total) by mouth once daily.    atorvastatin (LIPITOR) 80 MG tablet TAKE 1 TABLET EVERY DAY    chlorhexidine (PERIDEX) 0.12 % solution RINSE AND SPIT 15 ML TWICE DAILY FOR 7 DAYS    DEPAKOTE  mg Tb24 Take 500 mg by mouth nightly.     insulin (LANTUS SOLOSTAR U-100 INSULIN) glargine 100 units/mL (3mL) SubQ pen INJECT 23 UNITS SUBCUTANEOUSLY NIGHTLY    liraglutide 0.6 mg/0.1 mL, 18 mg/3 mL, subq PNIJ (VICTOZA 3-HERMANN) 0.6 mg/0.1 mL (18 mg/3 mL) PnIj pen INJECT 1.8 MG SUBCUTANEOUSLY ONCE DAILY.    lisinopriL-hydrochlorothiazide (PRINZIDE,ZESTORETIC) 10-12.5 mg per tablet Take 1 tablet by mouth once daily.    losartan (COZAAR) 25 MG tablet TAKE 1 TABLET ONE TIME DAILY    metFORMIN (GLUCOPHAGE) 500 MG tablet Take 1,000 mg by mouth once daily. Take with food.    oxybutynin (DITROPAN-XL) 10 MG 24 hr tablet Take 1 tablet " "(10 mg total) by mouth once daily.    penicillin v potassium (VEETID) 500 MG tablet Take 500 mg by mouth every 8 (eight) hours.    pregabalin (LYRICA) 100 MG capsule Take 1 capsule (100 mg total) by mouth 3 (three) times daily.    QUEtiapine (SEROQUEL) 100 MG Tab TAKE 200 MG BY MOUTH  EVERY EVENING     ziprasidone (GEODON) 40 MG Cap TAKE 1 CAPSULE EVERY EVENING    blood glucose control, low Soln 1 drop by Misc.(Non-Drug; Combo Route) route as needed.    blood sugar diagnostic (ACCU-CHEK AMBER PLUS TEST STRP) Strp Inject 2 strips into the skin 2 (two) times a day. Test Blood Sugar    blood-glucose meter (ACCU-CHEK AMBER PLUS METER) Misc Use as directed to check blood sugar three times daily    EASY TOUCH 31 gauge x 1/4" Ndle Use as directed    lancets Misc To check BG 3 times daily, to use with Accu check Guide meter pt has.    MITIGARE 0.6 mg Cap TAKE 1 CAPSULE BY MOUTH DAILY.PLEASE TAKE THIS MEDICATION ONCE DAILY UNTIL YOUR POTENTIAL GOUT FLARE IN YOUR FOOT CLEARS    pen needle, diabetic (BD ULTRA-FINE ITALO PEN NEEDLE) 32 gauge x 5/32" Ndle Pt is injecting once daily    TRUEPLUS LANCETS 33 gauge Misc 100 lancets by Subconjunctival route 4 (four) times daily.             Tara Paredes  EXT [id number]                  .        "

## 2023-01-19 NOTE — HPI
61 yo female with DM, schizophrenia, depression, CVA history presenting after EMS found her down 2/2 not answering a call today. On arrival, EMS states she had been on the floor all day, had missed her medications. She states she seemed more fatigued. She is not particularly sure why she presented to the ER, but does think she tripped and then could not get up. She does not report any pain except for pain in her foot which she always has. She denied any chest pain or passing out before the fall, stating that she must have tripped on something. She currently denies any chest pain or shortness of breath. Per the ER, she states she had chest pain and shortness of breath and passed out. She initially had some garbled speech MRI obtained without evidence of acute stroke.     Medicine called for admission after labs showing elevated CPK.

## 2023-01-19 NOTE — ED NOTES
Dr. Caballero stated to verify pt medication she had with her was penicillin. preet from pharmacy verified penicillin 500 mg. Notified Dr. Caballero, stated ok for pt to take medication from home for first dose. Pt took penicillin 500 mg by mouth with no issues. Tolerated well

## 2023-01-19 NOTE — ED NOTES
16th floor currently does not have telebox to put on pt. Charge nurse aware. Will put in for transport once telebox is received

## 2023-01-19 NOTE — ED NOTES
Dinner tray provided to pt. Sitting up, eating, and tolerating well. No insulin aspart needed at this time

## 2023-01-19 NOTE — ASSESSMENT & PLAN NOTE
Chronic, controlled, continue home medications, holding losartan as above to above BUFFY with lisinopril-HCTZ

## 2023-01-19 NOTE — ED NOTES
Received report, pt lying in bed, respirations even, unlabored, NAD noted, answering questions appropriately. Pt updated on plan of care, placed on telemetry, call bell within reach.

## 2023-01-19 NOTE — ASSESSMENT & PLAN NOTE
CPK elevated, renal function normal  Will continue with fluids  Hold statin, and losartan a this time  No new acute pain, imaging results without fracture

## 2023-01-20 LAB
ALBUMIN SERPL BCP-MCNC: 2.1 G/DL (ref 3.5–5.2)
ALP SERPL-CCNC: 65 U/L (ref 55–135)
ALT SERPL W/O P-5'-P-CCNC: 24 U/L (ref 10–44)
ANION GAP SERPL CALC-SCNC: 9 MMOL/L (ref 8–16)
AST SERPL-CCNC: 54 U/L (ref 10–40)
BASOPHILS # BLD AUTO: 0.03 K/UL (ref 0–0.2)
BASOPHILS NFR BLD: 0.3 % (ref 0–1.9)
BILIRUB SERPL-MCNC: 0.7 MG/DL (ref 0.1–1)
BUN SERPL-MCNC: 14 MG/DL (ref 8–23)
CALCIUM SERPL-MCNC: 8.5 MG/DL (ref 8.7–10.5)
CHLORIDE SERPL-SCNC: 110 MMOL/L (ref 95–110)
CK SERPL-CCNC: 1077 U/L (ref 20–180)
CK SERPL-CCNC: 1123 U/L (ref 20–180)
CO2 SERPL-SCNC: 21 MMOL/L (ref 23–29)
CREAT SERPL-MCNC: 0.9 MG/DL (ref 0.5–1.4)
DIFFERENTIAL METHOD: ABNORMAL
EOSINOPHIL # BLD AUTO: 0 K/UL (ref 0–0.5)
EOSINOPHIL NFR BLD: 0.4 % (ref 0–8)
ERYTHROCYTE [DISTWIDTH] IN BLOOD BY AUTOMATED COUNT: 15.3 % (ref 11.5–14.5)
EST. GFR  (NO RACE VARIABLE): >60 ML/MIN/1.73 M^2
GLUCOSE SERPL-MCNC: 63 MG/DL (ref 70–110)
HCT VFR BLD AUTO: 26.3 % (ref 37–48.5)
HGB BLD-MCNC: 8.2 G/DL (ref 12–16)
IMM GRANULOCYTES # BLD AUTO: 0.05 K/UL (ref 0–0.04)
IMM GRANULOCYTES NFR BLD AUTO: 0.5 % (ref 0–0.5)
LYMPHOCYTES # BLD AUTO: 2.7 K/UL (ref 1–4.8)
LYMPHOCYTES NFR BLD: 29.5 % (ref 18–48)
MAGNESIUM SERPL-MCNC: 1.6 MG/DL (ref 1.6–2.6)
MCH RBC QN AUTO: 29.4 PG (ref 27–31)
MCHC RBC AUTO-ENTMCNC: 31.2 G/DL (ref 32–36)
MCV RBC AUTO: 94 FL (ref 82–98)
MONOCYTES # BLD AUTO: 1.5 K/UL (ref 0.3–1)
MONOCYTES NFR BLD: 16.9 % (ref 4–15)
NEUTROPHILS # BLD AUTO: 4.8 K/UL (ref 1.8–7.7)
NEUTROPHILS NFR BLD: 52.4 % (ref 38–73)
NRBC BLD-RTO: 0 /100 WBC
PHOSPHATE SERPL-MCNC: 2.2 MG/DL (ref 2.7–4.5)
PLATELET # BLD AUTO: 218 K/UL (ref 150–450)
PMV BLD AUTO: 11.4 FL (ref 9.2–12.9)
POCT GLUCOSE: 102 MG/DL (ref 70–110)
POCT GLUCOSE: 120 MG/DL (ref 70–110)
POCT GLUCOSE: 64 MG/DL (ref 70–110)
POCT GLUCOSE: 75 MG/DL (ref 70–110)
POTASSIUM SERPL-SCNC: 3.8 MMOL/L (ref 3.5–5.1)
PROT SERPL-MCNC: 5.8 G/DL (ref 6–8.4)
RBC # BLD AUTO: 2.79 M/UL (ref 4–5.4)
SODIUM SERPL-SCNC: 140 MMOL/L (ref 136–145)
WBC # BLD AUTO: 9.12 K/UL (ref 3.9–12.7)

## 2023-01-20 PROCEDURE — 36410 VNPNXR 3YR/> PHY/QHP DX/THER: CPT

## 2023-01-20 PROCEDURE — 80053 COMPREHEN METABOLIC PANEL: CPT | Performed by: INTERNAL MEDICINE

## 2023-01-20 PROCEDURE — 99233 SBSQ HOSP IP/OBS HIGH 50: CPT | Mod: GC,,, | Performed by: STUDENT IN AN ORGANIZED HEALTH CARE EDUCATION/TRAINING PROGRAM

## 2023-01-20 PROCEDURE — A4216 STERILE WATER/SALINE, 10 ML: HCPCS | Performed by: PHYSICIAN ASSISTANT

## 2023-01-20 PROCEDURE — 25000003 PHARM REV CODE 250: Performed by: PHYSICIAN ASSISTANT

## 2023-01-20 PROCEDURE — 25000003 PHARM REV CODE 250: Performed by: STUDENT IN AN ORGANIZED HEALTH CARE EDUCATION/TRAINING PROGRAM

## 2023-01-20 PROCEDURE — 12000002 HC ACUTE/MED SURGE SEMI-PRIVATE ROOM

## 2023-01-20 PROCEDURE — 85025 COMPLETE CBC W/AUTO DIFF WBC: CPT | Performed by: INTERNAL MEDICINE

## 2023-01-20 PROCEDURE — 97165 OT EVAL LOW COMPLEX 30 MIN: CPT

## 2023-01-20 PROCEDURE — 97162 PT EVAL MOD COMPLEX 30 MIN: CPT

## 2023-01-20 PROCEDURE — 99233 PR SUBSEQUENT HOSPITAL CARE,LEVL III: ICD-10-PCS | Mod: GC,,, | Performed by: STUDENT IN AN ORGANIZED HEALTH CARE EDUCATION/TRAINING PROGRAM

## 2023-01-20 PROCEDURE — 97530 THERAPEUTIC ACTIVITIES: CPT

## 2023-01-20 PROCEDURE — 84100 ASSAY OF PHOSPHORUS: CPT | Performed by: INTERNAL MEDICINE

## 2023-01-20 PROCEDURE — 83735 ASSAY OF MAGNESIUM: CPT | Performed by: INTERNAL MEDICINE

## 2023-01-20 PROCEDURE — C1751 CATH, INF, PER/CENT/MIDLINE: HCPCS

## 2023-01-20 PROCEDURE — 82550 ASSAY OF CK (CPK): CPT | Performed by: INTERNAL MEDICINE

## 2023-01-20 PROCEDURE — 82550 ASSAY OF CK (CPK): CPT | Mod: 91 | Performed by: STUDENT IN AN ORGANIZED HEALTH CARE EDUCATION/TRAINING PROGRAM

## 2023-01-20 PROCEDURE — 36415 COLL VENOUS BLD VENIPUNCTURE: CPT | Performed by: INTERNAL MEDICINE

## 2023-01-20 PROCEDURE — 97535 SELF CARE MNGMENT TRAINING: CPT

## 2023-01-20 PROCEDURE — 63600175 PHARM REV CODE 636 W HCPCS: Performed by: INTERNAL MEDICINE

## 2023-01-20 PROCEDURE — 25000003 PHARM REV CODE 250: Performed by: INTERNAL MEDICINE

## 2023-01-20 RX ORDER — HYDROCODONE BITARTRATE AND ACETAMINOPHEN 10; 325 MG/1; MG/1
1 TABLET ORAL EVERY 6 HOURS PRN
Status: DISCONTINUED | OUTPATIENT
Start: 2023-01-20 | End: 2023-01-24

## 2023-01-20 RX ORDER — SODIUM,POTASSIUM PHOSPHATES 280-250MG
2 POWDER IN PACKET (EA) ORAL
Status: COMPLETED | OUTPATIENT
Start: 2023-01-20 | End: 2023-01-20

## 2023-01-20 RX ORDER — HYDROCODONE BITARTRATE AND ACETAMINOPHEN 5; 325 MG/1; MG/1
1 TABLET ORAL EVERY 6 HOURS PRN
Status: DISCONTINUED | OUTPATIENT
Start: 2023-01-20 | End: 2023-01-24

## 2023-01-20 RX ORDER — SODIUM CHLORIDE 0.9 % (FLUSH) 0.9 %
10 SYRINGE (ML) INJECTION
Status: DISCONTINUED | OUTPATIENT
Start: 2023-01-20 | End: 2023-01-24 | Stop reason: HOSPADM

## 2023-01-20 RX ORDER — SODIUM CHLORIDE 0.9 % (FLUSH) 0.9 %
10 SYRINGE (ML) INJECTION EVERY 6 HOURS
Status: DISCONTINUED | OUTPATIENT
Start: 2023-01-20 | End: 2023-01-24 | Stop reason: HOSPADM

## 2023-01-20 RX ADMIN — HYDROCODONE BITARTRATE AND ACETAMINOPHEN 1 TABLET: 10; 325 TABLET ORAL at 01:01

## 2023-01-20 RX ADMIN — HYDROCODONE BITARTRATE AND ACETAMINOPHEN 1 TABLET: 10; 325 TABLET ORAL at 10:01

## 2023-01-20 RX ADMIN — ENOXAPARIN SODIUM 40 MG: 40 INJECTION SUBCUTANEOUS at 04:01

## 2023-01-20 RX ADMIN — NIFEDIPINE 30 MG: 30 TABLET, FILM COATED, EXTENDED RELEASE ORAL at 09:01

## 2023-01-20 RX ADMIN — POTASSIUM & SODIUM PHOSPHATES POWDER PACK 280-160-250 MG 2 PACKET: 280-160-250 PACK at 10:01

## 2023-01-20 RX ADMIN — ASPIRIN 81 MG: 81 TABLET, COATED ORAL at 09:01

## 2023-01-20 RX ADMIN — NIFEDIPINE 30 MG: 30 TABLET, FILM COATED, EXTENDED RELEASE ORAL at 12:01

## 2023-01-20 RX ADMIN — HYDROCODONE BITARTRATE AND ACETAMINOPHEN 1 TABLET: 10; 325 TABLET ORAL at 04:01

## 2023-01-20 RX ADMIN — DIVALPROEX SODIUM 500 MG: 250 TABLET, EXTENDED RELEASE ORAL at 12:01

## 2023-01-20 RX ADMIN — Medication 10 ML: at 12:01

## 2023-01-20 RX ADMIN — Medication 10 ML: at 06:01

## 2023-01-20 RX ADMIN — DEXTROSE MONOHYDRATE 125 ML: 100 INJECTION, SOLUTION INTRAVENOUS at 08:01

## 2023-01-20 RX ADMIN — PENICILLIN V POTASSIUM 500 MG: 500 TABLET, FILM COATED ORAL at 03:01

## 2023-01-20 RX ADMIN — ZIPRASIDONE HYDROCHLORIDE 40 MG: 40 CAPSULE ORAL at 03:01

## 2023-01-20 RX ADMIN — DIVALPROEX SODIUM 500 MG: 250 TABLET, EXTENDED RELEASE ORAL at 08:01

## 2023-01-20 RX ADMIN — ZIPRASIDONE HYDROCHLORIDE 40 MG: 40 CAPSULE ORAL at 08:01

## 2023-01-20 RX ADMIN — POTASSIUM & SODIUM PHOSPHATES POWDER PACK 280-160-250 MG 2 PACKET: 280-160-250 PACK at 04:01

## 2023-01-20 NOTE — ASSESSMENT & PLAN NOTE
Patient's FSGs are uncontrolled due to hyperglycemia on current medication regimen.  Last A1c reviewed-   Lab Results   Component Value Date    HGBA1C 7.1 (H) 03/16/2022     Most recent fingerstick glucose reviewed-   Recent Labs   Lab 01/19/23  1605 01/19/23  1705 01/20/23  0756 01/20/23  1129   POCTGLUCOSE 59* 140* 64* 75     Current correctional scale  Low  Maintain anti-hyperglycemic dose as follows-   Antihyperglycemics (From admission, onward)    Start     Stop Route Frequency Ordered    01/21/23 0900  insulin detemir U-100 pen 7 Units         -- SubQ Daily 01/20/23 1513    01/19/23 0434  insulin aspart U-100 pen 0-5 Units         -- SubQ Before meals & nightly PRN 01/19/23 0335        Hold Oral hypoglycemics while patient is in the hospital.    PHARMACY CONSULT, as insulin dosing is completely inconsistent with patient reported dosages

## 2023-01-20 NOTE — PLAN OF CARE
[facility] ([place] [address])  Initial Discharge Assessment       Primary Care Provider: Ghulam Contreras MD    Admission Diagnosis: Syncope [R55]  Tachycardia [R00.0]  Foot swelling [M79.89]  Fall [W19.XXXA]  Chest pain [R07.9]  Traumatic rhabdomyolysis, initial encounter [T79.6XXA]  AMS (altered mental status) [R41.82]    Admission Date: 1/18/2023  Expected Discharge Date: 1/20/2023    Discharge Barriers Identified: (P) None    Payor: [facility] MCARE / Plan: [facility] DUAL COMPLETE HMO SNP / Product Type: Medicare Advantage /     Extended Emergency Contact Information  Primary Emergency Contact: Rojelio Daniels  Address: UNKNOWN   [place]  Mobile Phone: [phone]  Relation: Daughter  Secondary Emergency Contact: Glo Taylor  Address: UNKNOWN   [place]  Mobile Phone: [phone]  Relation: Daughter    Discharge Plan A: (P) [facility] - [facility] [address] [facility] [address] [facility]  [address] [facility] [address]  Phone: [phone] Fax: [phone]    [id number] - [facility] [place], [place] - [address] AT [facility] [address]  [address]  [place] [address]  Phone: [phone] Fax: [phone]      Initial Assessment (most recent)       Adult Discharge Assessment - 01/20/23 1202          Discharge Assessment    Assessment Type Discharge Planning Assessment (P)      Confirmed/corrected address, phone number and insurance Yes (P)      Confirmed Demographics Correct on Facesheet (P)      Source of Information family (P)      Communicated ANDREW with patient/caregiver No (P)      Reason For Admission traumatid rhabdomyolysis (P)      People in Home alone (P)      Facility Arrived From: home (P)      Do you expect to return to your current living situation? Yes (P)      Do you have help at home or someone to help you manage your care at home? No (P)      Prior to  hospitilization cognitive status: Alert/Oriented (P)      Current cognitive status: Unable to Assess (P)      Walking or Climbing Stairs ambulation difficulty, requires equipment (P)      Mobility Management Uses a cane (P)      Home Accessibility stairs to enter home (P)      Number of Stairs, Main Entrance four (P)      Home Layout Able to live on 1st floor (P)      Equipment Currently Used at Home cane, straight (P)      Readmission within 30 days? No (P)      Do you currently have service(s) that help you manage your care at home? No (P)      Do you take prescription medications? Yes (P)      Who is going to help you get home at discharge? adult children will  (P)      How do you get to doctors appointments? family or friend will provide (P)      Are you on dialysis? No (P)      Do you take coumadin? No (P)      Discharge Plan A Home Health (P)      DME Needed Upon Discharge  other (see comments) (P)    tbd    Discharge Plan discussed with: Adult children (P)      Discharge Barriers Identified None (P)                  Pt with documented mental health issues.  Completed assessment via phone with daughter Rojelio Daniels.  Mbr lives alone, has 6 children, one son checks pt every day.  Family provides transportation.  No dialysis or coumadin.    Selin Amezcua, JAVIDN, BS, RN, CCM

## 2023-01-20 NOTE — ASSESSMENT & PLAN NOTE
CPK elevated, renal function normal  Fluid stopped.  CPK trending downward.  Will restart antihypertensives on discharge  Hold statin, and losartan a this time  No new acute pain, imaging results without fracture  - knee x-ray ordered.  Will pursue CT if nondiagnostic.

## 2023-01-20 NOTE — PROGRESS NOTES
[facility] ([address] Medicine  Progress Note    Patient Name: Jonathan Gonzales  MRN: [id number]  Patient Class: IP- Inpatient   Admission Date: 1/18/2023  Length of Stay: 1 days  Attending Physician: Agustin Cosby MD  Primary Care Provider: Ghulam Contreras MD        Subjective:     Principal Problem:Traumatic rhabdomyolysis        HPI:  61 yo female with DM, schizophrenia, depression, CVA history presenting after EMS found her down 2/2 not answering a call today. On arrival, EMS states she had been on the floor all day, had missed her medications. She states she seemed more fatigued. She is not particularly sure why she presented to the ER, but does think she tripped and then could not get up. She does not report any pain except for pain in her foot which she always has. She denied any chest pain or passing out before the fall, stating that she must have tripped on something. She currently denies any chest pain or shortness of breath. Per the ER, she states she had chest pain and shortness of breath and passed out. She initially had some garbled speech MRI obtained without evidence of acute stroke.     Medicine called for admission after labs showing elevated CPK.      Overview/Hospital Course:  Patient's CK showed improvement with no evidence of end-organ damage.  Safe for discharge however evaluated by PT/OT and patient has significant physical weakness      Interval History:  Evaluated by PT. unsafe to go home however adamant that she does not want to be in a nursing facility..  No evidence of end-organ damage and CK is improving.  Some hypoglycemia, will reduce insulin.  Persistent leg pain particularly of the left knee noted by PT.  Will pursue x-ray and possible CT if x-ray nondiagnostic.    Objective:     Vital Signs (Most Recent):  Temp: 97 °F (36.1 °C) (01/20/23 1128)  Pulse: 66 (01/20/23 1128)  Resp: 17 (01/20/23 1128)  BP: (!) 90/53 (01/20/23 1128)  SpO2: 95 %  (01/20/23 1128)   Vital Signs (24h Range):  Temp:  [97 °F (36.1 °C)-100.1 °F (37.8 °C)] 97 °F (36.1 °C)  Pulse:  [62-74] 66  Resp:  [16-20] 17  SpO2:  [93 %-99 %] 95 %  BP: ()/(53-73) 90/53     Weight: 80 kg (176 lb 5.9 oz)  Body mass index is 30.27 kg/m².  No intake or output data in the 24 hours ending 01/20/23 1514   Physical Exam  Vitals reviewed.   Constitutional:       General: She is not in acute distress.     Appearance: She is well-developed.   HENT:      Head: Normocephalic and atraumatic.      Nose: Nose normal. No rhinorrhea.      Mouth/Throat:      Mouth: Mucous membranes are moist.   Eyes:      General: No scleral icterus.        Right eye: No discharge.         Left eye: No discharge.      Pupils: Pupils are equal, round, and reactive to light.   Neck:      Vascular: No JVD.   Cardiovascular:      Rate and Rhythm: Normal rate and regular rhythm.      Heart sounds: Normal heart sounds. No murmur heard.    No friction rub.   Pulmonary:      Effort: Pulmonary effort is normal. No respiratory distress.      Breath sounds: Normal breath sounds. No wheezing.   Abdominal:      General: Bowel sounds are normal. There is no distension.      Palpations: Abdomen is soft.      Tenderness: There is no abdominal tenderness.   Musculoskeletal:         General: No deformity. Normal range of motion.      Cervical back: Normal range of motion and neck supple.   Skin:     General: Skin is warm and dry.   Neurological:      General: No focal deficit present.      Mental Status: She is alert and oriented to person, place, and time.      Motor: Weakness (Lower extremities limited by pain.  Pain appears to have neuropathic component as she is exquisitely tender to mild palpation.  X-ray nondiagnostic) present.       Significant Labs: All pertinent labs within the past 24 hours have been reviewed.    Significant Imaging: I have reviewed all pertinent imaging results/findings within the past 24  hours.      Assessment/Plan:      * Traumatic rhabdomyolysis  CPK elevated, renal function normal  Fluid stopped.  CPK trending downward.  Will restart antihypertensives on discharge  Hold statin, and losartan a this time  No new acute pain, imaging results without fracture  - knee x-ray ordered.  Will pursue CT if nondiagnostic.      Vasovagal syncope  Acute, inconsistent story at this time, troponin negative  Will continue telemetry, obtain ECHO      Essential hypertension  Chronic, controlled, continue home medications, holding losartan as above to above BUFFY with lisinopril-HCTZ      Schizoaffective disorder, bipolar type  Chronic, controlled, continue home medications that patient stated was on however consult pharmacy as holding 1-2 medications as patient stated was not taking them.    Type 2 diabetes mellitus with neurologic complication, with long-term current use of insulin  Patient's FSGs are uncontrolled due to hyperglycemia on current medication regimen.  Last A1c reviewed-   Lab Results   Component Value Date    HGBA1C 7.1 (H) 03/16/2022     Most recent fingerstick glucose reviewed-   Recent Labs   Lab 01/19/23  1605 01/19/23  1705 01/20/23  0756 01/20/23  1129   POCTGLUCOSE 59* 140* 64* 75     Current correctional scale  Low  Maintain anti-hyperglycemic dose as follows-   Antihyperglycemics (From admission, onward)    Start     Stop Route Frequency Ordered    01/21/23 0900  insulin detemir U-100 pen 7 Units         -- SubQ Daily 01/20/23 1513    01/19/23 0434  insulin aspart U-100 pen 0-5 Units         -- SubQ Before meals & nightly PRN 01/19/23 0335        Hold Oral hypoglycemics while patient is in the hospital.    PHARMACY CONSULT, as insulin dosing is completely inconsistent with patient reported dosages      VTE Risk Mitigation (From admission, onward)         Ordered     enoxaparin injection 40 mg  Daily         01/19/23 0335     IP VTE HIGH RISK PATIENT  Once         01/19/23 0335     Place  sequential compression device  Until discontinued         01/19/23 0335     IP VTE HIGH RISK PATIENT  Once         01/19/23 0335     Place sequential compression device  Until discontinued         01/19/23 0335                Discharge Planning   ANDREW: 1/23/2023     Code Status: Full Code   Is the patient medically ready for discharge?:     Reason for patient still in hospital (select all that apply): Patient trending condition, PT / OT recommendations and Pending disposition  Discharge Plan A: Home Health                  Sergio Caballero MD  Department of Hospital Medicine   [facility] - Intensive Care ([address]

## 2023-01-20 NOTE — CONSULTS
PharmD Consult received for:    1.) Admit medication history/reconciliation: Complete. See notes entered by Marcelle Lizama and Tara Paredes 1/19/23    2.) Renally adjust all medications: Estimated Creatinine Clearance: 53.6 mL/min (based on SCr of 1.1 mg/dL). No renal dose adjustments needed. Will continue to monitor daily through departmental protocols        Thank you for the consult, will sign off  Eliecer Hernandez Pharm.D., BCPS  [id number]      **Note: Consults are reviewed Monday-Friday 7:00am-3:30pm. The above recommendations are only suggested. The recommendations should be considered in conjunction with all patient factors.**

## 2023-01-20 NOTE — HOSPITAL COURSE
"Patient's CK showed improvement with no evidence of end-organ damage.  Safe for discharge however evaluated by PT/OT and patient has significant physical weakness and gait instability. Recommending SNF. ray was non-diagnostic so an MRI and CT was pursued which revealed tibial osteonecrosis of the left leg. Started on antiinflammatories for trial to see if pain improved. Spoke with orthopedics; nothing to intervene upon at this time. ESR/CRP found to be signifcantly elevated, potentially just due to osteonecrosis, but will r/o additional pathologies such as HIV and SLE given high association with osteonecrosis. Will f/u labs after discharge.  Will also discharge with new 7 day course of cefuroxime+flagyl to cover dental infection that was previously partially treated as an outpatient and not completed the course. Instructed her to follow up with dentist and PCP within 1 week. Patient strongly desired to go home despite PT recommending SNF due to gait instability. I extensively discussed this with her and her son at bedside, my concern that she could fall and break a hip, have a brain bleed, or bad outcome. She adamantly refuses SNF placement stating "she just doesn't want to go" and that she wants to be free to walk around and get stronger. She states "I understand that risk [of falling and seriously injuring herself] and am willing to accept that risk." Will try to maximize her safety by discharging her with rolling walker as PT recommended + bedside commode. Son at bedside also in agreement with this plan stating "ultimately it is her decision." I do believe she has decision making capacity since she can communicate that she understands and accepts the risks. Home Health also ordered with PT/OT/aide. Instructed her to HOLD her pregabalin and minimize seroquel use to avoid oversedation. Also instructed her to HOLD her insulin until PCP f/u since her BG were borderline low here on no long-acting insulin, whereas at " home she reports taking 23U detemir nightly.

## 2023-01-20 NOTE — PT/OT/SLP EVAL
Physical Therapy Evaluation and Treatment    Patient Name:  Jonathan Gonzales   MRN:  [id number]    Recommendations:     Discharge Recommendations: nursing facility, skilled   Discharge Equipment Recommendations:  (pending progress in PT POC, B foot pain mgmt)   Barriers to discharge: Inaccessible home and Decreased caregiver support    Assessment:     Jonathan Gonzales is a 62 y.o. female admitted with a medical diagnosis of Traumatic rhabdomyolysis.  She presents with the following impairments/functional limitations: weakness, impaired endurance, impaired self care skills, impaired functional mobility, impaired balance, decreased lower extremity function, decreased safety awareness, pain, impaired coordination, impaired cardiopulmonary response to activity, other (comment) (dec insight into functional deficits, impaired judgement).    Pt pleasant and agreeable to PT intervention, requires SBA bed mobiltiy supine<>sit, and MAXA to complete sit<>Stand xfer from bed height x2 trials using RW due to 10/10 pain in B dorsum of feet, TTP, B feet/ankle edema observed, NSG/MD advised. Patient unable to bear weight through LLE while standing 30s despite MODA and cues, requiring return to seated and limiting dynamic standing tolerance. Patient scoots to HOB with SBA maintaining NWB LLE 2/2 pain. Patient educated on current DC rec SNF due to decline from PLOF and limited social support, inaccessible housing setup. Patient adamantly declines SNF at [facility] due to previous experiences with dec insight into functional deficits and current GERONIMO required.     Patient presents below PLOF and would benefit from acute care skilled PT to address deficits detailed above and promote safe and functional independence.    Rehab Prognosis: Fair; patient would benefit from acute skilled PT services to address these deficits and reach maximum level of function.    Recent Surgery: * No surgery found *      Plan:     During this hospitalization,  "patient to be seen 3 x/week to address the identified rehab impairments via gait training, therapeutic activities, therapeutic exercises, neuromuscular re-education and progress toward the following goals:    Plan of Care Expires:  02/19/23    Subjective     Chief Complaint: "Do you see my feet?"  Patient/Family Comments/goals: Go home  Pain/Comfort:  Pain Rating 1: 10/10  Location - Side 1: Bilateral  Location - Orientation 1: upper  Location 1: foot  Pain Addressed 1: Reposition, Distraction, Cessation of Activity, Pre-medicate for activity  Pain Rating Post-Intervention 1: 10/10  Pain Rating 2: 5/10  Location - Side 2: Left  Location - Orientation 2: generalized  Location 2: knee  Pain Addressed 2: Cessation of Activity, Reposition, Distraction, Nurse notified    Patients cultural, spiritual, Islam conflicts given the current situation: no    Living Environment:  Pt lives alone in [facility] with 3 GATITO and RHR, tub/shower combo.   Prior to admission, patients level of function was MI using SPC.  Equipment used at home: cane, straight.  DME owned (not currently used): rolling walker and shower chair.  Upon discharge, patient will have assistance from children in area unable to provide 24/7 care/supervision.    Objective:     Communicated with RN, OT prior to session.  Patient found supine with PICC line, PureWick, telemetry  upon PT entry to room.    General Precautions: Standard, fall, diabetic  Orthopedic Precautions:N/A   Braces: N/A  Respiratory Status: Room air    Exams:  Cognitive Exam:  Patient is oriented to Person, Place, Time, and Situation  Sensation:    -       Impaired  hypersensitivity B feet plantar and dorsal surfaces (L>R)  Skin Integrity/Edema:      -       Edema: Moderate B feet and ankles  RLE ROM: WFL  RLE Strength: WFL except 3-/5 dorsiflexion limited by pain/edema  LLE ROM: WFL  LLE Strength: WFL except 3-/5 dorsiflexion limited by pain/edema; knee extension limited 50% AROM due to " peripatellar pain     Functional Mobility:  Bed Mobility:   flat bed with bedrails utilized  Scooting: stand by assistance  Ant to EOB for prep for xfer, lateral scoots to the L x3 to HOB with dec WB through LLE  Supine to Sit: stand by assistance  Sit to Supine: stand by assistance  Transfers:     Sit to Stand:  maximal assistance with rolling walker  100% VC safe hand placement  X2 trials  Unable to WB LLE 2/2 pain at dorsum of foot   15-30s static stannding tolerance with maintained NWB LLE 2/2 pain      AM-PAC 6 CLICK MOBILITY  Total Score:12       Treatment & Education:  Performed functional mobility training detailed above.     Patient educated on role of physical therapy, PT POC, importance of OOB activity, safe functional mobility techniques, safe use of AD, use of call light, curernt DC recs, presence of staff for all out of bed mobility at this time. Allotted time for all questions to be answered. Patient verbalizes understanding of education provided with exception of rec for DC to SNF - patient feels that she can return home alone despite environmental concerns, GERONIMO and lack of social support.        Patient left HOB elevated with call button in reach and all needs met, bed alarm on .    GOALS:   Multidisciplinary Problems       Physical Therapy Goals          Problem: Physical Therapy    Goal Priority Disciplines Outcome Goal Variances Interventions   Physical Therapy Goal     PT, PT/OT Ongoing, Progressing     Description: Goals to be met by: 2/3/23     Patient will increase functional independence with mobility by performin. Supine to sit with Modified [place]  2. Sit to supine with Modified [place]  3. Sit to stand transfer with Modified [place]  4. Bed to chair transfer with Modified [place] using Rolling Walker/LRAD  5. Gait  x 50 feet with Modified [place] using Rolling Walker. /LRAD  6. Ascend/descend 3 stair with right Handrails Supervision using Rolling Walker.  /LRAD                         History:     Past Medical History:   Diagnosis Date    Bipolar disorder     Cancer of female breast 10/2010    T2 N1 Mx, Stage IIB left breast cancer    Cancer of upper-outer quadrant of female breast 7/9/2012    Depression     Diabetes mellitus type II     Disturbances of sensation of smell and taste 6/29/2012    Hypertension     Malignant neoplasm of breast (female), unspecified site 4/27/2015    Neuropathy     Nonspecific abnormal unspecified cardiovascular function study 4/12/2013    ECG READ AS SHOWING A T-WAVE ABNORMALITY     Post-mastectomy lymphedema syndrome     Schizophrenia     Stroke        Past Surgical History:   Procedure Laterality Date    BREAST RECONSTRUCTION  11/23/11    B/L SHLOMO flap reconstruction S/P left MRM, right prophylactic mastectomy    BREAST RECONSTRUCTION Bilateral 3/13/2015    NIPPLE RECONSTRUCTION    INJECTION OF ANESTHETIC AGENT AROUND NERVE Left 12/9/2020    Procedure: BLOCK, NERVE, C3-C4-C5-C6 MEDIAL BRANCH;  Surgeon: Darren Jerry MD;  Location: [facility];  Service: Pain Management;  Laterality: Left;    MASTECTOMY Bilateral 01/2011    bilateral    RADIOFREQUENCY ABLATION Left 2/3/2021    Procedure: RADIOFREQUENCY ABLATION, C3,C4,C5,C6 MEDIAL BRANCH 1 of 2;  Surgeon: Darren Jerry MD;  Location: [facility];  Service: Pain Management;  Laterality: Left;    RADIOFREQUENCY ABLATION Right 7/28/2021    Procedure: RADIOFREQUENCY ABLATION, C3-C4-C5-C6 MEDIAL BR ANCH 1 IOF 2;  Surgeon: Darren Jerry MD;  Location: [facility];  Service: Pain Management;  Laterality: Right;    RADIOFREQUENCY ABLATION Left 8/18/2021    Procedure: RADIOFREQUENCY ABLATION, C3-C4-C5-C6 MEDIAL BRANCH 2 OF 2;  Surgeon: Darren Jerry MD;  Location: [facility];  Service: Pain Management;  Laterality: Left;    TRANSFORAMINAL EPIDURAL INJECTION OF STEROID Bilateral 11/4/2020    Procedure: INJECTION, STEROID, EPIDURAL, TRANSFORAMINAL APPROACH L4/L5;   Surgeon: Darren Jerry MD;  Location: [facility];  Service: Pain Management;  Laterality: Bilateral;  Bilateral L4/L5    TRANSFORAMINAL EPIDURAL INJECTION OF STEROID Bilateral 2/10/2021    Procedure: INJECTION, STEROID, EPIDURAL, TRANSFORAMINAL APPROACH, L4-L5;  Surgeon: Darren Jerry MD;  Location: [facility];  Service: Pain Management;  Laterality: Bilateral;    TRANSFORAMINAL EPIDURAL INJECTION OF STEROID Bilateral 3/10/2021    Procedure: INJECTION, STEROID, EPIDURAL, TRANSFORAMINAL APPROACH, L4-L5;  Surgeon: Darren Jerry MD;  Location: [facility];  Service: Pain Management;  Laterality: Bilateral;    TRANSFORAMINAL EPIDURAL INJECTION OF STEROID Bilateral 12/15/2021    Procedure: INJECTION, STEROID, EPIDURAL, TRANSFORAMINAL APPROACH  Bilateral L4/5 TFESI / needs consent;  Surgeon: Darren Jerry MD;  Location: [facility];  Service: Pain Management;  Laterality: Bilateral;    TRANSFORAMINAL EPIDURAL INJECTION OF STEROID Bilateral 12/14/2022    Procedure: INJECTION, STEROID, EPIDURAL, TRANSFORAMINAL APPROACH BILATERAL L4/5 CONTRAST;  Surgeon: Darren Jerry MD;  Location: [facility];  Service: Pain Management;  Laterality: Bilateral;    TUBAL LIGATION         Time Tracking:     PT Received On: 01/20/23  PT Start Time: 1410     PT Stop Time: 1440  PT Total Time (min): 30 min     Billable Minutes: Evaluation 7 and Therapeutic Activity 23 01/20/2023

## 2023-01-20 NOTE — PLAN OF CARE
Problem: Occupational Therapy  Goal: Occupational Therapy Goal  Description: Goals to be met by: 01-31-23     Patient will increase functional independence with ADLs by performing:    UE Dressing with Set-up Assistance.  LE Dressing with Supervision.  Grooming while standing at sink with Supervision.  Toileting from toilet with Supervision for hygiene and clothing management.   Supine to sit with [place].  Stand pivot transfers with Supervision.  Toilet transfer to toilet with Supervision.  Pt. To be I with HEP to BUE to improve level of endurance    1/20/2023 1018 by ROSALIE Yen  Outcome: Ongoing, Progressing  1/20/2023 1018 by ROSALIE Yen  Outcome: Ongoing, Progressing

## 2023-01-20 NOTE — NURSING
Nurses Note -- 4 Eyes      1/19/2023   11:18 PM      Skin assessed during: Admit      [x] No Pressure Injuries Present    []Prevention Measures Documented      [] Yes- Altered Skin Integrity Present or Discovered   [] LDA Added if Not in Epic (Describe Wound)   [] New Altered Skin Integrity was Present on Admit and Documented in LDA   [] Wound Image Taken    Wound Care Consulted? No    Attending Nurse:  Joseph Simon RN     Second RN/Staff Member:  Charly Guillen LPN

## 2023-01-20 NOTE — SUBJECTIVE & OBJECTIVE
Interval History:  Evaluated by PT. unsafe to go home however adamant that she does not want to be in a nursing facility..  No evidence of end-organ damage and CK is improving.  Some hypoglycemia, will reduce insulin.  Persistent leg pain particularly of the left knee noted by PT.  Will pursue x-ray and possible CT if x-ray nondiagnostic.    Objective:     Vital Signs (Most Recent):  Temp: 97 °F (36.1 °C) (01/20/23 1128)  Pulse: 66 (01/20/23 1128)  Resp: 17 (01/20/23 1128)  BP: (!) 90/53 (01/20/23 1128)  SpO2: 95 % (01/20/23 1128)   Vital Signs (24h Range):  Temp:  [97 °F (36.1 °C)-100.1 °F (37.8 °C)] 97 °F (36.1 °C)  Pulse:  [62-74] 66  Resp:  [16-20] 17  SpO2:  [93 %-99 %] 95 %  BP: ()/(53-73) 90/53     Weight: 80 kg (176 lb 5.9 oz)  Body mass index is 30.27 kg/m².  No intake or output data in the 24 hours ending 01/20/23 1514   Physical Exam  Vitals reviewed.   Constitutional:       General: She is not in acute distress.     Appearance: She is well-developed.   HENT:      Head: Normocephalic and atraumatic.      Nose: Nose normal. No rhinorrhea.      Mouth/Throat:      Mouth: Mucous membranes are moist.   Eyes:      General: No scleral icterus.        Right eye: No discharge.         Left eye: No discharge.      Pupils: Pupils are equal, round, and reactive to light.   Neck:      Vascular: No JVD.   Cardiovascular:      Rate and Rhythm: Normal rate and regular rhythm.      Heart sounds: Normal heart sounds. No murmur heard.    No friction rub.   Pulmonary:      Effort: Pulmonary effort is normal. No respiratory distress.      Breath sounds: Normal breath sounds. No wheezing.   Abdominal:      General: Bowel sounds are normal. There is no distension.      Palpations: Abdomen is soft.      Tenderness: There is no abdominal tenderness.   Musculoskeletal:         General: No deformity. Normal range of motion.      Cervical back: Normal range of motion and neck supple.   Skin:     General: Skin is warm and dry.    Neurological:      General: No focal deficit present.      Mental Status: She is alert and oriented to person, place, and time.      Motor: Weakness (Lower extremities limited by pain.  Pain appears to have neuropathic component as she is exquisitely tender to mild palpation.  X-ray nondiagnostic) present.       Significant Labs: All pertinent labs within the past 24 hours have been reviewed.    Significant Imaging: I have reviewed all pertinent imaging results/findings within the past 24 hours.

## 2023-01-20 NOTE — PT/OT/SLP EVAL
Occupational Therapy   Evaluation/tx    Name: Jonathan Gonzales  MRN: [id number]  Admitting Diagnosis: Traumatic rhabdomyolysis  Recent Surgery: * No surgery found *      Recommendations:     Discharge Recommendations: nursing facility, skilled  Discharge Equipment Recommendations:   (TBD)  Barriers to discharge:  Decreased caregiver support    Assessment:     Jonathan Gonzales is a 62 y.o. female with a medical diagnosis of Traumatic rhabdomyolysis.  She presents with deficits in mobility as well as ADL tasks on this date. Pt. Extremely limited by significant pain in BLE (feet)/ Pt. Alert during session and participated fairly despite pain level. Pt. Lives alone and is significantly below PLOF. Patient would benefit from continued OT services to maximize level of safety and independence with self-care tasks.   . Performance deficits affecting function: weakness, impaired endurance, impaired self care skills, impaired functional mobility, pain, edema, decreased safety awareness.      Rehab Prognosis: Good; patient would benefit from acute skilled OT services to address these deficits and reach maximum level of function.       Plan:     Patient to be seen 3 x/week to address the above listed problems via self-care/home management, therapeutic activities, therapeutic exercises, neuromuscular re-education  Plan of Care Expires: 02/19/23  Plan of Care Reviewed with: patient    Subjective     Chief Complaint: Pain in both feet as well as edema  Patient/Family Comments/goals: to get better and have less pain    Occupational Profile:  Living Environment: pt. Reported residing alone in ss house with 3 steps to enter and RHR. Pt. Has a tub shower with a stool with back  Previous level of function: Pt. Reported I with all ADL tasks and mobility  Roles and Routines: caretaker of self , disabled  Equipment Used at Home: shower chair  Assistance upon Discharge: no real assist per pt.     Pain/Comfort:  Pain Rating 1:  10/10  Location - Side 1: Bilateral  Location 1: foot  Pain Addressed 1: Reposition, Distraction, Nurse notified  Pain Rating Post-Intervention 1: 10/10    Patients cultural, spiritual, Bahai conflicts given the current situation: no    Objective:     Communicated with: nurse prior to session.  Patient found left sidelying with PICC line, PureWick upon OT entry to room.    General Precautions: Standard, fall, diabetic  Orthopedic Precautions: N/A  Braces: N/A  Respiratory Status: Room air    Occupational Performance:    Bed Mobility:    Patient completed Supine to Sit with moderate assistance  Pt. Was able to scoot self back up in bed but significant pain noted when pushing up through BLE    Functional Mobility/Transfers:  Pt. Declined secondary to 10/10 pain  in both feet    Activities of Daily Living:  Grooming: stand by assistance seated EOB to wash face and brush teeth  Upper Body Dressing: minimum assistance to guide gown around back seated EOB  Lower Body Dressing: stand by assistance seated EOB to adjust socks    Cognitive/Visual Perceptual:  Cognitive/Psychosocial Skills:     -       Oriented to: Person, Place, Time, and Situation   -       Follows Commands/attention:Follows multistep  commands  -       Communication: clear/fluent  -       Memory: No Deficits noted  -       Safety awareness/insight to disability: fair   -       Mood/Affect/Coping skills/emotional control: a little impulsive   Visual/Perceptual:      -wears readers    Physical Exam:  Balance: -       sit: good; unable to attempt stand 2/2 pain   Postural examination/scapula alignment:    -       Rounded shoulders  -       Posterior pelvic tilt  Edema:  Moderate in bilateral feet  Upper Extremity Range of Motion:     -       Right Upper Extremity: WFL  -       Left Upper Extremity: WFL    AMPAC 6 Click ADL:  AMPAC Total Score: 18    Treatment & Education:  Pt. Educated on role of OT and POC    Patient left supine with all lines intact,  call button in reach, bed alarm on, and nurse notified    GOALS:   Multidisciplinary Problems       Occupational Therapy Goals          Problem: Occupational Therapy    Goal Priority Disciplines Outcome Interventions   Occupational Therapy Goal     OT, PT/OT Ongoing, Progressing    Description: Goals to be met by: 01-31-23     Patient will increase functional independence with ADLs by performing:    UE Dressing with Set-up Assistance.  LE Dressing with Supervision.  Grooming while standing at sink with Supervision.  Toileting from toilet with Supervision for hygiene and clothing management.   Supine to sit with [place].  Stand pivot transfers with Supervision.  Toilet transfer to toilet with Supervision.  Pt. To be I with HEP to BUE to improve level of endurance                         History:     Past Medical History:   Diagnosis Date    Bipolar disorder     Cancer of female breast 10/2010    T2 N1 Mx, Stage IIB left breast cancer    Cancer of upper-outer quadrant of female breast 7/9/2012    Depression     Diabetes mellitus type II     Disturbances of sensation of smell and taste 6/29/2012    Hypertension     Malignant neoplasm of breast (female), unspecified site 4/27/2015    Neuropathy     Nonspecific abnormal unspecified cardiovascular function study 4/12/2013    ECG READ AS SHOWING A T-WAVE ABNORMALITY     Post-mastectomy lymphedema syndrome     Schizophrenia     Stroke          Past Surgical History:   Procedure Laterality Date    BREAST RECONSTRUCTION  11/23/11    B/L SHLOMO flap reconstruction S/P left MRM, right prophylactic mastectomy    BREAST RECONSTRUCTION Bilateral 3/13/2015    NIPPLE RECONSTRUCTION    INJECTION OF ANESTHETIC AGENT AROUND NERVE Left 12/9/2020    Procedure: BLOCK, NERVE, C3-C4-C5-C6 MEDIAL BRANCH;  Surgeon: Darren Jerry MD;  Location: [facility];  Service: Pain Management;  Laterality: Left;    MASTECTOMY Bilateral 01/2011    bilateral    RADIOFREQUENCY ABLATION Left  2/3/2021    Procedure: RADIOFREQUENCY ABLATION, C3,C4,C5,C6 MEDIAL BRANCH 1 of 2;  Surgeon: Darren Jerry MD;  Location: [facility];  Service: Pain Management;  Laterality: Left;    RADIOFREQUENCY ABLATION Right 7/28/2021    Procedure: RADIOFREQUENCY ABLATION, C3-C4-C5-C6 MEDIAL BR ANCH 1 IOF 2;  Surgeon: Darren Jerry MD;  Location: [facility];  Service: Pain Management;  Laterality: Right;    RADIOFREQUENCY ABLATION Left 8/18/2021    Procedure: RADIOFREQUENCY ABLATION, C3-C4-C5-C6 MEDIAL BRANCH 2 OF 2;  Surgeon: Darren Jerry MD;  Location: [facility];  Service: Pain Management;  Laterality: Left;    TRANSFORAMINAL EPIDURAL INJECTION OF STEROID Bilateral 11/4/2020    Procedure: INJECTION, STEROID, EPIDURAL, TRANSFORAMINAL APPROACH L4/L5;  Surgeon: Darren Jerry MD;  Location: [facility];  Service: Pain Management;  Laterality: Bilateral;  Bilateral L4/L5    TRANSFORAMINAL EPIDURAL INJECTION OF STEROID Bilateral 2/10/2021    Procedure: INJECTION, STEROID, EPIDURAL, TRANSFORAMINAL APPROACH, L4-L5;  Surgeon: Darren Jerry MD;  Location: [facility];  Service: Pain Management;  Laterality: Bilateral;    TRANSFORAMINAL EPIDURAL INJECTION OF STEROID Bilateral 3/10/2021    Procedure: INJECTION, STEROID, EPIDURAL, TRANSFORAMINAL APPROACH, L4-L5;  Surgeon: Darren Jerry MD;  Location: [facility];  Service: Pain Management;  Laterality: Bilateral;    TRANSFORAMINAL EPIDURAL INJECTION OF STEROID Bilateral 12/15/2021    Procedure: INJECTION, STEROID, EPIDURAL, TRANSFORAMINAL APPROACH  Bilateral L4/5 TFESI / needs consent;  Surgeon: Darren Jerry MD;  Location: [facility];  Service: Pain Management;  Laterality: Bilateral;    TRANSFORAMINAL EPIDURAL INJECTION OF STEROID Bilateral 12/14/2022    Procedure: INJECTION, STEROID, EPIDURAL, TRANSFORAMINAL APPROACH BILATERAL L4/5 CONTRAST;  Surgeon: Darren Jerry MD;  Location: [facility];  Service: Pain Management;   Laterality: Bilateral;    TUBAL LIGATION         Time Tracking:     OT Date of Treatment: 01/20/23  OT Start Time: 0956  OT Stop Time: 1014  OT Total Time (min): 18 min    Billable Minutes:Evaluation 10  Self Care/Home Management 8    1/20/2023

## 2023-01-20 NOTE — PLAN OF CARE
Problem: Physical Therapy  Goal: Physical Therapy Goal  Description: Goals to be met by: 2/3/23     Patient will increase functional independence with mobility by performin. Supine to sit with Modified [place]  2. Sit to supine with Modified [place]  3. Sit to stand transfer with Modified [place]  4. Bed to chair transfer with Modified [place] using Rolling Walker/LRAD  5. Gait  x 50 feet with Modified [place] using Rolling Walker. /LRAD  6. Ascend/descend 3 stair with right Handrails Supervision using Rolling Walker. /LRAD    Outcome: Ongoing, Progressing   Eval completed, goals appropriate at this time

## 2023-01-21 LAB
ALBUMIN SERPL BCP-MCNC: 2.1 G/DL (ref 3.5–5.2)
ALP SERPL-CCNC: 69 U/L (ref 55–135)
ALT SERPL W/O P-5'-P-CCNC: 24 U/L (ref 10–44)
ANION GAP SERPL CALC-SCNC: 12 MMOL/L (ref 8–16)
AST SERPL-CCNC: 39 U/L (ref 10–40)
BASOPHILS # BLD AUTO: 0.05 K/UL (ref 0–0.2)
BASOPHILS NFR BLD: 0.6 % (ref 0–1.9)
BILIRUB SERPL-MCNC: 0.8 MG/DL (ref 0.1–1)
BUN SERPL-MCNC: 13 MG/DL (ref 8–23)
CALCIUM SERPL-MCNC: 9.2 MG/DL (ref 8.7–10.5)
CHLORIDE SERPL-SCNC: 108 MMOL/L (ref 95–110)
CO2 SERPL-SCNC: 21 MMOL/L (ref 23–29)
CREAT SERPL-MCNC: 0.9 MG/DL (ref 0.5–1.4)
DIFFERENTIAL METHOD: ABNORMAL
EOSINOPHIL # BLD AUTO: 0.2 K/UL (ref 0–0.5)
EOSINOPHIL NFR BLD: 2.7 % (ref 0–8)
ERYTHROCYTE [DISTWIDTH] IN BLOOD BY AUTOMATED COUNT: 15.6 % (ref 11.5–14.5)
EST. GFR  (NO RACE VARIABLE): >60 ML/MIN/1.73 M^2
GLUCOSE SERPL-MCNC: 91 MG/DL (ref 70–110)
HCT VFR BLD AUTO: 26.8 % (ref 37–48.5)
HGB BLD-MCNC: 8.8 G/DL (ref 12–16)
IMM GRANULOCYTES # BLD AUTO: 0.13 K/UL (ref 0–0.04)
IMM GRANULOCYTES NFR BLD AUTO: 1.5 % (ref 0–0.5)
LYMPHOCYTES # BLD AUTO: 2 K/UL (ref 1–4.8)
LYMPHOCYTES NFR BLD: 23.6 % (ref 18–48)
MAGNESIUM SERPL-MCNC: 1.7 MG/DL (ref 1.6–2.6)
MCH RBC QN AUTO: 29.2 PG (ref 27–31)
MCHC RBC AUTO-ENTMCNC: 32.8 G/DL (ref 32–36)
MCV RBC AUTO: 89 FL (ref 82–98)
MONOCYTES # BLD AUTO: 1.1 K/UL (ref 0.3–1)
MONOCYTES NFR BLD: 13.5 % (ref 4–15)
NEUTROPHILS # BLD AUTO: 4.9 K/UL (ref 1.8–7.7)
NEUTROPHILS NFR BLD: 58.1 % (ref 38–73)
NRBC BLD-RTO: 0 /100 WBC
PHOSPHATE SERPL-MCNC: 3.2 MG/DL (ref 2.7–4.5)
PLATELET # BLD AUTO: 230 K/UL (ref 150–450)
PLATELET BLD QL SMEAR: ABNORMAL
PMV BLD AUTO: 10.9 FL (ref 9.2–12.9)
POCT GLUCOSE: 78 MG/DL (ref 70–110)
POCT GLUCOSE: 88 MG/DL (ref 70–110)
POCT GLUCOSE: 89 MG/DL (ref 70–110)
POCT GLUCOSE: 93 MG/DL (ref 70–110)
POCT GLUCOSE: 95 MG/DL (ref 70–110)
POTASSIUM SERPL-SCNC: 3.9 MMOL/L (ref 3.5–5.1)
PROT SERPL-MCNC: 6.2 G/DL (ref 6–8.4)
RBC # BLD AUTO: 3.01 M/UL (ref 4–5.4)
SODIUM SERPL-SCNC: 141 MMOL/L (ref 136–145)
WBC # BLD AUTO: 8.46 K/UL (ref 3.9–12.7)

## 2023-01-21 PROCEDURE — 99233 SBSQ HOSP IP/OBS HIGH 50: CPT | Mod: GC,,, | Performed by: STUDENT IN AN ORGANIZED HEALTH CARE EDUCATION/TRAINING PROGRAM

## 2023-01-21 PROCEDURE — 63600175 PHARM REV CODE 636 W HCPCS: Performed by: INTERNAL MEDICINE

## 2023-01-21 PROCEDURE — 12000002 HC ACUTE/MED SURGE SEMI-PRIVATE ROOM

## 2023-01-21 PROCEDURE — 99233 PR SUBSEQUENT HOSPITAL CARE,LEVL III: ICD-10-PCS | Mod: GC,,, | Performed by: STUDENT IN AN ORGANIZED HEALTH CARE EDUCATION/TRAINING PROGRAM

## 2023-01-21 PROCEDURE — A4216 STERILE WATER/SALINE, 10 ML: HCPCS | Performed by: PHYSICIAN ASSISTANT

## 2023-01-21 PROCEDURE — 80053 COMPREHEN METABOLIC PANEL: CPT | Performed by: INTERNAL MEDICINE

## 2023-01-21 PROCEDURE — 83735 ASSAY OF MAGNESIUM: CPT | Performed by: INTERNAL MEDICINE

## 2023-01-21 PROCEDURE — 84100 ASSAY OF PHOSPHORUS: CPT | Performed by: INTERNAL MEDICINE

## 2023-01-21 PROCEDURE — 25000003 PHARM REV CODE 250: Performed by: INTERNAL MEDICINE

## 2023-01-21 PROCEDURE — 25000003 PHARM REV CODE 250: Performed by: PHYSICIAN ASSISTANT

## 2023-01-21 PROCEDURE — 85025 COMPLETE CBC W/AUTO DIFF WBC: CPT | Performed by: INTERNAL MEDICINE

## 2023-01-21 PROCEDURE — 36415 COLL VENOUS BLD VENIPUNCTURE: CPT | Performed by: INTERNAL MEDICINE

## 2023-01-21 RX ORDER — IBUPROFEN 400 MG/1
400 TABLET ORAL EVERY 6 HOURS PRN
Status: DISCONTINUED | OUTPATIENT
Start: 2023-01-21 | End: 2023-01-24

## 2023-01-21 RX ADMIN — Medication 10 ML: at 11:01

## 2023-01-21 RX ADMIN — ENOXAPARIN SODIUM 40 MG: 40 INJECTION SUBCUTANEOUS at 06:01

## 2023-01-21 RX ADMIN — HYDROCODONE BITARTRATE AND ACETAMINOPHEN 1 TABLET: 10; 325 TABLET ORAL at 09:01

## 2023-01-21 RX ADMIN — Medication 10 ML: at 06:01

## 2023-01-21 RX ADMIN — DIVALPROEX SODIUM 500 MG: 250 TABLET, EXTENDED RELEASE ORAL at 09:01

## 2023-01-21 RX ADMIN — Medication 10 ML: at 05:01

## 2023-01-21 RX ADMIN — HYDROCODONE BITARTRATE AND ACETAMINOPHEN 1 TABLET: 10; 325 TABLET ORAL at 12:01

## 2023-01-21 RX ADMIN — HYDROCODONE BITARTRATE AND ACETAMINOPHEN 1 TABLET: 10; 325 TABLET ORAL at 08:01

## 2023-01-21 RX ADMIN — ZIPRASIDONE HYDROCHLORIDE 40 MG: 40 CAPSULE ORAL at 09:01

## 2023-01-21 RX ADMIN — Medication 10 ML: at 12:01

## 2023-01-21 RX ADMIN — ASPIRIN 81 MG: 81 TABLET, COATED ORAL at 08:01

## 2023-01-21 NOTE — SUBJECTIVE & OBJECTIVE
Interval History: Continued leg pain. X ray non diagnostic. Pursuing advanced imaging given swelling and concern for possible occult trauma given her presentation.    Objective:     Vital Signs (Most Recent):  Temp: 98.4 °F (36.9 °C) (01/21/23 0759)  Pulse: 68 (01/21/23 0759)  Resp: 18 (01/21/23 0817)  BP: 102/61 (01/21/23 0759)  SpO2: (!) 93 % (01/21/23 0759)   Vital Signs (24h Range):  Temp:  [97.2 °F (36.2 °C)-99.5 °F (37.5 °C)] 98.4 °F (36.9 °C)  Pulse:  [] 68  Resp:  [16-19] 18  SpO2:  [93 %-97 %] 93 %  BP: (102-116)/(56-67) 102/61     Weight: 80 kg (176 lb 5.9 oz)  Body mass index is 30.27 kg/m².    Intake/Output Summary (Last 24 hours) at 1/21/2023 1153  Last data filed at 1/21/2023 0434  Gross per 24 hour   Intake 120 ml   Output 400 ml   Net -280 ml      Physical Exam  Vitals reviewed.   Constitutional:       General: She is not in acute distress.     Appearance: She is well-developed.   HENT:      Head: Normocephalic and atraumatic.      Nose: Nose normal. No rhinorrhea.      Mouth/Throat:      Mouth: Mucous membranes are moist.   Eyes:      General: No scleral icterus.        Right eye: No discharge.         Left eye: No discharge.      Pupils: Pupils are equal, round, and reactive to light.   Neck:      Vascular: No JVD.   Cardiovascular:      Rate and Rhythm: Normal rate and regular rhythm.      Heart sounds: Normal heart sounds. No murmur heard.    No friction rub.   Pulmonary:      Effort: Pulmonary effort is normal. No respiratory distress.      Breath sounds: Normal breath sounds. No wheezing.   Abdominal:      General: Bowel sounds are normal. There is no distension.      Palpations: Abdomen is soft.      Tenderness: There is no abdominal tenderness.   Musculoskeletal:         General: No deformity. Normal range of motion.      Cervical back: Normal range of motion and neck supple.   Skin:     General: Skin is warm and dry.   Neurological:      General: No focal deficit present.      Mental  Status: She is alert and oriented to person, place, and time.      Motor: Weakness (Lower extremities limited by pain.  Pain appears to have neuropathic component as she is exquisitely tender to mild palpation.  X-ray nondiagnostic) present.       Significant Labs: All pertinent labs within the past 24 hours have been reviewed.    Significant Imaging: I have reviewed all pertinent imaging results/findings within the past 24 hours.

## 2023-01-21 NOTE — ASSESSMENT & PLAN NOTE
CPK elevated, renal function normal  Fluid stopped.  CPK trending downward.  Will restart antihypertensives on discharge  Hold statin, and losartan a this time  No new acute pain, imaging results without fracture  - CT of feet and MRI of the knee

## 2023-01-21 NOTE — ASSESSMENT & PLAN NOTE
Acute, inconsistent story at this time, troponin negative   Discontinue tele.    Echo showed The left ventricle is normal in size with concentric remodeling and normal systolic function. The estimated ejection fraction is 65%.   Normal right ventricular size with normal right ventricular systolic function.   Normal left ventricular diastolic function.   The estimated PA systolic pressure is 34 mmHg.   Normal central venous pressure (3 mmHg).

## 2023-01-21 NOTE — ASSESSMENT & PLAN NOTE
Patient's FSGs are uncontrolled due to hyperglycemia on current medication regimen.  Last A1c reviewed-   Lab Results   Component Value Date    HGBA1C 7.1 (H) 03/16/2022     Most recent fingerstick glucose reviewed-   Recent Labs   Lab 01/20/23  1539 01/20/23 2022 01/21/23  0759   POCTGLUCOSE 102 120* 88     Current correctional scale  Low  Maintain anti-hyperglycemic dose as follows-   Antihyperglycemics (From admission, onward)    Start     Stop Route Frequency Ordered    01/22/23 0900  insulin detemir U-100 pen 4 Units         -- SubQ Daily 01/21/23 1202    01/19/23 0434  insulin aspart U-100 pen 0-5 Units         -- SubQ Before meals & nightly PRN 01/19/23 0335        Hold Oral hypoglycemics while patient is in the hospital.    PHARMACY CONSULT, as insulin dosing is completely inconsistent with patient reported dosages

## 2023-01-21 NOTE — PROGRESS NOTES
[facility] ([address] Medicine  Progress Note    Patient Name: Jonathan Gonzales  MRN: [id number]  Patient Class: IP- Inpatient   Admission Date: 1/18/2023  Length of Stay: 2 days  Attending Physician: Agustin Cosby MD  Primary Care Provider: Ghulam Contreras MD        Subjective:     Principal Problem:Traumatic rhabdomyolysis        HPI:  63 yo female with DM, schizophrenia, depression, CVA history presenting after EMS found her down 2/2 not answering a call today. On arrival, EMS states she had been on the floor all day, had missed her medications. She states she seemed more fatigued. She is not particularly sure why she presented to the ER, but does think she tripped and then could not get up. She does not report any pain except for pain in her foot which she always has. She denied any chest pain or passing out before the fall, stating that she must have tripped on something. She currently denies any chest pain or shortness of breath. Per the ER, she states she had chest pain and shortness of breath and passed out. She initially had some garbled speech MRI obtained without evidence of acute stroke.     Medicine called for admission after labs showing elevated CPK.      Overview/Hospital Course:  Patient's CK showed improvement with no evidence of end-organ damage.  Safe for discharge however evaluated by PT/OT and patient has significant physical weakness. X ray was non-diagnostic so an MRI and CT was pursued.      Interval History: Continued leg pain. X ray non diagnostic. Pursuing advanced imaging given swelling and concern for possible occult trauma given her presentation.    Objective:     Vital Signs (Most Recent):  Temp: 98.4 °F (36.9 °C) (01/21/23 0759)  Pulse: 68 (01/21/23 0759)  Resp: 18 (01/21/23 0817)  BP: 102/61 (01/21/23 0759)  SpO2: (!) 93 % (01/21/23 0759)   Vital Signs (24h Range):  Temp:  [97.2 °F (36.2 °C)-99.5 °F (37.5 °C)] 98.4 °F (36.9 °C)  Pulse:  []  68  Resp:  [16-19] 18  SpO2:  [93 %-97 %] 93 %  BP: (102-116)/(56-67) 102/61     Weight: 80 kg (176 lb 5.9 oz)  Body mass index is 30.27 kg/m².    Intake/Output Summary (Last 24 hours) at 1/21/2023 1153  Last data filed at 1/21/2023 0434  Gross per 24 hour   Intake 120 ml   Output 400 ml   Net -280 ml      Physical Exam  Vitals reviewed.   Constitutional:       General: She is not in acute distress.     Appearance: She is well-developed.   HENT:      Head: Normocephalic and atraumatic.      Nose: Nose normal. No rhinorrhea.      Mouth/Throat:      Mouth: Mucous membranes are moist.   Eyes:      General: No scleral icterus.        Right eye: No discharge.         Left eye: No discharge.      Pupils: Pupils are equal, round, and reactive to light.   Neck:      Vascular: No JVD.   Cardiovascular:      Rate and Rhythm: Normal rate and regular rhythm.      Heart sounds: Normal heart sounds. No murmur heard.    No friction rub.   Pulmonary:      Effort: Pulmonary effort is normal. No respiratory distress.      Breath sounds: Normal breath sounds. No wheezing.   Abdominal:      General: Bowel sounds are normal. There is no distension.      Palpations: Abdomen is soft.      Tenderness: There is no abdominal tenderness.   Musculoskeletal:         General: No deformity. Normal range of motion.      Cervical back: Normal range of motion and neck supple.   Skin:     General: Skin is warm and dry.   Neurological:      General: No focal deficit present.      Mental Status: She is alert and oriented to person, place, and time.      Motor: Weakness (Lower extremities limited by pain.  Pain appears to have neuropathic component as she is exquisitely tender to mild palpation.  X-ray nondiagnostic) present.       Significant Labs: All pertinent labs within the past 24 hours have been reviewed.    Significant Imaging: I have reviewed all pertinent imaging results/findings within the past 24 hours.      Assessment/Plan:      * Traumatic  rhabdomyolysis  CPK elevated, renal function normal  Fluid stopped.  CPK trending downward.  Will restart antihypertensives on discharge  Hold statin, and losartan a this time  No new acute pain, imaging results without fracture  - CT of feet and MRI of the knee      Vasovagal syncope  Acute, inconsistent story at this time, troponin negative   Discontinue tele.    Echo showed The left ventricle is normal in size with concentric remodeling and normal systolic function. The estimated ejection fraction is 65%.   Normal right ventricular size with normal right ventricular systolic function.   Normal left ventricular diastolic function.   The estimated PA systolic pressure is 34 mmHg.   Normal central venous pressure (3 mmHg).        Essential hypertension  Chronic, controlled, continue home medications, holding losartan as above to above BUFFY with lisinopril-HCTZ      Schizoaffective disorder, bipolar type  Chronic, controlled, continue home medications that patient stated was on however consult pharmacy as holding 1-2 medications as patient stated was not taking them.    Type 2 diabetes mellitus with neurologic complication, with long-term current use of insulin  Patient's FSGs are uncontrolled due to hyperglycemia on current medication regimen.  Last A1c reviewed-   Lab Results   Component Value Date    HGBA1C 7.1 (H) 03/16/2022     Most recent fingerstick glucose reviewed-   Recent Labs   Lab 01/20/23  1539 01/20/23 2022 01/21/23  0759   POCTGLUCOSE 102 120* 88     Current correctional scale  Low  Maintain anti-hyperglycemic dose as follows-   Antihyperglycemics (From admission, onward)    Start     Stop Route Frequency Ordered    01/22/23 0900  insulin detemir U-100 pen 4 Units         -- SubQ Daily 01/21/23 1202    01/19/23 0434  insulin aspart U-100 pen 0-5 Units         -- SubQ Before meals & nightly PRN 01/19/23 0335        Hold Oral hypoglycemics while patient is in the hospital.    PHARMACY CONSULT, as  insulin dosing is completely inconsistent with patient reported dosages    Debility  Significant impairment. She is adamant that she does not want to go to a facility following discharge but currently unable to walk or complete ADLs. Further investigating the lower extremity pain and swelling. No obvious fracture or dislocation but occult soft tissue injury may be present.        VTE Risk Mitigation (From admission, onward)         Ordered     enoxaparin injection 40 mg  Daily         01/19/23 0335     IP VTE HIGH RISK PATIENT  Once         01/19/23 0335     Place sequential compression device  Until discontinued         01/19/23 0335     IP VTE HIGH RISK PATIENT  Once         01/19/23 0335     Place sequential compression device  Until discontinued         01/19/23 0335                Discharge Planning   ANDREW: 1/23/2023     Code Status: Full Code   Is the patient medically ready for discharge?:     Reason for patient still in hospital (select all that apply): Imaging, PT / OT recommendations and Pending disposition  Discharge Plan A: Home Health                  Sergio Caballero MD  Department of Hospital Medicine   [facility] - Intensive Care ([place])

## 2023-01-21 NOTE — PLAN OF CARE
Problem: Adult Inpatient Plan of Care  Goal: Plan of Care Review  Outcome: Ongoing, Progressing     Problem: Adult Inpatient Plan of Care  Goal: Patient-Specific Goal (Individualized)  Outcome: Ongoing, Progressing     Problem: Adult Inpatient Plan of Care  Goal: Absence of Hospital-Acquired Illness or Injury  Outcome: Ongoing, Progressing     Problem: Diabetes Comorbidity  Goal: Blood Glucose Level Within Targeted Range  Outcome: Ongoing, Progressing     Problem: Infection  Goal: Absence of Infection Signs and Symptoms  Outcome: Ongoing, Progressing

## 2023-01-21 NOTE — ASSESSMENT & PLAN NOTE
Significant impairment. She is adamant that she does not want to go to a facility following discharge but currently unable to walk or complete ADLs. Further investigating the lower extremity pain and swelling. No obvious fracture or dislocation but occult soft tissue injury may be present.

## 2023-01-22 PROBLEM — M87.262: Status: ACTIVE | Noted: 2023-01-22

## 2023-01-22 LAB
CRP SERPL-MCNC: 196.1 MG/L (ref 0–8.2)
ERYTHROCYTE [SEDIMENTATION RATE] IN BLOOD BY PHOTOMETRIC METHOD: 106 MM/HR (ref 0–36)
POCT GLUCOSE: 75 MG/DL (ref 70–110)
POCT GLUCOSE: 76 MG/DL (ref 70–110)
POCT GLUCOSE: 81 MG/DL (ref 70–110)
PROCALCITONIN SERPL IA-MCNC: 0.2 NG/ML

## 2023-01-22 PROCEDURE — 86038 ANTINUCLEAR ANTIBODIES: CPT | Performed by: STUDENT IN AN ORGANIZED HEALTH CARE EDUCATION/TRAINING PROGRAM

## 2023-01-22 PROCEDURE — 99233 PR SUBSEQUENT HOSPITAL CARE,LEVL III: ICD-10-PCS | Mod: GC,,, | Performed by: STUDENT IN AN ORGANIZED HEALTH CARE EDUCATION/TRAINING PROGRAM

## 2023-01-22 PROCEDURE — 36415 COLL VENOUS BLD VENIPUNCTURE: CPT | Performed by: STUDENT IN AN ORGANIZED HEALTH CARE EDUCATION/TRAINING PROGRAM

## 2023-01-22 PROCEDURE — 84145 PROCALCITONIN (PCT): CPT | Performed by: STUDENT IN AN ORGANIZED HEALTH CARE EDUCATION/TRAINING PROGRAM

## 2023-01-22 PROCEDURE — 86225 DNA ANTIBODY NATIVE: CPT | Performed by: STUDENT IN AN ORGANIZED HEALTH CARE EDUCATION/TRAINING PROGRAM

## 2023-01-22 PROCEDURE — 99233 SBSQ HOSP IP/OBS HIGH 50: CPT | Mod: GC,,, | Performed by: STUDENT IN AN ORGANIZED HEALTH CARE EDUCATION/TRAINING PROGRAM

## 2023-01-22 PROCEDURE — 25000003 PHARM REV CODE 250: Performed by: INTERNAL MEDICINE

## 2023-01-22 PROCEDURE — 63600175 PHARM REV CODE 636 W HCPCS: Performed by: INTERNAL MEDICINE

## 2023-01-22 PROCEDURE — 12000002 HC ACUTE/MED SURGE SEMI-PRIVATE ROOM

## 2023-01-22 PROCEDURE — 25000003 PHARM REV CODE 250: Performed by: STUDENT IN AN ORGANIZED HEALTH CARE EDUCATION/TRAINING PROGRAM

## 2023-01-22 PROCEDURE — 25000003 PHARM REV CODE 250: Performed by: PHYSICIAN ASSISTANT

## 2023-01-22 PROCEDURE — A4216 STERILE WATER/SALINE, 10 ML: HCPCS | Performed by: PHYSICIAN ASSISTANT

## 2023-01-22 PROCEDURE — 85652 RBC SED RATE AUTOMATED: CPT | Performed by: STUDENT IN AN ORGANIZED HEALTH CARE EDUCATION/TRAINING PROGRAM

## 2023-01-22 PROCEDURE — 86140 C-REACTIVE PROTEIN: CPT | Performed by: STUDENT IN AN ORGANIZED HEALTH CARE EDUCATION/TRAINING PROGRAM

## 2023-01-22 PROCEDURE — 87389 HIV-1 AG W/HIV-1&-2 AB AG IA: CPT | Performed by: STUDENT IN AN ORGANIZED HEALTH CARE EDUCATION/TRAINING PROGRAM

## 2023-01-22 RX ORDER — KETOROLAC TROMETHAMINE 10 MG/1
10 TABLET, FILM COATED ORAL EVERY 8 HOURS
Status: COMPLETED | OUTPATIENT
Start: 2023-01-22 | End: 2023-01-24

## 2023-01-22 RX ORDER — GABAPENTIN 300 MG/1
300 CAPSULE ORAL 3 TIMES DAILY
Status: DISCONTINUED | OUTPATIENT
Start: 2023-01-22 | End: 2023-01-24

## 2023-01-22 RX ADMIN — Medication 10 ML: at 12:01

## 2023-01-22 RX ADMIN — KETOROLAC TROMETHAMINE 10 MG: 10 TABLET, FILM COATED ORAL at 09:01

## 2023-01-22 RX ADMIN — HYDROCODONE BITARTRATE AND ACETAMINOPHEN 1 TABLET: 10; 325 TABLET ORAL at 09:01

## 2023-01-22 RX ADMIN — IBUPROFEN 400 MG: 400 TABLET, FILM COATED ORAL at 09:01

## 2023-01-22 RX ADMIN — ZIPRASIDONE HYDROCHLORIDE 40 MG: 40 CAPSULE ORAL at 09:01

## 2023-01-22 RX ADMIN — Medication 10 ML: at 06:01

## 2023-01-22 RX ADMIN — Medication 10 ML: at 09:01

## 2023-01-22 RX ADMIN — DIVALPROEX SODIUM 500 MG: 250 TABLET, EXTENDED RELEASE ORAL at 09:01

## 2023-01-22 RX ADMIN — GABAPENTIN 300 MG: 300 CAPSULE ORAL at 09:01

## 2023-01-22 RX ADMIN — ENOXAPARIN SODIUM 40 MG: 40 INJECTION SUBCUTANEOUS at 04:01

## 2023-01-22 RX ADMIN — KETOROLAC TROMETHAMINE 10 MG: 10 TABLET, FILM COATED ORAL at 04:01

## 2023-01-22 RX ADMIN — GABAPENTIN 300 MG: 300 CAPSULE ORAL at 04:01

## 2023-01-22 RX ADMIN — ASPIRIN 81 MG: 81 TABLET, COATED ORAL at 09:01

## 2023-01-22 RX ADMIN — INSULIN DETEMIR 4 UNITS: 100 INJECTION, SOLUTION SUBCUTANEOUS at 09:01

## 2023-01-22 NOTE — PLAN OF CARE
Problem: Adult Inpatient Plan of Care  Goal: Plan of Care Review  Outcome: Ongoing, Progressing     Problem: Adult Inpatient Plan of Care  Goal: Patient-Specific Goal (Individualized)  Outcome: Ongoing, Progressing     Problem: Adult Inpatient Plan of Care  Goal: Absence of Hospital-Acquired Illness or Injury  Outcome: Ongoing, Progressing     Problem: Diabetes Comorbidity  Goal: Blood Glucose Level Within Targeted Range  Outcome: Ongoing, Progressing     Problem: Infection  Goal: Absence of Infection Signs and Symptoms  Outcome: Ongoing, Progressing     Problem: Skin Injury Risk Increased  Goal: Skin Health and Integrity  Outcome: Ongoing, Progressing

## 2023-01-22 NOTE — PROGRESS NOTES
[facility] ([place] [address])  [facility] Medicine  Progress Note    Patient Name: Jonathan Gonzales  MRN: [id number]  Patient Class: IP- Inpatient   Admission Date: 1/18/2023  Length of Stay: 3 days  Attending Physician: Agustin Cosby MD  Primary Care Provider: Ghulam Contreras MD        Subjective:     Principal Problem:Traumatic rhabdomyolysis        HPI:  61 yo female with DM, schizophrenia, depression, CVA history presenting after EMS found her down 2/2 not answering a call today. On arrival, EMS states she had been on the floor all day, had missed her medications. She states she seemed more fatigued. She is not particularly sure why she presented to the ER, but does think she tripped and then could not get up. She does not report any pain except for pain in her foot which she always has. She denied any chest pain or passing out before the fall, stating that she must have tripped on something. She currently denies any chest pain or shortness of breath. Per the ER, she states she had chest pain and shortness of breath and passed out. She initially had some garbled speech MRI obtained without evidence of acute stroke.     Medicine called for admission after labs showing elevated CPK.      Overview/Hospital Course:  Patient's CK showed improvement with no evidence of end-organ damage.  Safe for discharge however evaluated by PT/OT and patient has significant physical weakness. X ray was non-diagnostic so an MRI and CT was pursued which revealed tibial osteonecrosis of the left leg. Started on antiinflammatories for trial to see if pain improved.      Interval History:  MRI revealed osteonecrosis of the tibia on the left leg.  CT of the foot unrevealing although some soft tissue edema present.  Will trial NSAID for short period of time and see if pain improves.  Discussed avoiding long-term anti-inflammatories in this patient given her diabetes and wanting to avoid renal injury however her functional  status is severely limited and opiate analgesics would not treat the underlying inflammation.    Objective:     Vital Signs (Most Recent):  Temp: 98.3 °F (36.8 °C) (01/22/23 1218)  Pulse: 77 (01/22/23 1218)  Resp: 17 (01/22/23 1218)  BP: 117/84 (01/22/23 1218)  SpO2: 95 % (01/22/23 1218) Vital Signs (24h Range):  Temp:  [96 °F (35.6 °C)-99.3 °F (37.4 °C)] 98.3 °F (36.8 °C)  Pulse:  [] 77  Resp:  [15-18] 17  SpO2:  [22 %-97 %] 95 %  BP: (106-147)/(63-95) 117/84     Weight: 80 kg (176 lb 5.9 oz)  Body mass index is 30.27 kg/m².    Intake/Output Summary (Last 24 hours) at 1/22/2023 1240  Last data filed at 1/22/2023 0930  Gross per 24 hour   Intake 250 ml   Output 350 ml   Net -100 ml      Physical Exam  Vitals reviewed.   Constitutional:       General: She is not in acute distress.     Appearance: She is well-developed.   HENT:      Head: Normocephalic and atraumatic.      Nose: Nose normal. No rhinorrhea.      Mouth/Throat:      Mouth: Mucous membranes are moist.   Eyes:      General: No scleral icterus.        Right eye: No discharge.         Left eye: No discharge.      Pupils: Pupils are equal, round, and reactive to light.   Neck:      Vascular: No JVD.   Cardiovascular:      Rate and Rhythm: Normal rate and regular rhythm.      Heart sounds: Normal heart sounds. No murmur heard.    No friction rub.   Pulmonary:      Effort: Pulmonary effort is normal. No respiratory distress.      Breath sounds: Normal breath sounds. No wheezing.   Abdominal:      General: Bowel sounds are normal. There is no distension.      Palpations: Abdomen is soft.      Tenderness: There is no abdominal tenderness.   Musculoskeletal:         General: No deformity. Normal range of motion.      Cervical back: Normal range of motion and neck supple.   Skin:     General: Skin is warm and dry.   Neurological:      General: No focal deficit present.      Mental Status: She is alert and oriented to person, place, and time.      Motor:  Weakness (Lower extremities limited by pain.  Pain appears to have neuropathic component as she is exquisitely tender to mild palpation.  X-ray nondiagnostic) present.       Significant Labs: All pertinent labs within the past 24 hours have been reviewed.    Significant Imaging: I have reviewed all pertinent imaging results/findings within the past 24 hours.      Assessment/Plan:      * Traumatic rhabdomyolysis  CPK elevated, renal function normal  Fluid stopped.  CPK trending downward.  Will restart antihypertensives on discharge  Hold statin, and losartan a this time  No new acute pain, imaging results without fracture  - see osteonecrosis    Osteonecrosis due to previous trauma, left tibia  MRI revealed osteonecrosis of the proximal tibia in the left foot.  Started on trial of anti-inflammatories per orthopedic recommendation no surgical intervention warranted.  We will monitor renal function in the setting of NSAID use and her diabetes.  And reassess her mobility following proper analgesia.  Also initiated on gabapentin as foot pain may have neuropathic component in the setting of uncontrolled diabetes.      Vasovagal syncope  Acute, inconsistent story at this time, troponin negative   Discontinue tele.    Echo showed The left ventricle is normal in size with concentric remodeling and normal systolic function. The estimated ejection fraction is 65%.   Normal right ventricular size with normal right ventricular systolic function.   Normal left ventricular diastolic function.   The estimated PA systolic pressure is 34 mmHg.   Normal central venous pressure (3 mmHg).        Essential hypertension  Chronic, controlled, continue home medications, holding losartan as above to above BUFFY with lisinopril-HCTZ      Schizoaffective disorder, bipolar type  Chronic, controlled, continue home medications that patient stated was on however consult pharmacy as holding 1-2 medications as patient stated was not taking  them.    Type 2 diabetes mellitus with neurologic complication, with long-term current use of insulin  Patient's FSGs are uncontrolled due to hyperglycemia on current medication regimen.  Last A1c reviewed-   Lab Results   Component Value Date    HGBA1C 7.1 (H) 03/16/2022     Most recent fingerstick glucose reviewed-   Recent Labs   Lab 01/21/23  1604 01/21/23  1946 01/21/23 2016 01/22/23  1222   POCTGLUCOSE 89 78 95 75     Current correctional scale  Low  Maintain anti-hyperglycemic dose as follows-   Antihyperglycemics (From admission, onward)    Start     Stop Route Frequency Ordered    01/23/23 0900  insulin detemir U-100 pen 2 Units         -- SubQ Daily 01/22/23 1237    01/19/23 0434  insulin aspart U-100 pen 0-5 Units         -- SubQ Before meals & nightly PRN 01/19/23 0335        Hold Oral hypoglycemics while patient is in the hospital.    PHARMACY CONSULT, as insulin dosing is completely inconsistent with patient reported dosages    Debility  Significant impairment. She is adamant that she does not want to go to a facility following discharge but currently unable to walk or complete ADLs. Further investigating the lower extremity pain and swelling. No obvious fracture or dislocation but occult soft tissue injury may be present.    MRI performed which showed osteonecrosis of the proximal tibia on the left side.  CT of the feet unrevealing.  Discussed with orthopedics who felt nothing to do surgically and to managed with analgesics and anti-inflammatories.  Started on short trial of ketorolac with daily monitoring of renal function and will reassess mobility.        VTE Risk Mitigation (From admission, onward)         Ordered     enoxaparin injection 40 mg  Daily         01/19/23 0335     IP VTE HIGH RISK PATIENT  Once         01/19/23 0335     Place sequential compression device  Until discontinued         01/19/23 0335     IP VTE HIGH RISK PATIENT  Once         01/19/23 0335     Place sequential  compression device  Until discontinued         01/19/23 0335                Discharge Planning   ANDREW: 1/23/2023     Code Status: Full Code   Is the patient medically ready for discharge?:     Reason for patient still in hospital (select all that apply): Treatment  Discharge Plan A: Home Health                  Sergio Caballero MD  Department of Hospital Medicine   [facility] - Intensive Care ([place])

## 2023-01-22 NOTE — ASSESSMENT & PLAN NOTE
MRI revealed osteonecrosis of the proximal tibia in the left foot.  Started on trial of anti-inflammatories per orthopedic recommendation no surgical intervention warranted.  We will monitor renal function in the setting of NSAID use and her diabetes.  And reassess her mobility following proper analgesia.  Also initiated on gabapentin as foot pain may have neuropathic component in the setting of uncontrolled diabetes.

## 2023-01-22 NOTE — ASSESSMENT & PLAN NOTE
CPK elevated, renal function normal  Fluid stopped.  CPK trending downward.  Will restart antihypertensives on discharge  Hold statin, and losartan a this time  No new acute pain, imaging results without fracture  - see osteonecrosis

## 2023-01-22 NOTE — ASSESSMENT & PLAN NOTE
Significant impairment. She is adamant that she does not want to go to a facility following discharge but currently unable to walk or complete ADLs. Further investigating the lower extremity pain and swelling. No obvious fracture or dislocation but occult soft tissue injury may be present.    MRI performed which showed osteonecrosis of the proximal tibia on the left side.  CT of the feet unrevealing.  Discussed with orthopedics who felt nothing to do surgically and to managed with analgesics and anti-inflammatories.  Started on short trial of ketorolac with daily monitoring of renal function and will reassess mobility.

## 2023-01-22 NOTE — ASSESSMENT & PLAN NOTE
Patient's FSGs are uncontrolled due to hyperglycemia on current medication regimen.  Last A1c reviewed-   Lab Results   Component Value Date    HGBA1C 7.1 (H) 03/16/2022     Most recent fingerstick glucose reviewed-   Recent Labs   Lab 01/21/23  1604 01/21/23  1946 01/21/23 2016 01/22/23  1222   POCTGLUCOSE 89 78 95 75     Current correctional scale  Low  Maintain anti-hyperglycemic dose as follows-   Antihyperglycemics (From admission, onward)    Start     Stop Route Frequency Ordered    01/23/23 0900  insulin detemir U-100 pen 2 Units         -- SubQ Daily 01/22/23 1237    01/19/23 0434  insulin aspart U-100 pen 0-5 Units         -- SubQ Before meals & nightly PRN 01/19/23 0335        Hold Oral hypoglycemics while patient is in the hospital.    PHARMACY CONSULT, as insulin dosing is completely inconsistent with patient reported dosages

## 2023-01-22 NOTE — SUBJECTIVE & OBJECTIVE
Interval History:  MRI revealed osteonecrosis of the tibia on the left leg.  CT of the foot unrevealing although some soft tissue edema present.  Will trial NSAID for short period of time and see if pain improves.  Discussed avoiding long-term anti-inflammatories in this patient given her diabetes and wanting to avoid renal injury however her functional status is severely limited and opiate analgesics would not treat the underlying inflammation.    Objective:     Vital Signs (Most Recent):  Temp: 98.3 °F (36.8 °C) (01/22/23 1218)  Pulse: 77 (01/22/23 1218)  Resp: 17 (01/22/23 1218)  BP: 117/84 (01/22/23 1218)  SpO2: 95 % (01/22/23 1218) Vital Signs (24h Range):  Temp:  [96 °F (35.6 °C)-99.3 °F (37.4 °C)] 98.3 °F (36.8 °C)  Pulse:  [] 77  Resp:  [15-18] 17  SpO2:  [22 %-97 %] 95 %  BP: (106-147)/(63-95) 117/84     Weight: 80 kg (176 lb 5.9 oz)  Body mass index is 30.27 kg/m².    Intake/Output Summary (Last 24 hours) at 1/22/2023 1240  Last data filed at 1/22/2023 0930  Gross per 24 hour   Intake 250 ml   Output 350 ml   Net -100 ml      Physical Exam  Vitals reviewed.   Constitutional:       General: She is not in acute distress.     Appearance: She is well-developed.   HENT:      Head: Normocephalic and atraumatic.      Nose: Nose normal. No rhinorrhea.      Mouth/Throat:      Mouth: Mucous membranes are moist.   Eyes:      General: No scleral icterus.        Right eye: No discharge.         Left eye: No discharge.      Pupils: Pupils are equal, round, and reactive to light.   Neck:      Vascular: No JVD.   Cardiovascular:      Rate and Rhythm: Normal rate and regular rhythm.      Heart sounds: Normal heart sounds. No murmur heard.    No friction rub.   Pulmonary:      Effort: Pulmonary effort is normal. No respiratory distress.      Breath sounds: Normal breath sounds. No wheezing.   Abdominal:      General: Bowel sounds are normal. There is no distension.      Palpations: Abdomen is soft.      Tenderness:  There is no abdominal tenderness.   Musculoskeletal:         General: No deformity. Normal range of motion.      Cervical back: Normal range of motion and neck supple.   Skin:     General: Skin is warm and dry.   Neurological:      General: No focal deficit present.      Mental Status: She is alert and oriented to person, place, and time.      Motor: Weakness (Lower extremities limited by pain.  Pain appears to have neuropathic component as she is exquisitely tender to mild palpation.  X-ray nondiagnostic) present.       Significant Labs: All pertinent labs within the past 24 hours have been reviewed.    Significant Imaging: I have reviewed all pertinent imaging results/findings within the past 24 hours.

## 2023-01-23 LAB
ALBUMIN SERPL BCP-MCNC: 1.9 G/DL (ref 3.5–5.2)
ALP SERPL-CCNC: 71 U/L (ref 55–135)
ALT SERPL W/O P-5'-P-CCNC: 19 U/L (ref 10–44)
ANION GAP SERPL CALC-SCNC: 11 MMOL/L (ref 8–16)
AST SERPL-CCNC: 30 U/L (ref 10–40)
BACTERIA BLD CULT: NORMAL
BACTERIA BLD CULT: NORMAL
BASOPHILS # BLD AUTO: 0.03 K/UL (ref 0–0.2)
BASOPHILS NFR BLD: 0.6 % (ref 0–1.9)
BILIRUB SERPL-MCNC: 0.5 MG/DL (ref 0.1–1)
BUN SERPL-MCNC: 18 MG/DL (ref 8–23)
C3 SERPL-MCNC: 157 MG/DL (ref 50–180)
C4 SERPL-MCNC: 31 MG/DL (ref 11–44)
CALCIUM SERPL-MCNC: 8.9 MG/DL (ref 8.7–10.5)
CHLORIDE SERPL-SCNC: 108 MMOL/L (ref 95–110)
CO2 SERPL-SCNC: 21 MMOL/L (ref 23–29)
CREAT SERPL-MCNC: 0.9 MG/DL (ref 0.5–1.4)
DIFFERENTIAL METHOD: ABNORMAL
EOSINOPHIL # BLD AUTO: 0.3 K/UL (ref 0–0.5)
EOSINOPHIL NFR BLD: 4.9 % (ref 0–8)
ERYTHROCYTE [DISTWIDTH] IN BLOOD BY AUTOMATED COUNT: 15.8 % (ref 11.5–14.5)
EST. GFR  (NO RACE VARIABLE): >60 ML/MIN/1.73 M^2
FERRITIN SERPL-MCNC: 626 NG/ML (ref 20–300)
GLUCOSE SERPL-MCNC: 64 MG/DL (ref 70–110)
HCT VFR BLD AUTO: 25 % (ref 37–48.5)
HGB BLD-MCNC: 7.6 G/DL (ref 12–16)
HIV 1+2 AB+HIV1 P24 AG SERPL QL IA: NORMAL
IMM GRANULOCYTES # BLD AUTO: 0.09 K/UL (ref 0–0.04)
IMM GRANULOCYTES NFR BLD AUTO: 1.8 % (ref 0–0.5)
IRON SERPL-MCNC: 40 UG/DL (ref 30–160)
LYMPHOCYTES # BLD AUTO: 2.1 K/UL (ref 1–4.8)
LYMPHOCYTES NFR BLD: 40.7 % (ref 18–48)
MAGNESIUM SERPL-MCNC: 1.8 MG/DL (ref 1.6–2.6)
MCH RBC QN AUTO: 28.5 PG (ref 27–31)
MCHC RBC AUTO-ENTMCNC: 30.4 G/DL (ref 32–36)
MCV RBC AUTO: 94 FL (ref 82–98)
MONOCYTES # BLD AUTO: 0.7 K/UL (ref 0.3–1)
MONOCYTES NFR BLD: 12.7 % (ref 4–15)
NEUTROPHILS # BLD AUTO: 2 K/UL (ref 1.8–7.7)
NEUTROPHILS NFR BLD: 39.3 % (ref 38–73)
NRBC BLD-RTO: 0 /100 WBC
PHOSPHATE SERPL-MCNC: 3.6 MG/DL (ref 2.7–4.5)
PLATELET # BLD AUTO: 378 K/UL (ref 150–450)
PMV BLD AUTO: 9.2 FL (ref 9.2–12.9)
POCT GLUCOSE: 111 MG/DL (ref 70–110)
POCT GLUCOSE: 62 MG/DL (ref 70–110)
POCT GLUCOSE: 71 MG/DL (ref 70–110)
POCT GLUCOSE: 86 MG/DL (ref 70–110)
POTASSIUM SERPL-SCNC: 3.7 MMOL/L (ref 3.5–5.1)
PROT SERPL-MCNC: 5.6 G/DL (ref 6–8.4)
RBC # BLD AUTO: 2.67 M/UL (ref 4–5.4)
SATURATED IRON: 23 % (ref 20–50)
SODIUM SERPL-SCNC: 140 MMOL/L (ref 136–145)
TOTAL IRON BINDING CAPACITY: 175 UG/DL (ref 250–450)
TRANSFERRIN SERPL-MCNC: 118 MG/DL (ref 200–375)
WBC # BLD AUTO: 5.11 K/UL (ref 3.9–12.7)

## 2023-01-23 PROCEDURE — 84100 ASSAY OF PHOSPHORUS: CPT | Performed by: STUDENT IN AN ORGANIZED HEALTH CARE EDUCATION/TRAINING PROGRAM

## 2023-01-23 PROCEDURE — 84466 ASSAY OF TRANSFERRIN: CPT | Performed by: STUDENT IN AN ORGANIZED HEALTH CARE EDUCATION/TRAINING PROGRAM

## 2023-01-23 PROCEDURE — 94761 N-INVAS EAR/PLS OXIMETRY MLT: CPT

## 2023-01-23 PROCEDURE — 36415 COLL VENOUS BLD VENIPUNCTURE: CPT | Performed by: STUDENT IN AN ORGANIZED HEALTH CARE EDUCATION/TRAINING PROGRAM

## 2023-01-23 PROCEDURE — 25000003 PHARM REV CODE 250: Performed by: PHYSICIAN ASSISTANT

## 2023-01-23 PROCEDURE — 12000002 HC ACUTE/MED SURGE SEMI-PRIVATE ROOM

## 2023-01-23 PROCEDURE — 25000003 PHARM REV CODE 250: Performed by: INTERNAL MEDICINE

## 2023-01-23 PROCEDURE — 63600175 PHARM REV CODE 636 W HCPCS: Performed by: INTERNAL MEDICINE

## 2023-01-23 PROCEDURE — A4216 STERILE WATER/SALINE, 10 ML: HCPCS | Performed by: PHYSICIAN ASSISTANT

## 2023-01-23 PROCEDURE — 85025 COMPLETE CBC W/AUTO DIFF WBC: CPT | Performed by: STUDENT IN AN ORGANIZED HEALTH CARE EDUCATION/TRAINING PROGRAM

## 2023-01-23 PROCEDURE — 99232 SBSQ HOSP IP/OBS MODERATE 35: CPT | Mod: GC,,, | Performed by: STUDENT IN AN ORGANIZED HEALTH CARE EDUCATION/TRAINING PROGRAM

## 2023-01-23 PROCEDURE — 80053 COMPREHEN METABOLIC PANEL: CPT | Performed by: STUDENT IN AN ORGANIZED HEALTH CARE EDUCATION/TRAINING PROGRAM

## 2023-01-23 PROCEDURE — 86160 COMPLEMENT ANTIGEN: CPT | Performed by: STUDENT IN AN ORGANIZED HEALTH CARE EDUCATION/TRAINING PROGRAM

## 2023-01-23 PROCEDURE — 83735 ASSAY OF MAGNESIUM: CPT | Performed by: STUDENT IN AN ORGANIZED HEALTH CARE EDUCATION/TRAINING PROGRAM

## 2023-01-23 PROCEDURE — 99232 PR SUBSEQUENT HOSPITAL CARE,LEVL II: ICD-10-PCS | Mod: GC,,, | Performed by: STUDENT IN AN ORGANIZED HEALTH CARE EDUCATION/TRAINING PROGRAM

## 2023-01-23 PROCEDURE — 25000003 PHARM REV CODE 250: Performed by: STUDENT IN AN ORGANIZED HEALTH CARE EDUCATION/TRAINING PROGRAM

## 2023-01-23 PROCEDURE — 82728 ASSAY OF FERRITIN: CPT | Performed by: STUDENT IN AN ORGANIZED HEALTH CARE EDUCATION/TRAINING PROGRAM

## 2023-01-23 PROCEDURE — 86160 COMPLEMENT ANTIGEN: CPT | Mod: 59 | Performed by: STUDENT IN AN ORGANIZED HEALTH CARE EDUCATION/TRAINING PROGRAM

## 2023-01-23 PROCEDURE — 97535 SELF CARE MNGMENT TRAINING: CPT

## 2023-01-23 RX ADMIN — ENOXAPARIN SODIUM 40 MG: 40 INJECTION SUBCUTANEOUS at 04:01

## 2023-01-23 RX ADMIN — HYDROCODONE BITARTRATE AND ACETAMINOPHEN 1 TABLET: 10; 325 TABLET ORAL at 04:01

## 2023-01-23 RX ADMIN — KETOROLAC TROMETHAMINE 10 MG: 10 TABLET, FILM COATED ORAL at 02:01

## 2023-01-23 RX ADMIN — Medication 16 G: at 08:01

## 2023-01-23 RX ADMIN — DIVALPROEX SODIUM 500 MG: 250 TABLET, EXTENDED RELEASE ORAL at 09:01

## 2023-01-23 RX ADMIN — GABAPENTIN 300 MG: 300 CAPSULE ORAL at 02:01

## 2023-01-23 RX ADMIN — KETOROLAC TROMETHAMINE 10 MG: 10 TABLET, FILM COATED ORAL at 06:01

## 2023-01-23 RX ADMIN — ASPIRIN 81 MG: 81 TABLET, COATED ORAL at 08:01

## 2023-01-23 RX ADMIN — ZIPRASIDONE HYDROCHLORIDE 40 MG: 40 CAPSULE ORAL at 09:01

## 2023-01-23 RX ADMIN — GABAPENTIN 300 MG: 300 CAPSULE ORAL at 09:01

## 2023-01-23 RX ADMIN — Medication 10 ML: at 02:01

## 2023-01-23 RX ADMIN — Medication 10 ML: at 01:01

## 2023-01-23 RX ADMIN — KETOROLAC TROMETHAMINE 10 MG: 10 TABLET, FILM COATED ORAL at 09:01

## 2023-01-23 RX ADMIN — GABAPENTIN 300 MG: 300 CAPSULE ORAL at 08:01

## 2023-01-23 RX ADMIN — Medication 10 ML: at 06:01

## 2023-01-23 NOTE — PT/OT/SLP PROGRESS
Occupational Therapy   Treatment    Name: Jonathan Gonzales  MRN: [id number]  Admitting Diagnosis:  Traumatic rhabdomyolysis       Recommendations:     Discharge Recommendations: nursing facility, skilled  Discharge Equipment Recommendations:  walker, rolling  Barriers to discharge:  Decreased caregiver support    Assessment:     Jonathan Gonzales is a 62 y.o. female with a medical diagnosis of Traumatic rhabdomyolysis.  She presents with moderately impulsive behaviors, pt almost fell from bed before entering. Pt required RW to ambulate, pt attempted to furniture walk with decreased BLE coordination. Pt required Max VC/TC to navigate RW to bathroom. Pt performed toileting / grooming while in bathroom. Pt ambulated 50 ft in beard before fatigue. Performance deficits affecting function are impaired endurance, weakness, impaired self care skills, impaired functional mobility, gait instability, impaired balance, impaired cognition, decreased coordination, decreased lower extremity function, decreased safety awareness.     Rehab Prognosis:  Fair; patient would benefit from acute skilled OT services to address these deficits and reach maximum level of function.       Plan:     Patient to be seen 3 x/week to address the above listed problems via self-care/home management, neuromuscular re-education, therapeutic activities, therapeutic exercises  Plan of Care Expires: 02/19/23  Plan of Care Reviewed with: patient    Subjective     Pain/Comfort:  Pain Rating 1: 0/10    Objective:     Communicated with: nurse prior to session.  Patient found HOB elevated with PICC line, PureWick, telemetry upon OT entry to room.    General Precautions: Standard, fall, diabetic    Orthopedic Precautions:N/A  Braces: N/A  Respiratory Status: Room air     Occupational Performance:     Bed Mobility:    Patient completed Rolling/Turning to Left with  contact guard assistance  Patient completed Rolling/Turning to Right with contact guard  assistance  Patient completed Supine to Sit with contact guard assistance     Functional Mobility/Transfers:  Patient completed Sit <> Stand Transfer with minimum assistance  with  rolling walker   Functional Mobility: Pt unsafe to ambulate without increased (A). Impulsivity     Activities of Daily Living:  Grooming: minimum assistance Pt required TC to initiate teeth brushing / hand washing  Toileting: contact guard assistance Pt managed depends and pericare      AMPA 6 Click ADL: 16    Treatment & Education:  Pt educated on role of OT/POC  Pt. Educated on safety precautions   Pt provided self-care/ADL re-education  Pt reviewed bed mobility/functional mobility    Patient left HOB elevated with all lines intact, call button in reach, and nurse notified    GOALS:   Multidisciplinary Problems       Occupational Therapy Goals          Problem: Occupational Therapy    Goal Priority Disciplines Outcome Interventions   Occupational Therapy Goal     OT, PT/OT Ongoing, Progressing    Description: Goals to be met by: 01-31-23     Patient will increase functional independence with ADLs by performing:    UE Dressing with Set-up Assistance.  LE Dressing with Supervision.  Grooming while standing at sink with Supervision.  Toileting from toilet with Supervision for hygiene and clothing management.   Supine to sit with [place].  Stand pivot transfers with Supervision.  Toilet transfer to toilet with Supervision.  Pt. To be I with HEP to BUE to improve level of endurance                         Time Tracking:     OT Date of Treatment: 01/23/23  OT Start Time: 1356  OT Stop Time: 1411  OT Total Time (min): 15 min    Billable Minutes:Self Care/Home Management 15    OT/DOMINGA: OT     DOMINGA Visit Number: 0    1/23/2023

## 2023-01-23 NOTE — PLAN OF CARE
Problem: Adult Inpatient Plan of Care  Goal: Plan of Care Review  Outcome: Ongoing, Progressing  Goal: Patient-Specific Goal (Individualized)  Outcome: Ongoing, Progressing  Goal: Absence of Hospital-Acquired Illness or Injury  Outcome: Ongoing, Progressing  Goal: Optimal Comfort and Wellbeing  Outcome: Ongoing, Progressing  Goal: Readiness for Transition of Care  Outcome: Ongoing, Progressing     Problem: Diabetes Comorbidity  Goal: Blood Glucose Level Within Targeted Range  Outcome: Ongoing, Progressing     Problem: Fluid and Electrolyte Imbalance (Acute Kidney Injury/Impairment)  Goal: Fluid and Electrolyte Balance  Outcome: Ongoing, Progressing     Problem: Oral Intake Inadequate (Acute Kidney Injury/Impairment)  Goal: Optimal Nutrition Intake  Outcome: Ongoing, Progressing     Problem: Renal Function Impairment (Acute Kidney Injury/Impairment)  Goal: Effective Renal Function  Outcome: Ongoing, Progressing     Problem: Infection  Goal: Absence of Infection Signs and Symptoms  Outcome: Ongoing, Progressing     Problem: Skin Injury Risk Increased  Goal: Skin Health and Integrity  Outcome: Ongoing, Progressing

## 2023-01-23 NOTE — ASSESSMENT & PLAN NOTE
Patient's FSGs are uncontrolled due to hyperglycemia on current medication regimen.  Last A1c reviewed-   Lab Results   Component Value Date    HGBA1C 7.1 (H) 03/16/2022     Most recent fingerstick glucose reviewed-   Recent Labs   Lab 01/22/23  1941 01/23/23  0727 01/23/23  1105 01/23/23  1536   POCTGLUCOSE 76 62* 86 71     Current correctional scale  Low  Maintain anti-hyperglycemic dose as follows-   Antihyperglycemics (From admission, onward)    Start     Stop Route Frequency Ordered    01/19/23 0434  insulin aspart U-100 pen 0-5 Units         -- SubQ Before meals & nightly PRN 01/19/23 0335        Hold Oral hypoglycemics while patient is in the hospital.    - hold home insulin; glucose has been borderline low  - SSI only

## 2023-01-23 NOTE — PROGRESS NOTES
[facility] - [facility] ([address] Medicine  Progress Note    Patient Name: Jonathan Gonzales  MRN: [id number]  Patient Class: IP- Inpatient   Admission Date: 1/18/2023  Length of Stay: 4 days  Attending Physician: Agustin Cosby MD  Primary Care Provider: Ghulam Contreras MD        Subjective:     Principal Problem:Traumatic rhabdomyolysis        HPI:  61 yo female with DM, schizophrenia, depression, CVA history presenting after EMS found her down 2/2 not answering a call today. On arrival, EMS states she had been on the floor all day, had missed her medications. She states she seemed more fatigued. She is not particularly sure why she presented to the ER, but does think she tripped and then could not get up. She does not report any pain except for pain in her foot which she always has. She denied any chest pain or passing out before the fall, stating that she must have tripped on something. She currently denies any chest pain or shortness of breath. Per the ER, she states she had chest pain and shortness of breath and passed out. She initially had some garbled speech MRI obtained without evidence of acute stroke.     Medicine called for admission after labs showing elevated CPK.      Overview/Hospital Course:  Patient's CK showed improvement with no evidence of end-organ damage.  Safe for discharge however evaluated by PT/OT and patient has significant physical weakness and gait instability. Recommending SNF. ray was non-diagnostic so an MRI and CT was pursued which revealed tibial osteonecrosis of the left leg. Started on antiinflammatories for trial to see if pain improved. Spoke with orthopedics; nothing to intervene upon at this time. ESR/CRP found to be signifcantly elevated, potentially just due to osteonecrosis, but will r/o additional pathologies such as HIV and SLE given high association with osteonecrosis.       Interval History: NAEON. Leg pain improved on NSAID. Still very unstable  with her gait.     Review of Systems   Constitutional:  Negative for chills and fever.   HENT:  Positive for mouth sores (mouth pain from recent dental procedure). Negative for congestion.    Respiratory:  Negative for cough and shortness of breath.    Cardiovascular:  Positive for leg swelling. Negative for chest pain and palpitations.   Gastrointestinal:  Negative for abdominal pain, diarrhea and vomiting.   Objective:     Vital Signs (Most Recent):  Temp: 98.5 °F (36.9 °C) (01/23/23 1539)  Pulse: (!) 58 (01/23/23 1539)  Resp: 16 (01/23/23 1603)  BP: 138/63 (01/23/23 1539)  SpO2: 95 % (01/23/23 1539)   Vital Signs (24h Range):  Temp:  [97.6 °F (36.4 °C)-99.3 °F (37.4 °C)] 98.5 °F (36.9 °C)  Pulse:  [55-77] 58  Resp:  [16-18] 16  SpO2:  [92 %-96 %] 95 %  BP: (100-138)/(62-86) 138/63     Weight: 80 kg (176 lb 5.9 oz)  Body mass index is 30.27 kg/m².    Intake/Output Summary (Last 24 hours) at 1/23/2023 1639  Last data filed at 1/23/2023 1331  Gross per 24 hour   Intake 600 ml   Output 50 ml   Net 550 ml      Physical Exam  Vitals and nursing note reviewed.   Constitutional:       General: She is not in acute distress.     Appearance: She is not toxic-appearing or diaphoretic.   HENT:      Head: Normocephalic.      Mouth/Throat:      Dentition: Abnormal dentition. Dental caries present.   Cardiovascular:      Rate and Rhythm: Normal rate and regular rhythm.   Pulmonary:      Effort: Pulmonary effort is normal. No respiratory distress.   Abdominal:      Palpations: Abdomen is soft.      Tenderness: There is no guarding.   Musculoskeletal:      Right lower leg: Edema present.      Left lower leg: Edema present.   Skin:     General: Skin is warm and dry.   Neurological:      Mental Status: She is alert. Mental status is at baseline.      Cranial Nerves: No dysarthria.   Psychiatric:         Mood and Affect: Mood normal.         Behavior: Behavior normal.       Significant Labs: All pertinent labs within the past 24  hours have been reviewed.    Significant Imaging: I have reviewed all pertinent imaging results/findings within the past 24 hours.      Assessment/Plan:      * Traumatic rhabdomyolysis  CPK elevated, renal function normal  Fluid stopped.  CPK trending downward.  Will restart antihypertensives on discharge  Hold statin, and losartan a this time  No new acute pain, imaging results without fracture    - see osteonecrosis    Osteonecrosis due to previous trauma, left tibia  MRI revealed osteonecrosis of the proximal tibia in the left foot.  Started on trial of anti-inflammatories per orthopedic recommendation no surgical intervention warranted.  We will monitor renal function in the setting of NSAID use and her diabetes.  And reassess her mobility following proper analgesia.  Also initiated on gabapentin as foot pain may have neuropathic component in the setting of uncontrolled diabetes.    - continue GBP  - continue NSAIDs for pain      Vasovagal syncope  Acute, inconsistent story at this time, troponin negative   Discontinue tele.    Echo showed The left ventricle is normal in size with concentric remodeling and normal systolic function. The estimated ejection fraction is 65%.   Normal right ventricular size with normal right ventricular systolic function.   Normal left ventricular diastolic function.   The estimated PA systolic pressure is 34 mmHg.   Normal central venous pressure (3 mmHg).        Essential hypertension  Chronic, controlled, continue home medications, holding losartan as above to above BUFFY with lisinopril-HCTZ      Schizoaffective disorder, bipolar type  Chronic, controlled, continue home medications that patient stated was on however consult pharmacy as holding 1-2 medications as patient stated was not taking them.    Type 2 diabetes mellitus with neurologic complication, with long-term current use of insulin  Patient's FSGs are uncontrolled due to hyperglycemia on current medication  regimen.  Last A1c reviewed-   Lab Results   Component Value Date    HGBA1C 7.1 (H) 03/16/2022     Most recent fingerstick glucose reviewed-   Recent Labs   Lab 01/22/23  1941 01/23/23  0727 01/23/23  1105 01/23/23  1536   POCTGLUCOSE 76 62* 86 71     Current correctional scale  Low  Maintain anti-hyperglycemic dose as follows-   Antihyperglycemics (From admission, onward)    Start     Stop Route Frequency Ordered    01/19/23 0434  insulin aspart U-100 pen 0-5 Units         -- SubQ Before meals & nightly PRN 01/19/23 0335        Hold Oral hypoglycemics while patient is in the hospital.    - hold home insulin; glucose has been borderline low  - SSI only    Debility  Significant impairment. She is adamant that she does not want to go to a facility following discharge but currently unable to walk or complete ADLs. Further investigating the lower extremity pain and swelling. No obvious fracture or dislocation but occult soft tissue injury may be present.    MRI performed which showed osteonecrosis of the proximal tibia on the left side.  CT of the feet unrevealing.  Discussed with orthopedics who felt nothing to do surgically and to managed with analgesics and anti-inflammatories.  Started on short trial of ketorolac with daily monitoring of renal function and will reassess mobility.    - PT recommending SNF  - Specifically state patient is NOT safe to discharge home      VTE Risk Mitigation (From admission, onward)         Ordered     enoxaparin injection 40 mg  Daily         01/19/23 0335     IP VTE HIGH RISK PATIENT  Once         01/19/23 0335     Place sequential compression device  Until discontinued         01/19/23 0335     IP VTE HIGH RISK PATIENT  Once         01/19/23 0335     Place sequential compression device  Until discontinued         01/19/23 0335                Discharge Planning   ANDREW: 1/23/2023     Code Status: Full Code   Is the patient medically ready for discharge?: Yes    Reason for patient still  in hospital (select all that apply): Pending disposition  Discharge Plan A: Home Health                  Deandra Díaz MD  Department of Hospital Medicine   [facility] - Intensive Care ([place])

## 2023-01-23 NOTE — PLAN OF CARE
"  Problem: Renal Function Impairment (Acute Kidney Injury/Impairment)  Goal: Effective Renal Function  Outcome: Ongoing, Progressing     Problem: Infection  Goal: Absence of Infection Signs and Symptoms  Outcome: Ongoing, Progressing   Security called to talk with pt per request. When nurse asked about the reason for calling security, pt only stated "it is for personal reasons". Other than requesting to speak with security, no significant changes noted. Bed locked and in lowest position. Call light in reach.   "

## 2023-01-23 NOTE — ASSESSMENT & PLAN NOTE
MRI revealed osteonecrosis of the proximal tibia in the left foot.  Started on trial of anti-inflammatories per orthopedic recommendation no surgical intervention warranted.  We will monitor renal function in the setting of NSAID use and her diabetes.  And reassess her mobility following proper analgesia.  Also initiated on gabapentin as foot pain may have neuropathic component in the setting of uncontrolled diabetes.    - continue GBP  - continue NSAIDs for pain

## 2023-01-23 NOTE — ASSESSMENT & PLAN NOTE
Significant impairment. She is adamant that she does not want to go to a facility following discharge but currently unable to walk or complete ADLs. Further investigating the lower extremity pain and swelling. No obvious fracture or dislocation but occult soft tissue injury may be present.    MRI performed which showed osteonecrosis of the proximal tibia on the left side.  CT of the feet unrevealing.  Discussed with orthopedics who felt nothing to do surgically and to managed with analgesics and anti-inflammatories.  Started on short trial of ketorolac with daily monitoring of renal function and will reassess mobility.    - PT recommending SNF  - Specifically state patient is NOT safe to discharge home

## 2023-01-23 NOTE — PLAN OF CARE
Problem: Occupational Therapy  Goal: Occupational Therapy Goal  Description: Goals to be met by: 01-31-23     Patient will increase functional independence with ADLs by performing:    UE Dressing with Set-up Assistance.  LE Dressing with Supervision.  Grooming while standing at sink with Supervision.  Toileting from toilet with Supervision for hygiene and clothing management.   Supine to sit with [place].  Stand pivot transfers with Supervision.  Toilet transfer to toilet with Supervision.  Pt. To be I with HEP to BUE to improve level of endurance    Outcome: Ongoing, Progressing

## 2023-01-23 NOTE — SUBJECTIVE & OBJECTIVE
Interval History: NAEON. Leg pain improved on NSAID. Still very unstable with her gait.     Review of Systems   Constitutional:  Negative for chills and fever.   HENT:  Positive for mouth sores (mouth pain from recent dental procedure). Negative for congestion.    Respiratory:  Negative for cough and shortness of breath.    Cardiovascular:  Positive for leg swelling. Negative for chest pain and palpitations.   Gastrointestinal:  Negative for abdominal pain, diarrhea and vomiting.   Objective:     Vital Signs (Most Recent):  Temp: 98.5 °F (36.9 °C) (01/23/23 1539)  Pulse: (!) 58 (01/23/23 1539)  Resp: 16 (01/23/23 1603)  BP: 138/63 (01/23/23 1539)  SpO2: 95 % (01/23/23 1539)   Vital Signs (24h Range):  Temp:  [97.6 °F (36.4 °C)-99.3 °F (37.4 °C)] 98.5 °F (36.9 °C)  Pulse:  [55-77] 58  Resp:  [16-18] 16  SpO2:  [92 %-96 %] 95 %  BP: (100-138)/(62-86) 138/63     Weight: 80 kg (176 lb 5.9 oz)  Body mass index is 30.27 kg/m².    Intake/Output Summary (Last 24 hours) at 1/23/2023 1639  Last data filed at 1/23/2023 1331  Gross per 24 hour   Intake 600 ml   Output 50 ml   Net 550 ml      Physical Exam  Vitals and nursing note reviewed.   Constitutional:       General: She is not in acute distress.     Appearance: She is not toxic-appearing or diaphoretic.   HENT:      Head: Normocephalic.      Mouth/Throat:      Dentition: Abnormal dentition. Dental caries present.   Cardiovascular:      Rate and Rhythm: Normal rate and regular rhythm.   Pulmonary:      Effort: Pulmonary effort is normal. No respiratory distress.   Abdominal:      Palpations: Abdomen is soft.      Tenderness: There is no guarding.   Musculoskeletal:      Right lower leg: Edema present.      Left lower leg: Edema present.   Skin:     General: Skin is warm and dry.   Neurological:      Mental Status: She is alert. Mental status is at baseline.      Cranial Nerves: No dysarthria.   Psychiatric:         Mood and Affect: Mood normal.         Behavior: Behavior  normal.       Significant Labs: All pertinent labs within the past 24 hours have been reviewed.    Significant Imaging: I have reviewed all pertinent imaging results/findings within the past 24 hours.

## 2023-01-24 VITALS
WEIGHT: 176.38 LBS | HEIGHT: 64 IN | TEMPERATURE: 98 F | OXYGEN SATURATION: 97 % | SYSTOLIC BLOOD PRESSURE: 124 MMHG | BODY MASS INDEX: 30.11 KG/M2 | DIASTOLIC BLOOD PRESSURE: 89 MMHG | RESPIRATION RATE: 18 BRPM | HEART RATE: 71 BPM

## 2023-01-24 PROBLEM — K04.7 DENTAL INFECTION: Chronic | Status: ACTIVE | Noted: 2023-01-24

## 2023-01-24 LAB
ALBUMIN SERPL BCP-MCNC: 1.9 G/DL (ref 3.5–5.2)
ALP SERPL-CCNC: 78 U/L (ref 55–135)
ALT SERPL W/O P-5'-P-CCNC: 20 U/L (ref 10–44)
ANA SER QL IF: NORMAL
ANION GAP SERPL CALC-SCNC: 9 MMOL/L (ref 8–16)
AST SERPL-CCNC: 34 U/L (ref 10–40)
BASOPHILS # BLD AUTO: 0.06 K/UL (ref 0–0.2)
BASOPHILS NFR BLD: 0.9 % (ref 0–1.9)
BILIRUB SERPL-MCNC: 0.5 MG/DL (ref 0.1–1)
BUN SERPL-MCNC: 21 MG/DL (ref 8–23)
CALCIUM SERPL-MCNC: 8.7 MG/DL (ref 8.7–10.5)
CHLORIDE SERPL-SCNC: 108 MMOL/L (ref 95–110)
CO2 SERPL-SCNC: 24 MMOL/L (ref 23–29)
CREAT SERPL-MCNC: 1.1 MG/DL (ref 0.5–1.4)
DIFFERENTIAL METHOD: ABNORMAL
DSDNA AB SER-ACNC: NORMAL [IU]/ML
EOSINOPHIL # BLD AUTO: 0.3 K/UL (ref 0–0.5)
EOSINOPHIL NFR BLD: 4.6 % (ref 0–8)
ERYTHROCYTE [DISTWIDTH] IN BLOOD BY AUTOMATED COUNT: 15.8 % (ref 11.5–14.5)
EST. GFR  (NO RACE VARIABLE): 56.5 ML/MIN/1.73 M^2
GLUCOSE SERPL-MCNC: 66 MG/DL (ref 70–110)
HCT VFR BLD AUTO: 25.5 % (ref 37–48.5)
HGB BLD-MCNC: 7.8 G/DL (ref 12–16)
IMM GRANULOCYTES # BLD AUTO: 0.13 K/UL (ref 0–0.04)
IMM GRANULOCYTES NFR BLD AUTO: 1.9 % (ref 0–0.5)
LYMPHOCYTES # BLD AUTO: 2.7 K/UL (ref 1–4.8)
LYMPHOCYTES NFR BLD: 40.1 % (ref 18–48)
MAGNESIUM SERPL-MCNC: 1.8 MG/DL (ref 1.6–2.6)
MCH RBC QN AUTO: 28.6 PG (ref 27–31)
MCHC RBC AUTO-ENTMCNC: 30.6 G/DL (ref 32–36)
MCV RBC AUTO: 93 FL (ref 82–98)
MONOCYTES # BLD AUTO: 0.7 K/UL (ref 0.3–1)
MONOCYTES NFR BLD: 10.6 % (ref 4–15)
NEUTROPHILS # BLD AUTO: 2.9 K/UL (ref 1.8–7.7)
NEUTROPHILS NFR BLD: 41.9 % (ref 38–73)
NRBC BLD-RTO: 0 /100 WBC
PHOSPHATE SERPL-MCNC: 3.3 MG/DL (ref 2.7–4.5)
PLATELET # BLD AUTO: 446 K/UL (ref 150–450)
PMV BLD AUTO: 9.1 FL (ref 9.2–12.9)
POCT GLUCOSE: 145 MG/DL (ref 70–110)
POCT GLUCOSE: 72 MG/DL (ref 70–110)
POCT GLUCOSE: 78 MG/DL (ref 70–110)
POTASSIUM SERPL-SCNC: 4 MMOL/L (ref 3.5–5.1)
PROT SERPL-MCNC: 5.8 G/DL (ref 6–8.4)
RBC # BLD AUTO: 2.73 M/UL (ref 4–5.4)
SODIUM SERPL-SCNC: 141 MMOL/L (ref 136–145)
WBC # BLD AUTO: 6.79 K/UL (ref 3.9–12.7)

## 2023-01-24 PROCEDURE — 97530 THERAPEUTIC ACTIVITIES: CPT

## 2023-01-24 PROCEDURE — 63600175 PHARM REV CODE 636 W HCPCS: Performed by: STUDENT IN AN ORGANIZED HEALTH CARE EDUCATION/TRAINING PROGRAM

## 2023-01-24 PROCEDURE — 25000003 PHARM REV CODE 250: Performed by: STUDENT IN AN ORGANIZED HEALTH CARE EDUCATION/TRAINING PROGRAM

## 2023-01-24 PROCEDURE — 99232 SBSQ HOSP IP/OBS MODERATE 35: CPT | Mod: GC,,, | Performed by: STUDENT IN AN ORGANIZED HEALTH CARE EDUCATION/TRAINING PROGRAM

## 2023-01-24 PROCEDURE — 80053 COMPREHEN METABOLIC PANEL: CPT | Performed by: STUDENT IN AN ORGANIZED HEALTH CARE EDUCATION/TRAINING PROGRAM

## 2023-01-24 PROCEDURE — 84100 ASSAY OF PHOSPHORUS: CPT | Performed by: STUDENT IN AN ORGANIZED HEALTH CARE EDUCATION/TRAINING PROGRAM

## 2023-01-24 PROCEDURE — 25000003 PHARM REV CODE 250: Performed by: PHYSICIAN ASSISTANT

## 2023-01-24 PROCEDURE — 99232 PR SUBSEQUENT HOSPITAL CARE,LEVL II: ICD-10-PCS | Mod: GC,,, | Performed by: STUDENT IN AN ORGANIZED HEALTH CARE EDUCATION/TRAINING PROGRAM

## 2023-01-24 PROCEDURE — 36415 COLL VENOUS BLD VENIPUNCTURE: CPT | Performed by: STUDENT IN AN ORGANIZED HEALTH CARE EDUCATION/TRAINING PROGRAM

## 2023-01-24 PROCEDURE — A4216 STERILE WATER/SALINE, 10 ML: HCPCS | Performed by: PHYSICIAN ASSISTANT

## 2023-01-24 PROCEDURE — 85025 COMPLETE CBC W/AUTO DIFF WBC: CPT | Performed by: STUDENT IN AN ORGANIZED HEALTH CARE EDUCATION/TRAINING PROGRAM

## 2023-01-24 PROCEDURE — 25000003 PHARM REV CODE 250: Performed by: INTERNAL MEDICINE

## 2023-01-24 PROCEDURE — 83735 ASSAY OF MAGNESIUM: CPT | Performed by: STUDENT IN AN ORGANIZED HEALTH CARE EDUCATION/TRAINING PROGRAM

## 2023-01-24 PROCEDURE — 94761 N-INVAS EAR/PLS OXIMETRY MLT: CPT

## 2023-01-24 RX ORDER — CEFUROXIME AXETIL 500 MG/1
500 TABLET ORAL EVERY 12 HOURS
Qty: 14 TABLET | Refills: 0 | Status: SHIPPED | OUTPATIENT
Start: 2023-01-24 | End: 2023-01-31

## 2023-01-24 RX ORDER — PREGABALIN 100 MG/1
100 CAPSULE ORAL 3 TIMES DAILY
Qty: 270 CAPSULE | Refills: 1
Start: 2023-01-24 | End: 2023-01-30 | Stop reason: SDUPTHER

## 2023-01-24 RX ORDER — INSULIN GLARGINE 100 [IU]/ML
INJECTION, SOLUTION SUBCUTANEOUS
Qty: 30 ML | Refills: 5
Start: 2023-01-24 | End: 2024-03-14

## 2023-01-24 RX ORDER — TRAMADOL HYDROCHLORIDE 50 MG/1
50 TABLET ORAL EVERY 6 HOURS PRN
Status: DISCONTINUED | OUTPATIENT
Start: 2023-01-24 | End: 2023-01-24 | Stop reason: HOSPADM

## 2023-01-24 RX ORDER — QUETIAPINE FUMARATE 100 MG/1
100 TABLET, FILM COATED ORAL NIGHTLY
Qty: 90 TABLET | Refills: 3 | Status: SHIPPED | OUTPATIENT
Start: 2023-01-24 | End: 2024-03-14

## 2023-01-24 RX ORDER — ACETAMINOPHEN 500 MG
1000 TABLET ORAL EVERY 8 HOURS
Status: DISCONTINUED | OUTPATIENT
Start: 2023-01-24 | End: 2023-01-24 | Stop reason: HOSPADM

## 2023-01-24 RX ORDER — AMOXICILLIN AND CLAVULANATE POTASSIUM 875; 125 MG/1; MG/1
1 TABLET, FILM COATED ORAL EVERY 12 HOURS
Qty: 13 TABLET | Refills: 0 | Status: SHIPPED | OUTPATIENT
Start: 2023-01-24 | End: 2023-01-24 | Stop reason: HOSPADM

## 2023-01-24 RX ORDER — METFORMIN HYDROCHLORIDE 500 MG/1
1000 TABLET ORAL DAILY
Start: 2023-01-24 | End: 2023-04-05 | Stop reason: SDUPTHER

## 2023-01-24 RX ORDER — METRONIDAZOLE 500 MG/1
500 TABLET ORAL EVERY 8 HOURS
Qty: 21 TABLET | Refills: 0 | Status: SHIPPED | OUTPATIENT
Start: 2023-01-24 | End: 2023-01-31

## 2023-01-24 RX ORDER — PREGABALIN 50 MG/1
100 CAPSULE ORAL 3 TIMES DAILY
Status: DISCONTINUED | OUTPATIENT
Start: 2023-01-24 | End: 2023-01-24 | Stop reason: HOSPADM

## 2023-01-24 RX ORDER — IBUPROFEN 400 MG/1
400 TABLET ORAL EVERY 6 HOURS PRN
Status: DISCONTINUED | OUTPATIENT
Start: 2023-01-24 | End: 2023-01-24 | Stop reason: HOSPADM

## 2023-01-24 RX ADMIN — GABAPENTIN 300 MG: 300 CAPSULE ORAL at 08:01

## 2023-01-24 RX ADMIN — ACETAMINOPHEN 1000 MG: 500 TABLET ORAL at 02:01

## 2023-01-24 RX ADMIN — Medication 10 ML: at 12:01

## 2023-01-24 RX ADMIN — ASPIRIN 81 MG: 81 TABLET, COATED ORAL at 08:01

## 2023-01-24 RX ADMIN — Medication 10 ML: at 06:01

## 2023-01-24 RX ADMIN — PREGABALIN 100 MG: 50 CAPSULE ORAL at 02:01

## 2023-01-24 RX ADMIN — AMPICILLIN SODIUM AND SULBACTAM SODIUM 3 G: 2; 1 INJECTION, POWDER, FOR SOLUTION INTRAMUSCULAR; INTRAVENOUS at 02:01

## 2023-01-24 RX ADMIN — KETOROLAC TROMETHAMINE 10 MG: 10 TABLET, FILM COATED ORAL at 05:01

## 2023-01-24 RX ADMIN — HYDROCODONE BITARTRATE AND ACETAMINOPHEN 1 TABLET: 10; 325 TABLET ORAL at 09:01

## 2023-01-24 NOTE — PT/OT/SLP PROGRESS
Physical Therapy Treatment    Patient Name:  Jonathan Gonzales   MRN:  [id number]  Admitting Diagnosis: Traumatic rhabdomyolysis  Recent Surgery: * No surgery found *      Recommendations:     Discharge Recommendations:  nursing facility, skilled   Discharge Equipment Recommendations: walker, rolling   Barriers to discharge: None    Highest Level of Mobility: steps to chair  Assistance Needed: minimal assistance    Assessment:     Jonathan Gonzales is a 62 y.o. female admitted with a medical diagnosis of Traumatic rhabdomyolysis. Patient tolerated PT treatment well today. She  is most limited today by weakness.  She was able to practice bed mobility, standing, transfers, and took a few steps. Focus of treatment was improving overall mobility. Pt is progressing well. See detailed treatment note below:    Problem List: weakness, impaired endurance, impaired self care skills, impaired functional mobility, gait instability, impaired balance, decreased coordination, impaired cognition, decreased upper extremity function, decreased lower extremity function. weakness, impaired endurance, impaired self care skills, impaired functional mobility, gait instability, impaired balance, decreased coordination, impaired cognition, decreased upper extremity function, decreased lower extremity function  Rehab Prognosis: Good     GOALS:   Multidisciplinary Problems       Physical Therapy Goals          Problem: Physical Therapy    Goal Priority Disciplines Outcome Goal Variances Interventions   Physical Therapy Goal     PT, PT/OT Ongoing, Progressing     Description: Goals to be met by: 2/3/23     Patient will increase functional independence with mobility by performin. Supine to sit with Modified Broussard  2. Sit to supine with Modified Broussard  3. Sit to stand transfer with Modified Broussard  4. Bed to chair transfer with Modified Broussard using Rolling Walker/LRAD  5. Gait  x 50 feet with Modified  "Desha using Rolling Walker. /LRAD  6. Ascend/descend 3 stair with right Handrails Supervision using Rolling Walker. /LRAD                       Plan:     During this hospitalization, patient to be seen 3 x/week to address the listed problems via gait training, therapeutic activities, therapeutic exercises, neuromuscular re-education  Plan of Care Expires:  02/19/23  Plan of Care Reviewed with: patient    Subjective     Communicated with RN prior to session.  Patient found resting in bed upon PT entry to room.   "I can get up"    Pain/Comfort:  Pain Rating 1: 0/10  Pain Rating Post-Intervention 1: 0/10    Objective:     Patient found with: peripheral IV   Mental Status: Patient is Alert and Cooperative during session.    General Precautions: Standard, fall   Orthopedic Precautions:N/A   Braces: N/A   Respiratory Status: room air  Vital Signs (Most Recent):    Temp: 97.6 °F (36.4 °C) (01/24/23 0749)  Pulse: 76 (01/24/23 0749)  Resp: 18 (01/24/23 0952)  BP: (!) 147/98 (01/24/23 0749)  SpO2: 97 % (01/24/23 0749)    Functional Mobility:  Bed Mobility:   Supine to Sit: stand by assistance  Scooting anteriorly to EOB to have both feet planted on floor: stand by assistance    Sitting Balance at Edge of Bed:  Assistance Level Required: standy by assistance  Postural deviations noted: rounded shoulders    Transfers:   Sit <> Stand Transfer: contact guard assistance with no assistive device   Bed <> Chair Transfer: Step Transfer technique with minimum assistance with hand-held assist      Gait:  Patient ambulated: 4 steps to chair   Patient required: minimal assist  Patient used:  No Assistive Device  Gait Deviation(s): decreased speed, decreased step length, flexed posture, antalgic gait pattern      Therapeutic Exercises:   Patient educated on and demonstrated LE strengthening exercises to perform throughout the day.    Education:  Patient was educated on the following:  Progress of PT goals and plan of care  In room " safety and use of call button  Importance of continued upright mobility and exercise    Patient left up in chair with all lines intact, call button in reach, and RN notified.    AM-PAC 6 CLICK MOBILITY  Turning over in bed (including adjusting bedclothes, sheets and blankets)?: 4  Sitting down on and standing up from a chair with arms (e.g., wheelchair, bedside commode, etc.): 3  Moving from lying on back to sitting on the side of the bed?: 4  Moving to and from a bed to a chair (including a wheelchair)?: 3  Need to walk in hospital room?: 3  Climbing 3-5 steps with a railing?: 2  Basic Mobility Total Score: 19       Time Tracking:     PT Received On: 01/24/23  PT Start Time: 0852     PT Stop Time: 0902  PT Total Time (min): 10 min     Billable Minutes:   Therapeutic Activity 10    Treatment Type: Treatment  PT/PTA: PT       Bailey Elkins, PT, DPT  01/24/2023

## 2023-01-24 NOTE — ASSESSMENT & PLAN NOTE
MRI revealed osteonecrosis of the proximal tibia in the left foot.  Started on trial of anti-inflammatories per orthopedic recommendation no surgical intervention warranted. We will monitor renal function in the setting of NSAID use and her diabetes.  And reassess her mobility following proper analgesia.  Also initiated on gabapentin as foot pain may have neuropathic component in the setting of uncontrolled diabetes.    - continue home pregabalin 100mg TID  - scheduled tylenol 1g q8h  - PRN ibuprofen and tramadol available for breakthrough pain  - labs to rule out etiologies of osteonecrosis: HIV negative, SLE labs (GILDARDO pending, C3/C4/dsDNA wnl)

## 2023-01-24 NOTE — ASSESSMENT & PLAN NOTE
Significant impairment. She is adamant that she does not want to go to a facility following discharge but currently unable to walk or complete ADLs. Further investigating the lower extremity pain and swelling. No obvious fracture or dislocation but occult soft tissue injury may be present.    MRI performed which showed osteonecrosis of the proximal tibia on the left side.  CT of the feet unrevealing.  Discussed with orthopedics who felt nothing to do surgically and to managed with analgesics and anti-inflammatories.  Started on short trial of ketorolac with daily monitoring of renal function and will reassess mobility.    - PT recommending SNF  - Specifically state patient is NOT safe to discharge home  - discussed with patient's daughter with her permission: she understands her mother is not safe to go home and will try to discuss/encourage her to go to SNF; referrals pending

## 2023-01-24 NOTE — SUBJECTIVE & OBJECTIVE
Interval History: NAEON. Leg pain improved on NSAID. Still very unstable with her gait.     Review of Systems   Constitutional:  Negative for chills and fever.   HENT:  Positive for mouth sores (mouth pain from recent dental procedure). Negative for congestion.    Respiratory:  Negative for cough and shortness of breath.    Cardiovascular:  Positive for leg swelling. Negative for chest pain and palpitations.   Gastrointestinal:  Negative for constipation and diarrhea.   Neurological:  Negative for dizziness and syncope.   Objective:     Vital Signs (Most Recent):  Temp: 98.1 °F (36.7 °C) (01/24/23 1141)  Pulse: 64 (01/24/23 1141)  Resp: 18 (01/24/23 1141)  BP: 127/86 (01/24/23 1141)  SpO2: 95 % (01/24/23 1141)   Vital Signs (24h Range):  Temp:  [97.4 °F (36.3 °C)-98.5 °F (36.9 °C)] 98.1 °F (36.7 °C)  Pulse:  [58-76] 64  Resp:  [16-20] 18  SpO2:  [93 %-97 %] 95 %  BP: (105-170)/(63-98) 127/86     Weight: 80 kg (176 lb 5.9 oz)  Body mass index is 30.27 kg/m².    Intake/Output Summary (Last 24 hours) at 1/24/2023 1337  Last data filed at 1/24/2023 0538  Gross per 24 hour   Intake 240 ml   Output --   Net 240 ml        Physical Exam  Vitals and nursing note reviewed.   Constitutional:       General: She is not in acute distress.     Appearance: She is not toxic-appearing or diaphoretic.   HENT:      Head: Normocephalic.      Mouth/Throat:      Dentition: Abnormal dentition. Dental caries present.   Cardiovascular:      Rate and Rhythm: Normal rate and regular rhythm.   Pulmonary:      Effort: Pulmonary effort is normal. No respiratory distress.   Abdominal:      Palpations: Abdomen is soft.      Tenderness: There is no guarding.   Musculoskeletal:      Right lower leg: Edema present.      Left lower leg: Edema present.   Skin:     General: Skin is warm and dry.   Neurological:      Mental Status: She is alert. Mental status is at baseline.      Cranial Nerves: No dysarthria.   Psychiatric:         Mood and Affect: Mood  normal.         Behavior: Behavior normal.       Significant Labs: All pertinent labs within the past 24 hours have been reviewed.    Significant Imaging: I have reviewed all pertinent imaging results/findings within the past 24 hours.

## 2023-01-24 NOTE — ASSESSMENT & PLAN NOTE
Chronic, controlled, continue home medications that patient stated was on however consult pharmacy as holding 1-2 medications as patient stated was not taking them.    - continue home ziprasadone and divaloprex  - hold home seroquel 200mg qhs while resuming home pregabalin; do not want to over-sedate

## 2023-01-24 NOTE — DISCHARGE SUMMARY
[facility] - Intensive Care ([place] [id number])  [facility] Medicine  Discharge Summary      Patient Name: Jonathan Gonzales  MRN: [id number]  FABIANA: [id number]  Patient Class: IP- Inpatient  Admission Date: 1/18/2023  Hospital Length of Stay: 5 days  Discharge Date and Time: No discharge date for patient encounter.  Attending Physician: Agustin Cosby MD   Discharging Provider: Deandra Díaz MD  Primary Care Provider: Ghulam Contreras MD  [facility] Medicine Team: [facility] MED V Deandra Díaz MD  Primary Care Team: [facility] V    HPI:   61 yo female with DM, schizophrenia, depression, CVA history presenting after EMS found her down 2/2 not answering a call today. On arrival, EMS states she had been on the floor all day, had missed her medications. She states she seemed more fatigued. She is not particularly sure why she presented to the ER, but does think she tripped and then could not get up. She does not report any pain except for pain in her foot which she always has. She denied any chest pain or passing out before the fall, stating that she must have tripped on something. She currently denies any chest pain or shortness of breath. Per the ER, she states she had chest pain and shortness of breath and passed out. She initially had some garbled speech MRI obtained without evidence of acute stroke.     Medicine called for admission after labs showing elevated CPK.      * No surgery found *      Hospital Course:   Patient's CK showed improvement with no evidence of end-organ damage.  Safe for discharge however evaluated by PT/OT and patient has significant physical weakness and gait instability. Recommending SNF. ray was non-diagnostic so an MRI and CT was pursued which revealed tibial osteonecrosis of the left leg. Started on antiinflammatories for trial to see if pain improved. Spoke with orthopedics; nothing to intervene upon at this time. ESR/CRP found to be signifcantly elevated, potentially just due  "to osteonecrosis, but will r/o additional pathologies such as HIV and SLE given high association with osteonecrosis. Will f/u labs after discharge.  Will also discharge with new 7 day course of cefuroxime+flagyl to cover dental infection that was previously partially treated as an outpatient and not completed the course. Instructed her to follow up with dentist and PCP within 1 week. Patient strongly desired to go home despite PT recommending SNF due to gait instability. I extensively discussed this with her and her son at bedside, my concern that she could fall and break a hip, have a brain bleed, or bad outcome. She adamantly refuses SNF placement stating "she just doesn't want to go" and that she wants to be free to walk around and get stronger. She states "I understand that risk [of falling and seriously injuring herself] and am willing to accept that risk." Will try to maximize her safety by discharging her with rolling walker as PT recommended + bedside commode. Son at bedside also in agreement with this plan stating "ultimately it is her decision." I do believe she has decision making capacity since she can communicate that she understands and accepts the risks. Home Health also ordered with PT/OT/aide. Instructed her to HOLD her pregabalin and minimize seroquel use to avoid oversedation. Also instructed her to HOLD her insulin until PCP f/u since her BG were borderline low here on no long-acting insulin, whereas at home she reports taking 23U detemir nightly.        Goals of Care Treatment Preferences:  Code Status: Full Code      Consults:   Consults (From admission, onward)        Status Ordering Provider     Inpatient consult to Midline team  Once        Provider:  (Not yet assigned)    Completed [facility] Medicine PharmD Consult  Once        Provider:  (Not yet assigned)    Completed [facility] Medicine PharmD Consult  Once        Provider:  (Not yet assigned)    Completed " "CHRISTOPH LEBRON          No new Assessment & Plan notes have been filed under this hospital service since the last note was generated.  Service: Hospital Medicine    Final Active Diagnoses:    Diagnosis Date Noted POA    PRINCIPAL PROBLEM:  Traumatic rhabdomyolysis [T79.6XXA] 01/19/2023 Yes    Osteonecrosis due to previous trauma, left tibia [M87.262] 01/22/2023 Not Applicable    Debility [R53.81] 06/29/2012 Yes    Dental infection [K04.7] 01/24/2023 Unknown     Chronic    Vasovagal syncope [R55] 01/19/2023 Yes    Essential hypertension [I10] 09/23/2021 Yes    Schizoaffective disorder, bipolar type [F25.0]  Yes    Type 2 diabetes mellitus with neurologic complication, with long-term current use of insulin [E11.49, Z79.4] 06/19/2017 Not Applicable      Problems Resolved During this Admission:       Discharged Condition: fair    Disposition: Home-Health Care c    Follow Up:    Patient Instructions:      WALKER FOR HOME USE     Order Specific Question Answer Comments   Type of Walker: Adult (5'4"-6'6")    With wheels? Yes    Height: 5' 4" (1.626 m)    Weight: 80 kg (176 lb 5.9 oz)    Length of need (1-99 months): 99    Does patient have medical equipment at home? cane, mary    Please check all that apply: Patient's condition impairs ambulation.    Please check all that apply: Walker will be used for gait training.    Please check all that apply: Patient is unable to safely ambulate without equipment.      COMMODE FOR HOME USE     Order Specific Question Answer Comments   Type: Standard    Height: 5' 4" (1.626 m)    Weight: 80 kg (176 lb 5.9 oz)    Does patient have medical equipment at home? cane, straight    Length of need (1-99 months): 99      Ambulatory referral/consult to Internal Medicine   Standing Status: Future   Referral Priority: Routine Referral Type: Consultation   Referral Reason: Specialty Services Required   Requested Specialty: Internal Medicine   Number of Visits Requested: 1 "       Significant Diagnostic Studies: see chart    Pending Diagnostic Studies:     Procedure Component Value Units Date/Time    Protein / creatinine ratio, urine [[id number]] Collected: 01/23/23 1504    Order Status: Sent Lab Status: In process Updated: 01/23/23 1504    Specimen: Urine, Clean Catch          Medications:  Reconciled Home Medications:      Medication List      START taking these medications    cefUROXime 500 MG tablet  Commonly known as: CEFTIN  Take 1 tablet (500 mg total) by mouth every 12 (twelve) hours. for 7 days     metroNIDAZOLE 500 MG tablet  Commonly known as: FLAGYL  Take 1 tablet (500 mg total) by mouth every 8 (eight) hours. for 7 days        CHANGE how you take these medications    insulin glargine 100 units/mL SubQ pen  Commonly known as: LANTUS SOLOSTAR U-100 INSULIN  INJECT 23 UNITS SUBCUTANEOUSLY NIGHTLY  HOLD until you see your doctor and are told to resume  What changed: additional instructions     pregabalin 100 MG capsule  Commonly known as: LYRICA  Take 1 capsule (100 mg total) by mouth 3 (three) times daily. HOLD until you are seen by your doctor and told to resume  What changed: additional instructions     QUEtiapine 100 MG Tab  Commonly known as: SEROQUEL  Take 1 tablet (100 mg total) by mouth every evening.  What changed:   · how much to take  · how to take this  · when to take this        CONTINUE taking these medications    acetaminophen-codeine 300-30mg 300-30 mg Tab  Commonly known as: TYLENOL #3  Take 1 tablet by mouth every 6 (six) hours as needed for Pain.     ammonium lactate 12 % Crea  Apply twice daily to affected parts both feet as needed.     aspirin 81 MG EC tablet  Commonly known as: ECOTRIN  Take 1 tablet (81 mg total) by mouth once daily.     atorvastatin 80 MG tablet  Commonly known as: LIPITOR  TAKE 1 TABLET EVERY DAY     blood glucose control, low Soln  1 drop by Misc.(Non-Drug; Combo Route) route as needed.     blood sugar diagnostic Strp  Commonly known  "as: ACCU-CHEK AMBER PLUS TEST STRP  Inject 2 strips into the skin 2 (two) times a day. Test Blood Sugar     blood-glucose meter Misc  Commonly known as: ACCU-CHEK AMBER PLUS METER  Use as directed to check blood sugar three times daily     chlorhexidine 0.12 % solution  Commonly known as: PERIDEX  RINSE AND SPIT 15 ML TWICE DAILY FOR 7 DAYS     DEPAKOTE  MG Tb24  Generic drug: divalproex  Take 500 mg by mouth nightly.     lisinopriL-hydrochlorothiazide 10-12.5 mg per tablet  Commonly known as: PRINZIDE,ZESTORETIC  Take 1 tablet by mouth once daily.     metFORMIN 500 MG tablet  Commonly known as: GLUCOPHAGE  Take 2 tablets (1,000 mg total) by mouth once daily. Take with food.     * pen needle, diabetic 32 gauge x 5/32" Ndle  Commonly known as: BD ULTRA-FINE ITALO PEN NEEDLE  Pt is injecting once daily     * EASY TOUCH 31 gauge x 1/4" Ndle  Generic drug: pen needle, diabetic  Use as directed     * TRUEPLUS LANCETS 33 gauge Misc  Generic drug: lancets  100 lancets by Subconjunctival route 4 (four) times daily.     * lancets Misc  To check BG 3 times daily, to use with Accu check Guide meter pt has.     VICTOZA 3-HERMANN 0.6 mg/0.1 mL (18 mg/3 mL) Pnij pen  Generic drug: liraglutide 0.6 mg/0.1 mL (18 mg/3 mL) subq PNIJ  INJECT 1.8 MG SUBCUTANEOUSLY ONCE DAILY.     WRIST SUPPORT LARGE-XLARGE MISC     ziprasidone 40 MG Cap  Commonly known as: GEODON  TAKE 1 CAPSULE EVERY EVENING         * This list has 4 medication(s) that are the same as other medications prescribed for you. Read the directions carefully, and ask your doctor or other care provider to review them with you.            STOP taking these medications    MITIGARE 0.6 mg Cap  Generic drug: colchicine     oxybutynin 10 MG 24 hr tablet  Commonly known as: DITROPAN-XL     penicillin v potassium 500 MG tablet  Commonly known as: VEETID            Indwelling Lines/Drains at time of discharge:   Lines/Drains/Airways     None                 Time spent on the " discharge of patient: 60 minutes         Deandra Díaz MD  Department of Hospital Medicine  [facility] - Intensive Care ([place])

## 2023-01-24 NOTE — PLAN OF CARE
Problem: Adult Inpatient Plan of Care  Goal: Plan of Care Review  Outcome: Met  Goal: Patient-Specific Goal (Individualized)  Outcome: Met  Goal: Absence of Hospital-Acquired Illness or Injury  Outcome: Met  Goal: Optimal Comfort and Wellbeing  Outcome: Met  Goal: Readiness for Transition of Care  Outcome: Met     Problem: Diabetes Comorbidity  Goal: Blood Glucose Level Within Targeted Range  Outcome: Met     Problem: Fluid and Electrolyte Imbalance (Acute Kidney Injury/Impairment)  Goal: Fluid and Electrolyte Balance  Outcome: Met     Problem: Oral Intake Inadequate (Acute Kidney Injury/Impairment)  Goal: Optimal Nutrition Intake  Outcome: Met     Problem: Renal Function Impairment (Acute Kidney Injury/Impairment)  Goal: Effective Renal Function  Outcome: Met     Problem: Infection  Goal: Absence of Infection Signs and Symptoms  Outcome: Met     Problem: Skin Injury Risk Increased  Goal: Skin Health and Integrity  Outcome: Met     Patient alert and oriented. Cooperative with care. Discharged home this shift. Discharge papers reviewed with patient and family. Both verbalize understanding. Equipment delivered to room. Iv removed with tip intact. Patient awaiting medication delivery and transport

## 2023-01-24 NOTE — ASSESSMENT & PLAN NOTE
Chronic, controlled  Home regimen: lisinopril-HCTZ and losartan?    - resume home antihypertensives soon since no BUFFY developed from rhabdo  - discontinued home script for losartan to avoid duplicate therapy

## 2023-01-24 NOTE — ASSESSMENT & PLAN NOTE
Patient saw her dentist on 1/12 ([facility] in [place]) and was prescribed Penicillin V 500mg q8h PO x 7 days for a dental infection.  She reports ongoing mouth pain. She is not sure how many more pills she had left, but concern she did not fully complete her course    - Persistent pain and mild purulence of rear R sided molar on exam  - Will treat for a full course of 7-10 days given her elevated inflammatory markers and persistent symptoms with goal of preventing hematogenous seeding  - Will initiate IV unasyn with plans to transition to PO augmentin upon discharge

## 2023-01-24 NOTE — PROGRESS NOTES
[facility] ([address] Medicine  Progress Note    Patient Name: Jonathan Gonzales  MRN: [id number]  Patient Class: IP- Inpatient   Admission Date: 1/18/2023  Length of Stay: 5 days  Attending Physician: Agustin Cosby MD  Primary Care Provider: Ghulam Contreras MD        Subjective:     Principal Problem:Traumatic rhabdomyolysis        HPI:  63 yo female with DM, schizophrenia, depression, CVA history presenting after EMS found her down 2/2 not answering a call today. On arrival, EMS states she had been on the floor all day, had missed her medications. She states she seemed more fatigued. She is not particularly sure why she presented to the ER, but does think she tripped and then could not get up. She does not report any pain except for pain in her foot which she always has. She denied any chest pain or passing out before the fall, stating that she must have tripped on something. She currently denies any chest pain or shortness of breath. Per the ER, she states she had chest pain and shortness of breath and passed out. She initially had some garbled speech MRI obtained without evidence of acute stroke.     Medicine called for admission after labs showing elevated CPK.      Overview/Hospital Course:  Patient's CK showed improvement with no evidence of end-organ damage.  Safe for discharge however evaluated by PT/OT and patient has significant physical weakness and gait instability. Recommending SNF. ray was non-diagnostic so an MRI and CT was pursued which revealed tibial osteonecrosis of the left leg. Started on antiinflammatories for trial to see if pain improved. Spoke with orthopedics; nothing to intervene upon at this time. ESR/CRP found to be signifcantly elevated, potentially just due to osteonecrosis, but will r/o additional pathologies such as HIV and SLE given high association with osteonecrosis.       Interval History: NAEON. Leg pain improved on NSAID. Still very unstable  with her gait.     Review of Systems   Constitutional:  Negative for chills and fever.   HENT:  Positive for mouth sores (mouth pain from recent dental procedure). Negative for congestion.    Respiratory:  Negative for cough and shortness of breath.    Cardiovascular:  Positive for leg swelling. Negative for chest pain and palpitations.   Gastrointestinal:  Negative for constipation and diarrhea.   Neurological:  Negative for dizziness and syncope.   Objective:     Vital Signs (Most Recent):  Temp: 98.1 °F (36.7 °C) (01/24/23 1141)  Pulse: 64 (01/24/23 1141)  Resp: 18 (01/24/23 1141)  BP: 127/86 (01/24/23 1141)  SpO2: 95 % (01/24/23 1141)   Vital Signs (24h Range):  Temp:  [97.4 °F (36.3 °C)-98.5 °F (36.9 °C)] 98.1 °F (36.7 °C)  Pulse:  [58-76] 64  Resp:  [16-20] 18  SpO2:  [93 %-97 %] 95 %  BP: (105-170)/(63-98) 127/86     Weight: 80 kg (176 lb 5.9 oz)  Body mass index is 30.27 kg/m².    Intake/Output Summary (Last 24 hours) at 1/24/2023 1337  Last data filed at 1/24/2023 0538  Gross per 24 hour   Intake 240 ml   Output --   Net 240 ml        Physical Exam  Vitals and nursing note reviewed.   Constitutional:       General: She is not in acute distress.     Appearance: She is not toxic-appearing or diaphoretic.   HENT:      Head: Normocephalic.      Mouth/Throat:      Dentition: Abnormal dentition. Dental caries present.   Cardiovascular:      Rate and Rhythm: Normal rate and regular rhythm.   Pulmonary:      Effort: Pulmonary effort is normal. No respiratory distress.   Abdominal:      Palpations: Abdomen is soft.      Tenderness: There is no guarding.   Musculoskeletal:      Right lower leg: Edema present.      Left lower leg: Edema present.   Skin:     General: Skin is warm and dry.   Neurological:      Mental Status: She is alert. Mental status is at baseline.      Cranial Nerves: No dysarthria.   Psychiatric:         Mood and Affect: Mood normal.         Behavior: Behavior normal.       Significant Labs: All  pertinent labs within the past 24 hours have been reviewed.    Significant Imaging: I have reviewed all pertinent imaging results/findings within the past 24 hours.      Assessment/Plan:      * Traumatic rhabdomyolysis  CPK elevated, renal function normal  Fluid stopped.  CPK trending downward.  Will restart antihypertensives on discharge  Hold statin, and losartan a this time  No new acute pain, imaging results without fracture    - see osteonecrosis    Osteonecrosis due to previous trauma, left tibia  MRI revealed osteonecrosis of the proximal tibia in the left foot.  Started on trial of anti-inflammatories per orthopedic recommendation no surgical intervention warranted. We will monitor renal function in the setting of NSAID use and her diabetes.  And reassess her mobility following proper analgesia.  Also initiated on gabapentin as foot pain may have neuropathic component in the setting of uncontrolled diabetes.    - continue home pregabalin 100mg TID  - scheduled tylenol 1g q8h  - PRN ibuprofen and tramadol available for breakthrough pain  - labs to rule out etiologies of osteonecrosis: HIV negative, SLE labs (GILDARDO pending, C3/C4/dsDNA wnl)      Debility  Significant impairment. She is adamant that she does not want to go to a facility following discharge but currently unable to walk or complete ADLs. Further investigating the lower extremity pain and swelling. No obvious fracture or dislocation but occult soft tissue injury may be present.    MRI performed which showed osteonecrosis of the proximal tibia on the left side.  CT of the feet unrevealing.  Discussed with orthopedics who felt nothing to do surgically and to managed with analgesics and anti-inflammatories.  Started on short trial of ketorolac with daily monitoring of renal function and will reassess mobility.    - PT recommending SNF  - Specifically state patient is NOT safe to discharge home  - discussed with patient's daughter with her permission:  she understands her mother is not safe to go home and will try to discuss/encourage her to go to SNF; referrals pending    Dental infection  Patient saw her dentist on 1/12 ([facility] [place]) and was prescribed Penicillin V 500mg q8h PO x 7 days for a dental infection.  She reports ongoing mouth pain. She is not sure how many more pills she had left, but concern she did not fully complete her course    - Persistent pain and mild purulence of rear R sided molar on exam  - Will treat for a full course of 7-10 days given her elevated inflammatory markers and persistent symptoms with goal of preventing hematogenous seeding  - Will initiate IV unasyn with plans to transition to PO augmentin upon discharge      Vasovagal syncope  Acute, inconsistent story at this time, troponin negative  Discontinue tele.   Echo showed The left ventricle is normal in size with concentric remodeling and normal systolic function. The estimated ejection fraction is 65%.  Normal right ventricular size with normal right ventricular systolic function.  Normal left ventricular diastolic function.  The estimated PA systolic pressure is 34 mmHg.  Normal central venous pressure (3 mmHg).        Essential hypertension  Chronic, controlled  Home regimen: lisinopril-HCTZ and losartan?    - resume home antihypertensives soon since no BUFFY developed from rhabdo  - discontinued home script for losartan to avoid duplicate therapy    Schizoaffective disorder, bipolar type  Chronic, controlled, continue home medications that patient stated was on however consult pharmacy as holding 1-2 medications as patient stated was not taking them.    - continue home ziprasadone and divaloprex  - hold home seroquel 200mg qhs while resuming home pregabalin; do not want to over-sedate    Type 2 diabetes mellitus with neurologic complication, with long-term current use of insulin  Patient's FSGs are uncontrolled due to hyperglycemia on current medication  regimen.  Last A1c reviewed-   Lab Results   Component Value Date    HGBA1C 7.1 (H) 03/16/2022     Most recent fingerstick glucose reviewed-   Recent Labs   Lab 01/22/23  1941 01/23/23  0727 01/23/23  1105 01/23/23  1536   POCTGLUCOSE 76 62* 86 71     Current correctional scale  Low  Maintain anti-hyperglycemic dose as follows-   Antihyperglycemics (From admission, onward)      Start     Stop Route Frequency Ordered    01/19/23 0434  insulin aspart U-100 pen 0-5 Units         -- SubQ Before meals & nightly PRN 01/19/23 0335          Hold Oral hypoglycemics while patient is in the hospital.    - hold home insulin; glucose has been borderline low  - SSI only      VTE Risk Mitigation (From admission, onward)           Ordered     enoxaparin injection 40 mg  Daily         01/19/23 0335     IP VTE HIGH RISK PATIENT  Once         01/19/23 0335     Place sequential compression device  Until discontinued         01/19/23 0335     IP VTE HIGH RISK PATIENT  Once         01/19/23 0335     Place sequential compression device  Until discontinued         01/19/23 0335                    Discharge Planning   ANDREW: 1/24/2023     Code Status: Full Code   Is the patient medically ready for discharge?: Yes    Reason for patient still in hospital (select all that apply): Patient trending condition  Discharge Plan A: Home Health                  Deandra Díaz MD  Department of Hospital Medicine   [facility] - Intensive Care ([facility])

## 2023-01-24 NOTE — PLAN OF CARE
[facility] - [facility] ([place] [address])      HOME HEALTH ORDERS  FACE TO FACE ENCOUNTER    Patient Name: Jonathan Gonzales  YOB: 1960    PCP: Ghulam Contreras MD   PCP Address: [address] [place] [address] / [place] [address]  PCP Phone Number: [phone]  PCP Fax: [phone]    Encounter Date: 1/18/23    Admit to Home Health    Diagnoses:  Active Hospital Problems    Diagnosis  POA    *Traumatic rhabdomyolysis [T79.6XXA]  Yes     Priority: 1 - High    Osteonecrosis due to previous trauma, left tibia [M87.262]  Not Applicable     Priority: 2     Debility [R53.81]  Yes     Priority: 3     Dental infection [K04.7]  Unknown     Chronic    Vasovagal syncope [R55]  Yes    Essential hypertension [I10]  Yes    Schizoaffective disorder, bipolar type [F25.0]  Yes    Type 2 diabetes mellitus with neurologic complication, with long-term current use of insulin [E11.49, Z79.4]  Not Applicable     Tolerable amount of leg swelling on Lyrica 100mg po BID, will continue  We have discussed the improved HbA1c and I have encouraged continued health maintenance  We have discussed her cervical and lumbar DDD as well  She notes that this dose seems to help without too many side effects        Resolved Hospital Problems   No resolved problems to display.       Follow Up Appointments:  Future Appointments   Date Time Provider Department Center   2/7/2023 10:00 AM Kirsten Menard MD [facility] [place]   3/15/2023 11:00 AM Moses Coffey NP [facility]       Allergies:Review of patient's allergies indicates:  No Known Allergies    Medications: Review discharge medications with patient and family and provide education.    Current Facility-Administered Medications   Medication Dose Route Frequency Provider Last Rate Last Admin    acetaminophen tablet 1,000 mg  1,000 mg Oral Q8H Deandra Díaz MD   1,000 mg at 01/24/23 1430    albuterol-ipratropium 2.5 mg-0.5 mg/3 mL nebulizer solution 3 mL   3 mL Nebulization Q6H PRN Ras Ferguson MD        ampicillin-sulbactam (UNASYN) 3 g in sodium chloride 0.9 % 100 mL IVPB (MB+)  3 g Intravenous Q6H Deandra H MD Arjun 100 mL/hr at 01/24/23 1430 3 g at 01/24/23 1430    aspirin EC tablet 81 mg  81 mg Oral Daily Ras Ferguson MD   81 mg at 01/24/23 0854    dextrose 10% bolus 125 mL 125 mL  12.5 g Intravenous PRN Ras Ferguson MD   Stopped at 01/20/23 0808    dextrose 10% bolus 250 mL 250 mL  25 g Intravenous PRN Ras Ferguson MD        divalproex ER 24 hr tablet 500 mg  500 mg Oral Nightly Ras Ferguson MD   500 mg at 01/23/23 2115    enoxaparin injection 40 mg  40 mg Subcutaneous Daily Ras Ferguson MD   40 mg at 01/23/23 1603    glucagon (human recombinant) injection 1 mg  1 mg Intramuscular PRN Ras Ferguson MD        glucose chewable tablet 16 g  16 g Oral PRN Ras Ferguson MD   16 g at 01/23/23 0829    glucose chewable tablet 24 g  24 g Oral PRN Ras Ferguson MD        ibuprofen tablet 400 mg  400 mg Oral Q6H PRN Deandra Díaz MD        insulin aspart U-100 pen 0-5 Units  0-5 Units Subcutaneous QID (AC + HS) PRN Ras Ferguson MD        melatonin tablet 6 mg  6 mg Oral Nightly PRN Lexie Mendiola MD        naloxone 0.4 mg/mL injection 0.02 mg  0.02 mg Intravenous PRN Ras Ferguson MD        polyethylene glycol packet 17 g  17 g Oral BID PRN Ras Ferguson MD        pregabalin capsule 100 mg  100 mg Oral TID Deandra Díaz MD   100 mg at 01/24/23 1430    prochlorperazine injection Soln 5 mg  5 mg Intravenous Q6H PRN Ras Ferguson MD        simethicone chewable tablet 80 mg  1 tablet Oral QID PRN Ras Ferguson MD        sodium chloride 0.9% flush 10 mL  10 mL Intravenous PRN Lexie Mendiola MD        sodium chloride 0.9% flush 10 mL  10 mL Intravenous PRN Ras Ferguson MD        sodium chloride 0.9% flush 10 mL  10 mL Intravenous Q6H Crista Martin PA-C   10 mL at 01/24/23 0600    And    sodium chloride 0.9% flush 10 mL  10 mL Intravenous PRN Crista WADE  ROXANNE Martin        traMADoL tablet 50 mg  50 mg Oral Q6H PRN Deandra Díaz MD        ziprasidone capsule 40 mg  40 mg Oral QHS Ras Ferguson MD   40 mg at 01/23/23 2113     Facility-Administered Medications Ordered in Other Encounters   Medication Dose Route Frequency Provider Last Rate Last Admin    0.9%  NaCl infusion   Intravenous Continuous Jacky See MD 25 mL/hr at 02/03/21 1432 New Bag at 02/03/21 1432     Current Discharge Medication List        START taking these medications    Details   cefUROXime (CEFTIN) 500 MG tablet Take 1 tablet (500 mg total) by mouth every 12 (twelve) hours. for 7 days  Qty: 14 tablet, Refills: 0      metroNIDAZOLE (FLAGYL) 500 MG tablet Take 1 tablet (500 mg total) by mouth every 8 (eight) hours. for 7 days  Qty: 21 tablet, Refills: 0           CONTINUE these medications which have CHANGED    Details   insulin (LANTUS SOLOSTAR U-100 INSULIN) glargine 100 units/mL SubQ pen INJECT 23 UNITS SUBCUTANEOUSLY NIGHTLY  HOLD until you see your doctor and are told to resume  Qty: 30 mL, Refills: 5    Associated Diagnoses: Type 2 diabetes mellitus without complication, with long-term current use of insulin      metFORMIN (GLUCOPHAGE) 500 MG tablet Take 2 tablets (1,000 mg total) by mouth once daily. Take with food.    Associated Diagnoses: Type 2 diabetes mellitus without complication, with long-term current use of insulin      pregabalin (LYRICA) 100 MG capsule Take 1 capsule (100 mg total) by mouth 3 (three) times daily. HOLD until you are seen by your doctor and told to resume  Qty: 270 capsule, Refills: 1    Associated Diagnoses: Cervical spondylosis without myelopathy; DDD (degenerative disc disease), cervical; Cervical radiculopathy; DM type 2 with diabetic peripheral neuropathy      QUEtiapine (SEROQUEL) 100 MG Tab Take 1 tablet (100 mg total) by mouth every evening.  Qty: 90 tablet, Refills: 3    Associated Diagnoses: Cervical spondylosis without myelopathy            CONTINUE these medications which have NOT CHANGED    Details   acetaminophen-codeine 300-30mg (TYLENOL #3) 300-30 mg Tab Take 1 tablet by mouth every 6 (six) hours as needed for Pain.      ammonium lactate 12 % Crea Apply twice daily to affected parts both feet as needed.  Qty: 140 g, Refills: 11      aspirin (ECOTRIN) 81 MG EC tablet Take 1 tablet (81 mg total) by mouth once daily.  Refills: 0      atorvastatin (LIPITOR) 80 MG tablet TAKE 1 TABLET EVERY DAY  Qty: 90 tablet, Refills: 0      chlorhexidine (PERIDEX) 0.12 % solution RINSE AND SPIT 15 ML TWICE DAILY FOR 7 DAYS      DEPAKOTE  mg Tb24 Take 500 mg by mouth nightly.       liraglutide 0.6 mg/0.1 mL, 18 mg/3 mL, subq PNIJ (VICTOZA 3-HERMANN) 0.6 mg/0.1 mL (18 mg/3 mL) PnIj pen INJECT 1.8 MG SUBCUTANEOUSLY ONCE DAILY.  Qty: 27 mL, Refills: 11      lisinopriL-hydrochlorothiazide (PRINZIDE,ZESTORETIC) 10-12.5 mg per tablet Take 1 tablet by mouth once daily.  Qty: 90 tablet, Refills: 3    Comments: .  Associated Diagnoses: Essential hypertension      ziprasidone (GEODON) 40 MG Cap TAKE 1 CAPSULE EVERY EVENING  Qty: 30 capsule, Refills: 0    Associated Diagnoses: Schizoaffective disorder, bipolar type      arm brace (WRIST SUPPORT LARGE-XLARGE MISC)       blood glucose control, low Soln 1 drop by Misc.(Non-Drug; Combo Route) route as needed.  Qty: 1 each, Refills: 0    Associated Diagnoses: Type 2 diabetes mellitus with other specified complication, unspecified whether long term insulin use      blood sugar diagnostic (ACCU-CHEK AMBER PLUS TEST STRP) Strp Inject 2 strips into the skin 2 (two) times a day. Test Blood Sugar  Qty: 200 strip, Refills: 3    Associated Diagnoses: Uncontrolled type 2 diabetes mellitus, with long-term current use of insulin      blood-glucose meter (ACCU-CHEK AMBER PLUS METER) Misc Use as directed to check blood sugar three times daily  Qty: 300 each, Refills: 3    Comments: Or brand covered by insurance  Associated Diagnoses: DM  "type 2 with diabetic peripheral neuropathy      EASY TOUCH 31 gauge x 1/4" Ndle Use as directed  Qty: 100 each, Refills: 5      !! lancets Misc To check BG 3 times daily, to use with Accu check Guide meter pt has.  Qty: 200 each, Refills: 3    Associated Diagnoses: Type 2 diabetes mellitus with diabetic polyneuropathy, with long-term current use of insulin; Uncontrolled type 2 diabetes mellitus, with long-term current use of insulin      pen needle, diabetic (BD ULTRA-FINE ITALO PEN NEEDLE) 32 gauge x 5/32" Ndle Pt is injecting once daily  Qty: 30 each, Refills: 11      !! TRUEPLUS LANCETS 33 gauge Misc 100 lancets by Subconjunctival route 4 (four) times daily.  Qty: 100 each, Refills: 0    Associated Diagnoses: DM type 2 with diabetic peripheral neuropathy       !! - Potential duplicate medications found. Please discuss with provider.        STOP taking these medications       oxybutynin (DITROPAN-XL) 10 MG 24 hr tablet Comments:   Reason for Stopping:         penicillin v potassium (VEETID) 500 MG tablet Comments:   Reason for Stopping:         MITIGARE 0.6 mg Cap Comments:   Reason for Stopping:                 I have seen and examined this patient within the last 30 days. My clinical findings that support the need for the home health skilled services and home bound status are the following:no   Weakness/numbness causing balance and gait disturbance due to Weakness/Debility making it taxing to leave home.  Patient with medication mismanagement issues requiring home bound status as evidenced by  Poor understanding of medication regimen/dosage.     Diet:   regular diet    Labs:  No labs    Referrals/ Consults  Physical Therapy to evaluate and treat. Evaluate for home safety and equipment needs; Establish/upgrade home exercise program. Perform / instruct on therapeutic exercises, gait training, transfer training, and Range of Motion.  Occupational Therapy to evaluate and treat. Evaluate home environment for safety " and equipment needs. Perform/Instruct on transfers, ADL training, ROM, and therapeutic exercises.   to evaluate for community resources/long-range planning.  Aide to provide assistance with personal care, ADLs, and vital signs.    Activities:   ambulate in house with assistance    Nursing:   Agency to admit patient within 24 hours of hospital discharge unless specified on physician order or at patient request    SN to complete comprehensive assessment including routine vital signs. Instruct on disease process and s/s of complications to report to MD. Review/verify medication list sent home with the patient at time of discharge  and instruct patient/caregiver as needed. Frequency may be adjusted depending on start of care date.     Skilled nurse to perform up to 3 visits PRN for symptoms related to diagnosis    Notify MD if SBP > 160 or < 90; DBP > 90 or < 50; HR > 120 or < 50; Temp > 101; O2 < 88%; Other:       Ok to schedule additional visits based on staff availability and patient request on consecutive days within the home health episode.    When multiple disciplines ordered:    Start of Care occurs on Sunday - Wednesday schedule remaining discipline evaluations as ordered on separate consecutive days following the start of care.    Thursday SOC -schedule subsequent evaluations Friday and Monday the following week.     Friday - Saturday SOC - schedule subsequent discipline evaluations on consecutive days starting Monday of the following week.    For all post-discharge communication and subsequent orders please contact patient's primary care physician. If unable to reach primary care physician or do not receive response within 30 minutes, please contact  for clinical staff order clarification    Miscellaneous   Routine Skin for Bedridden Patients: Instruct patient/caregiver to apply moisture barrier cream to all skin folds and wet areas in perineal area daily and after baths and all bowel  movements.    Home Health Aide:  Nursing every 48 hours, Physical Therapy every 48 hours, Occupational Therapy every 48 hours, Medical Social Work every 48 hours, and Home Health Aide every 48 hours    Wound Care Orders  no    I certify that this patient is confined to her home and needs intermittent skilled nursing care, physical therapy, and occupational therapy.

## 2023-01-25 ENCOUNTER — PATIENT OUTREACH (OUTPATIENT)
Dept: ADMINISTRATIVE | Facility: CLINIC | Age: 63
End: 2023-01-25
Payer: MEDICARE

## 2023-01-25 NOTE — PLAN OF CARE
[facility] ([facility])  Discharge Final Note    Primary Care Provider: Ghulam Contreras MD    Expected Discharge Date: 1/24/2023    Final Discharge Note (most recent)       Final Note - 01/25/23 1652          Final Note    Assessment Type Final Discharge Note (P)      Anticipated Discharge Disposition Home-Health Care Svc (P)         Post-Acute Status    Discharge Delays None known at this time (P)                  Pt d/c'd with HH.    Important Message from Medicare

## 2023-01-25 NOTE — TELEPHONE ENCOUNTER
Left a voice message that a hospital follow up with Dr. Baires is scheduled for 01/30/2023 at 1430. Informed she should call  if this is a problem and we will also call her back if this is a problem. Date sent to [employer]

## 2023-01-25 NOTE — PROGRESS NOTES
C3 nurse attempted to contact Jonathan Gonzales  for a TCC post hospital discharge follow up call. No answer. Left voicemail with callback information. The patient does not have a scheduled HOSFU appointment. Message sent to PCP staff for assistance with scheduling visit with patient.

## 2023-01-25 NOTE — PLAN OF CARE
Calling Nelda with [facility] - she has been with [facility] before about a year ago.  CM states she needs HH again.  Nelda will look in to this and call back.    4:51 PM Nelda states they can staff this patient this coming Friday.    JAVID RosenthalN, BS, RN, [facility]

## 2023-01-27 PROCEDURE — G0180 PR HOME HEALTH MD CERTIFICATION: ICD-10-PCS | Mod: ,,, | Performed by: STUDENT IN AN ORGANIZED HEALTH CARE EDUCATION/TRAINING PROGRAM

## 2023-01-27 PROCEDURE — G0180 MD CERTIFICATION HHA PATIENT: HCPCS | Mod: ,,, | Performed by: STUDENT IN AN ORGANIZED HEALTH CARE EDUCATION/TRAINING PROGRAM

## 2023-01-27 NOTE — PROGRESS NOTES
2nd Attempt made to reach patient for TCC call. Left voicemail please call [phone] leave first name, last name, and  Rachel will return your call.

## 2023-01-30 ENCOUNTER — OFFICE VISIT (OUTPATIENT)
Dept: INTERNAL MEDICINE | Facility: CLINIC | Age: 63
End: 2023-01-30
Attending: FAMILY MEDICINE
Payer: MEDICARE

## 2023-01-30 ENCOUNTER — TELEPHONE (OUTPATIENT)
Dept: PAIN MEDICINE | Facility: OTHER | Age: 63
End: 2023-01-30
Payer: MEDICARE

## 2023-01-30 ENCOUNTER — TELEPHONE (OUTPATIENT)
Dept: INTERNAL MEDICINE | Facility: CLINIC | Age: 63
End: 2023-01-30
Payer: MEDICARE

## 2023-01-30 VITALS
HEART RATE: 75 BPM | BODY MASS INDEX: 29.89 KG/M2 | HEIGHT: 64 IN | WEIGHT: 175.06 LBS | OXYGEN SATURATION: 97 % | SYSTOLIC BLOOD PRESSURE: 100 MMHG | DIASTOLIC BLOOD PRESSURE: 70 MMHG

## 2023-01-30 DIAGNOSIS — E11.42 DM TYPE 2 WITH DIABETIC PERIPHERAL NEUROPATHY: ICD-10-CM

## 2023-01-30 DIAGNOSIS — T45.1X5A CHEMOTHERAPY-INDUCED NEUROPATHY: ICD-10-CM

## 2023-01-30 DIAGNOSIS — I77.1 TORTUOUS AORTA: ICD-10-CM

## 2023-01-30 DIAGNOSIS — G62.0 CHEMOTHERAPY-INDUCED NEUROPATHY: ICD-10-CM

## 2023-01-30 DIAGNOSIS — I10 PRIMARY HYPERTENSION: ICD-10-CM

## 2023-01-30 DIAGNOSIS — M46.00 SPINAL ENTHESOPATHY, SITE UNSPECIFIED: ICD-10-CM

## 2023-01-30 DIAGNOSIS — T79.6XXD TRAUMATIC RHABDOMYOLYSIS, SUBSEQUENT ENCOUNTER: Primary | ICD-10-CM

## 2023-01-30 DIAGNOSIS — C50.912 MALIGNANT NEOPLASM OF LEFT FEMALE BREAST, UNSPECIFIED ESTROGEN RECEPTOR STATUS, UNSPECIFIED SITE OF BREAST: ICD-10-CM

## 2023-01-30 DIAGNOSIS — M50.30 DDD (DEGENERATIVE DISC DISEASE), CERVICAL: ICD-10-CM

## 2023-01-30 DIAGNOSIS — M47.812 CERVICAL SPONDYLOSIS WITHOUT MYELOPATHY: ICD-10-CM

## 2023-01-30 DIAGNOSIS — M54.12 CERVICAL RADICULOPATHY: ICD-10-CM

## 2023-01-30 DIAGNOSIS — N18.31 STAGE 3A CHRONIC KIDNEY DISEASE: ICD-10-CM

## 2023-01-30 PROCEDURE — 3078F PR MOST RECENT DIASTOLIC BLOOD PRESSURE < 80 MM HG: ICD-10-PCS | Mod: CPTII,S$GLB,, | Performed by: FAMILY MEDICINE

## 2023-01-30 PROCEDURE — 1160F RVW MEDS BY RX/DR IN RCRD: CPT | Mod: CPTII,S$GLB,, | Performed by: FAMILY MEDICINE

## 2023-01-30 PROCEDURE — 1159F MED LIST DOCD IN RCRD: CPT | Mod: CPTII,S$GLB,, | Performed by: FAMILY MEDICINE

## 2023-01-30 PROCEDURE — 99999 PR PBB SHADOW E&M-EST. PATIENT-LVL IV: CPT | Mod: PBBFAC,,, | Performed by: FAMILY MEDICINE

## 2023-01-30 PROCEDURE — 3078F DIAST BP <80 MM HG: CPT | Mod: CPTII,S$GLB,, | Performed by: FAMILY MEDICINE

## 2023-01-30 PROCEDURE — 3074F PR MOST RECENT SYSTOLIC BLOOD PRESSURE < 130 MM HG: ICD-10-PCS | Mod: CPTII,S$GLB,, | Performed by: FAMILY MEDICINE

## 2023-01-30 PROCEDURE — 99214 OFFICE O/P EST MOD 30 MIN: CPT | Mod: S$GLB,,, | Performed by: FAMILY MEDICINE

## 2023-01-30 PROCEDURE — 1111F PR DISCHARGE MEDS RECONCILED W/ CURRENT OUTPATIENT MED LIST: ICD-10-PCS | Mod: CPTII,S$GLB,, | Performed by: FAMILY MEDICINE

## 2023-01-30 PROCEDURE — 3072F PR LOW RISK FOR RETINOPATHY: ICD-10-PCS | Mod: CPTII,S$GLB,, | Performed by: FAMILY MEDICINE

## 2023-01-30 PROCEDURE — 1111F DSCHRG MED/CURRENT MED MERGE: CPT | Mod: CPTII,S$GLB,, | Performed by: FAMILY MEDICINE

## 2023-01-30 PROCEDURE — 99999 PR PBB SHADOW E&M-EST. PATIENT-LVL IV: ICD-10-PCS | Mod: PBBFAC,,, | Performed by: FAMILY MEDICINE

## 2023-01-30 PROCEDURE — 3074F SYST BP LT 130 MM HG: CPT | Mod: CPTII,S$GLB,, | Performed by: FAMILY MEDICINE

## 2023-01-30 PROCEDURE — 3072F LOW RISK FOR RETINOPATHY: CPT | Mod: CPTII,S$GLB,, | Performed by: FAMILY MEDICINE

## 2023-01-30 PROCEDURE — 99214 PR OFFICE/OUTPT VISIT, EST, LEVL IV, 30-39 MIN: ICD-10-PCS | Mod: S$GLB,,, | Performed by: FAMILY MEDICINE

## 2023-01-30 PROCEDURE — 1159F PR MEDICATION LIST DOCUMENTED IN MEDICAL RECORD: ICD-10-PCS | Mod: CPTII,S$GLB,, | Performed by: FAMILY MEDICINE

## 2023-01-30 PROCEDURE — 99214 OFFICE O/P EST MOD 30 MIN: CPT | Mod: PBBFAC | Performed by: FAMILY MEDICINE

## 2023-01-30 PROCEDURE — 3008F PR BODY MASS INDEX (BMI) DOCUMENTED: ICD-10-PCS | Mod: CPTII,S$GLB,, | Performed by: FAMILY MEDICINE

## 2023-01-30 PROCEDURE — 3008F BODY MASS INDEX DOCD: CPT | Mod: CPTII,S$GLB,, | Performed by: FAMILY MEDICINE

## 2023-01-30 PROCEDURE — 1160F PR REVIEW ALL MEDS BY PRESCRIBER/CLIN PHARMACIST DOCUMENTED: ICD-10-PCS | Mod: CPTII,S$GLB,, | Performed by: FAMILY MEDICINE

## 2023-01-30 RX ORDER — ACETAMINOPHEN AND CODEINE PHOSPHATE 300; 30 MG/1; MG/1
1 TABLET ORAL EVERY 6 HOURS PRN
Qty: 20 TABLET | Refills: 0 | Status: SHIPPED | OUTPATIENT
Start: 2023-01-30 | End: 2024-03-14

## 2023-01-30 RX ORDER — PREGABALIN 100 MG/1
100 CAPSULE ORAL 3 TIMES DAILY
Qty: 270 CAPSULE | Refills: 1 | Status: ON HOLD | OUTPATIENT
Start: 2023-01-30 | End: 2023-04-06 | Stop reason: HOSPADM

## 2023-01-30 NOTE — TELEPHONE ENCOUNTER
----- Message from Belinda Russell sent at 1/30/2023  8:58 AM CST -----  Name of Who is Calling: JOYA MUNOZ [[id number]]           What is the request in detail: Patient is requesting a call back.  Patient needs information about the order placed for a toilet and walker.  Patient needs to know if the order was placed correctly.  Please assist.           Can the clinic reply by [facility]: No           What Number to Call Back if not in [facility]: [phone]

## 2023-01-30 NOTE — PROGRESS NOTES
CHIEF COMPLAINT:   Hosp. F/U in a patient with diabetes and hypertension    HISTORY OF PRESENT ILLNESS: The patient is a 62 year-old black female.  Recently inpt d/t rhabdo    C/O dental pain too    The patient has a long and complicated medical history.      She continues to have problems with neuropathic pain as well as central pain.  She does well w/ her Lyrica 100 twice a day.    The patient has a history of diabetes.  Recent blood sugars have been less than 200.  There have been no episodes of hypoglycemia.    The patient has a history of stable hypertension on current medications.  Patient denies chest pain or shortness of breath today.    The patient has a history of stable hyperlipidemia on current medications.  The patient denies chest pain or shortness of breath today.  The patient denies muscle aches or myalgias suggestive of myositis.    She has a history of breast cancer for which she underwent bilateral mastectomies and chemotherapy.    REVIEW OF SYSTEMS:  GENERAL: No fever, chills or weight loss.  SKIN: No rashes, itching or changes in color or texture of skin.  HEAD: No headaches or recent head trauma.  EYES: Visual acuity fine. No photophobia, ocular pain or diplopia.  EARS: Denies ear pain, discharge or vertigo.  NOSE: No loss of smell, no epistaxis or postnasal drip.  MOUTH & THROAT: No hoarseness or change in voice. No excessive gum bleeding.  NODES: Denies swollen glands.  CHEST: Denies AWAD, cyanosis, wheezing, cough and sputum production.  CARDIOVASCULAR: Denies chest pain, PND, orthopnea or reduced exercise tolerance.  ABDOMEN: Appetite fine. No weight loss. Denies diarrhea, abdominal pain, hematemesis or blood in stool.  URINARY: No flank pain, dysuria or hematuria.  PERIPHERAL VASCULAR: No claudication or cyanosis.  MUSCULOSKELETAL: No joint stiffness or swelling. Except as noted above.  NEUROLOGIC: No history of seizures    SOCIAL HISTORY: The patient does smoke.  The patient consumes  "alcohol socially.      PHYSICAL EXAMINATION:     Blood pressure 100/70, pulse 75, height 5' 4" (1.626 m), weight 79.4 kg (175 lb 0.7 oz), SpO2 97 %.    APPEARANCE: Well nourished, well developed, in no acute distress.    HEAD: Normocephalic, atraumatic.  EYES: PERRL. EOMI.  Conjunctivae without injection and  Anicteric  NOSE: Mucosa pink. Airway clear.  MOUTH & THROAT: No tonsillar enlargement. No pharyngeal erythema or exudate. No stridor.  NECK: Supple.   NODES: No cervical, axillary or inguinal lymph node enlargement.  CHEST: Lungs clear to auscultation.  No retractions are noted.  No rales or rhonchi are present.  CARDIOVASCULAR: Normal S1, S2. No rubs, murmurs or gallops.  ABDOMEN: Bowel sounds normal. Not distended. Soft. No tenderness or masses.  No ascites is noted.  MUSCULOSKELETAL:  There is no clubbing, cyanosis, or edema of the extremities x4.  There is full range of motion of the lumbar spine.  There is full range of motion of the extremities x4.  There is no deformity noted.    NEUROLOGIC:       Normal speech development.      Hearing normal.      paretic gait.      Motor and sensory exams grossly normal.  Mild RLE weakness noted  PSYCHIATRIC: Patient is alert and oriented x3.  Thought processes are all normal.  There is no homicidality.  There is no suicidality.  There is no evidence of psychosis.    LABORATORY/RADIOLOGY:   Chart reviewed including surgeries and blood work    ASSESSMENT:   Rhando  Dental pain  CVA  Breast cancer with resultant lymphedema  Hypertension, condition is well controlled on current medication regimen  Diabetes, condition is well controlled on current medication regimen  Neuropathic pain  Cervical radiculopathy    PLAN:  A1c good recently  Depakote 250 mg p.o. b.i.d.  Her diabetes is very well controlled    Pain clinic   KCl 40 po qd  Prinzide  Pravachol 20 mg by mouth daily    Return to clinic in 6 month with blood work prior                "

## 2023-02-22 ENCOUNTER — HOSPITAL ENCOUNTER (EMERGENCY)
Facility: OTHER | Age: 63
Discharge: PSYCHIATRIC OR SUBSTANCE USE FACILITY | End: 2023-02-23
Attending: EMERGENCY MEDICINE
Payer: COMMERCIAL

## 2023-02-22 DIAGNOSIS — F23 ACUTE PSYCHOSIS: Primary | ICD-10-CM

## 2023-02-22 DIAGNOSIS — Z00.8 MEDICAL CLEARANCE FOR PSYCHIATRIC ADMISSION: ICD-10-CM

## 2023-02-22 DIAGNOSIS — C50.912 MALIGNANT NEOPLASM OF LEFT FEMALE BREAST, UNSPECIFIED ESTROGEN RECEPTOR STATUS, UNSPECIFIED SITE OF BREAST: Primary | ICD-10-CM

## 2023-02-22 DIAGNOSIS — R41.82 ALTERED MENTAL STATUS: ICD-10-CM

## 2023-02-22 LAB
ALBUMIN SERPL BCP-MCNC: 2.8 G/DL (ref 3.5–5.2)
ALP SERPL-CCNC: 51 U/L (ref 55–135)
ALT SERPL W/O P-5'-P-CCNC: <5 U/L (ref 10–44)
AMPHET+METHAMPHET UR QL: NEGATIVE
ANION GAP SERPL CALC-SCNC: 9 MMOL/L (ref 8–16)
APAP SERPL-MCNC: <3 UG/ML (ref 10–20)
AST SERPL-CCNC: 13 U/L (ref 10–40)
BACTERIA #/AREA URNS HPF: ABNORMAL /HPF
BARBITURATES UR QL SCN>200 NG/ML: NEGATIVE
BASOPHILS # BLD AUTO: 0.01 K/UL (ref 0–0.2)
BASOPHILS NFR BLD: 0.2 % (ref 0–1.9)
BENZODIAZ UR QL SCN>200 NG/ML: NEGATIVE
BILIRUB SERPL-MCNC: 0.3 MG/DL (ref 0.1–1)
BILIRUB UR QL STRIP: NEGATIVE
BUN SERPL-MCNC: 13 MG/DL (ref 8–23)
BZE UR QL SCN: NEGATIVE
CALCIUM SERPL-MCNC: 8.7 MG/DL (ref 8.7–10.5)
CANNABINOIDS UR QL SCN: NEGATIVE
CHLORIDE SERPL-SCNC: 107 MMOL/L (ref 95–110)
CK SERPL-CCNC: 71 U/L (ref 20–180)
CLARITY UR: ABNORMAL
CO2 SERPL-SCNC: 27 MMOL/L (ref 23–29)
COLOR UR: YELLOW
CREAT SERPL-MCNC: 1 MG/DL (ref 0.5–1.4)
CREAT UR-MCNC: 329 MG/DL (ref 15–325)
CTP QC/QA: YES
CTP QC/QA: YES
DIFFERENTIAL METHOD: ABNORMAL
EOSINOPHIL # BLD AUTO: 0.1 K/UL (ref 0–0.5)
EOSINOPHIL NFR BLD: 1.7 % (ref 0–8)
ERYTHROCYTE [DISTWIDTH] IN BLOOD BY AUTOMATED COUNT: 15.6 % (ref 11.5–14.5)
EST. GFR  (NO RACE VARIABLE): >60 ML/MIN/1.73 M^2
ETHANOL SERPL-MCNC: <10 MG/DL
GLUCOSE SERPL-MCNC: 74 MG/DL (ref 70–110)
GLUCOSE SERPL-MCNC: 77 MG/DL (ref 70–110)
GLUCOSE UR QL STRIP: NEGATIVE
HCT VFR BLD AUTO: 31.6 % (ref 37–48.5)
HGB BLD-MCNC: 9.5 G/DL (ref 12–16)
HGB UR QL STRIP: NEGATIVE
HYALINE CASTS #/AREA URNS LPF: 1 /LPF
IMM GRANULOCYTES # BLD AUTO: 0.04 K/UL (ref 0–0.04)
IMM GRANULOCYTES NFR BLD AUTO: 0.8 % (ref 0–0.5)
KETONES UR QL STRIP: ABNORMAL
LEUKOCYTE ESTERASE UR QL STRIP: ABNORMAL
LYMPHOCYTES # BLD AUTO: 2.4 K/UL (ref 1–4.8)
LYMPHOCYTES NFR BLD: 50.5 % (ref 18–48)
MCH RBC QN AUTO: 29 PG (ref 27–31)
MCHC RBC AUTO-ENTMCNC: 30.1 G/DL (ref 32–36)
MCV RBC AUTO: 96 FL (ref 82–98)
METHADONE UR QL SCN>300 NG/ML: NEGATIVE
MICROSCOPIC COMMENT: ABNORMAL
MONOCYTES # BLD AUTO: 0.3 K/UL (ref 0.3–1)
MONOCYTES NFR BLD: 7.1 % (ref 4–15)
NEUTROPHILS # BLD AUTO: 1.9 K/UL (ref 1.8–7.7)
NEUTROPHILS NFR BLD: 39.7 % (ref 38–73)
NITRITE UR QL STRIP: NEGATIVE
NON-SQ EPI CELLS #/AREA URNS HPF: 2 /HPF
NRBC BLD-RTO: 0 /100 WBC
OPIATES UR QL SCN: NEGATIVE
PCP UR QL SCN>25 NG/ML: NEGATIVE
PH UR STRIP: 6 [PH] (ref 5–8)
PLATELET # BLD AUTO: 220 K/UL (ref 150–450)
PMV BLD AUTO: 10.1 FL (ref 9.2–12.9)
POC MOLECULAR INFLUENZA A AGN: NEGATIVE
POC MOLECULAR INFLUENZA B AGN: NEGATIVE
POCT GLUCOSE: 77 MG/DL (ref 70–110)
POTASSIUM SERPL-SCNC: 3.5 MMOL/L (ref 3.5–5.1)
PROT SERPL-MCNC: 6.6 G/DL (ref 6–8.4)
PROT UR QL STRIP: ABNORMAL
RBC # BLD AUTO: 3.28 M/UL (ref 4–5.4)
RBC #/AREA URNS HPF: 7 /HPF (ref 0–4)
SALICYLATES SERPL-MCNC: <5 MG/DL (ref 15–30)
SARS-COV-2 RDRP RESP QL NAA+PROBE: NEGATIVE
SODIUM SERPL-SCNC: 143 MMOL/L (ref 136–145)
SP GR UR STRIP: >1.03 (ref 1–1.03)
SQUAMOUS #/AREA URNS HPF: 22 /HPF
TOXICOLOGY INFORMATION: ABNORMAL
TSH SERPL DL<=0.005 MIU/L-ACNC: 0.63 UIU/ML (ref 0.4–4)
URN SPEC COLLECT METH UR: ABNORMAL
UROBILINOGEN UR STRIP-ACNC: NEGATIVE EU/DL
WBC # BLD AUTO: 4.81 K/UL (ref 3.9–12.7)
WBC #/AREA URNS HPF: 7 /HPF (ref 0–5)

## 2023-02-22 PROCEDURE — 81000 URINALYSIS NONAUTO W/SCOPE: CPT | Mod: 59 | Performed by: EMERGENCY MEDICINE

## 2023-02-22 PROCEDURE — 80143 DRUG ASSAY ACETAMINOPHEN: CPT | Performed by: EMERGENCY MEDICINE

## 2023-02-22 PROCEDURE — 82962 GLUCOSE BLOOD TEST: CPT | Mod: 91

## 2023-02-22 PROCEDURE — 80053 COMPREHEN METABOLIC PANEL: CPT | Performed by: EMERGENCY MEDICINE

## 2023-02-22 PROCEDURE — 82607 VITAMIN B-12: CPT | Performed by: EMERGENCY MEDICINE

## 2023-02-22 PROCEDURE — 84443 ASSAY THYROID STIM HORMONE: CPT | Performed by: EMERGENCY MEDICINE

## 2023-02-22 PROCEDURE — 93005 ELECTROCARDIOGRAM TRACING: CPT

## 2023-02-22 PROCEDURE — 82746 ASSAY OF FOLIC ACID SERUM: CPT | Performed by: EMERGENCY MEDICINE

## 2023-02-22 PROCEDURE — 93010 ELECTROCARDIOGRAM REPORT: CPT | Mod: ,,, | Performed by: INTERNAL MEDICINE

## 2023-02-22 PROCEDURE — 25000003 PHARM REV CODE 250: Performed by: EMERGENCY MEDICINE

## 2023-02-22 PROCEDURE — 82550 ASSAY OF CK (CPK): CPT | Performed by: EMERGENCY MEDICINE

## 2023-02-22 PROCEDURE — 80179 DRUG ASSAY SALICYLATE: CPT | Performed by: EMERGENCY MEDICINE

## 2023-02-22 PROCEDURE — 82077 ASSAY SPEC XCP UR&BREATH IA: CPT | Performed by: EMERGENCY MEDICINE

## 2023-02-22 PROCEDURE — 85025 COMPLETE CBC W/AUTO DIFF WBC: CPT | Performed by: EMERGENCY MEDICINE

## 2023-02-22 PROCEDURE — 99291 CRITICAL CARE FIRST HOUR: CPT | Mod: 25

## 2023-02-22 PROCEDURE — 80307 DRUG TEST PRSMV CHEM ANLYZR: CPT | Performed by: EMERGENCY MEDICINE

## 2023-02-22 PROCEDURE — 93010 EKG 12-LEAD: ICD-10-PCS | Mod: ,,, | Performed by: INTERNAL MEDICINE

## 2023-02-22 RX ORDER — NAPROXEN 500 MG/1
500 TABLET ORAL
Status: COMPLETED | OUTPATIENT
Start: 2023-02-22 | End: 2023-02-22

## 2023-02-22 RX ADMIN — NAPROXEN 500 MG: 500 TABLET ORAL at 11:02

## 2023-02-22 NOTE — ED TRIAGE NOTES
62 YO FEMALE REPORTS TO ED BROUGHT IN BY SON FOR PSYCH EVALUATION. PT IS REPORTING PEOPLE COMING IN HER HOUSE. SON REPORTS THE PT IS NOT ACTING HERSELF AND NOT EATING. DENIES SI/HI.

## 2023-02-22 NOTE — ED PROVIDER NOTES
"  Source of History:  Medical record, patient, patient's son.     Chief complaint:  Per triage note: "Psychiatric Evaluation (Pt son brought pt in for psych eval due to pt stating people coming in her house. States pt is not eating and not acting her normal.  AAO x 3 nadn skin w.d  pt lives alone.  Pt denies SI of HI   pt son will remain with pt until room available)  "    HPI:    Patient brought in by son with request for psychiatric evaluation. The patient's son reports that the patient been having auditory and visual hallucinations for the past many weeks, has not been taking her prescription medications and has not been taking care of herself lately. He also states that the patient has recently lost weight and has been "shaky" and has had difficulty holding on to things, frequently dropping items out of her hand.   The patient denies hallucinations and denies any desire to hurt herself or others. She also denies any recent drug or alcohol use. The patient also reports a history of breast cancer and she states she is worried for a recurrence. The son notes that her last psychiatric hospitalization was multiple years ago.     This is the extent of the patient's complaints at this time.     ROS:   As per HPI and below:   General: Notes weight loss.   Psych: Notes auditory and visual hallucinations. No SI. No HI.       Review of patient's allergies indicates:  No Known Allergies    PMH:  As per HPI and below:  Past Medical History:   Diagnosis Date    Bipolar disorder     Cancer of female breast 10/2010    T2 N1 Mx, Stage IIB left breast cancer    Cancer of upper-outer quadrant of female breast 7/9/2012    Depression     Diabetes mellitus type II     Disturbances of sensation of smell and taste 6/29/2012    Hypertension     Malignant neoplasm of breast (female), unspecified site 4/27/2015    Neuropathy     Nonspecific abnormal unspecified cardiovascular function study 4/12/2013    ECG READ AS SHOWING A T-WAVE " ABNORMALITY     Post-mastectomy lymphedema syndrome     Schizophrenia     Stroke        Past Surgical History:   Procedure Laterality Date    BREAST RECONSTRUCTION  11/23/11    B/L SHLOMO flap reconstruction S/P left MRM, right prophylactic mastectomy    BREAST RECONSTRUCTION Bilateral 3/13/2015    NIPPLE RECONSTRUCTION    INJECTION OF ANESTHETIC AGENT AROUND NERVE Left 12/9/2020    Procedure: BLOCK, NERVE, C3-C4-C5-C6 MEDIAL BRANCH;  Surgeon: Darren Jerry MD;  Location: [facility] [place] [facility];  Service: Pain Management;  Laterality: Left;    MASTECTOMY Bilateral 01/2011    bilateral    RADIOFREQUENCY ABLATION Left 2/3/2021    Procedure: RADIOFREQUENCY ABLATION, C3,C4,C5,C6 MEDIAL BRANCH 1 of 2;  Surgeon: Darren Jerry MD;  Location: [facility];  Service: Pain Management;  Laterality: Left;    RADIOFREQUENCY ABLATION Right 7/28/2021    Procedure: RADIOFREQUENCY ABLATION, C3-C4-C5-C6 MEDIAL BR ANCH 1 IOF 2;  Surgeon: Darren Jerry MD;  Location: [facility] [place] [facility]IN [facility];  Service: Pain Management;  Laterality: Right;    RADIOFREQUENCY ABLATION Left 8/18/2021    Procedure: RADIOFREQUENCY ABLATION, C3-C4-C5-C6 MEDIAL BRANCH 2 OF 2;  Surgeon: Darren Jerry MD;  Location: [facility] [place] [facility];  Service: Pain Management;  Laterality: Left;    TRANSFORAMINAL EPIDURAL INJECTION OF STEROID Bilateral 11/4/2020    Procedure: INJECTION, STEROID, EPIDURAL, TRANSFORAMINAL APPROACH L4/L5;  Surgeon: Darren Jerry MD;  Location: [facility] [place] [facility];  Service: Pain Management;  Laterality: Bilateral;  Bilateral L4/L5    TRANSFORAMINAL EPIDURAL INJECTION OF STEROID Bilateral 2/10/2021    Procedure: INJECTION, STEROID, EPIDURAL, TRANSFORAMINAL APPROACH, L4-L5;  Surgeon: Darren Jerry MD;  Location: [facility] [place] [facility];  Service: Pain Management;  Laterality: Bilateral;    TRANSFORAMINAL EPIDURAL INJECTION OF STEROID Bilateral 3/10/2021    Procedure: INJECTION, STEROID, EPIDURAL, TRANSFORAMINAL APPROACH, L4-L5;  Surgeon: Darren Jerry MD;  " Location: [facility] PAIN [facility]GT;  Service: Pain Management;  Laterality: Bilateral;    TRANSFORAMINAL EPIDURAL INJECTION OF STEROID Bilateral 12/15/2021    Procedure: INJECTION, STEROID, EPIDURAL, TRANSFORAMINAL APPROACH  Bilateral L4/5 TFESI / needs consent;  Surgeon: Darren Jerry MD;  Location: [facility] PAIN [facility]GT;  Service: Pain Management;  Laterality: Bilateral;    TRANSFORAMINAL EPIDURAL INJECTION OF STEROID Bilateral 2022    Procedure: INJECTION, STEROID, EPIDURAL, TRANSFORAMINAL APPROACH BILATERAL L4/5 CONTRAST;  Surgeon: Darren Jerry MD;  Location: [facility] PAIN [facility]GT;  Service: Pain Management;  Laterality: Bilateral;    TUBAL LIGATION         Social History     Tobacco Use    Smoking status: Former     Packs/day: 0.50     Years: 25.00     Pack years: 12.50     Types: Cigarettes     Start date:      Quit date: 3/17/2022     Years since quittin.9    Smokeless tobacco: Never    Tobacco comments:     currently at less than 1/2 pack pd   Substance Use Topics    Alcohol use: Yes     Alcohol/week: 0.0 standard drinks     Comment: social - once every 2 weeks    Drug use: No     Comment:  - stopped using crack cocaine       Physical Exam:      Nursing note and vitals reviewed.  /83 (BP Location: Left arm, Patient Position: Sitting)   Pulse 75   Temp 98.4 °F (36.9 °C) (Oral)   Resp 18   Ht 5' 3" (1.6 m)   Wt 74.8 kg (165 lb)   LMP  (LMP Unknown)   SpO2 98%   Breastfeeding No   BMI 29.23 kg/m²     Constitutional: No distress. Son at bedside.   Eyes: EOMI. No discharge. Anicteric.  HENT: Poor dentition.   Neck: Normal range of motion. Neck supple.  Cardiovascular: Normal rate. No murmur, no gallop and no friction rub heard.   Pulmonary/Chest: No respiratory distress. Effort normal. No wheezes, no rales, no rhonchi.   Abdominal: Bowel sounds normal. Soft. No distension and no mass. There is no tenderness. There is no rebound, no guarding, no tenderness at McBurney's point.  Neurological: GCS " "15. Alert and oriented to person, place, and time. Slightly slurred speech, otherwise no gross cranial nerve, light touch or strength deficit. Coordination normal. No tremor.    Skin: Skin is warm and dry.   EXT: 2+ radial pulses.   Psychiatric:  Suicidal ideation, homicidal ideation.  Linear thought process.      MDM:      ED Course as of 02/22/23 2316 Wed Feb 22, 2023 1737 Patient is a 63-year-old female with history of breast cancer in remission, diabetes, axis I mood disorder who presents with her son disorganization, auditory hallucinations, failure to thrive with patient not performing many of her ADLs and significant weight loss over the past many weeks.  Patient's son reports that she has not been taking her medication.  Patient notes that she has had slurred speech and "not talking right" for many weeks.   On exam, patient is slightly slurred speech, appropriate affect, denies any hallucinations, suicidal ideation, or suicidal ideation.   Ddx includes Axis I mood disorder, psychosis, medication noncompliance, metabolic derangement, mood disorder 2/2 medical condition, intoxication, withdrawal, neoplasm.   Review of the patient's medical record reveals that on generating she had CT PE study, CT head without contrast DVT study which were unremarkable and unrevealing.   External notes and sources reviewed: PCP note.    [RC]   1752   --  EKG Interpretation: I independently reviewed and interpreted EKG which shows sinus bradycardia rhythm at 54 beats per minute, no STEMI, no significant acute ST/T abnormalities, normal intervals.    No acute change compared to prior tracing.  --       [RC]   1901 I independently reviewed and interpreted CXR which shows no pneumothorax, no focal consolidation, no cardiomegaly, no acute process.   [RC]   1958 I independently reviewed and interpreted labs which are unremarkable / unrevealing.   Patient medically cleared for psychiatry admission pending CT head.  [RC]   2231 " Patient's IV access was lost.  After multiple attempts, unable to reestablish IV access.  Given her negative recent brain MRI, low probability of neoplastic disease today. Will obtain CT head without contrast.  [RC]   2312 I independently reviewed and interpreted CT head which shows no acute intracranial bleeding, mass, skull fracture, or acute intracranial process.  Pt medically cleared for psychiatric admission.  [RC]      ED Course User Index  [RC] Chai Malhotra MD       Medications   iohexoL (OMNIPAQUE 350) injection 75 mL (has no administration in time range)   naproxen tablet 500 mg (500 mg Oral Given 2/22/23 2315)       Medically cleared for psychiatry placement: 2/22/2023 11:13 PM    ---  I, Pablo Soriano, scribed for, and in the presence of, Dr. Malhotra. I performed the scribed service and the documentation accurately describes the services I performed. I attest to the accuracy of the note.     Physician Attestation for Scribe:   I, MD Chai Moreno MD, reviewed documentation as scribed in my presence, which is both accurate and complete.    Diagnostic Impression:    1. Acute psychosis    2. Medical clearance for psychiatric admission    3. Altered mental status         ED Disposition Condition    Transfer to Psych Facility Good          Future Appointments   Date Time Provider Department Center   4/5/2023  2:20 PM Galina Landon MD [facility] HEM ONC [facility]   4/19/2023 10:30 AM Kirsten Menard MD [facility] OPHTHAL [address]   4/26/2023 10:00 AM Moses Coffey NP [facility]      ED Prescriptions    None       Follow-up Information    None          Chai Malhotra MD  02/22/23 [id number]

## 2023-02-23 VITALS
HEIGHT: 63 IN | OXYGEN SATURATION: 96 % | HEART RATE: 58 BPM | DIASTOLIC BLOOD PRESSURE: 93 MMHG | RESPIRATION RATE: 19 BRPM | TEMPERATURE: 98 F | WEIGHT: 165 LBS | SYSTOLIC BLOOD PRESSURE: 144 MMHG | BODY MASS INDEX: 29.23 KG/M2

## 2023-02-23 LAB
FOLATE SERPL-MCNC: 5.9 NG/ML (ref 4–24)
VIT B12 SERPL-MCNC: 307 PG/ML (ref 210–950)

## 2023-02-23 PROCEDURE — 96372 THER/PROPH/DIAG INJ SC/IM: CPT | Performed by: EMERGENCY MEDICINE

## 2023-02-23 PROCEDURE — 63600175 PHARM REV CODE 636 W HCPCS: Performed by: EMERGENCY MEDICINE

## 2023-02-23 RX ORDER — HALOPERIDOL 5 MG/ML
5 INJECTION INTRAMUSCULAR
Status: COMPLETED | OUTPATIENT
Start: 2023-02-23 | End: 2023-02-23

## 2023-02-23 RX ORDER — LORAZEPAM 2 MG/ML
2 INJECTION INTRAMUSCULAR
Status: DISCONTINUED | OUTPATIENT
Start: 2023-02-23 | End: 2023-02-23 | Stop reason: HOSPADM

## 2023-02-23 RX ADMIN — HALOPERIDOL LACTATE 5 MG: 5 INJECTION, SOLUTION INTRAMUSCULAR at 05:02

## 2023-02-23 NOTE — PROVIDER PROGRESS NOTES - EMERGENCY DEPT.
Encounter Date: 2/22/2023    ED Physician Progress Notes        This woman was arranged for transport to an inpatient psychiatric unit.    She had been PEC'd by a previous emergency provider.  Upon transport arrival following medical clearance she became acutely agitated with transportation arrival.    She became aggressive with staff, attempted utilize a cane as a weapon.    As result she was placed back in her room, forced medications were administered for the safety of staff inpatient.    Based on previous labs and EKGs QT has been normal previously.    She is verbally abusive to staff, seemed overly agitated.      Critical Care    Date/Time: 2/23/2023 5:38 AM  Performed by: Ubaldo Bailey MD  Authorized by: Ubaldo Bailey MD   Direct patient critical care time: 15 minutes  Additional history critical care time: 6 minutes  Ordering / reviewing critical care time: 8 minutes  Documentation critical care time: 5 minutes  Total critical care time (exclusive of procedural time) : 34 minutes  Critical care time was exclusive of separately billable procedures and treating other patients and teaching time.  Critical care was necessary to treat or prevent imminent or life-threatening deterioration of the following conditions: agitated psychosis.  Critical care was time spent personally by me on the following activities: discussions with consultants, development of treatment plan with patient or surrogate, evaluation of patient's response to treatment, examination of patient, obtaining history from patient or surrogate, ordering and performing treatments and interventions, ordering and review of laboratory studies, ordering and review of radiographic studies, pulse oximetry and re-evaluation of patient's condition.

## 2023-02-23 NOTE — ED NOTES
Pt refusing to leave with [facility]. MD and AC notified. Received approval from AC to send pt via [facility]. Transfer center notified.

## 2023-02-23 NOTE — ED NOTES
"Attempted to contact family to update about pts POC. Pt refusing to get into [facility]. Pt states, "My family will not have a way to come visit me." Security at bedside. MD aware.  "

## 2023-02-23 NOTE — ED NOTES
[id number] transporting patient to [facility] in [place], [place] at this time.

## 2023-02-23 NOTE — ED NOTES
Report received from Roxanna SCHWARZ at bedside. Patient lying in bed sleeping, respirations E/UL, denies any needs at this time. Lj vallejo at bedside documenting q 15 min checks.

## 2023-02-23 NOTE — ED NOTES
Received report from Kendy SCHWARZ. Pt resting, NAD noted. ED sitter Sushant at bedside. Pt in pec scrubs. Will continue to monitor.

## 2023-02-23 NOTE — ED NOTES
Patient Belongings:  Pink tshirt  Blue tank top  Blue jeans  Gold belt  [employer] slides  Pink socks  Valuables  Diabetic Bracelet  8 gold bracelets  1 ear ring  1 black backpackmakeup  Candy  Perfume spray  1 whit iphone  2 chargers  Socks   underwear

## 2023-02-24 ENCOUNTER — EXTERNAL HOME HEALTH (OUTPATIENT)
Dept: HOME HEALTH SERVICES | Facility: HOSPITAL | Age: 63
End: 2023-02-24
Payer: MEDICARE

## 2023-03-05 ENCOUNTER — DOCUMENT SCAN (OUTPATIENT)
Dept: HOME HEALTH SERVICES | Facility: HOSPITAL | Age: 63
End: 2023-03-05
Payer: MEDICARE

## 2023-03-06 ENCOUNTER — DOCUMENT SCAN (OUTPATIENT)
Dept: HOME HEALTH SERVICES | Facility: HOSPITAL | Age: 63
End: 2023-03-06
Payer: MEDICARE

## 2023-03-07 ENCOUNTER — PATIENT MESSAGE (OUTPATIENT)
Dept: PAIN MEDICINE | Facility: OTHER | Age: 63
End: 2023-03-07
Payer: MEDICARE

## 2023-03-09 ENCOUNTER — TELEPHONE (OUTPATIENT)
Dept: INTERNAL MEDICINE | Facility: CLINIC | Age: 63
End: 2023-03-09
Payer: MEDICARE

## 2023-03-09 NOTE — TELEPHONE ENCOUNTER
----- Message from Akhil Ramon sent at 3/8/2023  8:36 AM CST -----  Regarding: CALL BACK  .Type: Patient Call Back    Who called: JOYA MUNOZ [[id number]]    What is the request in detail: Nurse called stating that patient needs to be squeezed in as 7/26/2023 is too long of an wait.Please contact to further discuss and advise.     Can the clinic reply by [facility]? NO    Would the patient rather a call back or a response via [facility]?  CALL BACK    Best call back number: [phone]    Additional Information: N/A

## 2023-03-15 ENCOUNTER — TELEPHONE (OUTPATIENT)
Dept: PAIN MEDICINE | Facility: CLINIC | Age: 63
End: 2023-03-15
Payer: MEDICARE

## 2023-03-15 NOTE — TELEPHONE ENCOUNTER
----- Message from Sadie Leo sent at 3/15/2023  1:15 PM CDT -----  Regarding: injection appt  Name of Who is Calling:JOYA MUNOZ [[id number]]          What is the request in detail:  Pt is calling to make an appt for injection in neck. She is asking if she can have it before the 29th of March  bc she missed the one on the 8th. Please c/b to assist         Can the clinic reply by [facility]:          What Number to Call Back if not in [facility]:[phone]

## 2023-03-16 ENCOUNTER — CLINICAL SUPPORT (OUTPATIENT)
Dept: SMOKING CESSATION | Facility: CLINIC | Age: 63
End: 2023-03-16
Payer: COMMERCIAL

## 2023-03-16 ENCOUNTER — PATIENT MESSAGE (OUTPATIENT)
Dept: ADMINISTRATIVE | Facility: HOSPITAL | Age: 63
End: 2023-03-16
Payer: MEDICARE

## 2023-03-16 DIAGNOSIS — F17.200 NICOTINE DEPENDENCE: Primary | ICD-10-CM

## 2023-03-16 PROCEDURE — 99407 PR TOBACCO USE CESSATION INTENSIVE >10 MINUTES: ICD-10-PCS | Mod: S$PBB,,, | Performed by: GENERAL PRACTICE

## 2023-03-16 PROCEDURE — 99407 BEHAV CHNG SMOKING > 10 MIN: CPT | Mod: S$PBB,,, | Performed by: GENERAL PRACTICE

## 2023-03-16 NOTE — PROGRESS NOTES
Spoke with patient today in regard to smoking cessation progress 3/6/12 month telephone follow up, she states that she is tobacco free.  Commended patient on the accomplishments thus far.  Informed patient of benefit period, future follow up, and contact information if any further help or support is needed.  Will complete smart form for 3/6/12 month follow up on Quit attempt #4 and resolve episode.

## 2023-03-17 ENCOUNTER — LAB VISIT (OUTPATIENT)
Dept: LAB | Facility: OTHER | Age: 63
End: 2023-03-17
Attending: FAMILY MEDICINE
Payer: MEDICARE

## 2023-03-17 ENCOUNTER — TELEPHONE (OUTPATIENT)
Dept: INTERNAL MEDICINE | Facility: CLINIC | Age: 63
End: 2023-03-17

## 2023-03-17 ENCOUNTER — OFFICE VISIT (OUTPATIENT)
Dept: OBSTETRICS AND GYNECOLOGY | Facility: CLINIC | Age: 63
End: 2023-03-17
Payer: MEDICARE

## 2023-03-17 VITALS
DIASTOLIC BLOOD PRESSURE: 90 MMHG | SYSTOLIC BLOOD PRESSURE: 134 MMHG | BODY MASS INDEX: 27.18 KG/M2 | WEIGHT: 153.44 LBS

## 2023-03-17 DIAGNOSIS — R30.0 DYSURIA: ICD-10-CM

## 2023-03-17 DIAGNOSIS — Z20.2 POTENTIAL EXPOSURE TO STD: ICD-10-CM

## 2023-03-17 DIAGNOSIS — N89.8 VAGINAL DISCHARGE: Primary | ICD-10-CM

## 2023-03-17 DIAGNOSIS — N30.00 ACUTE CYSTITIS WITHOUT HEMATURIA: ICD-10-CM

## 2023-03-17 LAB
BILIRUB UR QL STRIP: NEGATIVE
CANDIDA RRNA VAG QL PROBE: NEGATIVE
G VAGINALIS RRNA GENITAL QL PROBE: NEGATIVE
GLUCOSE UR QL STRIP: NEGATIVE
HBV SURFACE AG SERPL QL IA: NORMAL
HCV AB SERPL QL IA: NORMAL
HIV 1+2 AB+HIV1 P24 AG SERPL QL IA: NORMAL
KETONES UR QL STRIP: NEGATIVE
LEUKOCYTE ESTERASE UR QL STRIP: POSITIVE
PH, POC UA: 5
POC BLOOD, URINE: NEGATIVE
POC NITRATES, URINE: NEGATIVE
PROT UR QL STRIP: NEGATIVE
SP GR UR STRIP: 1.02 (ref 1–1.03)
T VAGINALIS RRNA GENITAL QL PROBE: NEGATIVE
UROBILINOGEN UR STRIP-ACNC: NORMAL (ref 0.1–1.1)

## 2023-03-17 PROCEDURE — 1160F RVW MEDS BY RX/DR IN RCRD: CPT | Mod: CPTII,S$GLB,, | Performed by: FAMILY MEDICINE

## 2023-03-17 PROCEDURE — 1160F PR REVIEW ALL MEDS BY PRESCRIBER/CLIN PHARMACIST DOCUMENTED: ICD-10-PCS | Mod: CPTII,S$GLB,, | Performed by: FAMILY MEDICINE

## 2023-03-17 PROCEDURE — 86803 HEPATITIS C AB TEST: CPT | Performed by: FAMILY MEDICINE

## 2023-03-17 PROCEDURE — 81003 PR URINALYSIS, AUTO, W/O SCOPE: ICD-10-PCS | Mod: QW,S$GLB,, | Performed by: FAMILY MEDICINE

## 2023-03-17 PROCEDURE — 36415 COLL VENOUS BLD VENIPUNCTURE: CPT | Performed by: FAMILY MEDICINE

## 2023-03-17 PROCEDURE — 3072F PR LOW RISK FOR RETINOPATHY: ICD-10-PCS | Mod: CPTII,S$GLB,, | Performed by: FAMILY MEDICINE

## 2023-03-17 PROCEDURE — 81003 URINALYSIS AUTO W/O SCOPE: CPT | Mod: PBBFAC | Performed by: FAMILY MEDICINE

## 2023-03-17 PROCEDURE — 1159F MED LIST DOCD IN RCRD: CPT | Mod: CPTII,S$GLB,, | Performed by: FAMILY MEDICINE

## 2023-03-17 PROCEDURE — 81003 URINALYSIS AUTO W/O SCOPE: CPT | Mod: QW,S$GLB,, | Performed by: FAMILY MEDICINE

## 2023-03-17 PROCEDURE — 3075F SYST BP GE 130 - 139MM HG: CPT | Mod: CPTII,S$GLB,, | Performed by: FAMILY MEDICINE

## 2023-03-17 PROCEDURE — 87340 HEPATITIS B SURFACE AG IA: CPT | Performed by: FAMILY MEDICINE

## 2023-03-17 PROCEDURE — 3075F PR MOST RECENT SYSTOLIC BLOOD PRESS GE 130-139MM HG: ICD-10-PCS | Mod: CPTII,S$GLB,, | Performed by: FAMILY MEDICINE

## 2023-03-17 PROCEDURE — 87591 N.GONORRHOEAE DNA AMP PROB: CPT | Performed by: FAMILY MEDICINE

## 2023-03-17 PROCEDURE — 86592 SYPHILIS TEST NON-TREP QUAL: CPT | Performed by: FAMILY MEDICINE

## 2023-03-17 PROCEDURE — 4010F ACE/ARB THERAPY RXD/TAKEN: CPT | Mod: CPTII,S$GLB,, | Performed by: FAMILY MEDICINE

## 2023-03-17 PROCEDURE — 99999 PR PBB SHADOW E&M-EST. PATIENT-LVL IV: ICD-10-PCS | Mod: PBBFAC,,, | Performed by: FAMILY MEDICINE

## 2023-03-17 PROCEDURE — 87480 CANDIDA DNA DIR PROBE: CPT | Performed by: FAMILY MEDICINE

## 2023-03-17 PROCEDURE — 4010F PR ACE/ARB THEARPY RXD/TAKEN: ICD-10-PCS | Mod: CPTII,S$GLB,, | Performed by: FAMILY MEDICINE

## 2023-03-17 PROCEDURE — 3008F BODY MASS INDEX DOCD: CPT | Mod: CPTII,S$GLB,, | Performed by: FAMILY MEDICINE

## 2023-03-17 PROCEDURE — 99214 OFFICE O/P EST MOD 30 MIN: CPT | Mod: PBBFAC | Performed by: FAMILY MEDICINE

## 2023-03-17 PROCEDURE — 99999 PR PBB SHADOW E&M-EST. PATIENT-LVL IV: CPT | Mod: PBBFAC,,, | Performed by: FAMILY MEDICINE

## 2023-03-17 PROCEDURE — 3080F PR MOST RECENT DIASTOLIC BLOOD PRESSURE >= 90 MM HG: ICD-10-PCS | Mod: CPTII,S$GLB,, | Performed by: FAMILY MEDICINE

## 2023-03-17 PROCEDURE — 87389 HIV-1 AG W/HIV-1&-2 AB AG IA: CPT | Performed by: FAMILY MEDICINE

## 2023-03-17 PROCEDURE — 3080F DIAST BP >= 90 MM HG: CPT | Mod: CPTII,S$GLB,, | Performed by: FAMILY MEDICINE

## 2023-03-17 PROCEDURE — 87086 URINE CULTURE/COLONY COUNT: CPT | Performed by: FAMILY MEDICINE

## 2023-03-17 PROCEDURE — 3008F PR BODY MASS INDEX (BMI) DOCUMENTED: ICD-10-PCS | Mod: CPTII,S$GLB,, | Performed by: FAMILY MEDICINE

## 2023-03-17 PROCEDURE — 99213 PR OFFICE/OUTPT VISIT, EST, LEVL III, 20-29 MIN: ICD-10-PCS | Mod: S$GLB,,, | Performed by: FAMILY MEDICINE

## 2023-03-17 PROCEDURE — 1159F PR MEDICATION LIST DOCUMENTED IN MEDICAL RECORD: ICD-10-PCS | Mod: CPTII,S$GLB,, | Performed by: FAMILY MEDICINE

## 2023-03-17 PROCEDURE — 99213 OFFICE O/P EST LOW 20 MIN: CPT | Mod: S$GLB,,, | Performed by: FAMILY MEDICINE

## 2023-03-17 PROCEDURE — 3072F LOW RISK FOR RETINOPATHY: CPT | Mod: CPTII,S$GLB,, | Performed by: FAMILY MEDICINE

## 2023-03-17 RX ORDER — IOHEXOL 300 MG/ML
INJECTION, SOLUTION INTRAVENOUS
COMMUNITY
Start: 2022-12-14 | End: 2023-04-05 | Stop reason: CLARIF

## 2023-03-17 RX ORDER — LISINOPRIL 5 MG/1
5 TABLET ORAL
Status: ON HOLD | COMMUNITY
Start: 2023-03-08 | End: 2023-04-06 | Stop reason: HOSPADM

## 2023-03-17 RX ORDER — GLUCAGON 1 MG
VIAL (EA) INJECTION
COMMUNITY
Start: 2023-02-23

## 2023-03-17 RX ORDER — NITROFURANTOIN 25; 75 MG/1; MG/1
100 CAPSULE ORAL 2 TIMES DAILY
Qty: 10 CAPSULE | Refills: 0 | Status: SHIPPED | OUTPATIENT
Start: 2023-03-17 | End: 2023-03-22

## 2023-03-17 RX ORDER — ZIPRASIDONE HYDROCHLORIDE 40 MG/1
CAPSULE ORAL
COMMUNITY
Start: 2023-01-27 | End: 2024-03-14

## 2023-03-17 RX ORDER — CEFUROXIME AXETIL 500 MG/1
TABLET ORAL
Status: ON HOLD | COMMUNITY
Start: 2023-01-27 | End: 2023-04-06 | Stop reason: HOSPADM

## 2023-03-17 RX ORDER — HYDROCHLOROTHIAZIDE 12.5 MG/1
12.5 TABLET ORAL
COMMUNITY
Start: 2023-03-08 | End: 2023-04-05 | Stop reason: SDUPTHER

## 2023-03-17 RX ORDER — FENTANYL CITRATE 50 UG/ML
INJECTION, SOLUTION INTRAMUSCULAR; INTRAVENOUS
Status: ON HOLD | COMMUNITY
Start: 2022-12-14 | End: 2023-04-06 | Stop reason: HOSPADM

## 2023-03-17 RX ORDER — LISINOPRIL 10 MG/1
5 TABLET ORAL
COMMUNITY
Start: 2023-02-26 | End: 2023-04-05 | Stop reason: CLARIF

## 2023-03-17 RX ORDER — DIVALPROEX SODIUM 500 MG/1
TABLET, FILM COATED, EXTENDED RELEASE ORAL
COMMUNITY
Start: 2023-01-27

## 2023-03-17 RX ORDER — INSULIN LISPRO 100 [IU]/ML
INJECTION, SOLUTION INTRAVENOUS; SUBCUTANEOUS
COMMUNITY
Start: 2023-02-24

## 2023-03-17 RX ORDER — DEXTROSE 15 G/37.5G
GEL ORAL
COMMUNITY
Start: 2023-02-23

## 2023-03-17 RX ORDER — LIRAGLUTIDE 6 MG/ML
INJECTION SUBCUTANEOUS
COMMUNITY
Start: 2023-01-27 | End: 2023-04-05 | Stop reason: CLARIF

## 2023-03-17 NOTE — PROGRESS NOTES
Note written by SAMRA Lopez student. Addended by myself where needed. I was present for exam/discussion.       CC: Vaginitis  HPI: Patient presents for evaluation of an abnormal vaginal discharge. Symptoms have been present for 2 days. Vaginal symptoms: discharge, burning, local irritation, and vulvar itching. She is also experiencing urinary urgency, burning with urination, and suprapubic pain with urination. She denies blood in urine, abnormal vaginal bleeding, and odor. She is sexually active, male partner unprotected, and requests STD testing. This is the extent of the patient's complaints at this time.     ROS:  GENERAL: No fever, chills, fatigability or weight loss.  VULVAR: No pain, no lesions and no itching.  VAGINAL: No relaxation, no itching, + discharge, no abnormal bleeding and no lesions.  URINARY: No incontinence, no nocturia, no frequency and + dysuria. +urgency     PHYSICAL EXAM:  Physical Exam:   Constitutional: She appears well-developed and well-nourished. She does not appear ill. No distress.               Genitourinary:    Uterus, right adnexa and left adnexa normal.      Pelvic exam was performed with patient supine.   The external female genitalia was normal.   Labial bartholins normal.There is no rash, tenderness or lesion on the right labia. There is no rash, tenderness or lesion on the left labia. Cervix is normal. Right adnexum displays no mass, no tenderness and no fullness. Left adnexum displays no mass, no tenderness and no fullness. There is vaginal discharge (clear) in the vagina. No tenderness, bleeding, rectocele, cystocele or unspecified prolapse of vaginal walls in the vagina.    No foreign body in the vagina.   Vaginal atrophy noted. Cervix exhibits no motion tenderness, no lesion, no discharge, no friability, no lesion and no polyp. Normal urethral meatus.Urethra findings: no urethral mass              Neurological: She is alert. GCS eye subscore is 4. GCS verbal subscore is  5. GCS motor subscore is 6.          Past Medical History:   Diagnosis Date    Bipolar disorder     Cancer of female breast 10/2010    T2 N1 Mx, Stage IIB left breast cancer    Cancer of upper-outer quadrant of female breast 7/9/2012    Depression     Diabetes mellitus type II     Disturbances of sensation of smell and taste 6/29/2012    Hypertension     Malignant neoplasm of breast (female), unspecified site 4/27/2015    Neuropathy     Nonspecific abnormal unspecified cardiovascular function study 4/12/2013    ECG READ AS SHOWING A T-WAVE ABNORMALITY     Post-mastectomy lymphedema syndrome     Schizophrenia     Stroke        Past Surgical History:   Procedure Laterality Date    BREAST RECONSTRUCTION  11/23/11    B/L SHLOMO flap reconstruction S/P left MRM, right prophylactic mastectomy    BREAST RECONSTRUCTION Bilateral 3/13/2015    NIPPLE RECONSTRUCTION    INJECTION OF ANESTHETIC AGENT AROUND NERVE Left 12/9/2020    Procedure: BLOCK, NERVE, C3-C4-C5-C6 MEDIAL BRANCH;  Surgeon: Darren Jerry MD;  Location: [facility];  Service: Pain Management;  Laterality: Left;    MASTECTOMY Bilateral 01/2011    bilateral    RADIOFREQUENCY ABLATION Left 2/3/2021    Procedure: RADIOFREQUENCY ABLATION, C3,C4,C5,C6 MEDIAL BRANCH 1 of 2;  Surgeon: Darren Jerry MD;  Location: [facility];  Service: Pain Management;  Laterality: Left;    RADIOFREQUENCY ABLATION Right 7/28/2021    Procedure: RADIOFREQUENCY ABLATION, C3-C4-C5-C6 MEDIAL BR ANCH 1 IOF 2;  Surgeon: Darren Jerry MD;  Location: [facility];  Service: Pain Management;  Laterality: Right;    RADIOFREQUENCY ABLATION Left 8/18/2021    Procedure: RADIOFREQUENCY ABLATION, C3-C4-C5-C6 MEDIAL BRANCH 2 OF 2;  Surgeon: Darren Jerry MD;  Location: [facility];  Service: Pain Management;  Laterality: Left;    TRANSFORAMINAL EPIDURAL INJECTION OF STEROID Bilateral 11/4/2020    Procedure: INJECTION, STEROID, EPIDURAL, TRANSFORAMINAL APPROACH L4/L5;  Surgeon:  Darren Jerry MD;  Location: [facility];  Service: Pain Management;  Laterality: Bilateral;  Bilateral L4/L5    TRANSFORAMINAL EPIDURAL INJECTION OF STEROID Bilateral 2/10/2021    Procedure: INJECTION, STEROID, EPIDURAL, TRANSFORAMINAL APPROACH, L4-L5;  Surgeon: Darren Jerry MD;  Location: [facility];  Service: Pain Management;  Laterality: Bilateral;    TRANSFORAMINAL EPIDURAL INJECTION OF STEROID Bilateral 3/10/2021    Procedure: INJECTION, STEROID, EPIDURAL, TRANSFORAMINAL APPROACH, L4-L5;  Surgeon: Darren Jerry MD;  Location: [facility];  Service: Pain Management;  Laterality: Bilateral;    TRANSFORAMINAL EPIDURAL INJECTION OF STEROID Bilateral 12/15/2021    Procedure: INJECTION, STEROID, EPIDURAL, TRANSFORAMINAL APPROACH  Bilateral L4/5 TFESI / needs consent;  Surgeon: Darren Jerry MD;  Location: [facility];  Service: Pain Management;  Laterality: Bilateral;    TRANSFORAMINAL EPIDURAL INJECTION OF STEROID Bilateral 12/14/2022    Procedure: INJECTION, STEROID, EPIDURAL, TRANSFORAMINAL APPROACH BILATERAL L4/5 CONTRAST;  Surgeon: Darren Jerry MD;  Location: [facility];  Service: Pain Management;  Laterality: Bilateral;    TUBAL LIGATION         Family History   Problem Relation Age of Onset    Kidney disease Mother         Dialysis    Hypertension Mother     Deep vein thrombosis Mother     Glaucoma Mother     Diabetic kidney disease Sister     Lung cancer Sister     Diabetes Sister     Cancer Sister         lung    Diabetes Brother     Diabetes Son     No Known Problems Father     No Known Problems Maternal Grandmother     No Known Problems Maternal Grandfather     No Known Problems Paternal Grandmother     No Known Problems Paternal Grandfather     No Known Problems Son     Cervical cancer Daughter     Cancer Daughter         cervical cancer    No Known Problems Daughter     No Known Problems Daughter     No Known Problems Daughter     Breast cancer Neg Hx     Ovarian  "cancer Neg Hx        Social History     Socioeconomic History    Marital status:     Number of children: 6   Occupational History    Occupation: Disability   Tobacco Use    Smoking status: Former     Packs/day: 0.50     Years: 25.00     Pack years: 12.50     Types: Cigarettes     Start date:      Quit date: 3/17/2022     Years since quittin.0    Smokeless tobacco: Never    Tobacco comments:     currently at less than 1/2 pack pd   Substance and Sexual Activity    Alcohol use: Yes     Alcohol/week: 0.0 standard drinks     Comment: social - once every 2 weeks    Drug use: No     Comment:  - stopped using crack cocaine    Sexual activity: Not Currently     Partners: Male       Current Outpatient Medications   Medication Sig Dispense Refill    acetaminophen-codeine 300-30mg (TYLENOL #3) 300-30 mg Tab Take 1 tablet by mouth every 6 (six) hours as needed (pain). 20 tablet 0    ammonium lactate 12 % Crea Apply twice daily to affected parts both feet as needed. 140 g 11    arm brace (WRIST SUPPORT LARGE-XLARGE MISC)       atorvastatin (LIPITOR) 80 MG tablet TAKE 1 TABLET EVERY DAY 90 tablet 0    blood sugar diagnostic (ACCU-CHEK AMBER PLUS TEST STRP) Strp Inject 2 strips into the skin 2 (two) times a day. Test Blood Sugar 200 strip 3    blood-glucose meter (ACCU-CHEK AMBER PLUS METER) Misc Use as directed to check blood sugar three times daily 300 each 3    cefUROXime (CEFTIN) 500 MG tablet 1 tablet EVERY 12 HOURS (route: oral)      chlorhexidine (PERIDEX) 0.12 % solution RINSE AND SPIT 15 ML TWICE DAILY FOR 7 DAYS      DEPAKOTE  mg Tb24 Take 500 mg by mouth nightly.       divalproex 500 MG Tb24 1 tablet EVERY PM (route: oral)      EASY TOUCH 31 gauge x 1/4" Ndle Use as directed 100 each 5    fentaNYL (SUBLIMAZE) 50 mcg/mL injection       GLUCAGON EMERGENCY KIT, HUMAN, 1 mg injection SMARTSI Vial(s) IM PRN      GLUTOSE-15 40 % gel SMARTSI unspecified By Mouth PRN      HUMALOG U-100 INSULIN 100 " "unit/mL injection SMARTSI.01 Milliliter(s) SUB-Q 4 Times Daily      hydroCHLOROthiazide (HYDRODIURIL) 12.5 MG Tab Take 12.5 mg by mouth.      insulin (LANTUS SOLOSTAR U-100 INSULIN) glargine 100 units/mL SubQ pen INJECT 23 UNITS SUBCUTANEOUSLY NIGHTLY  HOLD until you see your doctor and are told to resume 30 mL 5    lancets Misc To check BG 3 times daily, to use with Accu check Guide meter pt has. 200 each 3    liraglutide 0.6 mg/0.1 mL, 18 mg/3 mL, subq PNIJ (VICTOZA 3-HERMANN) 0.6 mg/0.1 mL (18 mg/3 mL) PnIj pen INJECT 1.8 MG SUBCUTANEOUSLY ONCE DAILY. 27 mL 11    liraglutide 0.6 mg/0.1 mL, 18 mg/3 mL, subq PNIJ (VICTOZA 3-HERMANN) 0.6 mg/0.1 mL (18 mg/3 mL) PnIj pen 1.8 mg DAILY (route: subcutaneous)      lisinopriL (PRINIVIL,ZESTRIL) 5 MG tablet Take 5 mg by mouth.      lisinopriL 10 MG tablet Take 5 mg by mouth.      lisinopriL-hydrochlorothiazide (PRINZIDE,ZESTORETIC) 10-12.5 mg per tablet Take 1 tablet by mouth once daily. 90 tablet 3    metFORMIN (GLUCOPHAGE) 500 MG tablet Take 2 tablets (1,000 mg total) by mouth once daily. Take with food.      OMNIPAQUE 300 300 mg iodine/mL injection       pen needle, diabetic (BD ULTRA-FINE [place]ALO PEN NEEDLE) 32 gauge x 5/32" Ndle Pt is injecting once daily 30 each 11    pregabalin (LYRICA) 100 MG capsule Take 1 capsule (100 mg total) by mouth 3 (three) times daily. HOLD until you are seen by your doctor and told to resume 270 capsule 1    QUEtiapine (SEROQUEL) 100 MG Tab Take 1 tablet (100 mg total) by mouth every evening. 90 tablet 3    TRUEPLUS LANCETS 33 gauge Misc 100 lancets by Subconjunctival route 4 (four) times daily. 100 each 0    ziprasidone (GEODON) 40 MG Cap TAKE 1 CAPSULE EVERY EVENING 30 capsule 0    ziprasidone (GEODON) 40 MG Cap 1 capsule EVERY PM (route: oral)      aspirin (ECOTRIN) 81 MG EC tablet Take 1 tablet (81 mg total) by mouth once daily.  0    blood glucose control, low Soln 1 drop by Misc.(Non-Drug; Combo Route) route as needed. 1 each 0    " nitrofurantoin, macrocrystal-monohydrate, (MACROBID) 100 MG capsule Take 1 capsule (100 mg total) by mouth 2 (two) times daily. for 5 days 10 capsule 0     No current facility-administered medications for this visit.     Facility-Administered Medications Ordered in Other Visits   Medication Dose Route Frequency Provider Last Rate Last Admin    0.9%  NaCl infusion   Intravenous Continuous Jacky See MD 25 mL/hr at 21 1432 New Bag at 21 1432       Review of patient's allergies indicates:  No Known Allergies       OB History    Para Term  AB Living   7 6     1 6   SAB IAB Ectopic Multiple Live Births   1       6      # Outcome Date GA Lbr Gonzalo/2nd Weight Sex Delivery Anes PTL Lv   7 SAB            6 Para      Vag-Spont   JONN   5 Para      Vag-Spont   JONN   4 Para      Vag-Spont   JONN   3 Para      Vag-Spont   JONN   2 Para      Vag-Spont   JONN   1 Para      Vag-Spont   JONN      Obstetric Comments   14/R/5   + HPV 2018   Hx gonorrhea    x 6, BB 8#      Results for orders placed or performed in visit on 23   POCT Urinalysis, Dipstick, Automated, W/O Scope   Result Value Ref Range    POC Blood, Urine Negative Negative    POC Bilirubin, Urine Negative Negative    POC Urobilinogen, Urine Normal 0.1 - 1.1    POC Ketones, Urine Negative Negative    POC Protein, Urine Negative Negative    POC Nitrates, Urine Negative Negative    POC Glucose, Urine Negative Negative    pH, UA 5     POC Specific Gravity, Urine 1.020 1.003 - 1.029    POC Leukocytes, Urine Positive (A) Negative     *Note: Due to a large number of results and/or encounters for the requested time period, some results have not been displayed. A complete set of results can be found in Results Review.         Assessment/Plan:    Vaginal discharge  -     Vaginosis Screen by DNA Probe    Potential exposure to STD  -     HIV 1/2 Ag/Ab (4th Gen); Future; Expected date: 2023  -     C. trachomatis/N. gonorrhoeae by AMP DNA  [facility]; Cervicovaginal  -     Cancel: HIV 1/2 Ag/Ab (4th Gen); Future; Expected date: 03/17/2023  -     RPR; Future; Expected date: 03/17/2023  -     Hepatitis C Antibody; Future; Expected date: 03/17/2023  -     Hepatitis B Surface Antigen; Future; Expected date: 03/17/2023    Dysuria  -     POCT Urinalysis, Dipstick, Automated, W/O Scope  -     Urine culture    Acute cystitis without hematuria  -     nitrofurantoin, macrocrystal-monohydrate, (MACROBID) 100 MG capsule; Take 1 capsule (100 mg total) by mouth 2 (two) times daily. for 5 days  Dispense: 10 capsule; Refill: 0          Patient was counseled today on vaginitis prevention including :  a. avoiding feminine products such as deoderant soaps, body wash, bubble bath, douches, scented toilet paper, deoderant tampons or pads, feminine wipes, chronic pad use, etc.  b. avoiding other vulvovaginal irritants such as long hot baths, humidity, tight, synthetic clothing, chlorine and sitting around in wet bathing suits  c. wearing cotton underwear, avoiding thong underwear and no underwear to bed  d. taking showers instead of baths and use a hair dryer on cool setting afterwards to dry  e. wearing cotton to exercise and shower immediately after exercise and change clothes  f. using polyurethane condoms without spermicide if sexually active and symptoms are triggered by intercourse     FOLLOW UP: PRN/lack of improvement.

## 2023-03-18 LAB
BACTERIA UR CULT: NORMAL
BACTERIA UR CULT: NORMAL
C TRACH DNA SPEC QL NAA+PROBE: NOT DETECTED
N GONORRHOEA DNA SPEC QL NAA+PROBE: NOT DETECTED
RPR SER QL: NORMAL

## 2023-03-22 ENCOUNTER — TELEPHONE (OUTPATIENT)
Dept: INTERNAL MEDICINE | Facility: CLINIC | Age: 63
End: 2023-03-22
Payer: MEDICARE

## 2023-03-22 NOTE — TELEPHONE ENCOUNTER
----- Message from Mohamud Neves sent at 3/21/2023  2:37 PM CDT -----  Name Of Caller: Jonathan        Provider Name: Ghulam Contreras        Does patient feel the need to be seen today? no        Relationship to the Pt?: patient        Contact Preference?: [phone]        What is the nature of the call?: Patient states that she would like to speak with someone in the office to see how she would go about getting tested to see if she has cancer cells in her body.

## 2023-03-22 NOTE — TELEPHONE ENCOUNTER
What specifically is she concerned about.  There is a test Galleri that is not covered by insurance but may not be what she wants.

## 2023-03-23 DIAGNOSIS — E11.9 TYPE 2 DIABETES MELLITUS WITHOUT COMPLICATION: ICD-10-CM

## 2023-03-23 NOTE — TELEPHONE ENCOUNTER
Spoke with the pt and informed her to reach out to her oncologist per Dr Contreras. Pt verbalized understanding.

## 2023-03-23 NOTE — TELEPHONE ENCOUNTER
"Spoke with the pt who stated "she was told that someone put a cancer cell in her body and the word on the street is she has cancer and she is dying and wants to start chemo".  Pt also stated "in the last month she has went from 176lbs to 153lbs and has no appetite".Please advise if pt to see her oncologist   "

## 2023-03-23 NOTE — TELEPHONE ENCOUNTER
----- Message from Sadie Ahmet sent at 3/23/2023  1:52 PM CDT -----  Regarding: please c/b pt  Name of Who is Calling:ALEXANDER MARALEKSEY LIU [[id number]]          What is the request in detail: Pt states that she is still waiting for a return call from yesterday. She says that she lost weight, she is asking where to go to find out if she has cancer. She is kind of talking all over the place and I kind of don't understand what it is she wants exactly .  Please call to assist           Can the clinic reply by [facility]:          What Number to Call Back if not in [facility]:[phone]

## 2023-03-28 ENCOUNTER — PATIENT MESSAGE (OUTPATIENT)
Dept: PAIN MEDICINE | Facility: OTHER | Age: 63
End: 2023-03-28
Payer: MEDICARE

## 2023-04-03 ENCOUNTER — LAB VISIT (OUTPATIENT)
Dept: LAB | Facility: OTHER | Age: 63
End: 2023-04-03
Attending: INTERNAL MEDICINE
Payer: MEDICARE

## 2023-04-03 DIAGNOSIS — C50.912 MALIGNANT NEOPLASM OF LEFT FEMALE BREAST, UNSPECIFIED ESTROGEN RECEPTOR STATUS, UNSPECIFIED SITE OF BREAST: ICD-10-CM

## 2023-04-03 LAB
ALBUMIN SERPL BCP-MCNC: 3.4 G/DL (ref 3.5–5.2)
ALP SERPL-CCNC: 71 U/L (ref 55–135)
ALT SERPL W/O P-5'-P-CCNC: 8 U/L (ref 10–44)
ANION GAP SERPL CALC-SCNC: 13 MMOL/L (ref 8–16)
AST SERPL-CCNC: 21 U/L (ref 10–40)
BASOPHILS # BLD AUTO: 0.07 K/UL (ref 0–0.2)
BASOPHILS NFR BLD: 1.1 % (ref 0–1.9)
BILIRUB SERPL-MCNC: 0.6 MG/DL (ref 0.1–1)
BUN SERPL-MCNC: 87 MG/DL (ref 8–23)
CALCIUM SERPL-MCNC: 9.7 MG/DL (ref 8.7–10.5)
CHLORIDE SERPL-SCNC: 101 MMOL/L (ref 95–110)
CO2 SERPL-SCNC: 23 MMOL/L (ref 23–29)
CREAT SERPL-MCNC: 3.1 MG/DL (ref 0.5–1.4)
DIFFERENTIAL METHOD: ABNORMAL
EOSINOPHIL # BLD AUTO: 0.1 K/UL (ref 0–0.5)
EOSINOPHIL NFR BLD: 1.4 % (ref 0–8)
ERYTHROCYTE [DISTWIDTH] IN BLOOD BY AUTOMATED COUNT: 14.7 % (ref 11.5–14.5)
EST. GFR  (NO RACE VARIABLE): 16 ML/MIN/1.73 M^2
GLUCOSE SERPL-MCNC: 103 MG/DL (ref 70–110)
HCT VFR BLD AUTO: 36.9 % (ref 37–48.5)
HGB BLD-MCNC: 11.6 G/DL (ref 12–16)
IMM GRANULOCYTES # BLD AUTO: 0.04 K/UL (ref 0–0.04)
IMM GRANULOCYTES NFR BLD AUTO: 0.6 % (ref 0–0.5)
LYMPHOCYTES # BLD AUTO: 3.2 K/UL (ref 1–4.8)
LYMPHOCYTES NFR BLD: 49.5 % (ref 18–48)
MCH RBC QN AUTO: 28.5 PG (ref 27–31)
MCHC RBC AUTO-ENTMCNC: 31.4 G/DL (ref 32–36)
MCV RBC AUTO: 91 FL (ref 82–98)
MONOCYTES # BLD AUTO: 0.5 K/UL (ref 0.3–1)
MONOCYTES NFR BLD: 8.5 % (ref 4–15)
NEUTROPHILS # BLD AUTO: 2.5 K/UL (ref 1.8–7.7)
NEUTROPHILS NFR BLD: 38.9 % (ref 38–73)
NRBC BLD-RTO: 0 /100 WBC
PLATELET # BLD AUTO: 548 K/UL (ref 150–450)
PMV BLD AUTO: 9.7 FL (ref 9.2–12.9)
POTASSIUM SERPL-SCNC: 4.7 MMOL/L (ref 3.5–5.1)
PROT SERPL-MCNC: 8.6 G/DL (ref 6–8.4)
RBC # BLD AUTO: 4.07 M/UL (ref 4–5.4)
SODIUM SERPL-SCNC: 137 MMOL/L (ref 136–145)
WBC # BLD AUTO: 6.36 K/UL (ref 3.9–12.7)

## 2023-04-03 PROCEDURE — 36415 COLL VENOUS BLD VENIPUNCTURE: CPT | Performed by: INTERNAL MEDICINE

## 2023-04-03 PROCEDURE — 80053 COMPREHEN METABOLIC PANEL: CPT | Performed by: INTERNAL MEDICINE

## 2023-04-03 PROCEDURE — 85025 COMPLETE CBC W/AUTO DIFF WBC: CPT | Performed by: INTERNAL MEDICINE

## 2023-04-05 ENCOUNTER — HOSPITAL ENCOUNTER (OUTPATIENT)
Facility: HOSPITAL | Age: 63
Discharge: HOME | End: 2023-04-06
Attending: EMERGENCY MEDICINE | Admitting: EMERGENCY MEDICINE
Payer: MEDICARE

## 2023-04-05 ENCOUNTER — OFFICE VISIT (OUTPATIENT)
Dept: HEMATOLOGY/ONCOLOGY | Facility: CLINIC | Age: 63
End: 2023-04-05
Payer: MEDICARE

## 2023-04-05 VITALS
SYSTOLIC BLOOD PRESSURE: 84 MMHG | OXYGEN SATURATION: 99 % | BODY MASS INDEX: 25.78 KG/M2 | WEIGHT: 145.5 LBS | DIASTOLIC BLOOD PRESSURE: 68 MMHG | HEART RATE: 64 BPM | HEIGHT: 63 IN

## 2023-04-05 DIAGNOSIS — F25.0 SCHIZOAFFECTIVE DISORDER, BIPOLAR TYPE: Chronic | ICD-10-CM

## 2023-04-05 DIAGNOSIS — Z79.4 TYPE 2 DIABETES MELLITUS WITHOUT COMPLICATION, WITH LONG-TERM CURRENT USE OF INSULIN: ICD-10-CM

## 2023-04-05 DIAGNOSIS — E11.9 TYPE 2 DIABETES MELLITUS WITHOUT COMPLICATION, WITH LONG-TERM CURRENT USE OF INSULIN: ICD-10-CM

## 2023-04-05 DIAGNOSIS — R07.9 CHEST PAIN: ICD-10-CM

## 2023-04-05 DIAGNOSIS — N17.9 AKI (ACUTE KIDNEY INJURY): ICD-10-CM

## 2023-04-05 DIAGNOSIS — N30.01 ACUTE CYSTITIS WITH HEMATURIA: Primary | ICD-10-CM

## 2023-04-05 DIAGNOSIS — R53.1 WEAKNESS: ICD-10-CM

## 2023-04-05 DIAGNOSIS — C50.912 MALIGNANT NEOPLASM OF LEFT FEMALE BREAST, UNSPECIFIED ESTROGEN RECEPTOR STATUS, UNSPECIFIED SITE OF BREAST: Primary | ICD-10-CM

## 2023-04-05 DIAGNOSIS — E11.9 TYPE 2 DIABETES MELLITUS WITHOUT COMPLICATION, UNSPECIFIED WHETHER LONG TERM INSULIN USE: ICD-10-CM

## 2023-04-05 DIAGNOSIS — F25.0 SCHIZOAFFECTIVE DISORDER, BIPOLAR TYPE: ICD-10-CM

## 2023-04-05 DIAGNOSIS — I10 ESSENTIAL HYPERTENSION: ICD-10-CM

## 2023-04-05 DIAGNOSIS — I95.9 HYPOTENSION, UNSPECIFIED HYPOTENSION TYPE: ICD-10-CM

## 2023-04-05 DIAGNOSIS — I95.9 HYPOTENSION: ICD-10-CM

## 2023-04-05 PROBLEM — R29.90 STROKE-LIKE SYMPTOMS: Status: RESOLVED | Noted: 2019-11-11 | Resolved: 2023-04-05

## 2023-04-05 PROBLEM — E11.49 TYPE 2 DIABETES MELLITUS WITH NEUROLOGIC COMPLICATION, WITH LONG-TERM CURRENT USE OF INSULIN: Chronic | Status: ACTIVE | Noted: 2017-06-19

## 2023-04-05 PROBLEM — N30.00 ACUTE CYSTITIS: Status: ACTIVE | Noted: 2023-04-05

## 2023-04-05 LAB
ALBUMIN SERPL BCP-MCNC: 3.3 G/DL (ref 3.5–5.2)
ALP SERPL-CCNC: 62 U/L (ref 55–135)
ALT SERPL W/O P-5'-P-CCNC: <5 U/L (ref 10–44)
ANION GAP SERPL CALC-SCNC: 9 MMOL/L (ref 8–16)
AST SERPL-CCNC: 15 U/L (ref 10–40)
BACTERIA #/AREA URNS HPF: ABNORMAL /HPF
BASOPHILS # BLD AUTO: 0.05 K/UL (ref 0–0.2)
BASOPHILS NFR BLD: 0.7 % (ref 0–1.9)
BILIRUB SERPL-MCNC: 0.5 MG/DL (ref 0.1–1)
BILIRUB UR QL STRIP: NEGATIVE
BUN SERPL-MCNC: 72 MG/DL (ref 8–23)
CALCIUM SERPL-MCNC: 9.4 MG/DL (ref 8.7–10.5)
CHLORIDE SERPL-SCNC: 102 MMOL/L (ref 95–110)
CK SERPL-CCNC: 102 U/L (ref 20–180)
CLARITY UR: ABNORMAL
CO2 SERPL-SCNC: 25 MMOL/L (ref 23–29)
COLOR UR: YELLOW
CREAT SERPL-MCNC: 2.1 MG/DL (ref 0.5–1.4)
CREAT UR-MCNC: 127.4 MG/DL (ref 15–325)
DIFFERENTIAL METHOD: ABNORMAL
EOSINOPHIL # BLD AUTO: 0.1 K/UL (ref 0–0.5)
EOSINOPHIL NFR BLD: 1.4 % (ref 0–8)
ERYTHROCYTE [DISTWIDTH] IN BLOOD BY AUTOMATED COUNT: 14.8 % (ref 11.5–14.5)
EST. GFR  (NO RACE VARIABLE): 26 ML/MIN/1.73 M^2
GLUCOSE SERPL-MCNC: 76 MG/DL (ref 70–110)
GLUCOSE UR QL STRIP: NEGATIVE
HCT VFR BLD AUTO: 35 % (ref 37–48.5)
HGB BLD-MCNC: 11.1 G/DL (ref 12–16)
HGB UR QL STRIP: NEGATIVE
HYALINE CASTS #/AREA URNS LPF: 3 /LPF
IMM GRANULOCYTES # BLD AUTO: 0.02 K/UL (ref 0–0.04)
IMM GRANULOCYTES NFR BLD AUTO: 0.3 % (ref 0–0.5)
KETONES UR QL STRIP: NEGATIVE
LACTATE SERPL-SCNC: 0.8 MMOL/L (ref 0.5–2.2)
LACTATE SERPL-SCNC: 0.9 MMOL/L (ref 0.5–2.2)
LEUKOCYTE ESTERASE UR QL STRIP: ABNORMAL
LIPASE SERPL-CCNC: 82 U/L (ref 4–60)
LYMPHOCYTES # BLD AUTO: 4.1 K/UL (ref 1–4.8)
LYMPHOCYTES NFR BLD: 57 % (ref 18–48)
MAGNESIUM SERPL-MCNC: 2.4 MG/DL (ref 1.6–2.6)
MCH RBC QN AUTO: 28 PG (ref 27–31)
MCHC RBC AUTO-ENTMCNC: 31.7 G/DL (ref 32–36)
MCV RBC AUTO: 88 FL (ref 82–98)
MICROSCOPIC COMMENT: ABNORMAL
MONOCYTES # BLD AUTO: 0.7 K/UL (ref 0.3–1)
MONOCYTES NFR BLD: 10.1 % (ref 4–15)
NEUTROPHILS # BLD AUTO: 2.2 K/UL (ref 1.8–7.7)
NEUTROPHILS NFR BLD: 30.5 % (ref 38–73)
NITRITE UR QL STRIP: NEGATIVE
NRBC BLD-RTO: 0 /100 WBC
PH UR STRIP: 5 [PH] (ref 5–8)
PHOSPHATE SERPL-MCNC: 3.4 MG/DL (ref 2.7–4.5)
PLATELET # BLD AUTO: 416 K/UL (ref 150–450)
PMV BLD AUTO: 10 FL (ref 9.2–12.9)
POCT GLUCOSE: 167 MG/DL (ref 70–110)
POTASSIUM SERPL-SCNC: 4.5 MMOL/L (ref 3.5–5.1)
PROT SERPL-MCNC: 8.4 G/DL (ref 6–8.4)
PROT UR QL STRIP: NEGATIVE
RBC # BLD AUTO: 3.97 M/UL (ref 4–5.4)
RBC #/AREA URNS HPF: 5 /HPF (ref 0–4)
SODIUM SERPL-SCNC: 136 MMOL/L (ref 136–145)
SODIUM UR-SCNC: 68 MMOL/L (ref 20–250)
SP GR UR STRIP: 1.01 (ref 1–1.03)
SQUAMOUS #/AREA URNS HPF: 16 /HPF
URN SPEC COLLECT METH UR: ABNORMAL
UROBILINOGEN UR STRIP-ACNC: NEGATIVE EU/DL
VALPROATE SERPL-MCNC: <12.5 UG/ML (ref 50–100)
WBC # BLD AUTO: 7.14 K/UL (ref 3.9–12.7)
WBC #/AREA URNS HPF: 17 /HPF (ref 0–5)
WBC CLUMPS URNS QL MICRO: ABNORMAL

## 2023-04-05 PROCEDURE — 93005 ELECTROCARDIOGRAM TRACING: CPT | Mod: HCNC

## 2023-04-05 PROCEDURE — 93010 EKG 12-LEAD: ICD-10-PCS | Mod: HCNC,,, | Performed by: INTERNAL MEDICINE

## 2023-04-05 PROCEDURE — 99214 PR OFFICE/OUTPT VISIT, EST, LEVL IV, 30-39 MIN: ICD-10-PCS | Mod: HCNC,S$GLB,, | Performed by: INTERNAL MEDICINE

## 2023-04-05 PROCEDURE — 81000 URINALYSIS NONAUTO W/SCOPE: CPT | Mod: HCNC | Performed by: EMERGENCY MEDICINE

## 2023-04-05 PROCEDURE — 99999 PR PBB SHADOW E&M-EST. PATIENT-LVL III: ICD-10-PCS | Mod: PBBFAC,HCNC,, | Performed by: INTERNAL MEDICINE

## 2023-04-05 PROCEDURE — 99999 PR PBB SHADOW E&M-EST. PATIENT-LVL III: CPT | Mod: PBBFAC,HCNC,, | Performed by: INTERNAL MEDICINE

## 2023-04-05 PROCEDURE — 99499 UNLISTED E&M SERVICE: CPT | Mod: S$GLB,,, | Performed by: INTERNAL MEDICINE

## 2023-04-05 PROCEDURE — 3072F PR LOW RISK FOR RETINOPATHY: ICD-10-PCS | Mod: HCNC,CPTII,S$GLB, | Performed by: INTERNAL MEDICINE

## 2023-04-05 PROCEDURE — 93010 ELECTROCARDIOGRAM REPORT: CPT | Mod: HCNC,,, | Performed by: INTERNAL MEDICINE

## 2023-04-05 PROCEDURE — 3074F PR MOST RECENT SYSTOLIC BLOOD PRESSURE < 130 MM HG: ICD-10-PCS | Mod: HCNC,CPTII,S$GLB, | Performed by: INTERNAL MEDICINE

## 2023-04-05 PROCEDURE — 83605 ASSAY OF LACTIC ACID: CPT | Mod: 91,HCNC | Performed by: EMERGENCY MEDICINE

## 2023-04-05 PROCEDURE — 25000003 PHARM REV CODE 250: Mod: HCNC | Performed by: EMERGENCY MEDICINE

## 2023-04-05 PROCEDURE — 80053 COMPREHEN METABOLIC PANEL: CPT | Mod: HCNC | Performed by: EMERGENCY MEDICINE

## 2023-04-05 PROCEDURE — 85025 COMPLETE CBC W/AUTO DIFF WBC: CPT | Mod: HCNC | Performed by: EMERGENCY MEDICINE

## 2023-04-05 PROCEDURE — 80164 ASSAY DIPROPYLACETIC ACD TOT: CPT | Mod: HCNC | Performed by: HOSPITALIST

## 2023-04-05 PROCEDURE — 63600175 PHARM REV CODE 636 W HCPCS: Mod: HCNC | Performed by: EMERGENCY MEDICINE

## 2023-04-05 PROCEDURE — 96360 HYDRATION IV INFUSION INIT: CPT | Mod: 59,HCNC

## 2023-04-05 PROCEDURE — 84100 ASSAY OF PHOSPHORUS: CPT | Mod: HCNC | Performed by: EMERGENCY MEDICINE

## 2023-04-05 PROCEDURE — 83930 ASSAY OF BLOOD OSMOLALITY: CPT | Mod: HCNC | Performed by: EMERGENCY MEDICINE

## 2023-04-05 PROCEDURE — 3078F PR MOST RECENT DIASTOLIC BLOOD PRESSURE < 80 MM HG: ICD-10-PCS | Mod: HCNC,CPTII,S$GLB, | Performed by: INTERNAL MEDICINE

## 2023-04-05 PROCEDURE — 83735 ASSAY OF MAGNESIUM: CPT | Mod: HCNC | Performed by: EMERGENCY MEDICINE

## 2023-04-05 PROCEDURE — 96361 HYDRATE IV INFUSION ADD-ON: CPT | Mod: HCNC

## 2023-04-05 PROCEDURE — 82570 ASSAY OF URINE CREATININE: CPT | Mod: HCNC | Performed by: EMERGENCY MEDICINE

## 2023-04-05 PROCEDURE — 3078F DIAST BP <80 MM HG: CPT | Mod: HCNC,CPTII,S$GLB, | Performed by: INTERNAL MEDICINE

## 2023-04-05 PROCEDURE — 3008F PR BODY MASS INDEX (BMI) DOCUMENTED: ICD-10-PCS | Mod: HCNC,CPTII,S$GLB, | Performed by: INTERNAL MEDICINE

## 2023-04-05 PROCEDURE — 4010F ACE/ARB THERAPY RXD/TAKEN: CPT | Mod: HCNC,CPTII,S$GLB, | Performed by: INTERNAL MEDICINE

## 2023-04-05 PROCEDURE — 99214 OFFICE O/P EST MOD 30 MIN: CPT | Mod: HCNC,S$GLB,, | Performed by: INTERNAL MEDICINE

## 2023-04-05 PROCEDURE — 96365 THER/PROPH/DIAG IV INF INIT: CPT | Mod: HCNC

## 2023-04-05 PROCEDURE — 4010F PR ACE/ARB THEARPY RXD/TAKEN: ICD-10-PCS | Mod: HCNC,CPTII,S$GLB, | Performed by: INTERNAL MEDICINE

## 2023-04-05 PROCEDURE — 3008F BODY MASS INDEX DOCD: CPT | Mod: HCNC,CPTII,S$GLB, | Performed by: INTERNAL MEDICINE

## 2023-04-05 PROCEDURE — 99285 EMERGENCY DEPT VISIT HI MDM: CPT | Mod: 25,HCNC

## 2023-04-05 PROCEDURE — 83935 ASSAY OF URINE OSMOLALITY: CPT | Mod: HCNC | Performed by: EMERGENCY MEDICINE

## 2023-04-05 PROCEDURE — 82550 ASSAY OF CK (CPK): CPT | Mod: HCNC | Performed by: EMERGENCY MEDICINE

## 2023-04-05 PROCEDURE — G0378 HOSPITAL OBSERVATION PER HR: HCPCS | Mod: HCNC

## 2023-04-05 PROCEDURE — 84300 ASSAY OF URINE SODIUM: CPT | Mod: HCNC | Performed by: EMERGENCY MEDICINE

## 2023-04-05 PROCEDURE — 87040 BLOOD CULTURE FOR BACTERIA: CPT | Mod: HCNC | Performed by: EMERGENCY MEDICINE

## 2023-04-05 PROCEDURE — 3074F SYST BP LT 130 MM HG: CPT | Mod: HCNC,CPTII,S$GLB, | Performed by: INTERNAL MEDICINE

## 2023-04-05 PROCEDURE — 3072F LOW RISK FOR RETINOPATHY: CPT | Mod: HCNC,CPTII,S$GLB, | Performed by: INTERNAL MEDICINE

## 2023-04-05 PROCEDURE — 83690 ASSAY OF LIPASE: CPT | Mod: HCNC | Performed by: EMERGENCY MEDICINE

## 2023-04-05 PROCEDURE — 87086 URINE CULTURE/COLONY COUNT: CPT | Mod: HCNC | Performed by: EMERGENCY MEDICINE

## 2023-04-05 PROCEDURE — 82962 GLUCOSE BLOOD TEST: CPT | Mod: HCNC

## 2023-04-05 PROCEDURE — 99499 RISK ADDL DX/OHS AUDIT: ICD-10-PCS | Mod: S$GLB,,, | Performed by: INTERNAL MEDICINE

## 2023-04-05 PROCEDURE — 83605 ASSAY OF LACTIC ACID: CPT | Mod: HCNC | Performed by: EMERGENCY MEDICINE

## 2023-04-05 RX ORDER — DEXTROSE 40 %
30 GEL (GRAM) ORAL
Status: DISCONTINUED | OUTPATIENT
Start: 2023-04-05 | End: 2023-04-06 | Stop reason: HOSPADM

## 2023-04-05 RX ORDER — QUETIAPINE FUMARATE 100 MG/1
100 TABLET, FILM COATED ORAL NIGHTLY
Status: DISCONTINUED | OUTPATIENT
Start: 2023-04-06 | End: 2023-04-06 | Stop reason: HOSPADM

## 2023-04-05 RX ORDER — HYDROCHLOROTHIAZIDE 12.5 MG/1
12.5 TABLET ORAL DAILY
Qty: 90 TABLET | Refills: 3 | Status: ON HOLD | OUTPATIENT
Start: 2023-04-05 | End: 2023-04-06 | Stop reason: HOSPADM

## 2023-04-05 RX ORDER — HEPARIN SODIUM 5000 [USP'U]/ML
5000 INJECTION, SOLUTION INTRAVENOUS; SUBCUTANEOUS EVERY 8 HOURS
Status: DISCONTINUED | OUTPATIENT
Start: 2023-04-05 | End: 2023-04-06 | Stop reason: HOSPADM

## 2023-04-05 RX ORDER — MAG HYDROX/ALUMINUM HYD/SIMETH 200-200-20
30 SUSPENSION, ORAL (FINAL DOSE FORM) ORAL 4 TIMES DAILY PRN
Status: DISCONTINUED | OUTPATIENT
Start: 2023-04-05 | End: 2023-04-06 | Stop reason: HOSPADM

## 2023-04-05 RX ORDER — DEXTROSE 40 %
15 GEL (GRAM) ORAL
Status: DISCONTINUED | OUTPATIENT
Start: 2023-04-05 | End: 2023-04-06 | Stop reason: HOSPADM

## 2023-04-05 RX ORDER — ONDANSETRON 2 MG/ML
4 INJECTION INTRAMUSCULAR; INTRAVENOUS EVERY 8 HOURS PRN
Status: DISCONTINUED | OUTPATIENT
Start: 2023-04-05 | End: 2023-04-06 | Stop reason: HOSPADM

## 2023-04-05 RX ORDER — POLYETHYLENE GLYCOL 3350 17 G/17G
17 POWDER, FOR SOLUTION ORAL DAILY
Status: DISCONTINUED | OUTPATIENT
Start: 2023-04-06 | End: 2023-04-06 | Stop reason: HOSPADM

## 2023-04-05 RX ORDER — ATORVASTATIN CALCIUM 80 MG/1
80 TABLET, FILM COATED ORAL DAILY
Qty: 90 TABLET | Refills: 0 | Status: SHIPPED | OUTPATIENT
Start: 2023-04-05 | End: 2024-03-15 | Stop reason: SDUPTHER

## 2023-04-05 RX ORDER — ATORVASTATIN CALCIUM 40 MG/1
80 TABLET, FILM COATED ORAL DAILY
Status: DISCONTINUED | OUTPATIENT
Start: 2023-04-06 | End: 2023-04-06 | Stop reason: HOSPADM

## 2023-04-05 RX ORDER — SODIUM CHLORIDE 9 MG/ML
125 INJECTION, SOLUTION INTRAVENOUS ONCE
Status: COMPLETED | OUTPATIENT
Start: 2023-04-05 | End: 2023-04-05

## 2023-04-05 RX ORDER — PROCHLORPERAZINE EDISYLATE 5 MG/ML
5 INJECTION INTRAMUSCULAR; INTRAVENOUS EVERY 6 HOURS PRN
Status: DISCONTINUED | OUTPATIENT
Start: 2023-04-05 | End: 2023-04-06 | Stop reason: HOSPADM

## 2023-04-05 RX ORDER — NALOXONE HCL 0.4 MG/ML
0.02 VIAL (ML) INJECTION
Status: DISCONTINUED | OUTPATIENT
Start: 2023-04-05 | End: 2023-04-06 | Stop reason: HOSPADM

## 2023-04-05 RX ORDER — ACETAMINOPHEN 325 MG/1
650 TABLET ORAL EVERY 6 HOURS PRN
Status: DISCONTINUED | OUTPATIENT
Start: 2023-04-05 | End: 2023-04-06 | Stop reason: HOSPADM

## 2023-04-05 RX ORDER — METFORMIN HYDROCHLORIDE 500 MG/1
1000 TABLET ORAL 2 TIMES DAILY WITH MEALS
Qty: 120 TABLET | Refills: 11 | Status: SHIPPED | OUTPATIENT
Start: 2023-04-05

## 2023-04-05 RX ORDER — INSULIN ASPART 100 [IU]/ML
1-10 INJECTION, SOLUTION INTRAVENOUS; SUBCUTANEOUS
Status: DISCONTINUED | OUTPATIENT
Start: 2023-04-05 | End: 2023-04-06 | Stop reason: HOSPADM

## 2023-04-05 RX ORDER — GLUCAGON 1 MG
1 KIT INJECTION
Status: DISCONTINUED | OUTPATIENT
Start: 2023-04-05 | End: 2023-04-06 | Stop reason: HOSPADM

## 2023-04-05 RX ORDER — TALC
6 POWDER (GRAM) TOPICAL NIGHTLY PRN
Status: DISCONTINUED | OUTPATIENT
Start: 2023-04-05 | End: 2023-04-06 | Stop reason: HOSPADM

## 2023-04-05 RX ORDER — SODIUM CHLORIDE 0.9 % (FLUSH) 0.9 %
10 SYRINGE (ML) INJECTION EVERY 8 HOURS PRN
Status: DISCONTINUED | OUTPATIENT
Start: 2023-04-05 | End: 2023-04-06 | Stop reason: HOSPADM

## 2023-04-05 RX ORDER — LISINOPRIL AND HYDROCHLOROTHIAZIDE 10; 12.5 MG/1; MG/1
1 TABLET ORAL DAILY
Qty: 90 TABLET | Refills: 3 | Status: SHIPPED | OUTPATIENT
Start: 2023-04-05 | End: 2023-04-05 | Stop reason: CLARIF

## 2023-04-05 RX ORDER — SIMETHICONE 80 MG
1 TABLET,CHEWABLE ORAL 4 TIMES DAILY PRN
Status: DISCONTINUED | OUTPATIENT
Start: 2023-04-05 | End: 2023-04-06 | Stop reason: HOSPADM

## 2023-04-05 RX ADMIN — SODIUM CHLORIDE 125 ML/HR: 9 INJECTION, SOLUTION INTRAVENOUS at 05:04

## 2023-04-05 RX ADMIN — SODIUM CHLORIDE 1000 ML: 9 INJECTION, SOLUTION INTRAVENOUS at 08:04

## 2023-04-05 RX ADMIN — CEFTRIAXONE 1 G: 1 INJECTION, SOLUTION INTRAVENOUS at 08:04

## 2023-04-05 RX ADMIN — SODIUM CHLORIDE 1000 ML: 9 INJECTION, SOLUTION INTRAVENOUS at 04:04

## 2023-04-05 NOTE — ED PROVIDER NOTES
Encounter Date: 4/5/2023    SCRIBE #1 NOTE: I, Cindi Hughes, am scribing for, and in the presence of,  Destin Carr MD. I have scribed the following portions of the note - Other sections scribed: HPI, ROS, PE.     History     Chief Complaint   Patient presents with    Hypotension     64 yo female to triage for low blood pressure and possibly kidney failure. Sent from Oncologist to get further eval of kidneys and hypotension. Pt denies N/V/D, CP, SOB. Pt does complain of leg pain as well, says she has been having a lot of falls lately. AAOx4.     Fall     Ms. Jonathan Gonzales presents to the ED for hypotension and possible kidney failure, sent from heme/onc clinic after routine labs found cher. The patient says she was sent to the ED by her oncologist to get further workup after abnormal lab results from 4/3 and hypotension at her office visit today. The patient is unsure why she had an oncologist appointment today. She endorses feeling fatigued, rundown, and diffusely tired recently. Patient notes decreased appetite with slight upper abdominal pain for weeks. Normally, the patient is ambulatory with a cane. A few days ago, she was unable to ambulate secondary to severe hip pain, although this has resolved and she is ambulatory at baseline today. She denies any chest pain, SOB, or headache. The patient denies any recent medication changes and endorses compliance with medication regimen. Her PMHx is significant for Breast cancer s/p mastectomy (2011), DM type 2, HTN, CKD stage 3, bipolar and schizophrenia.     The history is provided by the patient. No  was used.   Review of patient's allergies indicates:  No Known Allergies  Past Medical History:   Diagnosis Date    Bipolar disorder     Cancer of female breast 10/2010    T2 N1 Mx, Stage IIB left breast cancer    Cancer of upper-outer quadrant of female breast 7/9/2012    Depression     Diabetes mellitus type II     Disturbances of sensation of smell  and taste 6/29/2012    Hypertension     Malignant neoplasm of breast (female), unspecified site 4/27/2015    Neuropathy     Nonspecific abnormal unspecified cardiovascular function study 4/12/2013    ECG READ AS SHOWING A T-WAVE ABNORMALITY     Post-mastectomy lymphedema syndrome     Schizophrenia     Stroke      Past Surgical History:   Procedure Laterality Date    BREAST RECONSTRUCTION  11/23/11    B/L SHLOMO flap reconstruction S/P left MRM, right prophylactic mastectomy    BREAST RECONSTRUCTION Bilateral 3/13/2015    NIPPLE RECONSTRUCTION    INJECTION OF ANESTHETIC AGENT AROUND NERVE Left 12/9/2020    Procedure: BLOCK, NERVE, C3-C4-C5-C6 MEDIAL BRANCH;  Surgeon: Darren Jerry MD;  Location: [facility];  Service: Pain Management;  Laterality: Left;    MASTECTOMY Bilateral 01/2011    bilateral    RADIOFREQUENCY ABLATION Left 2/3/2021    Procedure: RADIOFREQUENCY ABLATION, C3,C4,C5,C6 MEDIAL BRANCH 1 of 2;  Surgeon: Darren Jerry MD;  Location: [facility];  Service: Pain Management;  Laterality: Left;    RADIOFREQUENCY ABLATION Right 7/28/2021    Procedure: RADIOFREQUENCY ABLATION, C3-C4-C5-C6 MEDIAL BR ANCH 1 IOF 2;  Surgeon: Darren Jerry MD;  Location: [facility];  Service: Pain Management;  Laterality: Right;    RADIOFREQUENCY ABLATION Left 8/18/2021    Procedure: RADIOFREQUENCY ABLATION, C3-C4-C5-C6 MEDIAL BRANCH 2 OF 2;  Surgeon: Darren Jerry MD;  Location: [facility];  Service: Pain Management;  Laterality: Left;    TRANSFORAMINAL EPIDURAL INJECTION OF STEROID Bilateral 11/4/2020    Procedure: INJECTION, STEROID, EPIDURAL, TRANSFORAMINAL APPROACH L4/L5;  Surgeon: Darren Jerry MD;  Location: [facility];  Service: Pain Management;  Laterality: Bilateral;  Bilateral L4/L5    TRANSFORAMINAL EPIDURAL INJECTION OF STEROID Bilateral 2/10/2021    Procedure: INJECTION, STEROID, EPIDURAL, TRANSFORAMINAL APPROACH, L4-L5;  Surgeon: Darren Jerry MD;  Location: [facility];   Service: Pain Management;  Laterality: Bilateral;    TRANSFORAMINAL EPIDURAL INJECTION OF STEROID Bilateral 3/10/2021    Procedure: INJECTION, STEROID, EPIDURAL, TRANSFORAMINAL APPROACH, L4-L5;  Surgeon: Darren Jerry MD;  Location: [facility];  Service: Pain Management;  Laterality: Bilateral;    TRANSFORAMINAL EPIDURAL INJECTION OF STEROID Bilateral 12/15/2021    Procedure: INJECTION, STEROID, EPIDURAL, TRANSFORAMINAL APPROACH  Bilateral L4/5 TFESI / needs consent;  Surgeon: Darren Jerry MD;  Location: [facility];  Service: Pain Management;  Laterality: Bilateral;    TRANSFORAMINAL EPIDURAL INJECTION OF STEROID Bilateral 2022    Procedure: INJECTION, STEROID, EPIDURAL, TRANSFORAMINAL APPROACH BILATERAL L4/5 CONTRAST;  Surgeon: Darren Jerry MD;  Location: [facility];  Service: Pain Management;  Laterality: Bilateral;    TUBAL LIGATION       Family History   Problem Relation Age of Onset    Kidney disease Mother         Dialysis    Hypertension Mother     Deep vein thrombosis Mother     Glaucoma Mother     Diabetic kidney disease Sister     Lung cancer Sister     Diabetes Sister     Cancer Sister         lung    Diabetes Brother     Diabetes Son     No Known Problems Father     No Known Problems Maternal Grandmother     No Known Problems Maternal Grandfather     No Known Problems Paternal Grandmother     No Known Problems Paternal Grandfather     No Known Problems Son     Cervical cancer Daughter     Cancer Daughter         cervical cancer    No Known Problems Daughter     No Known Problems Daughter     No Known Problems Daughter     Breast cancer Neg Hx     Ovarian cancer Neg Hx      Social History     Tobacco Use    Smoking status: Former     Packs/day: 0.50     Years: 25.00     Pack years: 12.50     Types: Cigarettes     Start date:      Quit date: 3/17/2022     Years since quittin.0    Smokeless tobacco: Never    Tobacco comments:     currently at less than 1/2 pack pd    Substance Use Topics    Alcohol use: Yes     Alcohol/week: 0.0 standard drinks     Comment: social - once every 2 weeks    Drug use: No     Comment: 1991 - stopped using crack cocaine     Review of Systems   Constitutional:  Positive for appetite change (decreased) and fatigue.   Respiratory:  Negative for shortness of breath.    Cardiovascular:  Negative for chest pain.        (+) Hypotension.   Gastrointestinal:  Abdominal pain: upper.        Transient abd pain, not current   Musculoskeletal:  Positive for arthralgias (B hip pain, resolved.).   Neurological:  Negative for headaches.   All other systems reviewed and are negative.    Physical Exam     Initial Vitals [04/05/23 1449]   BP Pulse Resp Temp SpO2   (!) 88/52 84 16 98.1 °F (36.7 °C) 99 %      MAP       --         Physical Exam    Nursing note and vitals reviewed.  Constitutional: She appears well-developed and well-nourished. She is not diaphoretic. No distress.   Appears mildly lethargic.   HENT:   Head: Normocephalic and atraumatic.   Eyes: EOM are normal.   Cardiovascular:  Normal rate, regular rhythm and normal heart sounds.           No murmur heard.  Pulmonary/Chest: Breath sounds normal. No respiratory distress.   Abdominal: Abdomen is soft. Bowel sounds are normal. She exhibits no distension and no mass. There is no abdominal tenderness. There is no guarding.   Musculoskeletal:         General: No tenderness or edema. Normal range of motion.      Comments: No calf or ankle swelling or edema.     Neurological: She is oriented to person, place, and time.   Lucid and linear. Alert and oriented x3. Answers minimally slowed.   Skin: Skin is warm and dry. No rash noted. No erythema.   Psychiatric: She has a normal mood and affect. Her behavior is normal. Judgment and thought content normal.       ED Course   Critical Care    Date/Time: 4/6/2023 1:51 PM  Performed by: Destin Carr MD  Authorized by: Indra Zamora III, MD   Direct patient  critical care time: 10 minutes  Additional history critical care time: 5 minutes  Ordering / reviewing critical care time: 5 minutes  Documentation critical care time: 5 minutes  Consulting other physicians critical care time: 5 minutes  Total critical care time (exclusive of procedural time) : 30 minutes      Labs Reviewed   CBC W/ AUTO DIFFERENTIAL - Abnormal; Notable for the following components:       Result Value    RBC 3.97 (*)     Hemoglobin 11.1 (*)     Hematocrit 35.0 (*)     MCHC 31.7 (*)     RDW 14.8 (*)     Gran % 30.5 (*)     Lymph % 57.0 (*)     All other components within normal limits   COMPREHENSIVE METABOLIC PANEL - Abnormal; Notable for the following components:    BUN 72 (*)     Creatinine 2.1 (*)     Albumin 3.3 (*)     ALT <5 (*)     eGFR 26 (*)     All other components within normal limits   LIPASE - Abnormal; Notable for the following components:    Lipase 82 (*)     All other components within normal limits   URINALYSIS, REFLEX TO URINE CULTURE - Abnormal; Notable for the following components:    Appearance, UA Hazy (*)     Leukocytes, UA 3+ (*)     All other components within normal limits    Narrative:     Specimen Source->Urine   URINALYSIS MICROSCOPIC - Abnormal; Notable for the following components:    RBC, UA 5 (*)     WBC, UA 17 (*)     WBC Clumps, UA Occasional (*)     Bacteria Moderate (*)     Hyaline Casts, UA 3 (*)     All other components within normal limits    Narrative:     Specimen Source->Urine   VALPROIC ACID - Abnormal; Notable for the following components:    Valproic Acid Level <12.5 (*)     All other components within normal limits   POCT GLUCOSE - Abnormal; Notable for the following components:    POCT Glucose 167 (*)     All other components within normal limits   CULTURE, BLOOD   CULTURE, BLOOD   LACTIC ACID, PLASMA   MAGNESIUM   PHOSPHORUS   CREATININE, URINE, RANDOM    Narrative:     Specimen Source->Urine   SODIUM, URINE, RANDOM    Narrative:     Specimen  Source->Urine   CK   CK   LACTIC ACID, PLASMA   POCT GLUCOSE, HAND-HELD DEVICE   POCT GLUCOSE, HAND-HELD DEVICE   POCT GLUCOSE     EKG Readings: (Independently Interpreted)   Initial Reading: No STEMI. Rhythm: Normal Sinus Rhythm. Ectopy: No Ectopy. Conduction: Normal.     Imaging Results    None          Medications   atorvastatin tablet 80 mg (80 mg Oral Given 4/6/23 1006)   sodium chloride 0.9% flush 10 mL (has no administration in time range)   melatonin tablet 6 mg (has no administration in time range)   ondansetron injection 4 mg (has no administration in time range)   prochlorperazine injection Soln 5 mg (has no administration in time range)   polyethylene glycol packet 17 g (17 g Oral Given 4/6/23 1006)   acetaminophen tablet 650 mg (has no administration in time range)   simethicone chewable tablet 80 mg (has no administration in time range)   aluminum-magnesium hydroxide-simethicone 200-200-20 mg/5 mL suspension 30 mL (has no administration in time range)   naloxone 0.4 mg/mL injection 0.02 mg (has no administration in time range)   glucagon (human recombinant) injection 1 mg (has no administration in time range)   dextrose 10% bolus 125 mL 125 mL (has no administration in time range)   dextrose 10% bolus 250 mL 250 mL (has no administration in time range)   dextrose 40 % gel 15,000 mg (has no administration in time range)   dextrose 40 % gel 30,000 mg (has no administration in time range)   heparin (porcine) injection 5,000 Units (5,000 Units Subcutaneous Given 4/6/23 0602)   insulin aspart U-100 pen 1-10 Units (has no administration in time range)   cefTRIAXone (ROCEPHIN) 1 g/50 mL D5W IVPB (has no administration in time range)   QUEtiapine tablet 100 mg (has no administration in time range)   sodium chloride 0.9% bolus 1,000 mL 1,000 mL (0 mLs Intravenous Stopped 4/5/23 1738)   0.9%  NaCl infusion (125 mL/hr Intravenous New Bag 4/5/23 8335)   cefTRIAXone (ROCEPHIN) 1 g/50 mL D5W IVPB (0 g Intravenous  Stopped 4/5/23 2040)   sodium chloride 0.9% bolus 1,000 mL 1,000 mL (0 mLs Intravenous Stopped 4/5/23 2111)     Medical Decision Making:   History:   Old Medical Records: I decided to obtain old medical records.  Independently Interpreted Test(s):   I have ordered and independently interpreted EKG Reading(s) - see prior notes  Clinical Tests:   Lab Tests: Ordered and Reviewed  Radiological Study: Ordered and Reviewed  Medical Tests: Reviewed and Ordered  ED Management:  Ms. Gonzales has been stable during her time in the ER. Was given 1L bolus and systolic BP came to 90s and remained. HR has been mostly in low 50s.   Prior records reviewed.   Labs noted. UA noted uti.   Lactate wnl. Pt given 125cc/hr, BUFFY noted.   Given uti, will give an additional fluid bolus then resume 125/hr NS.   Discussed plan for observation w KATHRYN Harden, MARLEE w HM.   Pt does have hypotension that has resolved, with uti, but does not have elevated wbc, fever or lactate.   Discussed w HM, do not suspect overt sepsis at this time, but blood cxs have been drawn as a precaution. Abx given for uti.         Scribe Attestation:   Scribe #1: I performed the above scribed service and the documentation accurately describes the services I performed. I attest to the accuracy of the note.                   Clinical Impression:   Final diagnoses:  [R53.1] Weakness  [N30.01] Acute cystitis with hematuria (Primary)  [N17.9] BUFFY (acute kidney injury)  [I95.9] Hypotension, unspecified hypotension type  [I95.9] Hypotension        ED Disposition Condition    Observation Stable                Destin Carr MD  04/06/23 0369

## 2023-04-05 NOTE — ED TRIAGE NOTES
Pt went to an doctor's appt this am and found to have low BP and sent here for evaluation. Pt denies N/V/D, CP, SOB. Pt does complain of leg pain as well, says she has been having a lot of falls lately.

## 2023-04-05 NOTE — TELEPHONE ENCOUNTER
Refill Encounter unsure of sig and diagnosis    PCP Visits: Recent Visits  Date Type Provider Dept   01/30/23 Office Visit Ghulam Contreras MD [facility] Internal Medicine   Showing recent visits within past 360 days and meeting all other requirements  Future Appointments  Date Type Provider Dept   04/20/23 Appointment Ghulam Contreras MD [facility] Medicine   Showing future appointments within next 720 days and meeting all other requirements     Last 3 Blood Pressure:   BP Readings from Last 3 Encounters:   03/17/23 (!) 134/90   02/23/23 (!) 144/93   01/30/23 100/70     Preferred Pharmacy:   [facility] - [place], [place] - [address]  [address] [address]  Phone: [phone] Fax: [phone]      Requested RX:  Requested Prescriptions     Pending Prescriptions Disp Refills    atorvastatin (LIPITOR) 80 MG tablet 90 tablet 0     Sig: Take 1 tablet (80 mg total) by mouth once daily.    lisinopriL-hydrochlorothiazide (PRINZIDE,ZESTORETIC) 10-12.5 mg per tablet 90 tablet 3     Sig: Take 1 tablet by mouth once daily.    metFORMIN (GLUCOPHAGE) 500 MG tablet       Sig: Take 2 tablets (1,000 mg total) by mouth once daily. Take with food.    hydroCHLOROthiazide (HYDRODIURIL) 12.5 MG Tab       Sig: Take 1 tablet (12.5 mg total) by mouth.      RX Route: Normal

## 2023-04-05 NOTE — TELEPHONE ENCOUNTER
Care Due:                  Date            Visit Type   Department     Provider  --------------------------------------------------------------------------------                                [facility] INTERNAL  Ghulam Mendoza  Last Visit: 01-      FOLLOW UP    MEDICINE       [place]                              EP -                              PRIMARY      [facility] INTERNAL  Ghulam Mendoza  Next Visit: 04-      CARE ([facility])   MEDICINE       [facility]                                                            Last  Test          Frequency    Reason                     Performed    Due Date  --------------------------------------------------------------------------------    HBA1C.......  6 months...  liraglutide..............  03- 09-    Lipid Panel.  12 months..  atorvastatin.............  03- 03-    [facility] Embedded Care Gaps. Reference number: [id number]. 4/05/2023   1:06:58 PM CDT

## 2023-04-05 NOTE — PROGRESS NOTES
Subjective:       Patient ID: Jonathan Gonzales is a 63 y.o. female.    Chief Complaint: Breast Cancer       Diagnosis: Stage IIB lt Breast CA T2N1MO ER weak pos/FL negative, HER-2/bridget amplified dx'd 11/2011  Therapy:       1.  s/p bilateral mastectomy with SHLOMO flap reconstruction 11/23/2011                         2.  completed   Phase III 0221 protocol 3/12/13                ( Adriamycin 60mg/m2/Cytoxan 60mg/2) x4 followed by weekly Paclitaxel 175mg/m2/Herceptin and completion of Herceptin therapy (6mg/kg q3wks) 1 yr completed 3/12/13      HPI 63 -year-old female with stage IIB breast cancer, left breast originally diagnosed in November 2011, status post bilateral skin -sparing mastectomy with a right prophylactic and a left radical mastectomy for a T2 N1 MO ,Stage 2b. breast cancer of the left breast on 11/23/2011 with immediate autologous tissue reconstruction with SHLOMO flap. She underwent second stage reconstructive surgery on 4/27/2015 which was complicated by a hematoma for which She was taken back to OR emergently  for aspiration. Pt previously Lost to  follow-up ( >1yr) and has re established care 2017 She was seen in the ED 10/2019 for RLE weakness and dysarthria. She was then admitted to  SNF for CVA.She is followed by psychiatry Dr. Fierro  at [facility]   for bipolar disorder and schizophrenia. She is followed by PCP for  history of type 2  DM w/neuropathy , HTN and hyperlipidemia         Interval Hx: She is concerned about wt loss and diminished appetite past few mos  She was seen by her PCP recently  No CP/SOB  No lightheadedness  No fevers, night sweats  Recently treated for UTI    She is unclear about her meds       She has a history of diabetes.    HbA1c 6.1% ( 4/2021)      She is followed by PCP for  history of type 2  DM w/neuropathy , HTN and hyperlipidemia      PrevHx: Tumor was ER weak pos /FL negative, HER-2/bridget amplified with 1 of total of 24 lymph nodes removed which  "revealed metastatic carcinoma, 0.4 cm in diameter with no extranodal extension.She had abnormal PET imaging 12/2011 which revealed. Findings suggestive of metastatic disease to supraclavicular, mediastinal and right hilar lymph nodes.She underwent rt supraclavicular lymph node biopsy which was negative for malignancy. PET/CT 10/12 revealed resolution of LAD and no evid of mets.Patient was enrolled in 0221 study. She completed 1 yr of Herceptin therapy3/12/2013.     Tx; Phase III 0221 protocol- DD AC ( Adriamycin 60mg/m2/Cytoxan 60mg/2) x4 followed by weekly Paclitaxel 175mg/m2/Herceptin and completion of Herceptin therapy (6mg/kg q3wks) 1 yr 3/12/13          Review of Systems   Constitutional:  Positive for appetite change and fatigue. Negative for chills.   HENT:  Negative for congestion, hearing loss and nosebleeds.    Eyes:  Negative for visual disturbance.   Respiratory:  Negative for cough, chest tightness and shortness of breath.    Cardiovascular:  Negative for chest pain and leg swelling.   Gastrointestinal:  Negative for abdominal pain, blood in stool, constipation, diarrhea, nausea and vomiting.   Genitourinary:  Negative for dysuria, flank pain, frequency and hematuria.   Musculoskeletal:  Negative for arthralgias, back pain, gait problem, joint swelling and neck pain.   Skin:  Negative for rash.        No petechiae, ecchymoses   Neurological:  Negative for dizziness, light-headedness, numbness and headaches.   Hematological:  Negative for adenopathy. Does not bruise/bleed easily.   Psychiatric/Behavioral:  Negative for dysphoric mood. The patient is not nervous/anxious.      Objective:       Vitals:    04/05/23 1401 04/05/23 1427   BP: (!) 74/50 (!) 84/68   BP Location: Right arm Left arm   Patient Position: Sitting Sitting   BP Method: Large (Automatic) Large (Manual)   Pulse: 64    SpO2: 99%    Weight: 66 kg (145 lb 8.1 oz)    Height: 5' 3" (1.6 m)        Physical Exam  Constitutional:       " Appearance: She is well-developed.   HENT:      Head: Normocephalic.      Mouth/Throat:      Pharynx: No oropharyngeal exudate.   Eyes:      General: Lids are normal. No scleral icterus.     Conjunctiva/sclera: Conjunctivae normal.   Neck:      Thyroid: No thyromegaly.   Cardiovascular:      Rate and Rhythm: Normal rate and regular rhythm.      Heart sounds: Normal heart sounds. No murmur heard.  Pulmonary:      Breath sounds: Normal breath sounds. No wheezing or rales.   Chest:      Comments: Bilateral reconstructed breasts- no masses or axillary LAD noted  Abdominal:      General: Bowel sounds are normal.      Palpations: Abdomen is soft.      Tenderness: There is no abdominal tenderness. There is no guarding or rebound.   Musculoskeletal:         General: No tenderness. Normal range of motion.      Cervical back: Normal range of motion and neck supple.      Comments: Compression sleeves present   Lymphadenopathy:      Cervical: No cervical adenopathy.      Upper Body:      Right upper body: No supraclavicular adenopathy.      Left upper body: No supraclavicular adenopathy.   Skin:     General: Skin is warm and dry.      Findings: No ecchymosis, erythema, petechiae or rash.   Neurological:      Mental Status: She is alert and oriented to person, place, and time.      Cranial Nerves: No cranial nerve deficit.      Coordination: Coordination normal.           Results for JOYA MUNOZ (MRN [id number]) as of 6/8/2015 13:40   Ref. Range 6/2/2015 07:50   Vit D, 25-Hydroxy Latest Range: 30-96 ng/mL 33     Lab Results   Component Value Date    WBC 5.57 04/06/2023    HGB 10.2 (L) 04/06/2023    HCT 32.8 (L) 04/06/2023    MCV 91 04/06/2023     04/06/2023           10/19/2016 Bone Density-nl    Labs: none   Assessment:       1. Malignant neoplasm of left female breast, unspecified estrogen receptor status, unspecified site of breast    2. Schizoaffective disorder, bipolar type    3. Type 2 diabetes mellitus without  complication, unspecified whether long term insulin use    4. BUFFY (acute kidney injury)          Plan:        Breast cancer, Stage IIB lt Breast CA T2N1MO ER weak pos/ WI neg Her 2 pos diagnosed  11/2011   - s/p bilateral mastectomy with SHLOMO flap reconstruction 11/23/2011   - s/p chemotherapy per  Phase III 0221 protocol      S/p second stage reconstructive surgery      Rt Breast US 10/2015- Area in the right breast is benign-negative.  .   ACR BI-RADS Category 2: Benign   - CUAUHTEMOC  recurrence clinically     Lymphedema   Stable  Cont compression sleeves      Bipolar d/o  Follow-up with psych  Cont meds as prescribed per psych    DM type 2  Elevated glucose on recent labs   Last HbA1c 6.1% on 4/19/21   Follow up with PCP    BUFFY  Cr  3.1   Referral to ED     Refer to ED for Hypotension and BUFFY  Follow up with PCP for med mgmt    Advance Care Planning     Power of   I previously initiated the process of advance care planning today and explained the importance of this process to the patient.  I introduced the concept of advance directives to the patient, as well. Then the patient received detailed information about the importance of designating a Health Care Power of  (HCPOA). She was also instructed to communicate with this person about their wishes for future healthcare, should she become sick and lose decision-making capacity. The patient has not previously appointed a HCPOA. After our discussion, the patient has decided to complete a HCPOA and has appointed her daughter and NAME:    Rojelio Daniels    I spent a total time of 16  minutes discussing this issue with the patient.           CC: Ghulam Contreras MD

## 2023-04-06 VITALS
OXYGEN SATURATION: 100 % | TEMPERATURE: 98 F | HEIGHT: 63 IN | RESPIRATION RATE: 18 BRPM | BODY MASS INDEX: 26.6 KG/M2 | WEIGHT: 150.13 LBS | DIASTOLIC BLOOD PRESSURE: 69 MMHG | HEART RATE: 44 BPM | SYSTOLIC BLOOD PRESSURE: 110 MMHG

## 2023-04-06 LAB
ALBUMIN SERPL BCP-MCNC: 2.8 G/DL (ref 3.5–5.2)
ALP SERPL-CCNC: 56 U/L (ref 55–135)
ALT SERPL W/O P-5'-P-CCNC: <5 U/L (ref 10–44)
ANION GAP SERPL CALC-SCNC: 5 MMOL/L (ref 8–16)
AST SERPL-CCNC: 15 U/L (ref 10–40)
BASOPHILS # BLD AUTO: 0.04 K/UL (ref 0–0.2)
BASOPHILS NFR BLD: 0.7 % (ref 0–1.9)
BILIRUB SERPL-MCNC: 0.5 MG/DL (ref 0.1–1)
BUN SERPL-MCNC: 51 MG/DL (ref 8–23)
CALCIUM SERPL-MCNC: 8.8 MG/DL (ref 8.7–10.5)
CHLORIDE SERPL-SCNC: 110 MMOL/L (ref 95–110)
CO2 SERPL-SCNC: 24 MMOL/L (ref 23–29)
CREAT SERPL-MCNC: 1.3 MG/DL (ref 0.5–1.4)
DIFFERENTIAL METHOD: ABNORMAL
EOSINOPHIL # BLD AUTO: 0.1 K/UL (ref 0–0.5)
EOSINOPHIL NFR BLD: 2.2 % (ref 0–8)
ERYTHROCYTE [DISTWIDTH] IN BLOOD BY AUTOMATED COUNT: 14.9 % (ref 11.5–14.5)
EST. GFR  (NO RACE VARIABLE): 46 ML/MIN/1.73 M^2
GLUCOSE SERPL-MCNC: 94 MG/DL (ref 70–110)
HCT VFR BLD AUTO: 32.8 % (ref 37–48.5)
HGB BLD-MCNC: 10.2 G/DL (ref 12–16)
IMM GRANULOCYTES # BLD AUTO: 0.02 K/UL (ref 0–0.04)
IMM GRANULOCYTES NFR BLD AUTO: 0.4 % (ref 0–0.5)
LYMPHOCYTES # BLD AUTO: 2.6 K/UL (ref 1–4.8)
LYMPHOCYTES NFR BLD: 46.5 % (ref 18–48)
MCH RBC QN AUTO: 28.2 PG (ref 27–31)
MCHC RBC AUTO-ENTMCNC: 31.1 G/DL (ref 32–36)
MCV RBC AUTO: 91 FL (ref 82–98)
MONOCYTES # BLD AUTO: 0.5 K/UL (ref 0.3–1)
MONOCYTES NFR BLD: 9.5 % (ref 4–15)
NEUTROPHILS # BLD AUTO: 2.3 K/UL (ref 1.8–7.7)
NEUTROPHILS NFR BLD: 40.7 % (ref 38–73)
NRBC BLD-RTO: 0 /100 WBC
OSMOLALITY SERPL: 310 MOSM/KG (ref 275–295)
OSMOLALITY UR: 513 MOSM/KG (ref 50–1200)
PLATELET # BLD AUTO: 364 K/UL (ref 150–450)
PMV BLD AUTO: 10.1 FL (ref 9.2–12.9)
POCT GLUCOSE: 83 MG/DL (ref 70–110)
POCT GLUCOSE: 89 MG/DL (ref 70–110)
POCT GLUCOSE: 91 MG/DL (ref 70–110)
POTASSIUM SERPL-SCNC: 4.7 MMOL/L (ref 3.5–5.1)
PROT SERPL-MCNC: 7.3 G/DL (ref 6–8.4)
RBC # BLD AUTO: 3.62 M/UL (ref 4–5.4)
SODIUM SERPL-SCNC: 139 MMOL/L (ref 136–145)
WBC # BLD AUTO: 5.57 K/UL (ref 3.9–12.7)

## 2023-04-06 PROCEDURE — 80053 COMPREHEN METABOLIC PANEL: CPT | Mod: HCNC | Performed by: HOSPITALIST

## 2023-04-06 PROCEDURE — 93010 EKG 12-LEAD: ICD-10-PCS | Mod: ,,, | Performed by: INTERNAL MEDICINE

## 2023-04-06 PROCEDURE — G0378 HOSPITAL OBSERVATION PER HR: HCPCS | Mod: HCNC

## 2023-04-06 PROCEDURE — 25000003 PHARM REV CODE 250: Mod: HCNC | Performed by: HOSPITALIST

## 2023-04-06 PROCEDURE — 85025 COMPLETE CBC W/AUTO DIFF WBC: CPT | Mod: HCNC | Performed by: HOSPITALIST

## 2023-04-06 PROCEDURE — 63600175 PHARM REV CODE 636 W HCPCS: Mod: HCNC | Performed by: HOSPITALIST

## 2023-04-06 PROCEDURE — 82962 GLUCOSE BLOOD TEST: CPT | Mod: HCNC

## 2023-04-06 PROCEDURE — 93010 ELECTROCARDIOGRAM REPORT: CPT | Mod: ,,, | Performed by: INTERNAL MEDICINE

## 2023-04-06 PROCEDURE — 96372 THER/PROPH/DIAG INJ SC/IM: CPT | Performed by: HOSPITALIST

## 2023-04-06 RX ORDER — AMOXICILLIN AND CLAVULANATE POTASSIUM 500; 125 MG/1; MG/1
1 TABLET, FILM COATED ORAL 2 TIMES DAILY
Qty: 8 TABLET | Refills: 0 | Status: SHIPPED | OUTPATIENT
Start: 2023-04-06 | End: 2023-12-11

## 2023-04-06 RX ORDER — AMOXICILLIN AND CLAVULANATE POTASSIUM 500; 125 MG/1; MG/1
1 TABLET, FILM COATED ORAL 2 TIMES DAILY
Qty: 8 TABLET | Refills: 0 | Status: SHIPPED | OUTPATIENT
Start: 2023-04-06 | End: 2023-04-06 | Stop reason: SDUPTHER

## 2023-04-06 RX ADMIN — POLYETHYLENE GLYCOL 3350 17 G: 17 POWDER, FOR SOLUTION ORAL at 10:04

## 2023-04-06 RX ADMIN — HEPARIN SODIUM 5000 UNITS: 5000 INJECTION, SOLUTION INTRAVENOUS; SUBCUTANEOUS at 06:04

## 2023-04-06 RX ADMIN — ATORVASTATIN CALCIUM 80 MG: 40 TABLET, FILM COATED ORAL at 10:04

## 2023-04-06 NOTE — PLAN OF CARE
Problem: Adult Inpatient Plan of Care  Goal: Plan of Care Review  Outcome: Met  Goal: Patient-Specific Goal (Individualized)  Outcome: Met  Goal: Absence of Hospital-Acquired Illness or Injury  Outcome: Met  Goal: Optimal Comfort and Wellbeing  Outcome: Met  Goal: Readiness for Transition of Care  Outcome: Met     Problem: Infection  Goal: Absence of Infection Signs and Symptoms  Outcome: Met     Problem: Diabetes Comorbidity  Goal: Blood Glucose Level Within Targeted Range  Outcome: Met     Problem: Fluid and Electrolyte Imbalance (Acute Kidney Injury/Impairment)  Goal: Fluid and Electrolyte Balance  Outcome: Met     Problem: Oral Intake Inadequate (Acute Kidney Injury/Impairment)  Goal: Optimal Nutrition Intake  Outcome: Met     Problem: Renal Function Impairment (Acute Kidney Injury/Impairment)  Goal: Effective Renal Function  Outcome: Met     Problem: Skin Injury Risk Increased  Goal: Skin Health and Integrity  Outcome: Met

## 2023-04-06 NOTE — HPI
63 y.o. female with hypertension, DM2, and schizoaffective disorder bipolar type sent from outpatient oncology visit for evaluation of hypotension.  Recent labs on 04/03/2023 showed BUFFY with creatinine of 3.1.  Patient reports fatigue and decreased appetite.  Duration several days.  Denies fever, chills, cough, SOB, chest pain, palpitations, dizziness, syncope, nausea, vomiting, diarrhea, or abdominal pain.  In the ED, she was found to be hypotensive, labs demonstrate BUFFY but this appears to be slightly improving.  Evidence of infection on urinalysis.  Otherwise unremarkable for acute abnormality.  She is not febrile, there is no leukocytosis, lactic acid normal.  She was given IV fluids with improvement of her blood pressure.  Placed in observation.

## 2023-04-06 NOTE — PLAN OF CARE
[facility] - Telemetry  Discharge Final Note  TN sent a secure chat to telemetry nurse Benito that the patient is cleared for discharge from case management's viewpoint.    Primary Care Provider: Ghulam Contreras MD    Expected Discharge Date: 4/6/2023    Final Discharge Note (most recent)       Final Note - 04/06/23 0918          Final Note    Assessment Type Final Discharge Note     Anticipated Discharge Disposition Home or Self Care     What phone number can be called within the next 1-3 days to see how you are doing after discharge? --   see chart    Hospital Resources/Appts/Education Provided Appointments scheduled and added to AVS;Provided patient/caregiver with written discharge plan information;Appointments scheduled by Navigator/Coordinator        Post-Acute Status    Discharge Delays None known at this time                     Important Message from Medicare na             Contact Info       Ghulam Contreras MD   Specialty: Family Medicine   Relationship: PCP - General    [facility] Kunal Aranda  [address] [address]   Phone: [phone]       Next Steps: Follow up on 4/20/2023    Instructions: 8:30 AM  placed on wait lsit    OUT PATIENT CASE MANAGEMENT    [phone]  WILL CALL PATIENT       Next Steps: Follow up    Instructions: out patient services

## 2023-04-06 NOTE — NURSING
Pt arrive to the unit by bed accompanied by transport. Assisted pt to bed. Tele monitoring initiated.  Admit assessment initiated.  Pt is AAO.   IV infiltrated pt complaining of arm pain. IV removed. Notified house sup about ultrasound IV.  Patient's cane at bedside along with belongings.

## 2023-04-06 NOTE — H&P
[facility] Medicine  History & Physical    Patient Name: Jonathan Gonzales  MRN: [id number]  Patient Class: OP- Observation  Admission Date: 4/5/2023  Attending Physician: Gasper Gonzalez MD   Primary Care Provider: Ghulam Contreras MD         Patient information was obtained from patient, past medical records and ER records.     Subjective:     Principal Problem:BUFFY (acute kidney injury)    Chief Complaint:   Chief Complaint   Patient presents with    Hypotension     64 yo female to triage for low blood pressure and possibly kidney failure. Sent from Oncologist to get further eval of kidneys and hypotension. Pt denies N/V/D, CP, SOB. Pt does complain of leg pain as well, says she has been having a lot of falls lately. AAOx4.     Fall        HPI: 63 y.o. female with hypertension, DM2, and schizoaffective disorder bipolar type sent from outpatient oncology visit for evaluation of hypotension.  Recent labs on 04/03/2023 showed BUFFY with creatinine of 3.1.  Patient reports fatigue and decreased appetite.  Duration several days.  Denies fever, chills, cough, SOB, chest pain, palpitations, dizziness, syncope, nausea, vomiting, diarrhea, or abdominal pain.  In the ED, she was found to be hypotensive, labs demonstrate BUFFY but this appears to be slightly improving.  Evidence of infection on urinalysis.  Otherwise unremarkable for acute abnormality.  She is not febrile, there is no leukocytosis, lactic acid normal.  She was given IV fluids with improvement of her blood pressure.  Placed in observation.      Past Medical History:   Diagnosis Date    Bipolar disorder     Cancer of female breast 10/2010    T2 N1 Mx, Stage IIB left breast cancer    Cancer of upper-outer quadrant of female breast 7/9/2012    Depression     Diabetes mellitus type II     Disturbances of sensation of smell and taste 6/29/2012    Hypertension     Malignant neoplasm of breast (female), unspecified site 4/27/2015     Neuropathy     Nonspecific abnormal unspecified cardiovascular function study 4/12/2013    ECG READ AS SHOWING A T-WAVE ABNORMALITY     Post-mastectomy lymphedema syndrome     Schizophrenia     Stroke        Past Surgical History:   Procedure Laterality Date    BREAST RECONSTRUCTION  11/23/11    B/L SHLOMO flap reconstruction S/P left MRM, right prophylactic mastectomy    BREAST RECONSTRUCTION Bilateral 3/13/2015    NIPPLE RECONSTRUCTION    INJECTION OF ANESTHETIC AGENT AROUND NERVE Left 12/9/2020    Procedure: BLOCK, NERVE, C3-C4-C5-C6 MEDIAL BRANCH;  Surgeon: Darren Jerry MD;  Location: [facility];  Service: Pain Management;  Laterality: Left;    MASTECTOMY Bilateral 01/2011    bilateral    RADIOFREQUENCY ABLATION Left 2/3/2021    Procedure: RADIOFREQUENCY ABLATION, C3,C4,C5,C6 MEDIAL BRANCH 1 of 2;  Surgeon: Darren Jerry MD;  Location: [facility];  Service: Pain Management;  Laterality: Left;    RADIOFREQUENCY ABLATION Right 7/28/2021    Procedure: RADIOFREQUENCY ABLATION, C3-C4-C5-C6 MEDIAL BR ANCH 1 IOF 2;  Surgeon: Darren Jerry MD;  Location: [facility];  Service: Pain Management;  Laterality: Right;    RADIOFREQUENCY ABLATION Left 8/18/2021    Procedure: RADIOFREQUENCY ABLATION, C3-C4-C5-C6 MEDIAL BRANCH 2 OF 2;  Surgeon: Darren Jerry MD;  Location: [facility];  Service: Pain Management;  Laterality: Left;    TRANSFORAMINAL EPIDURAL INJECTION OF STEROID Bilateral 11/4/2020    Procedure: INJECTION, STEROID, EPIDURAL, TRANSFORAMINAL APPROACH L4/L5;  Surgeon: Darren Jerry MD;  Location: [facility];  Service: Pain Management;  Laterality: Bilateral;  Bilateral L4/L5    TRANSFORAMINAL EPIDURAL INJECTION OF STEROID Bilateral 2/10/2021    Procedure: INJECTION, STEROID, EPIDURAL, TRANSFORAMINAL APPROACH, L4-L5;  Surgeon: Darren Jerry MD;  Location: [facility];  Service: Pain Management;  Laterality: Bilateral;    TRANSFORAMINAL EPIDURAL INJECTION OF  STEROID Bilateral 3/10/2021    Procedure: INJECTION, STEROID, EPIDURAL, TRANSFORAMINAL APPROACH, L4-L5;  Surgeon: Darren Jerry MD;  Location: [facility];  Service: Pain Management;  Laterality: Bilateral;    TRANSFORAMINAL EPIDURAL INJECTION OF STEROID Bilateral 12/15/2021    Procedure: INJECTION, STEROID, EPIDURAL, TRANSFORAMINAL APPROACH  Bilateral L4/5 TFESI / needs consent;  Surgeon: Darren Jerry MD;  Location: [facility];  Service: Pain Management;  Laterality: Bilateral;    TRANSFORAMINAL EPIDURAL INJECTION OF STEROID Bilateral 12/14/2022    Procedure: INJECTION, STEROID, EPIDURAL, TRANSFORAMINAL APPROACH BILATERAL L4/5 CONTRAST;  Surgeon: Darren Jerry MD;  Location: [facility];  Service: Pain Management;  Laterality: Bilateral;    TUBAL LIGATION         Review of patient's allergies indicates:  No Known Allergies    Current Facility-Administered Medications on File Prior to Encounter   Medication    0.9%  NaCl infusion     Current Outpatient Medications on File Prior to Encounter   Medication Sig    acetaminophen-codeine 300-30mg (TYLENOL #3) 300-30 mg Tab Take 1 tablet by mouth every 6 (six) hours as needed (pain).    ammonium lactate 12 % Crea Apply twice daily to affected parts both feet as needed.    arm brace (WRIST SUPPORT LARGE-XLARGE MISC)     aspirin (ECOTRIN) 81 MG EC tablet Take 1 tablet (81 mg total) by mouth once daily.    atorvastatin (LIPITOR) 80 MG tablet Take 1 tablet (80 mg total) by mouth once daily.    blood glucose control, low Soln 1 drop by Misc.(Non-Drug; Combo Route) route as needed.    blood sugar diagnostic (ACCU-CHEK AMBER PLUS TEST STRP) Strp Inject 2 strips into the skin 2 (two) times a day. Test Blood Sugar    blood-glucose meter (ACCU-CHEK AMBER PLUS METER) Misc Use as directed to check blood sugar three times daily    cefUROXime (CEFTIN) 500 MG tablet 1 tablet EVERY 12 HOURS (route: oral)    chlorhexidine (PERIDEX) 0.12 % solution RINSE  "AND SPIT 15 ML TWICE DAILY FOR 7 DAYS    DEPAKOTE  mg Tb24 Take 500 mg by mouth nightly.     divalproex 500 MG Tb24 1 tablet EVERY PM (route: oral)    EASY TOUCH 31 gauge x 1/4" Ndle Use as directed    fentaNYL (SUBLIMAZE) 50 mcg/mL injection     GLUCAGON EMERGENCY KIT, HUMAN, 1 mg injection SMARTSI Vial(s) IM PRN    GLUTOSE-15 40 % gel SMARTSI unspecified By Mouth PRN    HUMALOG U-100 INSULIN 100 unit/mL injection SMARTSI.01 Milliliter(s) SUB-Q 4 Times Daily    hydroCHLOROthiazide (HYDRODIURIL) 12.5 MG Tab Take 1 tablet (12.5 mg total) by mouth once daily.    insulin (LANTUS SOLOSTAR U-100 INSULIN) glargine 100 units/mL SubQ pen INJECT 23 UNITS SUBCUTANEOUSLY NIGHTLY  HOLD until you see your doctor and are told to resume    lancets Misc To check BG 3 times daily, to use with Accu check Guide meter pt has.    liraglutide 0.6 mg/0.1 mL, 18 mg/3 mL, subq PNIJ (VICTOZA 3-HERMANN) 0.6 mg/0.1 mL (18 mg/3 mL) PnIj pen INJECT 1.8 MG SUBCUTANEOUSLY ONCE DAILY.    liraglutide 0.6 mg/0.1 mL, 18 mg/3 mL, subq PNIJ (VICTOZA 3-HERMANN) 0.6 mg/0.1 mL (18 mg/3 mL) PnIj pen 1.8 mg DAILY (route: subcutaneous)    lisinopriL (PRINIVIL,ZESTRIL) 5 MG tablet Take 5 mg by mouth.    lisinopriL 10 MG tablet Take 5 mg by mouth.    lisinopriL-hydrochlorothiazide (PRINZIDE,ZESTORETIC) 10-12.5 mg per tablet Take 1 tablet by mouth once daily.    metFORMIN (GLUCOPHAGE) 500 MG tablet Take 2 tablets (1,000 mg total) by mouth 2 (two) times daily with meals. Take with food.    OMNIPAQUE 300 300 mg iodine/mL injection     pen needle, diabetic (BD ULTRA-FINE ITALO PEN NEEDLE) 32 gauge x 5/32" Ndle Pt is injecting once daily    pregabalin (LYRICA) 100 MG capsule Take 1 capsule (100 mg total) by mouth 3 (three) times daily. HOLD until you are seen by your doctor and told to resume    QUEtiapine (SEROQUEL) 100 MG Tab Take 1 tablet (100 mg total) by mouth every evening.    TRUEPLUS LANCETS 33 gauge Misc 100 lancets by " Subconjunctival route 4 (four) times daily.    ziprasidone (GEODON) 40 MG Cap TAKE 1 CAPSULE EVERY EVENING    ziprasidone (GEODON) 40 MG Cap 1 capsule EVERY PM (route: oral)    [DISCONTINUED] atorvastatin (LIPITOR) 80 MG tablet TAKE 1 TABLET EVERY DAY    [DISCONTINUED] hydroCHLOROthiazide (HYDRODIURIL) 12.5 MG Tab Take 12.5 mg by mouth.    [DISCONTINUED] lisinopriL-hydrochlorothiazide (PRINZIDE,ZESTORETIC) 10-12.5 mg per tablet Take 1 tablet by mouth once daily.    [DISCONTINUED] metFORMIN (GLUCOPHAGE) 500 MG tablet Take 2 tablets (1,000 mg total) by mouth once daily. Take with food.    [DISCONTINUED] nicotine (NICODERM CQ) 14 mg/24 hr Place 1 patch onto the skin once daily.     Family History       Problem Relation (Age of Onset)    Cancer Sister, Daughter    Cervical cancer Daughter    Deep vein thrombosis Mother    Diabetes Sister, Brother, Son    Diabetic kidney disease Sister    Glaucoma Mother    Hypertension Mother    Kidney disease Mother    Lung cancer Sister    No Known Problems Father, Maternal Grandmother, Maternal Grandfather, Paternal Grandmother, Paternal Grandfather, Son, Daughter, Daughter, Daughter          Tobacco Use    Smoking status: Former     Packs/day: 0.50     Years: 25.00     Pack years: 12.50     Types: Cigarettes     Start date:      Quit date: 3/17/2022     Years since quittin.0    Smokeless tobacco: Never    Tobacco comments:     currently at less than 1/2 pack pd   Substance and Sexual Activity    Alcohol use: Yes     Alcohol/week: 0.0 standard drinks     Comment: social - once every 2 weeks    Drug use: No     Comment:  - stopped using crack cocaine    Sexual activity: Not Currently     Partners: Male     Review of Systems   Constitutional:  Positive for activity change, appetite change and fatigue. Negative for chills and fever.   HENT:  Negative for congestion and rhinorrhea.    Eyes:  Negative for photophobia and visual disturbance.   Respiratory:   Negative for cough and shortness of breath.    Cardiovascular:  Negative for chest pain, palpitations and leg swelling.   Gastrointestinal:  Negative for abdominal pain, diarrhea, nausea and vomiting.   Genitourinary:  Negative for dysuria, frequency and urgency.   Skin:  Negative for pallor, rash and wound.   Neurological:  Negative for light-headedness and headaches.   Psychiatric/Behavioral:  Negative for confusion and decreased concentration.    Objective:     Vital Signs (Most Recent):  Temp: 98.1 °F (36.7 °C) (04/05/23 1449)  Pulse: 61 (04/05/23 1903)  Resp: 20 (04/05/23 1903)  BP: (!) 92/57 (04/05/23 1903)  SpO2: 100 % (04/05/23 1903)   Vital Signs (24h Range):  Temp:  [98.1 °F (36.7 °C)] 98.1 °F (36.7 °C)  Pulse:  [48-84] 61  Resp:  [14-21] 20  SpO2:  [99 %-100 %] 100 %  BP: (74-97)/(50-68) 92/57     Weight: 66 kg (145 lb 8 oz)  Body mass index is 25.77 kg/m².    Physical Exam  Vitals and nursing note reviewed.   Constitutional:       General: She is not in acute distress.     Appearance: She is well-developed.   HENT:      Head: Normocephalic and atraumatic.      Right Ear: External ear normal.      Left Ear: External ear normal.      Nose: Nose normal.   Eyes:      Conjunctiva/sclera: Conjunctivae normal.   Cardiovascular:      Rate and Rhythm: Normal rate and regular rhythm.   Pulmonary:      Effort: Pulmonary effort is normal. No respiratory distress.   Abdominal:      General: There is no distension.      Palpations: Abdomen is soft.   Musculoskeletal:         General: Normal range of motion.      Cervical back: Normal range of motion and neck supple.   Skin:     General: Skin is warm and dry.   Neurological:      Mental Status: She is alert and oriented to person, place, and time.   Psychiatric:         Thought Content: Thought content normal.           Significant Labs: All pertinent labs within the past 24 hours have been reviewed.    Significant Imaging: I have reviewed all pertinent imaging  results/findings within the past 24 hours.    Assessment/Plan:     * BUFFY (acute kidney injury)  Patient with acute kidney injury likely due to IVVD/dehydration BUFFY is currently improving. Labs reviewed- Renal function/electrolytes with Estimated Creatinine Clearance: 24.7 mL/min (A) (based on SCr of 2.1 mg/dL (H)). according to latest data. Monitor urine output and serial BMP and adjust therapy as needed. Avoid nephrotoxins and renally dose meds for GFR listed above.     Hypotension  In setting of BUFFY and poor PO intake, hold ACEi and thiazide, continue IV fluids    Acute cystitis  Cultures pending, continue antibiotics    Essential hypertension  Hypotensive at presentation, hold antihypertensive medications, monitor blood pressure, adjust as needed.    Schizoaffective disorder, bipolar type  depkote level pending, Continue home regimen of quetiapine     Type 2 diabetes mellitus with neurologic complication, with long-term current use of insulin  Patient's FSGs are controlled on current medication regimen.  Last A1c reviewed-   Lab Results   Component Value Date    HGBA1C 7.1 (H) 03/16/2022     Most recent fingerstick glucose reviewed-   Recent Labs   Lab 04/05/23  1454   POCTGLUCOSE 167*     Current correctional scale  Medium  Maintain anti-hyperglycemic dose as follows-   Antihyperglycemics (From admission, onward)    Start     Stop Route Frequency Ordered    04/05/23 2103  insulin aspart U-100 pen 1-10 Units         -- SubQ Before meals & nightly PRN 04/05/23 2003        Hold Oral hypoglycemics while patient is in the hospital.    VTE Risk Mitigation (From admission, onward)         Ordered     heparin (porcine) injection 5,000 Units  Every 8 hours         04/05/23 2003     IP VTE HIGH RISK PATIENT  Once         04/05/23 2003     Place sequential compression device  Until discontinued         04/05/23 2003                     On 04/05/2023, patient should be placed in hospital observation services under my care  in collaboration with Gasper Gonzalez MD.    Benja Harden Jr., APRN, [facility]-BC  Hospitalist - Department of Hospital Medicine  [facility] [address] [place] [address]  Office #: [phone]; Pager #: [phone]

## 2023-04-06 NOTE — ASSESSMENT & PLAN NOTE
Hypotensive at presentation, hold antihypertensive medications, monitor blood pressure, adjust as needed.

## 2023-04-06 NOTE — SUBJECTIVE & OBJECTIVE
Past Medical History:   Diagnosis Date    Bipolar disorder     Cancer of female breast 10/2010    T2 N1 Mx, Stage IIB left breast cancer    Cancer of upper-outer quadrant of female breast 7/9/2012    Depression     Diabetes mellitus type II     Disturbances of sensation of smell and taste 6/29/2012    Hypertension     Malignant neoplasm of breast (female), unspecified site 4/27/2015    Neuropathy     Nonspecific abnormal unspecified cardiovascular function study 4/12/2013    ECG READ AS SHOWING A T-WAVE ABNORMALITY     Post-mastectomy lymphedema syndrome     Schizophrenia     Stroke        Past Surgical History:   Procedure Laterality Date    BREAST RECONSTRUCTION  11/23/11    B/L SHLOMO flap reconstruction S/P left MRM, right prophylactic mastectomy    BREAST RECONSTRUCTION Bilateral 3/13/2015    NIPPLE RECONSTRUCTION    INJECTION OF ANESTHETIC AGENT AROUND NERVE Left 12/9/2020    Procedure: BLOCK, NERVE, C3-C4-C5-C6 MEDIAL BRANCH;  Surgeon: Darren Jerry MD;  Location: [facility], [place], operated by [facility];  Service: Pain Management;  Laterality: Left;    MASTECTOMY Bilateral 01/2011    bilateral    RADIOFREQUENCY ABLATION Left 2/3/2021    Procedure: RADIOFREQUENCY ABLATION, C3,C4,C5,C6 MEDIAL BRANCH 1 of 2;  Surgeon: Darren Jerry MD;  Location: [facility], [place], operated by [facility];  Service: Pain Management;  Laterality: Left;    RADIOFREQUENCY ABLATION Right 7/28/2021    Procedure: RADIOFREQUENCY ABLATION, C3-C4-C5-C6 MEDIAL BR ANCH 1 IOF 2;  Surgeon: Darren Jerry MD;  Location: [facility], [place], operated by [facility];  Service: Pain Management;  Laterality: Right;    RADIOFREQUENCY ABLATION Left 8/18/2021    Procedure: RADIOFREQUENCY ABLATION, C3-C4-C5-C6 MEDIAL BRANCH 2 OF 2;  Surgeon: Darren Jerry MD;  Location: [facility], [place], operated by [facility];  Service: Pain Management;  Laterality: Left;    TRANSFORAMINAL EPIDURAL INJECTION OF STEROID Bilateral 11/4/2020    Procedure: INJECTION, STEROID, EPIDURAL, TRANSFORAMINAL APPROACH L4/L5;  Surgeon: Darren Jerry MD;  Location: [facility], [place], operated by [facility]  [facility]IN [facility];  Service: Pain Management;  Laterality: Bilateral;  Bilateral L4/L5    TRANSFORAMINAL EPIDURAL INJECTION OF STEROID Bilateral 2/10/2021    Procedure: INJECTION, STEROID, EPIDURAL, TRANSFORAMINAL APPROACH, L4-L5;  Surgeon: Darren Jerry MD;  Location: [facility];  Service: Pain Management;  Laterality: Bilateral;    TRANSFORAMINAL EPIDURAL INJECTION OF STEROID Bilateral 3/10/2021    Procedure: INJECTION, STEROID, EPIDURAL, TRANSFORAMINAL APPROACH, L4-L5;  Surgeon: Darren Jerry MD;  Location: [facility];  Service: Pain Management;  Laterality: Bilateral;    TRANSFORAMINAL EPIDURAL INJECTION OF STEROID Bilateral 12/15/2021    Procedure: INJECTION, STEROID, EPIDURAL, TRANSFORAMINAL APPROACH  Bilateral L4/5 TFESI / needs consent;  Surgeon: Darren Jerry MD;  Location: [facility];  Service: Pain Management;  Laterality: Bilateral;    TRANSFORAMINAL EPIDURAL INJECTION OF STEROID Bilateral 12/14/2022    Procedure: INJECTION, STEROID, EPIDURAL, TRANSFORAMINAL APPROACH BILATERAL L4/5 CONTRAST;  Surgeon: Darren Jerry MD;  Location: [facility];  Service: Pain Management;  Laterality: Bilateral;    TUBAL LIGATION         Review of patient's allergies indicates:  No Known Allergies    Current Facility-Administered Medications on File Prior to Encounter   Medication    0.9%  NaCl infusion     Current Outpatient Medications on File Prior to Encounter   Medication Sig    acetaminophen-codeine 300-30mg (TYLENOL #3) 300-30 mg Tab Take 1 tablet by mouth every 6 (six) hours as needed (pain).    ammonium lactate 12 % Crea Apply twice daily to affected parts both feet as needed.    arm brace (WRIST SUPPORT LARGE-XLARGE MISC)     aspirin (ECOTRIN) 81 MG EC tablet Take 1 tablet (81 mg total) by mouth once daily.    atorvastatin (LIPITOR) 80 MG tablet Take 1 tablet (80 mg total) by mouth once daily.    blood glucose control, low Soln 1 drop by Misc.(Non-Drug; Combo Route) route as needed.    blood  "sugar diagnostic (ACCU-CHEK AMBER PLUS TEST STRP) Strp Inject 2 strips into the skin 2 (two) times a day. Test Blood Sugar    blood-glucose meter (ACCU-CHEK AMBER PLUS METER) Misc Use as directed to check blood sugar three times daily    cefUROXime (CEFTIN) 500 MG tablet 1 tablet EVERY 12 HOURS (route: oral)    chlorhexidine (PERIDEX) 0.12 % solution RINSE AND SPIT 15 ML TWICE DAILY FOR 7 DAYS    DEPAKOTE  mg Tb24 Take 500 mg by mouth nightly.     divalproex 500 MG Tb24 1 tablet EVERY PM (route: oral)    EASY TOUCH 31 gauge x 1/4" Ndle Use as directed    fentaNYL (SUBLIMAZE) 50 mcg/mL injection     GLUCAGON EMERGENCY KIT, HUMAN, 1 mg injection SMARTSI Vial(s) IM PRN    GLUTOSE-15 40 % gel SMARTSI unspecified By Mouth PRN    HUMALOG U-100 INSULIN 100 unit/mL injection SMARTSI.01 Milliliter(s) SUB-Q 4 Times Daily    hydroCHLOROthiazide (HYDRODIURIL) 12.5 MG Tab Take 1 tablet (12.5 mg total) by mouth once daily.    insulin (LANTUS SOLOSTAR U-100 INSULIN) glargine 100 units/mL SubQ pen INJECT 23 UNITS SUBCUTANEOUSLY NIGHTLY  HOLD until you see your doctor and are told to resume    lancets Misc To check BG 3 times daily, to use with Accu check Guide meter pt has.    liraglutide 0.6 mg/0.1 mL, 18 mg/3 mL, subq PNIJ (VICTOZA 3-HERMANN) 0.6 mg/0.1 mL (18 mg/3 mL) PnIj pen INJECT 1.8 MG SUBCUTANEOUSLY ONCE DAILY.    liraglutide 0.6 mg/0.1 mL, 18 mg/3 mL, subq PNIJ (VICTOZA 3-HERMANN) 0.6 mg/0.1 mL (18 mg/3 mL) PnIj pen 1.8 mg DAILY (route: subcutaneous)    lisinopriL (PRINIVIL,ZESTRIL) 5 MG tablet Take 5 mg by mouth.    lisinopriL 10 MG tablet Take 5 mg by mouth.    lisinopriL-hydrochlorothiazide (PRINZIDE,ZESTORETIC) 10-12.5 mg per tablet Take 1 tablet by mouth once daily.    metFORMIN (GLUCOPHAGE) 500 MG tablet Take 2 tablets (1,000 mg total) by mouth 2 (two) times daily with meals. Take with food.    OMNIPAQUE 300 300 mg iodine/mL injection     pen needle, diabetic (BD ULTRA-FINE [place]O PEN NEEDLE) 32 gauge x 5/32" " Ndle Pt is injecting once daily    pregabalin (LYRICA) 100 MG capsule Take 1 capsule (100 mg total) by mouth 3 (three) times daily. HOLD until you are seen by your doctor and told to resume    QUEtiapine (SEROQUEL) 100 MG Tab Take 1 tablet (100 mg total) by mouth every evening.    TRUEPLUS LANCETS 33 gauge Misc 100 lancets by Subconjunctival route 4 (four) times daily.    ziprasidone (GEODON) 40 MG Cap TAKE 1 CAPSULE EVERY EVENING    ziprasidone (GEODON) 40 MG Cap 1 capsule EVERY PM (route: oral)    [DISCONTINUED] atorvastatin (LIPITOR) 80 MG tablet TAKE 1 TABLET EVERY DAY    [DISCONTINUED] hydroCHLOROthiazide (HYDRODIURIL) 12.5 MG Tab Take 12.5 mg by mouth.    [DISCONTINUED] lisinopriL-hydrochlorothiazide (PRINZIDE,ZESTORETIC) 10-12.5 mg per tablet Take 1 tablet by mouth once daily.    [DISCONTINUED] metFORMIN (GLUCOPHAGE) 500 MG tablet Take 2 tablets (1,000 mg total) by mouth once daily. Take with food.    [DISCONTINUED] nicotine (NICODERM CQ) 14 mg/24 hr Place 1 patch onto the skin once daily.     Family History       Problem Relation (Age of Onset)    Cancer Sister, Daughter    Cervical cancer Daughter    Deep vein thrombosis Mother    Diabetes Sister, Brother, Son    Diabetic kidney disease Sister    Glaucoma Mother    Hypertension Mother    Kidney disease Mother    Lung cancer Sister    No Known Problems Father, Maternal Grandmother, Maternal Grandfather, Paternal Grandmother, Paternal Grandfather, Son, Daughter, Daughter, Daughter          Tobacco Use    Smoking status: Former     Packs/day: 0.50     Years: 25.00     Pack years: 12.50     Types: Cigarettes     Start date:      Quit date: 3/17/2022     Years since quittin.0    Smokeless tobacco: Never    Tobacco comments:     currently at less than 1/2 pack pd   Substance and Sexual Activity    Alcohol use: Yes     Alcohol/week: 0.0 standard drinks     Comment: social - once every 2 weeks    Drug use: No     Comment:  - stopped using crack cocaine     Sexual activity: Not Currently     Partners: Male     Review of Systems   Constitutional:  Positive for activity change, appetite change and fatigue. Negative for chills and fever.   HENT:  Negative for congestion and rhinorrhea.    Eyes:  Negative for photophobia and visual disturbance.   Respiratory:  Negative for cough and shortness of breath.    Cardiovascular:  Negative for chest pain, palpitations and leg swelling.   Gastrointestinal:  Negative for abdominal pain, diarrhea, nausea and vomiting.   Genitourinary:  Negative for dysuria, frequency and urgency.   Skin:  Negative for pallor, rash and wound.   Neurological:  Negative for light-headedness and headaches.   Psychiatric/Behavioral:  Negative for confusion and decreased concentration.    Objective:     Vital Signs (Most Recent):  Temp: 98.1 °F (36.7 °C) (04/05/23 1449)  Pulse: 61 (04/05/23 1903)  Resp: 20 (04/05/23 1903)  BP: (!) 92/57 (04/05/23 1903)  SpO2: 100 % (04/05/23 1903)   Vital Signs (24h Range):  Temp:  [98.1 °F (36.7 °C)] 98.1 °F (36.7 °C)  Pulse:  [48-84] 61  Resp:  [14-21] 20  SpO2:  [99 %-100 %] 100 %  BP: (74-97)/(50-68) 92/57     Weight: 66 kg (145 lb 8 oz)  Body mass index is 25.77 kg/m².    Physical Exam  Vitals and nursing note reviewed.   Constitutional:       General: She is not in acute distress.     Appearance: She is well-developed.   HENT:      Head: Normocephalic and atraumatic.      Right Ear: External ear normal.      Left Ear: External ear normal.      Nose: Nose normal.   Eyes:      Conjunctiva/sclera: Conjunctivae normal.   Cardiovascular:      Rate and Rhythm: Normal rate and regular rhythm.   Pulmonary:      Effort: Pulmonary effort is normal. No respiratory distress.   Abdominal:      General: There is no distension.      Palpations: Abdomen is soft.   Musculoskeletal:         General: Normal range of motion.      Cervical back: Normal range of motion and neck supple.   Skin:     General: Skin is warm and dry.    Neurological:      Mental Status: She is alert and oriented to person, place, and time.   Psychiatric:         Thought Content: Thought content normal.           Significant Labs: All pertinent labs within the past 24 hours have been reviewed.    Significant Imaging: I have reviewed all pertinent imaging results/findings within the past 24 hours.

## 2023-04-06 NOTE — PLAN OF CARE
[place] - Telemetry  Discharge Assessment  Rojelio Daniels (Daughter)   [phone] (Mobile)  Primary Care Provider: Ghulam Contreras MD     Discharge Assessment (most recent)       BRIEF DISCHARGE ASSESSMENT - 04/06/23 0856          Discharge Planning    Assessment Type Discharge Planning Brief Assessment     Support Systems Family members     Current Living Arrangements home     Patient/Family Anticipates Transition to home     Patient/Family Anticipated Services at Transition none     DME Needed Upon Discharge  none     Discharge Plan A Home     Discharge Plan B Home

## 2023-04-06 NOTE — NURSING
[facility] rounding completed with , this RN, and charge RN. Plan of care discussed with patient.  aware of patient HR. (40s). EKG done per order and placed in chart.

## 2023-04-06 NOTE — ASSESSMENT & PLAN NOTE
Patient's FSGs are controlled on current medication regimen.  Last A1c reviewed-   Lab Results   Component Value Date    HGBA1C 7.1 (H) 03/16/2022     Most recent fingerstick glucose reviewed-   Recent Labs   Lab 04/05/23  1454   POCTGLUCOSE 167*     Current correctional scale  Medium  Maintain anti-hyperglycemic dose as follows-   Antihyperglycemics (From admission, onward)    Start     Stop Route Frequency Ordered    04/05/23 2103  insulin aspart U-100 pen 1-10 Units         -- SubQ Before meals & nightly PRN 04/05/23 2003        Hold Oral hypoglycemics while patient is in the hospital.

## 2023-04-06 NOTE — ASSESSMENT & PLAN NOTE
Patient with acute kidney injury likely due to IVVD/dehydration BUFFY is currently improving. Labs reviewed- Renal function/electrolytes with Estimated Creatinine Clearance: 24.7 mL/min (A) (based on SCr of 2.1 mg/dL (H)). according to latest data. Monitor urine output and serial BMP and adjust therapy as needed. Avoid nephrotoxins and renally dose meds for GFR listed above.

## 2023-04-06 NOTE — NURSING
[facility], [facility] [place]  Nurses Note -- 4 Eyes      4/6/2023       Skin assessed on: Q Shift      [x] No Pressure Injuries Present    [x]Prevention Measures Documented    [] Yes LDA  for Pressure Injury Previously documented     [] Yes New Pressure Injury Discovered   [] LDA for New Pressure Injury Added      Attending RN:  Benito Ramirez RN     Second RN:  Lisbet Castro RN

## 2023-04-06 NOTE — NURSING
[facility], [facility]  Nurses Note -- 4 Eyes      4/6/2023       Skin assessed on: Admit      [x] No Pressure Injuries Present    [x]Prevention Measures Documented    [] Yes LDA  for Pressure Injury Previously documented     [] Yes New Pressure Injury Discovered   [] LDA for New Pressure Injury Added      Attending RN:  Lisbet Castro RN     Second RN:  Meri Quintanilla RN

## 2023-04-06 NOTE — NURSING
Patient cleared for discharge by case management, Elda DIANA and . AVS/discharge instructions printed and reviewed with patient and patient's daughter/ Patient verbalizes understanding. Discharge instructions given to patient. Patient's prescribed medication brought to bedside by outpatient pharmacy.  No PIV in place.. Telemetry monitor returned to tele room. Transport requested for patient. Patient left unit via wheelchair with transport, patient's was picked up by her daughter, Lai

## 2023-04-07 LAB — BACTERIA UR CULT: NORMAL

## 2023-04-09 LAB
BACTERIA BLD CULT: NORMAL
BACTERIA BLD CULT: NORMAL

## 2023-04-09 NOTE — HOSPITAL COURSE
63F with pmh of DM2, and schizoaffective disorder bipolar type sent from outpatient oncology visit for evaluation of hypotension.  Recent labs on 04/03/2023 showed BUFFY with creatinine of 3.1.   In the ED, she was found to be hypotensive, labs demonstrate BUFFY but this appears to be slightly improving.  Evidence of infection on urinalysis.  Otherwise unremarkable for acute abnormality.  She is not febrile, there is no leukocytosis, lactic acid normal.  She was given IV fluids with improvement of her blood pressure.  Placed in observation. After ivf the following morning pts renal function returned back to baseline. Pt was feeling better and asking about discharge. Pt d/c home with referral for nephrology f/u to further monitor renal function as well as [facility] home referral for repeat evaluation. Pt also noted to have a uti on admission and d/c home with augmentin to complete an abx tx. Pt advised to see pcp in a week for repeat urinalysis

## 2023-04-11 ENCOUNTER — OUTPATIENT CASE MANAGEMENT (OUTPATIENT)
Dept: ADMINISTRATIVE | Facility: OTHER | Age: 63
End: 2023-04-11
Payer: MEDICARE

## 2023-04-11 ENCOUNTER — PATIENT MESSAGE (OUTPATIENT)
Dept: ADMINISTRATIVE | Facility: OTHER | Age: 63
End: 2023-04-11
Payer: MEDICARE

## 2023-04-12 ENCOUNTER — PATIENT MESSAGE (OUTPATIENT)
Dept: ADMINISTRATIVE | Facility: HOSPITAL | Age: 63
End: 2023-04-12
Payer: MEDICARE

## 2023-04-13 ENCOUNTER — TELEPHONE (OUTPATIENT)
Dept: INTERNAL MEDICINE | Facility: CLINIC | Age: 63
End: 2023-04-13
Payer: MEDICARE

## 2023-04-19 ENCOUNTER — HOSPITAL ENCOUNTER (OUTPATIENT)
Facility: OTHER | Age: 63
Discharge: HOME | End: 2023-04-19
Attending: ANESTHESIOLOGY | Admitting: ANESTHESIOLOGY
Payer: MEDICARE

## 2023-04-19 VITALS
OXYGEN SATURATION: 96 % | DIASTOLIC BLOOD PRESSURE: 98 MMHG | WEIGHT: 120 LBS | TEMPERATURE: 98 F | RESPIRATION RATE: 16 BRPM | HEART RATE: 49 BPM | HEIGHT: 63 IN | BODY MASS INDEX: 21.26 KG/M2 | SYSTOLIC BLOOD PRESSURE: 174 MMHG

## 2023-04-19 DIAGNOSIS — M47.812 CERVICAL SPONDYLOSIS: Primary | ICD-10-CM

## 2023-04-19 DIAGNOSIS — M54.12 CERVICAL RADICULOPATHY: ICD-10-CM

## 2023-04-19 DIAGNOSIS — G89.29 CHRONIC PAIN: ICD-10-CM

## 2023-04-19 LAB — POCT GLUCOSE: 82 MG/DL (ref 70–110)

## 2023-04-19 PROCEDURE — 64633 DESTROY CERV/THOR FACET JNT: CPT | Mod: LT | Performed by: ANESTHESIOLOGY

## 2023-04-19 PROCEDURE — 64634 PR DESTROY C/TH FACET JNT ADDL: ICD-10-PCS | Mod: LT,,, | Performed by: ANESTHESIOLOGY

## 2023-04-19 PROCEDURE — 64634 DESTROY C/TH FACET JNT ADDL: CPT | Mod: LT | Performed by: ANESTHESIOLOGY

## 2023-04-19 PROCEDURE — 64633 PR DESTROY CERV/THOR FACET JNT: ICD-10-PCS | Mod: LT,,, | Performed by: ANESTHESIOLOGY

## 2023-04-19 PROCEDURE — 64633 DESTROY CERV/THOR FACET JNT: CPT | Mod: LT,,, | Performed by: ANESTHESIOLOGY

## 2023-04-19 PROCEDURE — 63600175 PHARM REV CODE 636 W HCPCS: Performed by: ANESTHESIOLOGY

## 2023-04-19 PROCEDURE — 82947 ASSAY GLUCOSE BLOOD QUANT: CPT | Performed by: ANESTHESIOLOGY

## 2023-04-19 PROCEDURE — 64634 DESTROY C/TH FACET JNT ADDL: CPT | Mod: LT,,, | Performed by: ANESTHESIOLOGY

## 2023-04-19 PROCEDURE — 25000003 PHARM REV CODE 250: Performed by: ANESTHESIOLOGY

## 2023-04-19 RX ORDER — SODIUM CHLORIDE 9 MG/ML
500 INJECTION, SOLUTION INTRAVENOUS CONTINUOUS
Status: ACTIVE | OUTPATIENT
Start: 2023-04-19

## 2023-04-19 RX ORDER — DEXAMETHASONE SODIUM PHOSPHATE 10 MG/ML
INJECTION INTRAMUSCULAR; INTRAVENOUS
Status: DISCONTINUED | OUTPATIENT
Start: 2023-04-19 | End: 2023-04-19 | Stop reason: HOSPADM

## 2023-04-19 RX ORDER — FENTANYL CITRATE 50 UG/ML
INJECTION, SOLUTION INTRAMUSCULAR; INTRAVENOUS
Status: DISCONTINUED | OUTPATIENT
Start: 2023-04-19 | End: 2023-04-19 | Stop reason: HOSPADM

## 2023-04-19 RX ORDER — MIDAZOLAM HYDROCHLORIDE 1 MG/ML
INJECTION INTRAMUSCULAR; INTRAVENOUS
Status: DISCONTINUED | OUTPATIENT
Start: 2023-04-19 | End: 2023-04-19 | Stop reason: HOSPADM

## 2023-04-19 RX ORDER — BUPIVACAINE HYDROCHLORIDE 2.5 MG/ML
INJECTION, SOLUTION EPIDURAL; INFILTRATION; INTRACAUDAL
Status: DISCONTINUED | OUTPATIENT
Start: 2023-04-19 | End: 2023-04-19 | Stop reason: HOSPADM

## 2023-04-19 RX ORDER — LIDOCAINE HYDROCHLORIDE 20 MG/ML
INJECTION, SOLUTION INFILTRATION; PERINEURAL
Status: DISCONTINUED | OUTPATIENT
Start: 2023-04-19 | End: 2023-04-19 | Stop reason: HOSPADM

## 2023-04-19 NOTE — DISCHARGE INSTRUCTIONS

## 2023-04-19 NOTE — DISCHARGE SUMMARY
Discharge Note  Short Stay      SUMMARY     Admit Date: 2023    Attending Physician: Darren Jerry    Discharge Physician: Amilcar Obrien      Discharge Date: 2023 11:47 AM    Procedure(s) (LRB):  RADIOFREQUENCY ABLATION LEFT C3,C4,C5,C6  ONE OF TWO (Left)    Final Diagnosis: Cervical spondylosis [M47.812]    Disposition: Home or self care    Patient Instructions:   Current Discharge Medication List        CONTINUE these medications which have NOT CHANGED    Details   acetaminophen-codeine 300-30mg (TYLENOL #3) 300-30 mg Tab Take 1 tablet by mouth every 6 (six) hours as needed (pain).  Qty: 20 tablet, Refills: 0    Comments: Quantity prescribed more than 7 day supply? Yes, quantity medically necessary      ammonium lactate 12 % Crea Apply twice daily to affected parts both feet as needed.  Qty: 140 g, Refills: 11      amoxicillin-clavulanate 500-125mg (AUGMENTIN) 500-125 mg Tab Take 1 tablet (500 mg total) by mouth 2 (two) times daily.  Qty: 8 tablet, Refills: 0      arm brace (WRIST SUPPORT LARGE-XLARGE MISC)       aspirin (ECOTRIN) 81 MG EC tablet Take 1 tablet (81 mg total) by mouth once daily.  Refills: 0      atorvastatin (LIPITOR) 80 MG tablet Take 1 tablet (80 mg total) by mouth once daily.  Qty: 90 tablet, Refills: 0      blood glucose control, low Soln 1 drop by Misc.(Non-Drug; Combo Route) route as needed.  Qty: 1 each, Refills: 0    Associated Diagnoses: Type 2 diabetes mellitus with other specified complication, unspecified whether long term insulin use      chlorhexidine (PERIDEX) 0.12 % solution RINSE AND SPIT 15 ML TWICE DAILY FOR 7 DAYS      divalproex 500 MG Tb24 1 tablet EVERY PM (route: oral)      GLUCAGON EMERGENCY KIT, HUMAN, 1 mg injection SMARTSI Vial(s) IM PRN      GLUTOSE-15 40 % gel SMARTSI unspecified By Mouth PRN      HUMALOG U-100 INSULIN 100 unit/mL injection SMARTSI.01 Milliliter(s) SUB-Q 4 Times Daily      insulin (LANTUS SOLOSTAR U-100 INSULIN) glargine 100  units/mL SubQ pen INJECT 23 UNITS SUBCUTANEOUSLY NIGHTLY  HOLD until you see your doctor and are told to resume  Qty: 30 mL, Refills: 5    Associated Diagnoses: Type 2 diabetes mellitus without complication, with long-term current use of insulin      liraglutide 0.6 mg/0.1 mL, 18 mg/3 mL, subq PNIJ (VICTOZA 3-HERMANN) 0.6 mg/0.1 mL (18 mg/3 mL) PnIj pen INJECT 1.8 MG SUBCUTANEOUSLY ONCE DAILY.  Qty: 27 mL, Refills: 11      metFORMIN (GLUCOPHAGE) 500 MG tablet Take 2 tablets (1,000 mg total) by mouth 2 (two) times daily with meals. Take with food.  Qty: 120 tablet, Refills: 11    Associated Diagnoses: Type 2 diabetes mellitus without complication, with long-term current use of insulin      QUEtiapine (SEROQUEL) 100 MG Tab Take 1 tablet (100 mg total) by mouth every evening.  Qty: 90 tablet, Refills: 3    Associated Diagnoses: Cervical spondylosis without myelopathy      ziprasidone (GEODON) 40 MG Cap 1 capsule EVERY PM (route: oral)                 Discharge Diagnosis: Cervical spondylosis [M47.812]  Condition on Discharge: Stable with no complications to procedure   Diet on Discharge: Same as before.  Activity: as per instruction sheet.  Discharge to: Home with a responsible adult.  Follow up: 2-4 weeks       Please call my office or pager at [phone] if experienced any weakness or loss of sensation, fever > 101.5, pain uncontrolled with oral medications, persistent nausea/vomiting/or diarrhea, redness or drainage from the incisions, or any other worrisome concerns. If physician on call was not reached or could not communicate with our office for any reason please go to the nearest emergency department        Darren Jerry

## 2023-04-19 NOTE — OP NOTE
Therapeutic Cervical Medial Branch Radiofrequency Ablation Under Fluoroscopy     The procedure, risks, benefits, and options were discussed with the patient. There are no contraindications to the procedure. The patent expressed understanding and agreed to the procedure. Informed written consent was obtained prior to the start of the procedure and can be found in the patient's chart.        PATIENT NAME: Jonathan Gonzales   MRN: [id number]     DATE OF PROCEDURE: 04/19/2023       PROCEDURE: Therapeutic Left C3, C4, C5, and C6 Cervical Radiofrequency Ablation under Fluoroscopy    PRE-OP DIAGNOSIS: Cervical spondylosis [M47.812] Cervical Spondylosis [M47.812]    POST-OP DIAGNOSIS: Same    PHYSICIAN: Darren Jerry MD    ASSISTANTS: Amilcar Obrien MD     MEDICATIONS INJECTED:  Preservative-free Decadron 10mg with 9cc of Bupivicaine 0.25%    LOCAL ANESTHETIC INJECTED:   Xylocaine 2%    SEDATION: Versed 3mg and Fentanyl 125mg                                                                                                                                                                                     Conscious sedation ordered by M.D. Patient re-evaluation prior to administration of conscious sedation. No changes noted in patient's status from initial evaluation. The patient's vital signs were monitored by RN and patient remained hemodynamically stable throughout the procedure.    Event Time In   Sedation Start 1121   Sedation End 1145       ESTIMATED BLOOD LOSS:  None    COMPLICATIONS:  None.     INTERVAL HISTORY: Patient has clinical and imaging findings suggestive of recurrent facet mediated pain. Patient has undergone a previous RFA at specified levels with at least 50% relief for at least 6 months. Successful diagnostic medial branch blocks have been completed within the past 2 years.    TECHNIQUE: Time-out was performed to identify the patient and procedure to be performed. With the patient laying in a prone position,  the surgical area was prepped and draped in the usual sterile fashion using ChloraPrep and fenestrated drape. The levels were determined under fluoroscopic guidance. Skin anesthesia was achieved by injecting Lidocaine 2% over the injection sites. A 18 gauge 10mm curved active tip needle was introduced to the anatomic local of the medial branch at each of the above levels using AP, lateral and/or contralateral oblique fluoroscopic imaging. Then the sensory and motor testing was performed to confirm that the needle tips were in the correct location. After negative aspiration for blood or CSF was confirmed, 1 mL of the lidocaine 2% listed above was injected slowly at each site. This was followed by thermal lesioning at 80 degrees celsius for 90 seconds. That was followed by slowly injecting 1 mL of the medication mixture listed above at each site. The needles were removed and bleeding was nil. A sterile dressing was applied. No specimens collected. The patient tolerated the procedure well and did not have any procedure related motor deficit at the conclusion of the procedure.    The patient was monitored after the procedure in the recovery area. They were given post-procedure and discharge instructions to follow at home. The patient was discharged in a stable condition.     Amilcar Obrien MD  [employer] PM&R Resident     I reviewed and edited the fellow's note. I conducted my own interview and physical examination. I agree with the findings. I was present and supervising all critical portions of the procedure.    Darren Jerry MD

## 2023-04-19 NOTE — H&P
HPI  Jonathan Gonzales presents for Procedure(s):  RADIOFREQUENCY ABLATION LEFT C3,C4,C5,C6  ONE OF TWO    Recent anticoagulation/antiplatelets reviewed and verified with nursing.  Recent antibiotics/recent infections reviewed and verified with nursing.    Past Medical History:   Diagnosis Date    Bipolar disorder     Cancer of female breast 10/2010    T2 N1 Mx, Stage IIB left breast cancer    Cancer of upper-outer quadrant of female breast 7/9/2012    Depression     Diabetes mellitus type II     Disturbances of sensation of smell and taste 6/29/2012    Hypertension     Malignant neoplasm of breast (female), unspecified site 4/27/2015    Neuropathy     Nonspecific abnormal unspecified cardiovascular function study 4/12/2013    ECG READ AS SHOWING A T-WAVE ABNORMALITY     Post-mastectomy lymphedema syndrome     Schizophrenia     Stroke      Past Surgical History:   Procedure Laterality Date    BREAST RECONSTRUCTION  11/23/11    B/L SHLOMO flap reconstruction S/P left MRM, right prophylactic mastectomy    BREAST RECONSTRUCTION Bilateral 3/13/2015    NIPPLE RECONSTRUCTION    INJECTION OF ANESTHETIC AGENT AROUND NERVE Left 12/9/2020    Procedure: BLOCK, NERVE, C3-C4-C5-C6 MEDIAL BRANCH;  Surgeon: Darren Jerry MD;  Location: [facility];  Service: Pain Management;  Laterality: Left;    MASTECTOMY Bilateral 01/2011    bilateral    RADIOFREQUENCY ABLATION Left 2/3/2021    Procedure: RADIOFREQUENCY ABLATION, C3,C4,C5,C6 MEDIAL BRANCH 1 of 2;  Surgeon: Darren Jerry MD;  Location: [facility];  Service: Pain Management;  Laterality: Left;    RADIOFREQUENCY ABLATION Right 7/28/2021    Procedure: RADIOFREQUENCY ABLATION, C3-C4-C5-C6 MEDIAL BR ANCH 1 IOF 2;  Surgeon: Darren Jerry MD;  Location: [facility];  Service: Pain Management;  Laterality: Right;    RADIOFREQUENCY ABLATION Left 8/18/2021    Procedure: RADIOFREQUENCY ABLATION, C3-C4-C5-C6 MEDIAL BRANCH 2 OF 2;  Surgeon: Darren Jerry MD;   Location: [facility];  Service: Pain Management;  Laterality: Left;    TRANSFORAMINAL EPIDURAL INJECTION OF STEROID Bilateral 11/4/2020    Procedure: INJECTION, STEROID, EPIDURAL, TRANSFORAMINAL APPROACH L4/L5;  Surgeon: Darren Jerry MD;  Location: [facility];  Service: Pain Management;  Laterality: Bilateral;  Bilateral L4/L5    TRANSFORAMINAL EPIDURAL INJECTION OF STEROID Bilateral 2/10/2021    Procedure: INJECTION, STEROID, EPIDURAL, TRANSFORAMINAL APPROACH, L4-L5;  Surgeon: Darren Jerry MD;  Location: [facility];  Service: Pain Management;  Laterality: Bilateral;    TRANSFORAMINAL EPIDURAL INJECTION OF STEROID Bilateral 3/10/2021    Procedure: INJECTION, STEROID, EPIDURAL, TRANSFORAMINAL APPROACH, L4-L5;  Surgeon: Darren Jerry MD;  Location: [facility];  Service: Pain Management;  Laterality: Bilateral;    TRANSFORAMINAL EPIDURAL INJECTION OF STEROID Bilateral 12/15/2021    Procedure: INJECTION, STEROID, EPIDURAL, TRANSFORAMINAL APPROACH  Bilateral L4/5 TFESI / needs consent;  Surgeon: Darren Jerry MD;  Location: [facility];  Service: Pain Management;  Laterality: Bilateral;    TRANSFORAMINAL EPIDURAL INJECTION OF STEROID Bilateral 12/14/2022    Procedure: INJECTION, STEROID, EPIDURAL, TRANSFORAMINAL APPROACH BILATERAL L4/5 CONTRAST;  Surgeon: Darren Jerry MD;  Location: [facility];  Service: Pain Management;  Laterality: Bilateral;    TUBAL LIGATION       Review of patient's allergies indicates:  No Known Allergies     PMHx, PSHx, Allergies, Medications reviewed in epic      ROS negative except pain complaints in HPI    OBJECTIVE:    LMP  (LMP Unknown)     PHYSICAL EXAMINATION:    GENERAL: Well appearing, in no acute distress, alert and oriented to name, situation, location.  PSYCH:  Mood and affect appropriate.  SKIN: Skin color, texture, turgor normal, no rashes or lesions at site of procedure.  CV: regular rate with palpation of the radial artery.  PULM: No  evidence of respiratory difficulty, symmetric chest rise. No audible abnormal respiratory sounds.  NEURO: Cranial nerves grossly intact.    Plan:    Proceed with procedure as planned    Davis Mckeon  04/19/2023

## 2023-04-20 ENCOUNTER — LAB VISIT (OUTPATIENT)
Dept: LAB | Facility: OTHER | Age: 63
End: 2023-04-20
Attending: FAMILY MEDICINE
Payer: MEDICARE

## 2023-04-20 ENCOUNTER — OFFICE VISIT (OUTPATIENT)
Dept: INTERNAL MEDICINE | Facility: CLINIC | Age: 63
End: 2023-04-20
Attending: FAMILY MEDICINE
Payer: MEDICARE

## 2023-04-20 VITALS
HEART RATE: 48 BPM | WEIGHT: 152.25 LBS | SYSTOLIC BLOOD PRESSURE: 130 MMHG | OXYGEN SATURATION: 98 % | BODY MASS INDEX: 26.98 KG/M2 | HEIGHT: 63 IN | DIASTOLIC BLOOD PRESSURE: 80 MMHG

## 2023-04-20 DIAGNOSIS — E11.42 TYPE 2 DIABETES MELLITUS WITH DIABETIC POLYNEUROPATHY, WITH LONG-TERM CURRENT USE OF INSULIN: Chronic | ICD-10-CM

## 2023-04-20 DIAGNOSIS — Z12.11 ENCOUNTER FOR COLORECTAL CANCER SCREENING: ICD-10-CM

## 2023-04-20 DIAGNOSIS — N18.31 STAGE 3A CHRONIC KIDNEY DISEASE: ICD-10-CM

## 2023-04-20 DIAGNOSIS — Z79.4 TYPE 2 DIABETES MELLITUS WITH DIABETIC POLYNEUROPATHY, WITH LONG-TERM CURRENT USE OF INSULIN: Primary | Chronic | ICD-10-CM

## 2023-04-20 DIAGNOSIS — Z79.4 TYPE 2 DIABETES MELLITUS WITH DIABETIC POLYNEUROPATHY, WITH LONG-TERM CURRENT USE OF INSULIN: Chronic | ICD-10-CM

## 2023-04-20 DIAGNOSIS — G62.9 PERIPHERAL POLYNEUROPATHY: ICD-10-CM

## 2023-04-20 DIAGNOSIS — E11.42 TYPE 2 DIABETES MELLITUS WITH DIABETIC POLYNEUROPATHY, WITH LONG-TERM CURRENT USE OF INSULIN: Primary | Chronic | ICD-10-CM

## 2023-04-20 DIAGNOSIS — Z12.12 ENCOUNTER FOR COLORECTAL CANCER SCREENING: ICD-10-CM

## 2023-04-20 DIAGNOSIS — I10 PRIMARY HYPERTENSION: ICD-10-CM

## 2023-04-20 LAB
ESTIMATED AVG GLUCOSE: 117 MG/DL (ref 68–131)
HBA1C MFR BLD: 5.7 % (ref 4–5.6)

## 2023-04-20 PROCEDURE — 99214 PR OFFICE/OUTPT VISIT, EST, LEVL IV, 30-39 MIN: ICD-10-PCS | Mod: HCNC,S$GLB,, | Performed by: FAMILY MEDICINE

## 2023-04-20 PROCEDURE — 3079F PR MOST RECENT DIASTOLIC BLOOD PRESSURE 80-89 MM HG: ICD-10-PCS | Mod: HCNC,CPTII,S$GLB, | Performed by: FAMILY MEDICINE

## 2023-04-20 PROCEDURE — 3079F DIAST BP 80-89 MM HG: CPT | Mod: HCNC,CPTII,S$GLB, | Performed by: FAMILY MEDICINE

## 2023-04-20 PROCEDURE — 1160F PR REVIEW ALL MEDS BY PRESCRIBER/CLIN PHARMACIST DOCUMENTED: ICD-10-PCS | Mod: HCNC,CPTII,S$GLB, | Performed by: FAMILY MEDICINE

## 2023-04-20 PROCEDURE — 1159F PR MEDICATION LIST DOCUMENTED IN MEDICAL RECORD: ICD-10-PCS | Mod: HCNC,CPTII,S$GLB, | Performed by: FAMILY MEDICINE

## 2023-04-20 PROCEDURE — 83036 HEMOGLOBIN GLYCOSYLATED A1C: CPT | Mod: HCNC | Performed by: FAMILY MEDICINE

## 2023-04-20 PROCEDURE — 4010F ACE/ARB THERAPY RXD/TAKEN: CPT | Mod: HCNC,CPTII,S$GLB, | Performed by: FAMILY MEDICINE

## 2023-04-20 PROCEDURE — 99214 OFFICE O/P EST MOD 30 MIN: CPT | Mod: HCNC,S$GLB,, | Performed by: FAMILY MEDICINE

## 2023-04-20 PROCEDURE — 3008F BODY MASS INDEX DOCD: CPT | Mod: HCNC,CPTII,S$GLB, | Performed by: FAMILY MEDICINE

## 2023-04-20 PROCEDURE — 3072F PR LOW RISK FOR RETINOPATHY: ICD-10-PCS | Mod: HCNC,CPTII,S$GLB, | Performed by: FAMILY MEDICINE

## 2023-04-20 PROCEDURE — 99999 PR PBB SHADOW E&M-EST. PATIENT-LVL IV: CPT | Mod: PBBFAC,HCNC,, | Performed by: FAMILY MEDICINE

## 2023-04-20 PROCEDURE — 3075F PR MOST RECENT SYSTOLIC BLOOD PRESS GE 130-139MM HG: ICD-10-PCS | Mod: HCNC,CPTII,S$GLB, | Performed by: FAMILY MEDICINE

## 2023-04-20 PROCEDURE — 3075F SYST BP GE 130 - 139MM HG: CPT | Mod: HCNC,CPTII,S$GLB, | Performed by: FAMILY MEDICINE

## 2023-04-20 PROCEDURE — 4010F PR ACE/ARB THEARPY RXD/TAKEN: ICD-10-PCS | Mod: HCNC,CPTII,S$GLB, | Performed by: FAMILY MEDICINE

## 2023-04-20 PROCEDURE — 99999 PR PBB SHADOW E&M-EST. PATIENT-LVL IV: ICD-10-PCS | Mod: PBBFAC,HCNC,, | Performed by: FAMILY MEDICINE

## 2023-04-20 PROCEDURE — 1159F MED LIST DOCD IN RCRD: CPT | Mod: HCNC,CPTII,S$GLB, | Performed by: FAMILY MEDICINE

## 2023-04-20 PROCEDURE — 36415 COLL VENOUS BLD VENIPUNCTURE: CPT | Mod: HCNC | Performed by: FAMILY MEDICINE

## 2023-04-20 PROCEDURE — 1160F RVW MEDS BY RX/DR IN RCRD: CPT | Mod: HCNC,CPTII,S$GLB, | Performed by: FAMILY MEDICINE

## 2023-04-20 PROCEDURE — 3008F PR BODY MASS INDEX (BMI) DOCUMENTED: ICD-10-PCS | Mod: HCNC,CPTII,S$GLB, | Performed by: FAMILY MEDICINE

## 2023-04-20 PROCEDURE — 3072F LOW RISK FOR RETINOPATHY: CPT | Mod: HCNC,CPTII,S$GLB, | Performed by: FAMILY MEDICINE

## 2023-04-20 RX ORDER — OLANZAPINE 10 MG/1
10 TABLET ORAL EVERY 8 HOURS PRN
COMMUNITY
Start: 2023-02-27

## 2023-04-20 NOTE — PROGRESS NOTES
CHIEF COMPLAINT:   F/U in a patient with diabetes and hypertension    HISTORY OF PRESENT ILLNESS: The patient is a 63 year-old black female.  Recently inpt d/t rhabdo.  Second admission due to acute psychosis noted as well     The patient has a long and complicated medical history.      She continues to have problems with neuropathic pain as well as central pain.  She does well w/ her Lyrica 100 twice a day.    The patient has a history of diabetes.  Recent blood sugars have been less than 200.  There have been no episodes of hypoglycemia.    The patient has a history of stable hypertension on current medications.  Patient denies chest pain or shortness of breath today.    The patient has a history of stable hyperlipidemia on current medications.  The patient denies chest pain or shortness of breath today.  The patient denies muscle aches or myalgias suggestive of myositis.    She has a history of breast cancer for which she underwent bilateral mastectomies and chemotherapy.    REVIEW OF SYSTEMS:  GENERAL: No fever, chills or weight loss.  SKIN: No rashes, itching or changes in color or texture of skin.  HEAD: No headaches or recent head trauma.  EYES: Visual acuity fine. No photophobia, ocular pain or diplopia.  EARS: Denies ear pain, discharge or vertigo.  NOSE: No loss of smell, no epistaxis or postnasal drip.  MOUTH & THROAT: No hoarseness or change in voice. No excessive gum bleeding.  NODES: Denies swollen glands.  CHEST: Denies AWAD, cyanosis, wheezing, cough and sputum production.  CARDIOVASCULAR: Denies chest pain, PND, orthopnea or reduced exercise tolerance.  ABDOMEN: Appetite fine. No weight loss. Denies diarrhea, abdominal pain, hematemesis or blood in stool.  URINARY: No flank pain, dysuria or hematuria.  PERIPHERAL VASCULAR: No claudication or cyanosis.  MUSCULOSKELETAL: No joint stiffness or swelling. Except as noted above.  NEUROLOGIC: No history of seizures    SOCIAL HISTORY: The patient does smoke.  " The patient consumes alcohol socially.      PHYSICAL EXAMINATION:     Blood pressure 130/80, pulse (!) 48, height 5' 3" (1.6 m), weight 69 kg (152 lb 3.6 oz), SpO2 98 %.    APPEARANCE: Well nourished, well developed, in no acute distress.    HEAD: Normocephalic, atraumatic.  EYES: PERRL. EOMI.  Conjunctivae without injection and  Anicteric  NOSE: Mucosa pink. Airway clear.  MOUTH & THROAT: No tonsillar enlargement. No pharyngeal erythema or exudate. No stridor.  NECK: Supple.   NODES: No cervical, axillary or inguinal lymph node enlargement.  CHEST: Lungs clear to auscultation.  No retractions are noted.  No rales or rhonchi are present.  CARDIOVASCULAR: Normal S1, S2. No rubs, murmurs or gallops.  ABDOMEN: Bowel sounds normal. Not distended. Soft. No tenderness or masses.  No ascites is noted.  MUSCULOSKELETAL:  There is no clubbing, cyanosis, or edema of the extremities x4.  There is full range of motion of the lumbar spine.  There is full range of motion of the extremities x4.  There is no deformity noted.    NEUROLOGIC:       Normal speech development.      Hearing normal.      paretic g      Motor and sensory exams grossly normal.  Mild RLE weakness noted  PSYCHIATRIC: Patient is alert and oriented x3.  Thought processes are all normal.  There is no homicidality.  There is no suicidality.  There is no evidence of psychosis.    LABORATORY/RADIOLOGY:   Chart reviewed including surgeries and blood work    ASSESSMENT:   CVA  Breast cancer with resultant lymphedema  Hypertension, condition is well controlled on current medication regimen  Diabetes, condition is well controlled on current medication regimen  Neuropathic pain  Cervical radiculopathy    PLAN:  A1c today  Depakote 250 mg p.o. b.i.d.  Her diabetes is very well controlled    Pain clinic   KCl 40 po qd  Prinzide  Pravachol 20 mg by mouth daily    Return to clinic in 6 month with blood work prior                  "

## 2023-04-25 ENCOUNTER — TELEPHONE (OUTPATIENT)
Dept: PAIN MEDICINE | Facility: CLINIC | Age: 63
End: 2023-04-25
Payer: MEDICARE

## 2023-04-25 ENCOUNTER — OUTPATIENT CASE MANAGEMENT (OUTPATIENT)
Dept: ADMINISTRATIVE | Facility: OTHER | Age: 63
End: 2023-04-25
Payer: MEDICARE

## 2023-04-25 NOTE — PROGRESS NOTES
Outpatient Care Management  Patient Does Not Consent    Patient: Jonathan Gonzales  MRN:  [id number]  Date of Service:  4/25/2023  Completed by:  Ann Hernandez RN    Chief Complaint   Patient presents with    Case Closure     Unable to reach patient.       Patient Summary           Consent Received:  Decline

## 2023-04-26 ENCOUNTER — OFFICE VISIT (OUTPATIENT)
Dept: PAIN MEDICINE | Facility: CLINIC | Age: 63
End: 2023-04-26
Payer: MEDICARE

## 2023-04-26 VITALS
RESPIRATION RATE: 18 BRPM | DIASTOLIC BLOOD PRESSURE: 90 MMHG | BODY MASS INDEX: 26.95 KG/M2 | HEART RATE: 66 BPM | HEIGHT: 63 IN | TEMPERATURE: 98 F | SYSTOLIC BLOOD PRESSURE: 144 MMHG | WEIGHT: 152.13 LBS

## 2023-04-26 DIAGNOSIS — M47.812 CERVICAL SPONDYLOSIS: Primary | ICD-10-CM

## 2023-04-26 DIAGNOSIS — M51.9 LUMBAR DISC DISORDER: ICD-10-CM

## 2023-04-26 PROCEDURE — 99999 PR PBB SHADOW E&M-EST. PATIENT-LVL IV: CPT | Mod: PBBFAC,HCNC,, | Performed by: NURSE PRACTITIONER

## 2023-04-26 PROCEDURE — 3044F HG A1C LEVEL LT 7.0%: CPT | Mod: HCNC,CPTII,S$GLB, | Performed by: NURSE PRACTITIONER

## 2023-04-26 PROCEDURE — 3080F PR MOST RECENT DIASTOLIC BLOOD PRESSURE >= 90 MM HG: ICD-10-PCS | Mod: HCNC,CPTII,S$GLB, | Performed by: NURSE PRACTITIONER

## 2023-04-26 PROCEDURE — 99024 PR POST-OP FOLLOW-UP VISIT: ICD-10-PCS | Mod: HCNC,S$GLB,, | Performed by: NURSE PRACTITIONER

## 2023-04-26 PROCEDURE — 3008F PR BODY MASS INDEX (BMI) DOCUMENTED: ICD-10-PCS | Mod: HCNC,CPTII,S$GLB, | Performed by: NURSE PRACTITIONER

## 2023-04-26 PROCEDURE — 3072F PR LOW RISK FOR RETINOPATHY: ICD-10-PCS | Mod: HCNC,CPTII,S$GLB, | Performed by: NURSE PRACTITIONER

## 2023-04-26 PROCEDURE — 99999 PR PBB SHADOW E&M-EST. PATIENT-LVL IV: ICD-10-PCS | Mod: PBBFAC,HCNC,, | Performed by: NURSE PRACTITIONER

## 2023-04-26 PROCEDURE — 3008F BODY MASS INDEX DOCD: CPT | Mod: HCNC,CPTII,S$GLB, | Performed by: NURSE PRACTITIONER

## 2023-04-26 PROCEDURE — 1159F MED LIST DOCD IN RCRD: CPT | Mod: HCNC,CPTII,S$GLB, | Performed by: NURSE PRACTITIONER

## 2023-04-26 PROCEDURE — 3077F PR MOST RECENT SYSTOLIC BLOOD PRESSURE >= 140 MM HG: ICD-10-PCS | Mod: HCNC,CPTII,S$GLB, | Performed by: NURSE PRACTITIONER

## 2023-04-26 PROCEDURE — 3077F SYST BP >= 140 MM HG: CPT | Mod: HCNC,CPTII,S$GLB, | Performed by: NURSE PRACTITIONER

## 2023-04-26 PROCEDURE — 1159F PR MEDICATION LIST DOCUMENTED IN MEDICAL RECORD: ICD-10-PCS | Mod: HCNC,CPTII,S$GLB, | Performed by: NURSE PRACTITIONER

## 2023-04-26 PROCEDURE — 3080F DIAST BP >= 90 MM HG: CPT | Mod: HCNC,CPTII,S$GLB, | Performed by: NURSE PRACTITIONER

## 2023-04-26 PROCEDURE — 4010F ACE/ARB THERAPY RXD/TAKEN: CPT | Mod: HCNC,CPTII,S$GLB, | Performed by: NURSE PRACTITIONER

## 2023-04-26 PROCEDURE — 1160F RVW MEDS BY RX/DR IN RCRD: CPT | Mod: HCNC,CPTII,S$GLB, | Performed by: NURSE PRACTITIONER

## 2023-04-26 PROCEDURE — 4010F PR ACE/ARB THEARPY RXD/TAKEN: ICD-10-PCS | Mod: HCNC,CPTII,S$GLB, | Performed by: NURSE PRACTITIONER

## 2023-04-26 PROCEDURE — 99024 POSTOP FOLLOW-UP VISIT: CPT | Mod: HCNC,S$GLB,, | Performed by: NURSE PRACTITIONER

## 2023-04-26 PROCEDURE — 3072F LOW RISK FOR RETINOPATHY: CPT | Mod: HCNC,CPTII,S$GLB, | Performed by: NURSE PRACTITIONER

## 2023-04-26 PROCEDURE — 3044F PR MOST RECENT HEMOGLOBIN A1C LEVEL <7.0%: ICD-10-PCS | Mod: HCNC,CPTII,S$GLB, | Performed by: NURSE PRACTITIONER

## 2023-04-26 PROCEDURE — 1160F PR REVIEW ALL MEDS BY PRESCRIBER/CLIN PHARMACIST DOCUMENTED: ICD-10-PCS | Mod: HCNC,CPTII,S$GLB, | Performed by: NURSE PRACTITIONER

## 2023-04-26 RX ORDER — PREGABALIN 100 MG/1
100 CAPSULE ORAL 3 TIMES DAILY
Qty: 45 CAPSULE | Refills: 0 | Status: SHIPPED | OUTPATIENT
Start: 2023-04-26 | End: 2023-04-28 | Stop reason: SDUPTHER

## 2023-04-26 NOTE — Clinical Note
ASSESSMENT/PLAN:    Diagnoses and all orders for this visit:    Finger pain, left    Closed nondisplaced fracture of proximal phalanx of left ring finger, initial encounter  -     Ambulatory Referral to Orthopedic Surgery  -     XR finger left fourth digit-ring; Future        Plan: The ring and long fingers are to be buddy taped  Tape may be changed as needed and removed for cleansing  She is to work on gentle range of motion as instructed  I did discuss the potential benefits of therapy but she feels that she can work on range of motion on her own  If she does not see a change in her range of motion over the next few days she is to contact the office and a prescription for therapy will be provided  X-rays of her finger will be obtained at follow-up including AP, lateral and oblique views  Return in about 2 weeks (around 5/10/2023)  _____________________________________________________  CHIEF COMPLAINT:  Chief Complaint   Patient presents with   • Left Ring Finger - Pain, Fracture         SUBJECTIVE:  Evette Tovar is a 63y o  year old right-handed female who presents for evaluation of her left ring finger injured on 4/17/2023  She was walking into her home when she fell forward placing her left hand out to stop her fall as she was carrying some food in her right hand  She states all fingers were hyperextended  She noted pain but did not seek immediate medical care  She was seen in the emergency room at [facility] on 4/19/2023 and x-rays obtained, she was splinted and recommended to seek orthopedic follow-up  She presents today for initial follow-up after the emergency room visit  She has been removing the splint for icing the but otherwise leave the splint in place  She has noted improvement in swelling but continues to experience pain at the proximal phalanx and into the PIP joint  She denies paresthesias and denies any history of prior injuries    She did abrade both of her knees Patient seen in today, she remains tobacco free. Pt remains on the prescribed tobacco cessation medication regimen of 7 mg nicotine patch without any negative side effects at this time.   Refill sent to pharmacy. Reviewed strategies, cues, triggers, high risk situations, lapses, relapses, diet, exercise, stress, relaxation, sleep, habitual behavior, and life style changes. Encouraged patient to try a day of a dry run without any patch to see how strong her urges or cravings are. The patient will continue with therapy sessions and medication monitoring by CTTS. Prescribed medication management will be by physician. but denies any significant complaints today  PAST MEDICAL HISTORY:  Past Medical History:   Diagnosis Date   • Anxiety    • Encounter for screening colonoscopy    • Hypertension    • Sleep apnea    • Wears contact lenses        PAST SURGICAL HISTORY:  Past Surgical History:   Procedure Laterality Date   • HYSTERECTOMY     • OVARIAN CYST REMOVAL     • TONSILLECTOMY         FAMILY HISTORY:  History reviewed  No pertinent family history  SOCIAL HISTORY:  Social History     Tobacco Use   • Smoking status: Former     Types: Cigarettes   • Smokeless tobacco: Never   • Tobacco comments:     Pt smoked in college   Vaping Use   • Vaping Use: Never used   Substance Use Topics   • Alcohol use: Yes     Comment: socially/weekly   • Drug use: Never       MEDICATIONS:    Current Outpatient Medications:   •  amLODIPine (NORVASC) 5 mg tablet, Take 5 mg by mouth daily, Disp: , Rfl:   •  Ascorbic Acid (VITAMIN C ER PO), Take 1 tablet by mouth in the morning, Disp: , Rfl:   •  escitalopram (LEXAPRO) 10 mg tablet, Take 10 mg by mouth daily, Disp: , Rfl:   •  Misc Natural Products (GLUCOSAMINE CHOND CMP ADVANCED PO), Take 1 tablet by mouth in the morning, Disp: , Rfl:   •  Multiple Vitamins-Minerals (multivitamin with minerals) tablet, Take 1 tablet by mouth daily, Disp: , Rfl:   •  Omega-3 Fatty Acids (FISH OIL BURP-LESS PO), Take 1 capsule by mouth in the morning, Disp: , Rfl:     ALLERGIES:  Allergies   Allergen Reactions   • Nickel Itching     Pt states it is from jewelry  Review of systems:   Constitutional: Negative for fatigue, fever or loss of apetite  HENT: Negative  Respiratory: Negative for shortness of breath, dyspnea  Cardiovascular: Negative for chest pain/tightness  Gastrointestinal: Negative for abdominal pain, N/V  Endocrine: Negative for cold/heat intolerance, unexplained weight loss/gain  Genitourinary: Negative for flank pain, dysuria, hematuria     Musculoskeletal: Positive as in the "HPI  Skin: Negative for rash  Neurological: Negative  Psychiatric/Behavioral: Negative for agitation  _____________________________________________________  PHYSICAL EXAMINATION:    Blood pressure 135/88, pulse 80, temperature 97 6 °F (36 4 °C), temperature source Temporal, height 5' 9\" (1 753 m), weight 109 kg (240 lb)  General: well developed and well nourished, alert, oriented times 3 and appears comfortable  Psychiatric: Normal  HEENT: Benign  Cardiovascular: Regular    Pulmonary: No wheezing or stridor  Abdomen: Soft, Nontender  Skin: No masses, erythema, lacerations, fluctation, ulcerations  Neurovascular: Motor and sensory exams are intact except as limited by her injury  Pulses are palpable and good color and capillary refill is noted  MUSCULOSKELETAL EXAMINATION:    The left upper extremity exam demonstrates an AlumaFoam splint in place left ring finger  The remainder of the left upper extremity examination is benign  The left ring finger splint was removed  There is swelling and resolving ecchymosis noted involving the proximal phalanx and extending as far distally as the IP joint  She denies any tenderness to palpation of the distal phalanx  The middle phalanx is nontender excluding at the PIP joint  The proximal phalanx is tender to palpation  She was able to flex slightly but range of motion was limited due to the prolonged immobilization  There is no rotational or angular malalignment appreciated to inspection of the finger  _____________________________________________________  STUDIES REVIEWED:  X-rays dated 4/19/2023, of her left hand, demonstrating nondisplaced fracture of the proximal phalanx of the ring finger noted primarily on the oblique image  The report was reviewed  The ER note was reviewed  X-rays obtained today demonstrate the fracture to remain nondisplaced          Magi Alejandro"

## 2023-04-26 NOTE — PROGRESS NOTES
Chronic patient Established Note (Follow up )    SUBJECTIVE:    Interval History 4/26/2023:  Mrs Gonzales presents for follow up. She has continued cervicalgia. She is s/p Left C3,4,5,6 RFA and  with benefit and only 1 week out. She is having worsening of right sided symptoms. She is ready to repeat RFA to this side as well. She continues with med mgt of lyrica 100mg and is out of this medication. She denies newer areas of pain or neurological s/s concerning for myelopathy.    Interval History 1/6/23:  Mrs Gonzales presents for follow up of pain complaints. She is s/p Repeat B L4/5 TFESI and states some improvement. Pt further states exacerbated cervicalgia which was relieved prior by RFAs of approx 80% for over 12 months. Pain is left and right sided. Pain limiting functioning. She denies any myelopathic symptoms.     Interval History 11/4/2022:  Mrs Gonzales presents for follow up of lower back pain and returning cervicalgia. She is approx 11 months out of prior B L4/5 TFESI with >90% improved symptoms and ready to repeat this 1st prior to repeating prior Cervical RFAs. She has no newer areas of pain or neurological changes. She does not focally voice any s/s concerning for myelopathy or cauda equina.     Interval History 11/22/2021:  The Pt presents for follow up evaluation. She was doing well until she had fall. She states this was due to legs feeling weak after mourning loss of her brother. Pt states no new areas of pain and lumbar pain is same pain when she had prior B L4/5 TFESIs benefit her. She denies new neurological changes. She is currently prescribed Lyrica 100mg TID with benefit and denies adverse SE.     Interval Hx 10/04/21:  Jonathan Gonzales presents to the clinic for a follow-up appointment s/p bilateral C3,4,5,6 RFA (07/28/21 & 08/10/21) which she reports to have given her 80% relief. She currently has a abernathy after recent admission for urinary retention which she underwent cervical and lumbar MRI  that did not showed the cause of her urinary retention to be from a spinal lesion. She has no complaints today, she continues to take Liryca 100mg TID with no side effects. No upper extremity sx.     Interval History 7/6/2021:  The patient is here for follow up of left knee and chronic lower back/neck pain.  She has been having more increased neck pain recently.  She did have left-sided cervical radiofrequency ablation earlier this year with benefit.  However, we did not perform the right because she had a flare-up of back and leg pain which required an epidural steroid injection.  Recently, her back pain has been minimal and she would like to focus on her neck again.  The pain mostly stays in the neck without radiation into the arms.  However, she does have numbness to all 4 extremities consistent with her diabetic neuropathy.  Voltaren gel has been helpful for her knee pain. Since previous encounter, she had a recent annual follow up with her PCP. Her diabetes has been well controlled with her sugars within range. Her pain today is 7/10.    Interval History 5/10/2021:  The patient presents today for follow up of back, neck and left knee pain. Her back pain bothers her most with standing and walking. Her legs bother her most with walking. Her left knee pain has improved somewhat since previous encounter. She has had benefit with cervical RFAs in the past. She is doing home stretches daily. Her pain today is 8/10.    Interval History 4/9/2021:  The patient here today for follow-up of chronic neck and lower back pain.  She is now status post bilateral L4/5 transforaminal epidural steroid injections with significant benefit of leg pain.  Her neck pain is still mild at this time.  However, she reports new onset left anterior knee pain.  It does not worsen with activity.  It bothers her more when she touches the front of her knee or puts pressure on it.  She denies any recent injury.  She has not tried anything for the  pain.  Specifically, she has not tried creams or ice.  Her pain today is 6/10.    Interval history 03/03/2021:  Since previous encounter the patient has had left-sided radiofrequency ablation cervical spine at C3, C4, C5, C6 on 02/03/2021 followed by bilateral L4-5 transforaminal epidural steroid injections on 02/10/2021.  She is doing well in her neck pain and also having improvement in her lower extremity radicular pain symptoms but she states that she has been doing exercises and some her lower extremity radicular symptoms are starting to return but originally she had substantial relief.  No other health changes since previous encounter.    Interval History 1/13/2021:  Here for follow-up of chronic neck and lower back pain.  She is status post left C3, 4, 5, 6 medial branch blocks with significant benefit for 1 day.  She wishes to proceed with radiofrequency ablation of affected nerves.  She continues to report pain across the neck, worse on the left side.  It mainly stays in the neck without radiation into the arms.  She previously reported benefit for her leg pain with transforaminal lumbar steroid injection in November.  However, she states that her pain has been returning.  It radiates down the side of both legs to her shins.  She did previously have surgical consult and does not wish to pursue surgery at this time.  She does find Lyrica helpful in like a refill today.  Her pain today is 9/10.    Interval History 12/1/2020:  The patient presents today for follow up of back and right leg pain.  She is now s/p bilateral L4 TF CARLEE on 11/4/20. She is reporting 90% relief of bilateral leg pain.  She still has some pain to the left buttock which she says is worse when she sits and when she stands.  She is no longer having significant shooting pain into the legs.  She continues to have long-standing numbness to her feet consistent with previous diagnosis of neuropathy.  Her biggest complaint today is left-sided neck  pain.  She has had neck pain for many years.  She underwent cervical radiofrequency ablation in 2015.  Documentation after that time states that she had limited benefit although she did have relief short-term from the blocks.  She knows says that she feels that it did help her significantly after a couple of months.  Her pain started to worsen over the past few weeks.  She has been evaluated by neurosurgery in the past and cervical fusion was discussed, however, she does not wish to have surgery at this time.  She is not having any shooting pain into her arms at this time.  It is mostly across the left side of her neck and into her left shoulder.  She denies any new weakness or dropping of objects.  Her pain today is 8/10.    Previous Encounter:  60 year old female with PMH of breast cancer (s/p mastectomy bilaterally), DM2, HTN, bipolar disorder and schizophrenia. patient presents today to discuss severe back and right leg pain.  We have not seen the patient in clinic in over 3 years and she reports that she was mainly doing well with her back and neck pain.  She was sent up here today by her primary care doctor, Dr. Contreras, as she saw him today.  She reports that she has had back pain for many years but it has been well-controlled until 3 days ago.  She has been having severe right sided lower back pain with radiation down the back of the right leg with numbness.  She has pain and numbness to her right heel and calf.  She is not having significant symptoms on the left.  Her leg pain is greater than her back pain.  She is unable to walk without assistance.  She is feeling subjective weakness to her right foot.  She takes Lyrica twice daily from PCP for neuropathy.  She says that her sugars have been running 130-150 in the morning. She had a recent visit with oncology and was stable.   Pain Medications:    - Opioids: None  - Adjuvant Medications: Lyrica ( Pregabalin)  - Anti-Coagulants: None  - Others: See med  list    Opioid Contract: no     report:  Reviewed and consistent with medication use as prescribed.    Pain Procedures:  5/9/14 C7- T1 IL CARLEE  6/20/14 C7-T1 IL CARLEE  5/22/15 Left C3,4,5,6 MBB  6/26/15 Left C3,4,5,6 MBB  7/31/15 Left C3,4,5,6 RFA- no relief  9/24/15 Right C5-6 TF CARLEE (IR)- limited benefit  11/4/20 Bilateral L4/5 TF CARLEE- 90% relief  12/9/20 Left C3,4,5,6 MBB- 80% relief for one day  2/3/21 Left C3,4,5,6 RFA- 60% relief  3/10/21 Bilateral L4/5 TF CARLEE- 90% relief  07/28/21 Right C3,4,5,6 RFA  08/18/2021 Left C3,4,5,6 RFA   12/15/2021 Bilateral L4/5 TFESI  12/14/2022 B L4/5 TFESI      Physical Therapy/Home Exercise: yes in the past       Imaging:  MRI Cervical 09/23/21  FINDINGS:  There is mild reversal of the normal cervical lordosis.  Cervical vertebral body heights appear adequately maintained.  There is no evidence of diffuse bone marrow replacement process.     The cervicomeduallary junction is normal.  The cervical cord is normal in contour, caliber, and signal. The adjacent soft tissue structures demonstrate no significant abnormality.     C2-3:  There is central posterior disc osteophyte complex and mild bilateral facet arthropathy.  No significant spinal canal stenosis or neural foraminal narrowing.     C3-4:  There is a posterior disc osteophyte complex with effacement of the anterior thecal sac and at most mild spinal canal stenosis.  There is mild bilateral facet arthropathy, left greater than right, without significant neural foraminal narrowing.     C4-5:  There is a broad-based posterior disc osteophyte complex with effacement of the anterior thecal sac and mild spinal canal stenosis.  There is bilateral uncovertebral spurring with mild left-sided neural foraminal narrowing.     C5-6:  There is a broad-based posterior disc osteophyte complex with effacement of the anterior thecal sac.  There is mild to moderate spinal canal stenosis.  There is bilateral uncovertebral spurring with  moderate to severe right-sided and mild left-sided neural foraminal narrowing.     C6-7:  There is a broad-based posterior disc osteophyte complex with prominent asymmetric left paracentral component.  There is effacement of the anterior thecal sac with mild mass effect upon the left anterolateral cervical cord.  There is mild to moderate spinal canal stenosis.  There is right-sided uncovertebral spurring with mild right-sided neural foraminal narrowing.     C7-T1:  No disc herniation, spinal canal narrowing, or neuroforaminal narrowing.     Impression:     Multilevel degenerative changes of the cervical spine, similar to prior examination, most pronounced at the levels of C4-C5 through C6-C7.  Please see above for level by level details.    MRI Lumbar 09/23/21  FINDINGS:  There is grade 1 anterolisthesis of L4 on L5, similar to prior examination.  The vertebral body heights are well maintained, with no fracture.  There is no marrow signal abnormality suspicious for infiltrative process.  There is mild disc desiccation at L4-L5.  The conus is normal in appearance and terminates at the L1-L2 level.     L1-L2: There is no focal disk herniation.  No significant spinal canal or neural foraminal narrowing.     L2-L3: There is no focal disk herniation.  No significant spinal canal or neural foraminal narrowing.     L3-L4: There is no focal disc herniation.  There is ligamentum flavum thickening and mild bilateral facet arthropathy.  No significant spinal canal or neural foraminal narrowing.     L4-L5: There is grade 1 anterolisthesis with uncovering of the disc.  There is a circumferential disc bulge.  There is advanced ligamentum flavum thickening and bilateral facet arthropathy.  There is prominent fluid noted within the right facet articulation.  There is associated mass effect and tapering of the thecal sac with severe spinal canal stenosis, similar to prior examination.  There is mild bilateral neural foraminal  narrowing.     L5-S1: There is no focal disc herniation.  There is mild bilateral facet arthropathy.  No significant spinal canal or neural foraminal narrowing.     Limited views of the posterior abdomen demonstrate bilateral renal T2 hyperintensities likely representing cysts.     Impression:     Grade 1 anterolisthesis of L4 on L5 with uncovering of the disc.  There is associated advanced bilateral facet arthropathy at L4-L5 with resulting severe spinal canal stenosis and mild bilateral neural foraminal narrowing, similar to prior MRI examination.  Please see above for additional level by level details    Allergies: Review of patient's allergies indicates:  No Known Allergies    Current Medications:   Current Outpatient Medications   Medication Sig Dispense Refill    ammonium lactate 12 % Crea Apply twice daily to affected parts both feet as needed. 140 g 11    amoxicillin-clavulanate 500-125mg (AUGMENTIN) 500-125 mg Tab Take 1 tablet (500 mg total) by mouth 2 (two) times daily. 8 tablet 0    arm brace (WRIST SUPPORT LARGE-XLARGE MISC)       atorvastatin (LIPITOR) 80 MG tablet Take 1 tablet (80 mg total) by mouth once daily. 90 tablet 0    chlorhexidine (PERIDEX) 0.12 % solution RINSE AND SPIT 15 ML TWICE DAILY FOR 7 DAYS      divalproex 500 MG Tb24 1 tablet EVERY PM (route: oral)      GLUCAGON EMERGENCY KIT, HUMAN, 1 mg injection SMARTSI Vial(s) IM PRN      GLUTOSE-15 40 % gel SMARTSI unspecified By Mouth PRN      insulin (LANTUS SOLOSTAR U-100 INSULIN) glargine 100 units/mL SubQ pen INJECT 23 UNITS SUBCUTANEOUSLY NIGHTLY  HOLD until you see your doctor and are told to resume 30 mL 5    liraglutide 0.6 mg/0.1 mL, 18 mg/3 mL, subq PNIJ (VICTOZA 3-HERMANN) 0.6 mg/0.1 mL (18 mg/3 mL) PnIj pen INJECT 1.8 MG SUBCUTANEOUSLY ONCE DAILY. 27 mL 11    metFORMIN (GLUCOPHAGE) 500 MG tablet Take 2 tablets (1,000 mg total) by mouth 2 (two) times daily with meals. Take with food. 120 tablet 11    QUEtiapine (SEROQUEL) 100 MG  Tab Take 1 tablet (100 mg total) by mouth every evening. 90 tablet 3    ziprasidone (GEODON) 40 MG Cap 1 capsule EVERY PM (route: oral)      acetaminophen-codeine 300-30mg (TYLENOL #3) 300-30 mg Tab Take 1 tablet by mouth every 6 (six) hours as needed (pain). (Patient not taking: Reported on 4/26/2023) 20 tablet 0    aspirin (ECOTRIN) 81 MG EC tablet Take 1 tablet (81 mg total) by mouth once daily.  0    blood glucose control, low Soln 1 drop by Misc.(Non-Drug; Combo Route) route as needed. 1 each 0    HUMALOG U-100 INSULIN 100 unit/mL injection Has not started      OLANZapine (ZYPREXA) 10 MG tablet Take 10 mg by mouth every 8 (eight) hours as needed.      pregabalin (LYRICA) 100 MG capsule Take 1 capsule (100 mg total) by mouth 3 (three) times daily. for 15 days 45 capsule 0     No current facility-administered medications for this visit.     Facility-Administered Medications Ordered in Other Visits   Medication Dose Route Frequency Provider Last Rate Last Admin    0.9%  NaCl infusion   Intravenous Continuous Jacky See MD 25 mL/hr at 02/03/21 1432 New Bag at 02/03/21 1432    0.9%  NaCl infusion  500 mL Intravenous Continuous Cleopatra Rust DO           REVIEW OF SYSTEMS:    GENERAL:  No weight loss, malaise or fevers.  HEENT:  Negative for frequent or significant headaches.  NECK:  Negative for lumps, goiter, pain and significant neck swelling.  RESPIRATORY:  Negative for cough, wheezing or shortness of breath.  CARDIOVASCULAR:  Negative for chest pain, leg swelling or palpitations. Hypertension.  GI:  Negative for abdominal discomfort, blood in stools or black stools or change in bowel habits.  MUSCULOSKELETAL:  See HPI.  SKIN:  Negative for lesions, rash, and itching.  PSYCH:  Positive for sleep disturbance.  H/O depression, bipolar disorder and schizophrenia managed by outside psych.  HEMATOLOGY/LYMPHOLOGY:  Negative for prolonged bleeding, bruising easily or swollen nodes. H/O breast cancer.  NEURO:    No history of headaches, syncope, paralysis, seizures or tremors.  ENDO: Diabetes.  All other reviewed and negative other than HPI.    Past Medical History:  Past Medical History:   Diagnosis Date    Bipolar disorder     Cancer of female breast 10/2010    T2 N1 Mx, Stage IIB left breast cancer    Cancer of upper-outer quadrant of female breast 7/9/2012    Depression     Diabetes mellitus type II     Disturbances of sensation of smell and taste 6/29/2012    Hypertension     Malignant neoplasm of breast (female), unspecified site 4/27/2015    Neuropathy     Nonspecific abnormal unspecified cardiovascular function study 4/12/2013    ECG READ AS SHOWING A T-WAVE ABNORMALITY     Post-mastectomy lymphedema syndrome     Schizophrenia     Stroke        Past Surgical History:  Past Surgical History:   Procedure Laterality Date    BREAST RECONSTRUCTION  11/23/11    B/L SHLOMO flap reconstruction S/P left MRM, right prophylactic mastectomy    BREAST RECONSTRUCTION Bilateral 3/13/2015    NIPPLE RECONSTRUCTION    INJECTION OF ANESTHETIC AGENT AROUND NERVE Left 12/9/2020    Procedure: BLOCK, NERVE, C3-C4-C5-C6 MEDIAL BRANCH;  Surgeon: Darren Jerry MD;  Location: [facility];  Service: Pain Management;  Laterality: Left;    MASTECTOMY Bilateral 01/2011    bilateral    RADIOFREQUENCY ABLATION Left 2/3/2021    Procedure: RADIOFREQUENCY ABLATION, C3,C4,C5,C6 MEDIAL BRANCH 1 of 2;  Surgeon: Darren Jerry MD;  Location: [facility];  Service: Pain Management;  Laterality: Left;    RADIOFREQUENCY ABLATION Right 7/28/2021    Procedure: RADIOFREQUENCY ABLATION, C3-C4-C5-C6 MEDIAL BR ANCH 1 IOF 2;  Surgeon: Darren Jerry MD;  Location: [facility];  Service: Pain Management;  Laterality: Right;    RADIOFREQUENCY ABLATION Left 8/18/2021    Procedure: RADIOFREQUENCY ABLATION, C3-C4-C5-C6 MEDIAL BRANCH 2 OF 2;  Surgeon: Darren Jerry MD;  Location: [facility];  Service: Pain Management;  Laterality: Left;     RADIOFREQUENCY ABLATION Left 4/19/2023    Procedure: RADIOFREQUENCY ABLATION LEFT C3,C4,C5,C6  ONE OF TWO;  Surgeon: Darren Jerry MD;  Location: [facility];  Service: Pain Management;  Laterality: Left;  3/8 RESCHEDULE    TRANSFORAMINAL EPIDURAL INJECTION OF STEROID Bilateral 11/4/2020    Procedure: INJECTION, STEROID, EPIDURAL, TRANSFORAMINAL APPROACH L4/L5;  Surgeon: Darren Jerry MD;  Location: [facility];  Service: Pain Management;  Laterality: Bilateral;  Bilateral L4/L5    TRANSFORAMINAL EPIDURAL INJECTION OF STEROID Bilateral 2/10/2021    Procedure: INJECTION, STEROID, EPIDURAL, TRANSFORAMINAL APPROACH, L4-L5;  Surgeon: Darren Jerry MD;  Location: [facility];  Service: Pain Management;  Laterality: Bilateral;    TRANSFORAMINAL EPIDURAL INJECTION OF STEROID Bilateral 3/10/2021    Procedure: INJECTION, STEROID, EPIDURAL, TRANSFORAMINAL APPROACH, L4-L5;  Surgeon: Darren Jerry MD;  Location: [facility];  Service: Pain Management;  Laterality: Bilateral;    TRANSFORAMINAL EPIDURAL INJECTION OF STEROID Bilateral 12/15/2021    Procedure: INJECTION, STEROID, EPIDURAL, TRANSFORAMINAL APPROACH  Bilateral L4/5 TFESI / needs consent;  Surgeon: Darren Jerry MD;  Location: [facility];  Service: Pain Management;  Laterality: Bilateral;    TRANSFORAMINAL EPIDURAL INJECTION OF STEROID Bilateral 12/14/2022    Procedure: INJECTION, STEROID, EPIDURAL, TRANSFORAMINAL APPROACH BILATERAL L4/5 CONTRAST;  Surgeon: Darren Jerry MD;  Location: [facility];  Service: Pain Management;  Laterality: Bilateral;    TUBAL LIGATION         Family History:  Family History   Problem Relation Age of Onset    Kidney disease Mother         Dialysis    Hypertension Mother     Deep vein thrombosis Mother     Glaucoma Mother     Diabetic kidney disease Sister     Lung cancer Sister     Diabetes Sister     Cancer Sister         lung    Diabetes Brother     Diabetes Son     No Known Problems Father      "No Known Problems Maternal Grandmother     No Known Problems Maternal Grandfather     No Known Problems Paternal Grandmother     No Known Problems Paternal Grandfather     No Known Problems Son     Cervical cancer Daughter     Cancer Daughter         cervical cancer    No Known Problems Daughter     No Known Problems Daughter     No Known Problems Daughter     Breast cancer Neg Hx     Ovarian cancer Neg Hx        Social History:  Social History     Socioeconomic History    Marital status:     Number of children: 6   Occupational History    Occupation: Disability   Tobacco Use    Smoking status: Former     Packs/day: 0.50     Years: 25.00     Pack years: 12.50     Types: Cigarettes     Start date:      Quit date: 3/17/2022     Years since quittin.1    Smokeless tobacco: Never    Tobacco comments:     currently at less than 1/2 pack pd   Substance and Sexual Activity    Alcohol use: Yes     Alcohol/week: 0.0 standard drinks     Comment: social - once every 2 weeks    Drug use: No     Comment:  - stopped using crack cocaine    Sexual activity: Not Currently     Partners: Male       OBJECTIVE:    BP (!) 144/90   Pulse 66   Temp 97.9 °F (36.6 °C)   Resp 18   Ht 5' 3" (1.6 m)   Wt 69 kg (152 lb 1.9 oz)   LMP  (LMP Unknown)   BMI 26.95 kg/m²     PHYSICAL EXAMINATION:    General appearance: Well appearing, in no acute distress, alert and oriented x3.  Psych:  Mood and affect appropriate.  Skin: Skin color, texture, turgor normal, no rashes or lesions, in both upper and lower body.  Head/face:  Atraumatic, normocephalic. No palpable lymph nodes  Neck: Minimal discomfort to palpation over the cervical paraspinous muscles. Spurling Negative. No pain with neck flexion, extension, or lateral flexion. .  Extremities: Peripheral joint ROM is full and pain free without obvious instability or laxity in all four extremities. No deformities, edema, or skin discoloration. Good capillary refill.  Genitourinary: " Richard in place.   Musculoskeletal: Shoulder, hip, sacroiliac and knee provocative maneuvers are negative. Bilateral upper and lower extremity strength is normal and symmetric.  No atrophy or tone abnormalities are noted.  Neuro: Bilateral upper and lower extremity coordination symmetric.  . No loss of sensation is noted.  Gait: Using Straight Cane for ambulation     ASSESSMENT: 63 y.o. year old female with neck pain, consistent with     1. Cervical spondylosis  Procedure Order to Pain Management      2. Lumbar disc disorder                    PLAN:     - Prior records reviewed  -s/p repeat Bilateral L4/5 TFESI with some improvement  - most recently s/p Left sided C3,4,5,6 RFA  - Will reschedule for R sided C3,4,5,6 RFA  - Refill Lyrica 100mg TID  - I have stressed the importance of physical activity and a home exercise plan to help with pain and improve health.  - Patient can continue with medications for now since they are providing benefits, using them appropriately, and without side effects.  - Counseled patient regarding the importance of activity modification.  - RTC 4 weeks after RFAs     The above plan and management options were discussed at length with patient. Patient is in agreement with the above and verbalized understanding.    Moses Coffey   04/28/2023    I spent a total of 30 minutes on the day of the visit.  This includes face to face time and non-face to face time preparing to see the patient by reviewing previous labs/imaging, obtaining and/or reviewing separately obtained history, documenting clinical information in the electronic or other health record, independently interpreting results and communicating results to the patient/family/caregiver.

## 2023-04-28 RX ORDER — PREGABALIN 100 MG/1
100 CAPSULE ORAL 3 TIMES DAILY
Qty: 270 CAPSULE | Refills: 0 | Status: SHIPPED | OUTPATIENT
Start: 2023-04-28 | End: 2023-09-29 | Stop reason: SDUPTHER

## 2023-05-05 ENCOUNTER — TELEPHONE (OUTPATIENT)
Dept: INTERNAL MEDICINE | Facility: CLINIC | Age: 63
End: 2023-05-05

## 2023-05-30 ENCOUNTER — PATIENT MESSAGE (OUTPATIENT)
Dept: PAIN MEDICINE | Facility: OTHER | Age: 63
End: 2023-05-30
Payer: COMMERCIAL

## 2023-06-01 ENCOUNTER — TELEPHONE (OUTPATIENT)
Dept: PAIN MEDICINE | Facility: CLINIC | Age: 63
End: 2023-06-01
Payer: COMMERCIAL

## 2023-06-01 NOTE — TELEPHONE ENCOUNTER
Staff left the patient a message regarding rescheduling appointment 06/28/23 with Moses Coffey NP due to the provider being out of office on that day. Staff informed patient to contact office to be rescheduled.

## 2023-07-10 PROBLEM — N17.9 AKI (ACUTE KIDNEY INJURY): Status: RESOLVED | Noted: 2021-09-23 | Resolved: 2023-07-10

## 2023-07-20 ENCOUNTER — OFFICE VISIT (OUTPATIENT)
Dept: INTERNAL MEDICINE | Facility: CLINIC | Age: 63
End: 2023-07-20
Attending: FAMILY MEDICINE
Payer: COMMERCIAL

## 2023-07-20 VITALS
HEART RATE: 63 BPM | HEIGHT: 63 IN | BODY MASS INDEX: 25.88 KG/M2 | WEIGHT: 146.06 LBS | SYSTOLIC BLOOD PRESSURE: 128 MMHG | OXYGEN SATURATION: 99 % | DIASTOLIC BLOOD PRESSURE: 60 MMHG

## 2023-07-20 DIAGNOSIS — E11.42 TYPE 2 DIABETES MELLITUS WITH DIABETIC POLYNEUROPATHY, WITH LONG-TERM CURRENT USE OF INSULIN: Chronic | ICD-10-CM

## 2023-07-20 DIAGNOSIS — Z79.4 TYPE 2 DIABETES MELLITUS WITH DIABETIC POLYNEUROPATHY, WITH LONG-TERM CURRENT USE OF INSULIN: Chronic | ICD-10-CM

## 2023-07-20 DIAGNOSIS — R41.0 DELIRIUM: Primary | ICD-10-CM

## 2023-07-20 DIAGNOSIS — F25.0 SCHIZOAFFECTIVE DISORDER, BIPOLAR TYPE: Chronic | ICD-10-CM

## 2023-07-20 DIAGNOSIS — R53.81 DEBILITY: ICD-10-CM

## 2023-07-20 PROCEDURE — 3044F PR MOST RECENT HEMOGLOBIN A1C LEVEL <7.0%: ICD-10-PCS | Mod: CPTII,S$GLB,, | Performed by: FAMILY MEDICINE

## 2023-07-20 PROCEDURE — 3074F SYST BP LT 130 MM HG: CPT | Mod: CPTII,S$GLB,, | Performed by: FAMILY MEDICINE

## 2023-07-20 PROCEDURE — 4010F ACE/ARB THERAPY RXD/TAKEN: CPT | Mod: CPTII,S$GLB,, | Performed by: FAMILY MEDICINE

## 2023-07-20 PROCEDURE — 1159F PR MEDICATION LIST DOCUMENTED IN MEDICAL RECORD: ICD-10-PCS | Mod: CPTII,S$GLB,, | Performed by: FAMILY MEDICINE

## 2023-07-20 PROCEDURE — 99999 PR PBB SHADOW E&M-EST. PATIENT-LVL V: CPT | Mod: PBBFAC,,, | Performed by: FAMILY MEDICINE

## 2023-07-20 PROCEDURE — 99215 OFFICE O/P EST HI 40 MIN: CPT | Mod: S$GLB,,, | Performed by: FAMILY MEDICINE

## 2023-07-20 PROCEDURE — 1160F PR REVIEW ALL MEDS BY PRESCRIBER/CLIN PHARMACIST DOCUMENTED: ICD-10-PCS | Mod: CPTII,S$GLB,, | Performed by: FAMILY MEDICINE

## 2023-07-20 PROCEDURE — 1160F RVW MEDS BY RX/DR IN RCRD: CPT | Mod: CPTII,S$GLB,, | Performed by: FAMILY MEDICINE

## 2023-07-20 PROCEDURE — 3008F PR BODY MASS INDEX (BMI) DOCUMENTED: ICD-10-PCS | Mod: CPTII,S$GLB,, | Performed by: FAMILY MEDICINE

## 2023-07-20 PROCEDURE — 3008F BODY MASS INDEX DOCD: CPT | Mod: CPTII,S$GLB,, | Performed by: FAMILY MEDICINE

## 2023-07-20 PROCEDURE — 1159F MED LIST DOCD IN RCRD: CPT | Mod: CPTII,S$GLB,, | Performed by: FAMILY MEDICINE

## 2023-07-20 PROCEDURE — 3072F PR LOW RISK FOR RETINOPATHY: ICD-10-PCS | Mod: CPTII,S$GLB,, | Performed by: FAMILY MEDICINE

## 2023-07-20 PROCEDURE — 4010F PR ACE/ARB THEARPY RXD/TAKEN: ICD-10-PCS | Mod: CPTII,S$GLB,, | Performed by: FAMILY MEDICINE

## 2023-07-20 PROCEDURE — 3078F PR MOST RECENT DIASTOLIC BLOOD PRESSURE < 80 MM HG: ICD-10-PCS | Mod: CPTII,S$GLB,, | Performed by: FAMILY MEDICINE

## 2023-07-20 PROCEDURE — 99999 PR PBB SHADOW E&M-EST. PATIENT-LVL V: ICD-10-PCS | Mod: PBBFAC,,, | Performed by: FAMILY MEDICINE

## 2023-07-20 PROCEDURE — 3074F PR MOST RECENT SYSTOLIC BLOOD PRESSURE < 130 MM HG: ICD-10-PCS | Mod: CPTII,S$GLB,, | Performed by: FAMILY MEDICINE

## 2023-07-20 PROCEDURE — 3078F DIAST BP <80 MM HG: CPT | Mod: CPTII,S$GLB,, | Performed by: FAMILY MEDICINE

## 2023-07-20 PROCEDURE — 3072F LOW RISK FOR RETINOPATHY: CPT | Mod: CPTII,S$GLB,, | Performed by: FAMILY MEDICINE

## 2023-07-20 PROCEDURE — 3044F HG A1C LEVEL LT 7.0%: CPT | Mod: CPTII,S$GLB,, | Performed by: FAMILY MEDICINE

## 2023-07-20 PROCEDURE — 99215 PR OFFICE/OUTPT VISIT, EST, LEVL V, 40-54 MIN: ICD-10-PCS | Mod: S$GLB,,, | Performed by: FAMILY MEDICINE

## 2023-07-20 RX ORDER — MUPIROCIN 20 MG/G
OINTMENT TOPICAL 3 TIMES DAILY
Qty: 30 G | Refills: 3 | Status: SHIPPED | OUTPATIENT
Start: 2023-07-20 | End: 2023-07-30

## 2023-07-20 RX ORDER — GABAPENTIN 300 MG/1
300 CAPSULE ORAL 3 TIMES DAILY
COMMUNITY
Start: 2023-07-07 | End: 2023-10-02

## 2023-07-20 RX ORDER — LISINOPRIL AND HYDROCHLOROTHIAZIDE 10; 12.5 MG/1; MG/1
1 TABLET ORAL
COMMUNITY
Start: 2023-04-07

## 2023-07-20 RX ORDER — DIVALPROEX SODIUM 500 MG/1
500 TABLET, DELAYED RELEASE ORAL 3 TIMES DAILY
COMMUNITY
End: 2023-12-11

## 2023-07-20 RX ORDER — IBUPROFEN 800 MG/1
800 TABLET ORAL EVERY 8 HOURS PRN
COMMUNITY
Start: 2023-04-10

## 2023-07-20 RX ORDER — OXYBUTYNIN CHLORIDE 5 MG/1
1 TABLET ORAL DAILY
COMMUNITY
Start: 2023-07-07 | End: 2023-08-22 | Stop reason: SDUPTHER

## 2023-07-20 RX ORDER — LETROZOLE 2.5 MG/1
2.5 TABLET, FILM COATED ORAL
COMMUNITY

## 2023-07-20 RX ORDER — HYDROCHLOROTHIAZIDE 12.5 MG/1
1 TABLET ORAL DAILY
COMMUNITY
Start: 2023-07-07 | End: 2023-11-09 | Stop reason: SDUPTHER

## 2023-07-20 RX ORDER — CALCIUM CARBONATE 160(400)MG
1 TABLET,CHEWABLE ORAL
COMMUNITY
Start: 2023-07-06

## 2023-07-20 RX ORDER — HYDROCODONE BITARTRATE AND ACETAMINOPHEN 5; 325 MG/1; MG/1
1 TABLET ORAL EVERY 6 HOURS PRN
COMMUNITY
Start: 2023-04-10

## 2023-07-20 NOTE — PROGRESS NOTES
CHIEF COMPLAINT:   Hosp. F/U in a patient with diabetes and hypertension    HISTORY OF PRESENT ILLNESS: The patient is a 63 year-old black female.  Recently inpt d/t AMS.  No records of this hospitalization.  This may be due the fact that it appears to have been a psychiatric hospitalization at [place].  In any event I was unable to review records and limited history is available from patient and family.  Ultimately the family confided that they felt the lack of information was due to the sensitive nature of the admission.  She appears to be back to baseline    The patient has a long and complicated medical history.      She continues to have problems with neuropathic pain as well as central pain.  She does well w/ her Lyrica 100 twice a day.    The patient has a history of diabetes.  Recent blood sugars have been less than 200.  There have been no episodes of hypoglycemia.    The patient has a history of stable hypertension on current medications.  Patient denies chest pain or shortness of breath today.    The patient has a history of stable hyperlipidemia on current medications.  The patient denies chest pain or shortness of breath today.  The patient denies muscle aches or myalgias suggestive of myositis.    She has a history of breast cancer for which she underwent bilateral mastectomies and chemotherapy.    REVIEW OF SYSTEMS:  GENERAL: No fever, chills or weight loss.  SKIN: No rashes, itching or changes in color or texture of skin.  HEAD: No headaches or recent head trauma.  EYES: Visual acuity fine. No photophobia, ocular pain or diplopia.  EARS: Denies ear pain, discharge or vertigo.  NOSE: No loss of smell, no epistaxis or postnasal drip.  MOUTH & THROAT: No hoarseness or change in voice. No excessive gum bleeding.  NODES: Denies swollen glands.  CHEST: Denies AWAD, cyanosis, wheezing, cough and sputum production.  CARDIOVASCULAR: Denies chest pain, PND, orthopnea or reduced exercise tolerance.  ABDOMEN:  "Appetite fine. No weight loss. Denies diarrhea, abdominal pain, hematemesis or blood in stool.  URINARY: No flank pain, dysuria or hematuria.  PERIPHERAL VASCULAR: No claudication or cyanosis.  MUSCULOSKELETAL: No joint stiffness or swelling. Except as noted above.  NEUROLOGIC: No history of seizures    SOCIAL HISTORY: The patient does smoke.  The patient consumes alcohol socially.      PHYSICAL EXAMINATION:   Blood pressure 128/60, pulse 63, height 5' 3" (1.6 m), weight 66.3 kg (146 lb 0.9 oz), SpO2 99 %.    APPEARANCE: Well nourished, well developed, in no acute distress.    HEAD: Normocephalic, atraumatic.  EYES: PERRL. EOMI.  Conjunctivae without injection and  Anicteric  NOSE: Mucosa pink. Airway clear.  MOUTH & THROAT: No tonsillar enlargement. No pharyngeal erythema or exudate. No stridor.  NECK: Supple.   NODES: No cervical, axillary or inguinal lymph node enlargement.  CHEST: Lungs clear to auscultation.  No retractions are noted.  No rales or rhonchi are present.  CARDIOVASCULAR: Normal S1, S2. No rubs, murmurs or gallops.  ABDOMEN: Bowel sounds normal. Not distended. Soft. No tenderness or masses.  No ascites is noted.  MUSCULOSKELETAL:  There is no clubbing, cyanosis, or edema of the extremities x4.  There is full range of motion of the lumbar spine.  There is full range of motion of the extremities x4.  There is no deformity noted.    NEUROLOGIC:       Normal speech development.      Hearing normal.      paretic gait.      Motor and sensory exams grossly normal.  Mild RLE weakness noted  PSYCHIATRIC: Patient is alert and oriented x3.  Thought processes are all normal.  There is no homicidality.  There is no suicidality.  There is no evidence of psychosis.    LABORATORY/RADIOLOGY:   Chart reviewed including surgeries and blood work    ASSESSMENT:   3 weeks hospital for ?AMS, likely psychiatric in nature  CVA  Breast cancer with resultant lymphedema  Hypertension, condition is well controlled on current " medication regimen  Diabetes, condition is well controlled on current medication regimen  Neuropathic pain  Cervical radiculopathy    PLAN:  A1c good recently  Depakote   Her diabetes is very well controlled    Pain clinic   KCl 40 po qd  Prinzide  Pravachol 20 mg by mouth daily    Return to clinic in 6 month with blood work prior

## 2023-08-22 RX ORDER — OXYBUTYNIN CHLORIDE 5 MG/1
5 TABLET ORAL DAILY
Qty: 90 TABLET | Refills: 0 | Status: SHIPPED | OUTPATIENT
Start: 2023-08-22 | End: 2023-10-02

## 2023-08-22 NOTE — TELEPHONE ENCOUNTER
----- Message from Irma Lei sent at 8/21/2023  4:03 PM CDT -----  Regarding: refill  Type: RX Refill Request    Who Called: Jonathan     Have you contacted your pharmacy:yes     Refill or New Rx:refill     RX Name and Strength:oxybutynin (DITROPAN)  10 MG Tab    How is the patient currently taking it? (ex. 1XDay):    Is this a 30 day or 90 day RX:    Preferred Pharmacy with phone number:[facility] #[id number] - [address] AT [facility] [address]    Local or Mail Order:    Ordering Provider:    Would the patient rather a call back or a response via [facility]? Call     Best Call Back Number:[phone]    Additional Information:

## 2023-08-22 NOTE — TELEPHONE ENCOUNTER
Care Due:                  Date            Visit Type   Department     Provider  --------------------------------------------------------------------------------                                EP -                              PRIMARY      [facility] INTERNAL  Isrealmyriam Reji  Last Visit: 07-      [facility] ([facility])   [facility]  Next Visit: None Scheduled  None         None Found                                                            Last  Test          Frequency    Reason                     Performed    Due Date  --------------------------------------------------------------------------------    HBA1C.......  6 months...  liraglutide, metFORMIN...  04-   10-    Lipid Panel.  12 months..  atorvastatin.............  03- 03-    [employer] Embedded Care Due Messages. Reference number: [id number].   8/22/2023 10:28:52 AM CDT

## 2023-09-27 DIAGNOSIS — E11.9 TYPE 2 DIABETES MELLITUS WITHOUT COMPLICATION: ICD-10-CM

## 2023-09-29 NOTE — TELEPHONE ENCOUNTER
Refill Routing Note     Refill Routing Note   Medication(s) are not appropriate for processing by [facility] for the following reason(s):      Medication outside of protocol    [facility] action(s):  Route Care Due:  None identified            Appointments  past 12m or future 3m with PCP    Date Provider   Last Visit   7/20/2023 Ghulam Contreras MD   Next Visit   Visit date not found Ghulam Contreras MD   ED visits in past 90 days: 0        Note composed:12:07 PM 09/29/2023

## 2023-09-29 NOTE — TELEPHONE ENCOUNTER
No care due was identified.  [facility] Embedded Care Due Messages. Reference number: [id number].   9/29/2023 11:29:26 AM CDT

## 2023-09-29 NOTE — TELEPHONE ENCOUNTER
Refill Encounter unsure of diagnosis    PCP Visits: Recent Visits  Date Type Provider Dept   07/20/23 Office Visit Ghulam Contreras MD [facility] Internal Medicine   04/20/23 Office Visit Ghulam Contreras MD [facility] Internal Medicine   01/30/23 Office Visit Ghulam Contreras MD [facility] Internal Medicine   Showing recent visits within past 360 days and meeting all other requirements  Future Appointments  No visits were found meeting these conditions.  Showing future appointments within next 720 days and meeting all other requirements     Last 3 Blood Pressure:   BP Readings from Last 3 Encounters:   07/20/23 128/60   06/12/23 105/69   04/26/23 (!) 144/90     Preferred Pharmacy:     [facility] #[id number] [address]  [address] [facility] [address]  Phone: [phone] Fax: [phone]    Requested RX:  Requested Prescriptions      No prescriptions requested or ordered in this encounter      RX Route: Normal

## 2023-09-29 NOTE — TELEPHONE ENCOUNTER
----- Message from Meri Murillo sent at 9/29/2023 10:47 AM CDT -----  Contact: JOYA MUNOZ [[id number]]  Type: RX Refill Request    Who Called: JOYA MUNOZ [[id number]]    Refill or New Rx: refill     RX Name and Strength: pregabalin (LYRICA) 100 MG capsule      Is this a 30 day or 90 day RX: 90 day     Preferred Pharmacy with phone number: [facility] #[id number] [address] or Mail Order: local       Would the patient rather a call back or a response [facility]?     Best Call Back Number: [phone] ([place])         Additional Information:

## 2023-10-02 ENCOUNTER — TELEPHONE (OUTPATIENT)
Dept: INTERNAL MEDICINE | Facility: CLINIC | Age: 63
End: 2023-10-02
Payer: COMMERCIAL

## 2023-10-02 RX ORDER — OXYBUTYNIN CHLORIDE 10 MG/1
10 TABLET, EXTENDED RELEASE ORAL DAILY
Qty: 30 TABLET | Refills: 3 | Status: SHIPPED | OUTPATIENT
Start: 2023-10-02 | End: 2024-02-15

## 2023-10-02 RX ORDER — PREGABALIN 100 MG/1
100 CAPSULE ORAL 3 TIMES DAILY
Qty: 270 CAPSULE | Refills: 0 | Status: SHIPPED | OUTPATIENT
Start: 2023-10-02 | End: 2023-12-31

## 2023-10-02 NOTE — TELEPHONE ENCOUNTER
Spoke with the pt who stated she has been having frequent urination for a week. Pt denies any burning or back pain when urinating. Pt stated Dr Contreras is aware f her condition. Pt stated she is out of her Oxyburtin 5 mg being she has increased it to 10 mg daily. Pt requesting Oxybutin to be sent in to [facility] on [address]. Pended Oxybutin 10 mg, unsure of diagnosis. Please advise

## 2023-10-17 ENCOUNTER — LAB VISIT (OUTPATIENT)
Dept: LAB | Facility: OTHER | Age: 63
End: 2023-10-17
Attending: FAMILY MEDICINE
Payer: COMMERCIAL

## 2023-10-17 DIAGNOSIS — C50.912 MALIGNANT NEOPLASM OF LEFT FEMALE BREAST, UNSPECIFIED ESTROGEN RECEPTOR STATUS, UNSPECIFIED SITE OF BREAST: ICD-10-CM

## 2023-10-17 LAB
ALBUMIN SERPL BCP-MCNC: 3.3 G/DL (ref 3.5–5.2)
ALP SERPL-CCNC: 44 U/L (ref 55–135)
ALT SERPL W/O P-5'-P-CCNC: 10 U/L (ref 10–44)
ANION GAP SERPL CALC-SCNC: 8 MMOL/L (ref 8–16)
AST SERPL-CCNC: 21 U/L (ref 10–40)
BASOPHILS # BLD AUTO: 0.02 K/UL (ref 0–0.2)
BASOPHILS NFR BLD: 0.3 % (ref 0–1.9)
BILIRUB SERPL-MCNC: 0.3 MG/DL (ref 0.1–1)
BUN SERPL-MCNC: 20 MG/DL (ref 8–23)
CALCIUM SERPL-MCNC: 9.6 MG/DL (ref 8.7–10.5)
CHLORIDE SERPL-SCNC: 104 MMOL/L (ref 95–110)
CO2 SERPL-SCNC: 29 MMOL/L (ref 23–29)
CREAT SERPL-MCNC: 1.2 MG/DL (ref 0.5–1.4)
DIFFERENTIAL METHOD: ABNORMAL
EOSINOPHIL # BLD AUTO: 0.1 K/UL (ref 0–0.5)
EOSINOPHIL NFR BLD: 2.2 % (ref 0–8)
ERYTHROCYTE [DISTWIDTH] IN BLOOD BY AUTOMATED COUNT: 14.8 % (ref 11.5–14.5)
EST. GFR  (NO RACE VARIABLE): 51 ML/MIN/1.73 M^2
GLUCOSE SERPL-MCNC: 105 MG/DL (ref 70–110)
HCT VFR BLD AUTO: 33.7 % (ref 37–48.5)
HGB BLD-MCNC: 10.8 G/DL (ref 12–16)
IMM GRANULOCYTES # BLD AUTO: 0.04 K/UL (ref 0–0.04)
IMM GRANULOCYTES NFR BLD AUTO: 0.6 % (ref 0–0.5)
LYMPHOCYTES # BLD AUTO: 3.2 K/UL (ref 1–4.8)
LYMPHOCYTES NFR BLD: 51.8 % (ref 18–48)
MCH RBC QN AUTO: 29.5 PG (ref 27–31)
MCHC RBC AUTO-ENTMCNC: 32 G/DL (ref 32–36)
MCV RBC AUTO: 92 FL (ref 82–98)
MONOCYTES # BLD AUTO: 0.5 K/UL (ref 0.3–1)
MONOCYTES NFR BLD: 7.8 % (ref 4–15)
NEUTROPHILS # BLD AUTO: 2.3 K/UL (ref 1.8–7.7)
NEUTROPHILS NFR BLD: 37.3 % (ref 38–73)
NRBC BLD-RTO: 0 /100 WBC
PLATELET # BLD AUTO: 286 K/UL (ref 150–450)
PMV BLD AUTO: 10.3 FL (ref 9.2–12.9)
POTASSIUM SERPL-SCNC: 3.6 MMOL/L (ref 3.5–5.1)
PROT SERPL-MCNC: 7.2 G/DL (ref 6–8.4)
RBC # BLD AUTO: 3.66 M/UL (ref 4–5.4)
SODIUM SERPL-SCNC: 141 MMOL/L (ref 136–145)
WBC # BLD AUTO: 6.26 K/UL (ref 3.9–12.7)

## 2023-10-17 PROCEDURE — 85025 COMPLETE CBC W/AUTO DIFF WBC: CPT | Performed by: INTERNAL MEDICINE

## 2023-10-17 PROCEDURE — 36415 COLL VENOUS BLD VENIPUNCTURE: CPT | Performed by: INTERNAL MEDICINE

## 2023-10-17 PROCEDURE — 80053 COMPREHEN METABOLIC PANEL: CPT | Performed by: INTERNAL MEDICINE

## 2023-10-25 ENCOUNTER — OFFICE VISIT (OUTPATIENT)
Dept: HEMATOLOGY/ONCOLOGY | Facility: CLINIC | Age: 63
End: 2023-10-25
Payer: COMMERCIAL

## 2023-10-25 VITALS
OXYGEN SATURATION: 99 % | BODY MASS INDEX: 27.11 KG/M2 | WEIGHT: 153 LBS | HEIGHT: 63 IN | DIASTOLIC BLOOD PRESSURE: 70 MMHG | HEART RATE: 57 BPM | SYSTOLIC BLOOD PRESSURE: 100 MMHG

## 2023-10-25 DIAGNOSIS — R05.9 COUGH, UNSPECIFIED TYPE: ICD-10-CM

## 2023-10-25 DIAGNOSIS — F25.0 SCHIZOAFFECTIVE DISORDER, BIPOLAR TYPE: ICD-10-CM

## 2023-10-25 DIAGNOSIS — E11.42 TYPE 2 DIABETES MELLITUS WITH DIABETIC POLYNEUROPATHY, WITH LONG-TERM CURRENT USE OF INSULIN: Primary | ICD-10-CM

## 2023-10-25 DIAGNOSIS — I89.0 LYMPHEDEMA: ICD-10-CM

## 2023-10-25 DIAGNOSIS — E11.9 TYPE 2 DIABETES MELLITUS WITHOUT COMPLICATION, UNSPECIFIED WHETHER LONG TERM INSULIN USE: ICD-10-CM

## 2023-10-25 DIAGNOSIS — C50.912 MALIGNANT NEOPLASM OF LEFT FEMALE BREAST, UNSPECIFIED ESTROGEN RECEPTOR STATUS, UNSPECIFIED SITE OF BREAST: Primary | ICD-10-CM

## 2023-10-25 DIAGNOSIS — R63.4 WEIGHT LOSS: ICD-10-CM

## 2023-10-25 DIAGNOSIS — Z85.3 HISTORY OF BREAST CANCER: Primary | ICD-10-CM

## 2023-10-25 DIAGNOSIS — Z79.4 TYPE 2 DIABETES MELLITUS WITH DIABETIC POLYNEUROPATHY, WITH LONG-TERM CURRENT USE OF INSULIN: Primary | ICD-10-CM

## 2023-10-25 PROCEDURE — 3008F BODY MASS INDEX DOCD: CPT | Mod: CPTII,S$GLB,, | Performed by: INTERNAL MEDICINE

## 2023-10-25 PROCEDURE — 3078F DIAST BP <80 MM HG: CPT | Mod: CPTII,S$GLB,, | Performed by: INTERNAL MEDICINE

## 2023-10-25 PROCEDURE — 3072F PR LOW RISK FOR RETINOPATHY: ICD-10-PCS | Mod: CPTII,S$GLB,, | Performed by: INTERNAL MEDICINE

## 2023-10-25 PROCEDURE — 3074F SYST BP LT 130 MM HG: CPT | Mod: CPTII,S$GLB,, | Performed by: INTERNAL MEDICINE

## 2023-10-25 PROCEDURE — 3072F LOW RISK FOR RETINOPATHY: CPT | Mod: CPTII,S$GLB,, | Performed by: INTERNAL MEDICINE

## 2023-10-25 PROCEDURE — 99214 OFFICE O/P EST MOD 30 MIN: CPT | Mod: S$GLB,,, | Performed by: INTERNAL MEDICINE

## 2023-10-25 PROCEDURE — 99214 PR OFFICE/OUTPT VISIT, EST, LEVL IV, 30-39 MIN: ICD-10-PCS | Mod: S$GLB,,, | Performed by: INTERNAL MEDICINE

## 2023-10-25 PROCEDURE — 3044F PR MOST RECENT HEMOGLOBIN A1C LEVEL <7.0%: ICD-10-PCS | Mod: CPTII,S$GLB,, | Performed by: INTERNAL MEDICINE

## 2023-10-25 PROCEDURE — 4010F PR ACE/ARB THEARPY RXD/TAKEN: ICD-10-PCS | Mod: CPTII,S$GLB,, | Performed by: INTERNAL MEDICINE

## 2023-10-25 PROCEDURE — 99999 PR PBB SHADOW E&M-EST. PATIENT-LVL III: ICD-10-PCS | Mod: PBBFAC,,, | Performed by: INTERNAL MEDICINE

## 2023-10-25 PROCEDURE — 3074F PR MOST RECENT SYSTOLIC BLOOD PRESSURE < 130 MM HG: ICD-10-PCS | Mod: CPTII,S$GLB,, | Performed by: INTERNAL MEDICINE

## 2023-10-25 PROCEDURE — 4010F ACE/ARB THERAPY RXD/TAKEN: CPT | Mod: CPTII,S$GLB,, | Performed by: INTERNAL MEDICINE

## 2023-10-25 PROCEDURE — 3008F PR BODY MASS INDEX (BMI) DOCUMENTED: ICD-10-PCS | Mod: CPTII,S$GLB,, | Performed by: INTERNAL MEDICINE

## 2023-10-25 PROCEDURE — 3044F HG A1C LEVEL LT 7.0%: CPT | Mod: CPTII,S$GLB,, | Performed by: INTERNAL MEDICINE

## 2023-10-25 PROCEDURE — 99999 PR PBB SHADOW E&M-EST. PATIENT-LVL III: CPT | Mod: PBBFAC,,, | Performed by: INTERNAL MEDICINE

## 2023-10-25 PROCEDURE — 3078F PR MOST RECENT DIASTOLIC BLOOD PRESSURE < 80 MM HG: ICD-10-PCS | Mod: CPTII,S$GLB,, | Performed by: INTERNAL MEDICINE

## 2023-10-25 RX ORDER — LANCETS
200 EACH MISCELLANEOUS 2 TIMES DAILY
Qty: 200 EACH | Refills: 3 | Status: SHIPPED | OUTPATIENT
Start: 2023-10-25

## 2023-10-25 RX ORDER — DEXTROSE 4 G
1 TABLET,CHEWABLE ORAL 2 TIMES DAILY
Qty: 1 EACH | Refills: 0 | Status: SHIPPED | OUTPATIENT
Start: 2023-10-25 | End: 2024-10-24

## 2023-10-25 NOTE — TELEPHONE ENCOUNTER
Care Due:                  Date            Visit Type   Department     Provider  --------------------------------------------------------------------------------                                EP -                              PRIMARY      [facility] INTERNAL  Farzadtammymyriam Reji  Last Visit: 07-      [facility] ([facility])   [facility]  Next Visit: None Scheduled  None         None Found                                                            Last  Test          Frequency    Reason                     Performed    Due Date  --------------------------------------------------------------------------------    HBA1C.......  6 months...  liraglutide, metFORMIN...  04-   10-    Lipid Panel.  12 months..  atorvastatin.............  03- 03-    [employer] Embedded Care Due Messages. Reference number: [id number].   10/25/2023 9:26:18 AM CDT

## 2023-10-25 NOTE — PROGRESS NOTES
Subjective:       Patient ID: Jonathan Gonzales is a 63 y.o. female.    Chief Complaint: Breast Cancer       Diagnosis: Stage IIB lt Breast CA T2N1MO ER weak pos/WV negative, HER-2/bridget amplified dx'd 11/2011  Treatment:   1.  s/p bilateral mastectomy with SHLOMO flap reconstruction 11/23/2011                         2.  completed   Phase III 0221 protocol 3/12/13             AC x4 followed by  Paclitaxel/Herceptin and completion of Herceptin therapy 1 yr completed 3/12/13      HPI 63 -year-old female with stage IIB breast cancer, left breast originally diagnosed in November 2011, status post bilateral skin -sparing mastectomy with a right prophylactic and a left radical mastectomy for a T2 N1 MO ,Stage 2b. breast cancer of the left breast on 11/23/2011 with immediate autologous tissue reconstruction with SHLOMO flap. She underwent second stage reconstructive surgery on 4/27/2015 which was complicated by a hematoma for which She was taken back to OR emergently  for aspiration. Pt previously Lost to  follow-up ( >1yr) and has re established care 2017 She was seen in the ED 10/2019 for RLE weakness and dysarthria. She was then admitted to  SNF for CVA.She is followed by psychiatry Dr. Fierro  [facility]   for bipolar disorder and schizophrenia. She is followed by PCP for  history of type 2  DM w/neuropathy , HTN and hyperlipidemia         Interval Hx: She is concerned about wt loss and diminished appetite past few mos  Wt loss of about 30 lbs past yr  She also reports cough intermittently prod x 2 wks  No SOB/CP  Poor historian   She was hospitalized a few mos go  inpt d/t AMS.  No records of this hospitalization.  This may be due the fact that it appears to have been a psychiatric hospitalization at [place].     No CP/SOB  No lightheadedness  No fevers, night sweats    Lymphedema improved       She is followed by PCP for  history of type 2  DM w/neuropathy , HTN and hyperlipidemia  Reports she  "lost her glucometer            PrevHx: Tumor was ER weak pos /RI negative, HER-2/bridget amplified with 1 of total of 24 lymph nodes removed which revealed metastatic carcinoma, 0.4 cm in diameter with no extranodal extension.She had abnormal PET imaging 12/2011 which revealed. Findings suggestive of metastatic disease to supraclavicular, mediastinal and right hilar lymph nodes.She underwent rt supraclavicular lymph node biopsy which was negative for malignancy. PET/CT 10/12 revealed resolution of LAD and no evid of mets.Patient was enrolled in 0221 study. She completed 1 yr of Herceptin therapy3/12/2013.     Tx; Phase III 0221 protocol- DD AC ( Adriamycin 60mg/m2/Cytoxan 600mg/2) x4 followed by  Paclitaxel 175mg/m2/Herceptin and completion of Herceptin therapy (6mg/kg q3wks) 1 yr 3/12/13          Review of Systems   Constitutional:  Positive for appetite change, fatigue and unexpected weight change. Negative for chills.   HENT:  Negative for congestion, hearing loss and nosebleeds.    Eyes:  Negative for visual disturbance.   Respiratory:  Negative for cough, chest tightness and shortness of breath.    Cardiovascular:  Negative for chest pain and leg swelling.   Gastrointestinal:  Negative for abdominal pain, blood in stool, constipation, diarrhea, nausea and vomiting.   Genitourinary:  Negative for dysuria, flank pain, frequency and hematuria.   Musculoskeletal:  Negative for arthralgias, back pain, gait problem, joint swelling and neck pain.   Skin:  Negative for rash.        No petechiae, ecchymoses   Neurological:  Negative for dizziness, light-headedness, numbness and headaches.   Hematological:  Negative for adenopathy. Does not bruise/bleed easily.   Psychiatric/Behavioral:  Negative for dysphoric mood. The patient is not nervous/anxious.        Objective:       Vitals:    10/25/23 1051   BP: 100/70   Pulse: (!) 57   SpO2: 99%   Weight: 69.4 kg (153 lb)   Height: 5' 3" (1.6 m)       Physical " Exam  Constitutional:       Appearance: She is well-developed.   HENT:      Head: Normocephalic.      Mouth/Throat:      Pharynx: No oropharyngeal exudate.   Eyes:      General: Lids are normal. No scleral icterus.     Conjunctiva/sclera: Conjunctivae normal.   Neck:      Thyroid: No thyromegaly.   Cardiovascular:      Rate and Rhythm: Normal rate and regular rhythm.      Heart sounds: Normal heart sounds. No murmur heard.  Pulmonary:      Breath sounds: Normal breath sounds. No wheezing or rales.   Chest:      Comments: Bilateral reconstructed breasts- no masses or axillary LAD noted  Abdominal:      General: Bowel sounds are normal.      Palpations: Abdomen is soft.      Tenderness: There is no abdominal tenderness. There is no guarding or rebound.   Musculoskeletal:         General: No tenderness. Normal range of motion.      Cervical back: Normal range of motion and neck supple.   Lymphadenopathy:      Cervical: No cervical adenopathy.      Upper Body:      Right upper body: No supraclavicular adenopathy.      Left upper body: No supraclavicular adenopathy.   Skin:     General: Skin is warm and dry.      Findings: No ecchymosis, erythema, petechiae or rash.   Neurological:      Mental Status: She is alert and oriented to person, place, and time.      Cranial Nerves: No cranial nerve deficit.      Coordination: Coordination normal.             Results for JOYA MUNOZ (MRN [id number]) as of 6/8/2015 13:40   Ref. Range 6/2/2015 07:50   Vit D, 25-Hydroxy Latest Range: 30-96 ng/mL 33     Lab Results   Component Value Date    WBC 6.26 10/17/2023    HGB 10.8 (L) 10/17/2023    HCT 33.7 (L) 10/17/2023    MCV 92 10/17/2023     10/17/2023           10/19/2016 Bone Density-nl    Labs: none   Assessment:       1. History of breast cancer    2. Schizoaffective disorder, bipolar type    3. Type 2 diabetes mellitus without complication, unspecified whether long term insulin use    4. Weight loss    5. Cough,  unspecified type    6. Lymphedema            Plan:        Breast cancer, Stage IIB lt Breast CA T2N1MO ER weak pos/ [place] neg Her 2 pos diagnosed  11/2011   - s/p bilateral mastectomy with SHLOMO flap reconstruction 11/23/2011   - s/p chemotherapy per  Phase III 0221 protocol      S/p second stage reconstructive surgery      Rt Breast US 10/2015- Area in the right breast is benign-negative.  .   ACR BI-RADS Category 2: Benign   - CUAUHTEMOC  recurrence clinically     Lymphedema   improved  Cont compression sleeves      Bipolar d/o  Follow-up with psych  Cont meds as prescribed per psych    DM type 2   Last HbA1c 6.2% on 6/25/23  Follow up with PCP    Weight loss  Unintentional  Etiology unclear  Plan CT imaging    Cough  CT chest      CT at [facility] next week    Follow up in 6 mos with cbc,cmp prior to f/u   Advance Care Planning     Power of   I previously initiated the process of advance care planning today and explained the importance of this process to the patient.  I introduced the concept of advance directives to the patient, as well. Then the patient received detailed information about the importance of designating a Health Care Power of  (HCPOA). She was also instructed to communicate with this person about their wishes for future healthcare, should she become sick and lose decision-making capacity. The patient has not previously appointed a HCPOA. After our discussion, the patient has decided to complete a HCPOA and has appointed her daughter and NAME:    Rojelio Daniels    I spent a total time of 16  minutes discussing this issue with the patient.           CC: Ghulam Contreras MD

## 2023-10-25 NOTE — TELEPHONE ENCOUNTER
----- Message from Meri Murillo sent at 10/25/2023  8:17 AM CDT -----  Contact: JOYA MUNOZ [[id number]]  Type: RX Refill Request    Who Called:  JOYA MUNOZ [[id number]]    Refill or New Rx: refill     RX Name and Strength: Meter to test sugar      Is this a 30 day or 90 day RX:    Preferred Pharmacy with phone number: [facility] #[id number] - [address] or Mail Order: local       Would the patient rather a call back or a response via [facility]? Call back     Best Call Back Number:        Additional Information:

## 2023-10-26 ENCOUNTER — TELEPHONE (OUTPATIENT)
Dept: INTERNAL MEDICINE | Facility: CLINIC | Age: 63
End: 2023-10-26
Payer: COMMERCIAL

## 2023-10-26 DIAGNOSIS — E11.42 TYPE 2 DIABETES MELLITUS WITH DIABETIC POLYNEUROPATHY, WITH LONG-TERM CURRENT USE OF INSULIN: ICD-10-CM

## 2023-10-26 DIAGNOSIS — Z79.4 TYPE 2 DIABETES MELLITUS WITH DIABETIC POLYNEUROPATHY, WITH LONG-TERM CURRENT USE OF INSULIN: ICD-10-CM

## 2023-10-26 RX ORDER — DEXTROSE 4 G
1 TABLET,CHEWABLE ORAL 2 TIMES DAILY
Qty: 1 EACH | Refills: 0 | Status: CANCELLED | OUTPATIENT
Start: 2023-10-26 | End: 2024-10-25

## 2023-10-26 NOTE — TELEPHONE ENCOUNTER
----- Message from Anjana Pelletier sent at 10/26/2023 11:13 AM CDT -----  Regarding: Patient Advice                  Name of Who is Calling:      JOYA MUNOZ  \  Who Left The Message:      JOYA MUNOZ      What is the request in detail:         Patient called requesting a call; patient states her blood pressure has been running low (79 over 49 @ 8:30 AM ) and (115 over 77 @ 11:15 AM) this morning Thursday 10/26/2023 . Patient also has missed placed her glucose meter and would like a replacement ordered. Please give a call back at your earliest convenience and further advise.   Thank you      Reply by [facility]: NO      Preferred Call Back :  [phone] (m)

## 2023-10-30 ENCOUNTER — TELEPHONE (OUTPATIENT)
Dept: INTERNAL MEDICINE | Facility: CLINIC | Age: 63
End: 2023-10-30
Payer: COMMERCIAL

## 2023-10-30 NOTE — TELEPHONE ENCOUNTER
Spoke with the pt and informed her that we send in her glucometer on 10/25/23. Pt ROSEANN, scheduled her for her 6 month follow up.

## 2023-11-06 ENCOUNTER — TELEPHONE (OUTPATIENT)
Dept: INTERNAL MEDICINE | Facility: CLINIC | Age: 63
End: 2023-11-06
Payer: COMMERCIAL

## 2023-11-06 NOTE — TELEPHONE ENCOUNTER
----- Message from Meri Murillo sent at 11/6/2023  7:52 AM CST -----  Contact: JOYA MUNOZ [[id number]]  Type: Call Back      Who called: JOYA MUNOZ [[id number]]      What is the request in detail: Patient is requesting a call back. Pt states that she is unable to find the strps, she would like the office to assist.   Please advise.     Can the clinic reply by [facility]? No      Would the patient rather a call back or a response via [facility]? Call back       Best call back number: [phone] (home)       Additional Information: out of stock

## 2023-11-09 DIAGNOSIS — I10 PRIMARY HYPERTENSION: Primary | ICD-10-CM

## 2023-11-09 RX ORDER — HYDROCHLOROTHIAZIDE 12.5 MG/1
12.5 TABLET ORAL DAILY
Qty: 90 TABLET | Refills: 2 | Status: SHIPPED | OUTPATIENT
Start: 2023-11-09 | End: 2024-11-08

## 2023-11-09 NOTE — TELEPHONE ENCOUNTER
Refill Encounter    PCP Visits: Recent Visits  Date Type Provider Dept   07/20/23 Office Visit Ghulam Contreras MD [facility] Internal Medicine   04/20/23 Office Visit Ghulam Contreras MD [facility] Internal Medicine   01/30/23 Office Visit Ghulam Contreras MD [facility] Internal Medicine   Showing recent visits within past 360 days and meeting all other requirements  Future Appointments  Date Type Provider Dept   04/30/24 Appointment Ghulam Contreras MD [facility] Internal Medicine   Showing future appointments within next 720 days and meeting all other requirements     Last 3 Blood Pressure:   BP Readings from Last 3 Encounters:   10/25/23 100/70   07/20/23 128/60   06/12/23 105/69     Preferred Pharmacy: [facility]  Requested RX:  Requested Prescriptions      No prescriptions requested or ordered in this encounter      RX Route: Normal

## 2023-11-09 NOTE — TELEPHONE ENCOUNTER
No care due was identified.  [facility] Embedded Care Due Messages. Reference number: [id number].   11/09/2023 10:42:20 AM CST

## 2023-11-09 NOTE — TELEPHONE ENCOUNTER
----- Message from Carson Basurto sent at 11/8/2023  3:29 PM CST -----  Who Called:        Refill or New Rx: new prescription         RX Name and Strength:  hydroCHLOROthiazide (HYDRODIURIL) 12.5 MG Tab          Is this a 30 day or 90 day RX        Preferred Pharmacy with phone number:  [facility] #[id number] - [address]        Local or Mail Order: local           Would the patient rather a call back or a response via [facility]? Call back         Best Call Back Number:        Additional Information:

## 2023-11-09 NOTE — TELEPHONE ENCOUNTER
Refill Routing Note   Medication(s) are not appropriate for processing by [facility] for the following reason(s):      No active prescription written by provider    [facility] action(s):  Defer Care Due:  None identified            Appointments  past 12m or future 3m with PCP    Date Provider   Last Visit   7/20/2023 Ghulam Contreras MD   Next Visit   4/30/2024 Ghulam Contreras MD   ED visits in past 90 days: 0        Note composed:11:01 AM 11/09/2023                      [facility]

## 2023-11-14 ENCOUNTER — HOSPITAL ENCOUNTER (OUTPATIENT)
Dept: RADIOLOGY | Facility: OTHER | Age: 63
Discharge: HOME | End: 2023-11-14
Attending: INTERNAL MEDICINE
Payer: COMMERCIAL

## 2023-11-14 DIAGNOSIS — Z85.3 HISTORY OF BREAST CANCER: ICD-10-CM

## 2023-11-14 PROCEDURE — A9698 NON-RAD CONTRAST MATERIALNOC: HCPCS | Performed by: INTERNAL MEDICINE

## 2023-11-14 PROCEDURE — 71260 CT THORAX DX C+: CPT | Mod: TC

## 2023-11-14 PROCEDURE — 74177 CT ABD & PELVIS W/CONTRAST: CPT | Mod: 26,,, | Performed by: RADIOLOGY

## 2023-11-14 PROCEDURE — 74177 CT ABD & PELVIS W/CONTRAST: CPT | Mod: TC

## 2023-11-14 PROCEDURE — 71260 CT THORAX DX C+: CPT | Mod: 26,,, | Performed by: RADIOLOGY

## 2023-11-14 PROCEDURE — 25500020 PHARM REV CODE 255: Performed by: INTERNAL MEDICINE

## 2023-11-14 PROCEDURE — 71260 CT CHEST ABDOMEN PELVIS WITH CONTRAST (XPD): ICD-10-PCS | Mod: 26,,, | Performed by: RADIOLOGY

## 2023-11-14 PROCEDURE — 74177 CT CHEST ABDOMEN PELVIS WITH CONTRAST (XPD): ICD-10-PCS | Mod: 26,,, | Performed by: RADIOLOGY

## 2023-11-14 RX ADMIN — IOHEXOL 1000 ML: 9 SOLUTION ORAL at 01:11

## 2023-11-14 RX ADMIN — IOHEXOL 75 ML: 350 INJECTION, SOLUTION INTRAVENOUS at 01:11

## 2023-11-15 ENCOUNTER — TELEPHONE (OUTPATIENT)
Dept: INTERNAL MEDICINE | Facility: CLINIC | Age: 63
End: 2023-11-15
Payer: COMMERCIAL

## 2023-11-25 NOTE — TELEPHONE ENCOUNTER
No care due was identified.  [facility] Embedded Care Due Messages. Reference number: [id number].   11/25/2023 10:10:44 AM CST

## 2023-11-26 RX ORDER — OXYBUTYNIN CHLORIDE 5 MG/1
5 TABLET ORAL
Qty: 90 TABLET | Refills: 0 | OUTPATIENT
Start: 2023-11-26

## 2023-11-26 NOTE — TELEPHONE ENCOUNTER
Refill Decision Note   Jonathan Gonzales  is requesting a refill authorization.  Brief Assessment and Rationale for Refill:  Quick Discontinue     Medication Therapy Plan:         Comments:     Note composed:2:11 PM 11/26/2023

## 2023-12-05 ENCOUNTER — TELEPHONE (OUTPATIENT)
Dept: HEMATOLOGY/ONCOLOGY | Facility: CLINIC | Age: 63
End: 2023-12-05
Payer: COMMERCIAL

## 2023-12-05 NOTE — TELEPHONE ENCOUNTER
Patient called for her CT results. Informed her that both the nurse and her provider are busy at the moment so they will give her a call back with the results. Patient verbalized understanding

## 2023-12-05 NOTE — TELEPHONE ENCOUNTER
Called and spoke with patient, let her know per provider that her CT scan looked good and that there was no sign of cancer, she verbalized understanding and had no other questions at this time.

## 2023-12-07 ENCOUNTER — OFFICE VISIT (OUTPATIENT)
Dept: OPTOMETRY | Facility: CLINIC | Age: 63
End: 2023-12-07
Payer: COMMERCIAL

## 2023-12-07 DIAGNOSIS — E11.9 DIABETES MELLITUS TYPE 2 WITHOUT RETINOPATHY: Primary | ICD-10-CM

## 2023-12-07 DIAGNOSIS — H52.4 PRESBYOPIA: ICD-10-CM

## 2023-12-07 DIAGNOSIS — H25.13 NUCLEAR SCLEROSIS, BILATERAL: ICD-10-CM

## 2023-12-07 DIAGNOSIS — Z13.5 GLAUCOMA SCREENING: ICD-10-CM

## 2023-12-07 LAB
LEFT EYE DM RETINOPATHY: NEGATIVE
RIGHT EYE DM RETINOPATHY: NEGATIVE

## 2023-12-07 PROCEDURE — 92014 COMPRE OPH EXAM EST PT 1/>: CPT | Mod: S$GLB,,, | Performed by: OPTOMETRIST

## 2023-12-07 PROCEDURE — 99999 PR PBB SHADOW E&M-EST. PATIENT-LVL III: CPT | Mod: PBBFAC,,, | Performed by: OPTOMETRIST

## 2023-12-07 PROCEDURE — 4010F ACE/ARB THERAPY RXD/TAKEN: CPT | Mod: CPTII,S$GLB,, | Performed by: OPTOMETRIST

## 2023-12-07 PROCEDURE — 4010F PR ACE/ARB THEARPY RXD/TAKEN: ICD-10-PCS | Mod: CPTII,S$GLB,, | Performed by: OPTOMETRIST

## 2023-12-07 PROCEDURE — 92015 PR REFRACTION: ICD-10-PCS | Mod: S$GLB,,, | Performed by: OPTOMETRIST

## 2023-12-07 PROCEDURE — 2023F DILAT RTA XM W/O RTNOPTHY: CPT | Mod: CPTII,S$GLB,, | Performed by: OPTOMETRIST

## 2023-12-07 PROCEDURE — 2023F PR DILATED RETINAL EXAM W/O EVID OF RETINOPATHY: ICD-10-PCS | Mod: CPTII,S$GLB,, | Performed by: OPTOMETRIST

## 2023-12-07 PROCEDURE — 92015 DETERMINE REFRACTIVE STATE: CPT | Mod: S$GLB,,, | Performed by: OPTOMETRIST

## 2023-12-07 PROCEDURE — 3044F PR MOST RECENT HEMOGLOBIN A1C LEVEL <7.0%: ICD-10-PCS | Mod: CPTII,S$GLB,, | Performed by: OPTOMETRIST

## 2023-12-07 PROCEDURE — 1159F MED LIST DOCD IN RCRD: CPT | Mod: CPTII,S$GLB,, | Performed by: OPTOMETRIST

## 2023-12-07 PROCEDURE — 1159F PR MEDICATION LIST DOCUMENTED IN MEDICAL RECORD: ICD-10-PCS | Mod: CPTII,S$GLB,, | Performed by: OPTOMETRIST

## 2023-12-07 PROCEDURE — 3044F HG A1C LEVEL LT 7.0%: CPT | Mod: CPTII,S$GLB,, | Performed by: OPTOMETRIST

## 2023-12-07 PROCEDURE — 99999 PR PBB SHADOW E&M-EST. PATIENT-LVL III: ICD-10-PCS | Mod: PBBFAC,,, | Performed by: OPTOMETRIST

## 2023-12-07 PROCEDURE — 92014 PR EYE EXAM, EST PATIENT,COMPREHESV: ICD-10-PCS | Mod: S$GLB,,, | Performed by: OPTOMETRIST

## 2023-12-07 NOTE — PROGRESS NOTES
HPI    Annual Diabetic Eye Exam   Pt states vision @ near Poor c Specs     Pt denies Flashes   Pt reports floaters- stable     Pt denies Dry/ Itchy/ watery eyes   Gtt: No    DM Dx'ed   BS: Unknown   Hemoglobin A1C       Date                     Value               Ref Range             Status                06/25/2023               6.2 (H)             4.7 - 5.6 %           Final                 04/20/2023               5.7 (H)             4.0 - 5.6 %           Final                   03/16/2022               7.1 (H)             4.0 - 5.6 %           Final                   09/23/2021               6.2 (H)             4.0 - 5.6 %           Final                Last edited by Dorota Baltazar on 12/7/2023  9:09 AM.            Assessment /Plan     For exam results, see Encounter Report.    Diabetes mellitus type 2 without retinopathy  -No retinopathy noted today.  Continued control with primary care physician and annual comprehensive eye exam.    Nuclear sclerosis, bilateral  -Educated patient on presence of cataracts at today's exam, monitor at annual dilated fundus exam. 1-2 years surgical estimate.  -Limited VA potential with sRx pt declines referral    Glaucoma screening  -Monitor with annual eye exam and IOP check    Presbyopia  Eyeglass Final Rx       Eyeglass Final Rx         Sphere Cylinder Axis Dist VA Add    Right [place] +0.50 180 20/40 +2.50    Left -1.00 +0.50 030 20/30 +2.50      Type: Bifocal    Expiration Date: 12/7/2024                      RTC 1 yr

## 2023-12-11 ENCOUNTER — OFFICE VISIT (OUTPATIENT)
Dept: INTERNAL MEDICINE | Facility: CLINIC | Age: 63
End: 2023-12-11
Attending: FAMILY MEDICINE
Payer: COMMERCIAL

## 2023-12-11 ENCOUNTER — LAB VISIT (OUTPATIENT)
Dept: LAB | Facility: OTHER | Age: 63
End: 2023-12-11
Attending: FAMILY MEDICINE
Payer: MEDICAID

## 2023-12-11 ENCOUNTER — TELEPHONE (OUTPATIENT)
Dept: INTERNAL MEDICINE | Facility: CLINIC | Age: 63
End: 2023-12-11

## 2023-12-11 VITALS
DIASTOLIC BLOOD PRESSURE: 74 MMHG | SYSTOLIC BLOOD PRESSURE: 112 MMHG | HEART RATE: 73 BPM | BODY MASS INDEX: 18.39 KG/M2 | HEIGHT: 63 IN | OXYGEN SATURATION: 100 % | WEIGHT: 103.81 LBS

## 2023-12-11 DIAGNOSIS — E11.9 TYPE 2 DIABETES MELLITUS WITHOUT COMPLICATION: ICD-10-CM

## 2023-12-11 DIAGNOSIS — R30.0 DYSURIA: Primary | ICD-10-CM

## 2023-12-11 DIAGNOSIS — N30.01 ACUTE CYSTITIS WITH HEMATURIA: ICD-10-CM

## 2023-12-11 DIAGNOSIS — C50.912 MALIGNANT NEOPLASM OF LEFT FEMALE BREAST, UNSPECIFIED ESTROGEN RECEPTOR STATUS, UNSPECIFIED SITE OF BREAST: ICD-10-CM

## 2023-12-11 LAB
ALBUMIN SERPL BCP-MCNC: 3.5 G/DL (ref 3.5–5.2)
ALP SERPL-CCNC: 46 U/L (ref 55–135)
ALT SERPL W/O P-5'-P-CCNC: 9 U/L (ref 10–44)
ANION GAP SERPL CALC-SCNC: 9 MMOL/L (ref 8–16)
AST SERPL-CCNC: 24 U/L (ref 10–40)
BILIRUB SERPL-MCNC: 0.3 MG/DL (ref 0.1–1)
BILIRUB SERPL-MCNC: ABNORMAL MG/DL
BLOOD URINE, POC: 250
BUN SERPL-MCNC: 22 MG/DL (ref 8–23)
CALCIUM SERPL-MCNC: 9.8 MG/DL (ref 8.7–10.5)
CHLORIDE SERPL-SCNC: 103 MMOL/L (ref 95–110)
CHOLEST SERPL-MCNC: 188 MG/DL (ref 120–199)
CHOLEST/HDLC SERPL: 3.4 {RATIO} (ref 2–5)
CLARITY, POC UA: ABNORMAL
CO2 SERPL-SCNC: 28 MMOL/L (ref 23–29)
COLOR, POC UA: YELLOW
CREAT SERPL-MCNC: 1.2 MG/DL (ref 0.5–1.4)
ERYTHROCYTE [DISTWIDTH] IN BLOOD BY AUTOMATED COUNT: 14.3 % (ref 11.5–14.5)
EST. GFR  (NO RACE VARIABLE): 51 ML/MIN/1.73 M^2
GLUCOSE SERPL-MCNC: 130 MG/DL (ref 70–110)
GLUCOSE UR QL STRIP: NORMAL
HCT VFR BLD AUTO: 33.2 % (ref 37–48.5)
HDLC SERPL-MCNC: 56 MG/DL (ref 40–75)
HDLC SERPL: 29.8 % (ref 20–50)
HGB BLD-MCNC: 10.9 G/DL (ref 12–16)
IMM GRANULOCYTES # BLD AUTO: 0.08 K/UL (ref 0–0.04)
KETONES UR QL STRIP: ABNORMAL
LDLC SERPL CALC-MCNC: 117 MG/DL (ref 63–159)
LEUKOCYTE ESTERASE URINE, POC: ABNORMAL
MCH RBC QN AUTO: 30.3 PG (ref 27–31)
MCHC RBC AUTO-ENTMCNC: 32.8 G/DL (ref 32–36)
MCV RBC AUTO: 92 FL (ref 82–98)
NEUTROPHILS # BLD AUTO: 2.7 K/UL (ref 1.8–7.7)
NITRITE, POC UA: ABNORMAL
NONHDLC SERPL-MCNC: 132 MG/DL
PH, POC UA: 7
PLATELET # BLD AUTO: 297 K/UL (ref 150–450)
PMV BLD AUTO: 9.7 FL (ref 9.2–12.9)
POTASSIUM SERPL-SCNC: 4.5 MMOL/L (ref 3.5–5.1)
PROT SERPL-MCNC: 7.8 G/DL (ref 6–8.4)
PROTEIN, POC: ABNORMAL
RBC # BLD AUTO: 3.6 M/UL (ref 4–5.4)
SODIUM SERPL-SCNC: 140 MMOL/L (ref 136–145)
SPECIFIC GRAVITY, POC UA: 1.01
TRIGL SERPL-MCNC: 75 MG/DL (ref 30–150)
UROBILINOGEN, POC UA: NORMAL
WBC # BLD AUTO: 5.66 K/UL (ref 3.9–12.7)

## 2023-12-11 PROCEDURE — 81002 URINALYSIS NONAUTO W/O SCOPE: CPT | Mod: S$GLB,,,

## 2023-12-11 PROCEDURE — 36415 COLL VENOUS BLD VENIPUNCTURE: CPT | Performed by: INTERNAL MEDICINE

## 2023-12-11 PROCEDURE — 3008F PR BODY MASS INDEX (BMI) DOCUMENTED: ICD-10-PCS | Mod: CPTII,S$GLB,,

## 2023-12-11 PROCEDURE — 99999 PR PBB SHADOW E&M-EST. PATIENT-LVL III: ICD-10-PCS | Mod: PBBFAC,,,

## 2023-12-11 PROCEDURE — 3074F SYST BP LT 130 MM HG: CPT | Mod: CPTII,S$GLB,,

## 2023-12-11 PROCEDURE — 80061 LIPID PANEL: CPT | Performed by: FAMILY MEDICINE

## 2023-12-11 PROCEDURE — 4010F PR ACE/ARB THEARPY RXD/TAKEN: ICD-10-PCS | Mod: CPTII,S$GLB,,

## 2023-12-11 PROCEDURE — 3078F DIAST BP <80 MM HG: CPT | Mod: CPTII,S$GLB,,

## 2023-12-11 PROCEDURE — 85027 COMPLETE CBC AUTOMATED: CPT | Performed by: INTERNAL MEDICINE

## 2023-12-11 PROCEDURE — 1159F MED LIST DOCD IN RCRD: CPT | Mod: CPTII,S$GLB,,

## 2023-12-11 PROCEDURE — 80053 COMPREHEN METABOLIC PANEL: CPT | Performed by: INTERNAL MEDICINE

## 2023-12-11 PROCEDURE — 4010F ACE/ARB THERAPY RXD/TAKEN: CPT | Mod: CPTII,S$GLB,,

## 2023-12-11 PROCEDURE — 99213 OFFICE O/P EST LOW 20 MIN: CPT | Mod: S$GLB,,,

## 2023-12-11 PROCEDURE — 1159F PR MEDICATION LIST DOCUMENTED IN MEDICAL RECORD: ICD-10-PCS | Mod: CPTII,S$GLB,,

## 2023-12-11 PROCEDURE — 3078F PR MOST RECENT DIASTOLIC BLOOD PRESSURE < 80 MM HG: ICD-10-PCS | Mod: CPTII,S$GLB,,

## 2023-12-11 PROCEDURE — 3044F HG A1C LEVEL LT 7.0%: CPT | Mod: CPTII,S$GLB,,

## 2023-12-11 PROCEDURE — 3008F BODY MASS INDEX DOCD: CPT | Mod: CPTII,S$GLB,,

## 2023-12-11 PROCEDURE — 3044F PR MOST RECENT HEMOGLOBIN A1C LEVEL <7.0%: ICD-10-PCS | Mod: CPTII,S$GLB,,

## 2023-12-11 PROCEDURE — 99213 PR OFFICE/OUTPT VISIT, EST, LEVL III, 20-29 MIN: ICD-10-PCS | Mod: S$GLB,,,

## 2023-12-11 PROCEDURE — 99999 PR PBB SHADOW E&M-EST. PATIENT-LVL III: CPT | Mod: PBBFAC,,,

## 2023-12-11 PROCEDURE — 3074F PR MOST RECENT SYSTOLIC BLOOD PRESSURE < 130 MM HG: ICD-10-PCS | Mod: CPTII,S$GLB,,

## 2023-12-11 PROCEDURE — 81002 POCT URINE DIPSTICK WITHOUT MICROSCOPE: ICD-10-PCS | Mod: S$GLB,,,

## 2023-12-11 RX ORDER — NITROFURANTOIN 25; 75 MG/1; MG/1
100 CAPSULE ORAL 2 TIMES DAILY
Qty: 10 CAPSULE | Refills: 0 | Status: SHIPPED | OUTPATIENT
Start: 2023-12-11 | End: 2024-03-14

## 2023-12-11 RX ORDER — PALIPERIDONE PALMITATE 117 MG/.75ML
INJECTION INTRAMUSCULAR
COMMUNITY
Start: 2023-11-16

## 2023-12-11 NOTE — TELEPHONE ENCOUNTER
----- Message from Jack Adams sent at 12/11/2023 11:59 AM CST -----  Regarding: Urine Lab  Name of Who is Calling:  Amie calling from [facility]          What is the request in detail:  Amie would like  to know can orders be put in for urine labs            Can the clinic reply by [facility]: No            What Number to Call Back if not in [facility]: [phone]

## 2023-12-11 NOTE — PROGRESS NOTES
Subjective     Patient ID: Jonathan Gonzales is a 63 y.o. female.    Chief Complaint: Urinary Tract Infection (Onset Saturday, frequency, dysuria cramping when urinating, denies hematuria, flank pain, abdominal pain)    Cramping with urination x 2 days. Denies burning, blood, fever, flank pain. Reports foul odor.    Urinary Tract Infection   This is a new problem. The current episode started acute onset. The problem occurs every urination. The problem has been unchanged. The quality of the pain is described as aching. There has been no fever. Associated symptoms include urgency. Pertinent negatives include no flank pain or hematuria. She has tried nothing for the symptoms. Her past medical history is significant for diabetes mellitus.     Review of Systems   Constitutional:  Negative for fever.   Respiratory:  Negative for shortness of breath.    Cardiovascular:  Negative for chest pain.   Genitourinary:  Positive for dysuria and urgency. Negative for flank pain and hematuria.          Objective     Physical Exam  Vitals reviewed.   Constitutional:       Appearance: She is well-developed.   HENT:      Head: Normocephalic and atraumatic.   Eyes:      Conjunctiva/sclera: Conjunctivae normal.   Cardiovascular:      Rate and Rhythm: Normal rate.   Pulmonary:      Effort: Pulmonary effort is normal. No respiratory distress.   Skin:     General: Skin is warm and dry.      Findings: No rash.   Neurological:      Mental Status: She is alert and oriented to person, place, and time.      Coordination: Coordination normal.   Psychiatric:         Behavior: Behavior normal.            Assessment and Plan     1. Dysuria  -     POCT URINE DIPSTICK WITHOUT MICROSCOPE    2. Acute cystitis with hematuria  -     POCT URINE DIPSTICK WITHOUT MICROSCOPE  -     nitrofurantoin, macrocrystal-monohydrate, (MACROBID) 100 MG capsule; Take 1 capsule (100 mg total) by mouth 2 (two) times daily.  Dispense: 10 capsule; Refill: 0    Pt instructed  to return to clinic for symptoms that do not resolve or get worse.  Discussed outstanding care gaps including RSV, COVID, and flu vaccines. Instructed pt to call podiatrist to schedule foot exam. Pt with outstanding labs for microalbumin and cbc/cmp. Scheduled labs for pt.    I spent a total of 21 minutes on the day of the visit.  This includes face to face time and non-face to face time preparing to see the patient (eg, review of tests), obtaining and/or reviewing separately obtained history, documenting clinical information in the electronic or other health record, independently interpreting results and communicating results to the patient/family/caregiver, or care coordinator.         No follow-ups on file.

## 2023-12-11 NOTE — TELEPHONE ENCOUNTER
Spoke with Mirna in the lab who stated the pt was unable to give a urine sample today. Mirna stated we could put in a new order for a urine micro. Pended order

## 2023-12-11 NOTE — PATIENT INSTRUCTIONS
Juanito Castillo,     If you are due for any health screening(s) below please notify me so we can arrange them to be ordered and scheduled. Most healthy patients at your age complete them, but you are free to accept or refuse.     If you can't do it, I'll definitely understand. If you can, I'd certainly appreciate it!    Tests to Keep You Healthy    Eye Exam: Met on 12/7/2023  Colon Cancer Screening: DUE  Cervical Cancer Screening: Met on 2/25/2022  Last Blood Pressure <= 139/89 (12/11/2023): Yes  Last HbA1c < 8 (06/25/2023): Yes      Its time for your colon cancer screening     Colorectal cancer is one of the leading causes of cancer death for men and women but it doesnt have to be. Screenings can prevent colorectal cancer or find it early enough to treat and cure the disease.     Our records indicate that you may be overdue for colon cancer screening. A colonoscopy or stool screening test can help identify patients at risk for developing colon cancer. Cancer screenings save lives, so schedule yours today to stay healthy.     A colonoscopy is the preferred test for detecting colon cancer. It is needed only once every 10 years if results are negative. While you are sedated, a flexible, lighted tube with a tiny camera is inserted into the rectum and advanced through the colon to look for cancers.     An alternative screening test that is used at home and returned to the lab may also be used. It detects hidden blood in bowel movements which could indicate cancer in the colon. If results are positive, you will need a colonoscopy to determine if the blood is a sign of cancer. This type of follow up (diagnostic) colonoscopy usually requires additional copays as required by your insurance provider.     If you recently had your colon cancer screening performed outside of [facility], please let your Health care team know so that they can update your health record. Please contact your PCP if you have any  questions.    Were here to help you quit smoking     Our records indicated that you are still smoking. One of the best things you can do for your health is to stop smoking and we are here to help.     Talk with your provider about our Smoking Cessation Program and how we can support you on your journey.         Vaccines:  RSV  Flu  COVID    Due for your diabetic foot exam. Contact your podiatrist for an appointment.

## 2023-12-12 NOTE — TELEPHONE ENCOUNTER
LVM to inform the pt that we put in a new urine sample for her to go to the lab since she was unable to go the other day.

## 2023-12-20 ENCOUNTER — TELEPHONE (OUTPATIENT)
Dept: PAIN MEDICINE | Facility: CLINIC | Age: 63
End: 2023-12-20
Payer: COMMERCIAL

## 2023-12-20 NOTE — TELEPHONE ENCOUNTER
----- Message from Ashwini Verdin sent at 12/20/2023 10:16 AM CST -----  Regarding: appt req  Contact: pt  Type: Appointment Request    Caller is requesting an appointment     Name of Caller: pt  Reason for appointment: Lower back pain  Would the patient rather a call back or a response via [facility]? Call back  Best Call Back Number:  [phone]  Additional Information: Please call , Thank You

## 2023-12-20 NOTE — TELEPHONE ENCOUNTER
----- Message from Ashwini Verdin sent at 12/20/2023 10:16 AM CST -----  Regarding: appt req  Contact: pt  Type: Appointment Request    Caller is requesting an appointment     Name of Caller: pt  Reason for appointment: Lower back pain  Would the patient rather a call back or a response via [facility]? Call back  Best Call Back Number:  [phone]  Additional Information: Please call , Thank You

## 2023-12-21 ENCOUNTER — TELEPHONE (OUTPATIENT)
Dept: PODIATRY | Facility: CLINIC | Age: 63
End: 2023-12-21
Payer: COMMERCIAL

## 2023-12-21 ENCOUNTER — OFFICE VISIT (OUTPATIENT)
Dept: PAIN MEDICINE | Facility: CLINIC | Age: 63
End: 2023-12-21
Attending: ANESTHESIOLOGY
Payer: COMMERCIAL

## 2023-12-21 VITALS
DIASTOLIC BLOOD PRESSURE: 56 MMHG | TEMPERATURE: 98 F | HEART RATE: 51 BPM | WEIGHT: 158.75 LBS | HEIGHT: 63 IN | SYSTOLIC BLOOD PRESSURE: 85 MMHG | RESPIRATION RATE: 18 BRPM | OXYGEN SATURATION: 100 % | BODY MASS INDEX: 28.13 KG/M2

## 2023-12-21 DIAGNOSIS — M47.812 ARTHRITIS OF FACET JOINT OF CERVICAL SPINE: ICD-10-CM

## 2023-12-21 DIAGNOSIS — M47.812 ARTHRITIS OF FACET JOINT OF CERVICAL SPINE: Primary | ICD-10-CM

## 2023-12-21 DIAGNOSIS — M47.819 SPONDYLOSIS WITHOUT MYELOPATHY: Primary | ICD-10-CM

## 2023-12-21 DIAGNOSIS — M47.812 CERVICAL SPONDYLOSIS: ICD-10-CM

## 2023-12-21 DIAGNOSIS — M51.36 DDD (DEGENERATIVE DISC DISEASE), LUMBAR: ICD-10-CM

## 2023-12-21 PROCEDURE — 99214 PR OFFICE/OUTPT VISIT, EST, LEVL IV, 30-39 MIN: ICD-10-PCS | Mod: GC,S$GLB,, | Performed by: ANESTHESIOLOGY

## 2023-12-21 PROCEDURE — 1159F PR MEDICATION LIST DOCUMENTED IN MEDICAL RECORD: ICD-10-PCS | Mod: CPTII,S$GLB,, | Performed by: ANESTHESIOLOGY

## 2023-12-21 PROCEDURE — 3044F PR MOST RECENT HEMOGLOBIN A1C LEVEL <7.0%: ICD-10-PCS | Mod: CPTII,S$GLB,, | Performed by: ANESTHESIOLOGY

## 2023-12-21 PROCEDURE — 3044F HG A1C LEVEL LT 7.0%: CPT | Mod: CPTII,S$GLB,, | Performed by: ANESTHESIOLOGY

## 2023-12-21 PROCEDURE — 99999 PR PBB SHADOW E&M-EST. PATIENT-LVL III: ICD-10-PCS | Mod: PBBFAC,,, | Performed by: ANESTHESIOLOGY

## 2023-12-21 PROCEDURE — 1159F MED LIST DOCD IN RCRD: CPT | Mod: CPTII,S$GLB,, | Performed by: ANESTHESIOLOGY

## 2023-12-21 PROCEDURE — 4010F ACE/ARB THERAPY RXD/TAKEN: CPT | Mod: CPTII,S$GLB,, | Performed by: ANESTHESIOLOGY

## 2023-12-21 PROCEDURE — 4010F PR ACE/ARB THEARPY RXD/TAKEN: ICD-10-PCS | Mod: CPTII,S$GLB,, | Performed by: ANESTHESIOLOGY

## 2023-12-21 PROCEDURE — 3008F BODY MASS INDEX DOCD: CPT | Mod: CPTII,S$GLB,, | Performed by: ANESTHESIOLOGY

## 2023-12-21 PROCEDURE — 99214 OFFICE O/P EST MOD 30 MIN: CPT | Mod: GC,S$GLB,, | Performed by: ANESTHESIOLOGY

## 2023-12-21 PROCEDURE — 3078F DIAST BP <80 MM HG: CPT | Mod: CPTII,S$GLB,, | Performed by: ANESTHESIOLOGY

## 2023-12-21 PROCEDURE — 3078F PR MOST RECENT DIASTOLIC BLOOD PRESSURE < 80 MM HG: ICD-10-PCS | Mod: CPTII,S$GLB,, | Performed by: ANESTHESIOLOGY

## 2023-12-21 PROCEDURE — 3008F PR BODY MASS INDEX (BMI) DOCUMENTED: ICD-10-PCS | Mod: CPTII,S$GLB,, | Performed by: ANESTHESIOLOGY

## 2023-12-21 PROCEDURE — 1160F PR REVIEW ALL MEDS BY PRESCRIBER/CLIN PHARMACIST DOCUMENTED: ICD-10-PCS | Mod: CPTII,S$GLB,, | Performed by: ANESTHESIOLOGY

## 2023-12-21 PROCEDURE — 3074F SYST BP LT 130 MM HG: CPT | Mod: CPTII,S$GLB,, | Performed by: ANESTHESIOLOGY

## 2023-12-21 PROCEDURE — 3074F PR MOST RECENT SYSTOLIC BLOOD PRESSURE < 130 MM HG: ICD-10-PCS | Mod: CPTII,S$GLB,, | Performed by: ANESTHESIOLOGY

## 2023-12-21 PROCEDURE — 99999 PR PBB SHADOW E&M-EST. PATIENT-LVL III: CPT | Mod: PBBFAC,,, | Performed by: ANESTHESIOLOGY

## 2023-12-21 PROCEDURE — 1160F RVW MEDS BY RX/DR IN RCRD: CPT | Mod: CPTII,S$GLB,, | Performed by: ANESTHESIOLOGY

## 2023-12-21 NOTE — TELEPHONE ENCOUNTER
Appointment rescheduled for 12/27 at 1:45p.    Patient verbalized understanding.      ----- Message from Eneiad Gabriel sent at 12/21/2023 11:30 AM CST -----  Type: General Call Back/RUNNING LATE         Name of Caller:pt  Would the patient rather a call back or a response via [facility]? CALL  Best Call Back Number:[phone]   Additional Information: pt states she is running late due to transportation, and would like to speak to someone in regards to coming in a little later. Please call pt with further info. Thank You.

## 2023-12-21 NOTE — PROGRESS NOTES
Chronic patient Established Note (Follow up )    SUBJECTIVE:  Interval History 12/21/2023:  Jonathan Gonzales returns to clinic for follow-up after L C3,4,5,6 RFA on 4/19. She was scheduled for the R side as well, but it was cancelled. Her pain is worse on her right than her left today. She endorses 1 year of frequent muscle cramping in her arms and legs. Denies new changes in function of her bowel or bladder, denies weakness or numbness aside from her typical diabetic neuropathy in her fingers and toes.  Pain is 6-8/10 today.    Interval History 4/26/2023:  Mrs Gonzales presents for follow up. She has continued cervicalgia. She is s/p Left C3,4,5,6 RFA and  with benefit and only 1 week out. She is having worsening of right sided symptoms. She is ready to repeat RFA to this side as well. She continues with med mgt of lyrica 100mg and is out of this medication. She denies newer areas of pain or neurological s/s concerning for myelopathy.    Interval History 1/6/23:  Mrs Gonzales presents for follow up of pain complaints. She is s/p Repeat B L4/5 TFESI and states some improvement. Pt further states exacerbated cervicalgia which was relieved prior by RFAs of approx 80% for over 12 months. Pain is left and right sided. Pain limiting functioning. She denies any myelopathic symptoms.     Interval History 11/4/2022:  Mrs Gonzales presents for follow up of lower back pain and returning cervicalgia. She is approx 11 months out of prior B L4/5 TFESI with >90% improved symptoms and ready to repeat this 1st prior to repeating prior Cervical RFAs. She has no newer areas of pain or neurological changes. She does not focally voice any s/s concerning for myelopathy or cauda equina.     Interval History 11/22/2021:  The Pt presents for follow up evaluation. She was doing well until she had fall. She states this was due to legs feeling weak after mourning loss of her brother. Pt states no new areas of pain and lumbar pain is same pain  when she had prior B L4/5 TFESIs benefit her. She denies new neurological changes. She is currently prescribed Lyrica 100mg TID with benefit and denies adverse SE.     Interval Hx 10/04/21:  Jonathan Gonzales presents to the clinic for a follow-up appointment s/p bilateral C3,4,5,6 RFA (07/28/21 & 08/10/21) which she reports to have given her 80% relief. She currently has a abernathy after recent admission for urinary retention which she underwent cervical and lumbar MRI that did not showed the cause of her urinary retention to be from a spinal lesion. She has no complaints today, she continues to take Liryca 100mg TID with no side effects. No upper extremity sx.     Interval History 7/6/2021:  The patient is here for follow up of left knee and chronic lower back/neck pain.  She has been having more increased neck pain recently.  She did have left-sided cervical radiofrequency ablation earlier this year with benefit.  However, we did not perform the right because she had a flare-up of back and leg pain which required an epidural steroid injection.  Recently, her back pain has been minimal and she would like to focus on her neck again.  The pain mostly stays in the neck without radiation into the arms.  However, she does have numbness to all 4 extremities consistent with her diabetic neuropathy.  Voltaren gel has been helpful for her knee pain. Since previous encounter, she had a recent annual follow up with her PCP. Her diabetes has been well controlled with her sugars within range. Her pain today is 7/10.    Interval History 5/10/2021:  The patient presents today for follow up of back, neck and left knee pain. Her back pain bothers her most with standing and walking. Her legs bother her most with walking. Her left knee pain has improved somewhat since previous encounter. She has had benefit with cervical RFAs in the past. She is doing home stretches daily. Her pain today is 8/10.    Interval History 4/9/2021:  The  patient here today for follow-up of chronic neck and lower back pain.  She is now status post bilateral L4/5 transforaminal epidural steroid injections with significant benefit of leg pain.  Her neck pain is still mild at this time.  However, she reports new onset left anterior knee pain.  It does not worsen with activity.  It bothers her more when she touches the front of her knee or puts pressure on it.  She denies any recent injury.  She has not tried anything for the pain.  Specifically, she has not tried creams or ice.  Her pain today is 6/10.    Interval history 03/03/2021:  Since previous encounter the patient has had left-sided radiofrequency ablation cervical spine at C3, C4, C5, C6 on 02/03/2021 followed by bilateral L4-5 transforaminal epidural steroid injections on 02/10/2021.  She is doing well in her neck pain and also having improvement in her lower extremity radicular pain symptoms but she states that she has been doing exercises and some her lower extremity radicular symptoms are starting to return but originally she had substantial relief.  No other health changes since previous encounter.    Interval History 1/13/2021:  Here for follow-up of chronic neck and lower back pain.  She is status post left C3, 4, 5, 6 medial branch blocks with significant benefit for 1 day.  She wishes to proceed with radiofrequency ablation of affected nerves.  She continues to report pain across the neck, worse on the left side.  It mainly stays in the neck without radiation into the arms.  She previously reported benefit for her leg pain with transforaminal lumbar steroid injection in November.  However, she states that her pain has been returning.  It radiates down the side of both legs to her shins.  She did previously have surgical consult and does not wish to pursue surgery at this time.  She does find Lyrica helpful in like a refill today.  Her pain today is 9/10.    Interval History 12/1/2020:  The patient  presents today for follow up of back and right leg pain.  She is now s/p bilateral L4 TF CARLEE on 11/4/20. She is reporting 90% relief of bilateral leg pain.  She still has some pain to the left buttock which she says is worse when she sits and when she stands.  She is no longer having significant shooting pain into the legs.  She continues to have long-standing numbness to her feet consistent with previous diagnosis of neuropathy.  Her biggest complaint today is left-sided neck pain.  She has had neck pain for many years.  She underwent cervical radiofrequency ablation in 2015.  Documentation after that time states that she had limited benefit although she did have relief short-term from the blocks.  She knows says that she feels that it did help her significantly after a couple of months.  Her pain started to worsen over the past few weeks.  She has been evaluated by neurosurgery in the past and cervical fusion was discussed, however, she does not wish to have surgery at this time.  She is not having any shooting pain into her arms at this time.  It is mostly across the left side of her neck and into her left shoulder.  She denies any new weakness or dropping of objects.  Her pain today is 8/10.    Previous Encounter:  60 year old female with PMH of breast cancer (s/p mastectomy bilaterally), DM2, HTN, bipolar disorder and schizophrenia. patient presents today to discuss severe back and right leg pain.  We have not seen the patient in clinic in over 3 years and she reports that she was mainly doing well with her back and neck pain.  She was sent up here today by her primary care doctor, Dr. Contreras, as she saw him today.  She reports that she has had back pain for many years but it has been well-controlled until 3 days ago.  She has been having severe right sided lower back pain with radiation down the back of the right leg with numbness.  She has pain and numbness to her right heel and calf.  She is not having  significant symptoms on the left.  Her leg pain is greater than her back pain.  She is unable to walk without assistance.  She is feeling subjective weakness to her right foot.  She takes Lyrica twice daily from PCP for neuropathy.  She says that her sugars have been running 130-150 in the morning. She had a recent visit with oncology and was stable.   Pain Medications:    - Opioids: None  - Adjuvant Medications: Lyrica ( Pregabalin)  - Anti-Coagulants: None  - Others: See med list    Opioid Contract: no     report:  Reviewed and consistent with medication use as prescribed.    Pain Procedures:  5/9/14 C7- T1 IL CARLEE  6/20/14 C7-T1 IL CARLEE  5/22/15 Left C3,4,5,6 MBB  6/26/15 Left C3,4,5,6 MBB  7/31/15 Left C3,4,5,6 RFA- no relief  9/24/15 Right C5-6 TF CARLEE (IR)- limited benefit  11/4/20 Bilateral L4/5 TF CARLEE- 90% relief  12/9/20 Left C3,4,5,6 MBB- 80% relief for one day  2/3/21 Left C3,4,5,6 RFA- 60% relief  3/10/21 Bilateral L4/5 TF CARLEE- 90% relief  07/28/21 Right C3,4,5,6 RFA  08/18/2021 Left C3,4,5,6 RFA   12/15/2021 Bilateral L4/5 TFESI  12/14/2022 B L4/5 TFESI   4/19/23 Left C3, 4, 5, 6 RFA - 90% relief       Physical Therapy/Home Exercise: yes      Imaging:  MRI Cervical 09/23/21  FINDINGS:  There is mild reversal of the normal cervical lordosis.  Cervical vertebral body heights appear adequately maintained.  There is no evidence of diffuse bone marrow replacement process.     The cervicomeduallary junction is normal.  The cervical cord is normal in contour, caliber, and signal. The adjacent soft tissue structures demonstrate no significant abnormality.     C2-3:  There is central posterior disc osteophyte complex and mild bilateral facet arthropathy.  No significant spinal canal stenosis or neural foraminal narrowing.     C3-4:  There is a posterior disc osteophyte complex with effacement of the anterior thecal sac and at most mild spinal canal stenosis.  There is mild bilateral facet arthropathy, left  greater than right, without significant neural foraminal narrowing.     C4-5:  There is a broad-based posterior disc osteophyte complex with effacement of the anterior thecal sac and mild spinal canal stenosis.  There is bilateral uncovertebral spurring with mild left-sided neural foraminal narrowing.     C5-6:  There is a broad-based posterior disc osteophyte complex with effacement of the anterior thecal sac.  There is mild to moderate spinal canal stenosis.  There is bilateral uncovertebral spurring with moderate to severe right-sided and mild left-sided neural foraminal narrowing.     C6-7:  There is a broad-based posterior disc osteophyte complex with prominent asymmetric left paracentral component.  There is effacement of the anterior thecal sac with mild mass effect upon the left anterolateral cervical cord.  There is mild to moderate spinal canal stenosis.  There is right-sided uncovertebral spurring with mild right-sided neural foraminal narrowing.     C7-T1:  No disc herniation, spinal canal narrowing, or neuroforaminal narrowing.     Impression:     Multilevel degenerative changes of the cervical spine, similar to prior examination, most pronounced at the levels of C4-C5 through C6-C7.  Please see above for level by level details.    MRI Lumbar 09/23/21  FINDINGS:  There is grade 1 anterolisthesis of L4 on L5, similar to prior examination.  The vertebral body heights are well maintained, with no fracture.  There is no marrow signal abnormality suspicious for infiltrative process.  There is mild disc desiccation at L4-L5.  The conus is normal in appearance and terminates at the L1-L2 level.     L1-L2: There is no focal disk herniation.  No significant spinal canal or neural foraminal narrowing.     L2-L3: There is no focal disk herniation.  No significant spinal canal or neural foraminal narrowing.     L3-L4: There is no focal disc herniation.  There is ligamentum flavum thickening and mild bilateral facet  arthropathy.  No significant spinal canal or neural foraminal narrowing.     L4-L5: There is grade 1 anterolisthesis with uncovering of the disc.  There is a circumferential disc bulge.  There is advanced ligamentum flavum thickening and bilateral facet arthropathy.  There is prominent fluid noted within the right facet articulation.  There is associated mass effect and tapering of the thecal sac with severe spinal canal stenosis, similar to prior examination.  There is mild bilateral neural foraminal narrowing.     L5-S1: There is no focal disc herniation.  There is mild bilateral facet arthropathy.  No significant spinal canal or neural foraminal narrowing.     Limited views of the posterior abdomen demonstrate bilateral renal T2 hyperintensities likely representing cysts.     Impression:     Grade 1 anterolisthesis of L4 on L5 with uncovering of the disc.  There is associated advanced bilateral facet arthropathy at L4-L5 with resulting severe spinal canal stenosis and mild bilateral neural foraminal narrowing, similar to prior MRI examination.  Please see above for additional level by level details    Allergies: Review of patient's allergies indicates:  No Known Allergies    Current Medications:   Current Outpatient Medications   Medication Sig Dispense Refill    ammonium lactate 12 % Crea Apply twice daily to affected parts both feet as needed. 140 g 11    atorvastatin (LIPITOR) 80 MG tablet Take 1 tablet (80 mg total) by mouth once daily. 90 tablet 0    blood sugar diagnostic (ACCU-CHEK AMBER PLUS TEST STRP) Strp TEST BLOOD SUGAR TWICE DAILY 200 strip 5    blood-glucose meter Misc 1 each by Misc.(Non-Drug; Combo Route) route 2 (two) times a day. 1 each 0    divalproex 500 MG Tb24 1 tablet EVERY PM (route: oral)      GLUCAGON EMERGENCY KIT, HUMAN, 1 mg injection SMARTSI Vial(s) IM PRN      GLUTOSE-15 40 % gel SMARTSI unspecified By Mouth PRN      HUMALOG U-100 INSULIN 100 unit/mL injection Has not started       hydroCHLOROthiazide (HYDRODIURIL) 12.5 MG Tab Take 1 tablet (12.5 mg total) by mouth once daily. 90 tablet 2    HYDROcodone-acetaminophen (NORCO) 5-325 mg per tablet Take 1 tablet by mouth every 6 (six) hours as needed.      insulin (LANTUS SOLOSTAR U-100 INSULIN) glargine 100 units/mL SubQ pen INJECT 23 UNITS SUBCUTANEOUSLY NIGHTLY  HOLD until you see your doctor and are told to resume 30 mL 5    INVEGA SUSTENNA 117 mg/0.75 mL Syrg injection Inject into the muscle.      lancets (LANCETS,THIN) Misc 200 Lancets by Misc.(Non-Drug; Combo Route) route 2 (two) times daily. 200 each 3    letrozole (FEMARA) 2.5 mg Tab Take 2.5 mg by mouth.      liraglutide 0.6 mg/0.1 mL, 18 mg/3 mL, subq PNIJ (VICTOZA 3-HERMANN) 0.6 mg/0.1 mL (18 mg/3 mL) PnIj pen INJECT 1.8 MG SUBCUTANEOUSLY ONCE DAILY. 27 mL 11    lisinopriL-hydrochlorothiazide (PRINZIDE,ZESTORETIC) 10-12.5 mg per tablet Take 1 tablet by mouth.      metFORMIN (GLUCOPHAGE) 500 MG tablet Take 2 tablets (1,000 mg total) by mouth 2 (two) times daily with meals. Take with food. 120 tablet 11    multivitamin capsule Take 1 capsule by mouth once daily.      pregabalin (LYRICA) 100 MG capsule Take 1 capsule (100 mg total) by mouth 3 (three) times daily. 270 capsule 0    ziprasidone (GEODON) 40 MG Cap 1 capsule EVERY PM (route: oral)      acetaminophen-codeine 300-30mg (TYLENOL #3) 300-30 mg Tab Take 1 tablet by mouth every 6 (six) hours as needed (pain). (Patient not taking: Reported on 4/26/2023) 20 tablet 0    aspirin (ECOTRIN) 81 MG EC tablet Take 1 tablet (81 mg total) by mouth once daily.  0    blood glucose control, low Soln 1 drop by Misc.(Non-Drug; Combo Route) route as needed. 1 each 0    ibuprofen (ADVIL,MOTRIN) 800 MG tablet Take 800 mg by mouth every 8 (eight) hours as needed.      nitrofurantoin, macrocrystal-monohydrate, (MACROBID) 100 MG capsule Take 1 capsule (100 mg total) by mouth 2 (two) times daily. (Patient not taking: Reported on 12/21/2023) 10 capsule 0     OLANZapine (ZYPREXA) 10 MG tablet Take 10 mg by mouth every 8 (eight) hours as needed.      oxybutynin (DITROPAN-XL) 10 MG 24 hr tablet Take 1 tablet (10 mg total) by mouth once daily. (Patient not taking: Reported on 12/21/2023) 30 tablet 3    QUEtiapine (SEROQUEL) 100 MG Tab Take 1 tablet (100 mg total) by mouth every evening. (Patient not taking: Reported on 12/11/2023) 90 tablet 3    walker (ULTRA-LIGHT ROLLATOR) Misc 1 Device by Other route.       No current facility-administered medications for this visit.     Facility-Administered Medications Ordered in Other Visits   Medication Dose Route Frequency Provider Last Rate Last Admin    0.9%  NaCl infusion   Intravenous Continuous Jacky See MD 25 mL/hr at 02/03/21 1432 New Bag at 02/03/21 1432    0.9%  NaCl infusion  500 mL Intravenous Continuous Cleopatra Rust DO           REVIEW OF SYSTEMS:    GENERAL:  No weight loss, malaise or fevers.  HEENT:  Negative for frequent or significant headaches.  NECK:  Negative for lumps, goiter, pain and significant neck swelling.  RESPIRATORY:  Negative for cough, wheezing or shortness of breath.  CARDIOVASCULAR:  Negative for chest pain, leg swelling or palpitations. Hypertension.  GI:  Negative for abdominal discomfort, blood in stools or black stools or change in bowel habits.  MUSCULOSKELETAL:  See HPI.  SKIN:  Negative for lesions, rash, and itching.  PSYCH:  Positive for sleep disturbance.  H/O depression, bipolar disorder and schizophrenia managed by outside psych.  HEMATOLOGY/LYMPHOLOGY:  Negative for prolonged bleeding, bruising easily or swollen nodes. H/O breast cancer.  NEURO:   No history of headaches, syncope, paralysis, seizures or tremors.  ENDO: Diabetes.  All other reviewed and negative other than HPI.    Past Medical History:  Past Medical History:   Diagnosis Date    Bipolar disorder     Cancer of female breast 10/2010    T2 N1 Mx, Stage IIB left breast cancer    Cancer of upper-outer quadrant of  female breast 7/9/2012    Depression     Diabetes mellitus type II     Disturbances of sensation of smell and taste 6/29/2012    Hypertension     Malignant neoplasm of breast (female), unspecified site 4/27/2015    Neuropathy     Nonspecific abnormal unspecified cardiovascular function study 4/12/2013    ECG READ AS SHOWING A T-WAVE ABNORMALITY     Post-mastectomy lymphedema syndrome     Schizophrenia     Stroke        Past Surgical History:  Past Surgical History:   Procedure Laterality Date    BREAST RECONSTRUCTION  11/23/11    B/L SHLOMO flap reconstruction S/P left MRM, right prophylactic mastectomy    BREAST RECONSTRUCTION Bilateral 3/13/2015    NIPPLE RECONSTRUCTION    INJECTION OF ANESTHETIC AGENT AROUND NERVE Left 12/9/2020    Procedure: BLOCK, NERVE, C3-C4-C5-C6 MEDIAL BRANCH;  Surgeon: Darren Jerry MD;  Location: [facility]IN [facility]GT;  Service: Pain Management;  Laterality: Left;    MASTECTOMY Bilateral 01/2011    bilateral    RADIOFREQUENCY ABLATION Left 2/3/2021    Procedure: RADIOFREQUENCY ABLATION, C3,C4,C5,C6 MEDIAL BRANCH 1 of 2;  Surgeon: Darren Jerry MD;  Location: [facility]IN [facility]GT;  Service: Pain Management;  Laterality: Left;    RADIOFREQUENCY ABLATION Right 7/28/2021    Procedure: RADIOFREQUENCY ABLATION, C3-C4-C5-C6 MEDIAL BR ANCH 1 IOF 2;  Surgeon: Darren Jerry MD;  Location: [facility]IN [facility]GT;  Service: Pain Management;  Laterality: Right;    RADIOFREQUENCY ABLATION Left 8/18/2021    Procedure: RADIOFREQUENCY ABLATION, C3-C4-C5-C6 MEDIAL BRANCH 2 OF 2;  Surgeon: Darren Jerry MD;  Location: [facility]IN [facility]GT;  Service: Pain Management;  Laterality: Left;    RADIOFREQUENCY ABLATION Left 4/19/2023    Procedure: RADIOFREQUENCY ABLATION LEFT C3,C4,C5,C6  ONE OF TWO;  Surgeon: Darren Jerry MD;  Location: [facility]IN [facility]GT;  Service: Pain Management;  Laterality: Left;  3/8 RESCHEDULE    TRANSFORAMINAL EPIDURAL INJECTION OF STEROID Bilateral 11/4/2020    Procedure: INJECTION, STEROID,  EPIDURAL, TRANSFORAMINAL APPROACH L4/L5;  Surgeon: Darren Jerry MD;  Location: [facility] PAIN [facility]GT;  Service: Pain Management;  Laterality: Bilateral;  Bilateral L4/L5    TRANSFORAMINAL EPIDURAL INJECTION OF STEROID Bilateral 2/10/2021    Procedure: INJECTION, STEROID, EPIDURAL, TRANSFORAMINAL APPROACH, L4-L5;  Surgeon: Darrne Jerry MD;  Location: [facility]IN [facility];  Service: Pain Management;  Laterality: Bilateral;    TRANSFORAMINAL EPIDURAL INJECTION OF STEROID Bilateral 3/10/2021    Procedure: INJECTION, STEROID, EPIDURAL, TRANSFORAMINAL APPROACH, L4-L5;  Surgeon: Darren Jerry MD;  Location: [facility]IN [facility];  Service: Pain Management;  Laterality: Bilateral;    TRANSFORAMINAL EPIDURAL INJECTION OF STEROID Bilateral 12/15/2021    Procedure: INJECTION, STEROID, EPIDURAL, TRANSFORAMINAL APPROACH  Bilateral L4/5 TFESI / needs consent;  Surgeon: Darren Jerry MD;  Location: [facility]IN [facility];  Service: Pain Management;  Laterality: Bilateral;    TRANSFORAMINAL EPIDURAL INJECTION OF STEROID Bilateral 12/14/2022    Procedure: INJECTION, STEROID, EPIDURAL, TRANSFORAMINAL APPROACH BILATERAL L4/5 CONTRAST;  Surgeon: Darren Jerry MD;  Location: [facility]IN [facility];  Service: Pain Management;  Laterality: Bilateral;    TUBAL LIGATION         Family History:  Family History   Problem Relation Age of Onset    Kidney disease Mother         Dialysis    Hypertension Mother     Deep vein thrombosis Mother     Glaucoma Mother     Diabetic kidney disease Sister     Lung cancer Sister     Diabetes Sister     Cancer Sister         lung    Diabetes Brother     Diabetes Son     No Known Problems Father     No Known Problems Maternal Grandmother     No Known Problems Maternal Grandfather     No Known Problems Paternal Grandmother     No Known Problems Paternal Grandfather     No Known Problems Son     Cervical cancer Daughter     Cancer Daughter         cervical cancer    No Known Problems Daughter     No Known Problems Daughter      No Known Problems Daughter     Breast cancer Neg Hx     Ovarian cancer Neg Hx        Social History:  Social History     Socioeconomic History    Marital status:     Number of children: 6   Occupational History    Occupation: Disability   Tobacco Use    Smoking status: Some Days     Current packs/day: 0.00     Average packs/day: 0.5 packs/day for 28.2 years (14.1 ttl pk-yrs)     Types: Cigarettes     Start date:      Last attempt to quit: 3/17/2022     Years since quittin.7    Smokeless tobacco: Never    Tobacco comments:     currently at less than 1/2 pack pd   Substance and Sexual Activity    Alcohol use: Yes     Alcohol/week: 0.0 standard drinks of alcohol     Comment: social - once every 2 weeks    Drug use: No     Comment:  - stopped using crack cocaine    Sexual activity: Not Currently     Partners: Male   Social History Narrative    ** Merged History Encounter **         ** Merged History Encounter **          Social Determinants of Health     Financial Resource Strain: Medium Risk (2021)    Overall Financial Resource Strain (CARDIA)     Difficulty of Paying Living Expenses: Somewhat hard   Food Insecurity: No Food Insecurity (2021)    Hunger Vital Sign     Worried About Running Out of Food in the Last Year: Never true     Ran Out of Food in the Last Year: Never true   Transportation Needs: No Transportation Needs (2021)    PRAPARE - Transportation     Lack of Transportation (Medical): No     Lack of Transportation (Non-Medical): No   Physical Activity: Insufficiently Active (2021)    Exercise Vital Sign     Days of Exercise per Week: 7 days     Minutes of Exercise per Session: 10 min   Stress: Stress Concern Present (2021)    [employer] [place] [employer] - Occupational Stress Questionnaire     Feeling of Stress : Very much   Social Connections: Socially Isolated (2021)    Social Connection and Isolation Panel [NHANES]     Frequency of Communication  "with Friends and Family: More than three times a week     Frequency of Social Gatherings with Friends and Family: More than three times a week     Attends Taoist Services: Never     Active Member of Clubs or Organizations: No     Attends Club or Organization Meetings: Never     Marital Status:    Housing Stability: Low Risk  (6/1/2021)    Housing Stability Vital Sign     Unable to Pay for Housing in the Last Year: No     Number of Places Lived in the Last Year: 1     Unstable Housing in the Last Year: No       OBJECTIVE:    BP (!) 85/56 (BP Location: Right arm, Patient Position: Sitting, BP Method: Small (Automatic))   Pulse (!) 51   Temp 97.7 °F (36.5 °C) (Oral)   Resp 18   Ht 5' 3" (1.6 m)   Wt 72 kg (158 lb 11.7 oz)   LMP  (LMP Unknown)   SpO2 100%   BMI 28.12 kg/m²     PHYSICAL EXAMINATION:    General appearance: Well appearing, in no acute distress, alert and oriented x3.  Psych:  Mood and affect appropriate.  Skin: Skin color, texture, turgor normal, no rashes or lesions, in both upper and lower body.  Head/face:  Atraumatic, normocephalic. No palpable lymph nodes  Neck: Discomfort to palpation over the cervical paraspinous muscles. Spurling Negative. Primary pain reproduced with cervical extension and rotation, worse to the right than the left (facet loading)  Extremities: Peripheral joint ROM is full and pain free without obvious instability or laxity in all four extremities. No deformities, edema, or skin discoloration. Good capillary refill.  Genitourinary: Richard in place.   Musculoskeletal: Shoulder, hip, sacroiliac and knee provocative maneuvers are negative. Bilateral upper and lower extremity strength is normal and symmetric.  No atrophy or tone abnormalities are noted.  Neuro: Bilateral upper and lower extremity coordination symmetric.  . No loss of sensation is noted.  Gait: Using Straight Cane for ambulation     ASSESSMENT: 63 y.o. year old female with neck pain, consistent with   "   1. Spondylosis without myelopathy        2. DDD (degenerative disc disease), lumbar        3. Cervical spondylosis        4. Arthritis of facet joint of cervical spine                      PLAN:   We discussed with the patient the assessment and recommendations. The following is the plan we agreed on:   - Prior records reviewed  - s/p repeat Bilateral L4/5 TFESI with some improvement  - most recently s/p Left sided C3,4,5,6 RFA  - Will reschedule for R sided C3,4,5,6 RFA  - Refill Lyrica 100mg TID  - I have stressed the importance of physical activity and a home exercise plan to help with pain and improve health.  - Patient can continue with medications for now since they are providing benefits, using them appropriately, and without side effects.  - Counseled patient regarding the importance of activity modification.  - RTC 4 weeks after RFAs     The above plan and management options were discussed at length with patient. Patient is in agreement with the above and verbalized understanding.    Jamie Dolan   12/21/2023    I spent a total of 30 minutes on the day of the visit.  This includes face to face time and non-face to face time preparing to see the patient by reviewing previous labs/imaging, obtaining and/or reviewing separately obtained history, documenting clinical information in the electronic or other health record, independently interpreting results and communicating results to the patient/family/caregiver.    I have personally taken the history and examined this patient and agree with the fellow's note as stated above.

## 2023-12-26 ENCOUNTER — PATIENT MESSAGE (OUTPATIENT)
Dept: PAIN MEDICINE | Facility: OTHER | Age: 63
End: 2023-12-26
Payer: COMMERCIAL

## 2023-12-27 ENCOUNTER — OFFICE VISIT (OUTPATIENT)
Dept: PODIATRY | Facility: CLINIC | Age: 63
End: 2023-12-27
Payer: COMMERCIAL

## 2023-12-27 VITALS
SYSTOLIC BLOOD PRESSURE: 104 MMHG | DIASTOLIC BLOOD PRESSURE: 73 MMHG | BODY MASS INDEX: 28.13 KG/M2 | WEIGHT: 158.75 LBS | HEIGHT: 63 IN | HEART RATE: 79 BPM

## 2023-12-27 DIAGNOSIS — M20.42 HAMMER TOES OF BOTH FEET: ICD-10-CM

## 2023-12-27 DIAGNOSIS — M20.41 HAMMER TOES OF BOTH FEET: ICD-10-CM

## 2023-12-27 DIAGNOSIS — E11.42 DIABETIC PERIPHERAL NEUROPATHY ASSOCIATED WITH TYPE 2 DIABETES MELLITUS: Primary | ICD-10-CM

## 2023-12-27 DIAGNOSIS — L84 CORN OR CALLUS: ICD-10-CM

## 2023-12-27 PROCEDURE — 99214 OFFICE O/P EST MOD 30 MIN: CPT | Mod: 25,S$GLB,, | Performed by: PODIATRIST

## 2023-12-27 PROCEDURE — 1159F MED LIST DOCD IN RCRD: CPT | Mod: CPTII,S$GLB,, | Performed by: PODIATRIST

## 2023-12-27 PROCEDURE — 3074F PR MOST RECENT SYSTOLIC BLOOD PRESSURE < 130 MM HG: ICD-10-PCS | Mod: CPTII,S$GLB,, | Performed by: PODIATRIST

## 2023-12-27 PROCEDURE — 3008F PR BODY MASS INDEX (BMI) DOCUMENTED: ICD-10-PCS | Mod: CPTII,S$GLB,, | Performed by: PODIATRIST

## 2023-12-27 PROCEDURE — 99999 PR PBB SHADOW E&M-EST. PATIENT-LVL IV: ICD-10-PCS | Mod: PBBFAC,,, | Performed by: PODIATRIST

## 2023-12-27 PROCEDURE — 1160F PR REVIEW ALL MEDS BY PRESCRIBER/CLIN PHARMACIST DOCUMENTED: ICD-10-PCS | Mod: CPTII,S$GLB,, | Performed by: PODIATRIST

## 2023-12-27 PROCEDURE — 11055 PR TRIM HYPERKERATOTIC SKIN LESION, ONE: ICD-10-PCS | Mod: Q9,S$GLB,, | Performed by: PODIATRIST

## 2023-12-27 PROCEDURE — 11721 DEBRIDE NAIL 6 OR MORE: CPT | Mod: 59,Q9,S$GLB, | Performed by: PODIATRIST

## 2023-12-27 PROCEDURE — 3078F DIAST BP <80 MM HG: CPT | Mod: CPTII,S$GLB,, | Performed by: PODIATRIST

## 2023-12-27 PROCEDURE — 1159F PR MEDICATION LIST DOCUMENTED IN MEDICAL RECORD: ICD-10-PCS | Mod: CPTII,S$GLB,, | Performed by: PODIATRIST

## 2023-12-27 PROCEDURE — 3044F PR MOST RECENT HEMOGLOBIN A1C LEVEL <7.0%: ICD-10-PCS | Mod: CPTII,S$GLB,, | Performed by: PODIATRIST

## 2023-12-27 PROCEDURE — 3078F PR MOST RECENT DIASTOLIC BLOOD PRESSURE < 80 MM HG: ICD-10-PCS | Mod: CPTII,S$GLB,, | Performed by: PODIATRIST

## 2023-12-27 PROCEDURE — 99999 PR PBB SHADOW E&M-EST. PATIENT-LVL IV: CPT | Mod: PBBFAC,,, | Performed by: PODIATRIST

## 2023-12-27 PROCEDURE — 4010F PR ACE/ARB THEARPY RXD/TAKEN: ICD-10-PCS | Mod: CPTII,S$GLB,, | Performed by: PODIATRIST

## 2023-12-27 PROCEDURE — 99214 PR OFFICE/OUTPT VISIT, EST, LEVL IV, 30-39 MIN: ICD-10-PCS | Mod: 25,S$GLB,, | Performed by: PODIATRIST

## 2023-12-27 PROCEDURE — 3044F HG A1C LEVEL LT 7.0%: CPT | Mod: CPTII,S$GLB,, | Performed by: PODIATRIST

## 2023-12-27 PROCEDURE — 1160F RVW MEDS BY RX/DR IN RCRD: CPT | Mod: CPTII,S$GLB,, | Performed by: PODIATRIST

## 2023-12-27 PROCEDURE — 4010F ACE/ARB THERAPY RXD/TAKEN: CPT | Mod: CPTII,S$GLB,, | Performed by: PODIATRIST

## 2023-12-27 PROCEDURE — 11055 PARING/CUTG B9 HYPRKER LES 1: CPT | Mod: Q9,S$GLB,, | Performed by: PODIATRIST

## 2023-12-27 PROCEDURE — 11721 PR DEBRIDEMENT OF NAILS, 6 OR MORE: ICD-10-PCS | Mod: 59,Q9,S$GLB, | Performed by: PODIATRIST

## 2023-12-27 PROCEDURE — 3008F BODY MASS INDEX DOCD: CPT | Mod: CPTII,S$GLB,, | Performed by: PODIATRIST

## 2023-12-27 PROCEDURE — 3074F SYST BP LT 130 MM HG: CPT | Mod: CPTII,S$GLB,, | Performed by: PODIATRIST

## 2023-12-27 RX ORDER — UREA 40 %
CREAM (GRAM) TOPICAL DAILY
Qty: 85 G | Refills: 11 | Status: SHIPPED | OUTPATIENT
Start: 2023-12-27 | End: 2024-02-27

## 2023-12-27 RX ORDER — CICLOPIROX 80 MG/ML
SOLUTION TOPICAL NIGHTLY
Qty: 6.6 ML | Refills: 11 | Status: SHIPPED | OUTPATIENT
Start: 2023-12-27 | End: 2024-02-27

## 2023-12-27 NOTE — PROGRESS NOTES
Subjective:      Patient ID: Jonathan Gonzales is a 63 y.o. female.    Chief Complaint: Diabetic Foot Exam    Jonathan is a 63 y.o. female who presents to the clinic for evaluation and treatment of high risk feet. Jonathan has a past medical history of Bipolar disorder, Cancer of female breast (10/2010), Cancer of upper-outer quadrant of female breast (7/9/2012), Depression, Diabetes mellitus type II, Disturbances of sensation of smell and taste (6/29/2012), Hypertension, Malignant neoplasm of breast (female), unspecified site (4/27/2015), Neuropathy, Nonspecific abnormal unspecified cardiovascular function study (4/12/2013), Post-mastectomy lymphedema syndrome, Schizophrenia, and Stroke. The patient's chief complaint is Diabetes, increased risk amputation needing evaluation/management/optomization of foot care.     Cc2 Diabetes, increased risk amputation needing evaluation/management/optomization of foot care.    .    PCP: Ghulam Contreras MD    Date Last Seen by PCP:   Chief Complaint   Patient presents with    Diabetic Foot Exam        Current shoe gear:  Affected Foot: Casual shoes     Unaffected Foot: Casual shoes    Hemoglobin A1C   Date Value Ref Range Status   06/25/2023 6.2 (H) 4.7 - 5.6 % Final   04/20/2023 5.7 (H) 4.0 - 5.6 % Final     Comment:     ADA Screening Guidelines:  5.7-6.4%  Consistent with prediabetes  >or=6.5%  Consistent with diabetes    High levels of fetal hemoglobin interfere with the HbA1C  assay. Heterozygous hemoglobin variants (HbS, HgC, etc)do  not significantly interfere with this assay.   However, presence of multiple variants may affect accuracy.     03/16/2022 7.1 (H) 4.0 - 5.6 % Final     Comment:     ADA Screening Guidelines:  5.7-6.4%  Consistent with prediabetes  >or=6.5%  Consistent with diabetes    High levels of fetal hemoglobin interfere with the HbA1C  assay. Heterozygous hemoglobin variants (HbS, HgC, etc)do  not significantly interfere with this assay.   However,  presence of multiple variants may affect accuracy.     09/23/2021 6.2 (H) 4.0 - 5.6 % Final     Comment:     ADA Screening Guidelines:  5.7-6.4%  Consistent with prediabetes  >or=6.5%  Consistent with diabetes    High levels of fetal hemoglobin interfere with the HbA1C  assay. Heterozygous hemoglobin variants (HbS, HgC, etc)do  not significantly interfere with this assay.   However, presence of multiple variants may affect accuracy.         Review of Systems   Constitutional: Negative for chills, diaphoresis, fever, malaise/fatigue and night sweats.   Cardiovascular:  Negative for claudication, cyanosis, leg swelling and syncope.   Skin:  Positive for nail changes and suspicious lesions. Negative for color change, dry skin, rash and unusual hair distribution.   Musculoskeletal:  Negative for falls, joint pain, joint swelling, muscle cramps, muscle weakness and stiffness.   Gastrointestinal:  Negative for constipation, diarrhea, nausea and vomiting.   Neurological:  Positive for numbness, paresthesias and sensory change. Negative for brief paralysis, disturbances in coordination, focal weakness and tremors.           Objective:      Physical Exam  Constitutional:       General: She is not in acute distress.     Appearance: She is well-developed. She is not diaphoretic.   Cardiovascular:      Pulses:           Popliteal pulses are 2+ on the right side and 2+ on the left side.        Dorsalis pedis pulses are 2+ on the right side and 2+ on the left side.        Posterior tibial pulses are 2+ on the right side and 2+ on the left side.      Comments: Capillary refill 3 seconds all toes/distal feet, all toes/both feet warm to touch.      Negative lymphadenopathy bilateral popliteal fossa and tarsal tunnel.      Negavie lower extremity edema bilateral.    Musculoskeletal:      Right ankle: No swelling, deformity, ecchymosis or lacerations. Normal range of motion. Normal pulse.      Right Achilles Tendon: Normal. No  defects. Boyd's test negative.      Comments: Patient has hammertoes of digits    2-5 bilateral               partially reducible without symptom today.      Otherwise, Normal angle, base, station of gait. All ten toes without clubbing, cyanosis, or signs of ischemia.  No pain to palpation bilateral lower extremities.  Range of motion, stability, muscle strength, and muscle tone normal bilateral feet and legs.      Lymphadenopathy:      Lower Body: No right inguinal adenopathy. No left inguinal adenopathy.      Comments: Negative lymphadenopathy bilateral popliteal fossa and tarsal tunnel.    Negative lymphangitic streaking bilateral feet/ankles/legs.   Skin:     General: Skin is warm and dry.      Capillary Refill: Capillary refill takes 2 to 3 seconds.      Coloration: Skin is not pale.      Findings: No abrasion, bruising, burn, ecchymosis, erythema, laceration, lesion or rash.      Nails: There is no clubbing.      Comments: Focal hyperkeratotic lesion consisting entirely of hyperkeratotic tissue without open skin, drainage, pus, fluctuance, malodor, or signs of infection plantar right 5th mtpj    Otherwise, Skin thin, shiny, atrophic, with decreased density and distribution of pedal hair bilateral, but without hyperpigmentation, zain discoloration,  ulcers, masses, nodules or cords palpated bilateral feet and legs.      Toenails 1st, 2nd, 3rd, 4th, 5th  bilateral are hypertrophic thickened 2-3 mm, dystrophic, discolored tanish brown with tan, gray crumbly subungual debris.  Tender to distal nail plate pressure, without periungual skin abnormality of each.     Neurological:      Mental Status: She is alert and oriented to person, place, and time.      Sensory: Sensory deficit present.      Motor: No tremor, atrophy or abnormal muscle tone.      Gait: Gait normal.      Comments: Paresthesias, and burning bilateral feet with no clearly identified trigger or source.    Negative tinel sign to percussion sural,  superficial peroneal, deep peroneal, saphenous, and posterior tibial nerves right and left ankles and feet.    Decreased/absent vibratory sensation bilateral feet to 128Hz tuning fork.    Diminished/loss of protective sensation all toes bilateral to 10 gram monofilament.     Psychiatric:         Behavior: Behavior is cooperative.             Assessment:       Encounter Diagnoses   Name Primary?    Diabetic peripheral neuropathy associated with type 2 diabetes mellitus Yes    Hammer toes of both feet     Corn or callus          Plan:       Jonathan was seen today for diabetic foot exam.    Diagnoses and all orders for this visit:    Diabetic peripheral neuropathy associated with type 2 diabetes mellitus  -     DIABETIC SHOES FOR HOME USE    Hammer toes of both feet  -     DIABETIC SHOES FOR HOME USE    Corn or callus  -     DIABETIC SHOES FOR HOME USE    Other orders  -     urea (CARMOL) 40 % Crea; Apply topically once daily.  -     ciclopirox (PENLAC) 8 % Soln; Apply topically nightly.      I counseled the patient on her conditions, their implications and medical management.        - Shoe inspection. Diabetic Foot Education. Patient reminded of the importance of good nutrition and blood sugar control to help prevent podiatric complications of diabetes. Patient instructed on proper foot hygeine. We discussed wearing proper shoe gear, daily foot inspections, never walking without protective shoe gear, never putting sharp instruments to feet, routine podiatric visits annually.      With the patient's permission, I debrided all ten toenails with a sterile nipper and curette, removing all offending nail and debris.  Patient tolerated the procedure well and related significant relief.    With the patient's permission, I debrided hyperkeratotic lesion(s) as above totaling       1         to, not  Including dermis with sterile #15 blade.  Patient tolerated the procedure well and related significant relief.     Discussed  conservative treatment with shoes of adequate dimensions, material, and style to alleviate symptoms and delay or prevent surgical intervention.    Rx lac hydrin, DM shoes/inserts.        No follow-ups on file.

## 2023-12-28 ENCOUNTER — TELEPHONE (OUTPATIENT)
Dept: PODIATRY | Facility: CLINIC | Age: 63
End: 2023-12-28
Payer: COMMERCIAL

## 2023-12-28 NOTE — TELEPHONE ENCOUNTER
Staff informed Lauren / [facility] wong prescription and notes  for pts shoes will be routed over.      Lauren / Olimp[facility] wong verbalized understanding.    ----- Message from Sadie Leo sent at 12/28/2023 10:51 AM CST -----  Regarding: script for shoes  Name of Who is Calling: [facility] [phone]          What is the request in detail:  Lauren needs to get a prescription and notes  for pts shoes . Please fax #[phone]        Can the clinic reply by [facility]:          What Number to Call Back if not in [facility]:

## 2023-12-28 NOTE — TELEPHONE ENCOUNTER
Staff informed patient that [facility] will have to fax over a request of what is needed.    Patient verbalized understanding.      ----- Message from Meri Murillo sent at 12/28/2023 10:34 AM CST -----  Contact: JOYA MUNOZ [[id number]]  Type: Call Back      Who called: JOYA MUNOZ [[id number]]      What is the request in detail: Patient is requesting a call back in regard to the prescription for her diabetic shoes.   Please advise.     Can the clinic reply by [facility]? No      Would the patient rather a call back or a response via [facility]? Call back       Best call back number: [phone] (home)       Additional Information:

## 2024-01-04 ENCOUNTER — PATIENT MESSAGE (OUTPATIENT)
Dept: PAIN MEDICINE | Facility: OTHER | Age: 64
End: 2024-01-04
Payer: MEDICARE

## 2024-01-25 ENCOUNTER — HOSPITAL ENCOUNTER (OUTPATIENT)
Facility: OTHER | Age: 64
Discharge: HOME | End: 2024-01-25
Attending: ANESTHESIOLOGY | Admitting: ANESTHESIOLOGY
Payer: MEDICARE

## 2024-01-25 VITALS
SYSTOLIC BLOOD PRESSURE: 156 MMHG | WEIGHT: 155 LBS | HEART RATE: 68 BPM | BODY MASS INDEX: 27.46 KG/M2 | TEMPERATURE: 98 F | OXYGEN SATURATION: 97 % | DIASTOLIC BLOOD PRESSURE: 79 MMHG | RESPIRATION RATE: 16 BRPM | HEIGHT: 63 IN

## 2024-01-25 DIAGNOSIS — G89.29 CHRONIC PAIN: ICD-10-CM

## 2024-01-25 DIAGNOSIS — M47.812 CERVICAL SPONDYLOSIS WITHOUT MYELOPATHY: Primary | ICD-10-CM

## 2024-01-25 DIAGNOSIS — M47.892 OTHER OSTEOARTHRITIS OF SPINE, CERVICAL REGION: ICD-10-CM

## 2024-01-25 LAB — POCT GLUCOSE: 99 MG/DL (ref 70–110)

## 2024-01-25 PROCEDURE — 25000003 PHARM REV CODE 250: Performed by: ANESTHESIOLOGY

## 2024-01-25 PROCEDURE — 64634 DESTROY C/TH FACET JNT ADDL: CPT | Mod: RT,,, | Performed by: ANESTHESIOLOGY

## 2024-01-25 PROCEDURE — 99153 MOD SED SAME PHYS/QHP EA: CPT | Performed by: ANESTHESIOLOGY

## 2024-01-25 PROCEDURE — 99152 MOD SED SAME PHYS/QHP 5/>YRS: CPT | Mod: ,,, | Performed by: ANESTHESIOLOGY

## 2024-01-25 PROCEDURE — 64633 DESTROY CERV/THOR FACET JNT: CPT | Mod: RT,,, | Performed by: ANESTHESIOLOGY

## 2024-01-25 PROCEDURE — 64634 DESTROY C/TH FACET JNT ADDL: CPT | Mod: RT | Performed by: ANESTHESIOLOGY

## 2024-01-25 PROCEDURE — 99152 MOD SED SAME PHYS/QHP 5/>YRS: CPT | Performed by: ANESTHESIOLOGY

## 2024-01-25 PROCEDURE — 63600175 PHARM REV CODE 636 W HCPCS: Performed by: ANESTHESIOLOGY

## 2024-01-25 PROCEDURE — 64633 DESTROY CERV/THOR FACET JNT: CPT | Mod: RT | Performed by: ANESTHESIOLOGY

## 2024-01-25 RX ORDER — LIDOCAINE HYDROCHLORIDE 20 MG/ML
INJECTION, SOLUTION INFILTRATION; PERINEURAL
Status: DISCONTINUED | OUTPATIENT
Start: 2024-01-25 | End: 2024-01-25 | Stop reason: HOSPADM

## 2024-01-25 RX ORDER — HYDROMORPHONE HCL 2 MG/ML
VIAL (ML) INJECTION
Status: DISCONTINUED | OUTPATIENT
Start: 2024-01-25 | End: 2024-01-25 | Stop reason: HOSPADM

## 2024-01-25 RX ORDER — SODIUM CHLORIDE 9 MG/ML
INJECTION, SOLUTION INTRAVENOUS CONTINUOUS
Status: DISCONTINUED | OUTPATIENT
Start: 2024-01-25 | End: 2024-01-25 | Stop reason: HOSPADM

## 2024-01-25 RX ORDER — BUPIVACAINE HYDROCHLORIDE 2.5 MG/ML
INJECTION, SOLUTION EPIDURAL; INFILTRATION; INTRACAUDAL
Status: DISCONTINUED | OUTPATIENT
Start: 2024-01-25 | End: 2024-01-25 | Stop reason: HOSPADM

## 2024-01-25 RX ORDER — DEXAMETHASONE SODIUM PHOSPHATE 10 MG/ML
INJECTION INTRAMUSCULAR; INTRAVENOUS
Status: DISCONTINUED | OUTPATIENT
Start: 2024-01-25 | End: 2024-01-25 | Stop reason: HOSPADM

## 2024-01-25 RX ORDER — MIDAZOLAM HYDROCHLORIDE 1 MG/ML
INJECTION INTRAMUSCULAR; INTRAVENOUS
Status: DISCONTINUED | OUTPATIENT
Start: 2024-01-25 | End: 2024-01-25 | Stop reason: HOSPADM

## 2024-01-25 NOTE — H&P
HPI  Patient presenting for Procedure(s) (LRB):  RADIOFREQUENCY ABLATION RIGHT C3, 4, 5, 6 (Right)     Patient on Anti-coagulation No    No health changes since previous encounter    Past Medical History:   Diagnosis Date    Bipolar disorder     Cancer of female breast 10/2010    T2 N1 Mx, Stage IIB left breast cancer    Cancer of upper-outer quadrant of female breast 7/9/2012    Depression     Diabetes mellitus type II     Disturbances of sensation of smell and taste 6/29/2012    Hypertension     Malignant neoplasm of breast (female), unspecified site 4/27/2015    Neuropathy     Nonspecific abnormal unspecified cardiovascular function study 4/12/2013    ECG READ AS SHOWING A T-WAVE ABNORMALITY     Post-mastectomy lymphedema syndrome     Schizophrenia     Stroke      Past Surgical History:   Procedure Laterality Date    BREAST RECONSTRUCTION  11/23/11    B/L SHLOMO flap reconstruction S/P left MRM, right prophylactic mastectomy    BREAST RECONSTRUCTION Bilateral 3/13/2015    NIPPLE RECONSTRUCTION    INJECTION OF ANESTHETIC AGENT AROUND NERVE Left 12/9/2020    Procedure: BLOCK, NERVE, C3-C4-C5-C6 MEDIAL BRANCH;  Surgeon: Darren Jerry MD;  Location: [facility];  Service: Pain Management;  Laterality: Left;    MASTECTOMY Bilateral 01/2011    bilateral    RADIOFREQUENCY ABLATION Left 2/3/2021    Procedure: RADIOFREQUENCY ABLATION, C3,C4,C5,C6 MEDIAL BRANCH 1 of 2;  Surgeon: Darren Jerry MD;  Location: [facility];  Service: Pain Management;  Laterality: Left;    RADIOFREQUENCY ABLATION Right 7/28/2021    Procedure: RADIOFREQUENCY ABLATION, C3-C4-C5-C6 MEDIAL BR ANCH 1 IOF 2;  Surgeon: Darren Jerry MD;  Location: [facility];  Service: Pain Management;  Laterality: Right;    RADIOFREQUENCY ABLATION Left 8/18/2021    Procedure: RADIOFREQUENCY ABLATION, C3-C4-C5-C6 MEDIAL BRANCH 2 OF 2;  Surgeon: Darren Jerry MD;  Location: [facility];  Service: Pain Management;  Laterality: Left;     RADIOFREQUENCY ABLATION Left 4/19/2023    Procedure: RADIOFREQUENCY ABLATION LEFT C3,C4,C5,C6  ONE OF TWO;  Surgeon: Darren Jerry MD;  Location: [facility];  Service: Pain Management;  Laterality: Left;  3/8 RESCHEDULE    TRANSFORAMINAL EPIDURAL INJECTION OF STEROID Bilateral 11/4/2020    Procedure: INJECTION, STEROID, EPIDURAL, TRANSFORAMINAL APPROACH L4/L5;  Surgeon: Darren Jerry MD;  Location: [facility];  Service: Pain Management;  Laterality: Bilateral;  Bilateral L4/L5    TRANSFORAMINAL EPIDURAL INJECTION OF STEROID Bilateral 2/10/2021    Procedure: INJECTION, STEROID, EPIDURAL, TRANSFORAMINAL APPROACH, L4-L5;  Surgeon: Darren Jerry MD;  Location: [facility];  Service: Pain Management;  Laterality: Bilateral;    TRANSFORAMINAL EPIDURAL INJECTION OF STEROID Bilateral 3/10/2021    Procedure: INJECTION, STEROID, EPIDURAL, TRANSFORAMINAL APPROACH, L4-L5;  Surgeon: Darren Jerry MD;  Location: [facility];  Service: Pain Management;  Laterality: Bilateral;    TRANSFORAMINAL EPIDURAL INJECTION OF STEROID Bilateral 12/15/2021    Procedure: INJECTION, STEROID, EPIDURAL, TRANSFORAMINAL APPROACH  Bilateral L4/5 TFESI / needs consent;  Surgeon: Darren Jerry MD;  Location: [facility];  Service: Pain Management;  Laterality: Bilateral;    TRANSFORAMINAL EPIDURAL INJECTION OF STEROID Bilateral 12/14/2022    Procedure: INJECTION, STEROID, EPIDURAL, TRANSFORAMINAL APPROACH BILATERAL L4/5 CONTRAST;  Surgeon: Darren Jerry MD;  Location: [facility];  Service: Pain Management;  Laterality: Bilateral;    TUBAL LIGATION       Review of patient's allergies indicates:  No Known Allergies   No current facility-administered medications for this encounter.     Facility-Administered Medications Ordered in Other Encounters   Medication    0.9%  NaCl infusion    0.9%  NaCl infusion       PMHx, PSHx, Allergies, Medications reviewed in epic    ROS negative except pain complaints in  HPI    OBJECTIVE:    LMP  (LMP Unknown)   Breastfeeding No     PHYSICAL EXAMINATION:    GENERAL: Well appearing, in no acute distress, alert and oriented x3.  PSYCH:  Mood and affect appropriate.  SKIN: Skin color, texture, turgor normal, no rashes or lesions which will impact the procedure.  CV: RRR with palpation of the radial artery.  PULM: No evidence of respiratory difficulty, symmetric chest rise. Clear to auscultation.  NEURO: Cranial nerves grossly intact.    Plan:    Proceed with procedure as planned Procedure(s) (LRB):  RADIOFREQUENCY ABLATION RIGHT C3, 4, 5, 6 (Right)    Galindo Holbrook M.D.  PGY-5  Interventional Pain Management Fellow  [facility]  Pager: [phone]    01/25/2024

## 2024-01-25 NOTE — DISCHARGE INSTRUCTIONS

## 2024-01-25 NOTE — DISCHARGE SUMMARY
Discharge Note  Short Stay      SUMMARY     Admit Date: 1/25/2024    Attending Physician: Kathy Garvin MD    Discharge Physician: Galindo Holbrook MD      Discharge Date: 1/25/2024 10:59 AM    Procedure(s) (LRB):  RADIOFREQUENCY ABLATION RIGHT C3, 4, 5, 6 (Right)    Final Diagnosis: Arthritis of facet joint of cervical spine [M47.812]    Disposition: Home or self care    Patient Instructions:   Current Discharge Medication List        CONTINUE these medications which have NOT CHANGED    Details   acetaminophen-codeine 300-30mg (TYLENOL #3) 300-30 mg Tab Take 1 tablet by mouth every 6 (six) hours as needed (pain).  Qty: 20 tablet, Refills: 0    Comments: Quantity prescribed more than 7 day supply? Yes, quantity medically necessary      ammonium lactate 12 % Crea Apply twice daily to affected parts both feet as needed.  Qty: 140 g, Refills: 11      aspirin (ECOTRIN) 81 MG EC tablet Take 1 tablet (81 mg total) by mouth once daily.  Refills: 0      atorvastatin (LIPITOR) 80 MG tablet Take 1 tablet (80 mg total) by mouth once daily.  Qty: 90 tablet, Refills: 0      blood glucose control, low Soln 1 drop by Misc.(Non-Drug; Combo Route) route as needed.  Qty: 1 each, Refills: 0    Associated Diagnoses: Type 2 diabetes mellitus with other specified complication, unspecified whether long term insulin use      blood sugar diagnostic (ACCU-CHEK AMBER PLUS TEST STRP) Strp TEST BLOOD SUGAR TWICE DAILY  Qty: 200 strip, Refills: 5    Comments: Or brand preferred by insurance.  Associated Diagnoses: Type 2 diabetes mellitus with diabetic polyneuropathy, with long-term current use of insulin      blood-glucose meter Misc 1 each by Misc.(Non-Drug; Combo Route) route 2 (two) times a day.  Qty: 1 each, Refills: 0    Comments: Or brand insurance will cover  Associated Diagnoses: Type 2 diabetes mellitus with diabetic polyneuropathy, with long-term current use of insulin      ciclopirox (PENLAC) 8 % Soln Apply topically nightly.  Qty: 6.6  mL, Refills: 11      divalproex 500 MG Tb24 1 tablet EVERY PM (route: oral)      GLUCAGON EMERGENCY KIT, HUMAN, 1 mg injection SMARTSI Vial(s) IM PRN      GLUTOSE-15 40 % gel SMARTSI unspecified By Mouth PRN      HUMALOG U-100 INSULIN 100 unit/mL injection Has not started      hydroCHLOROthiazide (HYDRODIURIL) 12.5 MG Tab Take 1 tablet (12.5 mg total) by mouth once daily.  Qty: 90 tablet, Refills: 2    Comments: .  Associated Diagnoses: Primary hypertension      HYDROcodone-acetaminophen (NORCO) 5-325 mg per tablet Take 1 tablet by mouth every 6 (six) hours as needed.      ibuprofen (ADVIL,MOTRIN) 800 MG tablet Take 800 mg by mouth every 8 (eight) hours as needed.      insulin (LANTUS SOLOSTAR U-100 INSULIN) glargine 100 units/mL SubQ pen INJECT 23 UNITS SUBCUTANEOUSLY NIGHTLY  HOLD until you see your doctor and are told to resume  Qty: 30 mL, Refills: 5    Associated Diagnoses: Type 2 diabetes mellitus without complication, with long-term current use of insulin      INVEGA SUSTENNA 117 mg/0.75 mL Syrg injection Inject into the muscle.      lancets (LANCETS,THIN) Misc 200 Lancets by Misc.(Non-Drug; Combo Route) route 2 (two) times daily.  Qty: 200 each, Refills: 3    Comments: Or brand preferred by insurance.  Associated Diagnoses: Type 2 diabetes mellitus with diabetic polyneuropathy, with long-term current use of insulin      letrozole (FEMARA) 2.5 mg Tab Take 2.5 mg by mouth.      liraglutide 0.6 mg/0.1 mL, 18 mg/3 mL, subq PNIJ (VICTOZA 3-HERMANN) 0.6 mg/0.1 mL (18 mg/3 mL) PnIj pen INJECT 1.8 MG SUBCUTANEOUSLY ONCE DAILY.  Qty: 27 mL, Refills: 11      lisinopriL-hydrochlorothiazide (PRINZIDE,ZESTORETIC) 10-12.5 mg per tablet Take 1 tablet by mouth.      metFORMIN (GLUCOPHAGE) 500 MG tablet Take 2 tablets (1,000 mg total) by mouth 2 (two) times daily with meals. Take with food.  Qty: 120 tablet, Refills: 11    Associated Diagnoses: Type 2 diabetes mellitus without complication, with long-term current use of  insulin      multivitamin capsule Take 1 capsule by mouth once daily.      nitrofurantoin, macrocrystal-monohydrate, (MACROBID) 100 MG capsule Take 1 capsule (100 mg total) by mouth 2 (two) times daily.  Qty: 10 capsule, Refills: 0    Associated Diagnoses: Acute cystitis with hematuria      OLANZapine (ZYPREXA) 10 MG tablet Take 10 mg by mouth every 8 (eight) hours as needed.      oxybutynin (DITROPAN-XL) 10 MG 24 hr tablet Take 1 tablet (10 mg total) by mouth once daily.  Qty: 30 tablet, Refills: 3      pregabalin (LYRICA) 100 MG capsule Take 1 capsule (100 mg total) by mouth 3 (three) times daily.  Qty: 270 capsule, Refills: 0      QUEtiapine (SEROQUEL) 100 MG Tab Take 1 tablet (100 mg total) by mouth every evening.  Qty: 90 tablet, Refills: 3    Associated Diagnoses: Cervical spondylosis without myelopathy      urea (CARMOL) 40 % Crea Apply topically once daily.  Qty: 85 g, Refills: 11      walker (ULTRA-LIGHT ROLLATOR) Misc 1 Device by Other route.      ziprasidone (GEODON) 40 MG Cap 1 capsule EVERY PM (route: oral)                 Discharge Diagnosis: Arthritis of facet joint of cervical spine [M47.812]  Condition on Discharge: Stable with no complications to procedure   Diet on Discharge: Same as before.  Activity: as per instruction sheet.  Discharge to: Home with a responsible adult.  Follow up: 2-4 weeks       Please call my office or pager at [phone] if experienced any weakness or loss of sensation, fever > 101.5, pain uncontrolled with oral medications, persistent nausea/vomiting/or diarrhea, redness or drainage from the incisions, or any other worrisome concerns. If physician on call was not reached or could not communicate with our office for any reason please go to the nearest emergency department      Galindo Holbrook M.D.  PGY-5  Interventional Pain Management Fellow  [facility]  Pager: [phone]    01/25/2024

## 2024-01-31 DIAGNOSIS — E11.9 TYPE 2 DIABETES MELLITUS WITHOUT COMPLICATION: ICD-10-CM

## 2024-02-05 DIAGNOSIS — I10 PRIMARY HYPERTENSION: Primary | ICD-10-CM

## 2024-02-05 RX ORDER — LOSARTAN POTASSIUM 25 MG/1
25 TABLET ORAL DAILY
Qty: 90 TABLET | Refills: 3 | Status: SHIPPED | OUTPATIENT
Start: 2024-02-05 | End: 2024-04-30 | Stop reason: SDUPTHER

## 2024-02-05 NOTE — TELEPHONE ENCOUNTER
Spoke with pt to conform she was taking Losartan 25 mg daily. Not on current medication list, but pt stated that she is taking it.

## 2024-02-05 NOTE — TELEPHONE ENCOUNTER
----- Message from Domenic Alas sent at 2/5/2024  9:04 AM CST -----  Regarding: Refil Request  Who Called:JOYA MUNOZ [[id number]]       New Prescription or Refill : Refill      RX Name and Strength:  losartan 25mg         30 day or 90 day RX:       Local or Mail Order : Local         Preferred Pharmacy:[id number] - [address]

## 2024-02-05 NOTE — TELEPHONE ENCOUNTER
Care Due:                  Date            Visit Type   Department     Provider  --------------------------------------------------------------------------------                                EP -                              [facility]  Ghulam Mendoza  Last Visit: 07-      CARE ([facility])   MEDICINE       [place]                              [facility]  Next Visit: 04-      [facility] ([facility]                                                            Last  Test          Frequency    Reason                     Performed    Due Date  --------------------------------------------------------------------------------    HBA1C.......  6 months...  liraglutide, metFORMIN...  04-   10-    [facility] Embedded Care Due Messages. Reference number: [id number].   2/05/2024 9:18:26 AM CST

## 2024-02-08 ENCOUNTER — TELEPHONE (OUTPATIENT)
Dept: INTERNAL MEDICINE | Facility: CLINIC | Age: 64
End: 2024-02-08
Payer: MEDICARE

## 2024-02-08 DIAGNOSIS — M47.812 CERVICAL SPONDYLOSIS WITHOUT MYELOPATHY: Primary | ICD-10-CM

## 2024-02-08 DIAGNOSIS — M46.00 SPINAL ENTHESOPATHY, SITE UNSPECIFIED: ICD-10-CM

## 2024-02-08 NOTE — TELEPHONE ENCOUNTER
Medical necessity form faxed to N along with an order for the walker. Faxed paperwork on providers MA desk

## 2024-02-08 NOTE — TELEPHONE ENCOUNTER
----- Message from Damaris Camacho sent at 2/8/2024  9:46 AM CST -----  Name of Who is calling :  JOYA MUNOZ [[id number]]      What is the request in detail:  Pt called in and stated that she needs a request put in for a new walker that you can walk with and sit on. Please assist     Can the clinic reply by [facility]:no             What number to call back if not in [facility]:

## 2024-02-11 NOTE — TELEPHONE ENCOUNTER
No care due was identified.  [facility] Embedded Care Due Messages. Reference number: [id number].   2/11/2024 3:37:55 AM CST

## 2024-02-12 NOTE — TELEPHONE ENCOUNTER
Refill Routing Note   Medication(s) are not appropriate for processing by [facility] for the following reason(s):        No active prescription written by provider    [facility] action(s):  Defer               Appointments  past 12m or future 3m with PCP    Date Provider   Last Visit   7/20/2023 Ghulam Contreras MD   Next Visit   4/30/2024 Ghulam Contreras MD   ED visits in past 90 days: 0        Note composed:3:16 AM 02/12/2024

## 2024-02-15 RX ORDER — OXYBUTYNIN CHLORIDE 10 MG/1
10 TABLET, EXTENDED RELEASE ORAL
Qty: 30 TABLET | Refills: 3 | Status: SHIPPED | OUTPATIENT
Start: 2024-02-15

## 2024-02-20 ENCOUNTER — TELEPHONE (OUTPATIENT)
Dept: INTERNAL MEDICINE | Facility: CLINIC | Age: 64
End: 2024-02-20
Payer: MEDICARE

## 2024-02-20 ENCOUNTER — TELEPHONE (OUTPATIENT)
Dept: PODIATRY | Facility: CLINIC | Age: 64
End: 2024-02-20
Payer: MEDICARE

## 2024-02-20 NOTE — TELEPHONE ENCOUNTER
----- Message from Su Agustin sent at 2/19/2024 10:25 AM CST -----  Regarding: PA status  Name of Who is Calling: [facility]           What is the request in detail: Martha is requesting a call back to get the status of PA for the walker that was sent to [facility].            [facility] the clinic [facility] No           What Number to Call Back if not in [facility]: [phone] REF# [id number]

## 2024-02-20 NOTE — TELEPHONE ENCOUNTER
Appointment scheduled for 2/27 at 2:15p.    Patient verbalized understanding.      ----- Message from Nicole Anamariaroque Weston sent at 2/20/2024 11:33 AM CST -----  Regarding: call back  Type: Patient Call Back    Who called: pt    What is the request in detail: requesting call back to discuss getting new orders submitted for diabetic shoes, states she changed insurances and her shoe providers asked for orders and visit notes to be resubmitted     Can the clinic reply by [facility]?no    Would the patient rather a call back or a response via [facility]? call    Best call back number: [phone], pt   [phone], shoe provider     Additional Information:

## 2024-02-20 NOTE — TELEPHONE ENCOUNTER
Spoke with [facility] and they stated Pt received a walker on 1/24/23 and her insurance states that she can't get another walker for another 3 years. Pt states that she bought her walker for 80 dollars out of pocket and she did not get a walker from the DME company. Pt states that she will call [facility] to see what is going on. Gave pt the number to [phone] to call. Pt states that [employer] gave her the OK to get a walker.

## 2024-02-27 ENCOUNTER — OFFICE VISIT (OUTPATIENT)
Dept: PODIATRY | Facility: CLINIC | Age: 64
End: 2024-02-27
Payer: MEDICARE

## 2024-02-27 VITALS
BODY MASS INDEX: 27.46 KG/M2 | WEIGHT: 155 LBS | SYSTOLIC BLOOD PRESSURE: 126 MMHG | HEIGHT: 63 IN | HEART RATE: 88 BPM | DIASTOLIC BLOOD PRESSURE: 79 MMHG

## 2024-02-27 DIAGNOSIS — M20.42 HAMMER TOES OF BOTH FEET: ICD-10-CM

## 2024-02-27 DIAGNOSIS — M20.41 HAMMER TOES OF BOTH FEET: ICD-10-CM

## 2024-02-27 DIAGNOSIS — L84 CORN OR CALLUS: ICD-10-CM

## 2024-02-27 DIAGNOSIS — B35.1 ONYCHOMYCOSIS DUE TO DERMATOPHYTE: ICD-10-CM

## 2024-02-27 DIAGNOSIS — E11.42 DIABETIC PERIPHERAL NEUROPATHY ASSOCIATED WITH TYPE 2 DIABETES MELLITUS: Primary | ICD-10-CM

## 2024-02-27 PROCEDURE — 11055 PARING/CUTG B9 HYPRKER LES 1: CPT | Mod: Q9,S$GLB,, | Performed by: PODIATRIST

## 2024-02-27 PROCEDURE — 99214 OFFICE O/P EST MOD 30 MIN: CPT | Mod: 25,S$GLB,, | Performed by: PODIATRIST

## 2024-02-27 PROCEDURE — 11721 DEBRIDE NAIL 6 OR MORE: CPT | Mod: Q9,59,S$GLB, | Performed by: PODIATRIST

## 2024-02-27 PROCEDURE — 99999 PR PBB SHADOW E&M-EST. PATIENT-LVL IV: CPT | Mod: PBBFAC,,, | Performed by: PODIATRIST

## 2024-02-27 RX ORDER — CICLOPIROX 80 MG/ML
SOLUTION TOPICAL NIGHTLY
Qty: 6.6 ML | Refills: 11 | Status: SHIPPED | OUTPATIENT
Start: 2024-02-27

## 2024-02-27 RX ORDER — UREA 40 %
CREAM (GRAM) TOPICAL DAILY
Qty: 85 G | Refills: 11 | Status: SHIPPED | OUTPATIENT
Start: 2024-02-27

## 2024-02-27 NOTE — PROGRESS NOTES
Subjective:      Patient ID: Jonathan Gonzales is a 64 y.o. female.    Chief Complaint: Diabetic Foot Exam    Jonathan is a 64 y.o. female who presents to the clinic for evaluation and treatment of high risk feet. Jonathan has a past medical history of Bipolar disorder, Cancer of female breast (10/2010), Cancer of upper-outer quadrant of female breast (7/9/2012), Depression, Diabetes mellitus type II, Disturbances of sensation of smell and taste (6/29/2012), Hypertension, Malignant neoplasm of breast (female), unspecified site (4/27/2015), Neuropathy, Nonspecific abnormal unspecified cardiovascular function study (4/12/2013), Post-mastectomy lymphedema syndrome, Schizophrenia, and Stroke. The patient's chief complaint is Diabetes, increased risk amputation needing evaluation/management/optomization of foot care.     Cc2 Diabetes, increased risk amputation needing evaluation/management/optomization of foot care.    .    PCP: Ghulam Contreras MD    Date Last Seen by PCP:   Chief Complaint   Patient presents with    Diabetic Foot Exam        Current shoe gear:  Affected Foot: Casual shoes     Unaffected Foot: Casual shoes    Hemoglobin A1C   Date Value Ref Range Status   06/25/2023 6.2 (H) 4.7 - 5.6 % Final   04/20/2023 5.7 (H) 4.0 - 5.6 % Final     Comment:     ADA Screening Guidelines:  5.7-6.4%  Consistent with prediabetes  >or=6.5%  Consistent with diabetes    High levels of fetal hemoglobin interfere with the HbA1C  assay. Heterozygous hemoglobin variants (HbS, HgC, etc)do  not significantly interfere with this assay.   However, presence of multiple variants may affect accuracy.     03/16/2022 7.1 (H) 4.0 - 5.6 % Final     Comment:     ADA Screening Guidelines:  5.7-6.4%  Consistent with prediabetes  >or=6.5%  Consistent with diabetes    High levels of fetal hemoglobin interfere with the HbA1C  assay. Heterozygous hemoglobin variants (HbS, HgC, etc)do  not significantly interfere with this assay.   However,  presence of multiple variants may affect accuracy.     09/23/2021 6.2 (H) 4.0 - 5.6 % Final     Comment:     ADA Screening Guidelines:  5.7-6.4%  Consistent with prediabetes  >or=6.5%  Consistent with diabetes    High levels of fetal hemoglobin interfere with the HbA1C  assay. Heterozygous hemoglobin variants (HbS, HgC, etc)do  not significantly interfere with this assay.   However, presence of multiple variants may affect accuracy.         Review of Systems   Constitutional: Negative for chills, diaphoresis, fever, malaise/fatigue and night sweats.   Cardiovascular:  Negative for claudication, cyanosis, leg swelling and syncope.   Skin:  Positive for nail changes and suspicious lesions. Negative for color change, dry skin, rash and unusual hair distribution.   Musculoskeletal:  Negative for falls, joint pain, joint swelling, muscle cramps, muscle weakness and stiffness.   Gastrointestinal:  Negative for constipation, diarrhea, nausea and vomiting.   Neurological:  Positive for numbness, paresthesias and sensory change. Negative for brief paralysis, disturbances in coordination, focal weakness and tremors.           Objective:      Physical Exam  Constitutional:       General: She is not in acute distress.     Appearance: She is well-developed. She is not diaphoretic.   Cardiovascular:      Pulses:           Popliteal pulses are 2+ on the right side and 2+ on the left side.        Dorsalis pedis pulses are 1+ on the right side and 1+ on the left side.        Posterior tibial pulses are 1+ on the right side and 1+ on the left side.      Comments: Capillary refill 3 seconds all toes/distal feet, all toes/both feet warm to touch.      Negative lymphadenopathy bilateral popliteal fossa and tarsal tunnel.      Negavie lower extremity edema bilateral.    Musculoskeletal:      Right ankle: No swelling, deformity, ecchymosis or lacerations. Normal range of motion. Normal pulse.      Right Achilles Tendon: Normal. No  defects. Boyd's test negative.      Comments: Patient has hammertoes of digits    2-5 bilateral               partially reducible without symptom today.      Otherwise, Normal angle, base, station of gait. All ten toes without clubbing, cyanosis, or signs of ischemia.  No pain to palpation bilateral lower extremities.  Range of motion, stability, muscle strength, and muscle tone normal bilateral feet and legs.      Lymphadenopathy:      Lower Body: No right inguinal adenopathy. No left inguinal adenopathy.      Comments: Negative lymphadenopathy bilateral popliteal fossa and tarsal tunnel.    Negative lymphangitic streaking bilateral feet/ankles/legs.   Skin:     General: Skin is warm and dry.      Capillary Refill: Capillary refill takes 2 to 3 seconds.      Coloration: Skin is not pale.      Findings: No abrasion, bruising, burn, ecchymosis, erythema, laceration, lesion or rash.      Nails: There is no clubbing.      Comments: Focal hyperkeratotic lesion consisting entirely of hyperkeratotic tissue without open skin, drainage, pus, fluctuance, malodor, or signs of infection plantar right 5th mtpj    Otherwise, Skin thin, shiny, atrophic, with decreased density and distribution of pedal hair bilateral, but without hyperpigmentation, zain discoloration,  ulcers, masses, nodules or cords palpated bilateral feet and legs.      Toenails 1st, 2nd, 3rd, 4th, 5th  bilateral are hypertrophic thickened 2-3 mm, dystrophic, discolored tanish brown with tan, gray crumbly subungual debris.  Tender to distal nail plate pressure, without periungual skin abnormality of each.     Neurological:      Mental Status: She is alert and oriented to person, place, and time.      Sensory: Sensory deficit present.      Motor: No tremor, atrophy or abnormal muscle tone.      Gait: Gait normal.      Comments: Paresthesias, and burning bilateral feet with no clearly identified trigger or source.    Negative tinel sign to percussion sural,  superficial peroneal, deep peroneal, saphenous, and posterior tibial nerves right and left ankles and feet.    Decreased/absent vibratory sensation bilateral feet to 128Hz tuning fork.    Diminished/loss of protective sensation all toes bilateral to 10 gram monofilament.     Psychiatric:         Behavior: Behavior is cooperative.               Assessment:       Encounter Diagnoses   Name Primary?    Diabetic peripheral neuropathy associated with type 2 diabetes mellitus Yes    Hammer toes of both feet     Corn or callus     Onychomycosis due to dermatophyte          Plan:       Jonathan was seen today for diabetic foot exam.    Diagnoses and all orders for this visit:    Diabetic peripheral neuropathy associated with type 2 diabetes mellitus  -     DIABETIC SHOES FOR HOME USE    Hammer toes of both feet  -     DIABETIC SHOES FOR HOME USE    Corn or callus  -     urea (CARMOL) 40 % Crea; Apply topically once daily.  -     DIABETIC SHOES FOR HOME USE    Onychomycosis due to dermatophyte  -     ciclopirox (PENLAC) 8 % Soln; Apply topically nightly.  -     DIABETIC SHOES FOR HOME USE      I counseled the patient on her conditions, their implications and medical management.        - Shoe inspection. Diabetic Foot Education. Patient reminded of the importance of good nutrition and blood sugar control to help prevent podiatric complications of diabetes. Patient instructed on proper foot hygeine. We discussed wearing proper shoe gear, daily foot inspections, never walking without protective shoe gear, never putting sharp instruments to feet, routine podiatric visits annually.      With the patient's permission, I debrided all ten toenails with a sterile nipper and curette, removing all offending nail and debris.  Patient tolerated the procedure well and related significant relief.    With the patient's permission, I debrided hyperkeratotic lesion(s) as above totaling       1         to, not  Including dermis with sterile #15  blade.  Patient tolerated the procedure well and related significant relief.     Discussed conservative treatment with shoes of adequate dimensions, material, and style to alleviate symptoms and delay or prevent surgical intervention.    Rx lac hydrin, DM shoes/inserts.        No follow-ups on file.

## 2024-03-04 ENCOUNTER — TELEPHONE (OUTPATIENT)
Dept: PODIATRY | Facility: CLINIC | Age: 64
End: 2024-03-04
Payer: MEDICARE

## 2024-03-04 NOTE — TELEPHONE ENCOUNTER
Staff left message stating Dr. Julien politely declined this request.  Dr. Julien wrote for 2.  Dr. Julien feel he needs 2.  Dr. Julien do not feel that 6 is necessary.       ----- Message from Philippe Julien DPM sent at 3/1/2024  5:00 PM CST -----  I politely declined this request.  I wrote for 2.  I feel he needs to.  I do not feel that 6 is necessary.  ----- Message -----  From: Jossy Simon MA  Sent: 3/1/2024   4:51 PM CST  To: Philippe Julien DPM    Change Qty of Diabetic shoe order to 6.    ----- Message -----  From: Debbie Trivedi  Sent: 3/1/2024   4:12 PM CST  To: [facility] Staff    Name of Who is Calling: JOYA MUNOZ [[id number]] Jewell ( orthotics called )       What is the request in detail: Requesting a call back on feb 27th on the pt order. Please add  insert qty 6 to be changed on the order and faxed back. Please advise       Can the clinic reply by [facility]: No       What Number [facility]: [phone] fax [phone]

## 2024-03-05 ENCOUNTER — TELEPHONE (OUTPATIENT)
Dept: PODIATRY | Facility: CLINIC | Age: 64
End: 2024-03-05
Payer: MEDICARE

## 2024-03-05 NOTE — TELEPHONE ENCOUNTER
New Diabetic order routed to [facility].        ----- Message from Anjana Pelletier sent at 3/5/2024  9:26 AM CST -----  Regarding: Returning Call                Name of Who is Calling:  Jewell with [facility]    Who Left the Message:   Jewell with [facility]    Message Is For:   Jossy    What is the request in detail:      Jewell with [facility] called stating she's returning the Office's call and would you to please call again.   Please further advise.    Thank you      Preferred Call Back for Jewell with [facility]: [phone] (Office) :

## 2024-03-06 DIAGNOSIS — E11.9 TYPE 2 DIABETES MELLITUS WITHOUT COMPLICATION: ICD-10-CM

## 2024-03-13 LAB — POCT GLUCOSE: 139 MG/DL (ref 70–110)

## 2024-03-13 PROCEDURE — 82962 GLUCOSE BLOOD TEST: CPT

## 2024-03-13 PROCEDURE — 96374 THER/PROPH/DIAG INJ IV PUSH: CPT

## 2024-03-14 ENCOUNTER — HOSPITAL ENCOUNTER (EMERGENCY)
Facility: OTHER | Age: 64
Discharge: HOME | End: 2024-03-14
Attending: EMERGENCY MEDICINE
Payer: MEDICARE

## 2024-03-14 VITALS
OXYGEN SATURATION: 98 % | HEART RATE: 70 BPM | SYSTOLIC BLOOD PRESSURE: 133 MMHG | RESPIRATION RATE: 18 BRPM | BODY MASS INDEX: 27.46 KG/M2 | WEIGHT: 155 LBS | TEMPERATURE: 98 F | DIASTOLIC BLOOD PRESSURE: 84 MMHG

## 2024-03-14 DIAGNOSIS — M10.9 ACUTE GOUT OF LEFT FOOT, UNSPECIFIED CAUSE: ICD-10-CM

## 2024-03-14 DIAGNOSIS — M10.9 GOUT, UNSPECIFIED CAUSE, UNSPECIFIED CHRONICITY, UNSPECIFIED SITE: ICD-10-CM

## 2024-03-14 DIAGNOSIS — R79.82 ELEVATED C-REACTIVE PROTEIN (CRP): ICD-10-CM

## 2024-03-14 DIAGNOSIS — R70.0 ELEVATED SED RATE: ICD-10-CM

## 2024-03-14 DIAGNOSIS — M79.89 SWELLING OF LEFT FOOT: ICD-10-CM

## 2024-03-14 DIAGNOSIS — M79.672 LEFT FOOT PAIN: Primary | ICD-10-CM

## 2024-03-14 PROCEDURE — 63600175 PHARM REV CODE 636 W HCPCS: Performed by: EMERGENCY MEDICINE

## 2024-03-14 PROCEDURE — 99284 EMERGENCY DEPT VISIT MOD MDM: CPT | Mod: 25

## 2024-03-14 PROCEDURE — 25000003 PHARM REV CODE 250: Performed by: EMERGENCY MEDICINE

## 2024-03-14 RX ORDER — SULFAMETHOXAZOLE AND TRIMETHOPRIM 800; 160 MG/1; MG/1
1 TABLET ORAL 2 TIMES DAILY
Qty: 14 TABLET | Refills: 0 | Status: SHIPPED | OUTPATIENT
Start: 2024-03-14 | End: 2024-03-21

## 2024-03-14 RX ORDER — SULFAMETHOXAZOLE AND TRIMETHOPRIM 800; 160 MG/1; MG/1
TABLET ORAL
Status: DISCONTINUED
Start: 2024-03-14 | End: 2024-03-14 | Stop reason: HOSPADM

## 2024-03-14 RX ORDER — KETOROLAC TROMETHAMINE 30 MG/ML
INJECTION, SOLUTION INTRAMUSCULAR; INTRAVENOUS
Status: DISCONTINUED
Start: 2024-03-14 | End: 2024-03-14 | Stop reason: HOSPADM

## 2024-03-14 RX ORDER — INDOMETHACIN 50 MG/1
50 CAPSULE ORAL 3 TIMES DAILY PRN
Qty: 30 CAPSULE | Refills: 0 | Status: SHIPPED | OUTPATIENT
Start: 2024-03-14

## 2024-03-14 RX ORDER — KETOROLAC TROMETHAMINE 30 MG/ML
15 INJECTION, SOLUTION INTRAMUSCULAR; INTRAVENOUS
Status: COMPLETED | OUTPATIENT
Start: 2024-03-14 | End: 2024-03-14

## 2024-03-14 RX ORDER — COLCHICINE 0.6 MG/1
TABLET, FILM COATED ORAL
Status: DISCONTINUED
Start: 2024-03-14 | End: 2024-03-14 | Stop reason: HOSPADM

## 2024-03-14 RX ORDER — ONDANSETRON HYDROCHLORIDE 2 MG/ML
INJECTION, SOLUTION INTRAVENOUS
Status: DISCONTINUED
Start: 2024-03-14 | End: 2024-03-14 | Stop reason: HOSPADM

## 2024-03-14 RX ORDER — COLCHICINE 0.6 MG/1
1.2 TABLET, FILM COATED ORAL
Status: COMPLETED | OUTPATIENT
Start: 2024-03-14 | End: 2024-03-14

## 2024-03-14 RX ORDER — SULFAMETHOXAZOLE AND TRIMETHOPRIM 800; 160 MG/1; MG/1
1 TABLET ORAL
Status: COMPLETED | OUTPATIENT
Start: 2024-03-14 | End: 2024-03-14

## 2024-03-14 RX ORDER — ACETAMINOPHEN 500 MG
TABLET ORAL
Status: DISCONTINUED
Start: 2024-03-14 | End: 2024-03-14 | Stop reason: HOSPADM

## 2024-03-14 RX ORDER — COLCHICINE 0.6 MG/1
0.6 TABLET, FILM COATED ORAL ONCE
Status: COMPLETED | OUTPATIENT
Start: 2024-03-14 | End: 2024-03-14

## 2024-03-14 RX ADMIN — COLCHICINE 1.2 MG: 0.6 TABLET, FILM COATED ORAL at 03:03

## 2024-03-14 RX ADMIN — SULFAMETHOXAZOLE AND TRIMETHOPRIM 1 TABLET: 800; 160 TABLET ORAL at 03:03

## 2024-03-14 RX ADMIN — KETOROLAC TROMETHAMINE 15 MG: 30 INJECTION, SOLUTION INTRAMUSCULAR; INTRAVENOUS at 03:03

## 2024-03-14 RX ADMIN — COLCHICINE 0.6 MG: 0.6 TABLET, FILM COATED ORAL at 04:03

## 2024-03-14 NOTE — ED PROVIDER NOTES
Encounter Date: 3/13/2024       History     Chief Complaint   Patient presents with    Foot Swelling     Pt reports left foot pain, redness and swelling that she noticed on today. Pt denies injury.      65 yo female with DM2, HTN, DLD, presents via personal transportation (son) to [facility] ER with acute severe atraumatic left foot pain and swelling, starting on Tuesday 3/12/24 and worsening today Wednesday 3/13/24.  Patient took Lyrica Wednesday morning without relief.  No other meds PTA.  No fevers/chills.  Patient on ROS reviews intermittent cough x 3 months without shortness of breath or chest pain.    PMH: DM2, HTN, DLD, prior breast CA s/p bilateral mastectomy, TIA/CVA    Psych: bipolar, schizophrenia    PSH: bilateral mastectomy, BTL      Review of patient's allergies indicates:  No Known Allergies  Past Medical History:   Diagnosis Date    Bipolar disorder     Cancer of female breast 10/2010    T2 N1 Mx, Stage IIB left breast cancer    Cancer of upper-outer quadrant of female breast 7/9/2012    Depression     Diabetes mellitus type II     Disturbances of sensation of smell and taste 6/29/2012    Hypertension     Malignant neoplasm of breast (female), unspecified site 4/27/2015    Neuropathy     Nonspecific abnormal unspecified cardiovascular function study 4/12/2013    ECG READ AS SHOWING A T-WAVE ABNORMALITY     Post-mastectomy lymphedema syndrome     Schizophrenia     Stroke      Past Surgical History:   Procedure Laterality Date    BREAST RECONSTRUCTION  11/23/11    B/L SHLOMO flap reconstruction S/P left MRM, right prophylactic mastectomy    BREAST RECONSTRUCTION Bilateral 3/13/2015    NIPPLE RECONSTRUCTION    INJECTION OF ANESTHETIC AGENT AROUND NERVE Left 12/9/2020    Procedure: BLOCK, NERVE, C3-C4-C5-C6 MEDIAL BRANCH;  Surgeon: Darren Jerry MD;  Location: [facility] PAIN MGT;  Service: Pain Management;  Laterality: Left;    MASTECTOMY Bilateral 01/2011    bilateral    RADIOFREQUENCY ABLATION Left  2/3/2021    Procedure: RADIOFREQUENCY ABLATION, C3,C4,C5,C6 MEDIAL BRANCH 1 of 2;  Surgeon: Darren Jerry MD;  Location: [facility] PAIN MGT;  Service: Pain Management;  Laterality: Left;    RADIOFREQUENCY ABLATION Right 7/28/2021    Procedure: RADIOFREQUENCY ABLATION, C3-C4-C5-C6 MEDIAL BR ANCH 1 IOF 2;  Surgeon: Darren Jerry MD;  Location: [facility] PAIN MGT;  Service: Pain Management;  Laterality: Right;    RADIOFREQUENCY ABLATION Left 8/18/2021    Procedure: RADIOFREQUENCY ABLATION, C3-C4-C5-C6 MEDIAL BRANCH 2 OF 2;  Surgeon: Darren Jerry MD;  Location: [facility] PAIN MGT;  Service: Pain Management;  Laterality: Left;    RADIOFREQUENCY ABLATION Left 4/19/2023    Procedure: RADIOFREQUENCY ABLATION LEFT C3,C4,C5,C6  ONE OF TWO;  Surgeon: Darren Jerry MD;  Location: [facility] PAIN MGT;  Service: Pain Management;  Laterality: Left;  3/8 RESCHEDULE    RADIOFREQUENCY ABLATION Right 1/25/2024    Procedure: RADIOFREQUENCY ABLATION RIGHT C3, 4, 5, 6;  Surgeon: Kathy Garvin MD;  Location: [facility] PAIN MGT;  Service: Pain Management;  Laterality: Right;  [phone]  4 WK F/U MARLEE    TRANSFORAMINAL EPIDURAL INJECTION OF STEROID Bilateral 11/4/2020    Procedure: INJECTION, STEROID, EPIDURAL, TRANSFORAMINAL APPROACH L4/L5;  Surgeon: Darren Jerry MD;  Location: [facility] PAIN [facility]GT;  Service: Pain Management;  Laterality: Bilateral;  Bilateral L4/L5    TRANSFORAMINAL EPIDURAL INJECTION OF STEROID Bilateral 2/10/2021    Procedure: INJECTION, STEROID, EPIDURAL, TRANSFORAMINAL APPROACH, L4-L5;  Surgeon: Darren Jerry MD;  Location: [facility] PAIN [facility]GT;  Service: Pain Management;  Laterality: Bilateral;    TRANSFORAMINAL EPIDURAL INJECTION OF STEROID Bilateral 3/10/2021    Procedure: INJECTION, STEROID, EPIDURAL, TRANSFORAMINAL APPROACH, L4-L5;  Surgeon: Darren Jerry MD;  Location: [facility] PAIN [facility]GT;  Service: Pain Management;  Laterality: Bilateral;    TRANSFORAMINAL EPIDURAL INJECTION OF STEROID Bilateral 12/15/2021     Procedure: INJECTION, STEROID, EPIDURAL, TRANSFORAMINAL APPROACH  Bilateral L4/5 TFESI / needs consent;  Surgeon: Darren Jerry MD;  Location: [facility] PAIN MGT;  Service: Pain Management;  Laterality: Bilateral;    TRANSFORAMINAL EPIDURAL INJECTION OF STEROID Bilateral 2022    Procedure: INJECTION, STEROID, EPIDURAL, TRANSFORAMINAL APPROACH BILATERAL L4/5 CONTRAST;  Surgeon: Darren Jerry MD;  Location: [facility] PAIN MGT;  Service: Pain Management;  Laterality: Bilateral;    TUBAL LIGATION       Family History   Problem Relation Age of Onset    Kidney disease Mother         Dialysis    Hypertension Mother     Deep vein thrombosis Mother     Glaucoma Mother     Diabetic kidney disease Sister     Lung cancer Sister     Diabetes Sister     Cancer Sister         lung    Diabetes Brother     Diabetes Son     No Known Problems Father     No Known Problems Maternal Grandmother     No Known Problems Maternal Grandfather     No Known Problems Paternal Grandmother     No Known Problems Paternal Grandfather     No Known Problems Son     Cervical cancer Daughter     Cancer Daughter         cervical cancer    No Known Problems Daughter     No Known Problems Daughter     No Known Problems Daughter     Breast cancer Neg Hx     Ovarian cancer Neg Hx      Social History     Tobacco Use    Smoking status: Some Days     Current packs/day: 0.00     Average packs/day: 0.5 packs/day for 28.2 years (14.1 ttl pk-yrs)     Types: Cigarettes     Start date:      Last attempt to quit: 3/17/2022     Years since quittin.9    Smokeless tobacco: Never    Tobacco comments:     currently at less than 1/2 pack pd   Substance Use Topics    Alcohol use: Yes     Alcohol/week: 0.0 standard drinks of alcohol     Comment: social - once every 2 weeks    Drug use: No     Comment:  - stopped using crack cocaine     Review of Systems   Constitutional:  Negative for fever.   HENT:  Negative for sore throat.    Eyes:  Negative for  photophobia.   Respiratory:  Negative for shortness of breath.    Cardiovascular:  Negative for chest pain.   Gastrointestinal:  Negative for abdominal pain.   Genitourinary:  Negative for dysuria.   Musculoskeletal:  Positive for gait problem (uses cane chronically due to neck/back problems) and myalgias (left foot).   Skin:  Positive for color change (left foot).   Neurological:  Negative for syncope, weakness and numbness.       Physical Exam     Initial Vitals [03/13/24 2100]   BP Pulse Resp Temp SpO2   (!) 150/87 97 18 98.4 °F (36.9 °C) 99 %      MAP       --         Physical Exam    Nursing note and vitals reviewed.  Constitutional: She appears well-developed and well-nourished. She is not diaphoretic.   Awake, alert, nontoxic.   HENT:   Head: Normocephalic and atraumatic.   Mouth/Throat: Oropharynx is clear and moist.   Eyes: Conjunctivae and EOM are normal. Pupils are equal, round, and reactive to light.   Neck: Neck supple.   Normal range of motion.  Cardiovascular:  Normal rate, regular rhythm and intact distal pulses.           Pulmonary/Chest: Breath sounds normal. No respiratory distress. She has no wheezes. She has no rhonchi. She has no rales.   Abdominal: Abdomen is soft. There is no abdominal tenderness.   Musculoskeletal:         General: Tenderness present.      Cervical back: Normal range of motion and neck supple.      Comments: Diffuse swelling of left foot extending into left ankle with TTP primarily over dorsum of foot.  Mild redness over dorsum of foot as well.       Neurological: She is alert and oriented to person, place, and time. She has normal strength.   Moving all extremities.   Skin: Skin is warm and dry. There is erythema (mild to left foot). No pallor.   Psychiatric: She has a normal mood and affect.         ED Course   Procedures  Labs Reviewed   POCT GLUCOSE - Abnormal; Notable for the following components:       Result Value    POCT Glucose 139 (*)     All other components  "within normal limits          Imaging Results              X-Ray Foot Complete Left (Final result)  Result time 03/14/24 06:37:10      Final result by Spenser Uriarte MD (03/14/24 06:37:10)                   Impression:      Soft tissue edema in the forefoot.  Recommend correlation for any clinical signs of cellulitis in this patient with "pain in left foot".      Electronically signed by: Spenser Uriarte MD  Date:    03/14/2024  Time:    06:37               Narrative:    EXAMINATION:  XR FOOT COMPLETE 3 VIEW LEFT    CLINICAL HISTORY:  Pain in left foot    TECHNIQUE:  Three views of the left foot.    COMPARISON:  01/18/2023.    FINDINGS:  Provided history is "pain in left foot".  No acute displaced fracture or gross dislocation.  Significant soft tissue edema along the dorsal aspect of the foot.  Suggest correlation for underlying cellulitis.  Scattered degenerative changes and small plantar calcaneal spur.  MRI follow-up may provide improved sensitivity if there is strong concern for osteomyelitis.  No unexpected radiopaque foreign body.                                       Medications   colchicine capsule/tablet 1.2 mg (1.2 mg Oral Given 3/14/24 0337)   ketorolac injection 15 mg (15 mg Intravenous Given 3/14/24 0337)   colchicine capsule/tablet 0.6 mg (0.6 mg Oral Given 3/14/24 0417)   sulfamethoxazole-trimethoprim 800-160mg per tablet 1 tablet (1 tablet Oral Given 3/14/24 0337)     Medical Decision Making  63 yo female with DM2, HTN, DLD, here with acute atraumatic left foot swelling.    Ddx includes gout, cellulitis, abscess, osteomyelitis, diabetic foot infection, other.    XR shows diffuse soft tissue edema.  No evidence of gas in soft tissue.  No fractures.      Normal WBC suggests non-infectious.  However, ESR and CRP are quite elevated.      I treated the patient as gout with PO APAP 1g and IV toradol 15mg as well as PO colchicine 1.2mg and then 0.6mg.  Patient's pain improved significantly.  Due to " erythema/warmth to exam, I also ordered PO bactrim DS.      Patient states she feels well enough to go home.  I have rx'ed indomethacin and bactrim for home use.  I have advised her to return if symptoms worsen or do not improve.  I have messaged PMD Dr. Ghulam Contreras and podiatrist Dr. Philippe Julien in Epic regarding this visit.      Risk  Prescription drug management.                                      Clinical Impression:  Final diagnoses:  [M79.672] Left foot pain (Primary)  [R70.0] Elevated sed rate  [R79.82] Elevated C-reactive protein (CRP)  [M79.89] Swelling of left foot  [M10.9] Gout, unspecified cause, unspecified chronicity, unspecified site  [M10.9] Acute gout of left foot, unspecified cause          ED Disposition Condition    Discharge Stable          ED Prescriptions       Medication Sig Dispense Start Date End Date Auth. Provider    indomethacin (INDOCIN) 50 MG capsule Take 1 capsule (50 mg total) by mouth 3 (three) times daily as needed (left foot pain). May cause acid reflux. You may take this medication with an antacid. 30 capsule 3/14/2024 -- Allyssa Dobbs MD    sulfamethoxazole-trimethoprim 800-160mg (BACTRIM DS) 800-160 mg Tab Take 1 tablet by mouth 2 (two) times daily. for 7 days 14 tablet 3/14/2024 3/21/2024 Allyssa Dobbs MD          Follow-up Information       Follow up With Specialties Details Why Contact Info    Ghulam Contreras MD Family Medicine   [address]  [facility] [address]  [phone]      Philippe Julien, DPDEANNA Podiatry, Wound Care   [address]  [facility] [address]  [phone]               Allyssa Dobbs MD  03/14/24 2083

## 2024-03-14 NOTE — FIRST PROVIDER EVALUATION
Emergency Department TeleTriage Encounter Note      CHIEF COMPLAINT    Chief Complaint   Patient presents with    Foot Swelling     Pt reports left foot pain, redness and swelling that she noticed on today. Pt denies injury.        VITAL SIGNS   Initial Vitals [03/13/24 2100]   BP Pulse Resp Temp SpO2   (!) 150/87 97 18 98.4 °F (36.9 °C) 99 %      MAP       --            ALLERGIES    Review of patient's allergies indicates:  No Known Allergies    PROVIDER TRIAGE NOTE    64-year-old female presents with left foot pain redness and swelling for the past 3 days.  Denies trauma or injury.    ORDERS  Labs Reviewed   POCT GLUCOSE - Abnormal; Notable for the following components:       Result Value    POCT Glucose 139 (*)     All other components within normal limits       ED Orders (720h ago, onward)      Start Ordered     Status Ordering Provider    03/13/24 2059 03/13/24 2059  POCT glucose  Once         Final result EMERGENCY, DEPT PHYSICIAN              Virtual Visit Note: The provider triage portion of this emergency department evaluation and documentation was performed via [employer], a HIPAA-compliant telemedicine application, in concert with a tele-presenter in the room. A face to face patient evaluation with one of my colleagues will occur once the patient is placed in an emergency department room.      DISCLAIMER: This note was prepared with [employer] voice recognition transcription software. Garbled syntax, mangled pronouns, and other bizarre constructions may be attributed to that software system.

## 2024-03-14 NOTE — ED NOTES
Pt aaox4 from home c/o left foot swelling, redness and pain that started on yesterday. Pt denies injury. Pt ambulatory in triage using cane with steady gait. trina

## 2024-03-15 ENCOUNTER — PATIENT OUTREACH (OUTPATIENT)
Dept: EMERGENCY MEDICINE | Facility: OTHER | Age: 64
End: 2024-03-15
Payer: MEDICARE

## 2024-03-15 RX ORDER — ATORVASTATIN CALCIUM 80 MG/1
80 TABLET, FILM COATED ORAL DAILY
Qty: 90 TABLET | Refills: 0 | Status: SHIPPED | OUTPATIENT
Start: 2024-03-15

## 2024-03-15 NOTE — TELEPHONE ENCOUNTER
No care due was identified.  [facility] Embedded Care Due Messages. Reference number: [id number].   3/15/2024 10:29:54 AM CDT

## 2024-03-15 NOTE — TELEPHONE ENCOUNTER
----- Message from Cheryl Williamson sent at 3/14/2024 11:03 AM CDT -----  Contact: patient  Type:  Patient Call          Who Called: Patient         Does the patient know what this is regarding?: requesting a call back for a refill on her cholesterol medication ; please advise           Would the patient rather a call back or a response via [facility]?call           Best Call Back Number: [phone]             Additional Information:      [id number] - [place], [place] - [address] [place] [address] & [address]  [address] [address]  Phone: [phone] Fax: [phone]

## 2024-03-22 ENCOUNTER — OFFICE VISIT (OUTPATIENT)
Dept: PODIATRY | Facility: CLINIC | Age: 64
End: 2024-03-22
Payer: MEDICARE

## 2024-03-22 VITALS
HEART RATE: 84 BPM | SYSTOLIC BLOOD PRESSURE: 111 MMHG | HEIGHT: 63 IN | BODY MASS INDEX: 27.46 KG/M2 | DIASTOLIC BLOOD PRESSURE: 79 MMHG | WEIGHT: 155 LBS

## 2024-03-22 DIAGNOSIS — E11.42 DIABETIC PERIPHERAL NEUROPATHY ASSOCIATED WITH TYPE 2 DIABETES MELLITUS: Primary | ICD-10-CM

## 2024-03-22 DIAGNOSIS — M10.9 GOUT, UNSPECIFIED CAUSE, UNSPECIFIED CHRONICITY, UNSPECIFIED SITE: ICD-10-CM

## 2024-03-22 PROCEDURE — 99999 PR PBB SHADOW E&M-EST. PATIENT-LVL IV: CPT | Mod: PBBFAC,,, | Performed by: PODIATRIST

## 2024-03-22 PROCEDURE — 99214 OFFICE O/P EST MOD 30 MIN: CPT | Mod: S$GLB,,, | Performed by: PODIATRIST

## 2024-03-22 RX ORDER — LIDOCAINE AND PRILOCAINE 25; 25 MG/G; MG/G
CREAM TOPICAL
Qty: 30 G | Refills: 3 | Status: SHIPPED | OUTPATIENT
Start: 2024-03-22

## 2024-03-22 NOTE — PROGRESS NOTES
Subjective:      Patient ID: Jonathan Gonzales is a 64 y.o. female.    Chief Complaint: Foot Pain (L foot pain)    Pain swelling redness right foot.  Patient is seen in the emergency room 03/14/2024 treated for gout with colchicine Toradol.  Covered for infection with Bactrim.  Still taking colchicine and Bactrim.  Relates significant relief-not at goal of resolution.  Independent read X-rays 03/13/2024 reviewed-no acute injury, no gas in soft tissue, no evidence foreign body, no evidence bone destruction.    Review of Systems   Constitutional: Negative for chills, diaphoresis, fever, malaise/fatigue and night sweats.   Cardiovascular:  Negative for claudication, cyanosis, leg swelling and syncope.   Skin:  Negative for color change, dry skin, nail changes, rash, suspicious lesions and unusual hair distribution.   Musculoskeletal:  Positive for gout, joint pain and joint swelling. Negative for falls, muscle cramps, muscle weakness and stiffness.   Gastrointestinal:  Negative for constipation, diarrhea, nausea and vomiting.   Neurological:  Positive for numbness, paresthesias and sensory change. Negative for brief paralysis, disturbances in coordination, focal weakness and tremors.         Objective:      Physical Exam  Constitutional:       General: She is not in acute distress.     Appearance: She is well-developed. She is not diaphoretic.   Cardiovascular:      Pulses:           Popliteal pulses are 1+ on the right side and 1+ on the left side.        Dorsalis pedis pulses are 1+ on the right side and 1+ on the left side.        Posterior tibial pulses are 2+ on the right side and 2+ on the left side.      Comments: Capillary refill 3 seconds all toes/distal feet, all toes/both feet warm to touch.      Negative lymphadenopathy bilateral popliteal fossa and tarsal tunnel.      Trace lower extremity edema bilateral-symmetrical.    Musculoskeletal:      Right ankle: No swelling, deformity, ecchymosis or  lacerations. Normal range of motion. Normal pulse.      Right Achilles Tendon: Normal. No defects. Boyd's test negative.      Comments: Patient today as minimal pain to palpation which is residual from original incident over the dorsum of the left foot in the area of the 1st and 2nd metatarsophalangeal joints extending almost of the tarsometatarsal joint without deformity, loss of function, signs of acute trauma.   Lymphadenopathy:      Lower Body: No right inguinal adenopathy. No left inguinal adenopathy.      Comments: Negative lymphadenopathy bilateral popliteal fossa and tarsal tunnel.    Negative lymphangitic streaking bilateral feet/ankles/legs.   Skin:     General: Skin is warm and dry.      Capillary Refill: Capillary refill takes 2 to 3 seconds.      Coloration: Skin is not pale.      Findings: No abrasion, bruising, burn, ecchymosis, erythema, laceration, lesion or rash.      Nails: There is no clubbing.      Comments: Skin is normal age and health appropriate color, turgor, texture, and temperature bilateral lower extremities without ulceration, hyperpigmentation, discoloration, masses nodules or cords palpated.  No ecchymosis, erythema, edema, or cardinal signs of infection bilateral lower extremities.      Neurological:      Mental Status: She is alert and oriented to person, place, and time.      Sensory: Sensory deficit present.      Motor: No tremor, atrophy or abnormal muscle tone.      Gait: Gait normal.      Comments: Decreased/absent vibratory sensation bilateral feet to 128Hz tuning fork.    Intermittent burning tingling pain both feet without known triggers source-she relates this is baseline and normal for her and not the same as the pain for which she presented to the ER.   Psychiatric:         Behavior: Behavior is cooperative.           Assessment:       Encounter Diagnoses   Name Primary?    Diabetic peripheral neuropathy associated with type 2 diabetes mellitus Yes    Gout,  unspecified cause, unspecified chronicity, unspecified site          Plan:       Jonathan was seen today for foot pain.    Diagnoses and all orders for this visit:    Diabetic peripheral neuropathy associated with type 2 diabetes mellitus    Gout, unspecified cause, unspecified chronicity, unspecified site    Other orders  -     LIDOcaine-prilocaine (EMLA) cream; Apply topically as needed.      I counseled the patient on her conditions, their implications and medical management.        Finish medications from the emergency room including the Bactrim and the colchicine.      Continue to hydrate well proximally 3 L of water a day.      Continue ambulation to tolerance and stiff-soled shoe.      Continue daily foot inspections report to an emergency facility or my office same-day if she experiences redness heat swelling pain loss of function open skin blistering drainage or other abnormality.  She verbalizes understanding.      Discussed conservative treatment with shoes of adequate dimensions, material, and style to alleviate symptoms and delay or prevent surgical intervention.      Follow up in about 1 year (around 3/22/2025).

## 2024-04-17 DIAGNOSIS — Z78.0 MENOPAUSE: ICD-10-CM

## 2024-04-30 ENCOUNTER — PATIENT MESSAGE (OUTPATIENT)
Dept: ADMINISTRATIVE | Facility: HOSPITAL | Age: 64
End: 2024-04-30
Payer: MEDICARE

## 2024-04-30 ENCOUNTER — OFFICE VISIT (OUTPATIENT)
Dept: INTERNAL MEDICINE | Facility: CLINIC | Age: 64
End: 2024-04-30
Attending: FAMILY MEDICINE
Payer: MEDICARE

## 2024-04-30 ENCOUNTER — LAB VISIT (OUTPATIENT)
Dept: LAB | Facility: OTHER | Age: 64
End: 2024-04-30
Attending: FAMILY MEDICINE
Payer: MEDICARE

## 2024-04-30 VITALS
HEART RATE: 62 BPM | HEIGHT: 63 IN | DIASTOLIC BLOOD PRESSURE: 67 MMHG | WEIGHT: 167.69 LBS | SYSTOLIC BLOOD PRESSURE: 111 MMHG | BODY MASS INDEX: 29.71 KG/M2 | OXYGEN SATURATION: 100 %

## 2024-04-30 DIAGNOSIS — M46.00 SPINAL ENTHESOPATHY, SITE UNSPECIFIED: ICD-10-CM

## 2024-04-30 DIAGNOSIS — E11.9 TYPE 2 DIABETES MELLITUS WITHOUT COMPLICATION, WITH LONG-TERM CURRENT USE OF INSULIN: Primary | ICD-10-CM

## 2024-04-30 DIAGNOSIS — C50.912 MALIGNANT NEOPLASM OF LEFT FEMALE BREAST, UNSPECIFIED ESTROGEN RECEPTOR STATUS, UNSPECIFIED SITE OF BREAST: ICD-10-CM

## 2024-04-30 DIAGNOSIS — I10 PRIMARY HYPERTENSION: ICD-10-CM

## 2024-04-30 DIAGNOSIS — F17.200 SMOKER: ICD-10-CM

## 2024-04-30 DIAGNOSIS — T45.1X5A CHEMOTHERAPY-INDUCED NEUROPATHY: ICD-10-CM

## 2024-04-30 DIAGNOSIS — E11.9 TYPE 2 DIABETES MELLITUS WITHOUT COMPLICATION, WITH LONG-TERM CURRENT USE OF INSULIN: ICD-10-CM

## 2024-04-30 DIAGNOSIS — Z79.4 TYPE 2 DIABETES MELLITUS WITHOUT COMPLICATION, WITH LONG-TERM CURRENT USE OF INSULIN: Primary | ICD-10-CM

## 2024-04-30 DIAGNOSIS — Z79.4 TYPE 2 DIABETES MELLITUS WITHOUT COMPLICATION, WITH LONG-TERM CURRENT USE OF INSULIN: ICD-10-CM

## 2024-04-30 DIAGNOSIS — N18.31 STAGE 3A CHRONIC KIDNEY DISEASE: ICD-10-CM

## 2024-04-30 DIAGNOSIS — I77.1 TORTUOUS AORTA: ICD-10-CM

## 2024-04-30 DIAGNOSIS — F25.0 SCHIZOAFFECTIVE DISORDER, BIPOLAR TYPE: ICD-10-CM

## 2024-04-30 DIAGNOSIS — G62.0 CHEMOTHERAPY-INDUCED NEUROPATHY: ICD-10-CM

## 2024-04-30 PROBLEM — M87.262: Status: RESOLVED | Noted: 2023-01-22 | Resolved: 2024-04-30

## 2024-04-30 LAB
ESTIMATED AVG GLUCOSE: 131 MG/DL (ref 68–131)
HBA1C MFR BLD: 6.2 % (ref 4–5.6)

## 2024-04-30 PROCEDURE — 1159F MED LIST DOCD IN RCRD: CPT | Mod: CPTII,S$GLB,, | Performed by: FAMILY MEDICINE

## 2024-04-30 PROCEDURE — 99214 OFFICE O/P EST MOD 30 MIN: CPT | Mod: S$GLB,,, | Performed by: FAMILY MEDICINE

## 2024-04-30 PROCEDURE — 3008F BODY MASS INDEX DOCD: CPT | Mod: CPTII,S$GLB,, | Performed by: FAMILY MEDICINE

## 2024-04-30 PROCEDURE — 36415 COLL VENOUS BLD VENIPUNCTURE: CPT | Performed by: FAMILY MEDICINE

## 2024-04-30 PROCEDURE — 3074F SYST BP LT 130 MM HG: CPT | Mod: CPTII,S$GLB,, | Performed by: FAMILY MEDICINE

## 2024-04-30 PROCEDURE — 3288F FALL RISK ASSESSMENT DOCD: CPT | Mod: CPTII,S$GLB,, | Performed by: FAMILY MEDICINE

## 2024-04-30 PROCEDURE — 99999 PR PBB SHADOW E&M-EST. PATIENT-LVL III: CPT | Mod: PBBFAC,,, | Performed by: FAMILY MEDICINE

## 2024-04-30 PROCEDURE — 3078F DIAST BP <80 MM HG: CPT | Mod: CPTII,S$GLB,, | Performed by: FAMILY MEDICINE

## 2024-04-30 PROCEDURE — 4010F ACE/ARB THERAPY RXD/TAKEN: CPT | Mod: CPTII,S$GLB,, | Performed by: FAMILY MEDICINE

## 2024-04-30 PROCEDURE — 1160F RVW MEDS BY RX/DR IN RCRD: CPT | Mod: CPTII,S$GLB,, | Performed by: FAMILY MEDICINE

## 2024-04-30 PROCEDURE — 3072F LOW RISK FOR RETINOPATHY: CPT | Mod: CPTII,S$GLB,, | Performed by: FAMILY MEDICINE

## 2024-04-30 PROCEDURE — 83036 HEMOGLOBIN GLYCOSYLATED A1C: CPT | Performed by: FAMILY MEDICINE

## 2024-04-30 PROCEDURE — 1101F PT FALLS ASSESS-DOCD LE1/YR: CPT | Mod: CPTII,S$GLB,, | Performed by: FAMILY MEDICINE

## 2024-04-30 RX ORDER — LOSARTAN POTASSIUM 25 MG/1
25 TABLET ORAL DAILY
Qty: 90 TABLET | Refills: 3 | Status: SHIPPED | OUTPATIENT
Start: 2024-04-30 | End: 2025-04-30

## 2024-04-30 RX ORDER — METFORMIN HYDROCHLORIDE 500 MG/1
1000 TABLET ORAL 2 TIMES DAILY WITH MEALS
Qty: 360 TABLET | Refills: 11 | Status: SHIPPED | OUTPATIENT
Start: 2024-04-30

## 2024-04-30 NOTE — PROGRESS NOTES
CHIEF COMPLAINT:   F/U in a patient with diabetes and hypertension    HISTORY OF PRESENT ILLNESS: The patient is a 64 year-old black female.  DM and HTN refill    Previously inpt d/t AMS.  No records of this hospitalization.  This may be due the fact that it appears to have been a psychiatric hospitalization at [facility].  In any event I was unable to review records and limited history is available from patient and family.  Ultimately the family confided that they felt the lack of information was due to the sensitive nature of the admission.  She appears to be back to baseline    The patient has a long and complicated medical history.      She continues to have problems with neuropathic pain as well as central pain.  She does well w/ her Lyrica 100 twice a day.    The patient has a history of diabetes.  Recent blood sugars have been less than 200.  There have been no episodes of hypoglycemia.    The patient has a history of stable hypertension on current medications.  Patient denies chest pain or shortness of breath today.    The patient has a history of stable hyperlipidemia on current medications.  The patient denies chest pain or shortness of breath today.  The patient denies muscle aches or myalgias suggestive of myositis.    She has a history of breast cancer for which she underwent bilateral mastectomies and chemotherapy.    REVIEW OF SYSTEMS:  GENERAL: No fever, chills or weight loss.  SKIN: No rashes, itching or changes in color or texture of skin.  HEAD: No headaches or recent head trauma.  EYES: Visual acuity fine. No photophobia, ocular pain or diplopia.  EARS: Denies ear pain, discharge or vertigo.  NOSE: No loss of smell, no epistaxis or postnasal drip.  MOUTH & THROAT: No hoarseness or change in voice. No excessive gum bleeding.  NODES: Denies swollen glands.  CHEST: Denies AWAD, cyanosis, wheezing, cough and sputum production.  CARDIOVASCULAR: Denies chest pain, PND, orthopnea or reduced exercise  "tolerance.  ABDOMEN: Appetite fine. No weight loss. Denies diarrhea, abdominal pain, hematemesis or blood in stool.  URINARY: No flank pain, dysuria or hematuria.  PERIPHERAL VASCULAR: No claudication or cyanosis.  MUSCULOSKELETAL: No joint stiffness or swelling. Except as noted above.  NEUROLOGIC: No history of seizures    SOCIAL HISTORY: The patient does smoke.  The patient consumes alcohol socially.      PHYSICAL EXAMINATION:   Blood pressure 111/67, pulse 62, height 5' 3" (1.6 m), weight 76.1 kg (167 lb 10.6 oz), SpO2 100%.    APPEARANCE: Well nourished, well developed, in no acute distress.    HEAD: Normocephalic, atraumatic.  EYES: PERRL. EOMI.  Conjunctivae without injection and  Anicteric  NOSE: Mucosa pink. Airway clear.  MOUTH & THROAT: No tonsillar enlargement. No pharyngeal erythema or exudate. No stridor.  NECK: Supple.   NODES: No cervical, axillary or inguinal lymph node enlargement.  CHEST: Lungs clear to auscultation.  No retractions are noted.  No rales or rhonchi are present.  CARDIOVASCULAR: Normal S1, S2. No rubs, murmurs or gallops.  ABDOMEN: Bowel sounds normal. Not distended. Soft. No tenderness or masses.  No ascites is noted.  MUSCULOSKELETAL:  There is no clubbing, cyanosis, or edema of the extremities x4.  There is full range of motion of the lumbar spine.  There is full range of motion of the extremities x4.  There is no deformity noted.    NEUROLOGIC:       Normal speech development.      Hearing normal.      paretic gait.      Motor and sensory exams grossly normal.  Mild RLE weakness noted  PSYCHIATRIC: Patient is alert and oriented x3.  Thought processes are all normal.  There is no homicidality.  There is no suicidality.  There is no evidence of psychosis.    LABORATORY/RADIOLOGY:   Chart reviewed including surgeries and blood work    ASSESSMENT:   CVA  Breast cancer with resultant lymphedema  Hypertension, condition is well controlled on current medication regimen  Diabetes, " condition is well controlled on current medication regimen  Neuropathic pain  Cervical radiculopathy  Schizophrenia    PLAN:  A1c today  Depakote   Her diabetes is very well controlled    Pain clinic   KCl 40 po qd  Prinzide  Pravachol 20 mg by mouth daily    Return to clinic in 6 month with blood work prior

## 2024-05-01 ENCOUNTER — TELEPHONE (OUTPATIENT)
Dept: INTERNAL MEDICINE | Facility: CLINIC | Age: 64
End: 2024-05-01
Payer: MEDICARE

## 2024-05-01 NOTE — TELEPHONE ENCOUNTER
Attempted to call and give the listed message from Dr. Contreras. Their is no voice message and NA at listed numbers      From Dr. Contreras  A1c is good.  No changes in medications.  Followup as scheduled.

## 2024-05-01 NOTE — TELEPHONE ENCOUNTER
----- Message from Ghulam Contreras MD sent at 5/1/2024  9:32 AM CDT -----  A1c is good.  No changes in medications.  Followup as scheduled.

## 2024-05-10 DIAGNOSIS — G62.9 NEUROPATHY: Primary | ICD-10-CM

## 2024-05-10 DIAGNOSIS — I10 PRIMARY HYPERTENSION: ICD-10-CM

## 2024-05-10 RX ORDER — LISINOPRIL AND HYDROCHLOROTHIAZIDE 10; 12.5 MG/1; MG/1
1 TABLET ORAL
Status: CANCELLED | OUTPATIENT
Start: 2024-05-10

## 2024-05-10 RX ORDER — HYDROCHLOROTHIAZIDE 12.5 MG/1
12.5 TABLET ORAL DAILY
Qty: 90 TABLET | Refills: 2 | Status: CANCELLED | OUTPATIENT
Start: 2024-05-10 | End: 2025-05-10

## 2024-05-10 RX ORDER — PREGABALIN 100 MG/1
100 CAPSULE ORAL 3 TIMES DAILY
Qty: 270 CAPSULE | Refills: 0 | Status: CANCELLED | OUTPATIENT
Start: 2024-05-10 | End: 2024-08-08

## 2024-05-10 RX ORDER — PREGABALIN 100 MG/1
100 CAPSULE ORAL 3 TIMES DAILY
Qty: 270 CAPSULE | Refills: 0 | Status: SHIPPED | OUTPATIENT
Start: 2024-05-10 | End: 2024-05-23 | Stop reason: SDUPTHER

## 2024-05-10 NOTE — TELEPHONE ENCOUNTER
LOV 04/30/24 RX Refill.  LVM that request has been sent to her PCP. He is not in the office on today

## 2024-05-10 NOTE — TELEPHONE ENCOUNTER
No care due was identified.  Health [facility] Embedded Care Due Messages. Reference number: [id number].   5/10/2024 4:00:57 PM CDT

## 2024-05-10 NOTE — TELEPHONE ENCOUNTER
Reviewed  and [place] has no discrepancies in  system and is eligible today for the prescribed controlled substances.

## 2024-05-10 NOTE — TELEPHONE ENCOUNTER
----- Message from Anastasia Hallman sent at 5/10/2024  3:40 PM CDT -----  Regarding: refill  Type:  RX Refill Request    Who Called: patient    Refill or New Rx:refill    RX Name and Strength:lisinopriL-hydrochlorothiazide (PRINZIDE,ZESTORETIC) 10-12.5 mg per tablet, pregabalin (LYRICA) 100 MG capsule    How is the patient currently taking it? (ex. 1XDay):    Is this a 30 day or 90 day RX:    Preferred Pharmacy with phone number:[facility] #[id number] - [address]   Phone: [phone]  Fax: [phone]          Local or Mail Order:local    Ordering Provider:    Would the patient rather a call back or a response via [facility]? call    Best Call Back Number:[phone]    Additional Information:

## 2024-05-10 NOTE — TELEPHONE ENCOUNTER
----- Message from Anna Bruce sent at 5/10/2024  3:01 PM CDT -----  Regarding: rx refill  Type: RX Refill Request    Who Called:     Pharmacy Name: [facility][id number] - [address] AT [facility] & [facility]   Phone: [phone]  Fax: [phone]      Refill or New Rx:    RX Name and Strength:pregabalin (LYRICA) 100 MG capsule    How is the patient currently taking it? (ex. 1XDay):    Is this a 30 day or 90 day RX:    Additional Notes:

## 2024-05-10 NOTE — TELEPHONE ENCOUNTER
No care due was identified.  [facility] Embedded Care Due Messages. Reference number: [id number].   5/10/2024 3:16:23 PM CDT

## 2024-05-11 NOTE — TELEPHONE ENCOUNTER
Refill Routing Note   Refill Routing Note   Medication(s) are not appropriate for processing by [facility] for the following reason(s):        Outside of protocol  No active prescription written by provider  Clarification of medication (Rx) details    [facility] action(s):  Route  Defer      Medication Therapy Plan: Unclear HTN regimen. Med list has HCTZ, Lisinopril/HCTZ, Losartan. LOV 4/2024 Prinzide listed.      Appointments  past 12m or future 3m with PCP    Date Provider   Last Visit   4/30/2024 Ghulam Contreras MD   Next Visit   Visit date not found Ghulam Contreras MD   ED visits in past 90 days: 1        Note composed:8:50 PM 05/10/2024

## 2024-05-13 ENCOUNTER — CLINICAL SUPPORT (OUTPATIENT)
Dept: SMOKING CESSATION | Facility: CLINIC | Age: 64
End: 2024-05-13
Payer: COMMERCIAL

## 2024-05-13 DIAGNOSIS — F17.200 NICOTINE DEPENDENCE: Primary | ICD-10-CM

## 2024-05-13 PROCEDURE — 99404 PREV MED CNSL INDIV APPRX 60: CPT | Mod: S$GLB,,,

## 2024-05-13 PROCEDURE — 99999 PR PBB SHADOW E&M-EST. PATIENT-LVL II: CPT | Mod: PBBFAC,,,

## 2024-05-13 RX ORDER — IBUPROFEN 200 MG
1 TABLET ORAL DAILY
Qty: 28 PATCH | Refills: 0 | Status: SHIPPED | OUTPATIENT
Start: 2024-05-13 | End: 2024-05-27 | Stop reason: SDUPTHER

## 2024-05-13 RX ORDER — DM/P-EPHED/ACETAMINOPH/DOXYLAM 30-7.5/3
2 LIQUID (ML) ORAL
Qty: 72 LOZENGE | Refills: 0 | Status: SHIPPED | OUTPATIENT
Start: 2024-05-13 | End: 2024-05-27 | Stop reason: SDUPTHER

## 2024-05-13 NOTE — PROGRESS NOTES
Patient was seen in clinic today and reports smoking up to a pack of cigarettes per day. She will begin biweekly tobacco cessation sessions and a tobacco cessation medication regimen of 21mg nicotine patch and 2mg nicotine lozenges. Patient reports that she smokes within her home and oftentimes chain smokes. Suggested that patient only smoke no more than 1 cigarette per hour and start smoking outside, taking 1 cigarette out a time instead of the entire pack. Patient states that herself and her grandson share a pack of cigarettes, encouraged patient to have her own pack. Discussed other tips and strategies to assist with quit. The patient will continue with  therapy sessions and medication monitoring by CTTS. Prescribed medication management will be by physician.

## 2024-05-20 ENCOUNTER — PATIENT MESSAGE (OUTPATIENT)
Dept: INTERNAL MEDICINE | Facility: CLINIC | Age: 64
End: 2024-05-20
Payer: MEDICARE

## 2024-05-20 RX ORDER — PREGABALIN 100 MG/1
100 CAPSULE ORAL 3 TIMES DAILY
Qty: 270 CAPSULE | Refills: 0 | OUTPATIENT
Start: 2024-05-20 | End: 2024-08-18

## 2024-05-20 NOTE — TELEPHONE ENCOUNTER
No care due was identified.  [facility] Embedded Care Due Messages. Reference number: [id number].   5/20/2024 1:29:49 PM CDT

## 2024-05-20 NOTE — TELEPHONE ENCOUNTER
----- Message from Dione Smith sent at 5/20/2024 12:01 PM CDT -----  Type: Patient Call Back    Who called:Self    What is the request in detail:In regards to medication    Can the clinic reply by [facility]?No    Would the patient rather a call back or a response via [facility]? Call    Best call back number:[phone] (home)       Additional Information:

## 2024-05-20 NOTE — TELEPHONE ENCOUNTER
Left a voicemail. For Ms. Gonzales to let patient know that this medication was send in on 5/10/2024 270 capsules.

## 2024-05-20 NOTE — TELEPHONE ENCOUNTER
----- Message from Su Agustin sent at 5/20/2024 12:07 PM CDT -----  Regarding: change location  Type:  RX Refill Request    Who Called: Matheny  Refill or New Rx:refill   RX Name and Strength:pregabalin (LYRICA) 100 MG capsule  How is the patient currently taking it? (ex. 1XDay):  Is this a 30 day or 90 day RX:  Preferred Pharmacy with phone number:[facility] #[id number] [address]   Phone: [phone]  Fax: [phone]    Local or Mail Order:local  Ordering Provider:  Would the patient rather a call back or a response via [facility]? call  Best Call Back Number:[phone]  Additional Information: change location/ Patient is in a lot of pain

## 2024-05-23 DIAGNOSIS — T45.1X5A CHEMOTHERAPY-INDUCED NEUROPATHY: Primary | ICD-10-CM

## 2024-05-23 DIAGNOSIS — G62.0 CHEMOTHERAPY-INDUCED NEUROPATHY: Primary | ICD-10-CM

## 2024-05-23 RX ORDER — PREGABALIN 100 MG/1
100 CAPSULE ORAL 3 TIMES DAILY
Qty: 270 CAPSULE | Refills: 0 | Status: SHIPPED | OUTPATIENT
Start: 2024-05-23 | End: 2024-08-21

## 2024-05-23 NOTE — TELEPHONE ENCOUNTER
No care due was identified.  Health [facility] Embedded Care Due Messages. Reference number: [id number].   5/23/2024 11:38:06 AM CDT

## 2024-05-27 ENCOUNTER — CLINICAL SUPPORT (OUTPATIENT)
Dept: SMOKING CESSATION | Facility: CLINIC | Age: 64
End: 2024-05-27
Payer: COMMERCIAL

## 2024-05-27 DIAGNOSIS — F17.200 NICOTINE DEPENDENCE: ICD-10-CM

## 2024-05-27 PROCEDURE — 99404 PREV MED CNSL INDIV APPRX 60: CPT | Mod: S$GLB,,,

## 2024-05-27 PROCEDURE — 99999 PR PBB SHADOW E&M-EST. PATIENT-LVL I: CPT | Mod: PBBFAC,,,

## 2024-05-27 RX ORDER — IBUPROFEN 200 MG
1 TABLET ORAL DAILY
Qty: 28 PATCH | Refills: 0 | Status: SHIPPED | OUTPATIENT
Start: 2024-05-27 | End: 2024-06-17 | Stop reason: SDUPTHER

## 2024-05-27 RX ORDER — DM/P-EPHED/ACETAMINOPH/DOXYLAM 30-7.5/3
2 LIQUID (ML) ORAL
Qty: 72 LOZENGE | Refills: 0 | Status: SHIPPED | OUTPATIENT
Start: 2024-05-27

## 2024-05-27 NOTE — PROGRESS NOTES
Individual Follow-Up Form    5/27/2024    Quit Date:     Clinical Status of Patient: Outpatient    Length of Service: 60 minutes    Continuing Medication: yes  Patches or Nicotine Lozenges    Other Medications: none     Target Symptoms: Withdrawal and medication side effects. The following were  rated moderate (3) to severe (4) on TCRS:  Moderate (3): none  Severe (4): none    Comments: Patient was seen in clinic today and reports smoking up to 4 cigarettes per day. She remains on a tobacco cessation medication regimen of 21mg nicotine patch and 2mg nicotine lozenges. Refill sent to pharmacy. No abnormal behaviors or mental changes reported at this time. Patient states that she was able to go 4 days without tobacco and as of yesterday purchased a pack, only when the  asked her did she need cigarettes. Encouraged the patient to say no next time or go to another store that isn't familiar or associated with her smoking. Patient states that she will not be purchasing anymore packs once this one is out. Patient reports giving some cigarettes to her grandson so she doesn't have many left. Reviewed strategies, cues, and triggers. Introduced the negative impact of tobacco on health, the health advantages of discontinuing the use of tobacco, time line improved health changes after a quit, withdrawal issues to expect from nicotine and habit, and ways to achieve the goal of a quit. The patient will continue with  therapy sessions and medication monitoring by CTTS. Prescribed medication management will be by physician.     Diagnosis: F17.200    Next Visit: 6/17/2024

## 2024-06-17 ENCOUNTER — CLINICAL SUPPORT (OUTPATIENT)
Dept: SMOKING CESSATION | Facility: CLINIC | Age: 64
End: 2024-06-17
Payer: COMMERCIAL

## 2024-06-17 DIAGNOSIS — F17.200 NICOTINE DEPENDENCE: ICD-10-CM

## 2024-06-17 PROCEDURE — 99401 PREV MED CNSL INDIV APPRX 15: CPT | Mod: S$GLB,,,

## 2024-06-17 PROCEDURE — 99999 PR PBB SHADOW E&M-EST. PATIENT-LVL I: CPT | Mod: PBBFAC,,,

## 2024-06-17 RX ORDER — IBUPROFEN 200 MG
1 TABLET ORAL DAILY
Qty: 28 PATCH | Refills: 0 | Status: SHIPPED | OUTPATIENT
Start: 2024-06-17

## 2024-06-17 NOTE — PROGRESS NOTES
Individual Follow-Up Form    6/17/2024    Quit Date: 6/13/2024    Clinical Status of Patient: Outpatient    Length of Service: 15 minutes    Continuing Medication: yes  Patches or Nicotine Lozenges    Other Medications: none     Target Symptoms: Withdrawal and medication side effects. The following were  rated moderate (3) to severe (4) on TCRS:  Moderate (3): none  Severe (4): none    Comments: Spoke with patient for a length of time over the telephone and she reports being tobacco free. Commended patient on her hard work and dedication to this quit attempt. Patient states that she purchased her last pack last Sunday and has not brought a pack since. She remains on a tobacco cessation medication regimen of 21mg nicotine patch and 2mg nicotine lozenges. Refill for patch sent to pharmacy. No abnormal behaviors or mental changes reported at this time. Reviewed strategies, controlling environment, cues, triggers, new goals set. Introduced high risk situations with preparation interventions, caffeine similarities with withdrawal issues of habit and nicotine, Alcohol, Understanding urges, cravings, stress and relaxation. Open discussion with intervention discussion. The patient will continue with  therapy sessions and medication monitoring by CTTS. Prescribed medication management will be by physician.     Diagnosis: F17.200    Next Visit: 7/9/2024

## 2024-06-20 ENCOUNTER — TELEPHONE (OUTPATIENT)
Dept: SMOKING CESSATION | Facility: CLINIC | Age: 64
End: 2024-06-20
Payer: MEDICARE

## 2024-06-20 NOTE — TELEPHONE ENCOUNTER
Returned call to patient who states that she was unable to pick her patches from [facility] because the trust would not cover them. Informed patient that her patches were available at [facility] for  or she could wait until the 30th of June for the trust to cover the patches at [facility].

## 2024-06-21 NOTE — TELEPHONE ENCOUNTER
No care due was identified.  [facility] Embedded Care Due Messages. Reference number: [id number].   6/21/2024 5:10:33 PM CDT

## 2024-06-22 NOTE — TELEPHONE ENCOUNTER
Refill Routing Note   Medication(s) are not appropriate for processing by [facility] for the following reason(s):        Drug-disease interaction    [facility] action(s):  Defer        Medication Therapy Plan: atorvastatin and Traumatic rhabdomyolysis      Appointments  past 12m or future 3m with PCP    Date Provider   Last Visit   4/30/2024 Ghulam Contreras MD   Next Visit   Visit date not found Ghulam Contreras MD   ED visits in past 90 days: 0        Note composed:10:17 PM 06/21/2024

## 2024-06-24 RX ORDER — ATORVASTATIN CALCIUM 80 MG/1
TABLET, FILM COATED ORAL
Qty: 90 TABLET | Refills: 1 | Status: SHIPPED | OUTPATIENT
Start: 2024-06-24

## 2024-06-27 DIAGNOSIS — N32.81 OVERACTIVE BLADDER: Primary | ICD-10-CM

## 2024-06-27 RX ORDER — OXYBUTYNIN CHLORIDE 10 MG/1
10 TABLET, EXTENDED RELEASE ORAL DAILY
Qty: 90 TABLET | Refills: 3 | Status: SHIPPED | OUTPATIENT
Start: 2024-06-27

## 2024-06-27 NOTE — TELEPHONE ENCOUNTER
Refill Encounter    PCP Visits: Recent Visits  Date Type Provider Dept   04/30/24 Office Visit Ghulam Contreras MD [facility]   07/20/23 Office Visit Ghulam Contreras MD [facility]   Showing recent visits within past 360 days and meeting all other requirements  Future Appointments  No visits were found meeting these conditions.  Showing future appointments within next 720 days and meeting all other requirements     Last 3 Blood Pressure:   BP Readings from Last 3 Encounters:   04/30/24 111/67   03/22/24 111/79   03/14/24 133/84     Preferred Pharmacy:   [facility] #[id number] [address] [facility] [address]  [address] [facility] [address]  Phone: [phone] Fax: [phone]    Requested RX:  Requested Prescriptions     Pending Prescriptions Disp Refills    oxybutynin (DITROPAN-XL) 10 MG 24 hr tablet 90 tablet 3     Sig: Take 1 tablet (10 mg total) by mouth once daily.      RX Route: Normal

## 2024-06-27 NOTE — TELEPHONE ENCOUNTER
Refill Decision Note   Jonathan Gonzales  is requesting a refill authorization.    Brief Assessment and Rationale for Refill:   Approve       Medication Therapy Plan:         Comments:     Note composed:1:31 PM 06/27/2024

## 2024-06-27 NOTE — TELEPHONE ENCOUNTER
No care due was identified.  Health [facility] Embedded Care Due Messages. Reference number: [id number].   6/27/2024 1:27:12 PM CDT

## 2024-07-09 ENCOUNTER — CLINICAL SUPPORT (OUTPATIENT)
Dept: SMOKING CESSATION | Facility: CLINIC | Age: 64
End: 2024-07-09
Payer: COMMERCIAL

## 2024-07-09 DIAGNOSIS — F17.200 NICOTINE DEPENDENCE: Primary | ICD-10-CM

## 2024-07-09 PROCEDURE — 99401 PREV MED CNSL INDIV APPRX 15: CPT | Mod: S$GLB,,,

## 2024-07-09 PROCEDURE — 99999 PR PBB SHADOW E&M-EST. PATIENT-LVL I: CPT | Mod: PBBFAC,,,

## 2024-07-09 NOTE — PROGRESS NOTES
Individual Follow-Up Form    7/9/2024    Quit Date:     Clinical Status of Patient: Outpatient    Length of Service: 15 minutes    Continuing Medication: yes  Patches or Nicotine Lozenges    Other Medications: none     Target Symptoms: Withdrawal and medication side effects. The following were  rated moderate (3) to severe (4) on TCRS:  Moderate (3): none  Severe (4): none    Comments: Spoke with patient for a length of time over the telephone and patient reports that she purchased a pack of cigarettes two days ago after encountering a stressful personal situation. Patient remains on a tobacco cessation medication regimen of 21mg nicotine patch and 2mg nicotine lozenges. Patient reports that at this time she is waiting for her daughter to bring her the patches. Patient states having 4 cigarettes left in her pack and reports that she would like to refrain from smoking once they are out. Reviewed strategies, habitual behavior, stress, and high risk situations. Introduced stress with addition interventions, SOLVE, relaxation with interventions, nutrition, exercise, weight gain, and the importance of rewarding oneself for accomplishments toward becoming tobacco free. Open discussion of all items with interventions. The patient will continue with  therapy sessions and medication monitoring by CTTS. Prescribed medication management will be by physician.     Diagnosis: F17.200    Next Visit: 7/30/2024

## 2024-07-30 ENCOUNTER — CLINICAL SUPPORT (OUTPATIENT)
Dept: SMOKING CESSATION | Facility: CLINIC | Age: 64
End: 2024-07-30
Payer: COMMERCIAL

## 2024-07-30 DIAGNOSIS — F17.200 NICOTINE DEPENDENCE: Primary | ICD-10-CM

## 2024-07-30 PROCEDURE — 99401 PREV MED CNSL INDIV APPRX 15: CPT | Mod: S$GLB,,,

## 2024-07-30 PROCEDURE — 99999 PR PBB SHADOW E&M-EST. PATIENT-LVL I: CPT | Mod: PBBFAC,,,

## 2024-07-30 NOTE — PROGRESS NOTES
Individual Follow-Up Form    7/30/2024    Quit Date:     Clinical Status of Patient: Outpatient    Length of Service: 15 minutes    Continuing Medication: no    Other Medications: none     Target Symptoms: Withdrawal and medication side effects. The following were  rated moderate (3) to severe (4) on TCRS:  Moderate (3): none  Severe (4): none    Comments: Spoke with patient for a length of time over the telephone and she reports being tobacco free. Commended patient on her hard work and dedication to this quit. Patient states that she has discontinued the use of any tobacco cessation medication at this time. Patient says that she never received her last refill and has been okay without the patches. Suggested that patient contact CTTS if she needs a refill on the patches. Patient reports that she has just been inside, watching tv to stay busy and refraining from smoking Discussed tips and strategies to assist with quit. Patient made aware of benefits period and understands that she can reach out to CTTS as needed.     Diagnosis: F17.200    Next Visit:

## 2024-08-14 DIAGNOSIS — T45.1X5A CHEMOTHERAPY-INDUCED NEUROPATHY: ICD-10-CM

## 2024-08-14 DIAGNOSIS — G62.0 CHEMOTHERAPY-INDUCED NEUROPATHY: ICD-10-CM

## 2024-08-14 RX ORDER — PREGABALIN 100 MG/1
100 CAPSULE ORAL 3 TIMES DAILY
Qty: 270 CAPSULE | Refills: 0 | Status: SHIPPED | OUTPATIENT
Start: 2024-08-14 | End: 2024-11-12

## 2024-08-14 NOTE — TELEPHONE ENCOUNTER
Care Due:                  Date            Visit Type   Department     Provider  --------------------------------------------------------------------------------                                EP -                              PRIMARY      [facility] INTERNAL  Ghulam Mendoza  Last Visit: 04-      CARE ([facility])   [facility]  Next Visit: None Scheduled  None         None Found                                                            Last  Test          Frequency    Reason                     Performed    Due Date  --------------------------------------------------------------------------------    HBA1C.......  6 months...  liraglutide, metFORMIN...  05-   10-    [facility] Embedded Care Due Messages. Reference number: [id number].   8/14/2024 2:31:07 PM CDT

## 2024-08-14 NOTE — TELEPHONE ENCOUNTER
----- Message from Damaris Camacho sent at 8/14/2024 11:17 AM CDT -----  Who Called:    JOYA MUNOZ [[id number]]            New Prescription of Refill:refill          RX Name and Strength:   Disp Refills Start End SOMMER  pregabalin (LYRICA) 100 MG capsule                             30 day or 90 day RX:  30          Local or Mail Order:  local          Preferred Pharmacy:[facility] #[id number] [address]                Would the patient rather a call back or a response via [facility]?          Best call back number:          Additional Information:

## 2024-08-26 ENCOUNTER — CLINICAL SUPPORT (OUTPATIENT)
Dept: SMOKING CESSATION | Facility: CLINIC | Age: 64
End: 2024-08-26
Payer: COMMERCIAL

## 2024-08-26 DIAGNOSIS — F17.200 NICOTINE DEPENDENCE: Primary | ICD-10-CM

## 2024-08-26 PROCEDURE — 99407 BEHAV CHNG SMOKING > 10 MIN: CPT | Mod: S$GLB,,,

## 2024-08-26 NOTE — PROGRESS NOTES
Spoke with patient today in regard to smoking cessation progress for 3 month telephone follow up, she states tobacco free. Commended patient on the accomplishment thus far. Patient states the use of the nicotine lozenge to help aid in her quit. Informed patient of benefit period, future follow ups, and contact information if any further help or support is needed. Will complete smart form for 3 month follow up on Quit attempt #5.

## 2024-08-29 ENCOUNTER — PATIENT OUTREACH (OUTPATIENT)
Dept: ADMINISTRATIVE | Facility: HOSPITAL | Age: 64
End: 2024-08-29
Payer: MEDICARE

## 2024-09-12 NOTE — DISCHARGE SUMMARY
[facility] Medicine  Discharge Summary      Patient Name: Jonathan Gonzales  MRN: [id number]  FABIANA: [id number]  Patient Class: OP- Observation  Admission Date: 4/5/2023  Hospital Length of Stay: 0 days  Discharge Date and Time: 4/6/2023  2:18 PM  Attending Physician: No att. providers found   Discharging Provider: Indra Zamora III, MD  Primary Care Provider: Ghulam Contreras MD    Primary Care Team: Networked reference to record PCT     HPI:   63 y.o. female with hypertension, DM2, and schizoaffective disorder bipolar type sent from outpatient oncology visit for evaluation of hypotension.  Recent labs on 04/03/2023 showed BUFFY with creatinine of 3.1.  Patient reports fatigue and decreased appetite.  Duration several days.  Denies fever, chills, cough, SOB, chest pain, palpitations, dizziness, syncope, nausea, vomiting, diarrhea, or abdominal pain.  In the ED, she was found to be hypotensive, labs demonstrate BUFFY but this appears to be slightly improving.  Evidence of infection on urinalysis.  Otherwise unremarkable for acute abnormality.  She is not febrile, there is no leukocytosis, lactic acid normal.  She was given IV fluids with improvement of her blood pressure.  Placed in observation.      * No surgery found *      Hospital Course:   63F with pmh of DM2, and schizoaffective disorder bipolar type sent from outpatient oncology visit for evaluation of hypotension.  Recent labs on 04/03/2023 showed BUFFY with creatinine of 3.1.   In the ED, she was found to be hypotensive, labs demonstrate BUFFY but this appears to be slightly improving.  Evidence of infection on urinalysis.  Otherwise unremarkable for acute abnormality.  She is not febrile, there is no leukocytosis, lactic acid normal.  She was given IV fluids with improvement of her blood pressure.  Placed in observation. After ivf the following morning pts renal function returned back to baseline. Pt was feeling better and asking about  discharge. Pt d/c home with referral for nephrology f/u to further monitor renal function as well as [facility] referral for repeat evaluation. Pt also noted to have a uti on admission and d/c home with augmentin to complete an abx tx. Pt advised to see pcp in a week for repeat urinalysis       Goals of Care Treatment Preferences:  Code Status: Full Code      Consults:     No new Assessment & Plan notes have been filed under this hospital service since the last note was generated.  Service: Hospital Medicine    Final Active Diagnoses:    Diagnosis Date Noted POA    PRINCIPAL PROBLEM:  BUFFY (acute kidney injury) [N17.9] 09/23/2021 Yes    Hypotension [I95.9] 04/05/2023 Yes    Acute cystitis [N30.00] 04/05/2023 Yes    Essential hypertension [I10] 09/23/2021 Yes     Chronic    Schizoaffective disorder, bipolar type [F25.0]  Yes     Chronic    Type 2 diabetes mellitus with neurologic complication, with long-term current use of insulin [E11.49, Z79.4] 06/19/2017 Not Applicable     Chronic      Problems Resolved During this Admission:       Discharged Condition: good    Disposition: Home or Self Care    Follow Up:   Follow-up Information     Ghulam Contreras MD Follow up on 4/20/2023.    Specialty: Family Medicine  Why: 8:30 AM  placed on wait lsit  Contact information:  [address]  [facility] [address]  [facility] [address]  [phone]             OUT PATIENT CASE MANAGEMENT Follow up.    Why: out patient services  Contact information:  [phone]  WILL CALL PATIENT                     Patient Instructions:      Ambulatory referral/consult to Nephrology   Standing Status: Future   Referral Priority: Routine Referral Type: Consultation   Referral Reason: Specialty Services Required   Requested Specialty: Nephrology   Number of Visits Requested: 1     Ambulatory referral/consult to Cardiology   Standing Status: Future   Referral Priority: Routine Referral Type: Consultation   Referral Reason: Specialty  Services Required   Requested Specialty: Cardiology   Number of Visits Requested: 1     Ambulatory referral/consult to [facility]   Standing Status: Future   Referral Priority: Routine Referral Type: Consultation   Referral Reason: Specialty Services Required   Number of Visits Requested: 1     Ambulatory referral/consult to Outpatient Case Management   Referral Priority: Routine Referral Type: Consultation   Referral Reason: Specialty Services Required   Number of Visits Requested: 1     Diet renal     Notify your health care provider if you experience any of the following:  increased confusion or weakness     Notify your health care provider if you experience any of the following:  persistent dizziness, light-headedness, or visual disturbances     Notify your health care provider if you experience any of the following:  worsening rash     Notify your health care provider if you experience any of the following:  severe persistent headache     Notify your health care provider if you experience any of the following:  difficulty breathing or increased cough     Notify your health care provider if you experience any of the following:  redness, tenderness, or signs of infection (pain, swelling, redness, odor or green/yellow discharge around incision site)     Notify your health care provider if you experience any of the following:  severe uncontrolled pain     Notify your health care provider if you experience any of the following:  persistent nausea and vomiting or diarrhea     Notify your health care provider if you experience any of the following:  temperature >100.4     Activity as tolerated       Significant Diagnostic Studies: Labs: All labs within the past 24 hours have been reviewed    Pending Diagnostic Studies:     None         Medications:  Reconciled Home Medications:      Medication List      START taking these medications    amoxicillin-clavulanate 500-125mg 500-125 mg  Tab  Commonly known as: AUGMENTIN  Take 1 tablet (500 mg total) by mouth 2 (two) times daily.        CONTINUE taking these medications    acetaminophen-codeine 300-30mg 300-30 mg Tab  Commonly known as: TYLENOL #3  Take 1 tablet by mouth every 6 (six) hours as needed (pain).     ammonium lactate 12 % Crea  Apply twice daily to affected parts both feet as needed.     aspirin 81 MG EC tablet  Commonly known as: ECOTRIN  Take 1 tablet (81 mg total) by mouth once daily.     atorvastatin 80 MG tablet  Commonly known as: LIPITOR  Take 1 tablet (80 mg total) by mouth once daily.     blood glucose control, low Soln  1 drop by Misc.(Non-Drug; Combo Route) route as needed.     chlorhexidine 0.12 % solution  Commonly known as: PERIDEX  RINSE AND SPIT 15 ML TWICE DAILY FOR 7 DAYS     divalproex 500 MG Tb24  1 tablet EVERY PM (route: oral)     GLUCAGON EMERGENCY KIT (HUMAN) 1 mg Solr  Generic drug: glucagon  SMARTSI Vial(s) IM PRN     GLUTOSE-15 40 % gel  Generic drug: dextrose  SMARTSI unspecified By Mouth PRN     HumaLOG U-100 Insulin 100 unit/mL injection  Generic drug: insulin lispro  SMARTSI.01 Milliliter(s) SUB-Q 4 Times Daily     insulin glargine 100 units/mL SubQ pen  Commonly known as: LANTUS SOLOSTAR U-100 INSULIN  INJECT 23 UNITS SUBCUTANEOUSLY NIGHTLY  HOLD until you see your doctor and are told to resume     metFORMIN 500 MG tablet  Commonly known as: GLUCOPHAGE  Take 2 tablets (1,000 mg total) by mouth 2 (two) times daily with meals. Take with food.     QUEtiapine 100 MG Tab  Commonly known as: SEROQUEL  Take 1 tablet (100 mg total) by mouth every evening.     VICTOZA 3-HERMANN 0.6 mg/0.1 mL (18 mg/3 mL) Pnij pen  Generic drug: liraglutide 0.6 mg/0.1 mL (18 mg/3 mL) subq PNIJ  INJECT 1.8 MG SUBCUTANEOUSLY ONCE DAILY.     WRIST SUPPORT LARGE-XLARGE MISC     ziprasidone 40 MG Cap  Commonly known as: GEODON  1 capsule EVERY PM (route: oral)        STOP taking these medications    cefUROXime 500 MG  tablet  Commonly known as: CEFTIN     fentaNYL 50 mcg/mL injection  Commonly known as: SUBLIMAZE     hydroCHLOROthiazide 12.5 MG Tab  Commonly known as: HYDRODIURIL     lisinopriL 5 MG tablet  Commonly known as: PRINIVIL,ZESTRIL     pregabalin 100 MG capsule  Commonly known as: LYRICA            Indwelling Lines/Drains at time of discharge:   Lines/Drains/Airways     None                 Time spent on the discharge of patient: >35 minutes         Indra Zamora III, MD  Department of [facility] Medicine  [facility]   Parkinsons

## 2024-09-30 ENCOUNTER — TELEPHONE (OUTPATIENT)
Dept: INTERNAL MEDICINE | Facility: CLINIC | Age: 64
End: 2024-09-30
Payer: MEDICARE

## 2024-09-30 NOTE — TELEPHONE ENCOUNTER
"----- Message from Rivera sent at 9/27/2024 12:41 PM CDT -----  Regarding: call back  Type: Patient Call Back    Who called:[facility]      What is the request in detail: requesting provider staff reach to [facility] to update "member credentials" so that provider can be registered as pt PCP with her insurance     Can the clinic reply by [facility]?no    Would the patient rather a call back or a response via [facility]? call    Best call back number: provider line [phone]    Additional Information:  "

## 2024-09-30 NOTE — TELEPHONE ENCOUNTER
"On hold too long, due to "recent severe weather" affecting wait times.     "  ----- Message from Nicole sent at 9/27/2024 12:41 PM CDT -----  Regarding: call back  Type: Patient Call Back     Who called:[facility]       What is the request in detail: requesting provider staff reach to [facility] to update "member credentials" so that provider can be registered as pt PCP with her insurance      Can the clinic reply by [facility]?no     Would the patient rather a call back or a response via [facility]? call     Best call back number: provider line [phone]     Additional Information:      "  "

## 2024-10-11 ENCOUNTER — TELEPHONE (OUTPATIENT)
Dept: INTERNAL MEDICINE | Facility: CLINIC | Age: 64
End: 2024-10-11
Payer: MEDICARE

## 2024-10-11 NOTE — TELEPHONE ENCOUNTER
----- Message from Med Assistant Ashanti sent at 10/11/2024  1:09 PM CDT -----  Regarding: Refill Request  Who Called:JOYA MUNOZ [[id number]]           New Prescription or Refill : Refill      RX Name and Strength:  oxybutynin (DITROPAN-XL) 10 MG 24 hr tablet              30 day or 90 day RX: 90           Local or Mail Order : local            Preferred Pharmacy:[facility] #[id number] [address]       Would the patient rather a call back or a response via [facility]? call        Best Call Back Number:  [phone]

## 2024-11-07 DIAGNOSIS — Z79.4 TYPE 2 DIABETES MELLITUS WITH DIABETIC POLYNEUROPATHY, WITH LONG-TERM CURRENT USE OF INSULIN: ICD-10-CM

## 2024-11-07 DIAGNOSIS — E11.42 TYPE 2 DIABETES MELLITUS WITH DIABETIC POLYNEUROPATHY, WITH LONG-TERM CURRENT USE OF INSULIN: ICD-10-CM

## 2024-11-07 RX ORDER — BLOOD SUGAR DIAGNOSTIC
STRIP MISCELLANEOUS 2 TIMES DAILY
Qty: 200 STRIP | Refills: 3 | Status: SHIPPED | OUTPATIENT
Start: 2024-11-07

## 2024-11-07 NOTE — TELEPHONE ENCOUNTER
Provider Staff:  Action required for this patient    Requires labs      Please see care gap opportunities below in Care Due Message.    Thanks!  [facility]     Appointments      Date Provider   Last Visit   4/30/2024 Ghulam Contreras MD   Next Visit   11/26/2024 Ghulam Contreras MD     Refill Decision Note   Jonathan Gonzales  is requesting a refill authorization.  Brief Assessment and Rationale for Refill:  Approve     Medication Therapy Plan:         Comments:     Note composed:7:43 AM 11/07/2024

## 2024-11-07 NOTE — TELEPHONE ENCOUNTER
Care Due:                  Date            Visit Type   Department     Provider  --------------------------------------------------------------------------------                                EP -                              [facility]  Ghulam Mendoza  Last Visit: 04-      CARE ([facility])   MEDICINE       [place]                              EP -                              [facility]  Ghulam Mendoza  Next Visit: 11-      [facility] ([facility]                                                            Last  Test          Frequency    Reason                     Performed    Due Date  --------------------------------------------------------------------------------    HBA1C.......  6 months...  liraglutide, metFORMIN...  05-   10-    [facility] Embedded Care Due Messages. Reference number: [id number].   11/07/2024 3:37:27 AM CST

## 2024-11-12 ENCOUNTER — PATIENT OUTREACH (OUTPATIENT)
Dept: ADMINISTRATIVE | Facility: HOSPITAL | Age: 64
End: 2024-11-12
Payer: MEDICARE

## 2024-11-12 DIAGNOSIS — E11.9 DIABETES MELLITUS WITHOUT COMPLICATION: Primary | ICD-10-CM

## 2024-11-12 DIAGNOSIS — Z13.220 ENCOUNTER FOR LIPID SCREENING FOR CARDIOVASCULAR DISEASE: ICD-10-CM

## 2024-11-12 DIAGNOSIS — Z13.6 ENCOUNTER FOR LIPID SCREENING FOR CARDIOVASCULAR DISEASE: ICD-10-CM

## 2024-11-13 ENCOUNTER — TELEPHONE (OUTPATIENT)
Dept: SMOKING CESSATION | Facility: CLINIC | Age: 64
End: 2024-11-13
Payer: MEDICARE

## 2024-11-13 NOTE — TELEPHONE ENCOUNTER
1st attempt, patient called in regards to 6 month f/u for quit 5 with Smoking Cessation.  Voicemail with contact information given.

## 2024-11-26 ENCOUNTER — TELEPHONE (OUTPATIENT)
Dept: INTERNAL MEDICINE | Facility: CLINIC | Age: 64
End: 2024-11-26
Payer: MEDICARE

## 2024-11-26 NOTE — TELEPHONE ENCOUNTER
----- Message from Nicole sent at 11/26/2024 11:28 AM CST -----  Regarding: call back  Type: Patient Call Back    Who called: pt     What is the request in detail: requesting call to assist rescheduling bone density and labs, unsure if testing needs to be done before f/u or after     Can the clinic reply by [facility]?no    Would the patient rather a call back or a response via [facility]? call    Best call back number: [phone]     Additional Information:

## 2024-11-27 ENCOUNTER — OFFICE VISIT (OUTPATIENT)
Dept: INTERNAL MEDICINE | Facility: CLINIC | Age: 64
End: 2024-11-27
Attending: FAMILY MEDICINE
Payer: MEDICARE

## 2024-11-27 ENCOUNTER — HOSPITAL ENCOUNTER (OUTPATIENT)
Dept: RADIOLOGY | Facility: OTHER | Age: 64
Discharge: HOME | End: 2024-11-27
Attending: FAMILY MEDICINE
Payer: MEDICARE

## 2024-11-27 VITALS
SYSTOLIC BLOOD PRESSURE: 110 MMHG | HEIGHT: 63 IN | DIASTOLIC BLOOD PRESSURE: 82 MMHG | WEIGHT: 182.75 LBS | HEART RATE: 61 BPM | BODY MASS INDEX: 32.38 KG/M2 | OXYGEN SATURATION: 97 %

## 2024-11-27 DIAGNOSIS — Z78.0 MENOPAUSE: ICD-10-CM

## 2024-11-27 DIAGNOSIS — T45.1X5A CHEMOTHERAPY-INDUCED NEUROPATHY: ICD-10-CM

## 2024-11-27 DIAGNOSIS — Z79.4 TYPE 2 DIABETES MELLITUS WITH DIABETIC POLYNEUROPATHY, WITH LONG-TERM CURRENT USE OF INSULIN: Primary | ICD-10-CM

## 2024-11-27 DIAGNOSIS — I10 PRIMARY HYPERTENSION: ICD-10-CM

## 2024-11-27 DIAGNOSIS — G62.0 CHEMOTHERAPY-INDUCED NEUROPATHY: ICD-10-CM

## 2024-11-27 DIAGNOSIS — E11.42 TYPE 2 DIABETES MELLITUS WITH DIABETIC POLYNEUROPATHY, WITH LONG-TERM CURRENT USE OF INSULIN: Primary | ICD-10-CM

## 2024-11-27 PROCEDURE — 99999 PR PBB SHADOW E&M-EST. PATIENT-LVL V: CPT | Mod: PBBFAC,,, | Performed by: FAMILY MEDICINE

## 2024-11-27 PROCEDURE — 3074F SYST BP LT 130 MM HG: CPT | Mod: CPTII,S$GLB,, | Performed by: FAMILY MEDICINE

## 2024-11-27 PROCEDURE — 99214 OFFICE O/P EST MOD 30 MIN: CPT | Mod: S$GLB,,, | Performed by: FAMILY MEDICINE

## 2024-11-27 PROCEDURE — 4010F ACE/ARB THERAPY RXD/TAKEN: CPT | Mod: CPTII,S$GLB,, | Performed by: FAMILY MEDICINE

## 2024-11-27 PROCEDURE — 3008F BODY MASS INDEX DOCD: CPT | Mod: CPTII,S$GLB,, | Performed by: FAMILY MEDICINE

## 2024-11-27 PROCEDURE — 3079F DIAST BP 80-89 MM HG: CPT | Mod: CPTII,S$GLB,, | Performed by: FAMILY MEDICINE

## 2024-11-27 PROCEDURE — 1159F MED LIST DOCD IN RCRD: CPT | Mod: CPTII,S$GLB,, | Performed by: FAMILY MEDICINE

## 2024-11-27 PROCEDURE — 1160F RVW MEDS BY RX/DR IN RCRD: CPT | Mod: CPTII,S$GLB,, | Performed by: FAMILY MEDICINE

## 2024-11-27 PROCEDURE — 3288F FALL RISK ASSESSMENT DOCD: CPT | Mod: CPTII,S$GLB,, | Performed by: FAMILY MEDICINE

## 2024-11-27 PROCEDURE — G2211 COMPLEX E/M VISIT ADD ON: HCPCS | Mod: S$GLB,,, | Performed by: FAMILY MEDICINE

## 2024-11-27 PROCEDURE — 1101F PT FALLS ASSESS-DOCD LE1/YR: CPT | Mod: CPTII,S$GLB,, | Performed by: FAMILY MEDICINE

## 2024-11-27 PROCEDURE — 3044F HG A1C LEVEL LT 7.0%: CPT | Mod: CPTII,S$GLB,, | Performed by: FAMILY MEDICINE

## 2024-11-27 PROCEDURE — 77080 DXA BONE DENSITY AXIAL: CPT | Mod: TC

## 2024-11-27 PROCEDURE — 3072F LOW RISK FOR RETINOPATHY: CPT | Mod: CPTII,S$GLB,, | Performed by: FAMILY MEDICINE

## 2024-11-27 PROCEDURE — 77080 DXA BONE DENSITY AXIAL: CPT | Mod: 26,,, | Performed by: RADIOLOGY

## 2024-11-27 RX ORDER — PREGABALIN 150 MG/1
150 CAPSULE ORAL 3 TIMES DAILY
Qty: 90 CAPSULE | Refills: 6 | Status: SHIPPED | OUTPATIENT
Start: 2024-11-27 | End: 2025-05-28

## 2024-11-27 NOTE — PROGRESS NOTES
CHIEF COMPLAINT:   F/U in a patient with diabetes and hypertension    HISTORY OF PRESENT ILLNESS: The patient is a 64 year-old black female.  DM and HTN refill    Previously inpt d/t AMS.  No records of this hospitalization.  This may be due the fact that it appears to have been a psychiatric hospitalization at [place].  In any event I was unable to review records and limited history is available from patient and family.  Ultimately the family confided that they felt the lack of information was due to the sensitive nature of the admission.  She appears to be back to baseline    The patient has a long and complicated medical history.      She continues to have problems with neuropathic pain as well as central pain.  She does well w/ her Lyrica 100 twice a day.    The patient has a history of diabetes.  Recent blood sugars have been less than 200.  There have been no episodes of hypoglycemia.    The patient has a history of stable hypertension on current medications.  Patient denies chest pain or shortness of breath today.    The patient has a history of stable hyperlipidemia on current medications.  The patient denies chest pain or shortness of breath today.  The patient denies muscle aches or myalgias suggestive of myositis.    She has a history of breast cancer for which she underwent bilateral mastectomies and chemotherapy.    REVIEW OF SYSTEMS:  GENERAL: No fever, chills or weight loss.  SKIN: No rashes, itching or changes in color or texture of skin.  HEAD: No headaches or recent head trauma.  EYES: Visual acuity fine. No photophobia, ocular pain or diplopia.  EARS: Denies ear pain, discharge or vertigo.  NOSE: No loss of smell, no epistaxis or postnasal drip.  MOUTH & THROAT: No hoarseness or change in voice. No excessive gum bleeding.  NODES: Denies swollen glands.  CHEST: Denies AWAD, cyanosis, wheezing, cough and sputum production.  CARDIOVASCULAR: Denies chest pain, PND, orthopnea or reduced exercise  "tolerance.  ABDOMEN: Appetite fine. No weight loss. Denies diarrhea, abdominal pain, hematemesis or blood in stool.  URINARY: No flank pain, dysuria or hematuria.  PERIPHERAL VASCULAR: No claudication or cyanosis.  MUSCULOSKELETAL: No joint stiffness or swelling. Except as noted above.  NEUROLOGIC: No history of seizures    SOCIAL HISTORY: The patient does smoke.  The patient consumes alcohol socially.      PHYSICAL EXAMINATION:   Blood pressure 110/82, pulse 61, height 5' 3" (1.6 m), weight 82.9 kg (182 lb 12.2 oz), SpO2 97%.    APPEARANCE: Well nourished, well developed, in no acute distress.    HEAD: Normocephalic, atraumatic.  EYES: PERRL. EOMI.  Conjunctivae without injection and  Anicteric  NOSE: Mucosa pink. Airway clear.  MOUTH & THROAT: No tonsillar enlargement. No pharyngeal erythema or exudate. No stridor.  NECK: Supple.   NODES: No cervical, axillary or inguinal lymph node enlargement.  CHEST: Lungs clear to auscultation.  No retractions are noted.  No rales or rhonchi are present.  CARDIOVASCULAR: Normal S1, S2. No rubs, murmurs or gallops.  ABDOMEN: Bowel sounds normal. Not distended. Soft. No tenderness or masses.  No ascites is noted.  MUSCULOSKELETAL:  There is no clubbing, cyanosis, or edema of the extremities x4.  There is full range of motion of the lumbar spine.  There is full range of motion of the extremities x4.  There is no deformity noted.    NEUROLOGIC:       Normal speech development.      Hearing normal.      paretic gait.      Motor and sensory exams grossly normal.  Mild RLE weakness noted  PSYCHIATRIC: Patient is alert and oriented x3.  Thought processes are all normal.  There is no homicidality.  There is no suicidality.  There is no evidence of psychosis.    LABORATORY/RADIOLOGY:   Chart reviewed including surgeries and blood work    ASSESSMENT:   CVA  Breast cancer with resultant lymphedema  Hypertension, condition is well controlled on current medication regimen  Diabetes, " condition is well controlled on current medication regimen  Neuropathic pain  Cervical radiculopathy  Schizophrenia    PLAN:  A1c was good recently  Depakote   Her diabetes is very well controlled    Pain clinic   KCl 40 po qd  Prinzide  Pravachol 20 mg by mouth daily    Return to clinic in 6 month with blood work prior

## 2024-12-19 ENCOUNTER — CLINICAL SUPPORT (OUTPATIENT)
Dept: SMOKING CESSATION | Facility: CLINIC | Age: 64
End: 2024-12-19
Payer: COMMERCIAL

## 2024-12-19 DIAGNOSIS — F17.200 NICOTINE DEPENDENCE: Primary | ICD-10-CM

## 2024-12-19 PROCEDURE — 99406 BEHAV CHNG SMOKING 3-10 MIN: CPT | Mod: S$GLB,,,

## 2024-12-19 NOTE — PROGRESS NOTES
Spoke with patient today in regard to smoking cessation progress for 6-month telephone follow up.  Patient states that she is tobacco free.  Patient states she still uses NRT lozenges for cravings, but feels she is doing well with her Quit.  Commended patient on their quit.  Informed patient of benefit period, future follow up, and contact information if any further help or support is needed.  Will complete smart form for 6 month follow up on Quit attempt #5.

## 2025-01-08 ENCOUNTER — OFFICE VISIT (OUTPATIENT)
Dept: PODIATRY | Facility: CLINIC | Age: 65
End: 2025-01-08
Payer: MEDICARE

## 2025-01-08 VITALS — BODY MASS INDEX: 32.38 KG/M2 | WEIGHT: 182.75 LBS | HEIGHT: 63 IN

## 2025-01-08 DIAGNOSIS — E11.42 DIABETIC PERIPHERAL NEUROPATHY ASSOCIATED WITH TYPE 2 DIABETES MELLITUS: Primary | ICD-10-CM

## 2025-01-08 DIAGNOSIS — L84 CORN OR CALLUS: ICD-10-CM

## 2025-01-08 DIAGNOSIS — B35.1 ONYCHOMYCOSIS DUE TO DERMATOPHYTE: ICD-10-CM

## 2025-01-08 PROCEDURE — 99999 PR PBB SHADOW E&M-EST. PATIENT-LVL III: CPT | Mod: PBBFAC,,, | Performed by: PODIATRIST

## 2025-01-08 PROCEDURE — 3008F BODY MASS INDEX DOCD: CPT | Mod: CPTII,S$GLB,, | Performed by: PODIATRIST

## 2025-01-08 PROCEDURE — 99214 OFFICE O/P EST MOD 30 MIN: CPT | Mod: 25,S$GLB,, | Performed by: PODIATRIST

## 2025-01-08 PROCEDURE — 11055 PARING/CUTG B9 HYPRKER LES 1: CPT | Mod: Q9,S$GLB,, | Performed by: PODIATRIST

## 2025-01-08 PROCEDURE — 3288F FALL RISK ASSESSMENT DOCD: CPT | Mod: CPTII,S$GLB,, | Performed by: PODIATRIST

## 2025-01-08 PROCEDURE — 1159F MED LIST DOCD IN RCRD: CPT | Mod: CPTII,S$GLB,, | Performed by: PODIATRIST

## 2025-01-08 PROCEDURE — 11721 DEBRIDE NAIL 6 OR MORE: CPT | Mod: 59,Q9,S$GLB, | Performed by: PODIATRIST

## 2025-01-08 PROCEDURE — 1101F PT FALLS ASSESS-DOCD LE1/YR: CPT | Mod: CPTII,S$GLB,, | Performed by: PODIATRIST

## 2025-01-08 PROCEDURE — 1125F AMNT PAIN NOTED PAIN PRSNT: CPT | Mod: CPTII,S$GLB,, | Performed by: PODIATRIST

## 2025-01-08 RX ORDER — UREA 40 %
CREAM (GRAM) TOPICAL DAILY
Qty: 85 G | Refills: 11 | Status: SHIPPED | OUTPATIENT
Start: 2025-01-08

## 2025-01-08 RX ORDER — CICLOPIROX 80 MG/ML
SOLUTION TOPICAL NIGHTLY
Qty: 6.6 ML | Refills: 11 | Status: SHIPPED | OUTPATIENT
Start: 2025-01-08

## 2025-01-08 NOTE — PROGRESS NOTES
Subjective:      Patient ID: Jonathan Gonzales is a 64 y.o. female.    Chief Complaint: Diabetic Foot Exam (PCP Ghulam Contreras MD 11/27/2024/) and Foot Pain    Jonathan is a 64 y.o. female who presents to the clinic for evaluation and treatment of high risk feet. Jonathan has a past medical history of Bipolar disorder, Cancer of female breast (10/2010), Cancer of upper-outer quadrant of female breast (7/9/2012), Depression, Diabetes mellitus type II, Disturbances of sensation of smell and taste (6/29/2012), Hypertension, Malignant neoplasm of breast (female), unspecified site (4/27/2015), Neuropathy, Nonspecific abnormal unspecified cardiovascular function study (4/12/2013), Post-mastectomy lymphedema syndrome, Schizophrenia, and Stroke. The patient's chief complaint is Diabetes, increased risk amputation needing evaluation/management/optomization of foot care.     Cc2 thick discolored misshapen toenails all 10 toes.  Chronic condition present for years.  This exacerbations been gradual onset, worsening over the past several weeks.  Aggravated by socks shoes pressure ambulation.  Periodic debridement talar here help symptoms.      Cc3  thick hard callus in the bottom of the right 5th MTPJ.  Indicated with index finger.  Chronic condition present for years.  This exacerbation has been gradual onset worsening over the past several weeks aggravated by socks shoes pressure ambulation.  Periodic debridement here help symptoms..    .    PCP: Ghulam Contreras MD    Date Last Seen by PCP:   Chief Complaint   Patient presents with    Diabetic Foot Exam     PCP Ghulam Contreras MD 11/27/2024      Foot Pain        Current shoe gear:  Affected Foot: Casual shoes     Unaffected Foot: Casual shoes    Hemoglobin A1C   Date Value Ref Range Status   11/27/2024 7.8 (H) 4.0 - 5.6 % Final     Comment:     ADA Screening Guidelines:  5.7-6.4%  Consistent with prediabetes  >or=6.5%  Consistent with diabetes    High  levels of fetal hemoglobin interfere with the HbA1C  assay. Heterozygous hemoglobin variants (HbS, HgC, etc)do  not significantly interfere with this assay.   However, presence of multiple variants may affect accuracy.     04/30/2024 6.2 (H) 4.0 - 5.6 % Final     Comment:     ADA Screening Guidelines:  5.7-6.4%  Consistent with prediabetes  >or=6.5%  Consistent with diabetes    High levels of fetal hemoglobin interfere with the HbA1C  assay. Heterozygous hemoglobin variants (HbS, HgC, etc)do  not significantly interfere with this assay.   However, presence of multiple variants may affect accuracy.     06/25/2023 6.2 (H) 4.7 - 5.6 % Final   04/20/2023 5.7 (H) 4.0 - 5.6 % Final     Comment:     ADA Screening Guidelines:  5.7-6.4%  Consistent with prediabetes  >or=6.5%  Consistent with diabetes    High levels of fetal hemoglobin interfere with the HbA1C  assay. Heterozygous hemoglobin variants (HbS, HgC, etc)do  not significantly interfere with this assay.   However, presence of multiple variants may affect accuracy.         Review of Systems   Constitutional: Negative for chills, diaphoresis, fever, malaise/fatigue and night sweats.   Cardiovascular:  Negative for claudication, cyanosis, leg swelling and syncope.   Skin:  Positive for nail changes and suspicious lesions. Negative for color change, dry skin, rash and unusual hair distribution.   Musculoskeletal:  Negative for falls, joint pain, joint swelling, muscle cramps, muscle weakness and stiffness.   Gastrointestinal:  Negative for constipation, diarrhea, nausea and vomiting.   Neurological:  Positive for numbness, paresthesias and sensory change. Negative for brief paralysis, disturbances in coordination, focal weakness and tremors.           Objective:      Physical Exam  Constitutional:       General: She is not in acute distress.     Appearance: She is well-developed. She is not diaphoretic.   Cardiovascular:      Pulses:           Popliteal pulses are 2+ on  the right side and 2+ on the left side.        Dorsalis pedis pulses are 1+ on the right side and 1+ on the left side.        Posterior tibial pulses are 1+ on the right side and 1+ on the left side.      Comments: Capillary refill 3 seconds all toes/distal feet, all toes/both feet warm to touch.      Negative lymphadenopathy bilateral popliteal fossa and tarsal tunnel.      Negavie lower extremity edema bilateral.    Musculoskeletal:      Right ankle: No swelling, deformity, ecchymosis or lacerations. Normal range of motion. Normal pulse.      Right Achilles Tendon: Normal. No defects. Boyd's test negative.      Comments: Patient has hammertoes of digits    2-5 bilateral               partially reducible without symptom today.      Otherwise, Normal angle, base, station of gait. All ten toes without clubbing, cyanosis, or signs of ischemia.  No pain to palpation bilateral lower extremities.  Range of motion, stability, muscle strength, and muscle tone normal bilateral feet and legs.      Lymphadenopathy:      Lower Body: No right inguinal adenopathy. No left inguinal adenopathy.      Comments: Negative lymphadenopathy bilateral popliteal fossa and tarsal tunnel.    Negative lymphangitic streaking bilateral feet/ankles/legs.   Skin:     General: Skin is warm and dry.      Capillary Refill: Capillary refill takes 2 to 3 seconds.      Coloration: Skin is not pale.      Findings: No abrasion, bruising, burn, ecchymosis, erythema, laceration, lesion or rash.      Nails: There is no clubbing.      Comments: Focal hyperkeratotic lesion consisting entirely of hyperkeratotic tissue without open skin, drainage, pus, fluctuance, malodor, or signs of infection plantar right 5th mtpj    Otherwise, Skin thin, shiny, atrophic, with decreased density and distribution of pedal hair bilateral, but without hyperpigmentation, zain discoloration,  ulcers, masses, nodules or cords palpated bilateral feet and legs.      Toenails  1st, 2nd, 3rd, 4th, 5th  bilateral are hypertrophic thickened 2-3 mm, dystrophic, discolored tanish brown with tan, gray crumbly subungual debris.  Tender to distal nail plate pressure, without periungual skin abnormality of each.     Neurological:      Mental Status: She is alert and oriented to person, place, and time.      Sensory: Sensory deficit present.      Motor: No tremor, atrophy or abnormal muscle tone.      Gait: Gait normal.      Comments: Paresthesias, and burning bilateral feet with no clearly identified trigger or source.    Negative tinel sign to percussion sural, superficial peroneal, deep peroneal, saphenous, and posterior tibial nerves right and left ankles and feet.    Decreased/absent vibratory sensation bilateral feet to 128Hz tuning fork.    Diminished/loss of protective sensation all toes bilateral to 10 gram monofilament.     Psychiatric:         Behavior: Behavior is cooperative.               Assessment:       Encounter Diagnoses   Name Primary?    Diabetic peripheral neuropathy associated with type 2 diabetes mellitus Yes    Onychomycosis due to dermatophyte          Plan:       Jonathan was seen today for diabetic foot exam and foot pain.    Diagnoses and all orders for this visit:    Diabetic peripheral neuropathy associated with type 2 diabetes mellitus    Onychomycosis due to dermatophyte      I counseled the patient on her conditions, their implications and medical management.        - Shoe inspection. Diabetic Foot Education. Patient reminded of the importance of good nutrition and blood sugar control to help prevent podiatric complications of diabetes. Patient instructed on proper foot hygeine. We discussed wearing proper shoe gear, daily foot inspections, never walking without protective shoe gear, never putting sharp instruments to feet, routine podiatric visits annually.      With the patient's permission, I debrided all ten toenails with a sterile nipper and curette, removing  all offending nail and debris.  Patient tolerated the procedure well and related significant relief.    With the patient's permission, I debrided hyperkeratotic lesion(s) as above totaling       1         to, not  Including dermis with sterile #15 blade.  Patient tolerated the procedure well and related significant relief.     Discussed conservative treatment with shoes of adequate dimensions, material, and style to alleviate symptoms and delay or prevent surgical intervention.    Rx urea, Penlac, DM shoes/inserts.        No follow-ups on file.

## 2025-01-14 ENCOUNTER — TELEPHONE (OUTPATIENT)
Dept: PODIATRY | Facility: CLINIC | Age: 65
End: 2025-01-14
Payer: MEDICARE

## 2025-01-14 NOTE — TELEPHONE ENCOUNTER
Left message for patient to give callback.    ----- Message from Med Assistant Hong sent at 1/13/2025  1:45 PM CST -----  Regarding: FW: Patient Advice    ----- Message -----  From: Anjana Pelletier  Sent: 1/13/2025  12:45 PM CST  To: [facility] Staff  Subject: Patient Advice                                             Name of Who is Calling:  JOYA MUNOZ    Who Left The Message:  JOYA MUNOZ      What is the request in detail:        Patient called stating that there is no signature on her Rx for her Diabetic shoes.  Please give the patient a call back at your earliest convenience and further advise.    THANK YOU      Reply by [facility]: NO      Preferred Call Back :  [phone]

## 2025-01-14 NOTE — TELEPHONE ENCOUNTER
Staff informed patient Diabetic shoe order and notes was routed to [facility].    Patient verbalized understanding.    ----- Message from Jack sent at 1/14/2025 11:01 AM CST -----  Regarding: Fax  Name of Who is Calling:  Patient          What is the request in detail:  Patient stated she would like to have Dr. Julien send a fax to start the process of getting her diabetic shoes done            Can the clinic reply by [facility]: No            What Number to Call Back if not in [facility]: [phone]      Fax: [phone] -[facility] [place]

## 2025-02-06 DIAGNOSIS — E66.811 OBESITY, CLASS I, BMI 30-34.9: Primary | ICD-10-CM

## 2025-02-06 DIAGNOSIS — Z79.4 TYPE 2 DIABETES MELLITUS WITHOUT COMPLICATION, WITH LONG-TERM CURRENT USE OF INSULIN: ICD-10-CM

## 2025-02-06 DIAGNOSIS — T45.1X5A CHEMOTHERAPY-INDUCED NEUROPATHY: ICD-10-CM

## 2025-02-06 DIAGNOSIS — E11.9 TYPE 2 DIABETES MELLITUS WITHOUT COMPLICATION, WITH LONG-TERM CURRENT USE OF INSULIN: ICD-10-CM

## 2025-02-06 DIAGNOSIS — G62.0 CHEMOTHERAPY-INDUCED NEUROPATHY: ICD-10-CM

## 2025-02-06 RX ORDER — METFORMIN HYDROCHLORIDE 500 MG/1
1000 TABLET ORAL 2 TIMES DAILY WITH MEALS
Qty: 360 TABLET | Refills: 11 | Status: SHIPPED | OUTPATIENT
Start: 2025-02-06

## 2025-02-06 RX ORDER — PREGABALIN 150 MG/1
150 CAPSULE ORAL 3 TIMES DAILY
Qty: 90 CAPSULE | Refills: 6 | Status: SHIPPED | OUTPATIENT
Start: 2025-02-06 | End: 2025-08-07

## 2025-02-06 RX ORDER — ATORVASTATIN CALCIUM 80 MG/1
80 TABLET, FILM COATED ORAL DAILY
Qty: 90 TABLET | Refills: 1 | Status: SHIPPED | OUTPATIENT
Start: 2025-02-06

## 2025-02-06 NOTE — TELEPHONE ENCOUNTER
----- Message from Summer sent at 2/6/2025 10:00 AM CST -----  Regarding: new prescrptions  Type:  Patient Returning Call        Name of who is calling:pt        What is request in detail:pt is requesting a call back in regards to medications, pt previous pharmacy has closed down. Pt now needs all new prescriptions and need them sent over to    [id number] [place] [address] [address] AT [address]   Phone: [phone]  Fax: [phone]           Can clinic reply by [facility]:no        What number to call back [facility] not [facility]:[phone]

## 2025-02-06 NOTE — TELEPHONE ENCOUNTER
Care Due:                  Date            Visit Type   Department     Provider  --------------------------------------------------------------------------------                                EP -                              [facility]  Ghulam Mendoza  Last Visit: 11-      CARE ([facility])   MEDICINE       [place]                              [facility]  Next Visit: 04-      [facility] ([facility]                                                            Last  Test          Frequency    Reason                     Performed    Due Date  --------------------------------------------------------------------------------    CMP.........  12 months..  atorvastatin,              12- 12-                             hydroCHLOROthiazide,                             losartan, metFORMIN......    [facility] Embedded Care Due Messages. Reference number: [id number].   2/06/2025 11:25:11 AM CST

## 2025-02-11 ENCOUNTER — OFFICE VISIT (OUTPATIENT)
Dept: OPTOMETRY | Facility: CLINIC | Age: 65
End: 2025-02-11
Payer: MEDICARE

## 2025-02-11 DIAGNOSIS — H25.13 NUCLEAR SCLEROSIS, BILATERAL: ICD-10-CM

## 2025-02-11 DIAGNOSIS — E11.9 DIABETES MELLITUS WITHOUT COMPLICATION: Primary | ICD-10-CM

## 2025-02-11 DIAGNOSIS — H52.223 REGULAR ASTIGMATISM OF BOTH EYES WITH PRESBYOPIA: ICD-10-CM

## 2025-02-11 DIAGNOSIS — Z13.5 GLAUCOMA SCREENING: ICD-10-CM

## 2025-02-11 DIAGNOSIS — H52.4 REGULAR ASTIGMATISM OF BOTH EYES WITH PRESBYOPIA: ICD-10-CM

## 2025-02-11 PROCEDURE — 99999 PR PBB SHADOW E&M-EST. PATIENT-LVL IV: CPT | Mod: PBBFAC,,, | Performed by: OPTOMETRIST

## 2025-02-11 PROCEDURE — 92015 DETERMINE REFRACTIVE STATE: CPT | Mod: S$GLB,,, | Performed by: OPTOMETRIST

## 2025-02-11 PROCEDURE — 4010F ACE/ARB THERAPY RXD/TAKEN: CPT | Mod: CPTII,S$GLB,, | Performed by: OPTOMETRIST

## 2025-02-11 PROCEDURE — 1159F MED LIST DOCD IN RCRD: CPT | Mod: CPTII,S$GLB,, | Performed by: OPTOMETRIST

## 2025-02-11 PROCEDURE — 3288F FALL RISK ASSESSMENT DOCD: CPT | Mod: CPTII,S$GLB,, | Performed by: OPTOMETRIST

## 2025-02-11 PROCEDURE — 1101F PT FALLS ASSESS-DOCD LE1/YR: CPT | Mod: CPTII,S$GLB,, | Performed by: OPTOMETRIST

## 2025-02-11 PROCEDURE — 92014 COMPRE OPH EXAM EST PT 1/>: CPT | Mod: S$GLB,,, | Performed by: OPTOMETRIST

## 2025-02-11 PROCEDURE — 2023F DILAT RTA XM W/O RTNOPTHY: CPT | Mod: CPTII,S$GLB,, | Performed by: OPTOMETRIST

## 2025-02-11 PROCEDURE — 1126F AMNT PAIN NOTED NONE PRSNT: CPT | Mod: CPTII,S$GLB,, | Performed by: OPTOMETRIST

## 2025-02-11 NOTE — PROGRESS NOTES
HPI    Annual Exam   Pt states vision fluctuates     Pt denies Flashes   Pt reports Floater new since last visit     Pt denies Dry/  Burning  Pt reports itchy watery eyes   Gtt: No    DM Dx'ed   BS: Unknown   Hemoglobin A1C       Date                     Value               Ref Range             Status                11/27/2024               7.8 (H)             4.0 - 5.6 %           Final                   04/30/2024               6.2 (H)             4.0 - 5.6 %           Final                   06/25/2023               6.2 (H)             4.7 - 5.6 %           Final                 04/20/2023               5.7 (H)             4.0 - 5.6 %           Final               Last edited by Dorota Baltazar on 2/11/2025 12:51 PM.            Assessment /Plan     For exam results, see Encounter Report.    Diabetes mellitus without complication  -     Ambulatory referral/consult to Optometry  -No retinopathy noted today.  Continued control with primary care physician and annual comprehensive eye exam.    Nuclear sclerosis, bilateral  -Educated patient on presence of cataracts at today's exam, monitor at annual dilated fundus exam. 1-3 years surgical estimate.    Glaucoma screening  -Monitor with annual eye exam and IOP check    Regular astigmatism of both eyes with presbyopia  Eyeglass Final Rx       Eyeglass Final Rx         Sphere Cylinder Axis Dist VA Add    Right [place] +0.50 180 20/40 +2.50    Left -0.75 +0.50 030 20/30 +2.50      Type: PAL    Expiration Date: 2/11/2026                      RTC 1 yr

## 2025-04-16 DIAGNOSIS — I10 PRIMARY HYPERTENSION: ICD-10-CM

## 2025-04-16 RX ORDER — LOSARTAN POTASSIUM 25 MG/1
25 TABLET ORAL DAILY
Qty: 90 TABLET | Refills: 3 | Status: SHIPPED | OUTPATIENT
Start: 2025-04-16 | End: 2026-04-16

## 2025-04-16 NOTE — TELEPHONE ENCOUNTER
Refill Encounter    PCP Visits: Recent Visits  Date Type Provider Dept   11/27/24 Office Visit Ghulam Contreras MD [facility]   04/30/24 Office Visit Ghulam Contreras MD [facility]   Showing recent visits within past 360 days and meeting all other requirements  Future Appointments  Date Type Provider Dept   04/17/25 Appointment Ghulam Contreras MD [facility]   Showing future appointments within next 720 days and meeting all other requirements      Last 3 Blood Pressure:   BP Readings from Last 3 Encounters:   11/27/24 110/82   04/30/24 111/67   03/22/24 111/79     Preferred Pharmacy:   [facility] #[id number] [address] AT [facility] [address]  [facility] [address]  [facility] [address]  Phone: [phone] Fax: [phone]    Requested RX:  Requested Prescriptions     Pending Prescriptions Disp Refills    losartan (COZAAR) 25 MG tablet 90 tablet 3     Sig: Take 1 tablet (25 mg total) by mouth once daily.      RX Route: Normal

## 2025-04-16 NOTE — TELEPHONE ENCOUNTER
Care Due:                  Date            Visit Type   Department     Provider  --------------------------------------------------------------------------------                                EP -                              [facility] INTERNAL  Ghulam Mendoza  Last Visit: 11-      CARE ([facility])   MEDICINE       [place]                              EP -                              [facility]  Ghulam Mendoza  Next Visit: 04-      [facility] ([facility]                                                            Last  Test          Frequency    Reason                     Performed    Due Date  --------------------------------------------------------------------------------    CMP.........  12 months..  atorvastatin,              12- 12-                             hydroCHLOROthiazide,                             losartan, metFORMIN......    HBA1C.......  6 months...  metFORMIN................  11- 05-    [facility] Embedded Care Due Messages. Reference number: [id number].   4/16/2025 3:55:34 PM CDT

## 2025-04-17 ENCOUNTER — OFFICE VISIT (OUTPATIENT)
Dept: INTERNAL MEDICINE | Facility: CLINIC | Age: 65
End: 2025-04-17
Attending: FAMILY MEDICINE
Payer: MEDICARE

## 2025-04-17 VITALS
DIASTOLIC BLOOD PRESSURE: 75 MMHG | WEIGHT: 189.13 LBS | HEIGHT: 63 IN | SYSTOLIC BLOOD PRESSURE: 114 MMHG | OXYGEN SATURATION: 97 % | HEART RATE: 71 BPM | BODY MASS INDEX: 33.51 KG/M2

## 2025-04-17 DIAGNOSIS — N18.31 STAGE 3A CHRONIC KIDNEY DISEASE: ICD-10-CM

## 2025-04-17 DIAGNOSIS — Z79.4 TYPE 2 DIABETES MELLITUS WITH DIABETIC POLYNEUROPATHY, WITH LONG-TERM CURRENT USE OF INSULIN: Primary | Chronic | ICD-10-CM

## 2025-04-17 DIAGNOSIS — C50.912 MALIGNANT NEOPLASM OF LEFT FEMALE BREAST, UNSPECIFIED ESTROGEN RECEPTOR STATUS, UNSPECIFIED SITE OF BREAST: ICD-10-CM

## 2025-04-17 DIAGNOSIS — E11.42 TYPE 2 DIABETES MELLITUS WITH DIABETIC POLYNEUROPATHY, WITH LONG-TERM CURRENT USE OF INSULIN: Primary | Chronic | ICD-10-CM

## 2025-04-17 DIAGNOSIS — I10 ESSENTIAL HYPERTENSION: Chronic | ICD-10-CM

## 2025-04-17 DIAGNOSIS — F25.0 SCHIZOAFFECTIVE DISORDER, BIPOLAR TYPE: Chronic | ICD-10-CM

## 2025-04-17 PROCEDURE — 3008F BODY MASS INDEX DOCD: CPT | Mod: CPTII,S$GLB,, | Performed by: FAMILY MEDICINE

## 2025-04-17 PROCEDURE — 3078F DIAST BP <80 MM HG: CPT | Mod: CPTII,S$GLB,, | Performed by: FAMILY MEDICINE

## 2025-04-17 PROCEDURE — 4010F ACE/ARB THERAPY RXD/TAKEN: CPT | Mod: CPTII,S$GLB,, | Performed by: FAMILY MEDICINE

## 2025-04-17 PROCEDURE — 99214 OFFICE O/P EST MOD 30 MIN: CPT | Mod: S$GLB,,, | Performed by: FAMILY MEDICINE

## 2025-04-17 PROCEDURE — 1159F MED LIST DOCD IN RCRD: CPT | Mod: CPTII,S$GLB,, | Performed by: FAMILY MEDICINE

## 2025-04-17 PROCEDURE — G2211 COMPLEX E/M VISIT ADD ON: HCPCS | Mod: S$GLB,,, | Performed by: FAMILY MEDICINE

## 2025-04-17 PROCEDURE — 1160F RVW MEDS BY RX/DR IN RCRD: CPT | Mod: CPTII,S$GLB,, | Performed by: FAMILY MEDICINE

## 2025-04-17 PROCEDURE — 1101F PT FALLS ASSESS-DOCD LE1/YR: CPT | Mod: CPTII,S$GLB,, | Performed by: FAMILY MEDICINE

## 2025-04-17 PROCEDURE — 99999 PR PBB SHADOW E&M-EST. PATIENT-LVL IV: CPT | Mod: PBBFAC,,, | Performed by: FAMILY MEDICINE

## 2025-04-17 PROCEDURE — 3288F FALL RISK ASSESSMENT DOCD: CPT | Mod: CPTII,S$GLB,, | Performed by: FAMILY MEDICINE

## 2025-04-17 PROCEDURE — 3074F SYST BP LT 130 MM HG: CPT | Mod: CPTII,S$GLB,, | Performed by: FAMILY MEDICINE

## 2025-04-17 RX ORDER — AZITHROMYCIN 250 MG/1
TABLET, FILM COATED ORAL
Qty: 6 TABLET | Refills: 0 | Status: SHIPPED | OUTPATIENT
Start: 2025-04-17 | End: 2025-04-22

## 2025-04-17 NOTE — PROGRESS NOTES
CHIEF COMPLAINT:   F/U in a patient with diabetes and hypertension    HISTORY OF PRESENT ILLNESS: The patient is a 65 year-old black female.  DM and HTN refill    Previously inpt d/t AMS.  No records of this hospitalization.  This may be due the fact that it appears to have been a psychiatric hospitalization at [place].  In any event I was unable to review records and limited history is available from patient and family.  Ultimately the family confided that they felt the lack of information was due to the sensitive nature of the admission.  She appears to be back to baseline    The patient has a long and complicated medical history.      She continues to have problems with neuropathic pain as well as central pain.  She does well w/ her Lyrica 100 twice a day.    The patient has a history of diabetes.  Recent blood sugars have been less than 200.  There have been no episodes of hypoglycemia.    The patient has a history of stable hypertension on current medications.  Patient denies chest pain or shortness of breath today.    The patient has a history of stable hyperlipidemia on current medications.  The patient denies chest pain or shortness of breath today.  The patient denies muscle aches or myalgias suggestive of myositis.    She has a history of breast cancer for which she underwent bilateral mastectomies and chemotherapy.    REVIEW OF SYSTEMS:  GENERAL: No fever, chills or weight loss.  SKIN: No rashes, itching or changes in color or texture of skin.  HEAD: No headaches or recent head trauma.  EYES: Visual acuity fine. No photophobia, ocular pain or diplopia.  EARS: Denies ear pain, discharge or vertigo.  NOSE: No loss of smell, no epistaxis or postnasal drip.  MOUTH & THROAT: No hoarseness or change in voice. No excessive gum bleeding.  NODES: Denies swollen glands.  CHEST: Denies AWAD, cyanosis, wheezing, cough and sputum production.  CARDIOVASCULAR: Denies chest pain, PND, orthopnea or reduced exercise  "tolerance.  ABDOMEN: Appetite fine. No weight loss. Denies diarrhea, abdominal pain, hematemesis or blood in stool.  URINARY: No flank pain, dysuria or hematuria.  PERIPHERAL VASCULAR: No claudication or cyanosis.  MUSCULOSKELETAL: No joint stiffness or swelling. Except as noted above.  NEUROLOGIC: No history of seizures    SOCIAL HISTORY: The patient does smoke.  The patient consumes alcohol socially.      PHYSICAL EXAMINATION:   Blood pressure 114/75, pulse 71, height 5' 3" (1.6 m), weight 85.8 kg (189 lb 2.5 oz), SpO2 97%.    APPEARANCE: Well nourished, well developed, in no acute distress.    HEAD: Normocephalic, atraumatic.  EYES: PERRL. EOMI.  Conjunctivae without injection and  Anicteric  NOSE: Mucosa pink. Airway clear.  MOUTH & THROAT: No tonsillar enlargement. No pharyngeal erythema or exudate. No stridor.  NECK: Supple.   NODES: No cervical, axillary or inguinal lymph node enlargement.  CHEST: Lungs clear to auscultation.  No retractions are noted.  No rales or rhonchi are present.  CARDIOVASCULAR: Normal S1, S2. No rubs, murmurs or gallops.  ABDOMEN: Bowel sounds normal. Not distended. Soft. No tenderness or masses.  No ascites is noted.  MUSCULOSKELETAL:  There is no clubbing, cyanosis, or edema of the extremities x4.  There is full range of motion of the lumbar spine.  There is full range of motion of the extremities x4.  There is no deformity noted.    NEUROLOGIC:       Normal speech development.      Hearing normal.      paretic gait.      Motor and sensory exams grossly normal.  Mild RLE weakness noted  PSYCHIATRIC: Patient is alert and oriented x3.  Thought processes are all normal.  There is no homicidality.  There is no suicidality.  There is no evidence of psychosis.    LABORATORY/RADIOLOGY:   Chart reviewed including surgeries and blood work    ASSESSMENT:   CVA  Breast cancer with resultant lymphedema  Hypertension, condition is well controlled on current medication regimen  Diabetes, " condition is well controlled on current medication regimen  Neuropathic pain  Cervical radiculopathy  Schizophrenia    PLAN:  A1c was good recently  Depakote   Her diabetes is very well controlled    Pain clinic   KCl 40 po qd  Prinzide  Pravachol 20 mg by mouth daily    Return to clinic in 6 month with blood work prior

## 2025-05-21 ENCOUNTER — TELEPHONE (OUTPATIENT)
Dept: SMOKING CESSATION | Facility: CLINIC | Age: 65
End: 2025-05-21
Payer: MEDICARE

## 2025-05-21 NOTE — TELEPHONE ENCOUNTER
Called patient about 12 month follow up. Left message for patient to call [phone]. Resolved quit episode.

## 2025-05-26 ENCOUNTER — PATIENT OUTREACH (OUTPATIENT)
Dept: ADMINISTRATIVE | Facility: HOSPITAL | Age: 65
End: 2025-05-26
Payer: MEDICARE

## 2025-05-26 DIAGNOSIS — E11.9 DIABETES MELLITUS WITHOUT COMPLICATION: Primary | ICD-10-CM

## 2025-05-28 ENCOUNTER — TELEPHONE (OUTPATIENT)
Dept: SMOKING CESSATION | Facility: CLINIC | Age: 65
End: 2025-05-28
Payer: MEDICARE

## 2025-05-28 NOTE — TELEPHONE ENCOUNTER
2nd attempt to contact patient about 12 month follow up. Left message to call ANI Barrett @ [phone].

## 2025-06-04 ENCOUNTER — LAB VISIT (OUTPATIENT)
Dept: LAB | Facility: OTHER | Age: 65
End: 2025-06-04
Attending: FAMILY MEDICINE
Payer: MEDICARE

## 2025-06-04 DIAGNOSIS — E11.9 DIABETES MELLITUS WITHOUT COMPLICATION: ICD-10-CM

## 2025-06-04 DIAGNOSIS — F25.0 SCHIZOAFFECTIVE DISORDER, BIPOLAR TYPE: Chronic | ICD-10-CM

## 2025-06-04 DIAGNOSIS — N18.31 STAGE 3A CHRONIC KIDNEY DISEASE: ICD-10-CM

## 2025-06-04 DIAGNOSIS — I10 ESSENTIAL HYPERTENSION: Chronic | ICD-10-CM

## 2025-06-04 DIAGNOSIS — Z79.4 TYPE 2 DIABETES MELLITUS WITH DIABETIC POLYNEUROPATHY, WITH LONG-TERM CURRENT USE OF INSULIN: Chronic | ICD-10-CM

## 2025-06-04 DIAGNOSIS — C50.912 MALIGNANT NEOPLASM OF LEFT FEMALE BREAST, UNSPECIFIED ESTROGEN RECEPTOR STATUS, UNSPECIFIED SITE OF BREAST: ICD-10-CM

## 2025-06-04 DIAGNOSIS — E11.42 TYPE 2 DIABETES MELLITUS WITH DIABETIC POLYNEUROPATHY, WITH LONG-TERM CURRENT USE OF INSULIN: Chronic | ICD-10-CM

## 2025-06-04 LAB
ALBUMIN SERPL BCP-MCNC: 3.6 G/DL (ref 3.5–5.2)
ALBUMIN/CREAT UR: 3.3 UG/MG
ALP SERPL-CCNC: 48 UNIT/L (ref 40–150)
ALT SERPL W/O P-5'-P-CCNC: 19 UNIT/L (ref 10–44)
ANION GAP (OHS): 10 MMOL/L (ref 8–16)
AST SERPL-CCNC: 32 UNIT/L (ref 11–45)
BILIRUB SERPL-MCNC: 0.6 MG/DL (ref 0.1–1)
BUN SERPL-MCNC: 10 MG/DL (ref 8–23)
CALCIUM SERPL-MCNC: 8.9 MG/DL (ref 8.7–10.5)
CHLORIDE SERPL-SCNC: 105 MMOL/L (ref 95–110)
CHOLEST SERPL-MCNC: 237 MG/DL (ref 120–199)
CHOLEST/HDLC SERPL: 6.8 {RATIO} (ref 2–5)
CO2 SERPL-SCNC: 26 MMOL/L (ref 23–29)
CREAT SERPL-MCNC: 1.1 MG/DL (ref 0.5–1.4)
CREAT UR-MCNC: 270 MG/DL (ref 15–325)
EAG (OHS): 189 MG/DL (ref 68–131)
GFR SERPLBLD CREATININE-BSD FMLA CKD-EPI: 56 ML/MIN/1.73/M2
GLUCOSE SERPL-MCNC: 157 MG/DL (ref 70–110)
HBA1C MFR BLD: 8.2 % (ref 4–5.6)
HDLC SERPL-MCNC: 35 MG/DL (ref 40–75)
HDLC SERPL: 14.8 % (ref 20–50)
LDLC SERPL CALC-MCNC: 166.4 MG/DL (ref 63–159)
MICROALBUMIN UR-MCNC: 9 UG/ML (ref ?–5000)
NONHDLC SERPL-MCNC: 202 MG/DL
POTASSIUM SERPL-SCNC: 4 MMOL/L (ref 3.5–5.1)
PROT SERPL-MCNC: 7.6 GM/DL (ref 6–8.4)
SODIUM SERPL-SCNC: 141 MMOL/L (ref 136–145)
TRIGL SERPL-MCNC: 178 MG/DL (ref 30–150)
TSH SERPL-ACNC: 1.28 UIU/ML (ref 0.4–4)

## 2025-06-04 PROCEDURE — 84443 ASSAY THYROID STIM HORMONE: CPT

## 2025-06-04 PROCEDURE — 36415 COLL VENOUS BLD VENIPUNCTURE: CPT

## 2025-06-04 PROCEDURE — 82570 ASSAY OF URINE CREATININE: CPT

## 2025-06-04 PROCEDURE — 80061 LIPID PANEL: CPT

## 2025-06-04 PROCEDURE — 83036 HEMOGLOBIN GLYCOSYLATED A1C: CPT

## 2025-06-04 PROCEDURE — 80053 COMPREHEN METABOLIC PANEL: CPT

## 2025-06-05 ENCOUNTER — RESULTS FOLLOW-UP (OUTPATIENT)
Dept: INTERNAL MEDICINE | Facility: CLINIC | Age: 65
End: 2025-06-05

## 2025-06-11 DIAGNOSIS — Z79.4 TYPE 2 DIABETES MELLITUS WITH DIABETIC POLYNEUROPATHY, WITH LONG-TERM CURRENT USE OF INSULIN: Primary | Chronic | ICD-10-CM

## 2025-06-11 DIAGNOSIS — E11.42 TYPE 2 DIABETES MELLITUS WITH DIABETIC POLYNEUROPATHY, WITH LONG-TERM CURRENT USE OF INSULIN: Primary | Chronic | ICD-10-CM

## 2025-06-23 ENCOUNTER — CLINICAL SUPPORT (OUTPATIENT)
Dept: DIABETES | Facility: CLINIC | Age: 65
End: 2025-06-23
Attending: FAMILY MEDICINE
Payer: MEDICARE

## 2025-06-23 VITALS — WEIGHT: 191.94 LBS | BODY MASS INDEX: 34 KG/M2

## 2025-06-23 DIAGNOSIS — E11.42 TYPE 2 DIABETES MELLITUS WITH DIABETIC POLYNEUROPATHY, WITH LONG-TERM CURRENT USE OF INSULIN: Chronic | ICD-10-CM

## 2025-06-23 DIAGNOSIS — Z79.4 TYPE 2 DIABETES MELLITUS WITH DIABETIC POLYNEUROPATHY, WITH LONG-TERM CURRENT USE OF INSULIN: Chronic | ICD-10-CM

## 2025-06-23 PROCEDURE — 99999 PR PBB SHADOW E&M-EST. PATIENT-LVL I: CPT | Mod: PBBFAC,,, | Performed by: DIETITIAN, REGISTERED

## 2025-06-23 PROCEDURE — G0108 DIAB MANAGE TRN  PER INDIV: HCPCS | Mod: S$GLB,,, | Performed by: FAMILY MEDICINE

## 2025-06-30 DIAGNOSIS — N32.81 OVERACTIVE BLADDER: ICD-10-CM

## 2025-06-30 RX ORDER — OXYBUTYNIN CHLORIDE 10 MG/1
10 TABLET, EXTENDED RELEASE ORAL DAILY
Qty: 90 TABLET | Refills: 3 | Status: SHIPPED | OUTPATIENT
Start: 2025-06-30

## 2025-06-30 NOTE — TELEPHONE ENCOUNTER
No care due was identified.  [facility] Embedded Care Due Messages. Reference number: [id number].   6/30/2025 11:07:00 AM CDT

## 2025-06-30 NOTE — TELEPHONE ENCOUNTER
Refill Encounter    PCP Visits: Recent Visits  Date Type Provider Dept   04/17/25 Office Visit Ghulam Contreras MD [facility]   11/27/24 Office Visit Ghulam Contreras MD [facility]   Showing recent visits within past 360 days and meeting all other requirements  Future Appointments  No visits were found meeting these conditions.  Showing future appointments within next 720 days and meeting all other requirements      Last 3 Blood Pressure:   BP Readings from Last 3 Encounters:   04/17/25 114/75   11/27/24 110/82   04/30/24 111/67     Preferred Pharmacy:   [id number] - [place], [place] - [address]  [address] [facility] [address]  Phone: [phone] Fax: [phone]    Requested RX:  Requested Prescriptions     Pending Prescriptions Disp Refills    oxybutynin (DITROPAN-XL) 10 MG 24 hr tablet 90 tablet 3     Sig: Take 1 tablet (10 mg total) by mouth once daily.      RX Route: Normal

## 2025-07-01 NOTE — PROGRESS NOTES
Diabetes Care Specialist Progress Note  Author: Sima Rivera RD  Date: 7/1/2025    Intake    Program Intake  Reason for Diabetes Program Visit:: Initial Diabetes Assessment  Current diabetes risk level:: moderate    Current Diabetes Treatment: Oral Medications  Oral Medication Type/Dose: Metformin, two pills twice daily    Continuous Glucose Monitoring  Patient has CGM: No    Lab Results   Component Value Date    HGBA1C 8.2 (H) 06/04/2025       Weight: 87.1 kg (191 lb 14.6 oz)       Body mass index is 34 kg/m².    Lifestyle Coping Support & Clinical    Lifestyle/Coping/Support  Learning Barriers:: Literacy  Culture or Mu-ism beliefs that may impact ability to access healthcare: No  Psychosocial/Coping Skills Assessment Completed: : Yes  Assessment indicates:: Knowledge deficit, Instruction Needed  Area of need?: Yes         Diabetes Self-Management Skills Assessment    Medication Skills Assessment  Patient is able to identify current diabetes medications, dosages, and appropriate timing of medications.: yes  Patient reports problems or concerns with current medication regimen.: yes  Medication regimen problems/concerns:: concerned about side effects  Patient is  aware that some diabetes medications can cause low blood sugar?: No  Medication Skills Assessment Completed:: Yes  Assessment indicates:: Knowledge deficit, Instruction Needed  Area of need?: Yes    Diabetes Disease Process/Treatment Options  Diabetes Type?: Type II  If previous diabetes education, when/where:: yes, here  Is patient aware of what causes diabetes?: No  Does patient understand the pathophysiology of diabetes?: No  Diabetes Disease Process/Treatment Options: Skills Assessment Completed: Yes  Assessment indicates:: Knowledge deficit  Area of need?: Deferred    Nutrition/Healthy Eating  Meal Plan 24 Hour Recall - Breakfast: Wakes around 11 am and eats around 12-1pm, yesterday ate french fries with water with 11 spoons of sugar  Meal  Plan 24 Hour Recall - Dinner: last night ate french fries, pork chop baked with cake and water with 10-11 spoons of sugar.  Meal Plan 24 Hour Recall - Snack: Would like some snack but does not have any at this time.  Meal Plan 24 Hour Recall - Beverage: pepsi, sweet tea, water with 10-11 spoons of sugar  Who shops/cooks?: daughter shops for her and she cooks for herself  Patient can identify foods that impact blood sugar.: no (see comments)  Challenges to healthy eating:: other (see comments) (financial, lack of knowledge)  Nutrition/Healthy Eating Skills Assessment Completed:: Yes  Assessment indicates:: Knowledge deficit, Instruction Needed  Area of need?: Yes    Physical Activity/Exercise  Patient's daily activity level:: sedentary  Patient formally exercises outside of work.: no  Reasons for not exercising:: physically unable to exercise currently  Patient can identify forms of physical activity.: no  Physical Activity/Exercise Skills Assessment Completed: : Yes  Assessment indicates:: Knowledge deficit  Area of need?: Deferred    Home Blood Glucose Monitoring  Patient states that blood sugar is checked at home daily.: no  Home Blood Glucose Monitoring Skills Assessment Completed: : Yes  Assessment indicates:: Knowledge deficit, Instruction Needed  Area of need?: Yes    Acute Complications  Acute Complications Skills Assessment Completed: : No  Deffered due to:: Time  Area of need?: Deferred    Chronic Complications  Chronic Complications Skills Assessment Completed: : No  Deferred due to:: Time  Area of need?: Deferred      Assessment Summary and Plan    Based on today's diabetes care assessment, the following areas of need were identified:      Identified Areas of Need      Medication/Current Diabetes Treatment: Yes-see care plan   Lifestyle Coping/Support: Yes-reviewed stress relieving techniques   Diabetes Disease Process/Treatment Options: Deferred   Nutrition/Healthy Eating: Yes -see care plan   Physical  Activity/Exercise: Deferred    Home Blood Glucose Monitoring: Yes -see care plan   Acute Complications: Deferred    Chronic Complications: Deferred       Today's interventions were provided through individual discussion, instruction, and written materials were provided.      Patient verbalized understanding of instruction and written materials.  Pt was able to return back demonstration of instructions today. Patient understood key points, needs reinforcement and further instruction.     Diabetes Self-Management Care Plan:    Today's Diabetes Self-Management Care Plan was developed with Jonathan's input. Jonathan has agreed to work toward the following goal(s) to improve his/her overall diabetes control.      There are no recently modified care plans to display for this patient.      Follow Up Plan     No follow-ups on file.    Today's care plan and follow up schedule was discussed with patient.  Jonathan verbalized understanding of the care plan, goals, and agrees to follow up plan.        The patient was encouraged to communicate with his/her health care provider/physician and care team regarding his/her condition(s) and treatment.  I provided the patient with my contact information today and encouraged to contact me via phone or [facility] Patient Portal as needed.     Length of Visit   Total Time: 60 Minutes

## 2025-07-03 ENCOUNTER — TELEPHONE (OUTPATIENT)
Dept: INTERNAL MEDICINE | Facility: CLINIC | Age: 65
End: 2025-07-03
Payer: MEDICARE

## 2025-07-03 NOTE — TELEPHONE ENCOUNTER
Attempted to call pt regarding new medication that was called into her [facility]'s pharmacy.     No answer, LVM        Disp Refills Start End SOMMER   SITagliptin phosphate (JANUVIA) 50 MG Tab 90 tablet 3 7/1/2025 -- No   Sig - Route: Take 1 tablet (50 mg total) by mouth once daily. - Oral   Sent to pharmacy as: SITagliptin phosphate (JANUVIA) 50 MG Tab   Class: Normal   Order: [id number]   Date/Time Signed: 7/1/2025 17:30       E-Prescribing Status: Receipt confirmed by pharmacy (7/1/2025  5:30 PM CDT)     Pharmacy    [id number] - [place], [place] - [address]

## 2025-07-17 ENCOUNTER — TELEPHONE (OUTPATIENT)
Dept: INTERNAL MEDICINE | Facility: CLINIC | Age: 65
End: 2025-07-17
Payer: MEDICARE

## 2025-07-17 NOTE — TELEPHONE ENCOUNTER
Tried to contact Adilia with [facility] but no answer and you can't speak with to the Rep I left a message for them to reach back out to the office.

## 2025-07-17 NOTE — TELEPHONE ENCOUNTER
Copied from CRM #[id number]. Topic: General Inquiry - Patient Advice  >> Jul 17, 2025 11:34 AM Med Assistant Ashanti wrote:  Type: Patient Call Back    Who called: Adilia with [facility]    What is the request in detail: They are checking the status of a fax that was sent over on 7/14/25 for the pt diabetic supplies. Please assist.    Can the clinic reply by [facility]? no    Would the patient rather a call back or a response via [facility]?  call    Best call back number: [phone]

## 2025-07-25 ENCOUNTER — TELEPHONE (OUTPATIENT)
Dept: INTERNAL MEDICINE | Facility: CLINIC | Age: 65
End: 2025-07-25
Payer: MEDICARE

## 2025-07-25 NOTE — TELEPHONE ENCOUNTER
Copied from CRM #[id number]. Topic: General Inquiry - Status Check  >> Jul 25, 2025  9:01 AM Mireya wrote:  Elizabeth is calling to check the status of a form that was sent over on the 14th and doctor notes. Please contact Elizabeth  [employer]     PH: [phone] opt 3   Fax: [phone]

## 2025-07-25 NOTE — TELEPHONE ENCOUNTER
S/w Elizabeth of [facility] is waiting for a copy of the last office visit and the continuous glucose monitor form (fax to us today) both to be faxed to [phone]. You may call the office for more questions at [phone].    Just s/w Faiza about this form. She is assisting me with this matter.

## 2025-07-28 ENCOUNTER — TELEPHONE (OUTPATIENT)
Dept: INTERNAL MEDICINE | Facility: CLINIC | Age: 65
End: 2025-07-28
Payer: MEDICARE

## 2025-08-04 ENCOUNTER — CLINICAL SUPPORT (OUTPATIENT)
Dept: DIABETES | Facility: CLINIC | Age: 65
End: 2025-08-04
Payer: MEDICARE

## 2025-08-04 DIAGNOSIS — Z79.4 TYPE 2 DIABETES MELLITUS WITH DIABETIC POLYNEUROPATHY, WITH LONG-TERM CURRENT USE OF INSULIN: Primary | ICD-10-CM

## 2025-08-04 DIAGNOSIS — E11.42 TYPE 2 DIABETES MELLITUS WITH DIABETIC POLYNEUROPATHY, WITH LONG-TERM CURRENT USE OF INSULIN: Primary | ICD-10-CM

## 2025-08-04 PROCEDURE — G0108 DIAB MANAGE TRN  PER INDIV: HCPCS | Mod: S$GLB,,, | Performed by: FAMILY MEDICINE

## 2025-08-04 NOTE — PROGRESS NOTES
Diabetes Care Specialist Progress Note  Author: Sima Rivera RD  Date: 8/4/2025    Intake    Program Intake  Reason for Diabetes Program Visit:: Intervention  Type of Intervention:: Individual  Individual: Education              Lab Results   Component Value Date    HGBA1C 8.2 (H) 06/04/2025               There is no height or weight on file to calculate BMI.    Lifestyle Coping Support & Clinical    Lifestyle/Coping/Support  Area of need?: Yes         Diabetes Self-Management Skills Assessment    Medication Skills Assessment  Area of need?: Yes         Nutrition/Healthy Eating  Area of need?: Yes         Home Blood Glucose Monitoring  Area of need?: Yes                Assessment Summary and Plan    Based on today's diabetes care assessment, the following areas of need were identified:      Identified Areas of Need      Medication/Current Diabetes Treatment: Yes-see care plan   Lifestyle Coping/Support: Yes-Her son recently passed away. Discussed stress reliving techniques. She smokes when stressed. Admits she does not smoke as much when she is with her children and is currently living with her daughter. Her daughter takes her out and about often which has improved her mood and leads to her being more active.   Diabetes Disease Process/Treatment Options:     Nutrition/Healthy Eating: Yes -see care plan   Physical Activity/Exercise:      Home Blood Glucose Monitoring: Yes -see care plan   Acute Complications:      Chronic Complications:         Today's interventions were provided through individual discussion, instruction, and written materials were provided.      Patient verbalized understanding of instruction and written materials.  Pt was able to return back demonstration of instructions today. Patient understood key points, needs reinforcement and further instruction.     Diabetes Self-Management Care Plan:    Today's Diabetes Self-Management Care Plan was developed with Jonathan's input. Jonathan has agreed  to work toward the following goal(s) to improve his/her overall diabetes control.      Care Plan: Diabetes Management   Updates made since 8/4/2024 12:00 AM        Problem: Healthy Eating         Goal: Omit sweet beverages from diet    Start Date: 6/23/2025   Expected End Date: 8/4/2025   This Visit's Progress: On track   Priority: High   Barriers: Knowledge deficit   Note:    6/23/25-Currently drinking Pepsi, sweet tea, and water with 10-11 spoons of sugar in it. She agrees to finish her current supply of drinks and stop buying them. She agrees to work towards drinking water and sugar free beverages only. She will inform her daughter who purchases all of her groceries for her. She may move in with her daughter this summer due to her house no longer being available to rent. She currently eats very limited food choices including lots of french fries. Ed on plate method. Suggested she eat eggs and toast for breakfast instead of french fries. She does not like many vegetables but agrees to try to expand her taste.     8/4/25-States omitted sugar water and sweet tea, continues to drink Pepsi. Daughter is here today. Discussed drinking water, crystal light, Sprite Zero instead of pepsi and sweet tea. Ed on omitting all dark ryann due to kidney issues. B:no breakfast, Lunch: shrimp sandwich with french fries. Loves french fries. Discussed her eating more nonstarchy vegetables, reviewed her choices. She agrees to continue to work on omitting sweet beverages and increasing her intake of nonstarchy vegetables. Agrees to eat eggs and toast for breakfast.        Task: Reviewed the sources and role of Carbohydrate, Protein, and Fat and how each nutrient impacts blood sugar.         Task: Provided visual examples using dry measuring cups, food models, and other familiar objects such as computer mouse, deck or cards, tennis ball etc. to help with visualization of portions. Completed 6/23/2025        Task: Explained how to count  "carbohydrates using the food label and the use of dry measuring cups for accurate carb counting.         Task: Discussed strategies for choosing healthier menu options when dining out.         Task: Recommended replacing beverages containing high sugar content with noncaloric/sugar free options and/or water. Completed 6/23/2025        Task: Review the importance of balancing carbohydrates with each meal using portion control techniques to count servings of carbohydrate and label reading to identify serving size and amount of total carbs per serving.         Task: Provided Sample plate method and reviewed the use of the plate to estimate amounts of carbohydrate per meal. Completed 6/23/2025        Problem: Medications         Goal: Patient Agrees to take Diabetes Medications as prescribed.    Start Date: 6/23/2025   Expected End Date: 8/4/2025   This Visit's Progress: On track   Priority: Medium   Barriers: No Barriers Identified   Note:    6/23/25-Taking metformin as ordered. Concerned about its safety because of "rumors" she has heard. Ed on Metformin SE, MOA, and benefits. Her A1c is not controlled and she is open to taking additional Dm medications. Will speak with her PCP about this.     8/4/25-Did not take medications this morning because she was busy with her daughter. She started taking Januvia on 7/2/25. Usually takes her medication as ordered daily. Discussed importance to taking all her medications as ordered daily.        Task: Reviewed with patient all current diabetes medications and provided basic review of the purpose, dosage, frequency, side effects, and storage of both oral and injectable diabetes medications. Completed 6/23/2025        Task: Reviewed possible resources for acquiring cost prohibitive medication.         Task: Instructed patient on how to self-administer         Task: Discussed guidelines for preventing, detecting and treating hypoglycemia and hyperglycemia and reviewed the " importance of meal and medication timing with diabetes mediations for prevention of hypoglycemia and maximum drug benefit. Completed 2025        Problem: Blood Glucose Self-Monitoring         Goal: Patient agrees to check and record blood sugars 1 time per day.    Start Date: 2025   Expected End Date: 2025   This Visit's Progress: On track   Priority: Low   Barriers: No Barriers Identified   Note:    25-Does not take BG regularly. States took it Saturday morning and I was above 220. She agrees to take her BG once a day at various times. Ed on target BG ranges. Logs provided.     25-Presents today with logs. Fastin,182,180,140 Before lunch:174,142,146,140 Before dinner:140,182. Not at goal yet. Discussed target goals. Recent stress due to loss of son and recent move. She is currently living with her daughter but will save up to get her own apartment.        Task: Provided patient with a meter today and sent Rx request to provider to send to patients pharmacy.         Task: Reviewed the importance of self-monitoring blood glucose and keeping logs. Completed 2025        Task: Instructed on how to self-monitor blood glucose using a home glucometer, how to properly dispose of used strips and lancets after use, and how to appropriately store meter and supplies.         Task: Provided patient with blood glucose logs, reviewed appropriate timing and frequency to SMBG, education on parameters on when to notify provider and advised patient to bring logs to all appts with PCP/Endocrinologist/Diabetes Care Specialist.         Task: Discussed ways to minimize pain when monitoring blood glucose.           Follow Up Plan     No follow-ups on file.    Today's care plan and follow up schedule was discussed with patient.  Jonathan verbalized understanding of the care plan, goals, and agrees to follow up plan.        The patient was encouraged to communicate with his/her health care provider/physician  and care team regarding his/her condition(s) and treatment.  I provided the patient with my contact information today and encouraged to contact me via phone or [facility] Patient Portal as needed.     Length of Visit   Total Time: 60 Minutes

## 2025-08-05 ENCOUNTER — TELEPHONE (OUTPATIENT)
Dept: INTERNAL MEDICINE | Facility: CLINIC | Age: 65
End: 2025-08-05
Payer: MEDICARE

## 2025-09-02 ENCOUNTER — TELEPHONE (OUTPATIENT)
Dept: INTERNAL MEDICINE | Facility: CLINIC | Age: 65
End: 2025-09-02
Payer: MEDICARE

## (undated) DEVICE — DRESSING LEUKOPLAST FLEX 1X3IN